# Patient Record
Sex: MALE | Race: WHITE | Employment: OTHER | URBAN - METROPOLITAN AREA
[De-identification: names, ages, dates, MRNs, and addresses within clinical notes are randomized per-mention and may not be internally consistent; named-entity substitution may affect disease eponyms.]

---

## 2017-01-03 ENCOUNTER — OFFICE VISIT (OUTPATIENT)
Dept: CARDIAC REHAB | Facility: CLINIC | Age: 82
End: 2017-01-03
Payer: COMMERCIAL

## 2017-01-03 DIAGNOSIS — Z95.2 S/P TAVR (TRANSCATHETER AORTIC VALVE REPLACEMENT): ICD-10-CM

## 2017-01-03 PROCEDURE — 93798 PHYS/QHP OP CAR RHAB W/ECG: CPT

## 2017-01-06 ENCOUNTER — HOSPITAL ENCOUNTER (INPATIENT)
Facility: HOSPITAL | Age: 82
LOS: 5 days | Discharge: HOME HEALTH | DRG: 291 | End: 2017-01-11
Attending: EMERGENCY MEDICINE | Admitting: INTERNAL MEDICINE
Payer: COMMERCIAL

## 2017-01-06 ENCOUNTER — APPOINTMENT (EMERGENCY)
Dept: RADIOLOGY | Facility: HOSPITAL | Age: 82
DRG: 291 | End: 2017-01-06
Payer: COMMERCIAL

## 2017-01-06 DIAGNOSIS — I50.22 CHRONIC SYSTOLIC HEART FAILURE (HCC): Chronic | ICD-10-CM

## 2017-01-06 DIAGNOSIS — J44.9 CHRONIC OBSTRUCTIVE PULMONARY DISEASE, UNSPECIFIED COPD TYPE (HCC): Chronic | ICD-10-CM

## 2017-01-06 DIAGNOSIS — J44.1 COPD EXACERBATION (HCC): ICD-10-CM

## 2017-01-06 DIAGNOSIS — I48.0 PAROXYSMAL ATRIAL FIBRILLATION (HCC): ICD-10-CM

## 2017-01-06 DIAGNOSIS — J44.1 COPD WITH EXACERBATION (HCC): Primary | ICD-10-CM

## 2017-01-06 DIAGNOSIS — I49.9 ARRHYTHMIA: ICD-10-CM

## 2017-01-06 DIAGNOSIS — J90 PLEURAL EFFUSION: ICD-10-CM

## 2017-01-06 PROBLEM — I47.2 NON-SUSTAINED VENTRICULAR TACHYCARDIA (HCC): Status: ACTIVE | Noted: 2017-01-06

## 2017-01-06 LAB
ALBUMIN SERPL BCP-MCNC: 2.7 G/DL (ref 3.5–5)
ALP SERPL-CCNC: 67 U/L (ref 46–116)
ALT SERPL W P-5'-P-CCNC: 14 U/L (ref 12–78)
ANION GAP SERPL CALCULATED.3IONS-SCNC: 13 MMOL/L (ref 4–13)
APTT PPP: 30 SECONDS (ref 24–36)
AST SERPL W P-5'-P-CCNC: 40 U/L (ref 5–45)
BACTERIA UR QL AUTO: NORMAL /HPF
BASOPHILS # BLD AUTO: 0 THOUSANDS/ΜL (ref 0–0.1)
BASOPHILS NFR BLD AUTO: 0 % (ref 0–1)
BILIRUB SERPL-MCNC: 0.6 MG/DL (ref 0.2–1)
BILIRUB UR QL STRIP: NEGATIVE
BUN SERPL-MCNC: 23 MG/DL (ref 5–25)
CALCIUM SERPL-MCNC: 8.7 MG/DL (ref 8.3–10.1)
CHLORIDE SERPL-SCNC: 103 MMOL/L (ref 100–108)
CK SERPL-CCNC: 111 U/L (ref 39–308)
CLARITY UR: CLEAR
CO2 SERPL-SCNC: 25 MMOL/L (ref 21–32)
COLOR UR: YELLOW
CREAT SERPL-MCNC: 1.8 MG/DL (ref 0.6–1.3)
EOSINOPHIL # BLD AUTO: 0 THOUSAND/ΜL (ref 0–0.61)
EOSINOPHIL NFR BLD AUTO: 1 % (ref 0–6)
ERYTHROCYTE [DISTWIDTH] IN BLOOD BY AUTOMATED COUNT: 15.8 % (ref 11.6–15.1)
GFR SERPL CREATININE-BSD FRML MDRD: 36 ML/MIN/1.73SQ M
GLUCOSE SERPL-MCNC: 98 MG/DL (ref 65–140)
GLUCOSE UR STRIP-MCNC: NEGATIVE MG/DL
HCT VFR BLD AUTO: 30.5 % (ref 42–52)
HGB BLD-MCNC: 9.5 G/DL (ref 14–18)
HGB UR QL STRIP.AUTO: NEGATIVE
INR PPP: 1.26 (ref 0.86–1.16)
KETONES UR STRIP-MCNC: NEGATIVE MG/DL
LACTATE SERPL-SCNC: 1.1 MMOL/L (ref 0.5–2)
LEUKOCYTE ESTERASE UR QL STRIP: NEGATIVE
LYMPHOCYTES # BLD AUTO: 0.6 THOUSANDS/ΜL (ref 0.6–4.47)
LYMPHOCYTES NFR BLD AUTO: 11 % (ref 14–44)
MAGNESIUM SERPL-MCNC: 2 MG/DL (ref 1.6–2.6)
MCH RBC QN AUTO: 25.1 PG (ref 27–31)
MCHC RBC AUTO-ENTMCNC: 31.1 G/DL (ref 31.4–37.4)
MCV RBC AUTO: 81 FL (ref 82–98)
MONOCYTES # BLD AUTO: 0.8 THOUSAND/ΜL (ref 0.17–1.22)
MONOCYTES NFR BLD AUTO: 13 % (ref 4–12)
NEUTROPHILS # BLD AUTO: 4.6 THOUSANDS/ΜL (ref 1.85–7.62)
NEUTS SEG NFR BLD AUTO: 76 % (ref 43–75)
NITRITE UR QL STRIP: NEGATIVE
NON-SQ EPI CELLS URNS QL MICRO: NORMAL /HPF
NRBC BLD AUTO-RTO: 0 /100 WBCS
NT-PROBNP SERPL-MCNC: 6070 PG/ML
PH UR STRIP.AUTO: 5.5 [PH] (ref 5–9)
PHOSPHATE SERPL-MCNC: 2.4 MG/DL (ref 2.3–4.1)
PLATELET # BLD AUTO: 271 THOUSANDS/UL (ref 130–400)
PMV BLD AUTO: 8.4 FL (ref 8.9–12.7)
POTASSIUM SERPL-SCNC: 3.9 MMOL/L (ref 3.5–5.3)
PROT SERPL-MCNC: 7.3 G/DL (ref 6.4–8.2)
PROT UR STRIP-MCNC: ABNORMAL MG/DL
PROTHROMBIN TIME: 13.3 SECONDS (ref 9.4–11.7)
RBC # BLD AUTO: 3.77 MILLION/UL (ref 4.7–6.1)
RBC #/AREA URNS AUTO: NORMAL /HPF
SODIUM SERPL-SCNC: 141 MMOL/L (ref 136–145)
SP GR UR STRIP.AUTO: 1.02 (ref 1–1.03)
TROPONIN I SERPL-MCNC: 0.07 NG/ML
UROBILINOGEN UR QL STRIP.AUTO: 0.2 E.U./DL
WBC # BLD AUTO: 6 THOUSAND/UL (ref 4.8–10.8)
WBC #/AREA URNS AUTO: NORMAL /HPF

## 2017-01-06 PROCEDURE — 36415 COLL VENOUS BLD VENIPUNCTURE: CPT | Performed by: EMERGENCY MEDICINE

## 2017-01-06 PROCEDURE — 83880 ASSAY OF NATRIURETIC PEPTIDE: CPT | Performed by: EMERGENCY MEDICINE

## 2017-01-06 PROCEDURE — 85610 PROTHROMBIN TIME: CPT | Performed by: EMERGENCY MEDICINE

## 2017-01-06 PROCEDURE — 85025 COMPLETE CBC W/AUTO DIFF WBC: CPT | Performed by: EMERGENCY MEDICINE

## 2017-01-06 PROCEDURE — 83735 ASSAY OF MAGNESIUM: CPT | Performed by: EMERGENCY MEDICINE

## 2017-01-06 PROCEDURE — 85730 THROMBOPLASTIN TIME PARTIAL: CPT | Performed by: EMERGENCY MEDICINE

## 2017-01-06 PROCEDURE — 80053 COMPREHEN METABOLIC PANEL: CPT | Performed by: EMERGENCY MEDICINE

## 2017-01-06 PROCEDURE — 82550 ASSAY OF CK (CPK): CPT | Performed by: EMERGENCY MEDICINE

## 2017-01-06 PROCEDURE — 71010 HB CHEST X-RAY 1 VIEW FRONTAL (PORTABLE): CPT

## 2017-01-06 PROCEDURE — 87081 CULTURE SCREEN ONLY: CPT | Performed by: ANESTHESIOLOGY

## 2017-01-06 PROCEDURE — 84484 ASSAY OF TROPONIN QUANT: CPT | Performed by: FAMILY MEDICINE

## 2017-01-06 PROCEDURE — 93005 ELECTROCARDIOGRAM TRACING: CPT | Performed by: EMERGENCY MEDICINE

## 2017-01-06 PROCEDURE — 96375 TX/PRO/DX INJ NEW DRUG ADDON: CPT

## 2017-01-06 PROCEDURE — 83605 ASSAY OF LACTIC ACID: CPT | Performed by: EMERGENCY MEDICINE

## 2017-01-06 PROCEDURE — 84100 ASSAY OF PHOSPHORUS: CPT | Performed by: FAMILY MEDICINE

## 2017-01-06 PROCEDURE — 87040 BLOOD CULTURE FOR BACTERIA: CPT | Performed by: EMERGENCY MEDICINE

## 2017-01-06 PROCEDURE — 99285 EMERGENCY DEPT VISIT HI MDM: CPT

## 2017-01-06 PROCEDURE — 94640 AIRWAY INHALATION TREATMENT: CPT

## 2017-01-06 PROCEDURE — 84484 ASSAY OF TROPONIN QUANT: CPT | Performed by: EMERGENCY MEDICINE

## 2017-01-06 PROCEDURE — 81001 URINALYSIS AUTO W/SCOPE: CPT | Performed by: EMERGENCY MEDICINE

## 2017-01-06 PROCEDURE — 96365 THER/PROPH/DIAG IV INF INIT: CPT

## 2017-01-06 PROCEDURE — 96367 TX/PROPH/DG ADDL SEQ IV INF: CPT

## 2017-01-06 RX ORDER — IPRATROPIUM BROMIDE AND ALBUTEROL SULFATE 2.5; .5 MG/3ML; MG/3ML
3 SOLUTION RESPIRATORY (INHALATION) ONCE
Status: COMPLETED | OUTPATIENT
Start: 2017-01-06 | End: 2017-01-06

## 2017-01-06 RX ORDER — ACETAMINOPHEN 325 MG/1
650 TABLET ORAL ONCE
Status: COMPLETED | OUTPATIENT
Start: 2017-01-06 | End: 2017-01-06

## 2017-01-06 RX ORDER — FUROSEMIDE 10 MG/ML
20 INJECTION INTRAMUSCULAR; INTRAVENOUS DAILY
Status: DISCONTINUED | OUTPATIENT
Start: 2017-01-06 | End: 2017-01-07

## 2017-01-06 RX ORDER — DOCUSATE SODIUM 100 MG/1
100 CAPSULE, LIQUID FILLED ORAL
Status: DISCONTINUED | OUTPATIENT
Start: 2017-01-06 | End: 2017-01-11 | Stop reason: HOSPADM

## 2017-01-06 RX ORDER — AMIODARONE HYDROCHLORIDE 200 MG/1
100 TABLET ORAL DAILY
Status: DISCONTINUED | OUTPATIENT
Start: 2017-01-07 | End: 2017-01-11 | Stop reason: HOSPADM

## 2017-01-06 RX ORDER — IPRATROPIUM BROMIDE AND ALBUTEROL SULFATE 2.5; .5 MG/3ML; MG/3ML
3 SOLUTION RESPIRATORY (INHALATION) EVERY 2 HOUR PRN
Status: DISCONTINUED | OUTPATIENT
Start: 2017-01-06 | End: 2017-01-11 | Stop reason: HOSPADM

## 2017-01-06 RX ORDER — TOLTERODINE 2 MG/1
4 CAPSULE, EXTENDED RELEASE ORAL DAILY
Status: DISCONTINUED | OUTPATIENT
Start: 2017-01-07 | End: 2017-01-11 | Stop reason: HOSPADM

## 2017-01-06 RX ORDER — SODIUM CHLORIDE 9 MG/ML
125 INJECTION, SOLUTION INTRAVENOUS CONTINUOUS
Status: DISCONTINUED | OUTPATIENT
Start: 2017-01-06 | End: 2017-01-06

## 2017-01-06 RX ORDER — POLYETHYLENE GLYCOL 3350 17 G/17G
17 POWDER, FOR SOLUTION ORAL DAILY
Status: DISCONTINUED | OUTPATIENT
Start: 2017-01-07 | End: 2017-01-11 | Stop reason: HOSPADM

## 2017-01-06 RX ORDER — METHYLPREDNISOLONE SODIUM SUCCINATE 125 MG/2ML
125 INJECTION, POWDER, LYOPHILIZED, FOR SOLUTION INTRAMUSCULAR; INTRAVENOUS ONCE
Status: COMPLETED | OUTPATIENT
Start: 2017-01-06 | End: 2017-01-06

## 2017-01-06 RX ORDER — LEVOTHYROXINE SODIUM 0.05 MG/1
50 TABLET ORAL
Status: DISCONTINUED | OUTPATIENT
Start: 2017-01-07 | End: 2017-01-11 | Stop reason: HOSPADM

## 2017-01-06 RX ORDER — 0.9 % SODIUM CHLORIDE 0.9 %
3 VIAL (ML) INJECTION AS NEEDED
Status: DISCONTINUED | OUTPATIENT
Start: 2017-01-06 | End: 2017-01-11 | Stop reason: HOSPADM

## 2017-01-06 RX ORDER — AMLODIPINE BESYLATE 10 MG/1
10 TABLET ORAL DAILY
Status: DISCONTINUED | OUTPATIENT
Start: 2017-01-07 | End: 2017-01-11 | Stop reason: HOSPADM

## 2017-01-06 RX ORDER — TADALAFIL 2.5 MG/1
2.5 TABLET ORAL DAILY PRN
Status: DISCONTINUED | OUTPATIENT
Start: 2017-01-06 | End: 2017-01-06

## 2017-01-06 RX ORDER — PRAVASTATIN SODIUM 20 MG
10 TABLET ORAL
Status: DISCONTINUED | OUTPATIENT
Start: 2017-01-07 | End: 2017-01-11 | Stop reason: HOSPADM

## 2017-01-06 RX ORDER — PANTOPRAZOLE SODIUM 40 MG/1
40 TABLET, DELAYED RELEASE ORAL
Status: DISCONTINUED | OUTPATIENT
Start: 2017-01-07 | End: 2017-01-11 | Stop reason: HOSPADM

## 2017-01-06 RX ORDER — MAGNESIUM SULFATE HEPTAHYDRATE 40 MG/ML
2 INJECTION, SOLUTION INTRAVENOUS ONCE
Status: COMPLETED | OUTPATIENT
Start: 2017-01-06 | End: 2017-01-07

## 2017-01-06 RX ORDER — AMIODARONE HYDROCHLORIDE 200 MG/1
100 TABLET ORAL ONCE
Status: DISCONTINUED | OUTPATIENT
Start: 2017-01-06 | End: 2017-01-06

## 2017-01-06 RX ORDER — ASPIRIN 81 MG/1
162 TABLET, CHEWABLE ORAL DAILY
Status: DISCONTINUED | OUTPATIENT
Start: 2017-01-07 | End: 2017-01-11 | Stop reason: HOSPADM

## 2017-01-06 RX ORDER — FENOFIBRATE 145 MG/1
145 TABLET, COATED ORAL DAILY
Status: DISCONTINUED | OUTPATIENT
Start: 2017-01-07 | End: 2017-01-11 | Stop reason: HOSPADM

## 2017-01-06 RX ORDER — IPRATROPIUM BROMIDE AND ALBUTEROL SULFATE 2.5; .5 MG/3ML; MG/3ML
3 SOLUTION RESPIRATORY (INHALATION)
Status: DISCONTINUED | OUTPATIENT
Start: 2017-01-07 | End: 2017-01-11 | Stop reason: HOSPADM

## 2017-01-06 RX ADMIN — DILTIAZEM HYDROCHLORIDE 2.5 MG/HR: 5 INJECTION INTRAVENOUS at 21:19

## 2017-01-06 RX ADMIN — MAGNESIUM SULFATE HEPTAHYDRATE 2 G: 40 INJECTION, SOLUTION INTRAVENOUS at 22:23

## 2017-01-06 RX ADMIN — AZITHROMYCIN 500 MG: 500 INJECTION, POWDER, LYOPHILIZED, FOR SOLUTION INTRAVENOUS at 19:13

## 2017-01-06 RX ADMIN — IPRATROPIUM BROMIDE AND ALBUTEROL SULFATE 3 ML: .5; 3 SOLUTION RESPIRATORY (INHALATION) at 18:14

## 2017-01-06 RX ADMIN — METHYLPREDNISOLONE SODIUM SUCCINATE 125 MG: 125 INJECTION, POWDER, FOR SOLUTION INTRAMUSCULAR; INTRAVENOUS at 18:14

## 2017-01-06 RX ADMIN — METOPROLOL TARTRATE 25 MG: 25 TABLET ORAL at 23:38

## 2017-01-06 RX ADMIN — CEFTRIAXONE 1000 MG: 1 INJECTION, SOLUTION INTRAVENOUS at 18:14

## 2017-01-06 RX ADMIN — DOCUSATE SODIUM 100 MG: 100 CAPSULE, LIQUID FILLED ORAL at 23:37

## 2017-01-06 RX ADMIN — FUROSEMIDE 20 MG: 10 INJECTION, SOLUTION INTRAMUSCULAR; INTRAVENOUS at 23:37

## 2017-01-06 RX ADMIN — ACETAMINOPHEN 650 MG: 325 TABLET, FILM COATED ORAL at 18:14

## 2017-01-06 RX ADMIN — AMIODARONE HYDROCHLORIDE 150 MG: 50 INJECTION, SOLUTION INTRAVENOUS at 20:30

## 2017-01-06 RX ADMIN — SODIUM CHLORIDE 125 ML/HR: 0.9 INJECTION, SOLUTION INTRAVENOUS at 18:17

## 2017-01-07 PROBLEM — M17.10 OSTEOARTHRITIS OF KNEE: Status: ACTIVE | Noted: 2017-01-07

## 2017-01-07 PROBLEM — I48.91 ATRIAL FIBRILLATION WITH RVR (HCC): Status: ACTIVE | Noted: 2017-01-07

## 2017-01-07 PROBLEM — I48.91 ATRIAL FIBRILLATION WITH RVR (HCC): Chronic | Status: ACTIVE | Noted: 2017-01-07

## 2017-01-07 PROBLEM — E66.01 MORBID OBESITY (HCC): Chronic | Status: ACTIVE | Noted: 2017-01-07

## 2017-01-07 PROBLEM — I10 BENIGN ESSENTIAL HYPERTENSION: Status: RESOLVED | Noted: 2017-01-07 | Resolved: 2017-01-07

## 2017-01-07 PROBLEM — I50.1 LEFT HEART FAILURE (HCC): Status: RESOLVED | Noted: 2017-01-07 | Resolved: 2017-01-07

## 2017-01-07 PROBLEM — R91.8 MULTIPLE PULMONARY NODULES: Status: ACTIVE | Noted: 2017-01-07

## 2017-01-07 PROBLEM — R91.8 MULTIPLE PULMONARY NODULES: Chronic | Status: ACTIVE | Noted: 2017-01-07

## 2017-01-07 PROBLEM — I10 BENIGN ESSENTIAL HYPERTENSION: Status: ACTIVE | Noted: 2017-01-07

## 2017-01-07 PROBLEM — E66.01 MORBID OBESITY (HCC): Status: ACTIVE | Noted: 2017-01-07

## 2017-01-07 PROBLEM — I73.9 PERIPHERAL VASCULAR DISEASE (HCC): Status: ACTIVE | Noted: 2017-01-07

## 2017-01-07 PROBLEM — I47.2 NON-SUSTAINED VENTRICULAR TACHYCARDIA (HCC): Status: RESOLVED | Noted: 2017-01-06 | Resolved: 2017-01-07

## 2017-01-07 PROBLEM — M17.10 OSTEOARTHRITIS OF KNEE: Chronic | Status: ACTIVE | Noted: 2017-01-07

## 2017-01-07 PROBLEM — I50.1 LEFT HEART FAILURE (HCC): Status: ACTIVE | Noted: 2017-01-07

## 2017-01-07 PROBLEM — I73.9 PERIPHERAL VASCULAR DISEASE (HCC): Chronic | Status: ACTIVE | Noted: 2017-01-07

## 2017-01-07 LAB
ANION GAP SERPL CALCULATED.3IONS-SCNC: 9 MMOL/L (ref 4–13)
BUN SERPL-MCNC: 27 MG/DL (ref 5–25)
CALCIUM SERPL-MCNC: 8.4 MG/DL (ref 8.3–10.1)
CHLORIDE SERPL-SCNC: 104 MMOL/L (ref 100–108)
CO2 SERPL-SCNC: 26 MMOL/L (ref 21–32)
CREAT SERPL-MCNC: 1.82 MG/DL (ref 0.6–1.3)
ERYTHROCYTE [DISTWIDTH] IN BLOOD BY AUTOMATED COUNT: 15.8 % (ref 11.6–15.1)
GFR SERPL CREATININE-BSD FRML MDRD: 35.5 ML/MIN/1.73SQ M
GLUCOSE SERPL-MCNC: 176 MG/DL (ref 65–140)
GLUCOSE SERPL-MCNC: 210 MG/DL (ref 65–140)
GLUCOSE SERPL-MCNC: 215 MG/DL (ref 65–140)
GLUCOSE SERPL-MCNC: 290 MG/DL (ref 65–140)
HCT VFR BLD AUTO: 32.3 % (ref 42–52)
HGB BLD-MCNC: 10.3 G/DL (ref 14–18)
MAGNESIUM SERPL-MCNC: 3.2 MG/DL (ref 1.6–2.6)
MCH RBC QN AUTO: 25.7 PG (ref 27–31)
MCHC RBC AUTO-ENTMCNC: 31.7 G/DL (ref 31.4–37.4)
MCV RBC AUTO: 81 FL (ref 82–98)
PHOSPHATE SERPL-MCNC: 4 MG/DL (ref 2.3–4.1)
PLATELET # BLD AUTO: 283 THOUSANDS/UL (ref 130–400)
PMV BLD AUTO: 8.5 FL (ref 8.9–12.7)
POTASSIUM SERPL-SCNC: 4.2 MMOL/L (ref 3.5–5.3)
RBC # BLD AUTO: 3.99 MILLION/UL (ref 4.7–6.1)
SODIUM SERPL-SCNC: 139 MMOL/L (ref 136–145)
TROPONIN I SERPL-MCNC: 0.05 NG/ML
TROPONIN I SERPL-MCNC: 0.07 NG/ML
WBC # BLD AUTO: 3.2 THOUSAND/UL (ref 4.8–10.8)

## 2017-01-07 PROCEDURE — 82948 REAGENT STRIP/BLOOD GLUCOSE: CPT

## 2017-01-07 PROCEDURE — 80048 BASIC METABOLIC PNL TOTAL CA: CPT | Performed by: FAMILY MEDICINE

## 2017-01-07 PROCEDURE — 84100 ASSAY OF PHOSPHORUS: CPT | Performed by: FAMILY MEDICINE

## 2017-01-07 PROCEDURE — 84484 ASSAY OF TROPONIN QUANT: CPT | Performed by: FAMILY MEDICINE

## 2017-01-07 PROCEDURE — 93005 ELECTROCARDIOGRAM TRACING: CPT | Performed by: FAMILY MEDICINE

## 2017-01-07 PROCEDURE — 85027 COMPLETE CBC AUTOMATED: CPT | Performed by: FAMILY MEDICINE

## 2017-01-07 PROCEDURE — 94640 AIRWAY INHALATION TREATMENT: CPT

## 2017-01-07 PROCEDURE — 94660 CPAP INITIATION&MGMT: CPT

## 2017-01-07 PROCEDURE — 94760 N-INVAS EAR/PLS OXIMETRY 1: CPT

## 2017-01-07 PROCEDURE — 83735 ASSAY OF MAGNESIUM: CPT | Performed by: FAMILY MEDICINE

## 2017-01-07 RX ORDER — TORSEMIDE 20 MG/1
20 TABLET ORAL
Status: DISCONTINUED | OUTPATIENT
Start: 2017-01-07 | End: 2017-01-11 | Stop reason: HOSPADM

## 2017-01-07 RX ORDER — TORSEMIDE 20 MG/1
40 TABLET ORAL
Status: DISCONTINUED | OUTPATIENT
Start: 2017-01-08 | End: 2017-01-11 | Stop reason: HOSPADM

## 2017-01-07 RX ORDER — MAGNESIUM SULFATE HEPTAHYDRATE 40 MG/ML
2 INJECTION, SOLUTION INTRAVENOUS ONCE
Status: COMPLETED | OUTPATIENT
Start: 2017-01-07 | End: 2017-01-07

## 2017-01-07 RX ORDER — METHYLPREDNISOLONE SODIUM SUCCINATE 40 MG/ML
40 INJECTION, POWDER, LYOPHILIZED, FOR SOLUTION INTRAMUSCULAR; INTRAVENOUS EVERY 8 HOURS SCHEDULED
Status: DISCONTINUED | OUTPATIENT
Start: 2017-01-07 | End: 2017-01-08

## 2017-01-07 RX ADMIN — INSULIN LISPRO 1 UNITS: 100 INJECTION, SOLUTION INTRAVENOUS; SUBCUTANEOUS at 13:15

## 2017-01-07 RX ADMIN — IPRATROPIUM BROMIDE AND ALBUTEROL SULFATE 3 ML: .5; 3 SOLUTION RESPIRATORY (INHALATION) at 11:52

## 2017-01-07 RX ADMIN — TOLTERODINE TARTRATE 4 MG: 2 CAPSULE, EXTENDED RELEASE ORAL at 09:46

## 2017-01-07 RX ADMIN — IPRATROPIUM BROMIDE AND ALBUTEROL SULFATE 3 ML: .5; 3 SOLUTION RESPIRATORY (INHALATION) at 23:25

## 2017-01-07 RX ADMIN — MAGNESIUM SULFATE HEPTAHYDRATE 2 G: 40 INJECTION, SOLUTION INTRAVENOUS at 01:14

## 2017-01-07 RX ADMIN — ENOXAPARIN SODIUM 30 MG: 30 INJECTION SUBCUTANEOUS at 09:49

## 2017-01-07 RX ADMIN — TORSEMIDE 20 MG: 20 TABLET ORAL at 14:51

## 2017-01-07 RX ADMIN — IPRATROPIUM BROMIDE AND ALBUTEROL SULFATE 3 ML: .5; 3 SOLUTION RESPIRATORY (INHALATION) at 04:13

## 2017-01-07 RX ADMIN — INSULIN LISPRO 1 UNITS: 100 INJECTION, SOLUTION INTRAVENOUS; SUBCUTANEOUS at 16:32

## 2017-01-07 RX ADMIN — IPRATROPIUM BROMIDE AND ALBUTEROL SULFATE 3 ML: .5; 3 SOLUTION RESPIRATORY (INHALATION) at 20:23

## 2017-01-07 RX ADMIN — LEVOTHYROXINE SODIUM 50 MCG: 50 TABLET ORAL at 06:04

## 2017-01-07 RX ADMIN — METHYLPREDNISOLONE SODIUM SUCCINATE 40 MG: 40 INJECTION, POWDER, FOR SOLUTION INTRAMUSCULAR; INTRAVENOUS at 14:50

## 2017-01-07 RX ADMIN — METHYLPREDNISOLONE SODIUM SUCCINATE 40 MG: 40 INJECTION, POWDER, FOR SOLUTION INTRAMUSCULAR; INTRAVENOUS at 21:12

## 2017-01-07 RX ADMIN — PANTOPRAZOLE SODIUM 40 MG: 40 TABLET, DELAYED RELEASE ORAL at 09:55

## 2017-01-07 RX ADMIN — PRAVASTATIN SODIUM 10 MG: 20 TABLET ORAL at 16:31

## 2017-01-07 RX ADMIN — PANTOPRAZOLE SODIUM 40 MG: 40 TABLET, DELAYED RELEASE ORAL at 15:48

## 2017-01-07 RX ADMIN — IPRATROPIUM BROMIDE AND ALBUTEROL SULFATE 3 ML: .5; 3 SOLUTION RESPIRATORY (INHALATION) at 15:48

## 2017-01-07 RX ADMIN — FUROSEMIDE 20 MG: 10 INJECTION, SOLUTION INTRAMUSCULAR; INTRAVENOUS at 09:51

## 2017-01-07 RX ADMIN — IPRATROPIUM BROMIDE AND ALBUTEROL SULFATE 3 ML: .5; 3 SOLUTION RESPIRATORY (INHALATION) at 00:09

## 2017-01-07 RX ADMIN — ASPIRIN 81 MG 162 MG: 81 TABLET ORAL at 09:48

## 2017-01-07 RX ADMIN — METOPROLOL TARTRATE 25 MG: 25 TABLET ORAL at 09:48

## 2017-01-07 RX ADMIN — DOCUSATE SODIUM 100 MG: 100 CAPSULE, LIQUID FILLED ORAL at 21:12

## 2017-01-07 RX ADMIN — AMIODARONE HYDROCHLORIDE 100 MG: 200 TABLET ORAL at 09:47

## 2017-01-07 RX ADMIN — IPRATROPIUM BROMIDE AND ALBUTEROL SULFATE 3 ML: .5; 3 SOLUTION RESPIRATORY (INHALATION) at 07:54

## 2017-01-07 RX ADMIN — FENOFIBRATE 145 MG: 145 TABLET, FILM COATED ORAL at 09:49

## 2017-01-07 RX ADMIN — METOPROLOL TARTRATE 25 MG: 25 TABLET ORAL at 21:11

## 2017-01-07 RX ADMIN — AZITHROMYCIN MONOHYDRATE 500 MG: 500 INJECTION, POWDER, LYOPHILIZED, FOR SOLUTION INTRAVENOUS at 19:50

## 2017-01-07 RX ADMIN — AMLODIPINE BESYLATE 10 MG: 10 TABLET ORAL at 09:47

## 2017-01-08 LAB
ANION GAP SERPL CALCULATED.3IONS-SCNC: 10 MMOL/L (ref 4–13)
BASOPHILS # BLD AUTO: 0 THOUSANDS/ΜL (ref 0–0.1)
BASOPHILS NFR BLD AUTO: 0 % (ref 0–1)
BUN SERPL-MCNC: 34 MG/DL (ref 5–25)
CALCIUM SERPL-MCNC: 8 MG/DL (ref 8.3–10.1)
CHLORIDE SERPL-SCNC: 102 MMOL/L (ref 100–108)
CO2 SERPL-SCNC: 26 MMOL/L (ref 21–32)
CREAT SERPL-MCNC: 1.84 MG/DL (ref 0.6–1.3)
EOSINOPHIL # BLD AUTO: 0 THOUSAND/ΜL (ref 0–0.61)
EOSINOPHIL NFR BLD AUTO: 0 % (ref 0–6)
ERYTHROCYTE [DISTWIDTH] IN BLOOD BY AUTOMATED COUNT: 15.7 % (ref 11.6–15.1)
EST. AVERAGE GLUCOSE BLD GHB EST-MCNC: 123 MG/DL
GFR SERPL CREATININE-BSD FRML MDRD: 35.1 ML/MIN/1.73SQ M
GLUCOSE SERPL-MCNC: 178 MG/DL (ref 65–140)
GLUCOSE SERPL-MCNC: 190 MG/DL (ref 65–140)
GLUCOSE SERPL-MCNC: 195 MG/DL (ref 65–140)
GLUCOSE SERPL-MCNC: 206 MG/DL (ref 65–140)
GLUCOSE SERPL-MCNC: 267 MG/DL (ref 65–140)
HBA1C MFR BLD: 5.9 % (ref 4.2–6.3)
HCT VFR BLD AUTO: 29.8 % (ref 42–52)
HGB BLD-MCNC: 9.6 G/DL (ref 14–18)
LYMPHOCYTES # BLD AUTO: 0.6 THOUSANDS/ΜL (ref 0.6–4.47)
LYMPHOCYTES NFR BLD AUTO: 6 % (ref 14–44)
MCH RBC QN AUTO: 25.7 PG (ref 27–31)
MCHC RBC AUTO-ENTMCNC: 32 G/DL (ref 31.4–37.4)
MCV RBC AUTO: 80 FL (ref 82–98)
MONOCYTES # BLD AUTO: 0.4 THOUSAND/ΜL (ref 0.17–1.22)
MONOCYTES NFR BLD AUTO: 4 % (ref 4–12)
MRSA NOSE QL CULT: ABNORMAL
NEUTROPHILS # BLD AUTO: 8.8 THOUSANDS/ΜL (ref 1.85–7.62)
NEUTS SEG NFR BLD AUTO: 90 % (ref 43–75)
NRBC BLD AUTO-RTO: 0 /100 WBCS
PLATELET # BLD AUTO: 256 THOUSANDS/UL (ref 130–400)
PMV BLD AUTO: 8.8 FL (ref 8.9–12.7)
POTASSIUM SERPL-SCNC: 4.1 MMOL/L (ref 3.5–5.3)
RBC # BLD AUTO: 3.72 MILLION/UL (ref 4.7–6.1)
SODIUM SERPL-SCNC: 138 MMOL/L (ref 136–145)
TSH SERPL DL<=0.05 MIU/L-ACNC: 4.61 UIU/ML (ref 0.36–3.74)
WBC # BLD AUTO: 9.7 THOUSAND/UL (ref 4.8–10.8)

## 2017-01-08 PROCEDURE — 85025 COMPLETE CBC W/AUTO DIFF WBC: CPT | Performed by: PHYSICIAN ASSISTANT

## 2017-01-08 PROCEDURE — 93005 ELECTROCARDIOGRAM TRACING: CPT | Performed by: PHYSICIAN ASSISTANT

## 2017-01-08 PROCEDURE — 84443 ASSAY THYROID STIM HORMONE: CPT | Performed by: INTERNAL MEDICINE

## 2017-01-08 PROCEDURE — 80048 BASIC METABOLIC PNL TOTAL CA: CPT | Performed by: PHYSICIAN ASSISTANT

## 2017-01-08 PROCEDURE — 94760 N-INVAS EAR/PLS OXIMETRY 1: CPT

## 2017-01-08 PROCEDURE — 94640 AIRWAY INHALATION TREATMENT: CPT

## 2017-01-08 PROCEDURE — 94660 CPAP INITIATION&MGMT: CPT

## 2017-01-08 PROCEDURE — 82948 REAGENT STRIP/BLOOD GLUCOSE: CPT

## 2017-01-08 PROCEDURE — 83036 HEMOGLOBIN GLYCOSYLATED A1C: CPT | Performed by: PHYSICIAN ASSISTANT

## 2017-01-08 RX ORDER — METHYLPREDNISOLONE SODIUM SUCCINATE 40 MG/ML
40 INJECTION, POWDER, LYOPHILIZED, FOR SOLUTION INTRAMUSCULAR; INTRAVENOUS EVERY 12 HOURS SCHEDULED
Status: DISCONTINUED | OUTPATIENT
Start: 2017-01-09 | End: 2017-01-10

## 2017-01-08 RX ADMIN — AMIODARONE HYDROCHLORIDE 100 MG: 200 TABLET ORAL at 08:25

## 2017-01-08 RX ADMIN — INSULIN LISPRO 1 UNITS: 100 INJECTION, SOLUTION INTRAVENOUS; SUBCUTANEOUS at 12:23

## 2017-01-08 RX ADMIN — PANTOPRAZOLE SODIUM 40 MG: 40 TABLET, DELAYED RELEASE ORAL at 06:33

## 2017-01-08 RX ADMIN — METOPROLOL TARTRATE 25 MG: 25 TABLET ORAL at 08:25

## 2017-01-08 RX ADMIN — ASPIRIN 81 MG 162 MG: 81 TABLET ORAL at 08:24

## 2017-01-08 RX ADMIN — PANTOPRAZOLE SODIUM 40 MG: 40 TABLET, DELAYED RELEASE ORAL at 15:58

## 2017-01-08 RX ADMIN — METOPROLOL TARTRATE 25 MG: 25 TABLET ORAL at 21:14

## 2017-01-08 RX ADMIN — IPRATROPIUM BROMIDE AND ALBUTEROL SULFATE 3 ML: .5; 3 SOLUTION RESPIRATORY (INHALATION) at 14:49

## 2017-01-08 RX ADMIN — LEVOTHYROXINE SODIUM 50 MCG: 50 TABLET ORAL at 06:33

## 2017-01-08 RX ADMIN — ENOXAPARIN SODIUM 30 MG: 30 INJECTION SUBCUTANEOUS at 08:25

## 2017-01-08 RX ADMIN — METHYLPREDNISOLONE SODIUM SUCCINATE 40 MG: 40 INJECTION, POWDER, FOR SOLUTION INTRAMUSCULAR; INTRAVENOUS at 14:20

## 2017-01-08 RX ADMIN — AMLODIPINE BESYLATE 10 MG: 10 TABLET ORAL at 08:25

## 2017-01-08 RX ADMIN — AZITHROMYCIN MONOHYDRATE 500 MG: 500 INJECTION, POWDER, LYOPHILIZED, FOR SOLUTION INTRAVENOUS at 18:14

## 2017-01-08 RX ADMIN — POLYETHYLENE GLYCOL 3350 17 G: 17 POWDER, FOR SOLUTION ORAL at 08:25

## 2017-01-08 RX ADMIN — DOCUSATE SODIUM 100 MG: 100 CAPSULE, LIQUID FILLED ORAL at 21:14

## 2017-01-08 RX ADMIN — IPRATROPIUM BROMIDE AND ALBUTEROL SULFATE 3 ML: .5; 3 SOLUTION RESPIRATORY (INHALATION) at 08:56

## 2017-01-08 RX ADMIN — METHYLPREDNISOLONE SODIUM SUCCINATE 40 MG: 40 INJECTION, POWDER, FOR SOLUTION INTRAMUSCULAR; INTRAVENOUS at 06:33

## 2017-01-08 RX ADMIN — TORSEMIDE 40 MG: 20 TABLET ORAL at 14:20

## 2017-01-08 RX ADMIN — IPRATROPIUM BROMIDE AND ALBUTEROL SULFATE 3 ML: .5; 3 SOLUTION RESPIRATORY (INHALATION) at 23:21

## 2017-01-08 RX ADMIN — PRAVASTATIN SODIUM 10 MG: 20 TABLET ORAL at 15:58

## 2017-01-08 RX ADMIN — INSULIN LISPRO 1 UNITS: 100 INJECTION, SOLUTION INTRAVENOUS; SUBCUTANEOUS at 09:07

## 2017-01-08 RX ADMIN — IPRATROPIUM BROMIDE AND ALBUTEROL SULFATE 3 ML: .5; 3 SOLUTION RESPIRATORY (INHALATION) at 04:05

## 2017-01-08 RX ADMIN — INSULIN LISPRO 1 UNITS: 100 INJECTION, SOLUTION INTRAVENOUS; SUBCUTANEOUS at 17:40

## 2017-01-08 RX ADMIN — FENOFIBRATE 145 MG: 145 TABLET, FILM COATED ORAL at 08:24

## 2017-01-09 ENCOUNTER — APPOINTMENT (INPATIENT)
Dept: RADIOLOGY | Facility: HOSPITAL | Age: 82
DRG: 291 | End: 2017-01-09
Payer: COMMERCIAL

## 2017-01-09 LAB
ANION GAP SERPL CALCULATED.3IONS-SCNC: 9 MMOL/L (ref 4–13)
ATRIAL RATE: 72 BPM
ATRIAL RATE: 84 BPM
BUN SERPL-MCNC: 40 MG/DL (ref 5–25)
CALCIUM SERPL-MCNC: 8.4 MG/DL (ref 8.3–10.1)
CHLORIDE SERPL-SCNC: 100 MMOL/L (ref 100–108)
CO2 SERPL-SCNC: 30 MMOL/L (ref 21–32)
CREAT SERPL-MCNC: 1.77 MG/DL (ref 0.6–1.3)
ERYTHROCYTE [DISTWIDTH] IN BLOOD BY AUTOMATED COUNT: 16.1 % (ref 11.6–15.1)
GFR SERPL CREATININE-BSD FRML MDRD: 36.7 ML/MIN/1.73SQ M
GLUCOSE SERPL-MCNC: 164 MG/DL (ref 65–140)
GLUCOSE SERPL-MCNC: 165 MG/DL (ref 65–140)
GLUCOSE SERPL-MCNC: 184 MG/DL (ref 65–140)
GLUCOSE SERPL-MCNC: 185 MG/DL (ref 65–140)
GLUCOSE SERPL-MCNC: 263 MG/DL (ref 65–140)
HCT VFR BLD AUTO: 32 % (ref 42–52)
HGB BLD-MCNC: 10.1 G/DL (ref 14–18)
LDH SERPL-CCNC: 282 U/L (ref 81–234)
MCH RBC QN AUTO: 25.6 PG (ref 27–31)
MCHC RBC AUTO-ENTMCNC: 31.7 G/DL (ref 31.4–37.4)
MCV RBC AUTO: 81 FL (ref 82–98)
P AXIS: 26 DEGREES
P AXIS: 51 DEGREES
PLATELET # BLD AUTO: 257 THOUSANDS/UL (ref 130–400)
PMV BLD AUTO: 8.8 FL (ref 8.9–12.7)
POTASSIUM SERPL-SCNC: 3.9 MMOL/L (ref 3.5–5.3)
PR INTERVAL: 200 MS
PR INTERVAL: 208 MS
PROT SERPL-MCNC: 7.6 G/DL (ref 6.4–8.2)
QRS AXIS: -49 DEGREES
QRS AXIS: -51 DEGREES
QRSD INTERVAL: 146 MS
QRSD INTERVAL: 146 MS
QT INTERVAL: 440 MS
QT INTERVAL: 464 MS
QTC INTERVAL: 508 MS
QTC INTERVAL: 519 MS
RBC # BLD AUTO: 3.97 MILLION/UL (ref 4.7–6.1)
SODIUM SERPL-SCNC: 139 MMOL/L (ref 136–145)
T WAVE AXIS: 79 DEGREES
T WAVE AXIS: 79 DEGREES
VENTRICULAR RATE: 72 BPM
VENTRICULAR RATE: 84 BPM
WBC # BLD AUTO: 11.6 THOUSAND/UL (ref 4.8–10.8)

## 2017-01-09 PROCEDURE — 97165 OT EVAL LOW COMPLEX 30 MIN: CPT

## 2017-01-09 PROCEDURE — 83615 LACTATE (LD) (LDH) ENZYME: CPT | Performed by: INTERNAL MEDICINE

## 2017-01-09 PROCEDURE — 82948 REAGENT STRIP/BLOOD GLUCOSE: CPT

## 2017-01-09 PROCEDURE — 94640 AIRWAY INHALATION TREATMENT: CPT

## 2017-01-09 PROCEDURE — G8978 MOBILITY CURRENT STATUS: HCPCS

## 2017-01-09 PROCEDURE — 76604 US EXAM CHEST: CPT

## 2017-01-09 PROCEDURE — 84155 ASSAY OF PROTEIN SERUM: CPT | Performed by: INTERNAL MEDICINE

## 2017-01-09 PROCEDURE — G8988 SELF CARE GOAL STATUS: HCPCS

## 2017-01-09 PROCEDURE — 85027 COMPLETE CBC AUTOMATED: CPT | Performed by: FAMILY MEDICINE

## 2017-01-09 PROCEDURE — 94760 N-INVAS EAR/PLS OXIMETRY 1: CPT

## 2017-01-09 PROCEDURE — G8979 MOBILITY GOAL STATUS: HCPCS

## 2017-01-09 PROCEDURE — G8987 SELF CARE CURRENT STATUS: HCPCS

## 2017-01-09 PROCEDURE — 94660 CPAP INITIATION&MGMT: CPT

## 2017-01-09 PROCEDURE — 80048 BASIC METABOLIC PNL TOTAL CA: CPT | Performed by: INTERNAL MEDICINE

## 2017-01-09 PROCEDURE — 97161 PT EVAL LOW COMPLEX 20 MIN: CPT

## 2017-01-09 RX ORDER — ACETAMINOPHEN 325 MG/1
650 TABLET ORAL EVERY 6 HOURS PRN
Status: DISCONTINUED | OUTPATIENT
Start: 2017-01-09 | End: 2017-01-11 | Stop reason: HOSPADM

## 2017-01-09 RX ADMIN — AZITHROMYCIN MONOHYDRATE 500 MG: 500 INJECTION, POWDER, LYOPHILIZED, FOR SOLUTION INTRAVENOUS at 18:10

## 2017-01-09 RX ADMIN — METHYLPREDNISOLONE SODIUM SUCCINATE 40 MG: 40 INJECTION, POWDER, FOR SOLUTION INTRAMUSCULAR; INTRAVENOUS at 13:10

## 2017-01-09 RX ADMIN — IPRATROPIUM BROMIDE AND ALBUTEROL SULFATE 3 ML: .5; 3 SOLUTION RESPIRATORY (INHALATION) at 20:21

## 2017-01-09 RX ADMIN — IPRATROPIUM BROMIDE AND ALBUTEROL SULFATE 3 ML: .5; 3 SOLUTION RESPIRATORY (INHALATION) at 23:06

## 2017-01-09 RX ADMIN — INSULIN LISPRO 1 UNITS: 100 INJECTION, SOLUTION INTRAVENOUS; SUBCUTANEOUS at 08:19

## 2017-01-09 RX ADMIN — IPRATROPIUM BROMIDE AND ALBUTEROL SULFATE 3 ML: .5; 3 SOLUTION RESPIRATORY (INHALATION) at 07:56

## 2017-01-09 RX ADMIN — TORSEMIDE 20 MG: 20 TABLET ORAL at 13:10

## 2017-01-09 RX ADMIN — IPRATROPIUM BROMIDE AND ALBUTEROL SULFATE 3 ML: .5; 3 SOLUTION RESPIRATORY (INHALATION) at 04:06

## 2017-01-09 RX ADMIN — INSULIN LISPRO 1 UNITS: 100 INJECTION, SOLUTION INTRAVENOUS; SUBCUTANEOUS at 12:24

## 2017-01-09 RX ADMIN — ACETAMINOPHEN 650 MG: 325 TABLET, FILM COATED ORAL at 22:40

## 2017-01-09 RX ADMIN — METOPROLOL TARTRATE 25 MG: 25 TABLET ORAL at 08:20

## 2017-01-09 RX ADMIN — INSULIN LISPRO 1 UNITS: 100 INJECTION, SOLUTION INTRAVENOUS; SUBCUTANEOUS at 16:52

## 2017-01-09 RX ADMIN — PRAVASTATIN SODIUM 10 MG: 20 TABLET ORAL at 16:52

## 2017-01-09 RX ADMIN — POLYETHYLENE GLYCOL 3350 17 G: 17 POWDER, FOR SOLUTION ORAL at 08:19

## 2017-01-09 RX ADMIN — METHYLPREDNISOLONE SODIUM SUCCINATE 40 MG: 40 INJECTION, POWDER, FOR SOLUTION INTRAMUSCULAR; INTRAVENOUS at 02:12

## 2017-01-09 RX ADMIN — DOCUSATE SODIUM 100 MG: 100 CAPSULE, LIQUID FILLED ORAL at 21:33

## 2017-01-09 RX ADMIN — ASPIRIN 81 MG 162 MG: 81 TABLET ORAL at 08:20

## 2017-01-09 RX ADMIN — METOPROLOL TARTRATE 25 MG: 25 TABLET ORAL at 21:33

## 2017-01-09 RX ADMIN — IPRATROPIUM BROMIDE AND ALBUTEROL SULFATE 3 ML: .5; 3 SOLUTION RESPIRATORY (INHALATION) at 11:43

## 2017-01-09 RX ADMIN — PANTOPRAZOLE SODIUM 40 MG: 40 TABLET, DELAYED RELEASE ORAL at 06:48

## 2017-01-09 RX ADMIN — FENOFIBRATE 145 MG: 145 TABLET, FILM COATED ORAL at 08:20

## 2017-01-09 RX ADMIN — PANTOPRAZOLE SODIUM 40 MG: 40 TABLET, DELAYED RELEASE ORAL at 16:52

## 2017-01-09 RX ADMIN — LEVOTHYROXINE SODIUM 50 MCG: 50 TABLET ORAL at 06:48

## 2017-01-09 RX ADMIN — AMIODARONE HYDROCHLORIDE 100 MG: 200 TABLET ORAL at 08:20

## 2017-01-09 RX ADMIN — TOLTERODINE TARTRATE 4 MG: 2 CAPSULE, EXTENDED RELEASE ORAL at 08:20

## 2017-01-09 RX ADMIN — AMLODIPINE BESYLATE 10 MG: 10 TABLET ORAL at 08:20

## 2017-01-10 LAB
ANION GAP SERPL CALCULATED.3IONS-SCNC: 7 MMOL/L (ref 4–13)
BUN SERPL-MCNC: 44 MG/DL (ref 5–25)
CALCIUM SERPL-MCNC: 8.5 MG/DL (ref 8.3–10.1)
CHLORIDE SERPL-SCNC: 100 MMOL/L (ref 100–108)
CO2 SERPL-SCNC: 34 MMOL/L (ref 21–32)
CREAT SERPL-MCNC: 1.73 MG/DL (ref 0.6–1.3)
ERYTHROCYTE [DISTWIDTH] IN BLOOD BY AUTOMATED COUNT: 16.3 % (ref 11.6–15.1)
GFR SERPL CREATININE-BSD FRML MDRD: 37.6 ML/MIN/1.73SQ M
GLUCOSE SERPL-MCNC: 106 MG/DL (ref 65–140)
GLUCOSE SERPL-MCNC: 146 MG/DL (ref 65–140)
GLUCOSE SERPL-MCNC: 191 MG/DL (ref 65–140)
GLUCOSE SERPL-MCNC: 268 MG/DL (ref 65–140)
GLUCOSE SERPL-MCNC: 87 MG/DL (ref 65–140)
HCT VFR BLD AUTO: 31.9 % (ref 42–52)
HGB BLD-MCNC: 10.1 G/DL (ref 14–18)
MCH RBC QN AUTO: 25.5 PG (ref 27–31)
MCHC RBC AUTO-ENTMCNC: 31.6 G/DL (ref 31.4–37.4)
MCV RBC AUTO: 81 FL (ref 82–98)
PLATELET # BLD AUTO: 249 THOUSANDS/UL (ref 130–400)
PMV BLD AUTO: 9.4 FL (ref 8.9–12.7)
POTASSIUM SERPL-SCNC: 3.5 MMOL/L (ref 3.5–5.3)
RBC # BLD AUTO: 3.95 MILLION/UL (ref 4.7–6.1)
SODIUM SERPL-SCNC: 141 MMOL/L (ref 136–145)
T4 FREE SERPL-MCNC: 1.12 NG/DL (ref 0.76–1.46)
WBC # BLD AUTO: 9.7 THOUSAND/UL (ref 4.8–10.8)

## 2017-01-10 PROCEDURE — 97110 THERAPEUTIC EXERCISES: CPT

## 2017-01-10 PROCEDURE — 84439 ASSAY OF FREE THYROXINE: CPT | Performed by: FAMILY MEDICINE

## 2017-01-10 PROCEDURE — 94760 N-INVAS EAR/PLS OXIMETRY 1: CPT

## 2017-01-10 PROCEDURE — 85027 COMPLETE CBC AUTOMATED: CPT | Performed by: FAMILY MEDICINE

## 2017-01-10 PROCEDURE — 82948 REAGENT STRIP/BLOOD GLUCOSE: CPT

## 2017-01-10 PROCEDURE — 97535 SELF CARE MNGMENT TRAINING: CPT

## 2017-01-10 PROCEDURE — 94640 AIRWAY INHALATION TREATMENT: CPT

## 2017-01-10 PROCEDURE — 80048 BASIC METABOLIC PNL TOTAL CA: CPT | Performed by: FAMILY MEDICINE

## 2017-01-10 RX ORDER — METHYLPREDNISOLONE SODIUM SUCCINATE 40 MG/ML
20 INJECTION, POWDER, LYOPHILIZED, FOR SOLUTION INTRAMUSCULAR; INTRAVENOUS EVERY 8 HOURS SCHEDULED
Status: DISCONTINUED | OUTPATIENT
Start: 2017-01-10 | End: 2017-01-11 | Stop reason: HOSPADM

## 2017-01-10 RX ORDER — METHYLPREDNISOLONE SODIUM SUCCINATE 40 MG/ML
INJECTION, POWDER, LYOPHILIZED, FOR SOLUTION INTRAMUSCULAR; INTRAVENOUS
Status: COMPLETED
Start: 2017-01-10 | End: 2017-01-10

## 2017-01-10 RX ORDER — HEPARIN SODIUM 5000 [USP'U]/ML
5000 INJECTION, SOLUTION INTRAVENOUS; SUBCUTANEOUS EVERY 8 HOURS SCHEDULED
Status: DISCONTINUED | OUTPATIENT
Start: 2017-01-10 | End: 2017-01-11 | Stop reason: HOSPADM

## 2017-01-10 RX ADMIN — INSULIN LISPRO 1 UNITS: 100 INJECTION, SOLUTION INTRAVENOUS; SUBCUTANEOUS at 17:15

## 2017-01-10 RX ADMIN — ASPIRIN 81 MG 162 MG: 81 TABLET ORAL at 08:25

## 2017-01-10 RX ADMIN — AZITHROMYCIN MONOHYDRATE 500 MG: 500 INJECTION, POWDER, LYOPHILIZED, FOR SOLUTION INTRAVENOUS at 18:04

## 2017-01-10 RX ADMIN — METHYLPREDNISOLONE SODIUM SUCCINATE 20 MG: 40 INJECTION, POWDER, FOR SOLUTION INTRAMUSCULAR; INTRAVENOUS at 21:07

## 2017-01-10 RX ADMIN — TOLTERODINE TARTRATE 4 MG: 2 CAPSULE, EXTENDED RELEASE ORAL at 08:28

## 2017-01-10 RX ADMIN — DOCUSATE SODIUM 100 MG: 100 CAPSULE, LIQUID FILLED ORAL at 21:03

## 2017-01-10 RX ADMIN — FENOFIBRATE 145 MG: 145 TABLET, FILM COATED ORAL at 08:25

## 2017-01-10 RX ADMIN — IPRATROPIUM BROMIDE AND ALBUTEROL SULFATE 3 ML: .5; 3 SOLUTION RESPIRATORY (INHALATION) at 04:18

## 2017-01-10 RX ADMIN — METHYLPREDNISOLONE SODIUM SUCCINATE 40 MG: 40 INJECTION, POWDER, FOR SOLUTION INTRAMUSCULAR; INTRAVENOUS at 03:28

## 2017-01-10 RX ADMIN — IPRATROPIUM BROMIDE AND ALBUTEROL SULFATE 3 ML: .5; 3 SOLUTION RESPIRATORY (INHALATION) at 15:05

## 2017-01-10 RX ADMIN — ACETAMINOPHEN 650 MG: 325 TABLET, FILM COATED ORAL at 13:47

## 2017-01-10 RX ADMIN — IPRATROPIUM BROMIDE AND ALBUTEROL SULFATE 3 ML: .5; 3 SOLUTION RESPIRATORY (INHALATION) at 19:57

## 2017-01-10 RX ADMIN — AMIODARONE HYDROCHLORIDE 100 MG: 200 TABLET ORAL at 08:27

## 2017-01-10 RX ADMIN — LEVOTHYROXINE SODIUM 50 MCG: 50 TABLET ORAL at 05:35

## 2017-01-10 RX ADMIN — PANTOPRAZOLE SODIUM 40 MG: 40 TABLET, DELAYED RELEASE ORAL at 08:28

## 2017-01-10 RX ADMIN — PANTOPRAZOLE SODIUM 40 MG: 40 TABLET, DELAYED RELEASE ORAL at 16:29

## 2017-01-10 RX ADMIN — HEPARIN SODIUM 5000 UNITS: 5000 INJECTION, SOLUTION INTRAVENOUS; SUBCUTANEOUS at 13:41

## 2017-01-10 RX ADMIN — IPRATROPIUM BROMIDE AND ALBUTEROL SULFATE 3 ML: .5; 3 SOLUTION RESPIRATORY (INHALATION) at 08:25

## 2017-01-10 RX ADMIN — METHYLPREDNISOLONE SODIUM SUCCINATE 40 MG: 40 INJECTION, POWDER, FOR SOLUTION INTRAMUSCULAR; INTRAVENOUS at 13:42

## 2017-01-10 RX ADMIN — PRAVASTATIN SODIUM 10 MG: 20 TABLET ORAL at 16:28

## 2017-01-10 RX ADMIN — AMLODIPINE BESYLATE 10 MG: 10 TABLET ORAL at 08:27

## 2017-01-10 RX ADMIN — METOPROLOL TARTRATE 25 MG: 25 TABLET ORAL at 08:28

## 2017-01-10 RX ADMIN — IPRATROPIUM BROMIDE AND ALBUTEROL SULFATE 3 ML: .5; 3 SOLUTION RESPIRATORY (INHALATION) at 23:30

## 2017-01-10 RX ADMIN — IPRATROPIUM BROMIDE AND ALBUTEROL SULFATE 3 ML: .5; 3 SOLUTION RESPIRATORY (INHALATION) at 11:34

## 2017-01-10 RX ADMIN — METOPROLOL TARTRATE 25 MG: 25 TABLET ORAL at 21:03

## 2017-01-10 RX ADMIN — HEPARIN SODIUM 5000 UNITS: 5000 INJECTION, SOLUTION INTRAVENOUS; SUBCUTANEOUS at 21:03

## 2017-01-10 RX ADMIN — TORSEMIDE 40 MG: 20 TABLET ORAL at 13:40

## 2017-01-11 VITALS
WEIGHT: 212.08 LBS | TEMPERATURE: 97.5 F | SYSTOLIC BLOOD PRESSURE: 150 MMHG | OXYGEN SATURATION: 94 % | RESPIRATION RATE: 18 BRPM | BODY MASS INDEX: 30.36 KG/M2 | HEIGHT: 70 IN | DIASTOLIC BLOOD PRESSURE: 70 MMHG | HEART RATE: 67 BPM

## 2017-01-11 PROBLEM — I50.33 ACUTE ON CHRONIC DIASTOLIC CONGESTIVE HEART FAILURE (HCC): Status: ACTIVE | Noted: 2017-01-11

## 2017-01-11 LAB
ATRIAL RATE: 73 BPM
BACTERIA BLD CULT: NORMAL
BACTERIA BLD CULT: NORMAL
GLUCOSE SERPL-MCNC: 128 MG/DL (ref 65–140)
GLUCOSE SERPL-MCNC: 156 MG/DL (ref 65–140)
P AXIS: 40 DEGREES
PR INTERVAL: 236 MS
QRS AXIS: -53 DEGREES
QRSD INTERVAL: 160 MS
QT INTERVAL: 472 MS
QTC INTERVAL: 519 MS
T WAVE AXIS: 84 DEGREES
VENTRICULAR RATE: 73 BPM

## 2017-01-11 PROCEDURE — 97110 THERAPEUTIC EXERCISES: CPT

## 2017-01-11 PROCEDURE — 94760 N-INVAS EAR/PLS OXIMETRY 1: CPT

## 2017-01-11 PROCEDURE — 94640 AIRWAY INHALATION TREATMENT: CPT

## 2017-01-11 PROCEDURE — 82948 REAGENT STRIP/BLOOD GLUCOSE: CPT

## 2017-01-11 RX ORDER — PREDNISONE 20 MG/1
20 TABLET ORAL DAILY
Qty: 13 TABLET | Refills: 0 | Status: SHIPPED | OUTPATIENT
Start: 2017-01-11 | End: 2017-01-19

## 2017-01-11 RX ADMIN — IPRATROPIUM BROMIDE AND ALBUTEROL SULFATE 3 ML: .5; 3 SOLUTION RESPIRATORY (INHALATION) at 03:30

## 2017-01-11 RX ADMIN — TOLTERODINE TARTRATE 4 MG: 2 CAPSULE, EXTENDED RELEASE ORAL at 08:05

## 2017-01-11 RX ADMIN — INSULIN LISPRO 1 UNITS: 100 INJECTION, SOLUTION INTRAVENOUS; SUBCUTANEOUS at 08:09

## 2017-01-11 RX ADMIN — TORSEMIDE 20 MG: 20 TABLET ORAL at 13:30

## 2017-01-11 RX ADMIN — HEPARIN SODIUM 5000 UNITS: 5000 INJECTION, SOLUTION INTRAVENOUS; SUBCUTANEOUS at 05:33

## 2017-01-11 RX ADMIN — AMLODIPINE BESYLATE 10 MG: 10 TABLET ORAL at 08:05

## 2017-01-11 RX ADMIN — METHYLPREDNISOLONE SODIUM SUCCINATE 20 MG: 40 INJECTION, POWDER, FOR SOLUTION INTRAMUSCULAR; INTRAVENOUS at 05:33

## 2017-01-11 RX ADMIN — ASPIRIN 81 MG 162 MG: 81 TABLET ORAL at 08:06

## 2017-01-11 RX ADMIN — PANTOPRAZOLE SODIUM 40 MG: 40 TABLET, DELAYED RELEASE ORAL at 05:35

## 2017-01-11 RX ADMIN — LEVOTHYROXINE SODIUM 50 MCG: 50 TABLET ORAL at 05:33

## 2017-01-11 RX ADMIN — IPRATROPIUM BROMIDE AND ALBUTEROL SULFATE 3 ML: .5; 3 SOLUTION RESPIRATORY (INHALATION) at 15:12

## 2017-01-11 RX ADMIN — AMIODARONE HYDROCHLORIDE 100 MG: 200 TABLET ORAL at 08:05

## 2017-01-11 RX ADMIN — IPRATROPIUM BROMIDE AND ALBUTEROL SULFATE 3 ML: .5; 3 SOLUTION RESPIRATORY (INHALATION) at 11:19

## 2017-01-11 RX ADMIN — IPRATROPIUM BROMIDE AND ALBUTEROL SULFATE 3 ML: .5; 3 SOLUTION RESPIRATORY (INHALATION) at 07:22

## 2017-01-11 RX ADMIN — METOPROLOL TARTRATE 25 MG: 25 TABLET ORAL at 08:05

## 2017-01-11 RX ADMIN — ACETAMINOPHEN 650 MG: 325 TABLET, FILM COATED ORAL at 13:27

## 2017-01-11 RX ADMIN — FENOFIBRATE 145 MG: 145 TABLET, FILM COATED ORAL at 08:05

## 2017-01-12 ENCOUNTER — GENERIC CONVERSION - ENCOUNTER (OUTPATIENT)
Dept: OTHER | Facility: OTHER | Age: 82
End: 2017-01-12

## 2017-01-27 ENCOUNTER — GENERIC CONVERSION - ENCOUNTER (OUTPATIENT)
Dept: OTHER | Facility: OTHER | Age: 82
End: 2017-01-27

## 2017-02-02 ENCOUNTER — GENERIC CONVERSION - ENCOUNTER (OUTPATIENT)
Dept: OTHER | Facility: OTHER | Age: 82
End: 2017-02-02

## 2017-02-06 ENCOUNTER — OFFICE VISIT (OUTPATIENT)
Dept: CARDIAC REHAB | Facility: CLINIC | Age: 82
End: 2017-02-06
Payer: COMMERCIAL

## 2017-02-06 DIAGNOSIS — Z95.2 S/P TAVR (TRANSCATHETER AORTIC VALVE REPLACEMENT): Chronic | ICD-10-CM

## 2017-02-06 PROCEDURE — 93798 PHYS/QHP OP CAR RHAB W/ECG: CPT

## 2017-02-08 ENCOUNTER — OFFICE VISIT (OUTPATIENT)
Dept: CARDIAC REHAB | Facility: CLINIC | Age: 82
End: 2017-02-08
Payer: COMMERCIAL

## 2017-02-08 DIAGNOSIS — Z95.2 S/P TAVR (TRANSCATHETER AORTIC VALVE REPLACEMENT): Chronic | ICD-10-CM

## 2017-02-08 PROCEDURE — 93798 PHYS/QHP OP CAR RHAB W/ECG: CPT

## 2017-02-10 ENCOUNTER — APPOINTMENT (OUTPATIENT)
Dept: CARDIAC REHAB | Facility: CLINIC | Age: 82
End: 2017-02-10
Payer: COMMERCIAL

## 2017-02-13 ENCOUNTER — APPOINTMENT (OUTPATIENT)
Dept: CARDIAC REHAB | Facility: CLINIC | Age: 82
End: 2017-02-13
Payer: COMMERCIAL

## 2017-02-15 ENCOUNTER — APPOINTMENT (OUTPATIENT)
Dept: CARDIAC REHAB | Facility: CLINIC | Age: 82
End: 2017-02-15
Payer: COMMERCIAL

## 2017-02-17 ENCOUNTER — APPOINTMENT (OUTPATIENT)
Dept: CARDIAC REHAB | Facility: CLINIC | Age: 82
End: 2017-02-17
Payer: COMMERCIAL

## 2017-02-20 ENCOUNTER — APPOINTMENT (OUTPATIENT)
Dept: CARDIAC REHAB | Facility: CLINIC | Age: 82
End: 2017-02-20
Payer: COMMERCIAL

## 2017-02-22 ENCOUNTER — APPOINTMENT (OUTPATIENT)
Dept: CARDIAC REHAB | Facility: CLINIC | Age: 82
End: 2017-02-22
Payer: COMMERCIAL

## 2017-03-02 ENCOUNTER — GENERIC CONVERSION - ENCOUNTER (OUTPATIENT)
Dept: OTHER | Facility: OTHER | Age: 82
End: 2017-03-02

## 2017-03-07 ENCOUNTER — GENERIC CONVERSION - ENCOUNTER (OUTPATIENT)
Dept: OTHER | Facility: OTHER | Age: 82
End: 2017-03-07

## 2017-04-26 ENCOUNTER — ALLSCRIPTS OFFICE VISIT (OUTPATIENT)
Dept: OTHER | Facility: OTHER | Age: 82
End: 2017-04-26

## 2017-04-26 DIAGNOSIS — I10 ESSENTIAL (PRIMARY) HYPERTENSION: ICD-10-CM

## 2017-04-26 DIAGNOSIS — I25.10 ATHEROSCLEROTIC HEART DISEASE OF NATIVE CORONARY ARTERY WITHOUT ANGINA PECTORIS: ICD-10-CM

## 2017-04-26 DIAGNOSIS — I48.91 ATRIAL FIBRILLATION (HCC): ICD-10-CM

## 2017-04-26 DIAGNOSIS — M19.012 PRIMARY OSTEOARTHRITIS OF LEFT SHOULDER: ICD-10-CM

## 2017-04-26 DIAGNOSIS — M25.512 PAIN IN LEFT SHOULDER: ICD-10-CM

## 2017-04-26 DIAGNOSIS — E03.9 HYPOTHYROIDISM: ICD-10-CM

## 2017-04-26 DIAGNOSIS — I50.9 HEART FAILURE (HCC): ICD-10-CM

## 2017-05-08 ENCOUNTER — HOSPITAL ENCOUNTER (OUTPATIENT)
Dept: RADIOLOGY | Facility: HOSPITAL | Age: 82
Discharge: HOME | End: 2017-05-08
Attending: FAMILY MEDICINE
Payer: COMMERCIAL

## 2017-05-08 ENCOUNTER — ALLSCRIPTS OFFICE VISIT (OUTPATIENT)
Dept: OTHER | Facility: OTHER | Age: 82
End: 2017-05-08

## 2017-05-08 ENCOUNTER — TRANSCRIBE ORDERS (OUTPATIENT)
Dept: ADMINISTRATIVE | Facility: HOSPITAL | Age: 82
End: 2017-05-08

## 2017-05-08 DIAGNOSIS — I25.119 ATHEROSCLEROSIS OF NATIVE CORONARY ARTERY WITH ANGINA PECTORIS, UNSPECIFIED WHETHER NATIVE OR TRANSPLANTED HEART (HCC): ICD-10-CM

## 2017-05-08 DIAGNOSIS — I48.91 ATRIAL FIBRILLATION, UNSPECIFIED TYPE (HCC): ICD-10-CM

## 2017-05-08 DIAGNOSIS — I48.91 ATRIAL FIBRILLATION, UNSPECIFIED TYPE (HCC): Primary | ICD-10-CM

## 2017-05-08 PROCEDURE — 71020 HB CHEST X-RAY 2VW FRONTAL&LATL: CPT

## 2017-05-09 ENCOUNTER — GENERIC CONVERSION - ENCOUNTER (OUTPATIENT)
Dept: OTHER | Facility: OTHER | Age: 82
End: 2017-05-09

## 2017-05-10 ENCOUNTER — HOSPITAL ENCOUNTER (OUTPATIENT)
Dept: RADIOLOGY | Facility: CLINIC | Age: 82
Discharge: HOME | End: 2017-05-10
Payer: COMMERCIAL

## 2017-05-10 ENCOUNTER — ALLSCRIPTS OFFICE VISIT (OUTPATIENT)
Dept: OTHER | Facility: OTHER | Age: 82
End: 2017-05-10

## 2017-05-10 DIAGNOSIS — M25.512 PAIN IN LEFT SHOULDER: ICD-10-CM

## 2017-05-10 PROCEDURE — 73030 X-RAY EXAM OF SHOULDER: CPT

## 2017-05-16 ENCOUNTER — APPOINTMENT (OUTPATIENT)
Dept: PHYSICAL THERAPY | Facility: CLINIC | Age: 82
End: 2017-05-16
Payer: COMMERCIAL

## 2017-05-16 DIAGNOSIS — M19.012 PRIMARY OSTEOARTHRITIS OF LEFT SHOULDER: ICD-10-CM

## 2017-05-16 PROCEDURE — G8984 CARRY CURRENT STATUS: HCPCS

## 2017-05-16 PROCEDURE — 97110 THERAPEUTIC EXERCISES: CPT

## 2017-05-16 PROCEDURE — G8985 CARRY GOAL STATUS: HCPCS

## 2017-05-16 PROCEDURE — 97161 PT EVAL LOW COMPLEX 20 MIN: CPT

## 2017-05-23 ENCOUNTER — GENERIC CONVERSION - ENCOUNTER (OUTPATIENT)
Dept: OTHER | Facility: OTHER | Age: 82
End: 2017-05-23

## 2017-05-23 ENCOUNTER — APPOINTMENT (OUTPATIENT)
Dept: PHYSICAL THERAPY | Facility: CLINIC | Age: 82
End: 2017-05-23
Payer: COMMERCIAL

## 2017-05-23 PROCEDURE — 97110 THERAPEUTIC EXERCISES: CPT

## 2017-05-30 ENCOUNTER — APPOINTMENT (OUTPATIENT)
Dept: PHYSICAL THERAPY | Facility: CLINIC | Age: 82
End: 2017-05-30
Payer: COMMERCIAL

## 2017-05-30 PROCEDURE — 97110 THERAPEUTIC EXERCISES: CPT

## 2017-05-31 ENCOUNTER — GENERIC CONVERSION - ENCOUNTER (OUTPATIENT)
Dept: OTHER | Facility: OTHER | Age: 82
End: 2017-05-31

## 2017-06-06 ENCOUNTER — APPOINTMENT (OUTPATIENT)
Dept: PHYSICAL THERAPY | Facility: CLINIC | Age: 82
End: 2017-06-06
Payer: COMMERCIAL

## 2017-06-08 ENCOUNTER — GENERIC CONVERSION - ENCOUNTER (OUTPATIENT)
Dept: OTHER | Facility: OTHER | Age: 82
End: 2017-06-08

## 2017-06-08 ENCOUNTER — HOSPITAL ENCOUNTER (OUTPATIENT)
Dept: RADIOLOGY | Facility: HOSPITAL | Age: 82
Discharge: HOME | End: 2017-06-08
Payer: COMMERCIAL

## 2017-06-08 DIAGNOSIS — R91.1 SOLITARY PULMONARY NODULE: ICD-10-CM

## 2017-06-08 LAB
A/G RATIO (HISTORICAL): 1.1 (ref 1.2–2.2)
ALBUMIN SERPL BCP-MCNC: 3.8 G/DL (ref 3.5–4.7)
ALP SERPL-CCNC: 69 IU/L (ref 39–117)
ALT SERPL W P-5'-P-CCNC: 12 IU/L (ref 0–44)
AMBIG ABBREV CMP14 DEFAULT (HISTORICAL): NORMAL
AST SERPL W P-5'-P-CCNC: 37 IU/L (ref 0–40)
BASOPHILS # BLD AUTO: 0 %
BASOPHILS # BLD AUTO: 0 X10E3/UL (ref 0–0.2)
BILIRUB SERPL-MCNC: 0.4 MG/DL (ref 0–1.2)
BUN SERPL-MCNC: 39 MG/DL (ref 8–27)
BUN/CREA RATIO (HISTORICAL): 22 (ref 10–24)
CALCIUM SERPL-MCNC: 9.6 MG/DL (ref 8.6–10.2)
CHLORIDE SERPL-SCNC: 96 MMOL/L (ref 96–106)
CHOLEST SERPL-MCNC: 110 MG/DL (ref 100–199)
CO2 SERPL-SCNC: 28 MMOL/L (ref 18–29)
CREAT SERPL-MCNC: 1.76 MG/DL (ref 0.76–1.27)
DEPRECATED RDW RBC AUTO: 17.1 % (ref 12.3–15.4)
EGFR AFRICAN AMERICAN (HISTORICAL): 40 ML/MIN/1.73
EGFR-AMERICAN CALC (HISTORICAL): 34 ML/MIN/1.73
EOSINOPHIL # BLD AUTO: 0.2 X10E3/UL (ref 0–0.4)
EOSINOPHIL # BLD AUTO: 3 %
GLUCOSE SERPL-MCNC: 96 MG/DL (ref 65–99)
HCT VFR BLD AUTO: 36.6 % (ref 37.5–51)
HDLC SERPL-MCNC: 59 MG/DL
HGB BLD-MCNC: 11.2 G/DL (ref 12.6–17.7)
IMM.GRANULOCYTES (CD4/8) (HISTORICAL): 0 %
IMM.GRANULOCYTES (CD4/8) (HISTORICAL): 0 X10E3/UL (ref 0–0.1)
LDLC SERPL CALC-MCNC: 43 MG/DL (ref 0–99)
LYMPHOCYTES # BLD AUTO: 1.5 X10E3/UL (ref 0.7–3.1)
LYMPHOCYTES # BLD AUTO: 19 %
MCH RBC QN AUTO: 24.9 PG (ref 26.6–33)
MCHC RBC AUTO-ENTMCNC: 30.6 G/DL (ref 31.5–35.7)
MCV RBC AUTO: 81 FL (ref 79–97)
MONOCYTES # BLD AUTO: 0.8 X10E3/UL (ref 0.1–0.9)
MONOCYTES (HISTORICAL): 10 %
NEUTROPHILS # BLD AUTO: 5.2 X10E3/UL (ref 1.4–7)
NEUTROPHILS # BLD AUTO: 68 %
PLATELET # BLD AUTO: 271 X10E3/UL (ref 150–379)
POTASSIUM SERPL-SCNC: 4.1 MMOL/L (ref 3.5–5.2)
RBC (HISTORICAL): 4.5 X10E6/UL (ref 4.14–5.8)
SODIUM SERPL-SCNC: 142 MMOL/L (ref 134–144)
TOT. GLOBULIN, SERUM (HISTORICAL): 3.5 G/DL (ref 1.5–4.5)
TOTAL PROTEIN (HISTORICAL): 7.3 G/DL (ref 6–8.5)
TRIGL SERPL-MCNC: 42 MG/DL (ref 0–149)
WBC # BLD AUTO: 7.7 X10E3/UL (ref 3.4–10.8)

## 2017-06-08 PROCEDURE — 71250 CT THORAX DX C-: CPT

## 2017-06-09 LAB
INTERPRETATION (HISTORICAL): NORMAL
INTERPRETATION (HISTORICAL): NORMAL
PDF IMAGE (HISTORICAL): NORMAL
T4 FREE SERPL-MCNC: 1.21 NG/DL (ref 0.82–1.77)
TSH SERPL DL<=0.05 MIU/L-ACNC: 5.73 UIU/ML (ref 0.45–4.5)

## 2017-06-11 ENCOUNTER — GENERIC CONVERSION - ENCOUNTER (OUTPATIENT)
Dept: OTHER | Facility: OTHER | Age: 82
End: 2017-06-11

## 2017-06-13 ENCOUNTER — APPOINTMENT (OUTPATIENT)
Dept: PHYSICAL THERAPY | Facility: CLINIC | Age: 82
End: 2017-06-13
Payer: COMMERCIAL

## 2017-06-13 PROCEDURE — 97110 THERAPEUTIC EXERCISES: CPT

## 2017-06-14 ENCOUNTER — ALLSCRIPTS OFFICE VISIT (OUTPATIENT)
Dept: OTHER | Facility: OTHER | Age: 82
End: 2017-06-14

## 2017-06-20 ENCOUNTER — APPOINTMENT (OUTPATIENT)
Dept: PHYSICAL THERAPY | Facility: CLINIC | Age: 82
End: 2017-06-20
Payer: COMMERCIAL

## 2017-06-23 ENCOUNTER — ALLSCRIPTS OFFICE VISIT (OUTPATIENT)
Dept: OTHER | Facility: OTHER | Age: 82
End: 2017-06-23

## 2017-06-27 ENCOUNTER — APPOINTMENT (OUTPATIENT)
Dept: PHYSICAL THERAPY | Facility: CLINIC | Age: 82
End: 2017-06-27
Payer: COMMERCIAL

## 2017-06-27 ENCOUNTER — ALLSCRIPTS OFFICE VISIT (OUTPATIENT)
Dept: OTHER | Facility: OTHER | Age: 82
End: 2017-06-27

## 2017-06-27 ENCOUNTER — TRANSCRIBE ORDERS (OUTPATIENT)
Dept: ADMINISTRATIVE | Facility: HOSPITAL | Age: 82
End: 2017-06-27

## 2017-06-27 DIAGNOSIS — Z95.2 HEART VALVE REPLACED: Primary | ICD-10-CM

## 2017-06-27 PROCEDURE — 97110 THERAPEUTIC EXERCISES: CPT

## 2017-06-27 PROCEDURE — G8985 CARRY GOAL STATUS: HCPCS

## 2017-06-27 PROCEDURE — G8986 CARRY D/C STATUS: HCPCS

## 2017-07-03 ENCOUNTER — GENERIC CONVERSION - ENCOUNTER (OUTPATIENT)
Dept: OTHER | Facility: OTHER | Age: 82
End: 2017-07-03

## 2017-08-25 ENCOUNTER — ALLSCRIPTS OFFICE VISIT (OUTPATIENT)
Dept: OTHER | Facility: OTHER | Age: 82
End: 2017-08-25

## 2017-09-18 ENCOUNTER — GENERIC CONVERSION - ENCOUNTER (OUTPATIENT)
Dept: OTHER | Facility: OTHER | Age: 82
End: 2017-09-18

## 2017-09-18 ENCOUNTER — TRANSCRIBE ORDERS (OUTPATIENT)
Dept: ADMINISTRATIVE | Facility: HOSPITAL | Age: 82
End: 2017-09-18

## 2017-09-18 ENCOUNTER — LAB (OUTPATIENT)
Dept: LAB | Facility: HOSPITAL | Age: 82
End: 2017-09-18
Payer: COMMERCIAL

## 2017-09-18 ENCOUNTER — HOSPITAL ENCOUNTER (OUTPATIENT)
Dept: NON INVASIVE DIAGNOSTICS | Facility: HOSPITAL | Age: 82
Discharge: HOME | End: 2017-09-18
Payer: COMMERCIAL

## 2017-09-18 DIAGNOSIS — I35.0 NODULAR CALCIFIC AORTIC VALVE STENOSIS: Primary | ICD-10-CM

## 2017-09-18 DIAGNOSIS — Z95.2 HEART VALVE REPLACED: ICD-10-CM

## 2017-09-18 DIAGNOSIS — N95.2 POSTMENOPAUSAL ATROPHIC VAGINITIS: ICD-10-CM

## 2017-09-18 DIAGNOSIS — Z95.2 PRESENCE OF PROSTHETIC HEART VALVE: ICD-10-CM

## 2017-09-18 DIAGNOSIS — I35.0 NONRHEUMATIC AORTIC VALVE STENOSIS: ICD-10-CM

## 2017-09-18 DIAGNOSIS — I35.0 NODULAR CALCIFIC AORTIC VALVE STENOSIS: ICD-10-CM

## 2017-09-18 LAB
BUN SERPL-MCNC: 44 MG/DL (ref 8–27)
BUN/CREA RATIO (HISTORICAL): 22 (ref 10–24)
CALCIUM SERPL-MCNC: 9.7 MG/DL (ref 8.6–10.2)
CHLORIDE SERPL-SCNC: 101 MMOL/L (ref 96–106)
CO2 SERPL-SCNC: 28 MMOL/L (ref 18–29)
CREAT SERPL-MCNC: 2.04 MG/DL (ref 0.76–1.27)
DEPRECATED RDW RBC AUTO: 15.9 % (ref 12.3–15.4)
EGFR AFRICAN AMERICAN (HISTORICAL): 33 ML/MIN/1.73
EGFR-AMERICAN CALC (HISTORICAL): 29 ML/MIN/1.73
GLUCOSE SERPL-MCNC: 81 MG/DL (ref 65–99)
HCT VFR BLD AUTO: 35.6 % (ref 37.5–51)
HGB BLD-MCNC: 11.5 G/DL (ref 12.6–17.7)
MCH RBC QN AUTO: 27.7 PG (ref 26.6–33)
MCHC RBC AUTO-ENTMCNC: 32.3 G/DL (ref 31.5–35.7)
MCV RBC AUTO: 86 FL (ref 79–97)
POTASSIUM SERPL-SCNC: 4.1 MMOL/L (ref 3.5–5.2)
RBC (HISTORICAL): 4.15 X10E6/UL (ref 4.14–5.8)
SODIUM SERPL-SCNC: 148 MMOL/L (ref 134–144)
WBC # BLD AUTO: 8.4 X10E3/UL (ref 3.4–10.8)

## 2017-09-18 PROCEDURE — 93306 TTE W/DOPPLER COMPLETE: CPT

## 2017-09-18 PROCEDURE — 93005 ELECTROCARDIOGRAM TRACING: CPT

## 2017-09-19 LAB
ATRIAL RATE: 74 BPM
P AXIS: 28 DEGREES
PR INTERVAL: 230 MS
QRS AXIS: -52 DEGREES
QRSD INTERVAL: 156 MS
QT INTERVAL: 422 MS
QTC INTERVAL: 468 MS
T WAVE AXIS: 77 DEGREES
VENTRICULAR RATE: 74 BPM

## 2017-10-02 ENCOUNTER — ALLSCRIPTS OFFICE VISIT (OUTPATIENT)
Dept: OTHER | Facility: OTHER | Age: 82
End: 2017-10-02

## 2017-10-05 ENCOUNTER — GENERIC CONVERSION - ENCOUNTER (OUTPATIENT)
Dept: OTHER | Facility: OTHER | Age: 82
End: 2017-10-05

## 2017-10-11 ENCOUNTER — ALLSCRIPTS OFFICE VISIT (OUTPATIENT)
Dept: OTHER | Facility: OTHER | Age: 82
End: 2017-10-11

## 2017-10-16 ENCOUNTER — GENERIC CONVERSION - ENCOUNTER (OUTPATIENT)
Dept: OTHER | Facility: OTHER | Age: 82
End: 2017-10-16

## 2017-10-16 ENCOUNTER — TRANSCRIBE ORDERS (OUTPATIENT)
Dept: ADMINISTRATIVE | Facility: HOSPITAL | Age: 82
End: 2017-10-16

## 2017-10-16 ENCOUNTER — HOSPITAL ENCOUNTER (OUTPATIENT)
Dept: RADIOLOGY | Facility: HOSPITAL | Age: 82
Discharge: HOME | End: 2017-10-16
Attending: FAMILY MEDICINE
Payer: COMMERCIAL

## 2017-10-16 DIAGNOSIS — I10 ESSENTIAL HYPERTENSION, MALIGNANT: ICD-10-CM

## 2017-10-16 DIAGNOSIS — I25.119 ATHEROSCLEROSIS OF NATIVE CORONARY ARTERY WITH ANGINA PECTORIS, UNSPECIFIED WHETHER NATIVE OR TRANSPLANTED HEART (HCC): ICD-10-CM

## 2017-10-16 DIAGNOSIS — I10 ESSENTIAL HYPERTENSION, MALIGNANT: Primary | ICD-10-CM

## 2017-10-16 DIAGNOSIS — J44.9 OBSTRUCTIVE CHRONIC BRONCHITIS WITHOUT EXACERBATION (HCC): ICD-10-CM

## 2017-10-16 DIAGNOSIS — I11.0 BENIGN HYPERTENSIVE HEART DISEASE WITH CONGESTIVE HEART FAILURE (HCC): ICD-10-CM

## 2017-10-16 DIAGNOSIS — N18.30 CHRONIC KIDNEY DISEASE, STAGE III (MODERATE) (HCC): ICD-10-CM

## 2017-10-16 LAB
A/G RATIO (HISTORICAL): 0.9 (ref 1.2–2.2)
ALBUMIN SERPL BCP-MCNC: 3.8 G/DL (ref 3.5–4.7)
ALP SERPL-CCNC: 61 IU/L (ref 39–117)
ALT SERPL W P-5'-P-CCNC: 15 IU/L (ref 0–44)
AST SERPL W P-5'-P-CCNC: 39 IU/L (ref 0–40)
BILIRUB SERPL-MCNC: 0.4 MG/DL (ref 0–1.2)
BUN SERPL-MCNC: 39 MG/DL (ref 8–27)
BUN/CREA RATIO (HISTORICAL): 19 (ref 10–24)
CALCIUM SERPL-MCNC: 9.7 MG/DL (ref 8.6–10.2)
CHLORIDE SERPL-SCNC: 95 MMOL/L (ref 96–106)
CHOLEST SERPL-MCNC: 97 MG/DL (ref 100–199)
CO2 SERPL-SCNC: 26 MMOL/L (ref 18–29)
CREAT SERPL-MCNC: 2.06 MG/DL (ref 0.76–1.27)
EGFR AFRICAN AMERICAN (HISTORICAL): 33 ML/MIN/1.73
EGFR-AMERICAN CALC (HISTORICAL): 28 ML/MIN/1.73
GLUCOSE SERPL-MCNC: 109 MG/DL (ref 65–99)
HDLC SERPL-MCNC: 37 MG/DL
LDLC SERPL CALC-MCNC: 45 MG/DL (ref 0–99)
POTASSIUM SERPL-SCNC: 4.6 MMOL/L (ref 3.5–5.2)
SODIUM SERPL-SCNC: 139 MMOL/L (ref 134–144)
TOT. GLOBULIN, SERUM (HISTORICAL): 4.1 G/DL (ref 1.5–4.5)
TOTAL PROTEIN (HISTORICAL): 7.9 G/DL (ref 6–8.5)
TRIGL SERPL-MCNC: 77 MG/DL (ref 0–149)

## 2017-10-16 PROCEDURE — 71020 HB CHEST X-RAY 2VW FRONTAL&LATL: CPT

## 2017-10-17 LAB
INTERPRETATION (HISTORICAL): NORMAL
INTERPRETATION (HISTORICAL): NORMAL
PDF IMAGE (HISTORICAL): NORMAL
TSH SERPL DL<=0.05 MIU/L-ACNC: 8.46 UIU/ML (ref 0.45–4.5)

## 2017-10-28 NOTE — PROGRESS NOTES
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)    Plan  Atrial fibrillation, CAD in native artery    · Metoprolol Tartrate 25 MG Oral Tablet; take 1 tablet by mouth twice a day  Benign essential HTN, CAD in native artery, Chronic kidney disease, stage 3, Chronicobstructive pulmonary disease, Congestive heart failure, Heart failure, left, with LVEF>40%    · (1) COMPREHENSIVE METABOLIC PANEL; Status:Active; Requested for:11Oct2017;    · (1) LIPID PANEL FASTING W DIRECT LDL REFLEX; Status:Active; Requestedfor:11Oct2017;    · (1) TSH; Status:Active; Requested for:11Oct2017;    · * XR CHEST PA & LATERAL; Status:Active; Requested for:11Oct2017;     Discussion/Summary    I suggest you take 2 pills a day of the torsemide 20mg until you reachieve a weight at home of about 210-211 or until monday when we should recheck your electrolytes to be sure it is safe on your kidneys and electrolytes  A chest xray can be done on Monday if you are not better or sooner if you are worse  contact your cardiologist for refils and monitoring of the medications for atrial fibrillation and CHF  The patient, patient's family was counseled regarding        Provider Comments  Provider Comments:   fluid overload w/o overt chfstablef/u for chf/cmp/afibstablestablemondayif no betterpending, unchanged hypothyroidism      Chief Complaint  Weight gain      History of Present Illness  HPI: weighs dailyfor past 3-4dwellabd size increaseall meds but missed torsemide past 4dsobPO 93-94 as usualcporthopnealeg edemasyncope but some dizzinesspalpitationsis Dr Isac Oliva next monthwith 2 pills torsemide today, typically alternates dosingstable      Review of Systems   Constitutional: no fever-- and-- no chills  Cardiovascular: no chest pain-- and-- no palpitations  Active Problems    1  Abnormal gait (781 2) (R26 9)   2  Acquired ankle/foot deformity (736 70) (M21 969)   3  Actinic keratosis (702 0) (L57 0)   4  Allergic rhinitis (477 9) (J30 9)   5  Arteriosclerosis of arteries of extremities (440 20) (I70 209)   6  Atrial fibrillation (427 31) (I48 91)   7  Benign essential HTN (401 1) (I10)   8  BPH (benign prostatic hyperplasia) (600 00) (N40 0)   9  Bursitis, subacromial (726 19) (M75 50)   10  CAD in native artery (414 01) (I25 10)   11  Callus (700) (L84)   12  Cervical radiculopathy (723 4) (M54 12)   13  Chronic kidney disease, stage 3 (585 3) (N18 3)   14  Chronic left shoulder pain (719 41,338 29) (M25 512,G89 29)   15  Chronic obstructive pulmonary disease (496) (J44 9)   16  Chronic systolic congestive heart failure (428 22,428 0) (I50 22)   17  Congestive heart failure (428 0) (I50 9)   18  COPD (chronic obstructive pulmonary disease) (496) (J44 9)   19  Depression screening (V79 0) (Z13 89)   20  Difficulty walking (719 7) (R26 2)   21  Dizziness (780 4) (R42)   22  Esophagitis determined by endoscopy (530 10) (K20 9)   23  Foot pain, bilateral (729 5) (M79 671,M79 672)   24  Heart failure, left, 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)   25  High triglycerides (272 1) (E78 1)   26  History of aortic valve stenosis (V12 59) (Z86 79)   27  Hyperlipidemia LDL goal <100 (272 4) (E78 5)   28  Hyperthyroidism (242 90) (E05 90)   29  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)   30  Hyponatremia (276 1) (E87 1)   31  Hypothyroidism (244 9) (E03 9)   32  Immunization due (V05 9) (Z23)   33  Obesity with alveolar hypoventilation, unspecified obesity severity (278 03) (E66 2)   34  Obstructive sleep apnea (327 23) (G47 33)   35  Onychomycosis (110 1) (B35 1)   36   Osteoarthritis of left shoulder (117 91) (M19 012)   37  Osteoarthritis, knee/lower leg (715 96) (M17 9)   38  Peripheral vascular disease (443 9) (I73 9)   39  Pleural Effusion   40  Prediabetes (790 29) (R73 09)   41  Primary osteoarthritis of left shoulder (715 11) (M19 012)   42  Pulmonary fibrosis (515) (J84 10)   43  Pulmonary nodule, left (793 11) (R91 1)   44  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)   45  Screening for cardiovascular, respiratory, and genitourinary diseases  (V81 2,V81 4,V81 6) (Z13 6,Z13 83,Z13 89)   46  Screening for neurological condition (V80 09) (Z13 89)   47  Sleep related hypoventilation in conditions classified elsewhere (327 26) (G47 36)    Past Medical History    1  Acute maxillary sinusitis (461 0) (J01 00)   2  History of Acute maxillary sinusitis, recurrence not specified (461 0) (J01 00)   3  Acute upper respiratory infection (465 9) (J06 9)   4  History of Acute upper respiratory infection (465 9) (J06 9)   5  History of Bladder neck obstruction (596 0) (N32 0)   6  History of Bronchitis, chronic obstructive, with exacerbation (491 21) (J44 1)   7  History of Cataract of both eyes (366 9) (H26 9)   8  History of Cellulitis (682 9) (L03 90)   9  History of Cellulitis (682 9) (L03 90)   10  History of Cellulitis of foot (682 7) (L03 119)   11  History of Cellulitis of neck (682 1) (L03 221)   12  History of Chronic allergic conjunctivitis (372 14) (H10 45)   13  History of 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)   14  History of Diabetes type 2, uncontrolled (250 02) (E11 65)   15  History of Dyspnea on exertion (786 09) (R06 09)   16  History of Exposure to scabies (V01 89) (Z20 89)   17  History of Foreign Body In The Right Auditory Canal (931)   18  History of acute bronchitis (V12 69) (Z87 09)   19  History of acute bronchitis (V12 69) (Z87 09)   20  History of acute bronchitis (V12 69) (Z87 09)   21  History of allergic rhinitis (V12 69) (Z87 09)   22   History of aortic valve replacement (V43 3) (Z95 2)   23  History of backache (V13 59) (Z87 39)   24  History of bacterial pneumonia (V12 61) (Z87 01)   25  History of bronchitis (V12 69) (Z87 09)   26  History of carcinoma (V10 90) (Z85 9)   27  History of cardiomegaly (V12 59) (Z86 79)   28  History of constipation (V12 79) (Z87 19)   29  History of decubitus ulcer (V13 3) (Z87 2)   30  History of dermatitis (V13 3) (Z87 2)   31  History of eczema (V13 3) (Z87 2)   32  History of edema (V13 89) (Z87 898)   33  History of epistaxis (V12 69) (Z87 898)   34  History of fever (V13 89) (Z87 898)   35  History of fracture of rib (V15 51) (Z87 81)   36  History of influenza (V12 09) (Z87 09)   37  History of osteoarthritis (V13 4) (Z87 39)   38  History of phimosis (V13 89) (Z87 438)   39  History of shortness of breath (V13 89) (Z87 898)   40  History of tinea corporis (V12 09) (Z86 19)   41  History of tinea corporis (V12 09) (Z86 19)   42  History of Internal Hemorrhoids (455 0)   43  History of Knee Sprain (844 9)   44  Late Effects Of Sprain Or Strain (905 7)   45  History of Noninfected skin tear of right lower extremity, initial encounter (891 0)  (S81 811A)   46  History of Non-ST elevation myocardial infarction (NSTEMI) of indeterminate age   52  History of Normal routine physical examination (V70 0) (Z00 00)   48  Otalgia, unspecified laterality (388 70) (H92 09)   49  History of Otalgia, unspecified laterality (388 70) (H92 09)   50  History of Pneumonia (V12 61)   51  History of Pneumonia (486) (J18 9)   52  History of Postop check (V67 00) (Z09)   53  History of Rotator cuff tendinitis (726 10) (M75 80)   54  Skin Abscess Of Hip (682 6)   55  History of Skin neoplasm (239 2) (D49 2)   56  History of Sprain and strain (848 9) (T14 8XXA)   57  History of Tachycardia (785 0) (R00 0)   58  History of Trigger finger (727 03) (M65 30)   59  History of Type 2 diabetes mellitus (250 00) (E11 9)   60   Venous thrombosis (453 9) (I82 90)    Family History  Mother    1  Family history of arthritis (V17 7) (Z82 61)   2  Family history of coronary artery disease (V17 3) (Z82 49)   3  Family history of hypertension (V17 49) (Z82 49)  Father    4  Family history of arthritis (V17 7) (Z82 61)   5  Family history of Prostate cancer  Family History    6  Denied: Family history of Pulmonary Disease  Family History Reviewed: The family history was reviewed and updated today  Social History   · Being A Social Drinker   · Denied: Drug use (305 90) (F19 90)   · Former smoker (V15 82) (J74 893)   · History of Former smoker (V1 82) (Z87 891)   · QUIT SMOKING 25-35 YEARS AGO AND HE SMOKED 1 PPD FOR 45YEARS  The social history was reviewed and updated today  Surgical History    1  History of Aortic Valve Replacement Transcatheter   2  History of Cataract Surgery   3  History of Hip Replacement   4  History of Knee Replacement   5  History of Surgery Penis Circumcision Except    10  History of Thoracentesis (Diagnostic)   7  History of Tonsillectomy    Current Meds   1  Amiodarone HCl - 100 MG Oral Tablet; TAKE 1 TABLET DAILY (CONTACT CARDIOLOGIST FOR ADDITIONAL REFILLS); Therapy: 13DOK3352 to (Last Rx:24Imd3424)  Requested for: 32Muc9248 Ordered   2  AmLODIPine Besylate 10 MG Oral Tablet; TAKE 1 TABLET DAILY; Therapy: 85HNO9920 to (Last Rx:41Lkx8345)  Requested for: 23Xjj5948 Ordered   3  Aspirin 81 MG Oral Tablet Chewable; 2 daily; Therapy: (Julia Gary) to Recorded   4  Breo Ellipta 100-25 MCG/INH Inhalation Aerosol Powder Breath Activated; INHALE 1 PUFFS Twice daily  Requested for: 17PMO5016; Last TC:13YZS4154 Ordered   5  Cialis 2 5 MG Oral Tablet; Take 1 tablet daily as directed; Therapy: 03KEO4769 to (Evaluate:2017)  Requested for: 44XDD9907; Last Rx:77Arj9890 Ordered   6  Colace 100 MG Oral Capsule; 1 Two Times A Day, As Needed; Therapy: 84REL5431 to  Requested for: 2017 Recorded   7   Fenofibrate 145 MG Oral Tablet; take 1 tablet by mouth once daily; Therapy: 78KRQ9897 to (Evaluate:37Kwo8138)  Requested for: 72Zbs5872; Last Rx:49Lxc6616 Ordered   8  Ipratropium-Albuterol 0 5-2 5 (3) MG/3ML Inhalation Solution; Use 1 vial in nebulizer 4 times daily; Therapy: 35Fzg1603 to (Evaluate:03Xzg4377)  Requested for: 12JSU8866; Last Rx:34Szj1385 Ordered   9  Levothyroxine Sodium 50 MCG Oral Tablet; Take 1 tablet daily, JESSICA; Therapy: 04LWN9600 to (Last Rx:05Jun2017)  Requested for: 82DDV3289 Ordered   10  Metoprolol Tartrate 25 MG Oral Tablet; take 1 tablet by mouth twice a day; Therapy: 79RMY7494 to (Evaluate:25Jan2018)  Requested for: 80QLW4873; Last  Rx:07Lze7375 Ordered   11  Simvastatin 20 MG Oral Tablet; Take 1 tablet daily; Therapy: 67XWX0263 to (Last Yessy Ghosh)  Requested for: 51BEF7798 Ordered   12  Tolterodine Tartrate 1 MG Oral Tablet; Therapy: (Leonila Nigh) to Recorded   13  Tolterodine Tartrate ER 4 MG Oral Capsule Extended Release 24 Hour; TAKE 1  CAPSULE ONCE DAILY; Therapy: 87VAE3386 to (Evaluate:25Mar2018)  Requested for: 10VRN7503; Last  Rx:46Uez1719 Ordered   14  Torsemide 20 MG Oral Tablet; TAKE 1 TABLET DAILY ALTERNATING WITH 2 TABLETS  (40 MG) DAILY; Therapy: 87LJS3425 to (Last Rx:09Oct2017)  Requested for: 37GTI4835 Ordered   15  Vitamin B Complex CAPS; Therapy: (Leonila Nigh) to Recorded   16  Vitamin B-12 TABS; Therapy: (Leonila Nigh) to Recorded   17  Vitamin C TABS; Therapy: (Leonila Nigh) to Recorded   18  Vitamin E 400 UNIT Oral Capsule; Therapy: (Leonila Nigh) to Recorded    Allergies  1  No Known Drug Allergies    Vitals   Recorded: 83VYA9779 02:34PM   Temperature 97 3 F   Heart Rate 88   Respiration 22   Systolic 543   Diastolic 64   Height 5 ft 8 in   Weight 218 lb    BMI Calculated 33 15   BSA Calculated 2 13       Physical Exam   Constitutional  General appearance: No acute distress, well appearing and well nourished     Eyes  Conjunctiva and lids: No swelling, erythema, or discharge  Pupils and irises: Equal, round and reactive to light  Ears, Nose, Mouth, and Throat  External inspection of ears and nose: Normal    Otoscopic examination: Tympanic membrance translucent with normal light reflex  Canals patent without erythema  Nasal mucosa, septum, and turbinates: Normal without edema or erythema  Oropharynx: Normal with no erythema, edema, exudate or lesions  Pulmonary  Respiratory effort: No increased work of breathing or signs of respiratory distress  Auscultation of lungs: Clear to auscultation, equal breath sounds bilaterally, no wheezes, no rales, no rhonci  Cardiovascular  Auscultation of heart: Normal rate and rhythm, normal S1 and S2, without murmurs  Examination of extremities for edema and/or varicosities: Normal    Abdomen  Abdomen: Abnormal   The abdomen was obese  Lymphatic  Palpation of lymph nodes in neck: No lymphadenopathy  Musculoskeletal  Gait and station: Normal    Digits and nails: Normal without clubbing or cyanosis  Inspection/palpation of joints, bones, and muscles: Normal    Skin  Skin and subcutaneous tissue: Normal without rashes or lesions  Psychiatric  Mood and affect: Normal          Future Appointments    Date/Time Provider Specialty Site   12/18/2017 03:15 PM Elizabeth Peace[address]   10/16/2017 03:45 PM Eva Montoya DPM Podiat[facility]   11/07/2017 02:00 PM ARLEEN Ang  Pulmonary Medicine [facility] [place]   11/03/2017 10:00 AM SAUL Elias   Cardiology [address]       Signatures   Electronically signed by : Da Romano DO; Oct 11 2017  3:28PM EST                       (Author)

## 2017-11-01 NOTE — PROGRESS NOTES
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)   2  History of aortic valve stenosis (V12 59) (Z86 79)    Discussion/Summary  Discussion Summary:   Coty Arthur returns to our office today for one year follow-up status post #E#29 mm TAVR bioprosthesis  He is stable from a cardiac standpoint  His cardiac symptoms are NYHA class I  His weight and vital signs are stable  CBC is stable and BMP demonstrates an elevation in creatinine reflective of known history of CKD stage III  Recent echocardiogram demonstrates aortic valve bioprosthesis well-seated with normal function  He does not need further follow-up in the CT surgery office however he should maintain routine follow-up with his cardiologist  We recommend yearly echocardiograms which can be obtained by his cardiologist    Counseling Documentation With Imm: The patient, patient's family was counseled regarding diagnostic results  Goals and Barriers: The patient has the current Goals: Maintain routine follow-up with cardiology  Patient's Capacity to Self-Care: Patient is able to Self-Care  Medication SE Review and Pt Understands Tx: Possible side effects of new medications were reviewed with the patient/guardian today  Self Referrals:   Self Referrals: No      Chief Complaint  Chief Complaint Free Text Note Form: One year follow-up status post TAVR      History of Present Illness  HPI: Coty Arthur is an 80-year-old gentleman with a history of symptomatic severe aortic stenosis who underwent TAVR #E#29 mm [place]real MAKAYLA 3 bioprosthesis on 8/23/16  He returns to our office today for routine 1 year follow-up  He reports doing well over the last year from a cardiac standpoint  He has had some hospital admissions related to exacerbation of COPD  He reports a stable weight without edema  He is experiencing his usual symptoms of shortness of breath related to his lung disease   He continues to use oxygen as he did prior to his procedure one year ago  He remains quite active and functional with his daily activities  He denies chest pain, fatigue, lightheadedness, dizziness, presyncope or syncope  Review of Systems  Complete-Male:  Constitutional: no fever,-- not feeling poorly,-- no recent weight gain,-- no chills-- and-- not feeling tired  ENT: no nosebleeds  Cardiovascular: no chest pain,-- no palpitations-- and-- no extremity edema  Respiratory: shortness of breath during exertion, but-- as noted in HPI,-- no orthopnea-- and-- no PND  Gastrointestinal: no abdominal pain  Musculoskeletal: arthralgias,-- joint stiffness-- and-- Multi-joint arthritis  Integumentary: no rashes  Neurological: no numbness,-- no tingling,-- no dizziness-- and-- no fainting  Psychiatric: Is not suicidal, no sleep disturbances, no anxiety or depression, no change in personality, no emotional problems  Endocrine: no muscle weakness  Hematologic/Lymphatic: a tendency for easy bruising, but-- no tendency for easy bleeding  Active Problems     1  Abnormal gait (781 2) (R26 9)   2  Actinic keratosis (702 0) (L57 0)   3  Allergic rhinitis (477 9) (J30 9)   4  BPH (benign prostatic hyperplasia) (600 00) (N40 0)   5  Bursitis, subacromial (726 19) (M75 50)   6  Callus (700) (L84)   7  Cervical radiculopathy (723 4) (M54 12)   8  Chronic left shoulder pain (719 41,338 29) (M25 512,G89 29)   9  Chronic systolic congestive heart failure (428 22,428 0) (I50 22)   10  COPD (chronic obstructive pulmonary disease) (496) (J44 9)   11  Depression screening (V79 0) (Z13 89)   12  Difficulty walking (719 7) (R26 2)   13  Dizziness (780 4) (R42)   14  Esophagitis determined by endoscopy (530 10) (K20 9)   15  High triglycerides (272 1) (E78 1)   16  Hyperlipidemia LDL goal <100 (272 4) (E78 5)   17  Hyperthyroidism (242 90) (E05 90)   18  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)   19  Hyponatremia (276 1) (E87 1)   20  Hypothyroidism (244 9) (E03 9)   21  Immunization due (V05 9) (Z23)   22  Obesity with alveolar hypoventilation, unspecified obesity severity (278 03) (E66 2)   23  Obstructive sleep apnea (327 23) (G47 33)   24  Onychomycosis (110 1) (B35 1)   25  Osteoarthritis of left shoulder (715 91) (M19 012)   26  Osteoarthritis, knee/lower leg (715 96) (M17 9)   27  Peripheral vascular disease (443 9) (I73 9)   28  Pleural Effusion   29  Prediabetes (790 29) (R73 09)   30  Primary osteoarthritis of left shoulder (715 11) (M19 012)   31  Pulmonary fibrosis (515) (J84 10)   32  Pulmonary nodule, left (793 11) (R91 1)   33  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)   34  Screening for cardiovascular, respiratory, and genitourinary diseases  (V81 2,V81 4,V81 6) (Z13 6,Z13 83,Z13 89)   35  Screening for neurological condition (V80 09) (Z13 89)   36  Sleep related hypoventilation in conditions classified elsewhere (327 26) (G47 36)  Atrial fibrillation (427 31) (I48 91)     Benign essential HTN (401 1) (I10)     Chronic obstructive pulmonary disease (496) (J44 9)     Chronic kidney disease, stage 3 (585 3) (N18 3)     Congestive heart failure (428 0) (I50 9)     CAD in native artery (414 01) (I25 10)     Heart failure, left, 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)     Acquired ankle/foot deformity (736 70) (M21 969)     Arteriosclerosis of arteries of extremities (440 20) (I70 209)     Foot pain, bilateral (729 5) (M79 671)       Past Medical History    1  Acute maxillary sinusitis (461 0) (J01 00)   2  History of Acute maxillary sinusitis, recurrence not specified (461 0) (J01 00)   3  Acute upper respiratory infection (465 9) (J06 9)   4  History of Acute upper respiratory infection (465 9) (J06 9)   5  History of Bladder neck obstruction (596 0) (N32 0)   6  History of Bronchitis, chronic obstructive, with exacerbation (491 21) (J44 1)   7  History of Cataract of both eyes (366 9) (H26 9)   8  History of Cellulitis (682 9) (L03 90)   9   History of Cellulitis (682 9) (L03 90)   10  History of Cellulitis of foot (682 7) (L03 119)   11  History of Cellulitis of neck (682 1) (L03 221)   12  History of Chronic allergic conjunctivitis (372 14) (H10 45)   13  History of 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)   14  History of Diabetes type 2, uncontrolled (250 02) (E11 65)   15  History of Dyspnea on exertion (786 09) (R06 09)   16  History of Exposure to scabies (V01 89) (Z20 89)   17  History of Foreign Body In The Right Auditory Canal (931)   18  History of acute bronchitis (V12 69) (Z87 09)   19  History of acute bronchitis (V12 69) (Z87 09)   20  History of acute bronchitis (V12 69) (Z87 09)   21  History of allergic rhinitis (V12 69) (Z87 09)   22  History of aortic valve replacement (V43 3) (Z95 2)   23  History of backache (V13 59) (Z87 39)   24  History of bacterial pneumonia (V12 61) (Z87 01)   25  History of bronchitis (V12 69) (Z87 09)   26  History of carcinoma (V10 90) (Z85 9)   27  History of cardiomegaly (V12 59) (Z86 79)   28  History of constipation (V12 79) (Z87 19)   29  History of decubitus ulcer (V13 3) (Z87 2)   30  History of dermatitis (V13 3) (Z87 2)   31  History of eczema (V13 3) (Z87 2)   32  History of edema (V13 89) (Z87 898)   33  History of epistaxis (V12 69) (Z87 898)   34  History of fever (V13 89) (Z87 898)   35  History of fracture of rib (V15 51) (Z87 81)   36  History of influenza (V12 09) (Z87 09)   37  History of osteoarthritis (V13 4) (Z87 39)   38  History of phimosis (V13 89) (Z87 438)   39  History of shortness of breath (V13 89) (Z87 898)   40  History of tinea corporis (V12 09) (Z86 19)   41  History of tinea corporis (V12 09) (Z86 19)   42  History of Internal Hemorrhoids (455 0)   43  History of Knee Sprain (844 9)   44  Late Effects Of Sprain Or Strain (905 7)   45  History of Noninfected skin tear of right lower extremity, initial encounter (891 0)  (S81 811A)   46   History of Non-ST elevation myocardial infarction (NSTEMI) of indeterminate age   52  History of Normal routine physical examination (V70 0) (Z00 00)   48  Otalgia, unspecified laterality (388 70) (H92 09)   49  History of Otalgia, unspecified laterality (388 70) (H92 09)   50  History of Pneumonia (V12 61)   51  History of Pneumonia (486) (J18 9)   52  History of Postop check (V67 00) (Z09)   53  History of Rotator cuff tendinitis (726 10) (M75 80)   54  Skin Abscess Of Hip (682 6)   55  History of Skin neoplasm (239 2) (D49 2)   56  History of Sprain and strain (848 9) (T14 8XXA)   57  History of Tachycardia (785 0) (R00 0)   58  History of Trigger finger (727 03) (M65 30)   59  History of Type 2 diabetes mellitus (250 00) (E11 9)   60  Venous thrombosis (453 9) (I82 90)    Surgical History  1  History of Aortic Valve Replacement Transcatheter   2  History of Cataract Surgery   3  History of Hip Replacement   4  History of Knee Replacement   5  History of Surgery Penis Circumcision Except    10  History of Thoracentesis (Diagnostic)   7  History of Tonsillectomy    Family History  Mother    1  Family history of arthritis (V17 7) (Z82 61)   2  Family history of coronary artery disease (V17 3) (Z82 49)   3  Family history of hypertension (V17 49) (Z82 49)  Father    4  Family history of arthritis (V17 7) (Z82 61)   5  Family history of Prostate cancer  Family History    6  Denied: Family history of Pulmonary Disease    Social History     · Being A Social Drinker   · Denied: Drug use (305 90) (F19 90)   · Former smoker (X04 05) (U39 575)   · History of Former smoker (V15 82) (Y10 543)  Social History Reviewed: The social history was reviewed and is unchanged  Current Meds   1  Amiodarone HCl - 100 MG Oral Tablet; TAKE 1 TABLET DAILY (CONTACT CARDIOLOGIST FOR ADDITIONAL REFILLS); Therapy: 21WZN4991 to (Last Rx:46Nip4575)  Requested for: 88Zxj3519 Ordered   2  AmLODIPine Besylate 10 MG Oral Tablet; TAKE 1 TABLET DAILY;  Therapy: 29QKS8407 to (Last Rx:38Ytr0997) Requested for: 54Yni9352 Ordered   3  Aspirin 81 MG Oral Tablet Chewable; 2 daily; Therapy: (Harry Sánchez) to Recorded   4  Breo Ellipta 100-25 MCG/INH Inhalation Aerosol Powder Breath Activated; INHALE 1 PUFFS Twice daily  Requested for: 30AME0408; Last GA:11HGE0879 Ordered   5  Cialis 2 5 MG Oral Tablet; Take 1 tablet daily as directed; Therapy: 51GUT1926 to (Evaluate:22Nov2017)  Requested for: 17CBJ9709; Last Rx:30Bsd4980 Ordered   6  Colace 100 MG Oral Capsule; 1 Two Times A Day, As Needed; Therapy: 02TOF5877 to  Requested for: 12Jan2017 Recorded   7  Fenofibrate 145 MG Oral Tablet; take 1 tablet by mouth once daily; Therapy: 50DDT4482 to (Evaluate:82Ohc2142)  Requested for: 26Apr2017; Last Rx:05Jum5984 Ordered   8  Ipratropium-Albuterol 0 5-2 5 (3) MG/3ML Inhalation Solution; Use 1 vial in nebulizer 4 times daily; Therapy: 60Lmj7228 to (Evaluate:92Pvw5877)  Requested for: 62VVY1726; Last Rx:53Gri4129 Ordered   9  Levothyroxine Sodium 50 MCG Oral Tablet; Take 1 tablet daily, JESSICA; Therapy: 29JMC7244 to (Last Rx:05Jun2017)  Requested for: 55YJH1730 Ordered   10  Metoprolol Tartrate 25 MG Oral Tablet; take 1 tablet by mouth twice a day; Therapy: 85CIP0603 to (Evaluate:25Jan2018)  Requested for: 34ISJ0794; Last  Rx:71Cyx3486 Ordered   11  Simvastatin 20 MG Oral Tablet; Take 1 tablet daily; Therapy: 08PAH9670 to (Last Elvia Marr)  Requested for: 30EWH6504 Ordered   12  Tolterodine Tartrate 1 MG Oral Tablet; Therapy: (Lizandroobe Chase) to Recorded   13  Tolterodine Tartrate ER 4 MG Oral Capsule Extended Release 24 Hour; TAKE 1  CAPSULE ONCE DAILY; Therapy: 42ATV0249 to (Evaluate:25Mar2018)  Requested for: 44FSR3547; Last  Rx:41Nai0636 Ordered   14  Torsemide 20 MG Oral Tablet; 20mg/d alternate with 40mg/d; Therapy: 07HJS9799 to (Evaluate:25Jun2017)  Requested for: 26Apr2017; Last  Rx:26Apr2017 Ordered   15  Vitamin B Complex CAPS; Therapy: (Pheobe Chase) to Recorded   16   Vitamin B-12 TABS; Therapy: (Evelin Dawson) to Recorded   17  Vitamin C TABS; Therapy: (Evelin Dawson) to Recorded   18  Vitamin E 400 UNIT Oral Capsule; Therapy: (Evelin Dawson) to Recorded  Medication List Reviewed: The medication list was reviewed and updated today  Allergies  1  No Known Drug Allergies   Dec 1 2004 12:00AM; Recorded By: ; 9/4/2012 12:16:59 AM    Vitals  Vital Signs    Recorded: 23FDU4044 10:15AM   Temperature 97 8 F, Oral    Heart Rate 52    Respiration 14    Systolic 805, , LUE   Diastolic 60, RUE 62, LUE   Height 5 ft 8 in    Weight 218 lb     BMI Calculated 33 15    BSA Calculated 2 12    O2 Saturation 96        Physical Exam   Constitutional  General appearance: No acute distress, well appearing and well nourished  Neck  Jugular veins: Normal    Pulmonary  Respiratory effort: No increased work of breathing or signs of respiratory distress  Auscultation of lungs: Clear to auscultation  Cardiovascular  Auscultation of heart: Normal rate and rhythm, normal S1 and S2, no murmurs  Measurement of blood pressure in two or more extremities: Within normal limits  Carotid pulses: Normal, 2+ bilaterally  Pedal pulses: Normal, 2+ bilaterally  Examination of extremities for edema and/or varicosities: Normal    Abdomen  Abdomen: Non-tender, no masses  Musculoskeletal  Inspection/palpation of digits and nails: Normal without clubbing or cyanosis  Muscle strength/tone: Normal  -- limited ROM TISH due to shoulder arthritis  Skin  Skin and subcutaneous tissue: Normal without rashes or lesions  Psychiatric  Orientation to person, place and time: Normal    Mood and affect: Normal        Future Appointments    Date/Time Provider Specialty Site   12/18/2017 03:15 PM Casi Lam[address]   12/18/2017 02:45 PM Zane Andrade DPM Podiatry [facility]   11/07/2017 02:00 PM ARLEEN Pettit   Pulmonary Medicine [facility]   11/03/2017 10:00 AM SAUL Bansal   Cardiology [address]       Signatures   Electronically signed by : ROC Pina; Oct  2 2017 11:26AM EST                       (Author)    Electronically signed by : Juliette Morton DO; Oct 17 2017  4:29PM EST                       (Author)

## 2017-11-03 ENCOUNTER — ALLSCRIPTS OFFICE VISIT (OUTPATIENT)
Dept: OTHER | Facility: OTHER | Age: 82
End: 2017-11-03

## 2017-11-04 NOTE — PROGRESS NOTES
Assessment  Assessed    1  Benign essential HTN (401 1) (I10)   2  CAD in native artery (414 01) (I25 10)   3  COPD (chronic obstructive pulmonary disease) (496) (J44 9)   4  Chronic systolic congestive heart failure (428 22,428 0) (I50 22)   5  Pulmonary fibrosis (515) (J84 10)   6  Hypothyroidism (244 9) (E03 9)   7  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)    Plan  Atrial fibrillation    · Amiodarone HCl - 100 MG Oral Tablet; TAKE 1 TABLET DAILY (CONTACT  CARDIOLOGIST FOR ADDITIONAL REFILLS)   Rx By: OhLife; Dispense: 0 Days ; #:90 Tablet; Refill: 1;For: Atrial fibrillation; JESSICA = N; Sent To: [facility]  Atrial fibrillation, CAD in native artery    · Metoprolol Tartrate 25 MG Oral Tablet; take 1 tablet by mouth twice a day   Rx By: OhLife; Dispense: 90 Days ; #:180 Tablet; Refill: 1;For: Atrial fibrillation, CAD in native artery; JESSICA = N; Sent To: [facility]   · Torsemide 20 MG Oral Tablet; TAKE 1 TABLET DAILY ALTERNATING WITH 2  TABLETS (40 MG) DAILY   Rx By: OhLife; Dispense: 90 Days ; #:180 X 90 Tablet Bottle; Refill: 1;For: Atrial fibrillation, CAD in native artery; JESSICA = N; Sent To: [facility]  Benign essential HTN    · AmLODIPine Besylate 10 MG Oral Tablet; TAKE 1 TABLET DAILY   Rx By: OhLife; Dispense: 0 Days ; #:90 Tablet; Refill: 1;For: Benign essential HTN; JESSICA = N; Sent To: [facility]  Benign essential HTN, CAD in native artery, Chronic systolic congestive heart failure, S/P  TAVR (transcatheter aortic valve replacement)    · Follow-up visit in 4 Months Evaluation and Treatment  Follow-up  Status: Hold For -  Scheduling  Requested for: 06UDV7900   Ordered; For: Benign essential HTN, CAD in native artery, Chronic systolic congestive heart failure, S/P TAVR (transcatheter aortic valve replacement);  Ordered By: Giselle Aguero Performed:  Due: 50PKL7203  High triglycerides    · From  Fenofibrate 145 MG Oral Tablet take 1 tablet by mouth once daily To  Fenofibrate 145 MG Oral Tablet ([facility]or) TAKE 1 TABLET BY MOUTH EVERY OTHER DAY   Rx By: Baldev Gomez; Dispense: 90 Days ; #:45 Tablet; Refill: 3;For: High triglycerides; JESSICA = N; Sent To: [facility]    Discussion/Summary  Cardiology Discussion Summary Free Text Note Form [facility]:   1  Chronic systolic and diastolic heart failure, [place] heart Association class 2/3 now compensated  Continue diuretics and other medication as before  Labs reviewed with the patientKnown moderate to severe COPD  Clinical exam shows to be under control continue nebulizers  Continue using home oxygen therapy  He isn't chest x-ray shows no infiltrate but pulmonary fibrosisHypertension seemed to well controlledParoxysmal atrial fibrillation Currently in sinus  Continue amiodarone  Patient not on antithrombotic therapy  His AFib episode only happens when patient respiratory status is compromised and he is not interested in antithrombotic therapy  Chronic kidney disease is stage III  Creatinine is stable around 2Dyslipidemia  Continue statins  Will decrease fenofibrate to every other day due to CRIHypothyroidism  TSH was high  He was not taking his levothyroxine  Now the dose is continued same as he is more compliantSevere aortic stenosis status post TAVR, will check echo Doppler for aortic valve area and paravalvular leakHistory of chronic COPD and pulmonary fibrosis  DJD with arthritis  Follow up with PMD         Goals and Barriers: The patient has the current Goals: Patient wanted to stay out of the hospital and [place]  The patent has the current Barriers: None  Patient's Capacity to Self-Care: Patient is able to Self-Care  Medication SE Review and Pt Understands Tx: Possible side effects of new medications were reviewed with the patient/guardian today     Counseling Documentation With [facility]: The patient, patient's family was counseled regarding instructions for management,-- risk factor reductions,-- prognosis,-- patient and family education,-- impressions,-- risks and benefits of treatment options,-- importance of compliance with treatment  Chief Complaint  Chief Complaint Free Text Note Form: Sebastián Mukherjee is here today as a followup  He denies any cardiac complaints in the office today  He is also resisting an EKG in office due to a recent one done in September  He would like you to control his cardiac medications  JW   Chief Complaint Chronic Condition [facility]: Patient is here today for follow up of chronic conditions described in HPI  History of Present Illness  Cardiology HPI Free Text Note Form [facility]: Mr Juan Carlos Bae is a 15-year-old male with history of coronary artery disease status post angioplasty of LAD with a residual 60% mid stenosis, severe aortic stenosis status post TAVR in [facility], known moderate to severe COPD, paroxysmal atrial fibrillation currently suppressed with amiodarone, on home oxygen therapy, chronic LBBB, CRI, hypertension, history of diastolic heart failure who was recently admitted to [facility] with a COPD exacerbation and Astelin heart failure was discharged home but was not taking his Demadex as prescribed  For couple days he did not take it at all and now he has started back  He was feeling short of breath particularly when he exerts  Since he started back on it he has been feeling a lot better  He still get hypoxic when he walks short distances  He denies any chest pain, any fever, any wheezing, any chills, any nausea or any increased leg swelling or weight gain  came with his significant other  He has not been admitted to hospital since January  Denies any chest pain or any shortness of breath  No fever no chills, still has chronic cough  Tolerating his medicines well  He had a history of TAVR   No PND, no other significant complaint he has is chronic usual shortness of breath and cough which is not changedreviewed  Patient came for follow-up  He is now using his oxygen all the time  Not admitted since January  Lost some weight  Recently chest x-ray shows presence of pulmonary fibrosis  Not on antithrombotic therapy but AFib is suppressed with low-dose amiodarone  No fever no chills no nausea no vomiting  No leg edema  Creatinine has been stable  Blood test and echo reviewed with the patient  CT surgery note noted his  Patient was not taking his thyroid medications and TSH was high  He is now from this to be compliant with medications  Came with his significant other  Review of Systems  Cardiology Male ROS:     Cardiac: rhythm problems-- and-- has heart murmur present, but-- as noted in HPI,-- no chest pain,-- no signs of swelling-- and-- no palpitations present  Skin: No complaints of nonhealing sores or skin rash  Genitourinary: frequent urination at night,-- kidney problems-- and-- prostate problems   Psychological: depression,-- anxiety-- and-- difficulty concentrating  General: appetite changes-- and-- night sweats, but-- no lack of energy/fatigue  Respiratory: shortness of breath-- and-- cough/sputum  HEENT: No complaints of serious problems, hearing problems, nose problems, throat problems, or snoring  Gastrointestinal: No complaints of liver problems, nausea, vomiting, heartburn, constipation, bloody stools, diarrhea, problems swallowing, adbominal pain, or rectal bleeding  Hematologic: No complaints of bleeding disorders, anemia, blood clots, or excessive brusing  Neurological: No complaints of numbness, tingling, dizziness, weakness, seizures, headaches, syncope or fainting, AM fatigue, daytime sleepiness, no witnessed apnea episodes  Musculoskeletal: arthritis-- and-- back pain, but-- no swelling/pain-- Patient has shoulder pain, bilateral hip arthritis and thinking of stem-cell transplant     ROS Reviewed:   ROS reviewed  Active Problems  Problems    1  Abnormal gait (781 2) (R26 9)   2  Acquired ankle/foot deformity (736 70) (M21 969)   3  Actinic keratosis (702 0) (L57 0)   4  Allergic rhinitis (477 9) (J30 9)   5  Arteriosclerosis of arteries of extremities (440 20) (I70 209)   6  Atrial fibrillation (427 31) (I48 91)   7  Benign essential HTN (401 1) (I10)   8  BPH (benign prostatic hyperplasia) (600 00) (N40 0)   9  Bursitis, subacromial (726 19) (M75 50)   10  CAD in native artery (414 01) (I25 10)   11  Callus (700) (L84)   12  Cervical radiculopathy (723 4) (M54 12)   13  Chronic kidney disease, stage 3 (585 3) (N18 3)   14  Chronic left shoulder pain (719 41,338 29) (M25 512,G89 29)   15  Chronic obstructive pulmonary disease (496) (J44 9)   16  Chronic systolic congestive heart failure (428 22,428 0) (I50 22)   17  Congestive heart failure (428 0) (I50 9)   18  COPD (chronic obstructive pulmonary disease) (496) (J44 9)   19  Depression screening (V79 0) (Z13 89)   20  Difficulty walking (719 7) (R26 2)   21  Dizziness (780 4) (R42)   22  Esophagitis determined by endoscopy (530 10) (K20 9)   23  Foot pain, bilateral (729 5) (M79 671,M79 672)   24  Heart failure, left, 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)   25  High triglycerides (272 1) (E78 1)   26  History of aortic valve stenosis (V12 59) (Z86 79)   27  Hyperlipidemia LDL goal <100 (272 4) (E78 5)   28  Hyperthyroidism (242 90) (E05 90)   29  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)   30  Hyponatremia (276 1) (E87 1)   31  Hypothyroidism (244 9) (E03 9)   32  Immunization due (V05 9) (Z23)   33  Obesity with alveolar hypoventilation, unspecified obesity severity (278 03) (E66 2)   34  Obstructive sleep apnea (327 23) (G47 33)   35  Onychomycosis (110 1) (B35 1)   36  Osteoarthritis of left shoulder (715 91) (M19 012)   37  Osteoarthritis, knee/lower leg (715 96) (M17 9)   38  Peripheral vascular disease (443 9) (I73 9)   39   Pleural Effusion 40  Prediabetes (790 29) (R73 09)   41  Primary osteoarthritis of left shoulder (715 11) (M19 012)   42  Pulmonary fibrosis (515) (J84 10)   43  Pulmonary nodule, left (793 11) (R91 1)   44  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)   45  Screening for cardiovascular, respiratory, and genitourinary diseases    (V81 2,V81 4,V81 6) (Z13 6,Z13 83,Z13 89)   46  Screening for neurological condition (V80 09) (Z13 89)   47  Sleep related hypoventilation in conditions classified elsewhere (327 26) (G47 36)    Past Medical History  Problems    1  Acute maxillary sinusitis (461 0) (J01 00)   2  History of Acute maxillary sinusitis, recurrence not specified (461 0) (J01 00)   3  Acute upper respiratory infection (465 9) (J06 9)   4  History of Acute upper respiratory infection (465 9) (J06 9)   5  History of Bladder neck obstruction (596 0) (N32 0)   6  History of Bronchitis, chronic obstructive, with exacerbation (491 21) (J44 1)   7  History of Cataract of both eyes (366 9) (H26 9)   8  History of Cellulitis (682 9) (L03 90)   9  History of Cellulitis (682 9) (L03 90)   10  History of Cellulitis of foot (682 7) (L03 119)   11  History of Cellulitis of neck (682 1) (L03 221)   12  History of Chronic allergic conjunctivitis (372 14) (H10 45)   13  History of 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)   14  History of Diabetes type 2, uncontrolled (250 02) (E11 65)   15  History of Dyspnea on exertion (786 09) (R06 09)   16  History of Exposure to scabies (V01 89) (Z20 89)   17  History of Foreign Body In The Right Auditory Canal (931)   18  History of acute bronchitis (V12 69) (Z87 09)   19  History of acute bronchitis (V12 69) (Z87 09)   20  History of acute bronchitis (V12 69) (Z87 09)   21  History of allergic rhinitis (V12 69) (Z87 09)   22  History of aortic valve replacement (V43 3) (Z95 2)   23  History of backache (V13 59) (Z87 39)   24  History of bacterial pneumonia (V12 61) (Z87 01)   25   History of bronchitis (V12 69) (Z87 09)   26  History of carcinoma (V10 90) (Z85 9)   27  History of cardiomegaly (V12 59) (Z86 79)   28  History of constipation (V12 79) (Z87 19)   29  History of decubitus ulcer (V13 3) (Z87 2)   30  History of dermatitis (V13 3) (Z87 2)   31  History of eczema (V13 3) (Z87 2)   32  History of edema (V13 89) (Z87 898)   33  History of epistaxis (V12 69) (Z87 898)   34  History of fever (V13 89) (Z87 898)   35  History of fracture of rib (V15 51) (Z87 81)   36  History of influenza (V12 09) (Z87 09)   37  History of osteoarthritis (V13 4) (Z87 39)   38  History of phimosis (V13 89) (Z87 438)   39  History of shortness of breath (V13 89) (Z87 898)   40  History of tinea corporis (V12 09) (Z86 19)   41  History of tinea corporis (V12 09) (Z86 19)   42  History of Internal Hemorrhoids (455 0)   43  History of Knee Sprain (844 9)   44  Late Effects Of Sprain Or Strain (905 7)   45  History of Noninfected skin tear of right lower extremity, initial encounter (891 0)    (S81 811A)   46  History of Non-ST elevation myocardial infarction (NSTEMI) of indeterminate age   52  History of Normal routine physical examination (V70 0) (Z00 00)   48  Otalgia, unspecified laterality (388 70) (H92 09)   49  History of Otalgia, unspecified laterality (388 70) (H92 09)   50  History of Pneumonia (V12 61)   51  History of Pneumonia (486) (J18 9)   52  History of Postop check (V67 00) (Z09)   53  History of Rotator cuff tendinitis (726 10) (M75 80)   54  Skin Abscess Of Hip (682 6)   55  History of Skin neoplasm (239 2) (D49 2)   56  History of Sprain and strain (848 9) (T14 8XXA)   57  History of Tachycardia (785 0) (R00 0)   58  History of Trigger finger (727 03) (M65 30)   59  History of Type 2 diabetes mellitus (250 00) (E11 9)   60  Venous thrombosis (453 9) (I82 90)  Active Problems And Past Medical History Reviewed: The active problems and past medical history were reviewed and updated today        Surgical History  Problems    1  History of Aortic Valve Replacement Transcatheter   2  History of Cataract Surgery   3  History of Hip Replacement   4  History of Knee Replacement   5  History of Surgery Penis Circumcision Except [place]   10  History of Thoracentesis (Diagnostic)   7  History of Tonsillectomy  Surgical History Reviewed: The surgical history was reviewed and updated today  Family History  Mother    1  Family history of arthritis (V17 7) (Z82 61)   2  Family history of coronary artery disease (V17 3) (Z82 49)   3  Family history of hypertension (V17 49) (Z82 49)  Father    4  Family history of arthritis (V17 7) (Z82 61)   5  Family history of Prostate cancer  Family History    6  Denied: Family history of Pulmonary Disease  Family History Reviewed: The family history was reviewed and updated today  Social History  Problems    · Being A Social Drinker   · Denied: Drug use (305 90) (F19 90)   · Former smoker (K27 72) (R86 122)   · History of Former smoker (N51 02) (A43 294)  Social History Reviewed: The social history was reviewed and is unchanged  Current Meds   1  Amiodarone HCl - 100 MG Oral Tablet; TAKE 1 TABLET DAILY (CONTACT   CARDIOLOGIST FOR ADDITIONAL REFILLS); Therapy: 77YHX1297 to (Last Rx:36Kxu6779)  Requested for: 89Gju5355 Ordered   2  AmLODIPine Besylate 10 MG Oral Tablet; TAKE 1 TABLET DAILY; Therapy: 70FVM5637 to (Last Rx:28Kxg7421)  Requested for: 39Vxt4263 Ordered   3  Aspirin 81 MG Oral Tablet Chewable; 2 daily; Therapy: (94 31 11) to Recorded   4  Breo Ellipta 100-25 MCG/INH Inhalation Aerosol Powder Breath Activated; INHALE 1   PUFFS Twice daily  Requested for: 68LDC9198; Last FI:24IUH1027 Ordered   5  Cialis 2 5 MG Oral Tablet; Take 1 tablet daily as directed; Therapy: 04JIH1492 to (Evaluate:43Vsn0448)  Requested for: 34DXS1883; Last   Rx:97Uzq8316 Ordered   6  Colace 100 MG Oral Capsule; 1 Two Times A Day, As Needed;    Therapy: 32HBZ7649 to Requested for: 73JJE9524 Recorded   7  Fenofibrate 145 MG Oral Tablet; take 1 tablet by mouth once daily; Therapy: 66ONA5026 to (Evaluate:29Qii7638)  Requested for: 53Uha2078; Last   Rx:76Epe8916 Ordered   8  Ipratropium-Albuterol 0 5-2 5 (3) MG/3ML Inhalation Solution; Use 1 vial in nebulizer 4   times daily; Therapy: 75Jhq0871 to (Evaluate:74Ukr8681)  Requested for: 69AEV2865; Last   Rx:32Vqa0602 Ordered   9  Levothyroxine Sodium 50 MCG Oral Tablet; Take 1 tablet daily, JESSICA;   Therapy: 13RII2024 to (Last Rx:05Jun2017)  Requested for: 60INI1114 Ordered   10  Metoprolol Tartrate 25 MG Oral Tablet; take 1 tablet by mouth twice a day; Therapy: 89XPE9912 to (486 8704)  Requested for: 97ULJ5947; Last    Rx:34Xaf6362 Ordered   11  Simvastatin 20 MG Oral Tablet; Take 1 tablet daily; Therapy: 09YWI2379 to (Last Maurita Feeling)  Requested for: 72LCK5836 Ordered   12  Tolterodine Tartrate 1 MG Oral Tablet; Therapy: (Almaz Zayra) to Recorded   13  Tolterodine Tartrate ER 4 MG Oral Capsule Extended Release 24 Hour; TAKE 1    CAPSULE ONCE DAILY; Therapy: 74RZR1311 to (Evaluate:25Mar2018)  Requested for: 91VZN9314; Last    Rx:03Mrp4182 Ordered   14  Torsemide 20 MG Oral Tablet; TAKE 1 TABLET DAILY ALTERNATING WITH 2 TABLETS    (40 MG) DAILY; Therapy: 91QSG6685 to (Last Rx:09Oct2017)  Requested for: 06HJK2397 Ordered   15  Vitamin B Complex CAPS; Therapy: (Almaz Zayra) to Recorded   16  Vitamin B-12 TABS; Therapy: (Almaz Zayra) to Recorded   17  Vitamin C TABS; Therapy: (Almaz Zayra) to Recorded   18  Vitamin E 400 UNIT Oral Capsule; Therapy: (Almaz Zayra) to Recorded  Medication List Reviewed: The medication list was reviewed and updated today  Allergies  Medication    1   No Known Drug Allergies    Vitals  Vital Signs    Recorded: 75EEQ6695 10:00AM   Heart Rate 42, Apical   Systolic 082, RUE, Sitting   Diastolic 70, RUE, Sitting   BP CUFF SIZE Large   Height 5 ft 8 in   Weight 218 lb 6 4 oz   BMI Calculated 33 21   BSA Calculated 2 12   O2 Saturation 78, RA     Physical Exam    Constitutional   General appearance: No acute distress, well appearing and well nourished  -- Alert awake oriented  Eyes   Conjunctiva and Sclera examination: Conjunctiva pink, sclera anicteric  Ears, Nose, Mouth, and Throat - Oropharynx: Clear, nares are clear, mucous membranes are moist    Neck   Neck and thyroid: Normal, supple, trachea midline, no thyromegaly  -- No JVP no carotid bruit  Pulmonary   Respiratory effort: No increased work of breathing or signs of respiratory distress  -- Normal effort  Auscultation of lungs: Clear to auscultation, no rales, no rhonchi, no wheezing, good air movement  -- Course breath sounds and with fine  Cardiovascular   Auscultation of heart: Normal rate and rhythm, normal S1 and S2, no murmurs  -- S1-S2 regular with 2/6 ejection murmur  Carotid pulses: Normal, 2+ bilaterally  Peripheral vascular exam: Abnormal     Pedal pulses: Abnormal  -- DP and PT are barely palpable  Examination of extremities for edema and/or varicosities: Normal     Abdomen   Abdomen: Non-tender and no distention  Liver and spleen: No hepatomegaly or splenomegaly  Musculoskeletal Gait and station: Normal gait  -- Digits and nails: Normal without clubbing or cyanosis  -- Inspection/palpation of joints, bones, and muscles: Normal, ROM normal     Skin - Skin and subcutaneous tissue: Normal without rashes or lesions  Skin is warm and well perfused, normal turgor  Neurologic - Cranial nerves: II - XII intact  -- Speech: Normal     Psychiatric - Orientation to person, place, and time: Normal -- Mood and affect: Normal    Additional Findings - On chronic oxygen therapy  Results/Data  Diagnostic Studies Reviewed Cardio: I personally reviewed the recording/images in the office today  My interpretation follows     Lab Review: October 16, 2017 shows BUN 39 creatinine 2 0, sodium 139 potassium 4 6, cholesterol 97 HDL 37 LDL 45  TSH was low 8 4   Echocardiogram/PINA: Echo Doppler done of in September of 2017 shows EF 40%, paradoxical septal motion, mildly dilated LV, the lateral right atrium and left atrium, mild-to-moderate TR with PA pressure 65 mm of mercury, bioprosthetic valve at aortic position which is Villarreal Esdras TAVR valve with normal function and mean gradient of 9 mm of mercury with no paravalvular leak  Mild-to-moderate MR noted  As compared to old study patient's EF is now around 40% and aortic stenosis has improved  Catheterization: Cardiac catheterization done shows he has 50-60% LAD stenosis  There is a patent stent seen in the mid and distal LAD  Nonobstructive disease seen in the right coronary artery and circumflex  This was done in March 2016  Other: Chest x-ray done few weeks ago shows evidence of interstitial fibrosis  No pneumonia no CHF  ECG Report: 6/27/2017 tablet EKG shows sinus rhythm with first-degree AV block, IVCD or incomplete LBBB  Not changed from old EKG  Patient had history of rate-related LBBB * XR CHEST PA & LATERAL 16Oct2017 08:55AM Bean Petty     Test Name Result Flag Reference   XR CHEST PA & LATERAL (Report)     CHEST      INDICATION: Shortness of breath  COMPARISON: 5/8/2017, CT 6/8/2017     VIEWS: Frontal and lateral projections     IMAGES: 2     FINDINGS:        Cardiomediastinal silhouette appears without change  Interstitial fibrosis noted predominantly at the lung bases as on the previous examination  Persistent small right effusion or pleural thickening is seen  No pneumothorax  No definite acute infiltrate  No pneumothorax     Visualized osseous structures appear within normal limits for the patient's age  IMPRESSION:     Interstitial fibrosis and chronic pleural changes on the right   No acute infiltrate is seen       Workstation performed: [id number]     Signed by:   Kee Moran MD 10/16/17     (1) COMPREHENSIVE METABOLIC PANEL 57FRW5811 25:27EA Kati Almonte     Test Name Result Flag Reference   Glucose, Serum 109 mg/dL H 65-99   BUN 39 mg/dL H 8-27   Creatinine, Serum 2 06 mg/dL H 0 76-1 27   BUN/Creatinine Ratio 19  10-24   Sodium, Serum 139 mmol/L  134-144   Potassium, Serum 4 6 mmol/L  3 5-5 2   Chloride, Serum 95 mmol/L L    Carbon Dioxide, Total 26 mmol/L  18-29   Calcium, Serum 9 7 mg/dL  8 6-10 2   Protein, Total, Serum 7 9 g/dL  6 0-8 5   Albumin, Serum 3 8 g/dL  3 5-4 7   Globulin, Total 4 1 g/dL  1 5-4 5   A/G Ratio 0 9 L 1 2-2 2   Bilirubin, Total 0 4 mg/dL  0 0-1 2   Alkaline Phosphatase, S 61 IU/L     AST (SGOT) 39 IU/L  0-40   ALT (SGPT) 15 IU/L  0-44   eGFR If NonAfricn Am 28 mL/min/1 73 L >59   eGFR If Africn Am 33 mL/min/1 73 L >59     (1) LIPID PANEL FASTING W DIRECT LDL REFLEX 16Oct2017 08:30AM Kati Almonte     Test Name Result Flag Reference   Cholesterol, Total 97 mg/dL L 100-199   Triglycerides 77 mg/dL  0-149   HDL Cholesterol 37 mg/dL L >39   LDL Cholesterol Calc 45 mg/dL  0-99     (1) TSH 16Oct2017 08:30AM Kati Almonte     Test Name Result Flag Reference   TSH 8 460 uIU/mL H 0 450-4 500     (LC) CBC, No Differential/Platelet 75HDX9706 77:90KH Gwendolyn Thomas     Test Name Result Flag Reference   WBC 8 4 x10E3/uL  3 4-10 8   RBC 4 15 x10E6/uL  4 14-5 80   Hemoglobin 11 5 g/dL L 12 6-17 7   Hematocrit 35 6 % L 37 5-51 0   MCV 86 fL  79-97   MCH 27 7 pg  26 6-33 0   MCHC 32 3 g/dL  31 5-35 7   RDW 15 9 % H 12 3-15 4     ECHO COMPLETE WITH CONTRAST IF INDICATED 18Sep2017 10:44AM Gwendolyn Thomas    Order Number: [id number]   Performing Comments: One year follow-up status post #26 mm MAKAYLA 3 TAVR; to be read by Dr Clara Mcconnell, Dr Ferny Jacobsen or Dr Makayla Dolan   - Patient Instructions: To schedule this appointment, please contact Central Scheduling at [phone]  For Luis Maier, please call [phone]       Test Name Result Flag Reference ECHO COMPLETE WITH CONTRAST IF INDICATED (Report)     THE [facility]   [address] [place] [address]   [phone]     Transthoracic Echocardiogram   2D, M-mode, Doppler, and Color Doppler     Study date: 18-Sep-2017     Patient: Chloe Najjar   MR number: [id number]   Account number: [de-identified]   : 24-Oct-1930   Age: 80 years   Gender: Male   Status: Routine   Location: Echo lab   Height: 70 in   Weight: 211 6 lb   BP: 152/ 71 mmHg     Indications: Follow up     Diagnoses: 424 1 - AORTIC VALVE DISORDER, V43 3 - HEART VALVE REPLAC NEC     Sonographer: CAMILLA Foley   Primary Physician: Kt Sequeira DO   Referring Physician: ROC Mendez   Group: [facility] Walden   Interpreting Physician: Renee Aden MD     SUMMARY     COMPARISONS:   Aortic stenosis has improved following the procedure  LEFT VENTRICLE:   The ventricle was mildly dilated  Systolic function was moderately reduced by visual assessment  Ejection fraction was estimated to be 40 %  There was moderate diffuse hypokinesis with paradoxical septal motion due to LBBB  Wall thickness was mildly increased  There was mild to moderate concentric hypertrophy  Doppler parameters were consistent with abnormal left ventricular relaxation (grade 1 diastolic dysfunction)  VENTRICULAR SEPTUM:   There was moderate paradoxical motion  These changes are consistent with LBBB  LEFT ATRIUM:   The atrium was moderately dilated  RIGHT ATRIUM:   The atrium was mildly dilated  MITRAL VALVE:   There was mild to moderate annular calcification  There was mild regurgitation  AORTIC VALVE:   Pt has TAVR, with peak velocity across the valve 2 26 m/sec, peak gradient of 20 mm of Hg and mean gradient of 9 mm of Hg and calculated FAMTATA 1 6 cm2  No paravalvular leak noted  TRICUSPID VALVE:   There was mild to moderate regurgitation  Estimated peak PA pressure was 65 mmHg       PULMONIC VALVE: There was mild regurgitation  COMPARISONS:   Aortic stenosis has improved following the procedure  HISTORY: PRIOR HISTORY: Aortic valve replace, HTN, CAD, COPD, CHF, A FIB      PROCEDURE: The procedure was performed in the echo lab  This was a routine study  The transthoracic approach was used  The study included complete 2D imaging, M-mode, complete spectral Doppler, and color Doppler  The heart rate was 78 bpm,   at the start of the study  Images were obtained from the parasternal, apical, subcostal, and suprasternal notch acoustic windows  Image quality was adequate  LEFT VENTRICLE: The ventricle was mildly dilated  Systolic function was moderately reduced by visual assessment  Ejection fraction was estimated to be 40 %  There was moderate diffuse hypokinesis with paradoxical septal motion due to LBBB  Wall thickness was mildly increased  There was mild to moderate concentric hypertrophy  DOPPLER: Doppler parameters were consistent with abnormal left ventricular relaxation (grade 1 diastolic dysfunction)  VENTRICULAR SEPTUM: Thickness was mildly increased  There was moderate paradoxical motion  These changes are consistent with LBBB  RIGHT VENTRICLE: The size was normal  Systolic function was normal      LEFT ATRIUM: The atrium was moderately dilated  RIGHT ATRIUM: The atrium was mildly dilated  MITRAL VALVE: There was mild to moderate annular calcification  There was mild thickening  DOPPLER: There was mild regurgitation  AORTIC VALVE: Pt has TAVR, with peak velocity across the valve 2 26 m/sec, peak gradient of 20 mm of Hg and mean gradient of 9 mm of Hg and calculated FATMATA 1 6 cm2  No paravalvular leak noted  DOPPLER: There was no evidence for stenosis  There was no significant regurgitation  TRICUSPID VALVE: DOPPLER: There was mild to moderate regurgitation  Estimated peak PA pressure was 65 mmHg  The findings suggest moderate to severe pulmonary hypertension  PULMONIC VALVE: DOPPLER: There was mild regurgitation  PERICARDIUM: There was no thickening or calcification  There was no pericardial effusion  AORTA: The root exhibited normal size  SYSTEMIC VEINS: IVC: The inferior vena cava was normal in size  Respirophasic changes were normal      SYSTEM MEASUREMENT TABLES     2D mode   AoR Diam 2D: 3 3 cm   LA Diam (2D): 4 2 cm   LA/Ao (2D): 1 27   FS (2D Teich): 11 1 %   IVSd (2D): 1 37 cm   LVDEV: 154 cmï¾³   LVEDV MOD BP: 196 cmï¾³   LVESV: 117 cmï¾³   LVIDd(2D): 5 6 cm   LVISd (2D): 4 98 cm   LVOT Area 2D: 3 14 cm squared   LVPWd (2D): 1 33 cm   SV (Teich): 37 cmï¾³     Apical four chamber   LVEF A4C: 28 %     Apical two chamber   LA Area: 28 1 cm squared   LA Volume: 106 cmï¾³   LVEF A2C: 34 %     Unspecified Scan Mode   FATMATA Cont Eq (Peak Pasquale): 0 82 cm squared   FATMATA Cont Eq (VTI): 1 52 cm squared   LVOT (VTI): 20 5 cm   LVOT Diam : 2 cm   LVOT Vmax: 1160 mm/s   LVOT Vmax; Mean: 595 mm/s   Peak Grad ; Mean: 1 mm[Hg]   SV (LVOT): 64 cmï¾³   VTI;Mean: 2 mm[Hg]   MV Peak A Pasquale: 1180 mm/s   MV Peak E Pasquale  Mean: 726 mm/s   MVA (PHT): 4 07 cm squared   PHT: 54 ms   Max P mm[Hg]   V Max: 3590 mm/s   Vmax: 3660 mm/s   RA Area: 23 6 cm squared   RA Volume: 75 5 cmï¾³   TAPSE: 2 5 cm     [employer] Accredited Echocardiography [facility]     Prepared and electronically signed by     Tereso Rubio MD   Signed 19-Sep-2017 08:17:58     Future Appointments    Date/Time Provider Specialty Site   2017 03:15 PM Kati Al[address]   2017 02:45 PM Brooklyn Kern DPM Podiatry [facility]ORES   2017 02:00 PM ARLEEN Lee   Pulmonary Medicine [place] [id number]     Signatures   Electronically signed by : SAUL Rojas ; Nov  3 2017 12:47PM EST                       (Author)

## 2017-11-07 ENCOUNTER — ALLSCRIPTS OFFICE VISIT (OUTPATIENT)
Dept: OTHER | Facility: OTHER | Age: 82
End: 2017-11-07

## 2017-11-08 NOTE — PROGRESS NOTES
Assessment  1  COPD (chronic obstructive pulmonary disease) (496) (J44 9)   2  Obstructive sleep apnea (327 23) (G47 33)   3  Chronic systolic congestive heart failure (428 22,428 0) (I50 22)   4  Chronic obstructive pulmonary disease (496) (J44 9)    Plan  Chronic obstructive pulmonary disease    · Modify Home Oxygen; Status:Complete;   Done: [id number]   Perform:[facility]; Order Comments:please provide optimizer and high flow tubing  Patient need 5-6L n/c wt ambulation; [id number]; Ordered; For:Chronic obstructive pulmonary disease; Ordered By:Kiera Waller;  Chronic obstructive pulmonary disease, Heart failure, left, with LVEF >40%    · Nocturnal Pulse Oximetry; Status:Active; Requested for:[id number];    [address]; Order Comments:please do nocturnal pulse oximetry on current CPAP and 5L supplemental oxyge; DHU:[id number]; Ordered; For:Chronic obstructive pulmonary disease, Heart failure, left, with LVEF >40%; Ordered By:Fausto Waller;  COPD (chronic obstructive pulmonary disease)    · Breo Ellipta 100-25 MCG/INH Inhalation Aerosol Powder Breath Activated;  INHALE 1 PUFFS Twice daily   Rx By: [employer]; Dispense: 90 Days ; #:3 Aerosol Powder Breath Activated; Refill: 3;For: COPD (chronic obstructive pulmonary disease); JESSICA = N; Verified Transmission to [facility]; Last Updated By: System, [employer]; 2017 10:09:26 AM  Pulmonary nodule, left    · * CT CHEST WO CONTRAST; Status:Need Information - Financial Authorization; Requested PARVEEN:[id number];    Perform:[facility]; HMX:[id number]; Ordered; For:Pulmonary nodule, left; Ordered By:Fausto Waller;    Discussion/Summary  Discussion Summary:   Twin has COPD and hypoxia  He is needing supplemental oxygen 4L at rest and 5-6L with ambulation  He is using Breo 100 mcg 1 puff daily and DuoNeb  I encouraged him to go use DuoNeb BID  He is compliant in the use of CPAP 10cm H2o pressure   He has been using 5L supplemental with CPAP  I will be ordering nocturnal pulse oximetry on L3 with CPAP had CT of chest 7/16 and new 9 7cm x 4 8mm OLYA nodule was seen  He has not had repeat CT and one is ordered for next month  had resting oximetry done on room air  With ambulation it was 82% and at rest 84%  Correction was made and instruction given  is having chest Xray today  Counseling Documentation With Imm: The patient, patient's family was counseled regarding  Goals and Barriers: The patient has the current Goals: Patient will use supplemental oxygen; high flow tubing and oxidizer is ordered  Patient's Capacity to Self-Care: Patient is able to Self-Care  Active Problems  1  Abnormal gait (781 2) (R26 9)   2  Actinic keratosis (702 0) (L57 0)   3  Allergic rhinitis (477 9) (J30 9)   4  Atrial fibrillation (427 31) (I48 91)   5  Benign essential HTN (401 1) (I10)   6  BPH (benign prostatic hypertrophy) (600 00) (N40 0)   7  CAD in native artery (414 01) (I25 10)   8  Cervical radiculopathy (723 4) (M54 12)   9  Chronic kidney disease, stage 3 (585 3) (N18 3)   10  Chronic left shoulder pain (719 41,338 29) (M25 512,G89 29)   11  Congestive heart failure (428 0) (I50 9)   12  COPD (chronic obstructive pulmonary disease) (496) (J44 9)   13  Depression screening (V79 0) (Z13 89)   14  Dizziness (780 4) (R42)   15  Esophagitis determined by endoscopy (530 10) (K20 9)   16  Heart failure, left, 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)   17  High triglycerides (272 1) (E78 1)   18  Hyperlipidemia LDL goal <100 (272 4) (E78 5)   19  Hyperthyroidism (242 90) (E05 90)   20  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)   21  Hyponatremia (276 1) (E87 1)   22  Hypothyroidism (244 9) (E03 9)   23  Immunization due (V05 9) (Z23)   24  Obesity with alveolar hypoventilation, unspecified obesity severity (278 03) (E66 2)   25  Obstructive sleep apnea (327 23) (G47 33)   26  Osteoarthritis, knee/lower leg (715 96) (M17 9)   27   Peripheral vascular disease (443  9) (I73 9)   28  Pleural Effusion   29  Prediabetes (790 29) (R73 09)   30  Pulmonary fibrosis (515) (J84 10)   31  Pulmonary nodule, left (793 11) (R91 1)   32  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)   33  Screening for cardiovascular, respiratory, and genitourinary diseases    (V81 2,V81 4,V81 6) (Z13 6,Z13 83,Z13 89)   34  Screening for neurological condition (V80 09) (Z13 89)   35  Sleep related hypoventilation in conditions classified elsewhere (327 26) (G47 36)    Chief Complaint  Chief Complaint Free Text Note Form: Patient is here for f/u COPD Cardiomegaly  CHF, PATITO, dyspnea  Hospital f/u      History of Present Illness  HPI: Berto Cortez is here today for follow up  He was hospitalized in 1/17 for atrial fibrillation and COPD exacerbation  He has chronic systolic heart failure  He has 2st degree AV block  He has stable loculated right pleural effusion and chronic hypoxic respiratory failure on home oxygen  He is using 5L Continous oxygen  He has PATITO and uses CPAP 10cm with 4 L supplemental oxygen  Berto Cortez has severe aortic stenosis s/p AVR  Maikol Albright saw Berto Cortez at the hospital during his visit  He was in ICU  Berto Cortez has moderate COPD and D#FEV is 1 87L or 74% of predicted  Berto Cortez is using Breo 100 mcg 1 puff daily and DuoNeb as needed  Berto Cortez has PND  He is having some productive cough but mostly clear to yellow      Review of Systems  Complete-Male Pulm:   Constitutional: No fever or chills, feels well, no tiredness, no recent weight gain or weight loss  Eyes: no complaints of vision problems  ENT: no rhinitis, no PND, no epistaxis  Cardiovascular: no palpitations, no chest pain  Respiratory: shortness of breath-- and-- shortness of breath during exertion  Gastrointestinal: no complaints of esophageal reflux, no abdominal pain  Genitourinary: no urinary retention  Musculoskeletal: no arthralgias, no joint swelling, no myalgias  Integumentary: no rash, no lesions  Neurological: no headache, no fainting, no weakness  Psychiatric: no anxiety, no depression  Endocrine: Negative  Past Medical History  1  Acute maxillary sinusitis (461 0) (J01 00)   2  History of Acute maxillary sinusitis, recurrence not specified (461 0) (J01 00)   3  Acute upper respiratory infection (465 9) (J06 9)   4  History of Acute upper respiratory infection (465 9) (J06 9)   5  History of Bladder neck obstruction (596 0) (N32 0)   6  History of Bronchitis, chronic obstructive, with exacerbation (491 21) (J44 1)   7  History of Cataract of both eyes (366 9) (H26 9)   8  History of Cellulitis (682 9) (L03 90)   9  History of Cellulitis (682 9) (L03 90)   10  History of Cellulitis of foot (682 7) (L03 119)   11  History of Cellulitis of neck (682 1) (L03 221)   12  History of Chronic allergic conjunctivitis (372 14) (H10 45)   13  History of 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)   14  History of Diabetes type 2, uncontrolled (250 02) (E11 65)   15  History of Dyspnea on exertion (786 09) (R06 09)   16  History of Exposure to scabies (V01 89) (Z20 89)   17  History of Foreign Body In The Right Auditory Canal (931)   18  History of acute bronchitis (V12 69) (Z87 09)   19  History of acute bronchitis (V12 69) (Z87 09)   20  History of acute bronchitis (V12 69) (Z87 09)   21  History of allergic rhinitis (V12 69) (Z87 09)   22  History of aortic valve replacement (V43 3) (Z95 2)   23  History of backache (V13 59) (Z87 39)   24  History of bacterial pneumonia (V12 61) (Z87 01)   25  History of bronchitis (V12 69) (Z87 09)   26  History of cardiomegaly (V12 59) (Z86 79)   27  History of constipation (V12 79) (Z87 19)   28  History of decubitus ulcer (V13 3) (Z87 2)   29  History of dermatitis (V13 3) (Z87 2)   30  History of eczema (V13 3) (Z87 2)   31  History of edema (V13 89) (Z87 898)   32  History of epistaxis (V12 69) (Z87 898)   33  History of fever (V13 89) (Z87 898)   34   History of fracture of rib (V15 51) (Z87 81)   35  History of influenza (V12 09) (Z87 09)   36  History of osteoarthritis (V13 4) (Z87 39)   37  History of phimosis (V13 89) (Z87 438)   38  History of shortness of breath (V13 89) (Z87 898)   39  History of tinea corporis (V12 09) (Z86 19)   40  History of tinea corporis (V12 09) (Z86 19)   41  History of Internal Hemorrhoids (455 0)   42  History of Knee Sprain (844 9)   43  Late Effects Of Sprain Or Strain (905 7)   44  History of Noninfected skin tear of right lower extremity, initial encounter (891 0)    (S81 811A)   45  History of Non-ST elevation myocardial infarction (NSTEMI) of indeterminate age   55  History of Normal routine physical examination (V70 0) (Z00 00)   47  Otalgia, unspecified laterality (388 70) (H92 09)   48  History of Otalgia, unspecified laterality (388 70) (H92 09)   49  History of Pneumonia (V12 61)   50  History of Pneumonia (486) (J18 9)   51  History of Postop check (V67 00) (Z09)   52  History of Rotator cuff tendinitis (726 10) (M75 80)   53  Skin Abscess Of Hip (682 6)   54  History of Skin neoplasm (239 2) (D49 2)   55  History of Sprain and strain (848 9) (T14 8)   56  History of Tachycardia (785 0) (R00 0)   57  History of Trigger finger (727 03) (M65 30)   58  History of Type 2 diabetes mellitus (250 00) (E11 9)   59  Venous thrombosis (453 9) (I82 90)    Surgical History  1  History of Aortic Valve Replacement Transcatheter   2  History of Knee Replacement   3  History of Surgery Penis Circumcision Except    4  History of Thoracentesis (Diagnostic)   5  History of Tonsillectomy  Surgical History Reviewed: The surgical history was reviewed and updated today  Family History  Mother    1  Family history of coronary artery disease (V17 3) (Z82 49)   2  Family history of hypertension (V17 49) (Z82 49)  Father    3  Family history of Prostate cancer  Family History    4  Denied: Family history of Pulmonary Disease  Family History Reviewed:    The family history was reviewed and updated today  Social History   · Being A Social Drinker   · Denied: Drug use (305 90) (F19 90)   · History of Former smoker (V15 82) (Z87 891)   · QUIT SMOKING 25-35 YEARS AGO AND HE SMOKED 1 PPD FOR 45YEARS  · Never a smoker  Social History Reviewed: The social history was reviewed and updated today  Current Meds   1  Amiodarone HCl - 100 MG Oral Tablet; TAKE 1 TABLET DAILY  Requested for:   22Nov2016; Last Rx:22Nov2016 Ordered   2  AmLODIPine Besylate 10 MG Oral Tablet; TAKE 1 TABLET DAILY; Therapy: 36CUB9761 to (Evaluate:96Mlk8208)  Requested for: 87QWY1152; Last   Rx:33Act5015 Ordered   3  AmLODIPine Besylate 2 5 MG Oral Tablet; take 1 tablet by mouth once daily as directed; Therapy: 47Vuy1888 to (03 17 74 30 53)  Requested for: 27Apr2017; Last   Rx:22Cpt3414 Ordered   4  Aspirin 81 MG Oral Tablet Chewable; 2 daily; Therapy: (Naima Stroud) to Recorded   5  Breo Ellipta 100-25 MCG/INH Inhalation Aerosol Powder Breath Activated; USE AS   DIRECTED ON PACKAGE; Therapy:  to Recorded   6  Cialis 2 5 MG Oral Tablet; Take 1 tablet daily as directed; Therapy: 33DRJ7033 to (Evaluate:16Vmv4904)  Requested for: 67QKP6727; Last   Rx:30Mar2017 Ordered   7  Colace 100 MG Oral Capsule; 1 Two Times A Day, As Needed; Therapy: 93WXB6265 to  Requested for: 12Jan2017 Recorded   8  Fenofibrate 145 MG Oral Tablet; take 1 tablet by mouth once daily; Therapy: 19PKX6944 to (Evaluate:78Jhl1060)  Requested for: 26Apr2017; Last   Rx:26Apr2017 Ordered   9  Ipratropium-Albuterol 0 5-2 5 (3) MG/3ML Inhalation Solution; Use 1 vial in nebulizer 4   times daily; Therapy: 82Cuo6693 to (Evaluate:48Ixm5651)  Requested for: 14UCV6897; Last   Rx:39Spu7879 Ordered   10  Levothyroxine Sodium 50 MCG Oral Tablet; Take 1 tablet daily, JESSICA;    Therapy: 30WRU4558 to (Last Rx:26Apr2017)  Requested for: 26Apr2017 Ordered   11   Metoprolol Tartrate 25 MG Oral Tablet; 1 two times a day; Therapy: 16RIT4237 to (Last Rx:16Oct2016)  Requested for: 72QPN9922 Ordered   12  Myrbetriq 25 MG Oral Tablet Extended Release 24 Hour; 1 DAILY; Therapy: 85EDC5359 to (Last Rx:14Oct2016) Ordered   13  Simvastatin 20 MG Oral Tablet; One tablet daily; Therapy: (Recorded:12Jan2017) to Recorded   14  Torsemide 20 MG Oral Tablet; 20mg/d alternate with 40mg/d; Therapy: 63UMT9954 to (Evaluate:25Jun2017)  Requested for: 26Apr2017; Last    Rx:26Apr2017 Ordered  Medication List Reviewed: The medication list was reviewed and updated today  Allergies  1  No Known Drug Allergies    Vitals  Vital Signs    Recorded: 92RXO8429 09:28AM   Temperature 98 F, Oral   Heart Rate 72   Pulse Quality Normal   Respiration Quality Normal   Respiration 24   Systolic 420, LUE, Sitting   Diastolic 60, LUE, Sitting   Height 5 ft 7 in   Weight 212 lb    BMI Calculated 33 2   BSA Calculated 2 07     Physical Exam    Constitutional   General appearance: No acute distress, well appearing and well nourished  Ears, Nose, Mouth, and Throat   Nasal mucosa, septum, and turbinates: Normal without edema or erythema  Lips, teeth, and gums: Normal, good dentition  Oropharynx: Normal with no erythema, edema, exudate or lesions  Neck   Neck: Supple, symmetric, trachea midline, no masses  Jugular veins: Normal     Pulmonary   Auscultation of lungs: Clear to auscultation, no rales, no crackles, no wheezing  Cardiovascular   Auscultation of heart: Normal rate and rhythm, normal S1 and S2, no murmurs  Examination of extremities for edema and/or varicosities: Normal     Abdomen   Abdomen: Soft, non-tender  Lymphatic   Palpation of lymph nodes in neck: No lymphadenopathy  Musculoskeletal   Gait and station: Normal     Digits and nails: Normal without clubbing or cyanosis  Neurologic   Mental Status: Normal  Not confused, no evidence of dementia, good comprehension, good concentration      Skin   Skin and subcutaneous tissue: Limited exam shows no rash  Psychiatric   Orientation to person, place and time: Normal     Mood and affect: Normal        Future Appointments    Date/Time Provider Specialty Site   05/10/2017 10:20 AM SAUL Loredo   Orthopedic [address] [place] [address]     Signatures   Electronically signed by : JACQUELYN Schwab; May  8 2017 10:21AM EST                       (Author)    Electronically signed by : ARLEEN Qiu ; Nov 7 2017  2:28PM EST                       (Author)

## 2017-11-10 ENCOUNTER — GENERIC CONVERSION - ENCOUNTER (OUTPATIENT)
Dept: OTHER | Facility: OTHER | Age: 82
End: 2017-11-10

## 2017-11-13 NOTE — PROGRESS NOTES
Assessment    1  Centrilobular emphysema (492 8) (J43 2)   2  Chronic hypoxemic respiratory failure (518 83,799 02) (J96 11)   3  Obstructive sleep apnea (327 23) (G47 33)    Plan   Chronic hypoxemic respiratory failure    · Follow-up visit in 6 months Evaluation and Treatment  Follow-up  Status: Hold For -Scheduling  Requested for: 17KGS3305   Ordered;Chronic hypoxemic respiratory failure; Ordered By: Shaq Aj Performed:  Due: 83EOB6626  Follow-up visit in 6 months Evaluation and Treatment  Follow-up  Status: Hold For - Scheduling  Requested for: 86ZMR2544 Ordered; For: Obstructive sleep apnea;  Ordered By: Shaq Aj  Performed:   Due: 71XDC6389   Results/Data  Naples Sleepiness Score 31SOG7937 02:41PM User, Ahs     Test Name Result Flag Reference   Naples Score 9 - Normal     Answer Summary: Q1-3, Q2-3, Q3-0, Q4-0, Q5-3, Q6-0, Q7-0, Q8-0     STOP BANG Questionnaire 70KWF2603 02:41PM User, [employer]     Test Name Result Flag Reference   STOP BANG Questionnaire Risk of PATITO Intermediate Risk       Snoring: No Tired: Yes Observed: No Blood Pressure: Yes BMI: No Age: Yes Neck Circumference: No Gender: Yes   STOP BANG Questionnaire PATITO Total Score 4       Snoring: No Tired: Yes Observed: No Blood Pressure: Yes BMI: No Age: Yes Neck Circumference: No Gender: Yes         Discussion/Summary  Discussion Summary:   COPD is stable  He will use Breo 100 mcg 1 puff once a day  He had mistakenly been taking 1 puff twice a day as the prescription that was ordered for a 90 day supply was written incorrectly  I did correct this and informed him that Chantel Kumar should only be 1 puff once a day  he does have moderate COPD with an FEV1 1 85 L or 76% of predicted  I will do spirometry next visit    Chronic hypoxemic respiratory failure  He will continue 4-5 L of oxygen with activity and 3 liters/minute at rest   Obstructive sleep apnea  Continue CPAP of 10 cm H2O with 3 liters of oxygen  with oxygen at bedtime    He does have a history of paroxysmal atrial fibrillation, hypertension and had trans catheter aortic valve replacement on August 23, 2016 for symptomatic severe aortic stenosis  He is on amiodarone 100 mg daily  Chest x-ray done October 16, 2017 was reviewed and shows some mild right lower lobe pulmonary fibrosis and some right pleural thickening  I did offer the influenza vaccine but he declined  He states last year and made him ill for several weeks  Follow-up in Spring  Goals and Barriers: The patient has the current Goals: Continue to use CPAP and oxygen as directed  Patient's Capacity to Self-Care: Patient is able to Self-Care  Medication SE Review and Pt Understands Tx: Possible side effects of new medications were reviewed with the patient/guardian today  The treatment plan was reviewed with the patient/guardian  The patient/guardian understands and agrees with the treatment plan      Active Problems    1  Abnormal gait (781 2) (R26 9)   2  Acquired ankle/foot deformity (736 70) (M21 969)   3  Actinic keratosis (702 0) (L57 0)   4  Allergic rhinitis (477 9) (J30 9)   5  Arteriosclerosis of arteries of extremities (440 20) (I70 209)   6  Atrial fibrillation (427 31) (I48 91)   7  Benign essential HTN (401 1) (I10)   8  BPH (benign prostatic hyperplasia) (600 00) (N40 0)   9  Bursitis, subacromial (726 19) (M75 50)   10  CAD in native artery (414 01) (I25 10)   11  Callus (700) (L84)   12  Cervical radiculopathy (723 4) (M54 12)   13  Chronic kidney disease, stage 3 (585 3) (N18 3)   14  Chronic left shoulder pain (719 41,338 29) (M25 512,G89 29)   15  Chronic systolic congestive heart failure (428 22,428 0) (I50 22)   16  Congestive heart failure (428 0) (I50 9)   17  COPD (chronic obstructive pulmonary disease) (496) (J44 9)   18  Depression screening (V79 0) (Z13 89)   19  Difficulty walking (719 7) (R26 2)   20  Dizziness (780 4) (R42)   21  Esophagitis determined by endoscopy (530 10) (K20 9)   22   Foot pain, bilateral (729 5) (M79 671,M79 672)   23  Heart failure, left, 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)   24  High triglycerides (272 1) (E78 1)   25  History of aortic valve stenosis (V12 59) (Z86 79)   26  Hyperlipidemia LDL goal <100 (272 4) (E78 5)   27  Hyperthyroidism (242 90) (E05 90)   28  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)   29  Hyponatremia (276 1) (E87 1)   30  Hypothyroidism (244 9) (E03 9)   31  Immunization due (V05 9) (Z23)   32  Obesity with alveolar hypoventilation, unspecified obesity severity (278 03) (E66 2)   33  Obstructive sleep apnea (327 23) (G47 33)   34  Onychomycosis (110 1) (B35 1)   35  Osteoarthritis of left shoulder (715 91) (M19 012)   36  Osteoarthritis, knee/lower leg (715 96) (M17 9)   37  Peripheral vascular disease (443 9) (I73 9)   38  Pleural Effusion   39  Prediabetes (790 29) (R73 09)   40  Primary osteoarthritis of left shoulder (715 11) (M19 012)   41  Pulmonary fibrosis (515) (J84 10)   42  Pulmonary nodule, left (793 11) (R91 1)   43  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)   44  Screening for cardiovascular, respiratory, and genitourinary diseases  (V81 2,V81 4,V81 6) (Z13 6,Z13 83,Z13 89)   45  Screening for neurological condition (V80 09) (Z13 89)   46  Sleep related hypoventilation in conditions classified elsewhere (327 26) (G47 36)    Chief Complaint  Chief Complaint Free Text Note Form: Pt presents today for 6mo checkup /sob/ct /pulse ox results  History of Present Illness  HPI: Luis De Los Santos presents for a follow-up visit  He has a history COPD hypoxemia  He also has underlying obstructive sleep apnea and uses CPAP at 10 cm H2O with 5 L of oxygen at bedtime  overall he is doing well  He does have some exertional dyspnea but this is stable  He is not having any chest pain, dizziness, or nocturnal dyspnea  He denies any chronic cough      He does have history of hx of symptomatic severe aortic stenosis and had transcatheter aortic valve replacement done 8/23/16  he also has paroxysmal atrial fibrillation, hypertension and chronic kidney disease  He did have a chest x-ray done October 16th which are reviewed and showed some small amount of right pleural thickening and mild fibrosis in the right lower lobe  Ying Foster stated he has been compliant with using his CPAP at night is not having any excessive daytime somnolence or morning headache  Review of Systems  Complete-Male Pulm:  Constitutional: No fever or chills, feels well, no tiredness, no recent weight gain or weight loss  Eyes: no complaints of vision problems  ENT: no rhinitis, no PND, no epistaxis  Cardiovascular: no palpitations, no chest pain  Respiratory: as noted in HPI  Gastrointestinal: no complaints of esophageal reflux, no abdominal pain  Genitourinary: no urinary retention  Musculoskeletal: no arthralgias, no joint swelling, no myalgias  Integumentary: no rash, no lesions  Neurological: no headache, no fainting, no weakness  Psychiatric: no anxiety, no depression  Hematologic/Lymphatic: no complaints of swollen glands  ROS Reviewed:   ROS reviewed  Past Medical History    1  Acute maxillary sinusitis (461 0) (J01 00)   2  History of Acute maxillary sinusitis, recurrence not specified (461 0) (J01 00)   3  Acute upper respiratory infection (465 9) (J06 9)   4  History of Acute upper respiratory infection (465 9) (J06 9)   5  History of Bladder neck obstruction (596 0) (N32 0)   6  History of Bronchitis, chronic obstructive, with exacerbation (491 21) (J44 1)   7  History of Cataract of both eyes (366 9) (H26 9)   8  History of Cellulitis (682 9) (L03 90)   9  History of Cellulitis (682 9) (L03 90)   10  History of Cellulitis of foot (682 7) (L03 119)   11  History of Cellulitis of neck (682 1) (L03 221)   12  History of Chronic allergic conjunctivitis (372 14) (H10 45)   13  History of 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)   14   History of Diabetes type 2, uncontrolled (250 02) (E11 65)   15  History of Dyspnea on exertion (786 09) (R06 09)   16  History of Exposure to scabies (V01 89) (Z20 89)   17  History of Foreign Body In The Right Auditory Canal (931)   18  History of acute bronchitis (V12 69) (Z87 09)   19  History of acute bronchitis (V12 69) (Z87 09)   20  History of acute bronchitis (V12 69) (Z87 09)   21  History of allergic rhinitis (V12 69) (Z87 09)   22  History of aortic valve replacement (V43 3) (Z95 2)   23  History of backache (V13 59) (Z87 39)   24  History of bacterial pneumonia (V12 61) (Z87 01)   25  History of bronchitis (V12 69) (Z87 09)   26  History of carcinoma (V10 90) (Z85 9)   27  History of cardiomegaly (V12 59) (Z86 79)   28  History of constipation (V12 79) (Z87 19)   29  History of decubitus ulcer (V13 3) (Z87 2)   30  History of dermatitis (V13 3) (Z87 2)   31  History of eczema (V13 3) (Z87 2)   32  History of edema (V13 89) (Z87 898)   33  History of epistaxis (V12 69) (Z87 898)   34  History of fever (V13 89) (Z87 898)   35  History of fracture of rib (V15 51) (Z87 81)   36  History of influenza (V12 09) (Z87 09)   37  History of phimosis (V13 89) (Z87 438)   38  History of shortness of breath (V13 89) (Z87 898)   39  History of tinea corporis (V12 09) (Z86 19)   40  History of tinea corporis (V12 09) (Z86 19)   41  History of Internal Hemorrhoids (455 0)   42  History of Knee Sprain (844 9)   43  Late Effects Of Sprain Or Strain (905 7)   44  History of Noninfected skin tear of right lower extremity, initial encounter (891 0)  (S81 811A)   45  History of Non-ST elevation myocardial infarction (NSTEMI) of indeterminate age   55  History of Normal routine physical examination (V70 0) (Z00 00)   47  Otalgia, unspecified laterality (388 70) (H92 09)   48  History of Otalgia, unspecified laterality (388 70) (H92 09)   49  History of Pneumonia (486) (J18 9)   50  History of Pneumonia (V12 61)   51   History of Postop check (V67 00) (Z09)   52  History of Rotator cuff tendinitis (726 10) (M75 80)   53  Skin Abscess Of Hip (682 6)   54  History of Skin neoplasm (239 2) (D49 2)   55  History of Sprain and strain (848 9) (T14 8XXA)   56  History of Tachycardia (785 0) (R00 0)   57  History of Trigger finger (727 03) (M65 30)   58  History of Type 2 diabetes mellitus (250 00) (E11 9)   59  Venous thrombosis (453 9) (I82 90)    Surgical History  1  History of Aortic Valve Replacement Transcatheter   2  History of Cataract Surgery   3  History of Hip Replacement   4  History of Knee Replacement   5  History of Surgery Penis Circumcision Except Vonore   10  History of Thoracentesis (Diagnostic)   7  History of Tonsillectomy  Surgical History Reviewed: The surgical history was reviewed and updated today  Family History  Mother    1  Family history of arthritis (V17 7) (Z82 61)   2  Family history of coronary artery disease (V17 3) (Z82 49)   3  Family history of hypertension (V17 49) (Z82 49)  Father    4  Family history of arthritis (V17 7) (Z82 61)   5  Family history of Prostate cancer  Family History    6  Denied: Family history of Pulmonary Disease  Family History Reviewed: The family history was reviewed and updated today  Social History     · Being A Social Drinker   · Denied: Drug use (305 90) (F19 90)   · History of Former smoker (V15 82) (R22 969)   · History of Former smoker (V15 82) (Z87 891)   · QUIT SMOKING 25-35 YEARS AGO AND HE SMOKED 1 PPD FOR 45YEARS  Social History Reviewed: The social history was reviewed and updated today  The social history was reviewed and is unchanged  Current Meds   1  Amiodarone HCl - 100 MG Oral Tablet; TAKE 1 TABLET DAILY (CONTACT CARDIOLOGIST FOR ADDITIONAL REFILLS); Therapy: 31XYJ0064 to (Last 21 )  Requested for: 33XIU5153 Ordered   2  AmLODIPine Besylate 10 MG Oral Tablet; TAKE 1 TABLET DAILY; Therapy: 39SSF5298 to (Last Rx:2017)  Requested for: 64OTH0739 Ordered   3  Aspirin 81 MG Oral Tablet Chewable; 2 daily; Therapy: (Tanner Marquez) to Recorded   4  Breo Ellipta 100-25 MCG/INH Inhalation Aerosol Powder Breath Activated; INHALE 1 PUFFS Daily; Therapy: 45IHD1242 to (Cheo Willo) Recorded   5  Cialis 2 5 MG Oral Tablet; Take 1 tablet daily as directed; Therapy: 01NKM8662 to (Evaluate:22Nov2017)  Requested for: 93BZY8213; Last Rx:92Miw6704 Ordered   6  Colace 100 MG Oral Capsule; 1 Two Times A Day, As Needed; Therapy: 30TWB5028 to  Requested for: 12Jan2017 Recorded   7  Fenofibrate 145 MG Oral Tablet; TAKE 1 TABLET BY MOUTH EVERY OTHER DAY; Therapy: 15NHT9210 to (95 [id number])  Requested for: 92NDJ5285; Last Rx:03Nov2017 Ordered   8  Ipratropium-Albuterol 0 5-2 5 (3) MG/3ML Inhalation Solution; Use 1 vial in nebulizer 4 times daily; Therapy: 15Vun5273 to (Evaluate:00Yko1075)  Requested for: 29GTP6638; Last Rx:88Azb5587 Ordered   9  Levothyroxine Sodium 50 MCG Oral Tablet; Take 1 tablet daily, JESSICA; Therapy: 46TKV7177 to (Last Rx:05Jun2017)  Requested for: 15QSC4505 Ordered   10  Metoprolol Tartrate 25 MG Oral Tablet; take 1 tablet by mouth twice a day; Therapy: 84XLU8815 to (Meagan Banks)  Requested for: 02QZT6253; Last  Rx:03Nov2017 Ordered   11  Simvastatin 40 MG Oral Tablet; take 1 tablet by mouth once daily; Therapy: (450 45 295) to Recorded   12  Tolterodine Tartrate ER 4 MG Oral Capsule Extended Release 24 Hour; TAKE 1  CAPSULE ONCE DAILY; Therapy: 09WSK7521 to (Evaluate:25Mar2018)  Requested for: 73EMD9157; Last  Rx:98Uxy4779 Ordered   13  Torsemide 20 MG Oral Tablet; TAKE 1 TABLET DAILY ALTERNATING WITH 2 TABLETS  (40 MG) DAILY; Therapy: 49AIR8010 to (Meagan Banks)  Requested for: 03XFH5587; Last  Rx:03Nov2017 Ordered   14  Vitamin B Complex CAPS; Therapy: (Irene Gabrielle) to Recorded   15  Vitamin B-12 TABS; Therapy: (Irene Gabrielle) to Recorded   16  Vitamin C TABS; Therapy: (Irene Gabrielle) to Recorded   17  Vitamin E 400 UNIT Oral Capsule; Therapy: (Leafy Torrance) to Recorded  Medication List Reviewed: The medication list was reviewed and updated today  Allergies  1  No Known Drug Allergies    Vitals  Vital Signs    Recorded: 28APF2039 02:06PM   Temperature 98 2 F, Oral   Heart Rate 76   Pulse Quality Normal   Respiration Quality Normal   Respiration 12   Systolic 995, LUE, Sitting   Diastolic 52, LUE, Sitting   Height 5 ft 9 in   Weight 218 lb    BMI Calculated 32 19   BSA Calculated 2 14   O2 Saturation 89, RA       Physical Exam   Constitutional  General appearance: No acute distress, well appearing and well nourished  Eyes  Examination of pupil and irises: Anicteric, pupils reactive  Ears, Nose, Mouth, and Throat  External inspection of ears and nose: Normal    Nasal mucosa, septum, and turbinates: Normal without edema or erythema  Lips, teeth, and gums: Normal, good dentition  Oropharynx: Normal with no erythema, edema, exudate or lesions  Neck  Neck: Supple, symmetric, trachea midline, no masses  Jugular veins: Normal    Pulmonary  Chest: Normal    Respiratory effort: No increased work of breathing or signs of respiratory distress  Auscultation of lungs: Abnormal   Auscultation of the lungs revealed decreased breath sounds diffusely  Cardiovascular  Auscultation of heart: Normal rate and rhythm, normal S1 and S2, no murmurs  -- Gr II DENIS  Examination of extremities for edema and/or varicosities: Abnormal   has trace lower tibial and pedal edema  Abdomen  Abdomen: Soft, non-tender  Lymphatic  Palpation of lymph nodes in neck: No lymphadenopathy  Musculoskeletal  Gait and station: Normal    Digits and nails: Normal without clubbing or cyanosis  Neurologic  Mental Status: Normal  Not confused, no evidence of dementia, good comprehension, good concentration  Skin  Skin and subcutaneous tissue: Limited exam shows no rash     Psychiatric  Orientation to person, place and time: Normal    Mood and affect: Normal        Future Appointments    Date/Time Provider Specialty Site   12/18/2017 03:15 PM Yana Jorgensen, [address] [place] [address]   12/18/2017 02:45 PM Nataliia Santiago DPM Podiat[facility]   05/14/2018 02:00 PM ARLEEN Zhang   Pulmonary Medicine [address] [facility]       Signatures   Electronically signed by : ARLEEN Delaney ; Nov 12 2017  7:35PM EST                       (Author)    Electronically signed by : ARLEEN Delaney ; Nov 12 2017  7:36PM EST                       (Author)

## 2017-11-27 ENCOUNTER — ALLSCRIPTS OFFICE VISIT (OUTPATIENT)
Dept: OTHER | Facility: OTHER | Age: 82
End: 2017-11-27

## 2017-11-28 NOTE — PROGRESS NOTES
Assessment    1  Atrial fibrillation (427 31) (I48 91)   2  Benign essential HTN (401 1) (I10)   3  CAD in native artery (414 01) (I25 10)   4  Congestive heart failure (428 0) (I50 9)   5  History of dizziness (V13 89) (Z87 898)   6  Dizziness (780 4) (R42)    Plan  Dizziness    · (1) COMPREHENSIVE METABOLIC PANEL; Status:Active; Requested for:27Nov2017;   Hypothyroidism    · Levothyroxine Sodium 50 MCG Oral Tablet; Take 1 tablet daily, JESSICA    Discussion/Summary    Please take Torsemide (diuretic) 20mg every day, not 1 pill/d alternating with 2 pills/d  See if this helps your symptoms of positional dizziness  We must be careful to control your blood pressure and not allow you to get fluid overloaded  Drink adequate water  Check your resting blood pressure and heart rate if possible over the next 3-4 days and let us know how you are  The patient, patient's caretaker was counseled regarding risk factor reductions,-- impressions,-- risks and benefits of treatment options  Possible side effects of new medications were reviewed with the patient/guardian today  The treatment plan was reviewed with the patient/guardian  The patient/guardian understands and agrees with the treatment plan      Chief Complaint  Discuss dizziness , review labs      History of Present Illness  dizzy in am, 5dwith position changesfew mintues then improvesspinninglightheadedheart racing or palpitations or cpbp check on ownconfusion or disorientation  drinking enough water admittedly  144/67885/60  been to cardiologistrecent changes in meds  stablef/ccompression stockings      Review of Systems   Cardiovascular: no chest pain-- and-- no extremity edema  Respiratory: no orthopnea  Active Problems    1  Abnormal gait (781 2) (R26 9)   2  Acquired ankle/foot deformity (736 70) (M21 969)   3  Actinic keratosis (702 0) (L57 0)   4  Allergic rhinitis (477 9) (J30 9)   5  Arteriosclerosis of arteries of extremities (440 20) (I70 209)   6  Atrial fibrillation (427 31) (I48 91)   7  Benign essential HTN (401 1) (I10)   8  BPH (benign prostatic hyperplasia) (600 00) (N40 0)   9  Bursitis, subacromial (726 19) (M75 50)   10  CAD in native artery (414 01) (I25 10)   11  Callus (700) (L84)   12  Centrilobular emphysema (492 8) (J43 2)   13  Cervical radiculopathy (723 4) (M54 12)   14  Denied: History of Chest tightness   15  Chronic hypoxemic respiratory failure (518 83,799 02) (J96 11)   16  Chronic kidney disease, stage 3 (585 3) (N18 3)   17  Chronic left shoulder pain (719 41,338 29) (M25 512,G89 29)   18  Chronic systolic congestive heart failure (428 22,428 0) (I50 22)   19  Congestive heart failure (428 0) (I50 9)   20  COPD (chronic obstructive pulmonary disease) (496) (J44 9)   21  Denied: History of Cough   22  Depression screening (V79 0) (Z13 89)   23  Difficulty walking (719 7) (R26 2)   24  Esophagitis determined by endoscopy (530 10) (K20 9)   25  Foot pain, bilateral (729 5) (M79 671,M79 672)   26  Heart failure, left, 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)   27  High triglycerides (272 1) (E78 1)   28  History of aortic valve stenosis (V12 59) (Z86 79)   29  Hyperlipidemia LDL goal <100 (272 4) (E78 5)   30  Hyperthyroidism (242 90) (E05 90)   31  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)   32  Hyponatremia (276 1) (E87 1)   33  Hypothyroidism (244 9) (E03 9)   34  Immunization due (V05 9) (Z23)   35  Obesity with alveolar hypoventilation, unspecified obesity severity (278 03) (E66 2)   36  Obstructive sleep apnea (327 23) (G47 33)   37  Onychomycosis (110 1) (B35 1)   38  Osteoarthritis of left shoulder (715 91) (M19 012)   39  Osteoarthritis, knee/lower leg (715 96) (M17 9)   40  Peripheral vascular disease (443 9) (I73 9)   41  Pleural Effusion   42  Prediabetes (790 29) (R73 09)   43  Primary osteoarthritis of left shoulder (715 11) (M19 012)   44  Pulmonary fibrosis (515) (J84 10)   45  Pulmonary nodule, left (793 11) (R91 1)   46   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)   47  Screening for cardiovascular, respiratory, and genitourinary diseases  (V81 2,V81 4,V81 6) (Z13 6,Z13 83,Z13 89)   48  Screening for neurological condition (V80 09) (Z13 89)   49  Sleep related hypoventilation in conditions classified elsewhere (327 26) (G47 36)    Past Medical History    1  Acute maxillary sinusitis (461 0) (J01 00)   2  History of Acute maxillary sinusitis, recurrence not specified (461 0) (J01 00)   3  Acute upper respiratory infection (465 9) (J06 9)   4  History of Acute upper respiratory infection (465 9) (J06 9)   5  History of Bladder neck obstruction (596 0) (N32 0)   6  History of Bronchitis, chronic obstructive, with exacerbation (491 21) (J44 1)   7  History of Cataract of both eyes (366 9) (H26 9)   8  History of Cellulitis (682 9) (L03 90)   9  History of Cellulitis (682 9) (L03 90)   10  History of Cellulitis of foot (682 7) (L03 119)   11  History of Cellulitis of neck (682 1) (L03 221)   12  Denied: History of Chest tightness   13  History of Chronic allergic conjunctivitis (372 14) (H10 45)   14  Denied: History of Cough   15  History of 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)   16  History of Diabetes type 2, uncontrolled (250 02) (E11 65)   17  History of Dyspnea on exertion (786 09) (R06 09)   18  History of Exposure to scabies (V01 89) (Z20 89)   19  History of Foreign Body In The Right Auditory Canal (931)   20  H/O blood clots (V12 51) (Z86 718)   21  History of acute bronchitis (V12 69) (Z87 09)   22  History of acute bronchitis (V12 69) (Z87 09)   23  History of acute bronchitis (V12 69) (Z87 09)   24  History of allergic rhinitis (V12 69) (Z87 09)   25  History of aortic valve replacement (V43 3) (Z95 2)   26  History of backache (V13 59) (Z87 39)   27  History of bacterial pneumonia (V12 61) (Z87 01)   28  History of bronchitis (V12 69) (Z87 09)   29  History of carcinoma (V10 90) (Z85 9)   30   History of cardiomegaly (V12 59) (Z86 79)   31  History of constipation (V12 79) (Z87 19)   32  History of decubitus ulcer (V13 3) (Z87 2)   33  History of dermatitis (V13 3) (Z87 2)   34  History of dizziness (V13 89) (Z87 898)   35  History of eczema (V13 3) (Z87 2)   36  History of edema (V13 89) (Z87 898)   37  History of epistaxis (V12 69) (Z87 898)   38  History of fever (V13 89) (Z87 898)   39  History of fracture of rib (V15 51) (Z87 81)   40  History of influenza (V12 09) (Z87 09)   41  History of phimosis (V13 89) (Z87 438)   42  History of shortness of breath (V13 89) (Z87 898)   43  History of tinea corporis (V12 09) (Z86 19)   44  History of tinea corporis (V12 09) (Z86 19)   45  History of Internal Hemorrhoids (455 0)   46  History of Knee Sprain (844 9)   47  Late Effects Of Sprain Or Strain (905 7)   48  History of Noninfected skin tear of right lower extremity, initial encounter (891 0)  (S81 811A)   49  History of Non-ST elevation myocardial infarction (NSTEMI) of indeterminate age   48  History of Normal routine physical examination (V70 0) (Z00 00)   51  Otalgia, unspecified laterality (388 70) (H92 09)   52  History of Otalgia, unspecified laterality (388 70) (H92 09)   53  History of Pneumonia (V12 61)   54  History of Pneumonia (486) (J18 9)   55  History of Postop check (V67 00) (Z09)   56  History of Rotator cuff tendinitis (726 10) (M75 80)   57  Skin Abscess Of Hip (682 6)   58  History of Skin neoplasm (239 2) (D49 2)   59  History of Sprain and strain (848 9) (T14 8XXA)   60  History of Tachycardia (785 0) (R00 0)   61  History of Trigger finger (727 03) (M65 30)   62  History of Type 2 diabetes mellitus (250 00) (E11 9)   63  Venous thrombosis (453 9) (I82 90)    Surgical History  1  History of Aortic Valve Replacement Transcatheter   2  History of Cataract Surgery   3  History of Hip Replacement   4  History of Knee Replacement   5  History of Surgery Penis Circumcision Except [place]   10   History of Thoracentesis (Diagnostic)   7  History of Tonsillectomy    Family History  Mother    1  Family history of arthritis (V17 7) (Z82 61)   2  Family history of coronary artery disease (V17 3) (Z82 49)   3  Family history of hypertension (V17 49) (Z82 49)  Father    4  Family history of arthritis (V17 7) (Z82 61)   5  Family history of Prostate cancer  Family History    6  Denied: Family history of Pulmonary Disease    The family history was reviewed and updated today  Social History     · Being A Social Drinker   · Denied: Drug use (305 90) (F19 90)   · History of Former smoker (V15 82) (I30 414)   · History of Former smoker (V15 82) (B26 079)   · Former smoker (V15 82) (V40 977)   · Denied: History of Pets / animals   · Denied: History of Recreational drug use   · Denied: History of Travel history  The social history was reviewed and updated today  Current Meds   1  Amiodarone HCl - 100 MG Oral Tablet; TAKE 1 TABLET DAILY (CONTACT CARDIOLOGIST FOR ADDITIONAL REFILLS); Therapy: 55MMV3737 to (Last 21 212 )  Requested for: 65ZJS6048 Ordered   2  AmLODIPine Besylate 10 MG Oral Tablet; TAKE 1 TABLET DAILY; Therapy: 97UAM2102 to (Last Rx:03Nov2017)  Requested for: 02GVH6637 Ordered   3  Aspirin 81 MG Oral Tablet Chewable; 2 daily; Therapy: (Azeb Wood) to Recorded   4  Breo Ellipta 100-25 MCG/INH Inhalation Aerosol Powder Breath Activated; INHALE 1 PUFFS Daily; Therapy: 91ILU2807 to (Talisha Dodd) Recorded   5  Cialis 2 5 MG Oral Tablet; Take 1 tablet daily as directed; Therapy: 17HQN5378 to (Evaluate:22Nov2017)  Requested for: 54HWO1924; Last Rx:12Eot9896 Ordered   6  Colace 100 MG Oral Capsule; 1 Two Times A Day, As Needed; Therapy: 46LXP3044 to  Requested for: 12Jan2017 Recorded   7  Fenofibrate 145 MG Oral Tablet; TAKE 1 TABLET BY MOUTH EVERY OTHER DAY; Therapy: 19MTO4128 to (21 )  Requested for: 90KUP8672; Last Rx:03Nov2017 Ordered   8   Ipratropium-Albuterol 0 5-2 5 (3) MG/3ML Inhalation Solution; Use 1 vial in nebulizer 4 times daily; Therapy: 15Wvx4232 to (Evaluate:73Vrb9387)  Requested for: 47LER1176; Last Rx:80Rtm0596 Ordered   9  Levothyroxine Sodium 50 MCG Oral Tablet; Take 1 tablet daily, JESSICA; Therapy: 58GAV9386 to (Last Rx:05Jun2017)  Requested for: 12KGV7150 Ordered   10  Metoprolol Tartrate 25 MG Oral Tablet; take 1 tablet by mouth twice a day; Therapy: 90KVJ1338 to (Andi Lacy)  Requested for: 31IVK7904; Last  Rx:03Nov2017 Ordered   11  Simvastatin 40 MG Oral Tablet; take 1 tablet by mouth once daily; Therapy: (0472 51 11 42) to Recorded   12  Tolterodine Tartrate ER 4 MG Oral Capsule Extended Release 24 Hour; TAKE 1  CAPSULE ONCE DAILY; Therapy: 45KAU7466 to (Evaluate:25Mar2018)  Requested for: 62FEF4241; Last  Rx:94Tlz1020 Ordered   13  Torsemide 20 MG Oral Tablet; TAKE 1 TABLET DAILY ALTERNATING WITH 2 TABLETS  (40 MG) DAILY; Therapy: 47AUJ5627 to (Andi Lacy)  Requested for: 95NBS2979; Last  Rx:03Nov2017 Ordered   14  Vitamin B Complex CAPS; Therapy: (Farris Bogus) to Recorded   15  Vitamin B-12 TABS; Therapy: (Farris Bogus) to Recorded   16  Vitamin C TABS; Therapy: (Farris Bogus) to Recorded   17  Vitamin E 400 UNIT Oral Capsule; Therapy: (Farris Bogus) to Recorded    Allergies  1  No Known Drug Allergies    Vitals  Vital Signs    Recorded: 75RWH8222 02:59PM   Temperature 97 5 F   Heart Rate 72   Respiration 18   Systolic 133   Diastolic 64   Height 5 ft 9 in   Weight 218 lb    BMI Calculated 32 19   BSA Calculated 2 14       Physical Exam   Constitutional  General appearance: No acute distress, well appearing and well nourished  Eyes  Conjunctiva and lids: No swelling, erythema, or discharge  Pupils and irises: Equal, round and reactive to light  Ears, Nose, Mouth, and Throat  External inspection of ears and nose: Normal    Otoscopic examination: Tympanic membrance translucent with normal light reflex  Canals patent without erythema  Nasal mucosa, septum, and turbinates: Normal without edema or erythema  Oropharynx: Normal with no erythema, edema, exudate or lesions  Pulmonary  Respiratory effort: No increased work of breathing or signs of respiratory distress  Auscultation of lungs: Clear to auscultation, equal breath sounds bilaterally, no wheezes, no rales, no rhonci  Cardiovascular  Auscultation of heart: Normal rate and rhythm, normal S1 and S2, without murmurs  Examination of extremities for edema and/or varicosities: Normal    Abdomen  Abdomen: Abnormal   The abdomen was obese  Lymphatic  Palpation of lymph nodes in neck: No lymphadenopathy  Musculoskeletal  Gait and station: Normal    Digits and nails: Normal without clubbing or cyanosis  Inspection/palpation of joints, bones, and muscles: Normal    Skin  Skin and subcutaneous tissue: Normal without rashes or lesions  Psychiatric  Mood and affect: Normal          Provider Comments    reduce torsemide in case of orthostasisif no betterstablemeds renewed, will need free [id number] better consider midodrine vs cardiologist eval      Future Appointments    Date/Time Provider Specialty Site   12/18/2017 03:15 PM [address]   12/18/2017 02:45 PM Odilon Dexter [facility]   05/14/2018 02:00 PM ARLEEN Low   Pulmonary Medicine [place] [address]       Signatures   Electronically signed by : Stephy Martin DO; Nov 27 2017 10:24PM EST                       (Author)

## 2017-12-18 ENCOUNTER — GENERIC CONVERSION - ENCOUNTER (OUTPATIENT)
Dept: OTHER | Facility: OTHER | Age: 82
End: 2017-12-18

## 2018-01-09 ENCOUNTER — GENERIC CONVERSION - ENCOUNTER (OUTPATIENT)
Dept: OTHER | Facility: OTHER | Age: 83
End: 2018-01-09

## 2018-01-09 NOTE — MISCELLANEOUS
Message   Recorded as Task   Date: 04/08/2016 11:34 AM, Created By: Learta Buerger   Task Name: Call Back   Assigned To: Joselo Brar   Regarding Patient: Tricia Holder, Status: Active   CommentMinerva Kwon - 08 Apr 2016 11:34 AM     TASK CREATED  pt was seen today by Dr LLOYD he forgot to give him a copy of his amlodipine 2 5 mg, he wanted this printed because he had issues with his pharmacy [facility]  I called this patient to see does he want me to call this in? Does he want us to mail this? should it go to mail order? Awaiting patient to call back  heena   VicenteDilia - 08 Apr 2016 1:26 PM     TASK EDITED  called pt  he asked Rx to be send to [facility] in [place]  sent per verbal order  Joselo Cuevas - 08 Apr 2016 6:33 PM     TASK EDITED  noted        Signatures   Electronically signed by : Magaly Oliva DO;  Apr 8 2016  6:34PM EST                       (Author)

## 2018-01-10 NOTE — MISCELLANEOUS
History of Present Illness  He has a low risk for readmission  He was discharged to home  Topics counseled included diet, need for daily weights, activities of daily living, importance of compliance with treatment and symptoms to report  HFCC Additional Notes:   Patient doing very well, maintaining self-management strategies and undergoing a work up prior to TAVR  Very pleasant with a positive attitude  [facility] following and has seen the OT  Current Meds   1  Amiodarone HCl - 100 MG Oral Tablet; TAKE 1 TABLET DAILY; Therapy: (Recorded:02Jun2016) to Recorded   2  AmLODIPine Besylate 2 5 MG Oral Tablet; Take 1 tablet daily as directed  Requested for:   08Apr2016; Last Rx:08Apr2016 Ordered   3  Aspirin 81 MG Oral Tablet Chewable; 2 daily; Therapy: (Recorded:07Apr2016) to Recorded   4  Benazepril HCl - 10 MG Oral Tablet; take one tablet by mouth every day; Therapy: 69OZY2729 to (Last Rx:03Jun2016)  Requested for: 03Jun2016 Ordered   5  Breo Ellipta 100-25 MCG/INH Inhalation Aerosol Powder Breath Activated; USE AS   DIRECTED ON PACKAGE; Therapy: (Recorded:02Jun2016) to Recorded   6  Cialis 2 5 MG Oral Tablet; 1 qd for BPH; Therapy: 23AFY6454 to (Last GS:30EZO9486)  Requested for: 03Jun2016 Ordered   7  CloNIDine HCl - 0 1 MG Oral Tablet; take 1 tablet by mouth twice a day; Therapy: 89AWC2419 to (Evaluate:08Wlq9360)  Requested for: 53LZX8406; Last   Rx:15Jan2016 Ordered   8  Colace 100 MG Oral Capsule; 1 DAILY; Therapy: (Recorded:02Jun2016) to Recorded   9  Fenofibrate 145 MG Oral Tablet (Tricor); take 1 tablet by mouth once daily; Therapy: 63DJE8373 to (Evaluate:83Umw3401)  Requested for: 07Apr2016; Last   Rx:96Ulh5177 Ordered   10  Ipratropium-Albuterol 0 5-2 5 (3) MG/3ML Inhalation Solution; Use 1 vial in nebulizer 4    times daily; Therapy: 28Apr2015 to (Evaluate:61Hzz7320)  Requested for: 12KMV4546; Last    Rx:77Com5750 Ordered   11   Metoprolol Tartrate 25 MG Oral Tablet; 1/2 tab tid; Therapy: 20WOW4624 to (Luis Purcell)  Requested for: 16ZJW4957; Last    Rx:29Mar2016 Ordered   12  Polyethylene Glycol 3350 Oral Packet (MiraLax); MIX 1 PACKET IN 8 OUNCES OF LIQUID    AND DRINK ONCE DAILY PRN; Therapy: 38GUT1422 to (Last Rx:29Mar2016) Ordered   13  ProAir  (90 Base) MCG/ACT Inhalation Aerosol Solution; 2 PUFFS EVERY 6    HOURS; Therapy: 13Bmx3110 to (Evaluate:11Oct2015)  Requested for: 19Vfi2158; Last    Rx:21Iot5974 Ordered   14  Ranitidine HCl - 150 MG Oral Tablet; TAKE 1 TABLET TWICE DAILY; Therapy: 73MTY8547 to (Evaluate:46Atm7945); Last Rx:29Mar2016 Ordered   15  Simvastatin 20 MG Oral Tablet; 1 every day; Therapy: 55XON5277 to (Last Rx:29Mar2016)  Requested for: 19Gku6368 Ordered   16  Tolterodine Tartrate ER 4 MG Oral Capsule Extended Release 24 Hour; TAKE 1    CAPSULE ONCE DAILY; Therapy: 33WAF4140 to (Evaluate:11Jun2016)  Requested for: 94DWT1839; Last    Rx:36Gnk5645 Ordered   17  Torsemide 20 MG Oral Tablet; 1 TABLET TWICE DAILY; Therapy: (Recorded:02Jun2016) to Recorded    Future Appointments    Date/Time Provider Specialty Site   07/08/2016 10:45 AM [place], [address]     Allergies    1   No Known Drug Allergies    Signatures   Electronically signed by : Lacy Cm, ; Peep 10 2016  4:16PM EST                       (Author)

## 2018-01-10 NOTE — MISCELLANEOUS
Chief Complaint  I spoke with Alyssia James on 1/12/17 after his hospital discharge to home on 1/11/17  He states that he is doing much better  He is afebrile and denies chest pain/dyspnea, He has his oxygen @ 5 L via N/C Continuously  I reviewed his medications with him and updated his chart  He is compliant with his Prednisone tapering instructions  He will call Dr Khoa Sosa office to make a follow up appt  He is aware to call our office at any time with any questions or concerns and he is also aware that if he has any dyspnea, weakness, dizziness, palpitations, et he needs to go to the ER Emilia Harp LPN   Chief Complaint Free Text Note Form: 2/1/17 HE CANCELLED HIS APPT SO HE WAS NOT SEEN FOR A TCM [address] [place] [address]      History of Present Illness  TCM Communication [facility]: The patient is being contacted for follow-up after hospitalization  Hospital records were reviewed  He was hospitalized at  The date of admission: 1/6/17, date of discharge: 1/11/17  Diagnosis: COPD Exac  He was discharged to home  Medications reviewed and updated today  He scheduled a follow up appointment  Follow-up appointments with other specialists: Dr Markel Rodgers 1/19/17  Counseling was provided to the patient  Topics counseled included instructions for management, risk factor reductions, patient and family education, activities of daily living and importance of compliance with treatment  Communication performed and completed by Emilia Harp LPN 3/84/70      Active Problems    1  Abnormal gait (781 2) (R26 9)  2  Actinic keratosis (702 0) (L57 0)  3  Allergic rhinitis (477 9) (J30 9)  4  Arthropathy of shoulder region (716 91) (M12 9)  5  Atrial fibrillation (427 31) (I48 91)  6  Benign essential HTN (401 1) (I10)  7  BPH (benign prostatic hypertrophy) (600 00) (N40 0)  8  CAD in native artery (414 01) (I25 10)  9  Cervical radiculopathy (723 4) (M54 12)  10  Chronic kidney disease, stage 3 (585 3) (N18 3)  11  Congestive heart failure (428 0) (I50 9)  12  COPD (chronic obstructive pulmonary disease) (496) (J44 9)  13  Depression screening (V79 0) (Z13 89)  14  Dizziness (780 4) (R42)  15  Esophagitis determined by endoscopy (530 10) (K20 9)  16  Heart failure, left, 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)  17  High triglycerides (272 1) (E78 1)  18  Hyperlipidemia LDL goal <100 (272 4) (E78 5)  19  Hyperthyroidism (242 90) (E05 90)  20  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)  21  Hyponatremia (276 1) (E87 1)  22  Immunization due (V05 9) (Z23)  23  Obesity with alveolar hypoventilation, unspecified obesity severity (278 03) (E66 2)  24  Obstructive sleep apnea (327 23) (G47 33)  25  Osteoarthritis, knee/lower leg (715 96) (M17 9)  26  Peripheral vascular disease (443 9) (I73 9)  27  Pleural Effusion  28  Prediabetes (790 29) (R73 09)  29  Pulmonary fibrosis (515) (J84 10)  30  Pulmonary nodule, left (793 11) (R91 1)  31  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)  32  Screening for neurological condition (V80 09) (Z13 89)  33  Sleep related hypoventilation in conditions classified elsewhere (327 26) (G47 36)    Screening for genitourinary condition (V81 6) (Z13 89)          Past Medical History   1  Acute maxillary sinusitis (461 0) (J01 00)  2  History of Acute maxillary sinusitis, recurrence not specified (461 0) (J01 00)  3  Acute upper respiratory infection (465 9) (J06 9)  4  History of Acute upper respiratory infection (465 9) (J06 9)  5  History of Bladder neck obstruction (596 0) (N32 0)  6  History of Bronchitis, chronic obstructive, with exacerbation (491 21) (J44 1)  7  History of Cataract of both eyes (366 9) (H26 9)  8  History of Cellulitis (682 9) (L03 90)  9  History of Cellulitis (682 9) (L03 90)  10  History of Cellulitis of foot (682 7) (L03 119)  11  History of Cellulitis of neck (682 1) (L03 221)  12  History of Chronic allergic conjunctivitis (372 14) (H10 45)  13  History of 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)  14   History of Diabetes type 2, uncontrolled (250 02) (E11 65)  15  History of Dyspnea on exertion (786 09) (R06 09)  16  History of Exposure to scabies (V01 89) (Z20 89)  17  History of Foreign Body In The Right Auditory Canal (931)  18  History of acute bronchitis (V12 69) (Z87 09)  19  History of acute bronchitis (V12 69) (Z87 09)  20  History of acute bronchitis (V12 69) (Z87 09)  21  History of allergic rhinitis (V12 69) (Z87 09)  22  History of aortic valve replacement (V43 3) (Z95 2)  23  History of backache (V13 59) (Z87 39)  24  History of bacterial pneumonia (V12 61) (Z87 01)  25  History of bronchitis (V12 69) (Z87 09)  26  History of cardiomegaly (V12 59) (Z86 79)  27  History of chronic obstructive lung disease (V12 69) (Z87 09)  28  History of constipation (V12 79) (Z87 19)  29  History of decubitus ulcer (V13 3) (Z87 2)  30  History of dermatitis (V13 3) (Z87 2)  31  History of eczema (V13 3) (Z87 2)  32  History of edema (V13 89) (Z87 898)  33  History of epistaxis (V12 69) (Z87 898)  34  History of fever (V13 89) (Z87 898)  35  History of fracture of rib (V15 51) (Z87 81)  36  History of influenza (V12 09) (Z87 09)  37  History of osteoarthritis (V13 4) (Z87 39)  38  History of phimosis (V13 89) (Z87 438)  39  History of shortness of breath (V13 89) (Z87 898)  40  History of tinea corporis (V12 09) (Z86 19)  41  History of tinea corporis (V12 09) (Z86 19)  42  History of Internal Hemorrhoids (455 0)  43  History of Knee Sprain (844 9)  44  Late Effects Of Sprain Or Strain (905 7)  45  History of Noninfected skin tear of right lower extremity, initial encounter (891 0)    (S81 801A)  46  History of Non-ST elevation myocardial infarction (NSTEMI) of indeterminate age  52  History of Normal routine physical examination (V70 0) (Z00 00)  48  Otalgia, unspecified laterality (388 70) (H92 09)  49  History of Otalgia, unspecified laterality (388 70) (H92 09)  50  History of Pneumonia (V12 61)  51   History of Pneumonia (486) (J18 9)  52  History of Postop check (V67 00) (Z09)  53  History of Rotator cuff tendinitis (726 10) (M75 80)  54  Skin Abscess Of Hip (682 6)  55  History of Skin neoplasm (239 2) (D49 2)  56  History of Sprain and strain (848 9) (T14 8)  57  History of Tachycardia (785 0) (R00 0)  58  History of Trigger finger (727 03) (M65 30)  59  History of Type 2 diabetes mellitus (250 00) (E11 9)  60  Venous thrombosis (453 9) (I82 90)    Surgical History   1  History of Aortic Valve Replacement Transcatheter  2  History of Knee Replacement  3  History of Surgery Penis Circumcision Except   4  History of Thoracentesis (Diagnostic)  5  History of Tonsillectomy    Family History  Mother   1  Family history of coronary artery disease (V17 3) (Z82 49)  2  Family history of hypertension (V17 49) (Z82 49)  Father   3  Family history of Prostate cancer  Family History   4  Denied: Family history of Pulmonary Disease    Social History    · Being A Social Drinker   · Denied: Drug use (305 90) (F19 90)   · History of Former smoker (V15 82) (O64 192)   · Never a smoker    Current Meds  1  Amiodarone HCl - 100 MG Oral Tablet; TAKE 1 TABLET DAILY  Requested for:   2016; Last Rx:2016 Ordered  2  AmLODIPine Besylate 10 MG Oral Tablet; TAKE 1 TABLET DAILY; Therapy: 43UUG7324 to (Evaluate:2017)  Requested for: 12NUP5517; Last   Rx:2016 Ordered  3  Aspirin 81 MG Oral Tablet Chewable; 2 daily; Therapy: (Chong Bathe) to Recorded  4  Breo Ellipta 100-25 MCG/INH Inhalation Aerosol Powder Breath Activated; USE AS   DIRECTED ON PACKAGE; Therapy: (Chong Bathe) to Recorded  5  Cialis 2 5 MG Oral Tablet; 1 qd for BPH; Therapy: 93YDH1272 to (Last DW:00OLR6350)  Requested for: 88TLN3288 Ordered  6  Colace 100 MG Oral Capsule; 1 Two Times A Day, As Needed; Therapy: 88MYS3455 to  Requested for: 2017 Recorded  7  Detrol LA 4 MG Oral Capsule Extended Release 24 Hour; 1 DAILY;    Therapy: (Recorded:12Jan2017) to Recorded  8  Fenofibrate 145 MG Oral Tablet; take 1 tablet by mouth once daily; Therapy: 67IHV9515 to (Evaluate:14Apr2017)  Requested for: 80NAW0634; Last   Rx:10Nmv0296 Ordered  9  Ipratropium-Albuterol 0 5-2 5 (3) MG/3ML Inhalation Solution; Use 1 vial in nebulizer 4   times daily; Therapy: 03Aww6156 to (Evaluate:78Lhr6476)  Requested for: 47YSM2497; Last   Rx:98Pxi7604 Ordered  10  Levothyroxine Sodium 50 MCG Oral Tablet; TAKE 1 TABLET DAILY; Therapy: 15HFZ9559 to Recorded  11  Metoprolol Tartrate 25 MG Oral Tablet; 1 two times a day; Therapy: 58YRD5454 to (Last Rx:16Oct2016)  Requested for: 36RIH5531 Ordered  12  Myrbetriq 25 MG Oral Tablet Extended Release 24 Hour; 1 DAILY; Therapy: 07IWR4041 to (Last Rx:14Oct2016) Ordered  13  Pantoprazole Sodium 40 MG Oral Tablet Delayed Release; TAKE 1 TABLET DAILY; Therapy: (Recorded:37Pqc4593) to Recorded  14  PredniSONE 20 MG Oral Tablet; use as dir as per hospital discharge instructions; Therapy: (Recorded:12Jan2017) to Recorded  15  Simvastatin 20 MG Oral Tablet; One tablet daily; Therapy: (Recorded:12Jan2017) to Recorded  16  Torsemide 20 MG Oral Tablet; 20mg/d alternate with 40mg/d; Therapy: 00MSF5195 to (Evaluate:03Jun2017)  Requested for: 04UZO6669; Last    Rx:52Plu6834 Ordered  Medication List Reviewed: The medication list was reviewed and updated today  Allergies   1  No Known Drug Allergies    Message   Recorded as Task   Date: 01/11/2017 05:01 PM, Created By: System   Task Name: [facility]   Assigned To:  Neftaly Sol   Regarding Patient: Nick Monday, Status: Active   Comment:    System - 11 Jan 2017 5:01 PM     Patient discharged from hospital   Patient Name: Tiffany Stokes  Patient YOB: 1930  Discharge Date: 1/11/2017  Facility: [facility] 11 Jan 2017 5:02 PM     TASK REASSIGNED: Previously Assigned To [facility]   Electronically signed by : Vladimir Ziegler ; Jan 12 2017 10:13AM EST                       (Co-author)    Electronically signed by : Teri Saul DO; Feb 2 2017 11:02PM EST                       (Author)    Electronically signed by : Teri Saul DO; Feb 2 2017 11:03PM EST                       (Author)

## 2018-01-11 NOTE — MISCELLANEOUS
Message   Recorded as Task   Date: 09/12/2016 03:34 PM, Created By: Triston Kelley   Task Name: Call Back   Assigned To: Joselo Brar   Regarding Patient: Sabina Soliman, Status: Active   CommentCaron Jasons - 12 Sep 2016 3:34 PM     TASK CREATED  I spoke with Hugh Trent today, 9/12/16  He was discharged from [facility] on 9/10/16 to his home  He is S/P TAVR  He states that he is doing fine and he denies Chest pain, fever, or dyspnea  He is using his Oxygen at home at 2 L via n/c  I attempted to review his medications with him and schedule his follow up appt with Dr Chantell Palencia but he wants to wait until Friday after he sees his cardiac surgeon Dr Angel Soto  He agreed to call me after that appt so we can review his meds and schedule an appt with Dr Chantell Palencia  He states that he wishes he was walking better than he is but he is managing to ambulate safely at home with either his cane or his walker  He is aware to call us back at any time with any questions or concerns  I will await a call back from him this Friday to set up his appt Danica Alt - 20 Sep 2016 3:38 PM     TASK EDITED                 I spoke with Hugh Trent again today  He is unable to come in sooner than 9/26/16 to make it a TCM visit  He states he is doing well and he offers no complaints   He said he will bring in his updated medication list from home to his appt and he is aware to call our office at any time with any questions or concerns Angelika Yoon - 24 Sep 2016 11:09 PM     TASK EDITED                 noted        Signatures   Electronically signed by : Sharifa Monson DO; Sep 24 2016 11:10PM EST                       (Author)

## 2018-01-11 NOTE — MISCELLANEOUS
Message  reviewed oximetry done on CPAP with 5 L supplemental oxygen  oxygen below 88% is 30 seconds  He is able to reduce to 4L supplemental oxygen        Signatures   Electronically signed by : JACQUELYN Rodriguez; May 31 2017  9:09AM EST                       (Author)

## 2018-01-11 NOTE — MISCELLANEOUS
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 (250 40,585 9)    (E11 22,N18 9)    Discussion/Summary  Discussion Summary:   No dysphagia off carafate  switch pantoprazole to ranitidine due to plavix  gi f/u pending  copd/htn/afib/natasha stable  Counseling Documentation With Imm: The patient, patient's family was counseled regarding risk factor reductions, impressions, risks and benefits of treatment options, importance of compliance with treatment  Medication SE Review and Pt Understands Tx: Possible side effects of new medications were reviewed with the patient/guardian today  The treatment plan was reviewed with the patient/guardian  The patient/guardian understands and agrees with the treatment plan      Chief Complaint   I spoke with both Tiara Gaming and his girlfriend on 3/29/16  He just came home from the [address] [place] [address] this afternoon  He is faxing me his medication list that he was provided with upon his discharge since there are some discrepancies from what we have here in his chart  He is aware that Dr Nesha Amos will review this and he will make the necessary changes and we will fax it back to him @ [phone] as per Tiara Gaming' request  He states that he is feeling well and much stronger  He is using oxygen at home @ 2 L when resting and 4 L when active  He denies chest pain or dyspnea, weakness   They are both aware to call our office at any time with any questions or concerns and they are also aware that if he develops any chest pain, dizziness, weakness, palpitations, dyspnea, etc he needs to go to the ER [facility]   Chief Complaint Free Text Note Form: Seen for a TCM  er/cma  History of Present Illness  TCM Communication [facility]: The patient is being contacted for follow-up after hospitalization and was in [facility], s/p pneumonia and copd exac with afib  Hospital records were reviewed  He was hospitalized [facility]  The date of discharge: 3/29/16  Diagnosis: COPD Exac, Esophagitis, A Fib  He was discharged to home  Medications reviewed and updated today  He scheduled a follow up appointment  Follow-up appointments with other specialists: Dr Luke Mcmahon 5/9/16  Counseling was provided to the patient  His girlfriend Alex Mcgill was also on the phone  Topics counseled included instructions for management, risk factor reductions, patient and family education, activities of daily living and importance of compliance with treatment  Communication performed and completed by 3/29/16 Neeru   HPI: d/c med list reviewed with pt  norvasc not been taken? [facility] med switched back from ditropan to detrol as before  diuretics alternating as before admission to hospital  zocor dose reduced for safety with amiodarone  on sotalol  metoprolol dosage was reduced  pt trying to be compliant with all meds, nebs per Dr Mary Causey  constipation stable      Review of Systems  Complete-Male:   Constitutional: no fever and no chills  ENT: no nosebleeds  Cardiovascular: no palpitations  Respiratory: no orthopnea and no PND  Gastrointestinal: constipation, but no abdominal pain and no blood in stools  Neurological: no dizziness and no fainting  Active Problems     1  Actinic keratosis (702 0) (L57 0)   2  Acute maxillary sinusitis, recurrence not specified (461 0) (J01 00)   3  Allergic rhinitis (477 9) (J30 9)   4  Atrial fibrillation (427 31) (I48 91)   5  Benign essential hypertension (401 1) (I10)   6   Benign prostatic hypertrophy without urinary obstruction (600 00) (N40 0)   7  Cardiomegaly (429 3) (I51 7)   8  Chronic kidney disease, stage 3 (585 3) (N18 3)   9  Chronic obstructive pulmonary disease (496) (J44 9)   10  Congestive heart failure (428 0) (I50 9)   11  Constipation (564 00) (K59 00)   12  Depression screening (V79 0) (Z13 89)   13  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)   14  Dyspnea on exertion (786 09) (R06 09)   15  Edema (782 3) (R60 9)   16  Esophagitis determined by endoscopy (530 10) (K20 9)   17  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)   18  Hyponatremia (276 1) (E87 1)   19  Lung nodules (793 19) (R91 8)   20  Need for pneumococcal vaccination (V03 82) (Z23)   21  Obesity, Class II, BMI 35-39 9, no comorbidity (278 01) (E66 01)   22  Obstructive sleep apnea (327 23) (G47 33)   23  Osteoarthritis, knee/lower leg (715 96) (M17 9)   24  Peripheral vascular disease (443 9) (I73 9)   25  Pleural Effusion (511 9)   26  Prediabetes (790 29) (R73 09)   27  Pulmonary fibrosis (515) (J84 10)   28  Screening for genitourinary condition (V81 6) (Z13 89)   29  Screening for neurological condition (V80 09) (Z13 89)   30  Sleep related hypoventilation in conditions classified elsewhere (327 26) (G47 36)    Coronary arteriosclerosis (414 00) (I25 10)       Tachycardia (785 0) (R00 0)       Hyperlipidemia (272 4) (E78 5)       Trigger finger (727 03) (M65 30)       Decubitus ulcer (707 00) (L89 90)       Rotator cuff tendinitis (726 10) (M75 80)       Cardiac left ventricular ejection fraction greater than or equal to 40 percent       Fever (780 60) (R50 9)       Bronchitis (490) (J40)       Non-ST elevation myocardial infarction (NSTEMI) of indeterminate age (36 65) (I24 2)          Past Medical History    1  Acute maxillary sinusitis (461 0) (J01 00)   2  Acute upper respiratory infection (465 9) (J06 9)   3  History of Acute upper respiratory infection (465 9) (J06 9)   4  History of Bladder neck obstruction (596 0) (N32 0)   5   History of Bronchitis, chronic obstructive, with exacerbation (491 21) (J44 1)   6  History of Cataract of both eyes (366 9) (H26 9)   7  History of Cellulitis (682 9) (L03 90)   8  History of Cellulitis of foot (682 7) (L03 119)   9  History of Cellulitis of neck (682 1) (L03 221)   10  History of Chronic allergic conjunctivitis (372 14) (H10 45)   11  History of Foreign Body In The Right Auditory Canal (931)   12  History of acute bronchitis (V12 69) (Z87 09)   13  History of acute bronchitis (V12 69) (Z87 09)   14  History of acute bronchitis (V12 69) (Z87 09)   15  History of allergic rhinitis (V12 69) (Z87 09)   16  History of backache (V13 59) (Z87 39)   17  History of bacterial pneumonia (V12 61) (Z87 01)   18  History of chronic obstructive lung disease (V12 69) (Z87 09)   19  History of dermatitis (V13 3) (Z87 2)   20  History of eczema (V13 3) (Z87 2)   21  History of epistaxis (V12 69) (Z87 898)   22  History of influenza (V12 09) (Z87 09)   23  History of osteoarthritis (V13 4) (Z87 39)   24  History of phimosis (V13 89) (Z87 438)   25  History of shortness of breath (V13 89) (Z87 898)   26  History of tinea corporis (V12 09) (Z86 19)   27  History of tinea corporis (V12 09) (Z86 19)   28  History of Internal Hemorrhoids (455 0)   29  History of Knee Sprain (844 9)   30  Late Effects Of Sprain Or Strain (905 7)   31  History of Normal routine physical examination (V70 0) (Z00 00)   32  Otalgia, unspecified laterality (388 70) (H92 09)   33  History of Otalgia, unspecified laterality (388 70) (H92 09)   34  History of Pneumonia (V12 61)   35  Skin Abscess Of Hip (682 6)   36  History of Type 2 diabetes mellitus (250 00) (E11 9)   37  Venous thrombosis (453 9) (I82 90)    Surgical History    1  History of Knee Replacement   2  History of Surgery Penis Circumcision Except    3  History of Thoracentesis (Diagnostic)   4  History of Tonsillectomy    Family History    1   Family history of coronary artery disease (V17 3) (Z82 49)   2  Family history of hypertension (V17 49) (Z82 49)    3  Family history of Prostate cancer    4  Denied: Family history of Pulmonary Disease  Family History Reviewed: The family history was reviewed and updated today  Social History    · Being A Social Drinker   · History of Former smoker (Y38 19) (L07 467)   · Never a smoker  Social History Reviewed: The social history was reviewed and is unchanged  Current Meds   1  Amiodarone HCl - 200 MG Oral Tablet; TAKE 1 TABLET DAILY  /cardiology; Therapy: 03BUU3071 to (Evaluate:89Qub6132); Last Rx:29Mar2016 Ordered   2  B-12 1000 MCG Oral Lozenge; one daily as directed; Last JE:69BIN2813 Ordered   3  Cialis 2 5 MG Oral Tablet; 1 qd for BPH; Therapy: 73XED4847 to (Last Rx:07Oct2015)  Requested for: 16VNH8530 Ordered   4  CloNIDine HCl - 0 1 MG Oral Tablet; take 1 tablet by mouth twice a day; Therapy: 15ZJQ8230 to (Evaluate:17Fag9018)  Requested for: 13BVI8228; Last   Rx:15Jan2016 Ordered   5  Clopidogrel Bisulfate 75 MG Oral Tablet; take 1 tablet by mouth once daily; Therapy: 26YWY4779 to (Evaluate:11Jun2016)  Requested for: 31UAP8826; Last   Rx:26Tag7097 Ordered   6  Clotrimazole-Betamethasone 1-0 05 % External Cream; APPLY SPARINGLY TO THE   AFFECTED AREA(S) TWICE DAILY  short term use; Therapy: 15CLC1353 to (Last ZA:23GDL9787)  Requested for: 78IZH0309 Ordered   7  Docusate Sodium 100 MG Oral Capsule; 2 Every Day; Therapy: 28YOP5717 to (Last Rx:29Mar2016)  Requested for: 51TQG8714 Ordered   8  Fenofibrate 145 MG Oral Tablet; take 1 tablet by mouth once daily; Therapy: 26JLS9481 to (Evaluate:22Qxr1342)  Requested for: 57Ssq9613; Last   Rx:94Yqz8723 Ordered   9  Ipratropium-Albuterol 0 5-2 5 (3) MG/3ML Inhalation Solution; Use 1 vial in nebulizer 4   times daily; Therapy: 38Udb7469 to (Evaluate:23Mar2016)  Requested for: 051-029-550; Last   Rx:28Apr2015 Ordered   10  Metoprolol Tartrate 25 MG Oral Tablet; 1/2 tab tid;     Therapy: 41QWJ2957 to (Troy Shikha)  Requested for: 76DIO0975; Last    Rx:29Mar2016 Ordered   11  Polyethylene Glycol 3350 Oral Packet; MIX 1 PACKET IN 8 OUNCES OF LIQUID AND    DRINK ONCE DAILY PRN; Therapy: 04GTW0202 to (Last Rx:29Mar2016) Ordered   12  ProAir  (90 Base) MCG/ACT Inhalation Aerosol Solution; 2 PUFFS EVERY 6    HOURS; Therapy: 91Noa3608 to (Evaluate:11Oct2015)  Requested for: 97Irr6834; Last    Rx:74Xir9942 Ordered   13  Ranitidine HCl - 150 MG Oral Tablet; TAKE 1 TABLET TWICE DAILY; Therapy: 92EKE9051 to (Evaluate:49Esm8833); Last Rx:29Mar2016 Ordered   14  Simvastatin 20 MG Oral Tablet; 1 every day; Therapy: 59QWA0163 to (Last Rx:29Mar2016)  Requested for: 29Mar2016 Ordered   15  Tolterodine Tartrate ER 4 MG Oral Capsule Extended Release 24 Hour; TAKE 1    CAPSULE ONCE DAILY; Therapy: 48SOP4689 to (Evaluate:11Jun2016)  Requested for: 68UQF2235; Last    Rx:18Xll0248 Ordered   16  Torsemide 20 MG Oral Tablet; 1 tab on mon/wed/fri, 2 tabs on other days; Therapy: 75HHC8239 to (Last HO:36IGY6628)  Requested for: 09VOW4237 Ordered   17  Vitamin C-Mariluz Hips 1000 MG Oral Tablet; Take 1 tablet daily; Last TP:58ASI9613    Ordered   18  Vitamin E 400 UNIT Oral Capsule; 1 every day; Therapy: 88Mdr0417 to (Last Rx:05Lqg9354) Ordered    Allergies    1  No Known Drug Allergies    Vitals  Signs [Data Includes: Current Encounter]   Recorded: 08Apr2016 10:30AM   Temperature: 97 7 F  Heart Rate: 88  Respiration: 24  Systolic: 659  Diastolic: 60  Height: 5 ft 8 5 in  Weight: 228 lb   BMI Calculated: 34 16  BSA Calculated: 2 17    Physical Exam    Constitutional   General appearance: No acute distress, well appearing and well nourished  Eyes   Conjunctiva and lids: No swelling, erythema, or discharge  Pupils and irises: Equal, round and reactive to light      Ears, Nose, Mouth, and Throat   External inspection of ears and nose: Normal     Otoscopic examination: Tympanic membrance translucent with normal light reflex  Canals patent without erythema  Nasal mucosa, septum, and turbinates: Normal without edema or erythema  Oropharynx: Normal with no erythema, edema, exudate or lesions  Pulmonary   Respiratory effort: No increased work of breathing or signs of respiratory distress  Auscultation of lungs: Clear to auscultation, equal breath sounds bilaterally, no wheezes, no rales, no rhonci  Cardiovascular   Auscultation of heart: Normal rate and rhythm, normal S1 and S2, without murmurs  Examination of extremities for edema and/or varicosities: Normal     Abdomen   Abdomen: Abnormal   The abdomen was obese  Lymphatic   Palpation of lymph nodes in neck: No lymphadenopathy  Musculoskeletal   Gait and station: Normal     Digits and nails: Normal without clubbing or cyanosis  Skin   Skin and subcutaneous tissue: Normal without rashes or lesions  Psychiatric   Mood and affect: Normal          Future Appointments    Date/Time Provider Specialty Site   05/09/2016 08:00 AM SAUL Henry  Gastroenterology Adult [facility]     Signatures   Electronically signed by : Karen Steve, ; Mar 29 2016  2:50PM EST                       (Co-author)    Electronically signed by : Romario Leon DO;  Apr 9 2016  3:58PM EST                       (Author)

## 2018-01-12 VITALS
HEART RATE: 42 BPM | WEIGHT: 218.4 LBS | BODY MASS INDEX: 33.1 KG/M2 | DIASTOLIC BLOOD PRESSURE: 70 MMHG | SYSTOLIC BLOOD PRESSURE: 145 MMHG | OXYGEN SATURATION: 78 % | HEIGHT: 68 IN

## 2018-01-12 VITALS
HEIGHT: 69 IN | RESPIRATION RATE: 18 BRPM | WEIGHT: 218 LBS | SYSTOLIC BLOOD PRESSURE: 150 MMHG | BODY MASS INDEX: 32.29 KG/M2 | DIASTOLIC BLOOD PRESSURE: 64 MMHG | HEART RATE: 72 BPM | TEMPERATURE: 97.5 F

## 2018-01-12 VITALS
DIASTOLIC BLOOD PRESSURE: 70 MMHG | SYSTOLIC BLOOD PRESSURE: 124 MMHG | BODY MASS INDEX: 31.55 KG/M2 | HEIGHT: 69 IN | WEIGHT: 213 LBS | HEART RATE: 69 BPM

## 2018-01-12 NOTE — PROCEDURES
Procedures by Good Espino PA-C at 8/24/2016   2:55 PM      Author:  Good Espino PA-C Service:  Interventional Radiology  Author Type:  Physician Assistant    Filed:  8/24/2016  3:00 PM Date of Service:  8/24/2016  2:55 PM Status:  Attested    :  Good Espino PA-C (Physician Assistant)  Cosigner:  Melisa Garcia MD at 8/26/2016  2:58 PM      Procedure Orders:       1  Thoracentesis [[id number]] ordered by Good Espino PA-C at 08/24/16 1455                 Post-procedure Diagnoses:       1  Pleural effusion, bilateral [J90]              Attestation signed by Melisa Garcia MD at 8/26/2016  2:58 PM           I was available to assist for the entirety of the procedure  I concur with my PA'S documentation  Thoracentesis  Date/Time: 8/24/2016 2:55 PM  Performed by: Mena Schwab by: Radha Lujan     Patient location: Interventional Radiology  Consent:     Consent obtained:  Written    Consent given by:  Patient    Risks discussed:  Bleeding, incomplete drainage, infection, pain and pneumothorax    Alternatives discussed:  No treatment and delayed treatment  Universal protocol:     Procedure explained and questions answered to patient or proxy's satisfaction: yes      Relevant documents present and verified: yes      Test results available and properly labeled: yes       Imaging studies available: yes      Required blood products, implants, devices and special equipment available: yes      Site/side marked: yes      Immediately prior to procedure a time out was called: yes      Patient identity confirmed:  Verbally with patient and arm band  Indications:     Procedure Purpose: therapeutic      Indications: pleural effusion      Indications comment:  Right  Sedation:     Sedation type: none  Anesthesia (see MAR for exact dosages):      Anesthesia method:  Local infiltration    Local anesthetic:  Lidocaine 1% w/o epi  Procedure details:     Preparation: Patient was prepped and draped in usual sterile fashion      Standard thoracentesis cath kit used: Yes (5 Fr x 7 cm Yueh)      Patient position:  Sitting    Laterality:  Unilateral and right    Location:  Posterior    Puncture method:  Over-the-needle catheter    Guidance: ultrasound      Reason for ultrasound: Identify fluid collection and guide cathetar placement  Number of attempts:  2 (repositioned puncture site to about 1 cm inferior than inital site due to patient movement)    Drainage characteristics:  Serosanguinous    Fluid removed amount:  525 mL  Post-procedure details:     Patient tolerance of procedure: Tolerated well, no immediate complications  Comments:      No labs on pleural fluid were ordered today  Both left and right pleural spaces were evaluated with ultrasound  A trace amount of fluid demonstrated in the left pleural space is not amenable to thoracentesis today  Thoracentesis performed on right                     Received for:Emi Lal [facility]  Aug 26 2016  2:59PM [facility] Standard Time

## 2018-01-12 NOTE — RESULT NOTES
Discussion/Summary   Chest xray did not show infection/pneumonia  There is still a pocket of fluid on the bottom right side but it appears smaller than before  Dr Elida Drummond        Verified Results  * XR CHEST PA & LATERAL [id number] 10:39AM Jailene Prim     Test Name Result Flag Reference   XR CHEST PA & LATERAL (Report)     CHEST      INDICATION: Pleural effusion     COMPARISON: 1/6/2017     VIEWS: Frontal and lateral projections     IMAGES: 2     FINDINGS:        Cardiomediastinal silhouette appears unremarkable  Prosthetic aortic valve  Loculated pleural effusion on the right appears smaller  Minimal bibasilar atelectasis  No evidence of heart failure  Visualized osseous structures appear within normal limits for the patient's age  IMPRESSION:     Loculated pleural effusion on the right appears smaller  Bibasilar atelectasis         Workstation performed: [id number]     Signed by:   Ibrahima Harris MD   5/9/17

## 2018-01-12 NOTE — RESULT NOTES
Verified Results  (1) BASIC METABOLIC PROFILE [id number] 06:30AM [facility] Kidney Disease Education Program recommendations are as follows:  GFR calculation is accurate only with a steady state creatinine  Chronic Kidney disease less than 60 ml/min/1 73 sq  meters  Kidney failure less than 15 ml/min/1 73 sq  meters  Test Name Result Flag Reference   GLUCOSE,RANDM 126 mg/dL     If the patient is fasting, the ADA then defines impaired fasting glucose as > 100 mg/dL and diabetes as > or equal to 123 mg/dL  SODIUM 140 mmol/L  136-145   POTASSIUM 3 3 mmol/L L 3 5-5 3   CHLORIDE 100 mmol/L  100-108   CARBON DIOXIDE 30 mmol/L  21-32   ANION GAP (CALC) 10 mmol/L  4-13   BLOOD UREA NITROGEN 30 mg/dL H 5-25   CREATININE 2 00 mg/dL H 0 60-1 30   Standardized to IDMS reference method   CALCIUM 8 6 mg/dL  8 3-10 1   eGFR Non-African American 31 9 ml/min/1 73sq m         Discussion/Summary   Potassium is slightly low  Kidneys are about the same  See you at your appointment    Dr Delmy Guerrero

## 2018-01-13 VITALS
BODY MASS INDEX: 32.28 KG/M2 | HEART RATE: 74 BPM | WEIGHT: 213 LBS | DIASTOLIC BLOOD PRESSURE: 63 MMHG | RESPIRATION RATE: 18 BRPM | SYSTOLIC BLOOD PRESSURE: 130 MMHG | HEIGHT: 68 IN

## 2018-01-13 VITALS
SYSTOLIC BLOOD PRESSURE: 130 MMHG | BODY MASS INDEX: 33.27 KG/M2 | HEART RATE: 72 BPM | HEIGHT: 67 IN | TEMPERATURE: 98 F | DIASTOLIC BLOOD PRESSURE: 60 MMHG | RESPIRATION RATE: 24 BRPM | WEIGHT: 212 LBS

## 2018-01-13 VITALS
HEART RATE: 68 BPM | SYSTOLIC BLOOD PRESSURE: 128 MMHG | BODY MASS INDEX: 31.7 KG/M2 | RESPIRATION RATE: 20 BRPM | WEIGHT: 214 LBS | TEMPERATURE: 96.8 F | DIASTOLIC BLOOD PRESSURE: 60 MMHG | HEIGHT: 69 IN

## 2018-01-13 NOTE — RESULT NOTES
Discussion/Summary   Your chest x-ray is stable  No sign of congestive heart failure  Dr Clau Comer        Verified Results  * XR CHEST PA & LATERAL [id number] 08:55AM Nader Button     Test Name Result Flag Reference   XR CHEST PA & LATERAL (Report)     CHEST      INDICATION: Shortness of breath  COMPARISON: 5/8/2017, CT 6/8/2017     VIEWS: Frontal and lateral projections     IMAGES: 2     FINDINGS:        Cardiomediastinal silhouette appears without change  Interstitial fibrosis noted predominantly at the lung bases as on the previous examination  Persistent small right effusion or pleural thickening is seen  No pneumothorax  No definite acute infiltrate  No pneumothorax     Visualized osseous structures appear within normal limits for the patient's age  IMPRESSION:     Interstitial fibrosis and chronic pleural changes on the right   No acute infiltrate is seen       Workstation performed: [id number]     Signed by:   Clemente Kauffman MD   10/16/17

## 2018-01-13 NOTE — RESULT NOTES
Verified Results  * CT HEAD WO CONTRAST 21Oct2016 09:53AM Nader Button     Test Name Result Flag Reference   CT HEAD WO CONTRAST (Report)     CT BRAIN - WITHOUT CONTRAST     INDICATION: Dizziness     COMPARISON: 5/16/2016     TECHNIQUE: CT examination of the brain was performed  In addition to axial images, coronal reformatted images were created and submitted for interpretation  This examination, like all CT scans performed in the [place], was    performed utilizing techniques to minimize radiation dose exposure, including the use of iterative reconstruction and automated exposure control  IMAGE QUALITY: Diagnostic  FINDINGS:      PARENCHYMA: No intracranial mass, mass effect or midline shift  No acute intracranial hemorrhage  No CT signs of acute infarction  VENTRICLES AND EXTRA-AXIAL SPACES: Prominent consistent with atrophy  There are periventricular ischemic changes present  VISUALIZED ORBITS AND PARANASAL SINUSES: Postoperative changes in the globes  CALVARIUM AND EXTRACRANIAL SOFT TISSUES:  Normal        IMPRESSION:     Atrophy and ischemia  No acute findings  Workstation performed: [id number]     Signed by:   Rohini Rodrigez MD   10/21/16       Discussion/Summary   CAT scan showed typical changes seen in someone with Genaro's age and conditions, but no new or old stroke or sign of brain injury    Dr Clau Comer

## 2018-01-13 NOTE — PROCEDURES
Procedures by Romario Palacios MD  at 3/10/2016  4:46 PM      Author:  Romario Palacios MD Service:  Cardiology Author Type:  Physician     Filed:  3/10/2016  4:47 PM Date of Service:  3/10/2016  4:46 PM Status:  Signed     :  Romario Palacios MD (Physician)         Procedures:       1  CARDIAC CATHETERIZATION [CATH01 (Custom)]                   Primary care doctor: Dr Kacey Venegas     Cardiologist is Dr Lorenzo Fitting    Date of procedure:  3/10/2016    Brief history: -25-year-old male with history of chronic renal insufficiency, CAD, status post angioplasty of LAD, known severe COPD who was admitted with hypoxic respiratory failure and has a troponin of 10  He has a known history of severe aortic  stenosis along with moderate pulmonary hypertension  Hence he was scheduled for complete heart cath    Procedure Details  The risks, benefits, complications, including risk of stroke, heart attack, bleeding and even death but not limited to at along with treatment options, and expected outcomes were discussed with the patient  The patient and/or family concurred with the  proposed plan, giving informed consent  Patient was brought to the cath lab after IV hydration was begun and oral premedication was given  He was further sedated with midazolam and fentanyl  He was prepped and draped in the usual manner  Using the modified  Seldinger access technique, a 6 Brune[place] sheath was placed in the right common femoral artery without any complications    A left heart catheterization was done  Angiograms were also done  End of the procedure patient was transferred to holding area without  any complications  He tolerated the procedure well  After the procedure was completed, sedation was stopped and the sheaths and catheters were all removed  Hemostasis was achieved with fem stop  Access was obtained in the right femoral artery as well as right femoral vein    Equipment used:   1  JR4 for right coronary angiography  2  JL 4 0 for left coronary angiography   3  LV gram with AL1    Findings:  1  Dominance: Right dominant coronary system    2  Left main:  Is a normal-size vessel it has mild calcification noted  It bifurcates into large LAD and a circumflex system    3  LAD: LAD is a large-size vessel and it has proximal calcification  Just after septal it has focal about 60-65% stenosis  This stent in the LAD is patent after the stenosis  No other focal stenosis seen  4  Circumflex is a nondominant medium size vessel  It has proximally 35-40% stenosis seen  5  RCA: RCA is large vessel and has mild luminal irregularities causing 35% stenosis proximally and mid area  RPL branch has around 35% stenosis in the mid area  5  LV gram: LV gram was not done as patient has renal insufficiency  However there was 25 mm gradient across aortic valve  Right heart catheter meters: Aortic pressure 128/74  LV pressure 152/10  Card to call output  About 5 L  PA pressure 55 mmHg  RV pressure 55/10  RA mean 8-10  Wedge pressure was not very accurate as patient has lots of respiratory motions  Itis to be around 16 minutes of mercury  Estimated Blood Loss: Minimal  Complications:  None; patient tolerated the procedure well  Recommendation: Continue medical therapy  Continue risk factor modification and patient will have a functional stress test as an outpatient to see if that LAD needs to be done  It seemed to be stable lesion and less likely to be the cause of patient's  non-ST elevation MI  We'll continue other Rx before  Check BMP and CBC  See orders     Disposition: Hemodynamically stable and PACU - hemodynamically stable  Condition: stable           Received for:Provider  EPIC   Mar 10 2016  4:48PM [facility] Standard Time

## 2018-01-14 VITALS
HEART RATE: 76 BPM | BODY MASS INDEX: 32.29 KG/M2 | WEIGHT: 218 LBS | RESPIRATION RATE: 12 BRPM | TEMPERATURE: 98.2 F | HEIGHT: 69 IN | OXYGEN SATURATION: 89 % | DIASTOLIC BLOOD PRESSURE: 52 MMHG | SYSTOLIC BLOOD PRESSURE: 124 MMHG

## 2018-01-14 VITALS
SYSTOLIC BLOOD PRESSURE: 154 MMHG | HEART RATE: 74 BPM | DIASTOLIC BLOOD PRESSURE: 66 MMHG | BODY MASS INDEX: 30.51 KG/M2 | HEIGHT: 69 IN | WEIGHT: 206 LBS

## 2018-01-14 NOTE — RESULT NOTES
Discussion/Summary   Sugar, kidneys, minerals and liver are normal/stable  Cholesterol profile is normal/good    Dr Philippe Barcenas        Verified Results  (1) COMPREHENSIVE METABOLIC PANEL [id number] 54:74HY Rey Navarro     Test Name Result Flag Reference   Glucose, Serum 109 mg/dL H 65-99   BUN 39 mg/dL H 8-27   Creatinine, Serum 2 06 mg/dL H 0 76-1 27   BUN/Creatinine Ratio 19  10-24   Sodium, Serum 139 mmol/L  134-144   Potassium, Serum 4 6 mmol/L  3 5-5 2   Chloride, Serum 95 mmol/L L    Carbon Dioxide, Total 26 mmol/L  18-29   Calcium, Serum 9 7 mg/dL  8 6-10 2   Protein, Total, Serum 7 9 g/dL  6 0-8 5   Albumin, Serum 3 8 g/dL  3 5-4 7   Globulin, Total 4 1 g/dL  1 5-4 5   A/G Ratio 0 9 L 1 2-2 2   Bilirubin, Total 0 4 mg/dL  0 0-1 2   Alkaline Phosphatase, S 61 IU/L     AST (SGOT) 39 IU/L  0-40   ALT (SGPT) 15 IU/L  0-44   eGFR If NonAfricn Am 28 mL/min/1 73 L >59   eGFR If Africn Am 33 mL/min/1 73 L >59     (1) LIPID PANEL FASTING W DIRECT LDL REFLEX 16Oct2017 08:30AM Rey Navarro     Test Name Result Flag Reference   Cholesterol, Total 97 mg/dL L 100-199   Triglycerides 77 mg/dL  0-149   HDL Cholesterol 37 mg/dL L >39   LDL Cholesterol Calc 45 mg/dL  0-99

## 2018-01-14 NOTE — MISCELLANEOUS
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)   7  BPH (benign prostatic hypertrophy) (600 00) (N40 0)    Plan  Atrial fibrillation, CAD in native artery    · Torsemide 20 MG Oral Tablet; 20mg/d alternate with 40mg/d   Rx By: Davion Pop; Dispense: 30 Days ; #:60 Tablet; Refill: 5; For: Atrial fibrillation, CAD in native artery; JESSICA = N; Verified Transmission to [facility] [place] [address] AILYN CARREON; Last Updated By: [employer]; 12/5/2016 10:49:56 AM  Chronic kidney disease, stage 3, Congestive heart failure    · Follow-Up Visit 10 - 14 Days Evaluation and Treatment  Follow-up  Status: Complete   Done: [id number]   Ordered Today; For: Chronic kidney disease, stage 3, Congestive heart failure; Ordered By: Davion Pop Performed:  Due: 02IZH2813    Discussion/Summary  Discussion Summary:   Daily wts suggested for CHF prediction  list of meds given for confirmation  cardiology f/u with Dr Kelsey Finney next week  labs in 1w after restart diuretics (he has not been taking the torsemide per pt)  f/u here in 2w from now  O2 sat went from 80 to 92-93% with rest in room  Chief Complaint  I spoke with Luigi Brantley on 11/28/16 after his hospital discharge to home this afternoon  He states that he is feeling better  He declined reviewing his medications with me and states that he is not able to do that at this time  He is using his O2 continuously at 4L  He will call Dr Kelsey Finney to schedule his hospital follow up appt and he knows to weigh himself daily at home  He knows to call Dr Kelsey Finney with any weight gain   He is aware to call our office at any time with any questions or concerns and he is also aware that if he has any chest pain, dyspnea, weakness, dizziness, palpitations, etc he should go to the ER [facility] LPN   Chief Complaint Free Text Note Form: pt present for a TCM  hg      History of Present Illness  TCM Communication [facility]: The patient is being contacted for follow-up after hospitalization and chf s/p diuresis, diastolic dysfunction, has not restarted any torsemide since ND  Hospital records were reviewed  He was hospitalized at [facility]  The date of admission: 11/24/16, date of discharge: 11/28/16  Diagnosis: CHF  He was discharged to home  Medications were not reviewed today  He scheduled a follow up appointment  Follow-up appointments with other specialists: He will call Dr Susan Orona to make his hospital folllow up appt  Counseling was provided to the patient  Topics counseled included instructions for management, risk factor reductions, patient and family education, activities of daily living and importance of compliance with treatment  Communication performed and completed by [facility] LPN 21/04/28      Review of Systems  Complete-Male:   Constitutional: no fever and no chills  Cardiovascular: no chest pain  Respiratory: shortness of breath and shortness of breath during exertion, but no cough  Neurological: no dizziness  Active Problems     1  Abnormal gait (781 2) (R26 9)   2  Actinic keratosis (702 0) (L57 0)   3  Allergic rhinitis (477 9) (J30 9)   4  Arthropathy of shoulder region (716 91) (M12 9)   5  Atrial fibrillation (427 31) (I48 91)   6  CAD in native artery (414 01) (I25 10)   7  Cervical radiculopathy (723 4) (M54 12)   8  Chronic kidney disease, stage 3 (585 3) (N18 3)   9  Congestive heart failure (428 0) (I50 9)   10  Depression screening (V79 0) (Z13 89)   11  Dizziness (780 4) (R42)   12  Esophagitis determined by endoscopy (530 10) (K20 9)   13  Heart failure, left, 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)   14  High triglycerides (272 1) (E78 1)   15  Hyperlipidemia LDL goal <100 (272 4) (E78 5)   16   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)   17  Hyponatremia (276 1) (E87 1)   18  Immunization due (V05 9) (Z23)   19  Obesity with alveolar hypoventilation, unspecified obesity severity (278 03) (E66 2)   20  Obstructive sleep apnea (327 23) (G47 33)   21  Osteoarthritis, knee/lower leg (715 96) (M17 9)   22  Peripheral vascular disease (443 9) (I73 9)   23  Prediabetes (790 29) (R73 09)   24  Pulmonary fibrosis (515) (J84 10)   25  Screening for genitourinary condition (V81 6) (Z13 89)   26  Screening for neurological condition (V80 09) (Z13 89)   27  Sleep related hypoventilation in conditions classified elsewhere (327 26) (G47 36)    Benign essential hypertension (401 1) (I10)       Benign prostatic hypertrophy without urinary obstruction (600 00) (N40 0)       Chronic obstructive pulmonary disease (496) (J44 9)       Constipation (564 00) (K59 00)       Dermatomycosis (110 9) (B36 9)       Lung nodules (793 19) (R91 8)       Fracture of rib (807 00) (S22 39XA)       Aortic stenosis, severe (424 1) (I35 0)       Noninfected skin tear of right lower extremity, initial encounter (891 0) (S81 801A)       Sprain and strain (848 9) (T14 8)       History of aortic valve replacement (V43 3) (Z95 2)       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)       Postop check (V67 00) (Z09)       Exposure to scabies (V01 89) (Z20 89)       Skin neoplasm (239 2) (D49 2)          Past Medical History    1  Acute maxillary sinusitis (461 0) (J01 00)   2  History of Acute maxillary sinusitis, recurrence not specified (461 0) (J01 00)   3  Acute upper respiratory infection (465 9) (J06 9)   4  History of Acute upper respiratory infection (465 9) (J06 9)   5  History of Bladder neck obstruction (596 0) (N32 0)   6  History of Bronchitis, chronic obstructive, with exacerbation (491 21) (J44 1)   7  History of Cataract of both eyes (366 9) (H26 9)   8  History of Cellulitis (682 9) (L03 90)   9   History of Cellulitis (682 9) (L03 90) 10  History of Cellulitis of foot (682 7) (L03 119)   11  History of Cellulitis of neck (682 1) (L03 221)   12  History of Chronic allergic conjunctivitis (372 14) (H10 45)   13  History of Diabetes type 2, uncontrolled (250 02) (E11 65)   14  History of Dyspnea on exertion (786 09) (R06 09)   15  History of Foreign Body In The Right Auditory Canal (931)   16  History of acute bronchitis (V12 69) (Z87 09)   17  History of acute bronchitis (V12 69) (Z87 09)   18  History of acute bronchitis (V12 69) (Z87 09)   19  History of allergic rhinitis (V12 69) (Z87 09)   20  History of backache (V13 59) (Z87 39)   21  History of bacterial pneumonia (V12 61) (Z87 01)   22  History of bronchitis (V12 69) (Z87 09)   23  History of cardiomegaly (V12 59) (Z86 79)   24  History of chronic obstructive lung disease (V12 69) (Z87 09)   25  History of decubitus ulcer (V13 3) (Z87 2)   26  History of dermatitis (V13 3) (Z87 2)   27  History of eczema (V13 3) (Z87 2)   28  History of edema (V13 89) (Z87 898)   29  History of epistaxis (V12 69) (Z87 898)   30  History of fever (V13 89) (Z87 898)   31  History of influenza (V12 09) (Z87 09)   32  History of osteoarthritis (V13 4) (Z87 39)   33  History of phimosis (V13 89) (Z87 438)   34  History of shortness of breath (V13 89) (Z87 898)   35  History of tinea corporis (V12 09) (Z86 19)   36  History of tinea corporis (V12 09) (Z86 19)   37  History of Internal Hemorrhoids (455 0)   38  History of Knee Sprain (844 9)   39  Late Effects Of Sprain Or Strain (905 7)   40  History of Non-ST elevation myocardial infarction (NSTEMI) of indeterminate age   39  History of Normal routine physical examination (V70 0) (Z00 00)   42  Otalgia, unspecified laterality (388 70) (H92 09)   43  History of Otalgia, unspecified laterality (388 70) (H92 09)   44  History of Pleural Effusion (511 9)   45  History of Pneumonia (V12 61)   46  History of Pneumonia (486) (J18 9)   47   History of Rotator cuff tendinitis (726 10) (M75 80)   48  Skin Abscess Of Hip (682 6)   49  History of Tachycardia (785 0) (R00 0)   50  History of Trigger finger (727 03) (M65 30)   51  History of Type 2 diabetes mellitus (250 00) (E11 9)   52  Venous thrombosis (453 9) (I82 90)    Surgical History    1  History of Aortic Valve Replacement Transcatheter   2  History of Knee Replacement   3  History of Surgery Penis Circumcision Except    4  History of Thoracentesis (Diagnostic)   5  History of Tonsillectomy    Family History  Mother    1  Family history of coronary artery disease (V17 3) (Z82 49)   2  Family history of hypertension (V17 49) (Z82 49)  Father    3  Family history of Prostate cancer  Family History    4  Denied: Family history of Pulmonary Disease  Family History Reviewed: The family history was reviewed and updated today  Social History    · Being A Social Drinker   · Denied: Drug use (305 90) (F19 90)   · History of Former smoker (V15 82) (L33 242)   · Never a smoker  Social History Reviewed: The social history was reviewed and is unchanged  Current Meds   1  Amiodarone HCl - 100 MG Oral Tablet; TAKE 1 TABLET DAILY  Requested for:   2016; Last Rx:2016 Ordered   2  AmLODIPine Besylate 10 MG Oral Tablet; TAKE 1 TABLET DAILY; Therapy: 32UPA9596 to (Evaluate:2017)  Requested for: 44OHC5186; Last   Rx:2016 Ordered   3  Aspirin 81 MG Oral Tablet Chewable; 2 daily; Therapy: (Mejias Serene) to Recorded   4  Breo Ellipta 100-25 MCG/INH Inhalation Aerosol Powder Breath Activated; USE AS   DIRECTED ON PACKAGE; Therapy: (Mejias Serene) to Recorded   5  Cialis 2 5 MG Oral Tablet; 1 qd for BPH; Therapy: 16MIK5066 to (Last AL:13OZA9981)  Requested for: 55LYH9915 Ordered   6  Fenofibrate 145 MG Oral Tablet; take 1 tablet by mouth once daily; Therapy: 57JFB6564 to (Evaluate:2017)  Requested for: 15IMH1148; Last   Rx:84Gxo1023 Ordered   7   Ipratropium-Albuterol 0 5-2 5 (3) MG/3ML Inhalation Solution; Use 1 vial in nebulizer 4   times daily; Therapy: 97Sya0393 to (Evaluate:81Ogt0223)  Requested for: 44FUL5046; Last   Rx:47Bze6517 Ordered   8  Metoprolol Tartrate 25 MG Oral Tablet; 1 two times a day; Therapy: 46BJR3463 to (Last Rx:16Oct2016)  Requested for: 49JIF3853 Ordered   9  Myrbetriq 25 MG Oral Tablet Extended Release 24 Hour; 1 DAILY; Therapy: 77CHI7936 to (Last Rx:14Oct2016) Ordered   10  Pacerone 100 MG Oral Tablet; take 1 tablet by mouth once daily; Therapy: 78ETH6671 to (Evaluate:05Qoy7360)  Requested for: 13HXL7159; Last    Rx:07Nov2016 Ordered   11  Pantoprazole Sodium 40 MG Oral Tablet Delayed Release; TAKE 1 TABLET DAILY; Therapy: (Recorded:14Oct2016) to Recorded   12  Simvastatin 40 MG Oral Tablet; Take 1 tablet daily; Therapy: 20PII0397 to (Evaluate:99Yut2504)  Requested for: 22Nov2016; Last    Rx:22Nov2016 Ordered   13  Torsemide 20 MG Oral Tablet; TAKE 1 TABLET DAILY; Therapy: 46QYQ3188 to (Evaluate:14Apr2017)  Requested for: 64ISJ4276; Last    Rx:16Oct2016 Ordered    Allergies    1  No Known Drug Allergies    Vitals  Signs   Recorded: 84QOX9583 10:13AM   Temperature: 98 2 F  Heart Rate: 90  Respiration: 26  Systolic: 890  Diastolic: 58  Height: 5 ft 10 in  Weight: 218 lb 4 oz  BMI Calculated: 31 32  BSA Calculated: 2 17    Physical Exam    Constitutional   General appearance: No acute distress, well appearing and well nourished  Eyes   Conjunctiva and lids: No swelling, erythema, or discharge  Pupils and irises: Equal, round and reactive to light  Ears, Nose, Mouth, and Throat   External inspection of ears and nose: Normal     Otoscopic examination: Tympanic membrance translucent with normal light reflex  Canals patent without erythema  Nasal mucosa, septum, and turbinates: Normal without edema or erythema  Oropharynx: Normal with no erythema, edema, exudate or lesions      Pulmonary   Respiratory effort: No increased work of breathing or signs of respiratory distress  Auscultation of lungs: Clear to auscultation, equal breath sounds bilaterally, no wheezes, no rales, no rhonci  Cardiovascular   Auscultation of heart: Normal rate and rhythm, normal S1 and S2, without murmurs  Examination of extremities for edema and/or varicosities: Normal     Carotid pulses: Normal     Abdomen   Abdomen: Abnormal   The abdomen was obese  Lymphatic   Palpation of lymph nodes in neck: No lymphadenopathy  Musculoskeletal   Gait and station: Normal     Digits and nails: Normal without clubbing or cyanosis  Skin   Skin and subcutaneous tissue: Normal without rashes or lesions  Psychiatric   Mood and affect: Normal          Provider Comments  Provider Comments:   meds reviewed  cont oxygen and pulm f/u  chf stable but needs wt monitoirng  cardio f/u appt pending  restart torsemide 20mg alt with 40mg, rx given, labs and f/u in 2w  bph stable  gerd stable  cad stable      Message   Recorded as Task   Date: 11/28/2016 03:13 PM, Created By: System   Task Name: [facility]   Assigned To:  Neftaly Sol   Regarding Patient: Cayla Westfall, Status: Active   Comment:    System - 28 Nov 2016 3:13 PM     Patient discharged from hospital   Patient Name: Renata Sanchez  Patient YOB: 1930  Discharge Date: 11/28/2016  Facility: [facility] 28 Nov 2016 3:14 PM     TASK REASSIGNED: Previously Assigned To [employer]   Electronically signed by : Ilir Lizama, ; Nov 28 2016  4:56PM EST                       (Co-author)    Electronically signed by : Ainsley Cowart DO; Dec  5 2016 10:50PM EST                       (Author)

## 2018-01-15 VITALS
TEMPERATURE: 97.3 F | RESPIRATION RATE: 22 BRPM | HEIGHT: 68 IN | DIASTOLIC BLOOD PRESSURE: 64 MMHG | SYSTOLIC BLOOD PRESSURE: 134 MMHG | BODY MASS INDEX: 33.04 KG/M2 | WEIGHT: 218 LBS | HEART RATE: 88 BPM

## 2018-01-15 VITALS
BODY MASS INDEX: 33.04 KG/M2 | RESPIRATION RATE: 14 BRPM | DIASTOLIC BLOOD PRESSURE: 60 MMHG | WEIGHT: 218 LBS | OXYGEN SATURATION: 96 % | HEIGHT: 68 IN | HEART RATE: 52 BPM | SYSTOLIC BLOOD PRESSURE: 138 MMHG | TEMPERATURE: 97.8 F

## 2018-01-15 NOTE — MISCELLANEOUS
Message  fax blood thinner clearance to Dr Cass Blount office  Fax number: [phone]      Active Problems    1  Actinic keratosis (702 0) (L57 0)   2  Allergic rhinitis (477 9) (J30 9)   3  Atrial fibrillation (427 31) (I48 91)   4  Benign essential hypertension (401 1) (I10)   5  Benign prostatic hypertrophy without urinary obstruction (600 00) (N40 0)   6  BMI 34 0-34 9,adult (V85 34) (Z68 34)   7  CAD in native artery (414 01) (I25 10)   8  Cardiomegaly (429 3) (I51 7)   9  Chronic kidney disease, stage 3 (585 3) (N18 3)   10  Chronic obstructive pulmonary disease (496) (J44 9)   11  Congestive heart failure (428 0) (I50 9)   12  Constipation (564 00) (K59 00)   13  Depression screening (V79 0) (Z13 89)   14  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)   15  Dyspnea on exertion (786 09) (R06 09)   16  Edema (782 3) (R60 9)   17  Esophagitis determined by endoscopy (530 10) (K20 9)   18  Heart failure, left, 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)   19  High triglycerides (272 1) (E78 1)   20  Hyperlipidemia LDL goal <100 (272 4) (E78 5)   21  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)   22  Hyponatremia (276 1) (E87 1)   23  Lung nodules (793 19) (R91 8)   24  Need for pneumococcal vaccination (V03 82) (Z23)   25  Obesity, Class II, BMI 35-39 9, no comorbidity (278 01) (E66 01)   26  Obstructive sleep apnea (327 23) (G47 33)   27  Osteoarthritis, knee/lower leg (715 96) (M17 9)   28  Peripheral vascular disease (443 9) (I73 9)   29  Pleural Effusion (511 9)   30  Prediabetes (790 29) (R73 09)   31  Pulmonary fibrosis (515) (J84 10)   32  Screening for genitourinary condition (V81 6) (Z13 89)   33  Screening for neurological condition (V80 09) (Z13 89)   34  Sleep related hypoventilation in conditions classified elsewhere (327 26) (G47 36)    Current Meds   1  Amiodarone HCl - 200 MG Oral Tablet; TAKE 1 TABLET DAILY  /cardiology; Therapy: 20OBE4539 to (Evaluate:13Iih7985); Last Rx:29Mar2016 Ordered   2   AmLODIPine Besylate 2 5 MG Oral Tablet; Take 1 tablet daily as directed  Requested for:   08Apr2016; Last Rx:08Apr2016 Ordered   3  Aspirin 81 MG Oral Tablet Chewable; 2 daily; Therapy: (Recorded:07Apr2016) to Recorded   4  Cialis 2 5 MG Oral Tablet; 1 qd for BPH; Therapy: 09DAQ0734 to (Last Rx:07Oct2015)  Requested for: 24SHX5153 Ordered   5  CloNIDine HCl - 0 1 MG Oral Tablet; take 1 tablet by mouth twice a day; Therapy: 69TSL9847 to (Evaluate:89Maa1871)  Requested for: 91DZE8701; Last   Rx:15Jan2016 Ordered   6  Clopidogrel Bisulfate 75 MG Oral Tablet; take 1 tablet by mouth once daily; Therapy: 71QQH1640 to (Evaluate:11Jun2016)  Requested for: 66ZGF7450; Last   Rx:04Euc6357 Ordered   7  Clotrimazole-Betamethasone 1-0 05 % External Cream; APPLY SPARINGLY TO THE   AFFECTED AREA(S) TWICE DAILY  short term use; Therapy: 09IPP9909 to (Last QC:44HYH8504)  Requested for: 88CYG5814 Ordered   8  Docusate Sodium 100 MG Oral Capsule; 2 Every Day; Therapy: 00CVA5006 to (Last Rx:29Mar2016)  Requested for: 07CXI2174 Ordered   9  Fenofibrate 145 MG Oral Tablet; take 1 tablet by mouth once daily; Therapy: 06DPD5442 to (Evaluate:22Azi1942)  Requested for: 07Apr2016; Last   Rx:99Qfa6217 Ordered   10  Ipratropium-Albuterol 0 5-2 5 (3) MG/3ML Inhalation Solution; Use 1 vial in nebulizer 4    times daily; Therapy: 28Apr2015 to (Evaluate:23Mar2016)  Requested for: [phone]; Last    Rx:28Apr2015 Ordered   11  Metoprolol Tartrate 25 MG Oral Tablet; 1/2 tab tid; Therapy: 36NUL7923 to (Concetta Sands)  Requested for: 90CXM7177; Last    Rx:29Mar2016 Ordered   12  Polyethylene Glycol 3350 Oral Packet; MIX 1 PACKET IN 8 OUNCES OF LIQUID AND    DRINK ONCE DAILY PRN; Therapy: 31WYH7545 to (Last Rx:29Mar2016) Ordered   13  ProAir  (90 Base) MCG/ACT Inhalation Aerosol Solution; 2 PUFFS EVERY 6    HOURS; Therapy: 12Aug2015 to (Evaluate:11Oct2015)  Requested for: 12Aug2015; Last    Rx:12Aug2015 Ordered   14   Ranitidine HCl - 150 MG Oral Tablet; TAKE 1 TABLET TWICE DAILY; Therapy: 00NLD7617 to (Evaluate:80Foe9718); Last Rx:29Mar2016 Ordered   15  Simvastatin 20 MG Oral Tablet; 1 every day; Therapy: 73HCH7564 to (Last Rx:29Mar2016)  Requested for: 43Idd1140 Ordered   16  Tolterodine Tartrate ER 4 MG Oral Capsule Extended Release 24 Hour; TAKE 1    CAPSULE ONCE DAILY; Therapy: 03LMF0229 to (Evaluate:11Jun2016)  Requested for: 34MSL9896; Last    Rx:47Gal8546 Ordered   17  Torsemide 20 MG Oral Tablet; 1 tab on mon/wed/fri, 2 tabs on other days; Therapy: 33NDP6810 to (Last DF:63HWS2001)  Requested for: 78BYX1256 Ordered    Allergies    1   No Known Drug Allergies    Signatures   Electronically signed by : Keara Sánchez, ; Apr 18 2016 11:50AM EST                       (Author)

## 2018-01-15 NOTE — MISCELLANEOUS
Assessment    1  Atrial fibrillation (427 31) (I48 91)   2  Benign essential hypertension (401 1) (I10)   3  Benign prostatic hypertrophy without urinary obstruction (600 00) (N40 0)   4  BMI 34 0-34 9,adult (V85 34) (Z68 34)   5  Congestive heart failure (428 0) (I50 9)   6  Aortic stenosis, severe (424 1) (I35 0)   7  Chronic obstructive pulmonary disease (496) (J44 9)   8  CAD in native artery (414 01) (I25 10)   9  Fracture of rib (807 00) (S22 39XA)   10  Chronic kidney disease, stage 3 (585 3) (N18 3)    Plan  Screening for genitourinary condition    · *VB-Urinary Incontinence Screen (Dx V81 6 Screen for UI); Status:Active; Requested  ZFD:45FNT8898;    Perform: In Office; SLADEZ:39MJN5297;ROMPXTV; For:Screening for genitourinary condition; Ordered By:Viki Brar Ahr;    Discussion/Summary  Discussion Summary:   Chf better  copd stable  tavr planned  oab stable  htn stable  Chief Complaint   I spoke with Jose R Collado on 6/2/16 after his hospital discharge to his home on 5/31/16  He states that he is doing fine and his breathing is "pretty good"  He is going to schedule a hospital follow up appt with Dr Corinne Contreras and he sees Dr Nain Marr next week  His oxygen is at 4L continuously at home with the concentrator  He does have some pain in his chest but states that this is from his rib injury s/p fall (previous admission) I reviewed his medications with him and updated his chart  He is aware to call our office at any time with any questions or concerns and he is also aware that if he has any dyspnea, chest pain which is different than his rib pain, weakness, dizziness, palpitations, etc he should go to the ER [facility]   Chief Complaint Free Text Note Form: Pt here for a TCM  sp/cma      History of Present Illness  TCM Communication [facility]: The patient is being contacted for follow-up after hospitalization  Hospital records were reviewed  He was hospitalized at and [address] [place] [address]  The date of discharge: 5/31/16  Diagnosis: CHF  He was discharged to home  Medications reviewed and updated today  He scheduled a follow up appointment  Follow-up appointments with other specialists: Dr Jg Hand next week he has an appt but unsure of the date  Counseling was provided to the patient  Topics counseled included instructions for management, risk factor reductions, patient and family education, activities of daily living and importance of compliance with treatment  Communication performed and completed by 6/2/16 Neeru   HPI: some meds changes  lost wt  benazepril added  diuretics reviewed  has significant AS, TAVR planned in about 5w  awaiting rib healing  using oxygen  copd precludes open heart valve replacement  resting pulse ox 95      Review of Systems  Complete-Male:   Constitutional: no fever and no chills  Cardiovascular: no chest pain  Neurological: no fainting  Preventive Quality 65 Older:   Preventive Quality 65 and Older: The patient is currently experiencing urinary symptoms  Urinary Incontinence Symptoms includes: urinary incontinence, urinary frequency and weak stream, but no dysuria, no nocturia and no straining       Active Problems     1  Actinic keratosis (702 0) (L57 0)   2  Allergic rhinitis (477 9) (J30 9)   3  Atrial fibrillation (427 31) (I48 91)   4  Benign essential hypertension (401 1) (I10)   5  Benign prostatic hypertrophy without urinary obstruction (600 00) (N40 0)   6  BMI 34 0-34 9,adult (V85 34) (Z68 34)   7  CAD in native artery (414 01) (I25 10)   8  Chronic kidney disease, stage 3 (585 3) (N18 3)   9  Chronic obstructive pulmonary disease (496) (J44 9)   10  Congestive heart failure (428 0) (I50 9)   11  Constipation (564 00) (K59 00)   12  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)   13  Esophagitis determined by endoscopy (530 10) (K20 9)   14  Fracture of rib (807 00) (S22 39XA)   15  Heart failure, left, 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)   16  High triglycerides (272 1) (E78 1)   17   Hyperlipidemia LDL goal <100 (272 4) (E78 5)   18  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)   19  Hyponatremia (276 1) (E87 1)   20  Lung nodules (793 19) (R91 8)   21  Obesity, Class II, BMI 35-39 9, no comorbidity (278 01) (E66 01)   22  Obstructive sleep apnea (327 23) (G47 33)   23  Osteoarthritis, knee/lower leg (715 96) (M17 9)   24  Peripheral vascular disease (443 9) (I73 9)   25  Prediabetes (790 29) (R73 09)   26  Pulmonary fibrosis (515) (J84 10)   27  Screening for genitourinary condition (V81 6) (Z13 89)   28  Screening for neurological condition (V80 09) (Z13 89)   29  Sleep related hypoventilation in conditions classified elsewhere (327 26) (G47 36)    Cardiomegaly (429 3) (I51 7)       Pleural Effusion (511 9)       Dyspnea on exertion (786 09) (R06 09)       Edema (782 3) (R60 9)       Depression screening (V79 0) (Z13 89)       Need for pneumococcal vaccination (V03 82) (Z23)          Past Medical History    1  Acute maxillary sinusitis (461 0) (J01 00)   2  History of Acute maxillary sinusitis, recurrence not specified (461 0) (J01 00)   3  Acute upper respiratory infection (465 9) (J06 9)   4  History of Acute upper respiratory infection (465 9) (J06 9)   5  History of Bladder neck obstruction (596 0) (N32 0)   6  History of Bronchitis, chronic obstructive, with exacerbation (491 21) (J44 1)   7  History of Cataract of both eyes (366 9) (H26 9)   8  History of Cellulitis (682 9) (L03 90)   9  History of Cellulitis of foot (682 7) (L03 119)   10  History of Cellulitis of neck (682 1) (L03 221)   11  History of Chronic allergic conjunctivitis (372 14) (H10 45)   12  History of Foreign Body In The Right Auditory Canal (931)   13  History of acute bronchitis (V12 69) (Z87 09)   14  History of acute bronchitis (V12 69) (Z87 09)   15  History of acute bronchitis (V12 69) (Z87 09)   16  History of allergic rhinitis (V12 69) (Z87 09)   17  History of backache (V13 59) (Z87 39)   18   History of bacterial pneumonia (V12 61) (Z87 01)   19  History of bronchitis (V12 69) (Z87 09)   20  History of chronic obstructive lung disease (V12 69) (Z87 09)   21  History of decubitus ulcer (V13 3) (Z87 2)   22  History of dermatitis (V13 3) (Z87 2)   23  History of eczema (V13 3) (Z87 2)   24  History of epistaxis (V12 69) (Z87 898)   25  History of fever (V13 89) (Z87 898)   26  History of influenza (V12 09) (Z87 09)   27  History of osteoarthritis (V13 4) (Z87 39)   28  History of phimosis (V13 89) (Z87 438)   29  History of shortness of breath (V13 89) (Z87 898)   30  History of tinea corporis (V12 09) (Z86 19)   31  History of tinea corporis (V12 09) (Z86 19)   32  History of Internal Hemorrhoids (455 0)   33  History of Knee Sprain (844 9)   34  Late Effects Of Sprain Or Strain (905 7)   35  History of Non-ST elevation myocardial infarction (NSTEMI) of indeterminate age    (36 65) (I21 4)   39  History of Normal routine physical examination (V70 0) (Z00 00)   37  Otalgia, unspecified laterality (388 70) (H92 09)   38  History of Otalgia, unspecified laterality (388 70) (H92 09)   39  History of Pneumonia (V12 61)   40  History of Rotator cuff tendinitis (726 10) (M75 80)   41  Skin Abscess Of Hip (682 6)   42  History of Tachycardia (785 0) (R00 0)   43  History of Trigger finger (727 03) (M65 30)   44  History of Type 2 diabetes mellitus (250 00) (E11 9)   45  Venous thrombosis (453 9) (I82 90)    Surgical History    1  History of Knee Replacement   2  History of Surgery Penis Circumcision Except Alpha   3  History of Thoracentesis (Diagnostic)   4  History of Tonsillectomy    Family History  Mother    1  Family history of coronary artery disease (V17 3) (Z82 49)   2  Family history of hypertension (V17 49) (Z82 49)  Father    3  Family history of Prostate cancer  Family History    4  Denied: Family history of Pulmonary Disease  Family History Reviewed: The family history was reviewed and updated today         Social History    · Being A Social Drinker   · History of Former smoker (M76 45) (M67 679)   · Never a smoker  Social History Reviewed: The social history was reviewed and is unchanged  Current Meds   1  Amiodarone HCl - 100 MG Oral Tablet; TAKE 1 TABLET DAILY; Therapy: (Recorded:02Jun2016) to Recorded   2  AmLODIPine Besylate 2 5 MG Oral Tablet; Take 1 tablet daily as directed  Requested for:   08Apr2016; Last Rx:08Apr2016 Ordered   3  Aspirin 81 MG Oral Tablet Chewable; 2 daily; Therapy: (Recorded:07Apr2016) to Recorded   4  Breo Ellipta 100-25 MCG/INH Inhalation Aerosol Powder Breath Activated; USE AS   DIRECTED ON PACKAGE; Therapy: (Recorded:02Jun2016) to Recorded   5  Cialis 2 5 MG Oral Tablet; 1 qd for BPH; Therapy: 01UMK0148 to (Last Rx:07Oct2015)  Requested for: 63IPW7469 Ordered   6  CloNIDine HCl - 0 1 MG Oral Tablet; take 1 tablet by mouth twice a day; Therapy: 08NGZ7703 to (Evaluate:79Yzk8921)  Requested for: 96MVM7558; Last   Rx:15Jan2016 Ordered   7  Colace 100 MG Oral Capsule; 1 DAILY; Therapy: (Recorded:02Jun2016) to Recorded   8  Fenofibrate 145 MG Oral Tablet; take 1 tablet by mouth once daily; Therapy: 83HLO3582 to (Evaluate:08Sbv5319)  Requested for: 07Apr2016; Last   Rx:01Vhe8925 Ordered   9  Ipratropium-Albuterol 0 5-2 5 (3) MG/3ML Inhalation Solution; Use 1 vial in nebulizer 4   times daily; Therapy: 28Apr2015 to (Evaluate:71Xbw2970)  Requested for: 28UJI2868; Last   Rx:05Uxh9698 Ordered   10  Metoprolol Tartrate 25 MG Oral Tablet; 1/2 tab tid; Therapy: 34LIJ0938 to (Jose Sharma)  Requested for: 76JZE9919; Last    Rx:29Mar2016 Ordered   11  Polyethylene Glycol 3350 Oral Packet; MIX 1 PACKET IN 8 OUNCES OF LIQUID AND    DRINK ONCE DAILY PRN; Therapy: 10HSX8421 to (Last Rx:29Mar2016) Ordered   12  ProAir  (90 Base) MCG/ACT Inhalation Aerosol Solution; 2 PUFFS EVERY 6    HOURS; Therapy: 23Osi2554 to (Evaluate:11Oct2015)  Requested for: 12Aug2015;  Last    Rx:12Aug2015 Ordered   13  Ranitidine HCl - 150 MG Oral Tablet; TAKE 1 TABLET TWICE DAILY; Therapy: 53XXV7043 to (Evaluate:90Jaw9037); Last Rx:29Mar2016 Ordered   14  Simvastatin 20 MG Oral Tablet; 1 every day; Therapy: 08BWV1888 to (Last Rx:29Mar2016)  Requested for: 07Apr2016 Ordered   15  Tolterodine Tartrate ER 4 MG Oral Capsule Extended Release 24 Hour; TAKE 1    CAPSULE ONCE DAILY; Therapy: 58HMY1351 to (Evaluate:11Jun2016)  Requested for: 23WOE3218; Last    Rx:27Pna8747 Ordered   16  Torsemide 20 MG Oral Tablet; 1 TABLET TWICE DAILY; Therapy: (Recorded:02Jun2016) to Recorded  Medication List Reviewed: The medication list was reviewed and updated today  Allergies    1  No Known Drug Allergies    Vitals  Signs [Data Includes: Current Encounter]   Recorded: 14ZYH1924 12:02PM   Temperature: 97 3 F  Heart Rate: 64  Respiration: 24  Systolic: 047  Diastolic: 50  Height: 5 ft 8 5 in  Weight: 230 lb   BMI Calculated: 34 46  BSA Calculated: 2 18  O2 Saturation: 77    Physical Exam    Constitutional   General appearance: No acute distress, well appearing and well nourished  Eyes   Conjunctiva and lids: No swelling, erythema, or discharge  Pupils and irises: Equal, round and reactive to light  Ears, Nose, Mouth, and Throat   External inspection of ears and nose: Normal     Otoscopic examination: Tympanic membrance translucent with normal light reflex  Canals patent without erythema  Nasal mucosa, septum, and turbinates: Normal without edema or erythema  Oropharynx: Normal with no erythema, edema, exudate or lesions  Pulmonary   Respiratory effort: No increased work of breathing or signs of respiratory distress  Auscultation of lungs: Clear to auscultation, equal breath sounds bilaterally, no wheezes, no rales, no rhonci  Cardiovascular   Auscultation of heart: Abnormal   The rhythm was regular  A grade 4 systolic murmur was heard at the RUSB     Examination of extremities for edema and/or varicosities: Normal     Carotid pulses: Normal     Abdomen   Abdomen: Abnormal   The abdomen was rounded and obese  Lymphatic   Palpation of lymph nodes in neck: No lymphadenopathy  Musculoskeletal   Gait and station: Abnormal   slow  Digits and nails: Normal without clubbing or cyanosis  Inspection/palpation of joints, bones, and muscles: Normal     Skin   Skin and subcutaneous tissue: Normal without rashes or lesions  Psychiatric   Mood and affect: Normal          Provider Comments  Provider Comments:   copd/cad/htn/chol stable  AS --plan surgery  CRF stable      Message   Recorded as Task   Date: 05/31/2016 04:06 PM, Created By: System   Task Name: Hospital DANIEL   Assigned To: Neftaly Sol   Regarding Patient: Sara Kang, Status: Active   Comment:    System - 31 May 2016 4:06 PM     Patient discharged from hospital   Patient Name: Sujit Salmeron  Patient YOB: 1930  Discharge Date: 5/31/2016  Facility[facility] 31 May 2016 4:12 PM     TASK REASSIGNED: Previously Assigned To Janae Ward 91 - 02 Jun 2016 10:34 AM     TASK EDITED  LM to CB on both his cell and house numbers   [facility]     Future Appointments    Date/Time Provider Specialty Site   07/08/2016 10:45 AM Eber Salazar, [address] [place] [address]     Signatures   Electronically signed by : Salbador Lobato, ; Jun 2 2016 12:36PM EST                       (Co-author)    Electronically signed by : Charly Leo DO; Pepe  3 2016 11:09PM EST                       (Author)

## 2018-01-15 NOTE — MISCELLANEOUS
Message  Return to work or school:        He may restart cardiac rehab on 2/6/17 as tolerated  His pulmonary specialist is Dr Maria Alejandra Clark if pulmonary issues arise  Marva Mejia DO        Signatures   Electronically signed by : Marva Mejia DO; Feb 2 2017 11:31PM EST                       (Author)

## 2018-01-15 NOTE — RESULT NOTES
Discussion/Summary   Blood count is normal/ok  Sugar, minerals and liver are normal   Kidneys are ok/stable         Cholesterol profile is normal       Thyroid levels are good currently  Please schedule an office appointment  Dr Ibrahima Max        Verified Results  [facility]) Cardiovascular Risk Assessment [id number] 08:54AM Clementina Boyd     Test Name Result Flag Reference   Interpretation NTAP     PDF Image Not applicable       [facility]) [address] [place] [address] Blvd CKD Program 47CPF0485 08:54AM Clementina Boyd     Test Name Result Flag Reference   Interpretation Note     -------------------------------  CHRONIC KIDNEY DISEASE:  EGFR, BLOOD PRESSURE, AND PROTEINURIA ASSESSMENT  We presume eGFR has been less than 60 mL/min/1 73mE2 on at  least two occasions spaced at least 3 months apart  Current  eGFR is 34 mL/min/1 73mE2 corresponding to CKD stage 3b  Multiply eGFR by 1 159 if patient is   Potassium is within goal, 4 1 mmol/L  EGFR, BLOOD PRESSURE, AND PROTEINURIA TREATMENT SUGGESTIONS  -  Given patient's advanced age, we are unable to provide  specific blood pressure treatment suggestions  Individualize  blood pressure goals based on the patient's comorbidities  and to avoid orthostatic hypotension and other medication  side effects  Assessment of albuminuria (urine  albumin:creatinine ratio or urine protein:creatinine ratio  preferred) is recommended at least annually in CKD patients  for staging and disease prognosis  EGFR, BLOOD PRESSURE, AND PROTEINURIA FOLLOW-UP  -  fasting Renal Panel within 6 months; Spot Urine Panel is  recommended by KDOQI guidelines, at least yearly;  -  BONE and MINERAL ASSESSMENT  Calcium is within goal, 9 6 mg/dL  Carbon Dioxide is within  goal, 28 mmol/L  KDOQI guidelines recommend the measurement  of 25-hydroxy vitamin D in patients with CKD  BONE and MINERAL TREATMENT SUGGESTIONS  -  Interpretations require simultaneous measurements of serum  calcium and phosphorus    BONE and MINERAL FOLLOW-UP  -  fasting PTH with Renal Panel and 25-Hydroxy Vitamin D are  recommended by KDOQI guidelines, at least yearly;  -  LIPIDS ASSESSMENT  LDL-C is optimal, 43 mg/dL  Triglyceride is normal, 42  mg/dL  Non-HDL Cholesterol is optimal, 51 mg/dL  HDL-C is  normal, 59 mg/dL  LIPIDS TREATMENT SUGGESTIONS  -  Therapeutic lifestyle changes are always valuable to  maintain optimal blood lipid status (diet, exercise, weight  management)  Continue statin if in use  Consider measurement  of LDL particle number or Apo B to adjudicate need for  further LDL lowering therapy  If statin cannot be tolerated  or increased, alternatives include use of an intestinal  agent (ezetimibe or bile acid sequestrant) or niacin  LIPIDS FOLLOW-UP  -  fasting Lipid Panel within 12 months;  -  ANEMIA ASSESSMENT  Hemoglobin is low, 11 2 g/dL  Hemoglobin target assumes NAJMA  is not in use  ANEMIA TREATMENT SUGGESTIONS  -  Iron deficiency is a common cause of anemia in CKD  Recommend measurement of Ferritin and TSAT  ANEMIA FOLLOW-UP  -  CBC within 3 months; Fe/TIBC (TSAT) and Ferritin with CBC is  due;  -------------------------------  DISCLAIMER  These assessments and treatment suggestions are provided as  a convenience in support of the physician-patient  relationship and are not intended to replace the physician's  clinical judgment  They are derived from the national  guidelines in addition to other evidence and expert opinion  The clinician should consider this information within the  context of clinical opinion and the individual patient  SEE GUIDANCE FOR CHRONIC KIDNEY DISEASE PROGRAM: National  Kidney Foundation Kidney Disease Outcomes Quality Initiative  (KDOQI (TM)), with its limitations and disclaimers, are at  www kidney  org/professionals/KDOQI  Kidney Disease Improving  Global Outcomes (KDIGO) clinical practice guidelines are at  http://kdigo  org/home/guidelines/   The members of  [employer] national advisory panel are listed at  www  [employer] com  This program is intended for patients who  have been diagnosed with stages 3, 4, or pre-dialysis 5 CKD  It is not intended for children, pregnant patients, or  transplant patients  PDF Image          (1) CBC/PLT/DIFF [id number] 08:54AM [facility]     Test Name Result Flag Reference   WBC 7 7 x10E3/uL  3 4-10 8   RBC 4 50 x10E6/uL  4 14-5 80   Hemoglobin 11 2 g/dL L 12 6-17 7   Hematocrit 36 6 % L 37 5-51 0   MCV 81 fL  79-97   MCH 24 9 pg L 26 6-33 0   MCHC 30 6 g/dL L 31 5-35 7   RDW 17 1 % H 12 3-15 4   Platelets 049 G09Z2/JR  150-379   Neutrophils 68 %     Lymphs 19 %     Monocytes 10 %     Eos 3 %     Basos 0 %     Neutrophils (Absolute) 5 2 x10E3/uL  1 4-7 0   Lymphs (Absolute) 1 5 x10E3/uL  0 7-3 1   Monocytes(Absolute) 0 8 x10E3/uL  0 1-0 9   Eos (Absolute) 0 2 x10E3/uL  0 0-0 4   Baso (Absolute) 0 0 x10E3/uL  0 0-0 2   Immature Granulocytes 0 %     Immature Grans (Abs) 0 0 x10E3/uL  0 0-0 1     (1) COMPREHENSIVE METABOLIC PANEL [id number] 99:17FK [facility]     Test Name Result Flag Reference   Glucose, Serum 96 mg/dL  65-99   BUN 39 mg/dL H 8-27   Creatinine, Serum 1 76 mg/dL H 0 76-1 27   BUN/Creatinine Ratio 22  10-24   Sodium, Serum 142 mmol/L  134-144   Potassium, Serum 4 1 mmol/L  3 5-5 2   Chloride, Serum 96 mmol/L     Carbon Dioxide, Total 28 mmol/L  18-29   Calcium, Serum 9 6 mg/dL  8 6-10 2   Protein, Total, Serum 7 3 g/dL  6 0-8 5   Albumin, Serum 3 8 g/dL  3 5-4 7   Globulin, Total 3 5 g/dL  1 5-4 5   A/G Ratio 1 1 L 1 2-2 2   Bilirubin, Total 0 4 mg/dL  0 0-1 2   Alkaline Phosphatase, S 69 IU/L     AST (SGOT) 37 IU/L  0-40   ALT (SGPT) 12 IU/L  0-44   eGFR If NonAfricn Am 34 mL/min/1 73 L >59   eGFR If Africn Am 40 mL/min/1 73 L >59     (1) LIPID PANEL FASTING W DIRECT LDL REFLEX [id number] 08:54AM [facility]     Test Name Result Flag Reference   Cholesterol, Total 110 mg/dL  100-199   Triglycerides 42 mg/dL  0-149   HDL Cholesterol 59 mg/dL  >39   LDL Cholesterol Calc 43 mg/dL  0-99     [facility]) Thyroxine (T4) Free, Direct, S [id number] 08:54AM [facility]     Test Name Result Flag Reference   T4,Free(Direct) 1 21 ng/dL  0 82-1 77     (1) TSH [id number] 08:54AM [facility]     Test Name Result Flag Reference   TSH 5 730 uIU/mL H 0 450-4 500

## 2018-01-16 NOTE — MISCELLANEOUS
Message   Recorded as Task   Date: 04/28/2016 09:38 AM, Created By: Kenia Hartman   Task Name: Follow Up   Assigned To: Joselo Brar   Regarding Patient: Dawayne Bamberger, Status: Active   Comment:    Kenia Hartman - 28 Apr 2016 9:38 AM     TASK CREATED  Caller: Self; Other; [phone] (Home)  CAN DR BRAR COME BY AND SIGN A CHART AT [facility] IT IS OUT OF COMPLIANCE  SHE KNOWS HE USUALLY COMES BY ON FRIDAYS SO SHE WILL LEAVE HIS CHART OUT FRONT ALONG WITH ANOTHER PT'S WHO JUST NEEDS SIGNING AS WELL  Robinson,Filomena - 28 Apr 2016 9:39 AM     TASK REASSIGNED: Previously Assigned To Janae Espinosa 42 - 28 Apr 2016 1:40 PM     TASK EDITED  noted        Signatures   Electronically signed by : Adina Stanley DO;  Apr 28 2016  1:40PM EST                       (Author)

## 2018-01-17 NOTE — MISCELLANEOUS
Message   Recorded as Task   Date: 03/17/2016 06:31 AM, Created By: System   Task Name: Hospital DANIEL   Assigned To:  Joselo Brar   Regarding Patient: Sravani Parish, Status: Active   Comment:    System - 17 Mar 2016 6:31 AM     Patient discharged from hospital   Patient Name: Sravani Parish  Patient YOB: 1930  Discharge Date: 03/16/2016  Facility: [facility] - 17 Mar 2016 8:42 AM     TASK REASSIGNED: Previously Assigned To Verito Caraballo - 17 Mar 2016 10:17 AM     TASK EDITED  He went to short term rehab [facility] Neftaly Jackson - 17 Mar 2016 10:17 AM     TASK REASSIGNED: Previously Assigned To Lalita Cooler - 17 Mar 2016 8:37 PM     TASK EDITED  noted        Signatures   Electronically signed by : Hammad Paz DO; Mar 17 2016  8:38PM EST                       (Author)

## 2018-01-17 NOTE — RESULT NOTES
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 or [facility]  They may not be cleared or approved by the [place]  Food and   Drug Administration  The FDA has determined that such clearance or   approval is not necessary  These tests are used for clinical purposes  They should not be regarded as investigational or for research  This   laboratory has been approved by [place] 88, designated as a high-complexity   laboratory and is qualified to perform these tests  LAB AP GROSS DESCRIPTION (Report)     A  The specimen is received in formalin, labeled with the patient's name   and hospital number, and is designated right cheek, is a shave biopsy of   skin measuring 0 8 x 0 5 x 0 1 cm  The epidermal surface is tan and   contains a 0 5 x 0 3 x 0 1 cm dark brown and ill-defined macule that comes   within 0 1 cm of the closest margin  The resection margin is inked blue   and the tips are inked red   Specimen is bisected revealing tan brown cut surfaces  Entirely submitted  One cassette  Note: The estimated total formalin fixation time based upon information   provided by the submitting clinician and the standard processing schedule   is over 72 hours  RLR   LAB AP CLINICAL INFORMATION      Shave biopsy from right cheek, r/o BCC

## 2018-01-18 NOTE — MISCELLANEOUS
Message   Recorded as Task   Date: 03/17/2016 08:03 AM, Created By: Alina Watts   Task Name: Miscellaneous   Assigned To: Joselo Brar   Regarding Patient: Tu Waters, Status: Active   Comment:    Alina Watts - 17 Mar 2016 8:03 AM     TASK CREATED  FYI  I admitted Mr Beti Villareal this morning to [facility] first floor    He came in last night after admission to [address] [place] [address] for COPD exacerbation, NSTEMI, Atrial fibrillation and esophagitis  Dr Ayesha Elizabeth   Electronically signed by : Nancy Matos DO; Mar 17 2016  6:05PM EST                       (Author)

## 2018-01-22 VITALS
HEART RATE: 78 BPM | HEIGHT: 68 IN | BODY MASS INDEX: 33.04 KG/M2 | RESPIRATION RATE: 22 BRPM | DIASTOLIC BLOOD PRESSURE: 67 MMHG | WEIGHT: 218 LBS | SYSTOLIC BLOOD PRESSURE: 129 MMHG

## 2018-01-23 NOTE — MISCELLANEOUS
Message   Recorded as Task   Date: 01/08/2018 01:11 AM, Created By: System   Task Name: Rx Renew Request   Assigned To: Joselo Brar   Regarding Patient: Alisia Zamora, Status: Active   Comment:    System - 08 Jan 2018 1:11 AM     PHARMACY: [facility]  PATIENT: Alisia Zamora  MEDICATION: METOPROLOL TARTRATE TABS 25MG   Joselo Brar - 08 Jan 2018 5:40 PM     TASK REASSIGNED: Previously Assigned To Joselo Brar  I think his cardiologist Dr Ry Diaz refills this (per med list) and he refilled it for 6 months in November 2017  Please confirm with patient     [address] - 09 Jan 2018 2:46 PM     TASK EDITED  I called the patient   [address] - 09 Jan 2018 2:47 PM     TASK EDITED  Please done this        Signatures   Electronically signed by : Julia Fischer DO; Jan 9 2018  6:08PM EST                       (Author)

## 2018-01-24 VITALS — HEIGHT: 69 IN | BODY MASS INDEX: 32.29 KG/M2 | WEIGHT: 218 LBS

## 2018-01-24 VITALS — HEIGHT: 69 IN | WEIGHT: 218 LBS | BODY MASS INDEX: 32.29 KG/M2

## 2018-02-23 ENCOUNTER — OFFICE VISIT (OUTPATIENT)
Dept: PODIATRY | Facility: CLINIC | Age: 83
End: 2018-02-23
Payer: COMMERCIAL

## 2018-02-23 VITALS — HEIGHT: 70 IN | BODY MASS INDEX: 30.35 KG/M2 | WEIGHT: 212 LBS

## 2018-02-23 DIAGNOSIS — L84 CORNS: Primary | ICD-10-CM

## 2018-02-23 DIAGNOSIS — I70.209 PERIPHERAL ARTERIOSCLEROSIS (HCC): ICD-10-CM

## 2018-02-23 DIAGNOSIS — E11.42 DIABETIC POLYNEUROPATHY ASSOCIATED WITH TYPE 2 DIABETES MELLITUS (HCC): ICD-10-CM

## 2018-02-23 DIAGNOSIS — M79.672 PAIN IN BOTH FEET: ICD-10-CM

## 2018-02-23 DIAGNOSIS — M79.671 PAIN IN BOTH FEET: ICD-10-CM

## 2018-02-23 PROCEDURE — 11056 PARNG/CUTG B9 HYPRKR LES 2-4: CPT | Performed by: PODIATRIST

## 2018-02-23 NOTE — PROGRESS NOTES
Assessment/Plan:    No problem-specific Assessment & Plan notes found for this encounter  Discussion/Summary  The patient was counseled regarding diagnostic results,-- instructions for management,-- prognosis,-- patient and family education,-- risks and benefits of treatment options,-- importance of compliance with treatment  Patient is able to Self-Care  Possible side effects of new medications were reviewed with the patient/guardian today  The treatment plan was reviewed with the patient/guardian  The patient/guardian understands and agrees with the treatment plan   Patient's shoes and socks removed  Right Foot/Ankle   Right Foot Inspection  Skin Exam: callus and callus                          Toe Exam: swelling  Sensory   Vibration: diminished  Proprioception: diminished   Monofilament testing: diminished  Vascular  Capillary refills: < 3 seconds  The right DP pulse is 1+  The right PT pulse is 1+  Left Foot/Ankle  Left Foot Inspection  Skin Exam: callus                         Toe Exam: swelling                   Sensory   Vibration: diminished  Proprioception: diminished  Monofilament: diminished  Vascular  Capillary refills: < 3 seconds  The left DP pulse is 1+  The left PT pulse is 1+  Assign Risk Category:  Deformity present; Loss of protective sensation; Weak pulses       Risk: 2     Chief Complaint  Routine nail care      History of Present Illness  HPI: Patient complains of pain in his feet with ambulation  Patient has no history of trauma  Patient has pain wearing shoes  Review of Systems   Constitutional: chills  Eyes: eyesight problems  ENT: hearing loss  Cardiovascular: No complaints of chest pain, no palpitations, no leg claudication or lower extremity edema  Respiratory: shortness of breath  Gastrointestinal: No complaints of abdominal pain, no constipation, no nausea or vomiting, no diarrhea or bloody stools    Genitourinary: No complaints of dysuria or incontinence, no hesitancy, no nocturia  Musculoskeletal: as noted in HPI  Integumentary: No complaints of skin rash or lesion, no itching or dry skin, no skin wounds  Neurological: No complaints of headache, no confusion, no numbness or tingling, no dizziness  Psychiatric: No suicidal thoughts, no anxiety, no depression  Endocrine: muscle weakness,-- frequent urination-- and-- excessive thirst       Active Problems  1  Abnormal gait (781 2) (R26 9)  2  Acquired ankle/foot deformity (736 70) (M21 969)  3  Actinic keratosis (702 0) (L57 0)  4  Allergic rhinitis (477 9) (J30 9)  5  Arteriosclerosis of arteries of extremities (440 20) (I70 209)  6  Atrial fibrillation (427 31) (I48 91)  7  Benign essential HTN (401 1) (I10)  8  BPH (benign prostatic hyperplasia) (600 00) (N40 0)  9  Bursitis, subacromial (726 19) (M75 50)  10  CAD in native artery (414 01) (I25 10)  11  Callus (700) (L84)  12  Centrilobular emphysema (492 8) (J43 2)  13  Cervical radiculopathy (723 4) (M54 12)  14  Denied: History of Chest tightness  15  Chronic hypoxemic respiratory failure (518 83,799 02) (J96 11)  16  Chronic kidney disease, stage 3 (585 3) (N18 3)  17  Chronic left shoulder pain (719 41,338 29) (M25 512,G89 29)  18  Chronic systolic congestive heart failure (428 22,428 0) (I50 22)  19  Congestive heart failure (428 0) (I50 9)  20  COPD (chronic obstructive pulmonary disease) (496) (J44 9)  21  Denied: History of Cough  22  Depression screening (V79 0) (Z13 89)  23  Difficulty walking (719 7) (R26 2)  24  Dizziness (780 4) (R42)  25  Esophagitis determined by endoscopy (530 10) (K20 9)  26  Foot pain, bilateral (729 5) (M79 671,M79 672)  27  Heart failure, left, 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)  28  High triglycerides (272 1) (E78 1)  29  History of aortic valve stenosis (V12 59) (Z86 79)  30  Hyperlipidemia LDL goal <100 (272 4) (E78 5)  31  Hyperthyroidism (242 90) (E05 90)  32  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)  33   Hyponatremia (276 1) (E87 1)  34  Hypothyroidism (244 9) (E03 9)  35  Immunization due (V05 9) (Z23)  36  Obesity with alveolar hypoventilation, unspecified obesity severity (278 03) (E66 2)  37  Obstructive sleep apnea (327 23) (G47 33)  38  Onychomycosis (110 1) (B35 1)  39  Osteoarthritis of left shoulder (715 91) (M19 012)  40  Osteoarthritis, knee/lower leg (715 96) (M17 9)  41  Peripheral vascular disease (443 9) (I73 9)  42  Pleural Effusion  43  Prediabetes (790 29) (R73 09)  44  Primary osteoarthritis of left shoulder (715 11) (M19 012)  45  Pulmonary fibrosis (515) (J84 10)  46  Pulmonary nodule, left (793 11) (R91 1)  47  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)  48  Screening for cardiovascular, respiratory, and genitourinary diseases  (V81 2,V81 4,V81 6) (Z13 6,Z13 83,Z13 89)  49  Screening for neurological condition (V80 09) (Z13 89)  50   Sleep related hypoventilation in conditions classified elsewhere (327 26) (G47 36)     Past Medical History   · Acute maxillary sinusitis (461 0) (J01 00)   · History of Acute maxillary sinusitis, recurrence not specified (461 0) (J01 00)   · Acute upper respiratory infection (465 9) (J06 9)   · History of Acute upper respiratory infection (465 9) (J06 9)   · History of Bladder neck obstruction (596 0) (N32 0)   · History of Bronchitis, chronic obstructive, with exacerbation (491 21) (J44 1)   · History of Cataract of both eyes (366 9) (H26 9)   · History of Cellulitis (682 9) (L03 90)   · History of Cellulitis (682 9) (L03 90)   · History of Cellulitis of foot (682 7) (L03 119)   · History of Cellulitis of neck (682 1) (O43 261)   · Denied: History of Chest tightness   · History of Chronic allergic conjunctivitis (372 14) (H10 45)   · Denied: History of Cough   · History of 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)   · History of Diabetes type 2, uncontrolled (250 02) (E11 65)   · History of Dyspnea on exertion (786 09) (R06 09)   · History of Exposure to scabies (V01 89) (Z20 89)   · History of Foreign Body In The Right Auditory Canal (931)   · H/O blood clots (V12 51) (Z86 718)   · History of acute bronchitis (V12 69) (Z87 09)   · History of acute bronchitis (V12 69) (Z87 09)   · History of acute bronchitis (V12 69) (Z87 09)   · History of allergic rhinitis (V12 69) (Z87 09)   · History of aortic valve replacement (V43 3) (Z95 2)   · History of backache (V13 59) (Z87 39)   · History of bacterial pneumonia (V12 61) (Z87 01)   · History of bronchitis (V12 69) (Z87 09)   · History of carcinoma (V10 90) (Z85 9)   · History of cardiomegaly (V12 59) (Z86 79)   · History of constipation (V12 79) (Z87 19)   · History of decubitus ulcer (V13 3) (Z87 2)   · History of dermatitis (V13 3) (Z87 2)   · History of dizziness (V13 89) (K19 603)   · History of eczema (V13 3) (Z87 2)   · History of edema (V13 89) (Q51 200)   · History of epistaxis (V12 69) (C17 113)   · History of fever (V13 89) (L39 074)   · History of fracture of rib (V15 51) (Z87 81)   · History of influenza (V12 09) (Z87 09)   · History of phimosis (V13 89) (A05 592)   · History of shortness of breath (V13 89) (G48 130)   · History of tinea corporis (V12 09) (Z86 19)   · History of tinea corporis (V12 09) (Z86 19)   · History of Internal Hemorrhoids (455 0)   · History of Knee Sprain (844 9)   · Late Effects Of Sprain Or Strain (905 7)   · History of Noninfected skin tear of right lower extremity, initial encounter (891 0)(S81 811A)   · History of Non-ST elevation myocardial infarction (NSTEMI) of indeterminate age   · History of Normal routine physical examination (V70 0) (Z00 00)   · Otalgia, unspecified laterality (388 70) (H92 09)   · History of Otalgia, unspecified laterality (388 70) (H92 09)   · History of Pneumonia (V12 61)   · History of Pneumonia (486) (J18 9)   · History of Postop check (V67 00) (Z09)   · History of Rotator cuff tendinitis (726 10) (M75 80)   · Skin Abscess Of Hip (682 6)   · History of Skin neoplasm (239 2) (D49 2)   · History of Sprain and strain (848 9) (T14 8XXA)   · History of Tachycardia (785 0) (R00 0)   · History of Trigger finger (727 03) (M65 30)   · History of Type 2 diabetes mellitus (250 00) (E11 9)   · Venous thrombosis (453 9) (I82 90)     The active problems and past medical history were reviewed and updated today  Surgical History   · History of Aortic Valve Replacement Transcatheter   · History of Cataract Surgery   · History of Hip Replacement   · History of Knee Replacement   · History of Surgery Penis Circumcision Except Rumely   · History of Thoracentesis (Diagnostic)   · History of Tonsillectomy     The surgical history was reviewed and updated today  Family History  Mother    · Family history of arthritis (V17 7) (Z82 61)   · Family history of coronary artery disease (V17 3) (Z82 49)   · Family history of hypertension (V17 49) (Z82 49)  Father    · Family history of arthritis (V17 7) (Z82 61)   · Family history of Prostate cancer  Family History    · Denied: Family history of Pulmonary Disease     The family history was reviewed and updated today  Social History   · Being A Social Drinker   · Denied: Drug use (305 90) (F19 90)   · Former smoker (V15 82) (H39 466)   · Pt quit smoking 50 yrs ago  · History of Former smoker (V15 97) (W46 342)   · History of Former smoker (V15 82) (Z87 891)   · QUIT SMOKING 25-35 YEARS AGO AND HE SMOKED 1 PPD FOR 45YEARS  · Denied: History of Pets / animals   · Denied: History of Recreational drug use   · Denied: History of Travel history  The social history was reviewed and updated today  Current Meds  1  Amiodarone HCl - 100 MG Oral Tablet; TAKE 1 TABLET DAILY (CONTACT CARDIOLOGIST FOR ADDITIONAL REFILLS); Therapy: 58TNC5262 to (Last 21 )  Requested for: 46WOO2830 Ordered  2  AmLODIPine Besylate 10 MG Oral Tablet; TAKE 1 TABLET DAILY; Therapy: 75VQW9132 to (Last Rx:2017)  Requested for: 31VPS7508 Ordered  3  Aspirin 81 MG Oral Tablet Chewable; 2 daily; Therapy: (Mary Browne) to Recorded  4  Breo Ellipta 100-25 MCG/INH Inhalation Aerosol Powder Breath Activated; INHALE 1 PUFFS Daily; Therapy: 61FWM7124 to (Mariia Junior) Recorded  5  Cialis 2 5 MG Oral Tablet; Take 1 tablet daily as directed; Therapy: 31ZRF3692 to (Evaluate:22Nov2017)  Requested for: 95QCQ4464; Last Rx:60Vnf8257 Ordered  6  Colace 100 MG Oral Capsule; 1 Two Times A Day, As Needed; Therapy: 54GYN6759 to  Requested for: 12Jan2017 Recorded  7  Fenofibrate 145 MG Oral Tablet; TAKE 1 TABLET BY MOUTH EVERY OTHER DAY; Therapy: 32ZJV0042 to ([id number])  Requested for: 50YAJ9434; Last Rx:03Nov2017 Ordered  8  Ipratropium-Albuterol 0 5-2 5 (3) MG/3ML Inhalation Solution; Use 1 vial in nebulizer 4 times daily; Therapy: 34Dzm7103 to (Evaluate:41Nmg0730)  Requested for: 24GPS7273; Last Rx:09May2016 Ordered  9  Levothyroxine Sodium 50 MCG Oral Tablet; Take 1 tablet daily, JESSICA; Therapy: 00FYM0366 to (Last Rx:27Nov2017)  Requested for: 27Nov2017 Ordered  10  Metoprolol Tartrate 25 MG Oral Tablet; take 1 tablet by mouth twice a day; Therapy: 34LKK6439 to (Hiwot Portillo)  Requested for: 24JGV0488; Last  Rx:03Nov2017 Ordered  11  Simvastatin 40 MG Oral Tablet; take 1 tablet by mouth once daily; Therapy: (0472 51 11 42) to Recorded  12  Tolterodine Tartrate ER 4 MG Oral Capsule Extended Release 24 Hour; TAKE 1  CAPSULE ONCE DAILY; Therapy: 04WBJ1329 to (Evaluate:25Mar2018)  Requested for: 28XOT8308; Last  Rx:12Ycx4606 Ordered  13  Torsemide 20 MG Oral Tablet; TAKE 1 TABLET DAILY ALTERNATING WITH 2 TABLETS  (40 MG) DAILY; Therapy: 69JNI8779 to (Hiwot Portillo)  Requested for: 25OYP8291; Last  Rx:03Nov2017 Ordered  14  Vitamin B Complex CAPS; Therapy: (Faizan Zapata) to Recorded  15  Vitamin B-12 TABS; Therapy: (Faizan Zapata) to Recorded  16  Vitamin C TABS; Therapy: (Faizan Zapata) to Recorded  17   Vitamin E 400 UNIT Oral Capsule; Therapy: (Alphguillermoa Monica) to Recorded     The medication list was reviewed and updated today  Allergies  1  No Known Drug Allergies     Vitals    Recorded: 76CQC2219 03:00PM Recorded: 92OBS7357 02:48PM   Height 5 ft 9 in 5 ft 9 in   Weight 218 lb  218 lb    BMI Calculated 32 19 32 19   BSA Calculated 2 14 2 14      Physical Exam  Left Foot: Appearance: Normal except as noted: excessive pronation-- and-- pes planus  Great toe deformities include a bunion  Tenderness: None except the distal first metatarsal-- and-- distal fifth metatarsal    Right Foot: Appearance: Normal except as noted: excessive pronation-- and-- pes planus  Great toe deformities include a bunion  Tenderness: None except the distal first metatarsal-- and-- distal fifth metatarsal    Left Ankle: ROM: limited ROM in all planes   Right Ankle: ROM: limited ROM in all planes   Neurological Exam: performed  Light touch was intact bilaterally  Vibratory sensation was intact bilaterally  Response to monofilament test was intact bilaterally  Deep tendon reflexes: patellar reflex present bilaterally-- and-- achilles reflex present bilaterally  Vascular Exam: performed Dorsalis pedis pulses were One/4 bilaterally  Posterior tibial pulses were One/4 bilaterally  Elevation Pallor: present bilaterally  Dependence rubor was present bilaterally  Capillary refill time was Q9 findings bilateral  Negative digital hair noted  Positive abnormal cooling bilateral, but-- between 1-3 seconds bilaterally  Toenails: All of the toenails were elongated,-- hypertrophied,-- discolored,-- tender-- and-- Mycotic  Hyperkeratosis: present on both first sub metatarsals-- and-- present on both fifth sub metatarsals  Shoe Gear Evaluation: performed ()  Recommendation(s): custom inlays  Procedure  Foot exam performed  All mycotic nails debrided  Plantar lesions debrided  Patient tolerated procedures well without pain or complication   Patient will moisturize after bathing  He will return for follow-up  There are no diagnoses linked to this encounter  Subjective:      Patient ID: Issa Duque is a 80 y o  male  HPI    The following portions of the patient's history were reviewed and updated as appropriate: allergies, current medications, past family history, past medical history, past social history, past surgical history and problem list     Review of Systems      Objective: Foot Exam    Right Foot/Ankle     Inspection and Palpation  Skin Exam: callus; Neurovascular  Dorsalis pedis: 1+  Posterior tibial: 1+      Left Foot/Ankle      Inspection and Palpation  Skin Exam: callus; Neurovascular  Dorsalis pedis: 1+  Posterior tibial: 1+        Physical Exam   Cardiovascular: Pulses are weak pulses  Pulses:       Dorsalis pedis pulses are 1+ on the right side, and 1+ on the left side  Posterior tibial pulses are 1+ on the right side, and 1+ on the left side  Feet:   Right Foot:   Skin Integrity: Positive for callus  Left Foot:   Skin Integrity: Positive for callus

## 2018-03-01 DIAGNOSIS — I48.0 PAF (PAROXYSMAL ATRIAL FIBRILLATION) (HCC): Primary | ICD-10-CM

## 2018-03-01 RX ORDER — AMIODARONE HYDROCHLORIDE 100 MG/1
100 TABLET ORAL DAILY
Qty: 90 TABLET | Refills: 3 | Status: SHIPPED | OUTPATIENT
Start: 2018-03-01 | End: 2018-03-06 | Stop reason: SDUPTHER

## 2018-03-06 DIAGNOSIS — I48.0 PAF (PAROXYSMAL ATRIAL FIBRILLATION) (HCC): ICD-10-CM

## 2018-03-06 RX ORDER — AMIODARONE HYDROCHLORIDE 100 MG/1
100 TABLET ORAL DAILY
Qty: 90 TABLET | Refills: 3 | Status: SHIPPED | OUTPATIENT
Start: 2018-03-06 | End: 2019-01-09 | Stop reason: SDUPTHER

## 2018-03-07 NOTE — PROGRESS NOTES
Dear Jeffrey Marie,  My name is Patti Matos and I am a Registered Nurse and Care Coordinator for [address]  We recently  spoke on the phone regarding your hospital stay and you opted to receive follow-up phone calls from me  Because of the reason that you were in the hospital, Medicare has placed you in a program called [address]  One of  the benefits of the program is that you can have a nurse call regularly to answer any questions or concerns you may have  My phone number is listed below in case you would need to contact me      Sincerely,   Patti Matos RN  [phone]        Electronically signed Juan Diego Millard RN  Nov 7 2016  3:21PM EST Author

## 2018-03-07 NOTE — PROGRESS NOTES
Dear Supriya Mcgarry,  My name is Bernabe Way and I am a Registered Nurse and Care Coordinator for [address]  We recently  spoke on the phone regarding your hospital stay and you opted to receive follow-up phone calls from me  Because of the reason that you were in the hospital, Medicare has placed you in a program called [address]  One of  the benefits of the program is that you can have a nurse call regularly to answer any questions or concerns you may have  My phone number is listed below in case you would need to contact me      Sincerely,   Bernabe Way RN  [phone]          Electronically signed Zainab Tellez RN  Nov 30 2016 11:48AM EST Author

## 2018-03-16 DIAGNOSIS — I25.10 CAD IN NATIVE ARTERY: Chronic | ICD-10-CM

## 2018-03-16 DIAGNOSIS — N31.8 HYPERTONICITY OF BLADDER: Primary | ICD-10-CM

## 2018-03-16 PROBLEM — G89.29 CHRONIC LEFT SHOULDER PAIN: Status: ACTIVE | Noted: 2017-04-26

## 2018-03-16 PROBLEM — J96.11 CHRONIC HYPOXEMIC RESPIRATORY FAILURE (HCC): Status: ACTIVE | Noted: 2017-11-12

## 2018-03-16 PROBLEM — M25.512 CHRONIC LEFT SHOULDER PAIN: Status: ACTIVE | Noted: 2017-04-26

## 2018-03-16 PROBLEM — J43.2 CENTRILOBULAR EMPHYSEMA (HCC): Status: ACTIVE | Noted: 2017-11-12

## 2018-03-16 PROBLEM — B35.1 ONYCHOMYCOSIS: Status: ACTIVE | Noted: 2017-08-25

## 2018-03-16 PROBLEM — I70.209 ARTERIOSCLEROSIS OF ARTERIES OF EXTREMITIES (HCC): Status: ACTIVE | Noted: 2017-08-25

## 2018-03-16 PROBLEM — E03.9 HYPOTHYROIDISM: Status: ACTIVE | Noted: 2017-04-26

## 2018-03-16 PROBLEM — M19.012 OSTEOARTHRITIS OF LEFT SHOULDER: Status: ACTIVE | Noted: 2017-05-10

## 2018-03-16 PROBLEM — I50.22 CHRONIC SYSTOLIC CONGESTIVE HEART FAILURE (HCC): Status: ACTIVE | Noted: 2017-05-08

## 2018-03-16 RX ORDER — LEVOTHYROXINE SODIUM 0.05 MG/1
TABLET ORAL
COMMUNITY
Start: 2016-12-09 | End: 2018-05-18 | Stop reason: SDUPTHER

## 2018-03-16 RX ORDER — SIMVASTATIN 40 MG
1 TABLET ORAL DAILY
Status: ON HOLD | COMMUNITY
End: 2018-03-31 | Stop reason: SDUPTHER

## 2018-03-16 RX ORDER — FLUTICASONE FUROATE AND VILANTEROL 100; 25 UG/1; UG/1
1 POWDER RESPIRATORY (INHALATION) DAILY
Status: ON HOLD | COMMUNITY
Start: 2017-11-07 | End: 2018-03-31 | Stop reason: SDUPTHER

## 2018-03-16 RX ORDER — AMLODIPINE BESYLATE 10 MG/1
1 TABLET ORAL DAILY
COMMUNITY
Start: 2016-09-09 | End: 2018-04-02 | Stop reason: HOSPADM

## 2018-03-16 RX ORDER — VITAMIN E 268 MG
CAPSULE ORAL
COMMUNITY
End: 2018-05-03 | Stop reason: ALTCHOICE

## 2018-03-16 RX ORDER — UBIDECARENONE 75 MG
CAPSULE ORAL DAILY
COMMUNITY

## 2018-03-16 RX ORDER — TORSEMIDE 20 MG/1
TABLET ORAL
COMMUNITY
Start: 2016-09-09 | End: 2018-08-03 | Stop reason: SDUPTHER

## 2018-03-16 RX ORDER — TOLTERODINE 4 MG/1
4 CAPSULE, EXTENDED RELEASE ORAL DAILY
Qty: 90 CAPSULE | Refills: 1 | Status: SHIPPED | OUTPATIENT
Start: 2018-03-16 | End: 2018-04-02 | Stop reason: HOSPADM

## 2018-03-16 RX ORDER — RIBOFLAVIN (VITAMIN B2) 100 MG
TABLET ORAL
COMMUNITY
End: 2018-05-03 | Stop reason: ALTCHOICE

## 2018-03-16 RX ORDER — ASPIRIN 81 MG/1
TABLET, CHEWABLE ORAL DAILY
COMMUNITY
End: 2018-12-09 | Stop reason: HOSPADM

## 2018-03-16 RX ORDER — IPRATROPIUM BROMIDE AND ALBUTEROL SULFATE 2.5; .5 MG/3ML; MG/3ML
1 SOLUTION RESPIRATORY (INHALATION) 4 TIMES DAILY
Status: ON HOLD | COMMUNITY
Start: 2015-04-28 | End: 2018-03-31

## 2018-03-16 RX ORDER — AMIODARONE HYDROCHLORIDE 100 MG/1
TABLET ORAL
COMMUNITY
Start: 2017-07-29 | End: 2018-04-02 | Stop reason: HOSPADM

## 2018-03-16 RX ORDER — SIMVASTATIN 40 MG
40 TABLET ORAL
Qty: 90 TABLET | Refills: 1 | Status: SHIPPED | OUTPATIENT
Start: 2018-03-16 | End: 2018-04-02 | Stop reason: HOSPADM

## 2018-03-16 RX ORDER — DOCUSATE SODIUM 100 MG/1
CAPSULE, LIQUID FILLED ORAL 2 TIMES DAILY PRN
COMMUNITY
Start: 2013-10-11 | End: 2018-04-02 | Stop reason: HOSPADM

## 2018-03-16 RX ORDER — TOLTERODINE 4 MG/1
1 CAPSULE, EXTENDED RELEASE ORAL DAILY
COMMUNITY
Start: 2014-05-30 | End: 2018-07-18

## 2018-03-16 RX ORDER — TADALAFIL 2.5 MG/1
1 TABLET ORAL DAILY
Status: ON HOLD | COMMUNITY
Start: 2015-10-07 | End: 2018-03-31 | Stop reason: SDUPTHER

## 2018-03-16 RX ORDER — FENOFIBRATE 145 MG/1
1 TABLET, COATED ORAL EVERY OTHER DAY
COMMUNITY
Start: 2012-07-06 | End: 2018-04-02 | Stop reason: HOSPADM

## 2018-03-20 ENCOUNTER — TELEPHONE (OUTPATIENT)
Dept: FAMILY MEDICINE CLINIC | Facility: CLINIC | Age: 83
End: 2018-03-20

## 2018-03-20 NOTE — TELEPHONE ENCOUNTER
Pt pharmacy called due to a possible drug interaction of symbastatin 40mg and amlodipine 10mg   Pls call and note order #[id number] at [phone]

## 2018-03-21 DIAGNOSIS — E78.5 HYPERLIPIDEMIA LDL GOAL <100: Primary | Chronic | ICD-10-CM

## 2018-03-21 RX ORDER — SIMVASTATIN 20 MG
TABLET ORAL
Qty: 90 TABLET | Refills: 1 | Status: SHIPPED | OUTPATIENT
Start: 2018-03-21 | End: 2018-07-18

## 2018-03-21 NOTE — TELEPHONE ENCOUNTER
I am ok with him taking both of these medications, amlodipine and simvastatin    I have discussed the warnings with him in the past

## 2018-03-28 ENCOUNTER — APPOINTMENT (EMERGENCY)
Dept: RADIOLOGY | Facility: HOSPITAL | Age: 83
DRG: 291 | End: 2018-03-28
Payer: COMMERCIAL

## 2018-03-28 ENCOUNTER — HOSPITAL ENCOUNTER (INPATIENT)
Facility: HOSPITAL | Age: 83
LOS: 5 days | Discharge: NURSING, REHAB OR LONG-TERM CARE | DRG: 291 | End: 2018-04-02
Attending: EMERGENCY MEDICINE | Admitting: ANESTHESIOLOGY
Payer: COMMERCIAL

## 2018-03-28 DIAGNOSIS — J96.91 RESPIRATORY FAILURE WITH HYPOXIA (HCC): ICD-10-CM

## 2018-03-28 DIAGNOSIS — J44.9 COPD, MODERATE (HCC): ICD-10-CM

## 2018-03-28 DIAGNOSIS — I50.9 CONGESTIVE HEART FAILURE (CHF) (HCC): Primary | ICD-10-CM

## 2018-03-28 DIAGNOSIS — H10.9 BACTERIAL CONJUNCTIVITIS OF RIGHT EYE: ICD-10-CM

## 2018-03-28 DIAGNOSIS — I10 HYPERTENSION: Chronic | ICD-10-CM

## 2018-03-28 LAB
ALBUMIN SERPL BCP-MCNC: 3 G/DL (ref 3.5–5)
ALP SERPL-CCNC: 57 U/L (ref 46–116)
ALT SERPL W P-5'-P-CCNC: 24 U/L (ref 12–78)
ANION GAP SERPL CALCULATED.3IONS-SCNC: 9 MMOL/L (ref 4–13)
APTT PPP: 27 SECONDS (ref 23–35)
AST SERPL W P-5'-P-CCNC: 39 U/L (ref 5–45)
BASOPHILS # BLD AUTO: 0 THOUSANDS/ΜL (ref 0–0.1)
BASOPHILS NFR BLD AUTO: 0 % (ref 0–1)
BILIRUB SERPL-MCNC: 0.3 MG/DL (ref 0.2–1)
BUN SERPL-MCNC: 24 MG/DL (ref 5–25)
CALCIUM SERPL-MCNC: 9.1 MG/DL (ref 8.3–10.1)
CHLORIDE SERPL-SCNC: 103 MMOL/L (ref 100–108)
CO2 SERPL-SCNC: 28 MMOL/L (ref 21–32)
CREAT SERPL-MCNC: 1.65 MG/DL (ref 0.6–1.3)
EOSINOPHIL # BLD AUTO: 0.2 THOUSAND/ΜL (ref 0–0.61)
EOSINOPHIL NFR BLD AUTO: 2 % (ref 0–6)
ERYTHROCYTE [DISTWIDTH] IN BLOOD BY AUTOMATED COUNT: 16.6 % (ref 11.6–15.1)
GFR SERPL CREATININE-BSD FRML MDRD: 37 ML/MIN/1.73SQ M
GLUCOSE SERPL-MCNC: 105 MG/DL (ref 65–140)
HCT VFR BLD AUTO: 36 % (ref 42–52)
HGB BLD-MCNC: 11.7 G/DL (ref 14–18)
INR PPP: 1.14 (ref 0.86–1.16)
LYMPHOCYTES # BLD AUTO: 1.8 THOUSANDS/ΜL (ref 0.6–4.47)
LYMPHOCYTES NFR BLD AUTO: 19 % (ref 14–44)
MCH RBC QN AUTO: 28.5 PG (ref 27–31)
MCHC RBC AUTO-ENTMCNC: 32.5 G/DL (ref 31.4–37.4)
MCV RBC AUTO: 87 FL (ref 82–98)
MONOCYTES # BLD AUTO: 0.6 THOUSAND/ΜL (ref 0.17–1.22)
MONOCYTES NFR BLD AUTO: 7 % (ref 4–12)
NEUTROPHILS # BLD AUTO: 6.6 THOUSANDS/ΜL (ref 1.85–7.62)
NEUTS SEG NFR BLD AUTO: 72 % (ref 43–75)
NRBC BLD AUTO-RTO: 0 /100 WBCS
NT-PROBNP SERPL-MCNC: 3795 PG/ML
PLATELET # BLD AUTO: 249 THOUSANDS/UL (ref 130–400)
PMV BLD AUTO: 8.6 FL (ref 8.9–12.7)
POTASSIUM SERPL-SCNC: 4.1 MMOL/L (ref 3.5–5.3)
PROT SERPL-MCNC: 7.4 G/DL (ref 6.4–8.2)
PROTHROMBIN TIME: 12 SECONDS (ref 9.4–11.7)
RBC # BLD AUTO: 4.12 MILLION/UL (ref 4.7–6.1)
SODIUM SERPL-SCNC: 140 MMOL/L (ref 136–145)
TROPONIN I SERPL-MCNC: <0.02 NG/ML
WBC # BLD AUTO: 9.3 THOUSAND/UL (ref 4.8–10.8)

## 2018-03-28 PROCEDURE — 84484 ASSAY OF TROPONIN QUANT: CPT | Performed by: EMERGENCY MEDICINE

## 2018-03-28 PROCEDURE — 85730 THROMBOPLASTIN TIME PARTIAL: CPT | Performed by: EMERGENCY MEDICINE

## 2018-03-28 PROCEDURE — 85610 PROTHROMBIN TIME: CPT | Performed by: EMERGENCY MEDICINE

## 2018-03-28 PROCEDURE — 96374 THER/PROPH/DIAG INJ IV PUSH: CPT

## 2018-03-28 PROCEDURE — 94660 CPAP INITIATION&MGMT: CPT

## 2018-03-28 PROCEDURE — 71045 X-RAY EXAM CHEST 1 VIEW: CPT

## 2018-03-28 PROCEDURE — 93005 ELECTROCARDIOGRAM TRACING: CPT

## 2018-03-28 PROCEDURE — 94760 N-INVAS EAR/PLS OXIMETRY 1: CPT

## 2018-03-28 PROCEDURE — 80053 COMPREHEN METABOLIC PANEL: CPT | Performed by: EMERGENCY MEDICINE

## 2018-03-28 PROCEDURE — 83880 ASSAY OF NATRIURETIC PEPTIDE: CPT | Performed by: EMERGENCY MEDICINE

## 2018-03-28 PROCEDURE — 36415 COLL VENOUS BLD VENIPUNCTURE: CPT | Performed by: EMERGENCY MEDICINE

## 2018-03-28 PROCEDURE — 85025 COMPLETE CBC W/AUTO DIFF WBC: CPT | Performed by: EMERGENCY MEDICINE

## 2018-03-28 RX ORDER — FUROSEMIDE 10 MG/ML
80 INJECTION INTRAMUSCULAR; INTRAVENOUS ONCE
Status: COMPLETED | OUTPATIENT
Start: 2018-03-28 | End: 2018-03-28

## 2018-03-28 RX ADMIN — FUROSEMIDE 80 MG: 10 INJECTION, SOLUTION INTRAMUSCULAR; INTRAVENOUS at 21:26

## 2018-03-29 ENCOUNTER — APPOINTMENT (INPATIENT)
Dept: RADIOLOGY | Facility: HOSPITAL | Age: 83
DRG: 291 | End: 2018-03-29
Payer: COMMERCIAL

## 2018-03-29 ENCOUNTER — APPOINTMENT (INPATIENT)
Dept: NON INVASIVE DIAGNOSTICS | Facility: HOSPITAL | Age: 83
DRG: 291 | End: 2018-03-29
Payer: COMMERCIAL

## 2018-03-29 PROBLEM — E03.9 HYPOTHYROIDISM: Chronic | Status: ACTIVE | Noted: 2017-04-26

## 2018-03-29 PROBLEM — I50.22 CHRONIC SYSTOLIC CONGESTIVE HEART FAILURE (HCC): Chronic | Status: ACTIVE | Noted: 2017-05-08

## 2018-03-29 PROBLEM — J96.21 ACUTE ON CHRONIC RESPIRATORY FAILURE WITH HYPOXIA (HCC): Status: ACTIVE | Noted: 2017-11-12

## 2018-03-29 PROBLEM — H10.9 BACTERIAL CONJUNCTIVITIS OF RIGHT EYE: Status: ACTIVE | Noted: 2018-03-29

## 2018-03-29 PROBLEM — J96.11 CHRONIC HYPOXEMIC RESPIRATORY FAILURE (HCC): Chronic | Status: ACTIVE | Noted: 2017-11-12

## 2018-03-29 PROBLEM — Z86.79 HISTORY OF ATRIAL FIBRILLATION: Chronic | Status: ACTIVE | Noted: 2017-01-07

## 2018-03-29 PROBLEM — D64.9 ANEMIA: Chronic | Status: ACTIVE | Noted: 2018-03-29

## 2018-03-29 PROBLEM — I50.42 CHRONIC COMBINED SYSTOLIC AND DIASTOLIC CHF (CONGESTIVE HEART FAILURE) (HCC): Status: ACTIVE | Noted: 2017-05-08

## 2018-03-29 PROBLEM — K21.9 GERD (GASTROESOPHAGEAL REFLUX DISEASE): Chronic | Status: ACTIVE | Noted: 2018-03-29

## 2018-03-29 LAB
ANION GAP SERPL CALCULATED.3IONS-SCNC: 9 MMOL/L (ref 4–13)
BASOPHILS # BLD AUTO: 0 THOUSANDS/ΜL (ref 0–0.1)
BASOPHILS NFR BLD AUTO: 0 % (ref 0–1)
BUN SERPL-MCNC: 21 MG/DL (ref 5–25)
CALCIUM SERPL-MCNC: 9.1 MG/DL (ref 8.3–10.1)
CHLORIDE SERPL-SCNC: 105 MMOL/L (ref 100–108)
CO2 SERPL-SCNC: 29 MMOL/L (ref 21–32)
CREAT SERPL-MCNC: 1.64 MG/DL (ref 0.6–1.3)
EOSINOPHIL # BLD AUTO: 0.2 THOUSAND/ΜL (ref 0–0.61)
EOSINOPHIL NFR BLD AUTO: 3 % (ref 0–6)
ERYTHROCYTE [DISTWIDTH] IN BLOOD BY AUTOMATED COUNT: 16.4 % (ref 11.6–15.1)
FLUAV AG SPEC QL: NORMAL
FLUBV AG SPEC QL: NORMAL
GFR SERPL CREATININE-BSD FRML MDRD: 37 ML/MIN/1.73SQ M
GLUCOSE SERPL-MCNC: 103 MG/DL (ref 65–140)
GLUCOSE SERPL-MCNC: 88 MG/DL (ref 65–140)
HCT VFR BLD AUTO: 31.9 % (ref 42–52)
HGB BLD-MCNC: 10.4 G/DL (ref 14–18)
LYMPHOCYTES # BLD AUTO: 1.6 THOUSANDS/ΜL (ref 0.6–4.47)
LYMPHOCYTES NFR BLD AUTO: 21 % (ref 14–44)
MAGNESIUM SERPL-MCNC: 1.9 MG/DL (ref 1.6–2.6)
MCH RBC QN AUTO: 28.3 PG (ref 27–31)
MCHC RBC AUTO-ENTMCNC: 32.5 G/DL (ref 31.4–37.4)
MCV RBC AUTO: 87 FL (ref 82–98)
MONOCYTES # BLD AUTO: 0.6 THOUSAND/ΜL (ref 0.17–1.22)
MONOCYTES NFR BLD AUTO: 8 % (ref 4–12)
NEUTROPHILS # BLD AUTO: 5.1 THOUSANDS/ΜL (ref 1.85–7.62)
NEUTS SEG NFR BLD AUTO: 68 % (ref 43–75)
NRBC BLD AUTO-RTO: 0 /100 WBCS
PHOSPHATE SERPL-MCNC: 3.6 MG/DL (ref 2.3–4.1)
PLATELET # BLD AUTO: 201 THOUSANDS/UL (ref 130–400)
PMV BLD AUTO: 8.1 FL (ref 8.9–12.7)
POTASSIUM SERPL-SCNC: 3.5 MMOL/L (ref 3.5–5.3)
PROCALCITONIN SERPL-MCNC: <0.05 NG/ML
RBC # BLD AUTO: 3.67 MILLION/UL (ref 4.7–6.1)
RSV B RNA SPEC QL NAA+PROBE: NORMAL
SODIUM SERPL-SCNC: 143 MMOL/L (ref 136–145)
TSH SERPL DL<=0.05 MIU/L-ACNC: 9.48 UIU/ML (ref 0.36–3.74)
WBC # BLD AUTO: 7.6 THOUSAND/UL (ref 4.8–10.8)

## 2018-03-29 PROCEDURE — 87205 SMEAR GRAM STAIN: CPT | Performed by: NURSE PRACTITIONER

## 2018-03-29 PROCEDURE — 99223 1ST HOSP IP/OBS HIGH 75: CPT | Performed by: ANESTHESIOLOGY

## 2018-03-29 PROCEDURE — 94660 CPAP INITIATION&MGMT: CPT

## 2018-03-29 PROCEDURE — 82948 REAGENT STRIP/BLOOD GLUCOSE: CPT

## 2018-03-29 PROCEDURE — 84145 PROCALCITONIN (PCT): CPT | Performed by: NURSE PRACTITIONER

## 2018-03-29 PROCEDURE — G8988 SELF CARE GOAL STATUS: HCPCS

## 2018-03-29 PROCEDURE — 87070 CULTURE OTHR SPECIMN AEROBIC: CPT | Performed by: NURSE PRACTITIONER

## 2018-03-29 PROCEDURE — 83735 ASSAY OF MAGNESIUM: CPT | Performed by: NURSE PRACTITIONER

## 2018-03-29 PROCEDURE — 84443 ASSAY THYROID STIM HORMONE: CPT | Performed by: NURSE PRACTITIONER

## 2018-03-29 PROCEDURE — 99285 EMERGENCY DEPT VISIT HI MDM: CPT

## 2018-03-29 PROCEDURE — 80048 BASIC METABOLIC PNL TOTAL CA: CPT | Performed by: NURSE PRACTITIONER

## 2018-03-29 PROCEDURE — 84100 ASSAY OF PHOSPHORUS: CPT | Performed by: NURSE PRACTITIONER

## 2018-03-29 PROCEDURE — 97163 PT EVAL HIGH COMPLEX 45 MIN: CPT

## 2018-03-29 PROCEDURE — 85025 COMPLETE CBC W/AUTO DIFF WBC: CPT | Performed by: NURSE PRACTITIONER

## 2018-03-29 PROCEDURE — 87798 DETECT AGENT NOS DNA AMP: CPT | Performed by: NURSE PRACTITIONER

## 2018-03-29 PROCEDURE — 71045 X-RAY EXAM CHEST 1 VIEW: CPT

## 2018-03-29 PROCEDURE — 94760 N-INVAS EAR/PLS OXIMETRY 1: CPT

## 2018-03-29 PROCEDURE — G8987 SELF CARE CURRENT STATUS: HCPCS

## 2018-03-29 PROCEDURE — G8979 MOBILITY GOAL STATUS: HCPCS

## 2018-03-29 PROCEDURE — 94640 AIRWAY INHALATION TREATMENT: CPT

## 2018-03-29 PROCEDURE — 94664 DEMO&/EVAL PT USE INHALER: CPT

## 2018-03-29 PROCEDURE — 87081 CULTURE SCREEN ONLY: CPT | Performed by: NURSE PRACTITIONER

## 2018-03-29 PROCEDURE — 87147 CULTURE TYPE IMMUNOLOGIC: CPT | Performed by: NURSE PRACTITIONER

## 2018-03-29 PROCEDURE — G8978 MOBILITY CURRENT STATUS: HCPCS

## 2018-03-29 PROCEDURE — 97167 OT EVAL HIGH COMPLEX 60 MIN: CPT

## 2018-03-29 RX ORDER — TOLTERODINE 2 MG/1
4 CAPSULE, EXTENDED RELEASE ORAL DAILY
Status: DISCONTINUED | OUTPATIENT
Start: 2018-03-29 | End: 2018-04-02 | Stop reason: HOSPADM

## 2018-03-29 RX ORDER — POTASSIUM CHLORIDE 20 MEQ/1
40 TABLET, EXTENDED RELEASE ORAL ONCE
Status: COMPLETED | OUTPATIENT
Start: 2018-03-29 | End: 2018-03-29

## 2018-03-29 RX ORDER — UBIDECARENONE 75 MG
100 CAPSULE ORAL DAILY
Status: DISCONTINUED | OUTPATIENT
Start: 2018-03-29 | End: 2018-04-02 | Stop reason: HOSPADM

## 2018-03-29 RX ORDER — POTASSIUM CHLORIDE 14.9 MG/ML
20 INJECTION INTRAVENOUS ONCE
Status: DISCONTINUED | OUTPATIENT
Start: 2018-03-29 | End: 2018-03-29

## 2018-03-29 RX ORDER — ASPIRIN 81 MG/1
81 TABLET, CHEWABLE ORAL DAILY
Status: DISCONTINUED | OUTPATIENT
Start: 2018-03-29 | End: 2018-04-02 | Stop reason: HOSPADM

## 2018-03-29 RX ORDER — AMLODIPINE BESYLATE 10 MG/1
10 TABLET ORAL DAILY
Status: DISCONTINUED | OUTPATIENT
Start: 2018-03-29 | End: 2018-03-30

## 2018-03-29 RX ORDER — ACETAMINOPHEN 325 MG/1
650 TABLET ORAL EVERY 6 HOURS PRN
Status: DISCONTINUED | OUTPATIENT
Start: 2018-03-29 | End: 2018-04-02 | Stop reason: HOSPADM

## 2018-03-29 RX ORDER — ONDANSETRON 2 MG/ML
4 INJECTION INTRAMUSCULAR; INTRAVENOUS EVERY 6 HOURS PRN
Status: DISCONTINUED | OUTPATIENT
Start: 2018-03-29 | End: 2018-04-02 | Stop reason: HOSPADM

## 2018-03-29 RX ORDER — MULTIVIT WITH MINERALS/LUTEIN
125 TABLET ORAL DAILY
Status: DISCONTINUED | OUTPATIENT
Start: 2018-03-29 | End: 2018-04-02 | Stop reason: HOSPADM

## 2018-03-29 RX ORDER — FUROSEMIDE 10 MG/ML
40 INJECTION INTRAMUSCULAR; INTRAVENOUS ONCE
Status: COMPLETED | OUTPATIENT
Start: 2018-03-29 | End: 2018-03-29

## 2018-03-29 RX ORDER — AMIODARONE HYDROCHLORIDE 200 MG/1
100 TABLET ORAL DAILY
Status: DISCONTINUED | OUTPATIENT
Start: 2018-03-29 | End: 2018-04-02 | Stop reason: HOSPADM

## 2018-03-29 RX ORDER — SODIUM CHLORIDE FOR INHALATION 0.9 %
3 VIAL, NEBULIZER (ML) INHALATION
Status: DISCONTINUED | OUTPATIENT
Start: 2018-03-29 | End: 2018-04-02 | Stop reason: HOSPADM

## 2018-03-29 RX ORDER — POLYETHYLENE GLYCOL 3350 17 G/17G
17 POWDER, FOR SOLUTION ORAL DAILY
Status: DISCONTINUED | OUTPATIENT
Start: 2018-03-29 | End: 2018-04-02 | Stop reason: HOSPADM

## 2018-03-29 RX ORDER — MAGNESIUM SULFATE HEPTAHYDRATE 40 MG/ML
2 INJECTION, SOLUTION INTRAVENOUS ONCE
Status: COMPLETED | OUTPATIENT
Start: 2018-03-29 | End: 2018-03-30

## 2018-03-29 RX ORDER — POTASSIUM CHLORIDE 20 MEQ/1
20 TABLET, EXTENDED RELEASE ORAL ONCE
Status: COMPLETED | OUTPATIENT
Start: 2018-03-29 | End: 2018-03-29

## 2018-03-29 RX ORDER — LEVALBUTEROL 1.25 MG/.5ML
1.25 SOLUTION, CONCENTRATE RESPIRATORY (INHALATION)
Status: DISCONTINUED | OUTPATIENT
Start: 2018-03-29 | End: 2018-04-02 | Stop reason: HOSPADM

## 2018-03-29 RX ORDER — PRAVASTATIN SODIUM 40 MG
40 TABLET ORAL
Status: DISCONTINUED | OUTPATIENT
Start: 2018-03-29 | End: 2018-04-02 | Stop reason: HOSPADM

## 2018-03-29 RX ORDER — RIBOFLAVIN (VITAMIN B2) 100 MG
100 TABLET ORAL DAILY
Status: DISCONTINUED | OUTPATIENT
Start: 2018-03-29 | End: 2018-03-29 | Stop reason: RX

## 2018-03-29 RX ORDER — LEVOTHYROXINE SODIUM 0.05 MG/1
50 TABLET ORAL
Status: DISCONTINUED | OUTPATIENT
Start: 2018-03-29 | End: 2018-04-02 | Stop reason: HOSPADM

## 2018-03-29 RX ORDER — PANTOPRAZOLE SODIUM 40 MG/1
40 TABLET, DELAYED RELEASE ORAL
Status: DISCONTINUED | OUTPATIENT
Start: 2018-03-29 | End: 2018-04-02 | Stop reason: HOSPADM

## 2018-03-29 RX ORDER — DOCUSATE SODIUM 100 MG/1
100 CAPSULE, LIQUID FILLED ORAL
Status: DISCONTINUED | OUTPATIENT
Start: 2018-03-29 | End: 2018-04-02 | Stop reason: HOSPADM

## 2018-03-29 RX ORDER — FENOFIBRATE 145 MG/1
145 TABLET, COATED ORAL DAILY
Status: DISCONTINUED | OUTPATIENT
Start: 2018-03-29 | End: 2018-04-02 | Stop reason: HOSPADM

## 2018-03-29 RX ORDER — HEPARIN SODIUM 5000 [USP'U]/ML
5000 INJECTION, SOLUTION INTRAVENOUS; SUBCUTANEOUS EVERY 8 HOURS SCHEDULED
Status: DISCONTINUED | OUTPATIENT
Start: 2018-03-29 | End: 2018-04-02 | Stop reason: HOSPADM

## 2018-03-29 RX ORDER — ERYTHROMYCIN 5 MG/G
0.5 OINTMENT OPHTHALMIC EVERY 6 HOURS SCHEDULED
Status: DISCONTINUED | OUTPATIENT
Start: 2018-03-29 | End: 2018-04-02 | Stop reason: HOSPADM

## 2018-03-29 RX ADMIN — ERYTHROMYCIN 0.5 INCH: 5 OINTMENT OPHTHALMIC at 18:34

## 2018-03-29 RX ADMIN — AMIODARONE HYDROCHLORIDE 100 MG: 200 TABLET ORAL at 09:04

## 2018-03-29 RX ADMIN — FUROSEMIDE 40 MG: 10 INJECTION, SOLUTION INTRAMUSCULAR; INTRAVENOUS at 13:17

## 2018-03-29 RX ADMIN — IPRATROPIUM BROMIDE 0.5 MG: 0.5 SOLUTION RESPIRATORY (INHALATION) at 14:19

## 2018-03-29 RX ADMIN — POLYETHYLENE GLYCOL 3350 17 G: 17 POWDER, FOR SOLUTION ORAL at 09:13

## 2018-03-29 RX ADMIN — ISODIUM CHLORIDE 3 ML: 0.03 SOLUTION RESPIRATORY (INHALATION) at 14:19

## 2018-03-29 RX ADMIN — ERYTHROMYCIN 0.5 INCH: 5 OINTMENT OPHTHALMIC at 13:21

## 2018-03-29 RX ADMIN — ACETAMINOPHEN 650 MG: 325 TABLET, FILM COATED ORAL at 01:32

## 2018-03-29 RX ADMIN — PRAVASTATIN SODIUM 40 MG: 40 TABLET ORAL at 16:32

## 2018-03-29 RX ADMIN — ACETAMINOPHEN 650 MG: 325 TABLET, FILM COATED ORAL at 21:10

## 2018-03-29 RX ADMIN — FENOFIBRATE 145 MG: 145 TABLET ORAL at 09:09

## 2018-03-29 RX ADMIN — HEPARIN SODIUM 5000 UNITS: 5000 INJECTION, SOLUTION INTRAVENOUS; SUBCUTANEOUS at 15:26

## 2018-03-29 RX ADMIN — ISODIUM CHLORIDE 3 ML: 0.03 SOLUTION RESPIRATORY (INHALATION) at 07:24

## 2018-03-29 RX ADMIN — ASCORBIC ACID TAB 250 MG 125 MG: 250 TAB at 09:04

## 2018-03-29 RX ADMIN — POTASSIUM CHLORIDE 20 MEQ: 1500 TABLET, EXTENDED RELEASE ORAL at 07:30

## 2018-03-29 RX ADMIN — DOCUSATE SODIUM 100 MG: 100 CAPSULE, LIQUID FILLED ORAL at 01:32

## 2018-03-29 RX ADMIN — DOCUSATE SODIUM 100 MG: 100 CAPSULE, LIQUID FILLED ORAL at 21:09

## 2018-03-29 RX ADMIN — HEPARIN SODIUM 5000 UNITS: 5000 INJECTION, SOLUTION INTRAVENOUS; SUBCUTANEOUS at 21:10

## 2018-03-29 RX ADMIN — ERYTHROMYCIN 0.5 INCH: 5 OINTMENT OPHTHALMIC at 09:11

## 2018-03-29 RX ADMIN — LEVOTHYROXINE SODIUM 50 MCG: 50 TABLET ORAL at 06:14

## 2018-03-29 RX ADMIN — ASPIRIN 81 MG 81 MG: 81 TABLET ORAL at 09:06

## 2018-03-29 RX ADMIN — IPRATROPIUM BROMIDE 0.5 MG: 0.5 SOLUTION RESPIRATORY (INHALATION) at 01:45

## 2018-03-29 RX ADMIN — MAGNESIUM SULFATE HEPTAHYDRATE 2 G: 40 INJECTION, SOLUTION INTRAVENOUS at 13:17

## 2018-03-29 RX ADMIN — POTASSIUM CHLORIDE 40 MEQ: 1500 TABLET, EXTENDED RELEASE ORAL at 09:03

## 2018-03-29 RX ADMIN — IPRATROPIUM BROMIDE 0.5 MG: 0.5 SOLUTION RESPIRATORY (INHALATION) at 07:24

## 2018-03-29 RX ADMIN — METOPROLOL TARTRATE 25 MG: 25 TABLET ORAL at 21:09

## 2018-03-29 RX ADMIN — ISODIUM CHLORIDE 3 ML: 0.03 SOLUTION RESPIRATORY (INHALATION) at 19:26

## 2018-03-29 RX ADMIN — LEVALBUTEROL 1.25 MG: 1.25 SOLUTION, CONCENTRATE RESPIRATORY (INHALATION) at 14:19

## 2018-03-29 RX ADMIN — VITAM B12 100 MCG: 100 TAB at 09:10

## 2018-03-29 RX ADMIN — LEVALBUTEROL 1.25 MG: 1.25 SOLUTION, CONCENTRATE RESPIRATORY (INHALATION) at 01:45

## 2018-03-29 RX ADMIN — PANTOPRAZOLE SODIUM 40 MG: 40 TABLET, DELAYED RELEASE ORAL at 06:14

## 2018-03-29 RX ADMIN — AMLODIPINE BESYLATE 10 MG: 10 TABLET ORAL at 09:06

## 2018-03-29 RX ADMIN — HEPARIN SODIUM 5000 UNITS: 5000 INJECTION, SOLUTION INTRAVENOUS; SUBCUTANEOUS at 06:14

## 2018-03-29 RX ADMIN — IPRATROPIUM BROMIDE 0.5 MG: 0.5 SOLUTION RESPIRATORY (INHALATION) at 19:26

## 2018-03-29 RX ADMIN — TOLTERODINE TARTRATE 4 MG: 2 CAPSULE, EXTENDED RELEASE ORAL at 09:05

## 2018-03-29 RX ADMIN — LEVALBUTEROL 1.25 MG: 1.25 SOLUTION, CONCENTRATE RESPIRATORY (INHALATION) at 19:26

## 2018-03-29 RX ADMIN — LEVALBUTEROL 1.25 MG: 1.25 SOLUTION, CONCENTRATE RESPIRATORY (INHALATION) at 07:24

## 2018-03-29 RX ADMIN — METOPROLOL TARTRATE 25 MG: 25 TABLET ORAL at 09:05

## 2018-03-29 RX ADMIN — PANTOPRAZOLE SODIUM 40 MG: 40 TABLET, DELAYED RELEASE ORAL at 16:32

## 2018-03-29 NOTE — PLAN OF CARE
DISCHARGE PLANNING     Discharge to home or other facility with appropriate resources Progressing        INFECTION - ADULT     Absence or prevention of progression during hospitalization Progressing     Absence of fever/infection during neutropenic period Progressing        Knowledge Deficit     Patient/family/caregiver demonstrates understanding of disease process, treatment plan, medications, and discharge instructions Progressing        PAIN - ADULT     Verbalizes/displays adequate comfort level or baseline comfort level Progressing        Potential for Falls     Patient will remain free of falls Progressing        Prexisting or High Potential for Compromised Skin Integrity     Skin integrity is maintained or improved Progressing        RESPIRATORY - ADULT     Achieves optimal ventilation and oxygenation Progressing

## 2018-03-29 NOTE — PROGRESS NOTES
Transfer Note - ICU/Stepdown Transfer to [facility]/MS tele   Doug Early 80 y o  male MRN: [id number]  Maria T 45   Unit/Bed#: ICU 04 Encounter: [id number]    Code Status: Level 2 - DNAR: but accepts endotracheal intubation    Reason for ICU/Stepdown admission: Acute CHF Exacerbation    Active problems: Principal Problem:    Acute exacerbation of CHF (congestive heart failure) ([place] [address] )  Active Problems:    Acute on chronic respiratory failure with hypoxia (HCC)    COPD, moderate (HCC)    Chronic combined systolic and diastolic CHF (congestive heart failure) ([facility])    Bacterial conjunctivitis of right eye    CAD in native artery    History of atrial fibrillation    PATITO (obstructive sleep apnea)    Diabetes mellitus type 2, noninsulin dependent (HCC)    Hypertension    CKD (chronic kidney disease), stage III    Hyperlipidemia    Anemia    GERD (gastroesophageal reflux disease)    Hypothyroidism    Peripheral vascular disease ([facility])    Multiple pulmonary nodules    S/P TAVR (transcatheter aortic valve replacement)    BPH (benign prostatic hyperplasia)  Resolved Problems:    * No resolved hospital problems   *      * Acute exacerbation of CHF (congestive heart failure) ([place] [address] )   Assessment & Plan    ED started on BiPAP 12/6 40% and Received 80 mg IV Lasix in the ED on admission with good diuresis  · Monitor urine output, strict I&O  · Daily weight  · On Home O2 of 6-7L NC round the clock  · Another dose of lasix 40 mg IV today  · Can restart home Demadex tomorrow        Acute on chronic respiratory failure with hypoxia (HCC)   Assessment & Plan    · Patient wears 7 L of O2 at home and CPAP at HS  · Continue CPAP at night, 6-7L NC during the day        COPD, moderate (HCC)   Assessment & Plan    FEV1 1 85 L or 76% of predicted as of Nov 2017, uses Breo Ellipta and Duoneb tx at home  · Continue Atrovent and Xopenex q 6 hours         Chronic combined systolic and diastolic CHF (congestive heart failure) ([facility] ) Assessment & Plan    · Last echo done November 2017: EF 40% G1DD  Mild MR  Mild to moderate TR  Estimated PA systolic pressure 65  FATMATA 1 6 cm2  No paravalvular leak noted  · Give another dose of Lasix 40mg IV x1 and can restart home Demadex tomorrow  · Continue metoprolol        Bacterial conjunctivitis of right eye   Assessment & Plan    · Erythromycin ointment q 6 hours        CAD in native artery   Assessment & Plan    · Drug eluting stent in 2014  · Continue aspirin and metoprolol        History of atrial fibrillation   Assessment & Plan    · Continue home dose amiodarone 100 mg p o  Daily  · Patient is currently in normal sinus rhythm  · Per Cardiology report patient refuses long-term anticoagulation        PATITO (obstructive sleep apnea)   Assessment & Plan    · BiPAP at HS        Hypertension   Assessment & Plan    · Continue Norvasc and metoprolol        Diabetes mellitus type 2, noninsulin dependent (HCC)   Assessment & Plan    · Hemoglobin A1c 5 9, has been off any regimen at home  · Blood glucose well controlled on BMP, will initiate point of care glucose testing  Will add SSI coverage if needed        Hyperlipidemia   Assessment & Plan    · Continue pravastatin  · Continue Tricor         CKD (chronic kidney disease), stage III   Assessment & Plan    · Baseline creatinine appears to be between 1 6 and 1 8  · Patient at baseline, continue to trend serum creatinine  · Avoid nephrotoxic agents        Anemia   Assessment & Plan    · Chronic anemia secondary to chronic kidney disease  · Trend hemoglobin daily        GERD (gastroesophageal reflux disease)   Assessment & Plan    · Continue home Protonix        Hypothyroidism   Assessment & Plan    · Continue Synthroid  · Check TSH in a m          S/P TAVR (transcatheter aortic valve replacement)   Assessment & Plan    · No perivalvular leak noted on last echo November 2017            Consultants:   · none    History of Present Illness/Summary of clinical course: "80 y o  male with pmh of moderate COPD, chronic hypoxic respiratory failure, systolic heart failure, DM2, Afib, BPH, s/p TAVR, CKD III, CAD who presents to the ED on the evening of 3/28 with c/o progressive SOB  Patient is O2 dependent at home and wears 7L  CXR on admission showed CHF/pulmonary vascular edema and small b/l pleural effusions  Patient was given 80mg IV Lasix and put on bipap 12/6 40% with improvement in symptoms "     Patient diuresed well with UOP 1 9 overnight  Did well on BiPAP and now off with saturation of mid 90s-100% on 6L this morning  Patient typically on 6L home O2 continuously at home  Will continue duiresis with 40 mg IV and should consider restarting home torsemide tomorrow  Patient to be placed back on CPAP qhs for PATITO  Please refer to today's progress note for further clinical details  Recent or scheduled procedures: none    Outstanding/pending diagnostics: none       Mobilization Plan: early OOB as tolerated    Nutrition Plan: Heart healthy diet with salt restriction     Discharge Plan: STR vs home with services pending progress     Specific Diagnosis Plan:  Heart Failure:  Cardiology not placed  Diuresis plan: lasix 40 mg this afternoon and consider restarting home Demedex 20 mg q daily    [ [facility] Family aware of transfer out of critical care: yes     Spoke with Dr Hari Rodriguez regarding transfer @ 5 PM 3/29/18  Patient accepted to their service      Evaristo Crane

## 2018-03-29 NOTE — ASSESSMENT & PLAN NOTE
· Baseline creatinine appears to be between 1 6 and 1 8  · Patient at baseline, continue to trend serum creatinine  · Avoid nephrotoxic agents

## 2018-03-29 NOTE — ASSESSMENT & PLAN NOTE
ED started on BiPAP 12/6 40% and Received 80 mg IV Lasix in the ED on admission with good diuresis  · Monitor urine output, strict I&O  · Daily weight  · On Home O2 of 6-7L NC round the clock  · Another dose of lasix 40 mg IV today  · Can restart home Demadex tomorrow

## 2018-03-29 NOTE — OCCUPATIONAL THERAPY NOTE
OT EVALUATION     03/29/18 0935   Restrictions/Precautions   Other Precautions Fall Risk; Chair Alarm; Bed Alarm;O2   Pain Assessment   Pain Assessment 0-10   Pain Score 6   Pain Location Hip   Pain Orientation Left   Home Living   Type of [address] One level;Elevator  (raised ranch with elevator)   Bathroom Shower/Tub Walk-in shower   Bathroom Toilet Raised   Bathroom Equipment Grab bars in shower; Shower chair; Toilet raiser   Home Equipment Walker;Cane  (uses cane at home, electric cart when at grocery store)   Additional Comments pt doesnt drive, hasnt been going to the store becuase he wasnt feeling well the past couple of weeks, Patient cooks, has a cleaning lady, is on 6L O2 at home   Prior Function   Level of [place] Independent with ADLs and functional mobility; Needs assistance with IADLs   Lives With Spouse   Receives Help From Family   ADL Assistance Independent   IADLs Needs assistance   ADL   Eating Assistance 5  Supervision/Setup   Grooming Assistance 5  Supervision/Setup   UB Bathing Assistance 4  Minimal Assistance   LB Bathing Assistance 3  Moderate Assistance   UB Dressing Assistance 4  Minimal Assistance   LB Dressing Assistance 3  Moderate Assistance   Toileting Assistance  3  Moderate Assistance   Additional Comments pt stood to urinate in urinal with min assist for balance    Bed Mobility   Supine to Sit 3  Moderate assistance   Transfers   Sit to Stand 4  Minimal assistance   Stand to Sit 4  Minimal assistance   Stand pivot 4  Minimal assistance   Functional Mobility   Functional Mobility 4  Minimal assistance   Additional Comments 5 feet bed to chair; spO2 dropped to upper 70s with transfer, SOB with activity    Additional items Rolling walker   Balance   Static Sitting Fair +   Dynamic Sitting Fair   Static Standing Fair -   Dynamic Standing Poor   Activity Tolerance   Activity Tolerance Patient limited by fatigue  (SOB)   RUE Assessment   RUE Assessment WFL  (3+/4-/5, shoulder flexion to 90)   LUE Overall AROM   L Shoulder Flexion 50 degrees, MMT 2+/5   L Elbow Flexion WFL, MMT 4-/5   L Mass Grasp WFL, MMT 4-/5   Cognition   Overall Cognitive Status WFL   Arousal/Participation Cooperative   Attention Within functional limits   Orientation Level Oriented X4   Following Commands Follows all commands and directions without difficulty   Assessment   Limitation Decreased ADL status; Decreased UE strength;Decreased Safe judgement during ADL;Decreased UE ROM; Decreased endurance;Decreased self-care trans;Decreased high-level ADLs  (decreased balance and mobility )   Prognosis Good   Assessment Patient evaluated by Occupational Therapy  Patient admitted with Acute exacerbation of CHF (congestive heart failure) ([facility] )  The patients occupational profile, medical and therapy history includes a extensive additional review of physical, cognitive, or psychosocial history related to current functional performance  Comorbidities affecting functional mobility and ADLS include:pulmonary fibrosis, afib, CAD, CHF, CKD, COPD, DVT, hypertension and PVD  Prior to admission, patient was independent with functional mobility with cane, independent with ADLS and requiring assist for IADLS  The evaluation identifies the following performance deficits: weakness, decreased ROM, impaired balance, decreased endurance, increased fall risk, new onset of impairment of functional mobility, decreased ADLS, decreased IADLS, pain, decreased activity tolerance, decreased safety awareness, impaired judgement, SOB upon exertion and decreased strength, that result in activity limitations and/or participation restrictions  This evaluation requires clinical decision making of high complexity, because the patient presents with comorbidites that affect occupational performance and required significant modification of tasks or assistance with consideration of multiple treatment options    The Barthel Index was used as a functional outcome tool presenting with a score of 55, indicating marked limitations of functional mobility and ADLS  Patient will benefit from skilled Occupational Therapy services to address above deficits and facilitate a safe return to prior level of function  Goals   Patient Goals go home    STG Time Frame (1-7 days)   Short Term Goal  Patient will increase standing tolerance to 3 minutes during functional activity; Patient will increase bed mobility to min assist; Patient will increase functional mobility to and from bathroom with rolling walker with supervision to increase performance with ADLS; Patient will tolerate 8 minutes of UE ROM/strengthening to increase general activity tolerance and performance in ADLS/IADLS; Patient will improve functional activity tolerance to 10 minutes of sustained functional tasks to increase participation in basic self-care and decrease assistance level  LTG Time Frame (8-14 days)   Long Term Goal Patient will increase standing tolerance to 6 minutes during functional activity; Patient will increase bed mobility to supervision; Patient will increase functional mobility to and from bathroom with rolling walker independently to increase performance with ADLS; Patient will tolerate 12 minutes of UE ROM/strengthening to increase general activity tolerance and performance in ADLS/IADLS; Patient will improve functional activity tolerance to 20 minutes of sustained functional tasks to increase participation in basic self-care and decrease assistance level     Functional Transfer Goals   Pt Will Perform All Functional Transfers (STG supervision LTG independent )   ADL Goals   Pt Will Perform Bathing (STG min assist LTG supervision )   Pt Will Perform UE Dressing (STG independent )   Pt Will Perform LE Dressing (STG min assist LTG supervision )   Pt Will Perform Toileting (STG supervision LTG independent )   Plan   Treatment Interventions ADL retraining;Functional transfer training;UE strengthening/ROM; Endurance training;Patient/family training;Equipment evaluation/education; Activityengagement; Energy conservation   OT Frequency 3-5x/wk   Recommendation   OT Discharge Recommendation (STR vs home with services pending progress)   Barthel Index   Feeding 10   Bathing 0   Grooming Score 5   Dressing Score 5   Bladder Score 10   Bowels Score 10   Toilet Use Score 5   Transfers (Bed/Chair) Score 10   Mobility (Level Surface) Score 0   Stairs Score 0   Barthel Index Score 55   Gibson Hernandez MS OTR/L [id number]

## 2018-03-29 NOTE — CASE MANAGEMENT
Initial Clinical Review    Admission: Date/Time/Statement: 3/28/18 @ 2138     Orders Placed This Encounter   Procedures    Inpatient Admission (expected length of stay for this patient is greater than two midnights)     Standing Status:   Standing     Number of Occurrences:   1     Order Specific Question:   Admitting Physician     Answer:   Chanell Angeles [[id number]]     Order Specific Question:   Level of Care     Answer:   Level 1 Stepdown [13]     Order Specific Question:   Estimated length of stay     Answer:   More than 2 Midnights     Order Specific Question:   Certification     Answer:   I certify that inpatient services are medically necessary for this patient for a duration of greater than two midnights  See H&P and MD Progress Notes for additional information about the patient's course of treatment  ED: Date/Time/Mode of Arrival:   ED Arrival Information     Expected Arrival Acuity Means of Arrival Escorted By Service Admission Type    - 3/28/2018 19:58 Emergent Walk-In Spouse Critical Care/ICU Emergency    Arrival Complaint    shortness of breath      Chief Complaint:   Chief Complaint   Patient presents with    Shortness of Breath     Patient's wife at bedside states that the patient is normally SOB but the past ten days have been worse  " The past ten days he has been getting worse and worse, and he is gaining weight "   History of Illness:   Patient presents for evaluation of shortness of breath worsening over the past 7-10 days with increasing weight gain   Denies chest pain  ED Vital Signs:   ED Triage Vitals [03/28/18 2003]   Temperature Pulse Respirations Blood Pressure SpO2   98 2 °F (36 8 °C) 98 (!) 26 160/90 94 %      Temp Source Heart Rate Source Patient Position - Orthostatic VS BP Location FiO2 (%)   Oral Monitor Lying Right arm --      Pain Score       No Pain        Wt Readings from Last 1 Encounters:   03/29/18 98 4 kg (216 lb 14 9 oz)   Vital Signs (abnormal):   RR 22, 25, 28, 27  Abnormal Labs/Diagnostic Test Results:   HGB 11 7 PT 12 CR 1 65 ALB 3 0 GFR 37 BNP 3795  CXR=No acute cardiopulmonary disease  Cardiomegaly and chronic changes  EKG=Interpretation: abnormal    Rate:     ECG rate:  94    ECG rate assessment: normal    Rhythm:     Rhythm: sinus rhythm    Ectopy:     Ectopy: PVCs    QRS:     QRS axis:  Left  Conduction:     Conduction: abnormal      Abnormal conduction: complete LBBB   ED Treatment:   Medication Administration from 03/28/2018 1958 to 03/29/2018 6865       Date/Time Order Dose Route Action Action by Comments     03/28/2018 2126 furosemide (LASIX) injection 80 mg 80 mg Intravenous Given Lara Blacmkon RN       Past Medical/Surgical History:    Active Ambulatory Problems     Diagnosis Date Noted    COPD, moderate ([facility] ) 03/15/2016    Acute exacerbation of CHF (congestive heart failure) ([facility] ) 05/26/2016    CAD in native artery     BPH (benign prostatic hyperplasia)     CKD (chronic kidney disease), stage III     Diabetes mellitus type 2, noninsulin dependent ([facility] )     Hyperlipidemia     Hypertension     S/P TAVR (transcatheter aortic valve replacement) 08/23/2016    Ambulatory dysfunction 08/23/2016    Abnormal gait 10/05/2016    Hearing difficulty of right ear 10/05/2016    PATITO (obstructive sleep apnea) 11/27/2016    Pleural effusion, right (chronic) 11/28/2016    Benign essential HTN 01/07/2017    Multiple pulmonary nodules 01/07/2017    Morbid obesity ([facility] ) 01/07/2017    Osteoarthritis of knee 01/07/2017    Peripheral vascular disease ([facility] ) 01/07/2017    History of atrial fibrillation 01/07/2017    Acute on chronic diastolic congestive heart failure ([facility] ) 01/11/2017    Corns 02/23/2018    Diabetic polyneuropathy associated with type 2 diabetes mellitus ([facility] ) 02/23/2018    Pain in both feet 02/23/2018    Peripheral [facility]iosclerosis [facility] 02/23/2018    Actinic keratosis 05/10/2013    Allergic rhinitis 05/30/2014    Arteriosclerosis of arteries of extremities ([facility] ) 08/25/2017    Centrilobular emphysema [facility] ) 11/12/2017    Cervical radiculopathy 09/26/2016    Acute on chronic respiratory failure with hypoxia (HCC) 11/12/2017    Chronic kidney disease, stage 3 08/18/2013    Chronic left shoulder pain 04/26/2017    Chronic combined systolic and diastolic CHF (congestive heart failure) ([facility] ) 05/08/2017    Esophagitis determined by endoscopy 03/17/2016    Heart failure, left, with LVEF >40% ([facility] ) 04/07/2016    Hypertonicity of bladder 02/21/2014    Hypothyroidism 04/26/2017    Obesity with alveolar hypoventilation, unspecified obesity severity ([facility] ) 07/07/2016    Onychomycosis 08/25/2017    Osteoarthritis of left shoulder 05/10/2017    Pulmonary fibrosis ([facility] ) 04/28/2014     Resolved Ambulatory Problems     Diagnosis Date Noted    COPD exacerbation ([facility] ) 03/04/2016    CHF exacerbation ([facility] ) 03/04/2016    Tachycardia 03/04/2016    Chronic systolic heart failure ([facility] ) 03/15/2016    Non-ST elevation MI (NSTEMI) ([facility] ) 03/15/2016    Rib fracture 05/16/2016    Fall 05/17/2016    Aortic stenosis, severe     Paroxysmal atrial fibrillation (HCC)     1st degree AV block 08/23/2016    Sinus bradycardia 08/23/2016    Hematuria 08/23/2016    Pleural effusion, right 08/23/2016    Multiple falls 08/23/2016    Physical deconditioning 08/23/2016    Leukocytosis 08/24/2016    Acute blood loss anemia 08/24/2016    Age-related cognitive decline 08/25/2016    Constipation 10/02/2016    Fecal impaction in rectum ([facility] ) 10/02/2016    Acute on chronic low back pain 10/02/2016    Elevated TSH 10/05/2016    Melena 10/05/2016    Dyspnea on exertion 11/24/2016    Chronic respiratory failure with hypoxia ([facility] ) 11/27/2016    Non-sustained ventricular tachycardia ([facility] ) 01/06/2017    Left heart failure ([facility] ) 01/07/2017    Hyperthyroidism 12/09/2016    Hyponatremia 03/19/2014    Obstructive sleep apnea 09/04/2012    Pleural effusion 04/28/2014    Prediabetes 10/07/2015    Pulmonary nodule, left 10/08/2015    Sleep related hypoventilation in conditions classified elsewhere 09/04/2012     Past Medical History:   Diagnosis Date    Aortic stenosis, severe     BPH (benign prostatic hyperplasia)     CAD (coronary artery disease)     [facility]congestive [facility]) [facility]     Chronic kidney disease (CKD)     CKD (chronic kidney disease), stage III     CKD (chronic kidney disease), stage III     COPD (chronic obstructive pulmonary disease) ([facility]     Diabetes mellitus type 2, noninsulin dependent ([facility])     Esophagitis, erosive     History of DVT (deep vein thrombosis)     History of non-ST elevation myocardial infarction (NSTEMI)     History of pneumonia     Hyperlipidemia     Hypertension     LBBB (left bundle branch block)     MRSA (methicillin resistant staph aureus) culture positive     Obstructive sleep apnea on CPAP     Paroxysmal atrial fibrillation ([facility]     Pulmonary fibrosis ([facility])     PVD (peripheral vascular disease) ([facility])    Admitting Diagnosis: Shortness of breath [R06 02]  Congestive heart failure (CHF) ([facility]) [I50 9]  Respiratory failure with hypoxia ([facility]) [J96 91]  Age/Sex: 80 y o  male  Assessment/Plan:   Acute exacerbation of CHF (congestive heart failure) ([facility])   Assessment & Plan     · Received 80 mg IV Lasix in the ED  · Monitor urine output, strict I&O  · Daily weight  · Continue BiPAP 12/6 40%       Acute on chronic respiratory failure with hypoxia ([facility])   Assessment & Plan     · Patient wear 7 L of O2 at home and CPAP at HS  · Continue BiPAP       COPD, moderate ([facility])   Assessment & Plan     · FEV1 1 85 L or 76% of predicted as of Nov 2017  · Atrovent and Xopenex q 6 hours      Chronic combined systolic and diastolic CHF (congestive heart failure) [facility])   Assessment & Plan     · Last echo done November 2017: EF 40% G1DD  Mild MR  Mild to moderate TR  Estimated PA systolic pressure 65  FATMATA 1 6 cm2   No paravalvular leak noted  · Will plan to diurese again tomorrow  · Continue metoprolol   Admission Orders:  ICU LEVEL 1 STEPDOWN  BIPAP  CHF EDUCATION  NEURO CHECKS Q4H  NPO  PT/OT EVAL & TX  CONTACT ISOLATION  ACCUCHECKS WITH COVERAGE SCALE  VENODYNES  INCENTIVE SPIROMETRY  Scheduled Meds:   Current Facility-Administered Medications:  acetaminophen 650 mg Oral Q6H PRN Alexa Spironello V, CRNP   amiodarone 100 mg Oral Daily Alexa Spironello V, CRNP   amLODIPine 10 mg Oral Daily Alexa Spironello V, CRNP   vitamin C 125 mg Oral Daily Alexa Spironello V, CRNP   aspirin 81 mg Oral Daily Alexa Spironello V, CRNP   cyanocobalamin 100 mcg Oral Daily Alexa Gerardo BILLIE, CRNP   docusate sodium 100 mg Oral HS Alexa Gerardo V, CRNP   erythromycin 0 5 inch Right Eye Q6H [facility] Alexa Spironello V, CRNP   fenofibrate 145 mg Oral Daily Alexa Gerardo BILLIE, CRNP   furosemide 40 mg Intravenous Once Cecillia Old, CRNP   heparin (porcine) 5,000 Units Subcutaneous Q8H [facility] Alexa Spironello BILLIE, CRNP   ipratropium 0 5 mg Nebulization Q6H Alexa Spironello BILLIE, CRNP   levalbuterol 1 25 mg Nebulization Q6H Alexa Joynello V, CRNP   And       sodium chloride 3 mL Nebulization Q6H Alexa Joynello V, CRNP   levothyroxine 50 mcg Oral Early Morning Alexa Gerardo BILLIE, CRNP   magnesium sulfate 2 g Intravenous Once Klarissa Sharma, CHICHINP   metoprolol tartrate 25 mg Oral Q12H [facility] Alexa Spironello V, CRNP   ondansetron 4 mg Intravenous Q6H PRN Alexa Spironello V, CRNP   pantoprazole 40 mg Oral BID AC Alexa Spironello V, CRNP   polyethylene glycol 17 g Oral Daily Alexa Spironello V, CRNP   pravastatin 40 mg Oral Daily With Xiam V, CRNP   tolterodine 4 mg Oral Daily Alexa Spironello V, CRNP     Continuous Infusions:    PRN Meds:   acetaminophen    ondansetron  Thank you,  [facility] in the [facility] by Markus Henderson for 2017  Network Utilization Review Department  Phone: [phone]; Fax [phone]  ATTENTION: The Network Utilization Review Department is now centralized for our 7 Facilities  Make a note that we have a new phone and fax numbers for our Department  Please call with any questions or concerns to [phone] and carefully follow the prompts so that you are directed to the right person  All voicemails are confidential  Fax any determinations, approvals, denials, and requests for initial or continue stay review clinical to [phone]  Due to HIGH CALL volume, it would be easier if you could please send faxed requests to expedite your requests and in part, help us provide discharge notifications faster

## 2018-03-29 NOTE — H&P
History and Physical - Critical Care/ Stepdown   Karolina Porter 80 y o  male MRN: [id number]  Unit/Bed#: ICU 04 Encounter: [id number]    Reason for Admission / Chief Complaint: SOB     History of Present Illness:  Karolina Porter is a 80 y o  male with pmh of moderate COPD, chronic hypoxic respiratory failure, chronic combined systolic and diastolic heart failure, DM2, Afib, BPH, s/p TAVR, CKD III, CAD who presents to the ED on the evening of 3/28 with c/o progressive SOB  Patient is O2 dependent at home and wears 7L  CXR on admission showed CHF/pulmonary vascular edema and small b/l pleural effusions  Patient was given 80mg IV Lasix and put on bipap 12/6 40% with improvement in symptoms  History obtained from the patient      Past Medical History:  Past Medical History:   Diagnosis Date    Aortic stenosis, severe     BPH (benign prostatic hyperplasia)     CAD (coronary artery disease)     stent 2014    CHF (congestive heart failure) (HCC)     Chronic kidney disease (CKD)     CKD (chronic kidney disease), stage III     CKD (chronic kidney disease), stage III     COPD (chronic obstructive pulmonary disease) (HCC)     Diabetes mellitus type 2, noninsulin dependent (HCC)     Esophagitis, erosive     History of DVT (deep vein thrombosis)     left posterior tibial/peroneal veins    History of non-ST elevation myocardial infarction (NSTEMI)     History of pneumonia     Hyperlipidemia     Hypertension     LBBB (left bundle branch block)     MRSA (methicillin resistant staph aureus) culture positive     Obstructive sleep apnea on CPAP     severe PATITO with AHI of 106 and uses CPAP at 10 cm H2O with 2 l/m oxygen    Paroxysmal atrial fibrillation (HCC)     Pulmonary fibrosis (HCC)     PVD (peripheral vascular disease) ([facility] [address] )        Past Surgical History:  Past Surgical History:   Procedure Laterality Date    CARDIAC CATHETERIZATION  03/10/2016    Showed 60-70% mid LAD lesion next to stent    ESOPHAGOGASTRODUODENOSCOPY N/A 3/14/2016    Procedure: ESOPHAGOGASTRODUODENOSCOPY (EGD); Surgeon: Felix Navarro MD;  Location: [facility]; Service:     EYE SURGERY      FRACTURE SURGERY      JOINT REPLACEMENT      both hips replaced    DE REPLACE AORTIC VALVE OPENFEMORAL ARTERY APPROACH N/A 8/23/2016    Procedure: TRANSFEMORAL TAVR WITH 26MM CALIX MAKAYLA S3 TISSUE VALVE; PINA ;  Surgeon: Edward Mcneal DO;  Location: [facility];  Service: Cardiac Surgery    TISSUE AORTIC VALVE REPLACEMENT      transfemoral, transcatheter    TONSILLECTOMY AND ADENOIDECTOMY      TOTAL HIP ARTHROPLASTY      Bilateral       Past Family History:  Family History   Problem Relation Age of Onset    Coronary artery disease Mother        Social History:  History   Smoking Status    Former Smoker    Packs/day: 1 00    Years: 35 00    Types: Cigarettes    Quit date: 1/1/1987   Smokeless Tobacco    Never Used      History   Alcohol Use    Yes     Comment: occasionally     History   Drug Use No     Marital Status:    Exercise History: ambulatory     Medications:  Current Facility-Administered Medications   Medication Dose Route Frequency    acetaminophen (TYLENOL) tablet 650 mg  650 mg Oral Q6H PRN    amiodarone tablet 100 mg  100 mg Oral Daily    amLODIPine (NORVASC) tablet 10 mg  10 mg Oral Daily    ascorbic acid (VITAMIN C) tablet 125 mg  125 mg Oral Daily    aspirin chewable tablet 81 mg  81 mg Oral Daily    cyanocobalamin (VITAMIN B-12) tablet 100 mcg  100 mcg Oral Daily    docusate sodium (COLACE) capsule 100 mg  100 mg Oral HS    erythromycin (ILOTYCIN) 0 5 % ophthalmic ointment 0 5 inch  0 5 inch Right Eye Q6H [address]    fenofibrate (TRICOR) tablet 145 mg  145 mg Oral Daily    heparin (porcine) subcutaneous injection 5,000 Units  5,000 Units Subcutaneous Q8H [address]    ipratropium (ATROVENT) 0 02 % inhalation solution 0 5 mg  0 5 mg Nebulization Q6H    levalbuterol (XOPENEX) inhalation solution 1 25 mg 1 25 mg Nebulization Q6H    And    sodium chloride 0 9 % inhalation solution 3 mL  3 mL Nebulization Q6H    levothyroxine tablet 50 mcg  50 mcg Oral Early Morning    metoprolol tartrate (LOPRESSOR) tablet 25 mg  25 mg Oral Q12H [facility]    ondansetron (ZOFRAN) injection 4 mg  4 mg Intravenous Q6H PRN    pantoprazole (PROTONIX) EC tablet 40 mg  40 mg Oral BID AC    polyethylene glycol (MIRALAX) packet 17 g  17 g Oral Daily    pravastatin (PRAVACHOL) tablet 40 mg  40 mg Oral Daily With Dinner    tolterodine (DETROL LA) 24 hr capsule 4 mg  4 mg Oral Daily     Home medications:  Prior to Admission medications    Medication Sig Start Date End Date Taking? Authorizing Provider   acetaminophen (TYLENOL) 325 mg tablet Take 2 tablets (650 mg total) by mouth every 6 (six) hours as needed for mild pain  8/27/16   Arabella Castillo PA-C   amiodarone 100 mg tablet Take 1 tablet (100 mg total) by mouth daily 3/6/18   Israel Betancourt MD   amiodarone 100 mg tablet Take by mouth 7/29/17   Historical Provider, MD   amLODIPine (NORVASC) 10 mg tablet Take 1 tablet (10 mg total) by mouth daily  8/27/16   Arablela Castillo PA-C   amLODIPine (NORVASC) 10 mg tablet Take 1 tablet by mouth daily 9/9/16   Historical Provider, MD   Ascorbic Acid (VITAMIN C) 100 MG tablet Take by mouth    Historical Provider, MD   aspirin 81 mg chewable tablet Chew daily    Historical Provider, MD   aspirin 81 MG tablet Take 162 mg by mouth daily  Historical Provider, MD   B Complex Vitamins (VITAMIN B COMPLEX PO) Take by mouth    Historical Provider, MD   cyanocobalamin (VITAMIN B-12) 100 mcg tablet Take by mouth    Historical Provider, MD   docusate sodium (COLACE) 100 mg capsule Take 100 mg by mouth daily at bedtime  Historical Provider, MD   docusate sodium (COLACE) 100 mg capsule Take by mouth 2 (two) times a day as needed 10/11/13   Historical Provider, MD   fenofibrate (TRICOR) 145 mg tablet Take 145 mg by mouth daily      Historical Provider, MD fenofibrate (TRICOR) 145 mg tablet Take 1 tablet by mouth every other day 7/6/12   Historical Provider, MD   Fluticasone Furoate-Vilanterol (BREO ELLIPTA) 100-25 MCG/INH AEPB Inhale  Historical Provider, MD   fluticasone furoate-vilanterol (BREO ELLIPTA) Inhale 1 puff daily 11/7/17   Historical Provider, MD   ipratropium-albuterol (DUO-NEB) 0 5-2 5 mg/3 mL Inhale 1 vial 4 (four) times a day 4/28/15   Historical Provider, MD   ipratropium-albuterol (DUO-NEB) 0 5-2 5 mg/mL Inhale 3 mL every 6 (six) hours  Historical Provider, MD   levothyroxine 50 mcg tablet Take by mouth 12/9/16   Historical Provider, MD   metoprolol tartrate (LOPRESSOR) 25 mg tablet Take 1 tablet (25 mg total) by mouth every 12 (twelve) hours  8/27/16   Tamiko Walton PA-C   metoprolol tartrate (LOPRESSOR) 25 mg tablet Take 1 tablet by mouth 2 (two) times a day 9/9/16   Historical Provider, MD   MIRABEGRON ER PO Take by mouth daily Patient not sure of the dose  Historical Provider, MD   oxyCODONE-acetaminophen (PERCOCET) 5-325 mg per tablet Take 1 tablet by mouth every 6 (six) hours as needed for moderate pain for up to 10 doses Max Daily Amount: 4 tablets 10/5/16   Aimee Grijalva DO   pantoprazole (PROTONIX) 40 mg tablet Take 1 tablet by mouth 2 (two) times a day before meals for 30 days 10/5/16 11/4/16  Aimee Grijalva DO   polyethylene glycol (MIRALAX) 17 g packet Take 17 g by mouth daily for 30 days 10/5/16 11/4/16  Aimee Grijalva DO   simvastatin (ZOCOR) 20 mg tablet TAKE 1 TABLET DAILY 3/21/18   Papito JorgeDO   simvastatin (ZOCOR) 40 mg tablet Take 1 tablet (40 mg total) by mouth daily at bedtime 3/16/18   Papito Jorge, DO   simvastatin (ZOCOR) 40 mg tablet Take 1 tablet by mouth daily    Historical Provider, MD   tadalafil (CIALIS) 2 5 MG tablet Take 2 5 mg by mouth daily as needed for erectile dysfunction      Historical Provider, MD   tadalafil (CIALIS) 2 5 MG tablet Take 1 tablet by mouth daily 10/7/15   Historical Provider, MD   tolterodine (DETROL LA) 4 mg 24 hr capsule Take 1 capsule (4 mg total) by mouth daily 3/16/18   Tere Villeda DO   tolterodine (DETROL LA) 4 mg 24 hr capsule Take 1 capsule by mouth daily 14   Historical Provider, MD   torsemide (DEMADEX) 20 mg tablet Take by mouth 16   Historical Provider, MD   vitamin E, tocopherol, 400 units capsule Take by mouth    Historical Provider, MD     Allergies:  No Known Allergies    ROS:   Review of Systems   Constitutional: Positive for activity change  HENT: Positive for congestion and sinus pressure  Eyes: Positive for discharge  Respiratory: Positive for cough and shortness of breath  Cardiovascular: Negative  Gastrointestinal: Negative  Endocrine: Negative  Genitourinary: Negative  Musculoskeletal: Negative  Allergic/Immunologic: Negative  Neurological: Negative  Hematological: Negative  Psychiatric/Behavioral: Negative  Vitals:  Vitals:    18 0045 18 0110 18 0146 18 0200   BP:  164/73  149/69   BP Location:  Right arm     Pulse:  67  64   Resp:  (!) 29  (!) 25   Temp:  98 2 °F (36 8 °C)     TempSrc:       SpO2:  99% 100% 99%   Weight: 98 8 kg (217 lb 13 oz)      Height:         Temperature:   Temp (24hrs), Av 2 °F (36 8 °C), Min:98 2 °F (36 8 °C), Max:98 2 °F (36 8 °C)    Current Temperature: 98 2 °F (36 8 °C)    Weights:   IBW: 73 kg  Body mass index is 31 25 kg/m²      Hemodynamic Monitoring:  N/A     Non-Invasive/Invasive Ventilation Settings:  Respiratory    Lab Data (Last 4 hours)    None         O2/Vent Data (Last 4 hours)       2300  0147        Non-Invasive Ventilation Mode BiPAP BiPAP                No results found for: PHART, ESH1UUG, PO2ART, MMA7FGP, H7PXPOEC, BEART, SOURCE  SpO2: SpO2: 99 %, SpO2 Activity: SpO2 Activity: At Rest, SpO2 Device: O2 Device: Other (comment) (Bipap)     Physical Exam:  Physical Exam   Constitutional: He is oriented to person, place, and time  He appears well-developed and well-nourished  HENT:   Head: Normocephalic and atraumatic  Eyes: EOM and lids are normal  Pupils are equal, round, and reactive to light  Right eye conjunctival purulent discharge    Neck: Trachea normal, normal range of motion and full passive range of motion without pain  Neck supple  Cardiovascular: Normal rate, regular rhythm, S1 normal, S2 normal, normal heart sounds, intact distal pulses and normal pulses  Pulses:       Radial pulses are 2+ on the right side, and 2+ on the left side  Dorsalis pedis pulses are 2+ on the right side, and 2+ on the left side  Occasional PVCs    Pulmonary/Chest: Effort normal  He has decreased breath sounds  He has rales  Abdominal: Soft  Normal appearance and bowel sounds are normal    Musculoskeletal: Normal range of motion  Neurological: He is alert and oriented to person, place, and time  He has normal strength  GCS eye subscore is 4  GCS verbal subscore is 5  GCS motor subscore is 6  Skin: Skin is warm, dry and intact  Capillary refill takes less than 2 seconds  Psychiatric: He has a normal mood and affect   His speech is normal and behavior is normal  Judgment and thought content normal  Cognition and memory are normal        Labs:    Results from last 7 days  Lab Units 03/28/18 2011   WBC Thousand/uL 9 30   HEMOGLOBIN g/dL 11 7*   HEMATOCRIT % 36 0*   PLATELETS Thousands/uL 249   NEUTROS PCT % 72   MONOS PCT % 7        Results from last 7 days  Lab Units 03/28/18 2035   SODIUM mmol/L 140   POTASSIUM mmol/L 4 1   CHLORIDE mmol/L 103   CO2 mmol/L 28   BUN mg/dL 24   CREATININE mg/dL 1 65*   CALCIUM mg/dL 9 1   TOTAL PROTEIN g/dL 7 4   BILIRUBIN TOTAL mg/dL 0 30   ALK PHOS U/L 57   ALT U/L 24   AST U/L 39   GLUCOSE RANDOM mg/dL 105                Results from last 7 days  Lab Units 03/28/18 2011   INR  1 14   PTT seconds 27           0  Lab Value Date/Time   TROPONINI <0 02 03/28/2018 2034   TROPONINI 0 05 (H) 01/07/2017 0531   TROPONINI 0 07 (H) 01/06/2017 2348   TROPONINI 0 07 (H) 01/06/2017 1800   TROPONINI 0 02 11/25/2016 0600   TROPONINI 0 02 11/24/2016 2329   TROPONINI <0 02 11/24/2016 1938   TROPONINI 0 04 (H) 05/26/2016 1703   TROPONINI 0 03 05/26/2016 1312   TROPONINI 0 02 05/26/2016 0950   TROPONINI <0 02 05/16/2016 1154   TROPONINI 8 73 (H) 03/05/2016 1247   TROPONINI 10 10 (H) 03/05/2016 0534   TROPONINI 6 59 (H) 03/04/2016 2341   TROPONINI 1 93 (H) 03/04/2016 1936   TROPONINI 0 66 (H) 03/04/2016 1335       Imaging: CXR 3/28- CHF/pulmonary vascular edema/sm b/l pleural effusions   EKG: 3/28-NSR with PVCs     Micro:  Blood Culture:   Lab Results   Component Value Date    BLOODCX No Growth After 5 Days  01/06/2017    BLOODCX No Growth After 5 Days  01/06/2017    BLOODCX No Growth After 5 Days  03/06/2016    BLOODCX No Growth After 5 Days  03/06/2016    BLOODCX No Growth After 5 Days  03/04/2016    BLOODCX No Growth After 5 Days   03/04/2016     Urine Culture:   Lab Results   Component Value Date    URINECX No Growth <1000 cfu/mL 03/04/2016     Sputum Culture: No components found for: SPUTUMCX  Wound Culure: No results found for: Richard Machado  ______________________________________________________________________    Assessment:   Principal Problem:    Acute exacerbation of CHF (congestive heart failure) ([facility])  Active Problems:    Acute on chronic respiratory failure with hypoxia ([facility])    COPD, moderate ([facility])    Chronic combined systolic and diastolic CHF (congestive heart failure) ([facility])    Bacterial conjunctivitis of right eye    CAD in native artery    History of atrial fibrillation    PATITO (obstructive sleep apnea)    Diabetes mellitus type 2, noninsulin dependent (HCC)    Hypertension    CKD (chronic kidney disease), stage III    Hyperlipidemia    Anemia    GERD (gastroesophageal reflux disease)    Hypothyroidism    Peripheral vascular disease (HCC)    Multiple pulmonary nodules    S/P TAVR (transcatheter aortic valve replacement)    BPH (benign prostatic hyperplasia)  Resolved Problems:    * No resolved hospital problems  *      * Acute exacerbation of CHF (congestive heart failure) ([facility])   Assessment & Plan    · Received 80 mg IV Lasix in the ED  · Monitor urine output, strict I&O  · Daily weight  · Continue BiPAP 12/6 40%        Acute on chronic respiratory failure with hypoxia ([facility])   Assessment & Plan    · Patient wear 7 L of O2 at home and CPAP at HS  · Continue BiPAP, will attempt to wean off BiPAP tomorrow morning        COPD, moderate ([facility])   Assessment & Plan    · FEV1 1 85 L or 76% of predicted as of Nov 2017  · Atrovent and Xopenex q 6 hours         Chronic combined systolic and diastolic CHF (congestive heart failure) [facility])   Assessment & Plan    · Last echo done November 2017: EF 40% G1DD  Mild MR  Mild to moderate TR  Estimated PA systolic pressure 65  FATMATA 1 6 cm2  No paravalvular leak noted  · Will plan to diurese again tomorrow  · Continue metoprolol        Bacterial conjunctivitis of right eye   Assessment & Plan    · Erythromycin ointment q 6 hours        CAD in native artery   Assessment & Plan    · Drug eluting stent in 2014  · Continue aspirin and metoprolol        History of atrial fibrillation   Assessment & Plan    · Continue home dose amiodarone 100 mg p o  Daily  · Patient is currently in normal sinus rhythm  · Per Cardiology report patient refuses long-term anticoagulation        PATITO (obstructive sleep apnea)   Assessment & Plan    · BiPAP at HS        Hypertension   Assessment & Plan    · Continue Norvasc and metoprolol        Diabetes mellitus type 2, noninsulin dependent (HCC)   Assessment & Plan    · Blood glucose well controlled on BMP, will initiate point of care glucose testing q 6 hours    Will add SSI coverage if needed        Hyperlipidemia   Assessment & Plan    · Continue pravastatin  · Continue Tricor         CKD (chronic kidney disease), stage III   Assessment & Plan    · Baseline creatinine appears to be between 1 6 and 1 8  · Patient at baseline, continue to trend serum creatinine  · Avoid nephrotoxic agents        Anemia   Assessment & Plan    · Chronic anemia secondary to chronic kidney disease  · Trend hemoglobin daily        GERD (gastroesophageal reflux disease)   Assessment & Plan    · Continue home Protonix        Hypothyroidism   Assessment & Plan    · Continue Synthroid  · Check TSH in a m  S/P TAVR (transcatheter aortic valve replacement)   Assessment & Plan    · No perivalvular leak noted on last echo November 2017            Plan:    Neuro:   · Routine neuro checks  · Daily CAM ICU  · Regulate sleep-wake cycle, environmental controls    CV:   · Hemodynamic monitoring    Pulm:   · Acute on chronic respiratory failure with hypoxia  · Continue BiPAP 12/6 40%  · Repeat CXR in a m     GI:   · Protonix for history of GERD  · Bowel regimen:  MiraLax and Colace  · Last bowel movement:  3/28 in a m  per patient report    :  · CKD III, patient at baseline creatinine, continue to trend  · Strict I& O and daily weight    FEN:   · Received 80 mg IV Lasix in ED, has diuresed 1100 mL  · Trend electrolytes on morning labs  · Keep NPO until off BiPAP    ID:   · Patient reports thick sputum production and congestion, however does not have any other clinical signs or symptoms of infection at this point  Will check flu/RSV PCR and sent sputum culture  · Check procalcitonin in a m  Heme:   · Chronic anemia secondary to chronic kidney disease, hemoglobin stable, continue to trend    Endo:   · History of non-insulin-dependent type 2 diabetes, glucose well controlled on BMP, will check blood glucose q 6 hours    MSK/Skin:   · Turn and reposition off-load pressure points  · PT/OT consulted    Family:  · Family updated: no     Disposition: Admit to Stepdown     Counseling / Coordination of Care  Total time spent today 50 minutes   Greater than 50% of total time was spent with the patient and / or family counseling and / or coordination of care  A description of the counseling / coordination of care: Assessing patient, reviewing labs, medications, orders, PMH, and hospital course    ______________________________________________________________________    VTE Pharmacologic Prophylaxis: Heparin  VTE Mechanical Prophylaxis: sequential compression device    Invasive lines and devices: Invasive Devices     Peripheral Intravenous Line            Peripheral IV 03/28/18 Left Antecubital less than 1 day                Code Status: Level 2 - DNAR: but accepts endotracheal intubation  POA:  Barby De Leon (son)   POLST:  none     Given critical illness, patient length of stay will require greater than two midnights      Signature: ROC Galdamez  Date: 3/29/2018

## 2018-03-29 NOTE — PLAN OF CARE
Problem: PAIN - ADULT  Goal: Verbalizes/displays adequate comfort level or baseline comfort level  Interventions:  - Encourage patient to monitor pain and request assistance  - Assess pain using appropriate pain scale  - Administer analgesics based on type and severity of pain and evaluate response  - Implement non-pharmacological measures as appropriate and evaluate response  - Consider cultural and social influences on pain and pain management  - Notify physician/advanced practitioner if interventions unsuccessful or patient reports new pain  Outcome: Progressing      Problem: INFECTION - ADULT  Goal: Absence or prevention of progression during hospitalization  INTERVENTIONS:  - Assess and monitor for signs and symptoms of infection  - Monitor lab/diagnostic results    - Administer medications as ordered  - Instruct and encourage patient and family to use good hand hygiene technique  - Identify and instruct in appropriate isolation precautions for identified infection/condition  Outcome: Progressing    Goal: Absence of fever/infection during neutropenic period  INTERVENTIONS:  - Monitor WBC    Outcome: Progressing      Problem: DISCHARGE PLANNING  Goal: Discharge to home or other facility with appropriate resources  INTERVENTIONS:  - Identify barriers to discharge w/patient and caregiver  - Arrange for needed discharge resources and transportation as appropriate  - Identify discharge learning needs (meds, wound care, etc )  - Arrange for interpretive services to assist at discharge as needed  - Refer to Case Management Department for coordinating discharge planning if the patient needs post-hospital services based on physician/advanced practitioner order or complex needs related to functional status, cognitive ability, or social support system  Outcome: Progressing      Problem: Knowledge Deficit  Goal: Patient/family/caregiver demonstrates understanding of disease process, treatment plan, medications, and discharge instructions  Complete learning assessment and assess knowledge base    Interventions:  - Provide teaching at level of understanding  - Provide teaching via preferred learning methods  Outcome: Progressing

## 2018-03-29 NOTE — PLAN OF CARE
Problem: Potential for Falls  Goal: Patient will remain free of falls  INTERVENTIONS:  - Assess patient frequently for physical needs  -  Identify cognitive and physical deficits and behaviors that affect risk of falls    -  [place] fall precautions as indicated by assessment   - Educate patient/family on patient safety including physical limitations  - Instruct patient to call for assistance with activity based on assessment  - Modify environment to reduce risk of injury  - Consider OT/PT consult to assist with strengthening/mobility   Outcome: Progressing      Problem: Prexisting or High Potential for Compromised Skin Integrity  Goal: Skin integrity is maintained or improved  INTERVENTIONS:  - Identify patients at risk for skin breakdown  - Assess and monitor skin integrity  - Assess and monitor nutrition and hydration status  - Monitor labs (i e  albumin)  - Assess for incontinence   - Turn and reposition patient  - Assist with mobility/ambulation  - Relieve pressure over bony prominences  - Avoid friction and shearing  - Provide appropriate hygiene as needed including keeping skin clean and dry  - Evaluate need for skin moisturizer/barrier cream  - Collaborate with interdisciplinary team (i e  Nutrition, Rehabilitation, etc )   - Patient/family teaching   Outcome: Progressing      Problem: RESPIRATORY - ADULT  Goal: Achieves optimal ventilation and oxygenation  INTERVENTIONS:  - Assess for changes in respiratory status  - Assess for changes in mentation and behavior  - Position to facilitate oxygenation and minimize respiratory effort  - Oxygen administration by appropriate delivery method based on oxygen saturation (per order) or ABGs  - Initiate smoking cessation education as indicated  - Encourage broncho-pulmonary hygiene including cough, deep breathe, Incentive Spirometry  - Assess the need for suctioning and aspirate as needed  - Assess and instruct to report SOB or any respiratory difficulty  - Respiratory Therapy support as indicated   Outcome: Progressing

## 2018-03-29 NOTE — ASSESSMENT & PLAN NOTE
FEV1 1 85 L or 76% of predicted as of Nov 2017, uses Luiza and Duoneb tx at home  · Continue Atrovent and Xopenex q 6 hours

## 2018-03-29 NOTE — ASSESSMENT & PLAN NOTE
· Last echo done November 2017: EF 40% G1DD  Mild MR  Mild to moderate TR  Estimated PA systolic pressure 65  FATMATA 1 6 cm2   No paravalvular leak noted  · Give another dose of Lasix 40mg IV x1 and can restart home Demadex tomorrow  · Continue metoprolol

## 2018-03-29 NOTE — ED PROCEDURE NOTE
PROCEDURE  ECG 12 Lead Documentation  Date/Time: 3/28/2018 9:10 PM  Performed by: Cori Files by: Debi Shelley     ECG reviewed by me, the ED Provider: yes    Patient location:  ED  Previous ECG:     Previous ECG:  Compared to current    Similarity:  No change  Interpretation:     Interpretation: abnormal    Rate:     ECG rate:  94    ECG rate assessment: normal    Rhythm:     Rhythm: sinus rhythm    Ectopy:     Ectopy: PVCs    QRS:     QRS axis:  Left  Conduction:     Conduction: abnormal      Abnormal conduction: complete LBBB           Jonathan Hicks DO  03/28/18 3168

## 2018-03-29 NOTE — ED PROVIDER NOTES
History  Chief Complaint   Patient presents with    Shortness of Breath     Patient's wife at bedside states that the patient is normally SOB but the past ten days have been worse  " The past ten days he has been getting worse and worse, and he is gaining weight "     Patient presents for evaluation of shortness of breath worsening over the past 7-10 days with increasing weight gain  Denies chest pain  History provided by:  Patient   used: No    Shortness of Breath   Associated symptoms: no chest pain        Prior to Admission Medications   Prescriptions Last Dose Informant Patient Reported? Taking? Ascorbic Acid (VITAMIN C) 100 MG tablet   Yes No   Sig: Take by mouth   B Complex Vitamins (VITAMIN B COMPLEX PO)   Yes No   Sig: Take by mouth   Fluticasone Furoate-Vilanterol (BREO ELLIPTA) 100-25 MCG/INH AEPB   Yes No   Sig: Inhale  MIRABEGRON ER PO   Yes No   Sig: Take by mouth daily Patient not sure of the dose  acetaminophen (TYLENOL) 325 mg tablet   No No   Sig: Take 2 tablets (650 mg total) by mouth every 6 (six) hours as needed for mild pain  amLODIPine (NORVASC) 10 mg tablet   No No   Sig: Take 1 tablet (10 mg total) by mouth daily  amLODIPine (NORVASC) 10 mg tablet   Yes No   Sig: Take 1 tablet by mouth daily   amiodarone 100 mg tablet   No No   Sig: Take 1 tablet (100 mg total) by mouth daily   amiodarone 100 mg tablet   Yes No   Sig: Take by mouth   aspirin 81 MG tablet   Yes No   Sig: Take 162 mg by mouth daily  aspirin 81 mg chewable tablet   Yes No   Sig: Chew daily   cyanocobalamin (VITAMIN B-12) 100 mcg tablet   Yes No   Sig: Take by mouth   docusate sodium (COLACE) 100 mg capsule   Yes No   Sig: Take 100 mg by mouth daily at bedtime  docusate sodium (COLACE) 100 mg capsule   Yes No   Sig: Take by mouth 2 (two) times a day as needed   fenofibrate (TRICOR) 145 mg tablet   Yes No   Sig: Take 145 mg by mouth daily     fenofibrate (TRICOR) 145 mg tablet   Yes No Sig: Take 1 tablet by mouth every other day   fluticasone furoate-vilanterol (BREO ELLIPTA)   Yes No   Sig: Inhale 1 puff daily   ipratropium-albuterol (DUO-NEB) 0 5-2 5 mg/3 mL   Yes No   Sig: Inhale 1 vial 4 (four) times a day   ipratropium-albuterol (DUO-NEB) 0 5-2 5 mg/mL   Yes No   Sig: Inhale 3 mL every 6 (six) hours  levothyroxine 50 mcg tablet   Yes No   Sig: Take by mouth   metoprolol tartrate (LOPRESSOR) 25 mg tablet   No No   Sig: Take 1 tablet (25 mg total) by mouth every 12 (twelve) hours  metoprolol tartrate (LOPRESSOR) 25 mg tablet   Yes No   Sig: Take 1 tablet by mouth 2 (two) times a day   oxyCODONE-acetaminophen (PERCOCET) 5-325 mg per tablet   No No   Sig: Take 1 tablet by mouth every 6 (six) hours as needed for moderate pain for up to 10 doses Max Daily Amount: 4 tablets   pantoprazole (PROTONIX) 40 mg tablet   No No   Sig: Take 1 tablet by mouth 2 (two) times a day before meals for 30 days   polyethylene glycol (MIRALAX) 17 g packet   No No   Sig: Take 17 g by mouth daily for 30 days   simvastatin (ZOCOR) 20 mg tablet   No No   Sig: TAKE 1 TABLET DAILY   simvastatin (ZOCOR) 40 mg tablet   No No   Sig: Take 1 tablet (40 mg total) by mouth daily at bedtime   simvastatin (ZOCOR) 40 mg tablet   Yes No   Sig: Take 1 tablet by mouth daily   tadalafil (CIALIS) 2 5 MG tablet   Yes No   Sig: Take 2 5 mg by mouth daily as needed for erectile dysfunction     tadalafil (CIALIS) 2 5 MG tablet   Yes No   Sig: Take 1 tablet by mouth daily   tolterodine (DETROL LA) 4 mg 24 hr capsule   No No   Sig: Take 1 capsule (4 mg total) by mouth daily   tolterodine (DETROL LA) 4 mg 24 hr capsule   Yes No   Sig: Take 1 capsule by mouth daily   torsemide (DEMADEX) 20 mg tablet   Yes No   Sig: Take by mouth   vitamin E, tocopherol, 400 units capsule   Yes No   Sig: Take by mouth      Facility-Administered Medications: None       Past Medical History:   Diagnosis Date    Aortic stenosis, severe     BPH (benign prostatic hyperplasia)     CAD (coronary artery disease)     stent 2014    CHF (congestive heart failure) (HCC)     Chronic kidney disease (CKD)     CKD (chronic kidney disease), stage III     CKD (chronic kidney disease), stage III     COPD (chronic obstructive pulmonary disease) (HCC)     Diabetes mellitus type 2, noninsulin dependent (HCC)     Esophagitis, erosive     History of DVT (deep vein thrombosis)     left posterior tibial/peroneal veins    History of non-ST elevation myocardial infarction (NSTEMI)     History of pneumonia     Hyperlipidemia     Hypertension     LBBB (left bundle branch block)     MRSA (methicillin resistant staph aureus) culture positive     Obstructive sleep apnea on CPAP     severe PATITO with AHI of 106 and uses CPAP at 10 cm H2O with 2 l/m oxygen    Paroxysmal atrial fibrillation (HCC)     Pulmonary fibrosis (HCC)     PVD (peripheral vascular disease) ([facility] [address] )        Past Surgical History:   Procedure Laterality Date    CARDIAC CATHETERIZATION  03/10/2016    Showed 60-70% mid LAD lesion next to stent    ESOPHAGOGASTRODUODENOSCOPY N/A 3/14/2016    Procedure: ESOPHAGOGASTRODUODENOSCOPY (EGD); Surgeon: Ghislaine Dinero MD;  Location: [facility] GI [facility]; Service:     EYE SURGERY      FRACTURE SURGERY      JOINT REPLACEMENT      both hips replaced    MO REPLACE AORTIC VALVE OPENFEMORAL ARTERY APPROACH N/A 8/23/2016    Procedure: TRANSFEMORAL TAVR WITH 26MM CALIX MAKAYLA S3 TISSUE VALVE; PINA ;  Surgeon: Kiran Rubalcava DO;  Location:  MAIN OR;  Service: Cardiac Surgery    TISSUE AORTIC VALVE REPLACEMENT      transfemoral, transcatheter    TONSILLECTOMY AND ADENOIDECTOMY      TOTAL HIP ARTHROPLASTY      Bilateral       Family History   Problem Relation Age of Onset    Coronary artery disease Mother      I have reviewed and agree with the history as documented      Social History   Substance Use Topics    Smoking status: Former Smoker     Packs/day: 1 00     Years: 35 00     Types: Cigarettes     Quit date: 1/1/1987    Smokeless tobacco: Never Used    Alcohol use Yes      Comment: occasionally        Review of Systems   Respiratory: Positive for shortness of breath  Cardiovascular: Negative for chest pain  All other systems reviewed and are negative  Physical Exam  ED Triage Vitals [03/28/18 2003]   Temperature Pulse Respirations Blood Pressure SpO2   98 2 °F (36 8 °C) 98 (!) 26 160/90 94 %      Temp Source Heart Rate Source Patient Position - Orthostatic VS BP Location FiO2 (%)   Oral Monitor Lying Right arm --      Pain Score       No Pain           Orthostatic Vital Signs  Vitals:    03/28/18 2003 03/28/18 2206 03/28/18 2315   BP: 160/90 169/87    Pulse: 98 87 78   Patient Position - Orthostatic VS: Lying Lying        Physical Exam   Constitutional: He is oriented to person, place, and time  No distress  HENT:   Mouth/Throat: Oropharynx is clear and moist    Eyes: Pupils are equal, round, and reactive to light  Neck: Normal range of motion  Cardiovascular: Normal rate, regular rhythm and intact distal pulses  Pulmonary/Chest: He is in respiratory distress  He has rales  Moderate respiratory distress   Abdominal: Soft  There is no tenderness  Musculoskeletal: Normal range of motion  He exhibits edema  Neurological: He is alert and oriented to person, place, and time  Skin: Capillary refill takes less than 2 seconds  He is not diaphoretic  Nursing note and vitals reviewed        ED Medications  Medications   furosemide (LASIX) injection 80 mg (80 mg Intravenous Given 3/28/18 2126)       Diagnostic Studies  Results Reviewed     Procedure Component Value Units Date/Time    B-type natriuretic peptide [[id number]]  (Abnormal) Collected:  03/28/18 2035    Lab Status:  Final result Specimen:  Blood from Arm, Left Updated:  03/28/18 2109     NT-proBNP 3,795 (H) pg/mL     Troponin I [[id number]]  (Normal) Collected:  03/28/18 2034    Lab Status:  Final result Specimen:  Blood from Arm, Left Updated:  03/28/18 2105     Troponin I <0 02 ng/mL     Narrative:         [employer] Chemistry analyzer 99% cutoff is > 0 04 ng/mL in network labs    o cTnI 99% cutoff is useful only when applied to patients in the clinical setting of myocardial ischemia  o cTnI 99% cutoff should be interpreted in the context of clinical history, ECG findings and possibly cardiac imaging to establish correct diagnosis  o cTnI 99% cutoff may be suggestive but clearly not indicative of a coronary event without the clinical setting of myocardial ischemia  Comprehensive metabolic panel [[id number]]  (Abnormal) Collected:  03/28/18 2035    Lab Status:  Final result Specimen:  Blood from Arm, Left Updated:  03/28/18 2102     Sodium 140 mmol/L      Potassium 4 1 mmol/L      Chloride 103 mmol/L      CO2 28 mmol/L      Anion Gap 9 mmol/L      BUN 24 mg/dL      Creatinine 1 65 (H) mg/dL      Glucose 105 mg/dL      Calcium 9 1 mg/dL      AST 39 U/L      ALT 24 U/L      Alkaline Phosphatase 57 U/L      Total Protein 7 4 g/dL      Albumin 3 0 (L) g/dL      Total Bilirubin 0 30 mg/dL      eGFR 37 ml/min/1 73sq m     Narrative:         National Kidney Disease Education Program recommendations are as follows:  GFR calculation is accurate only with a steady state creatinine  Chronic Kidney disease less than 60 ml/min/1 73 sq  meters  Kidney failure less than 15 ml/min/1 73 sq  meters  Protime-INR [[id number]]  (Abnormal) Collected:  03/28/18 2011    Lab Status:  Final result Specimen:  Blood from Arm, Left Updated:  03/28/18 2040     Protime 12 0 (H) seconds      INR 1 14    APTT [[id number]]  (Normal) Collected:  03/28/18 2011    Lab Status:  Final result Specimen:  Blood from Arm, Left Updated:  03/28/18 2040     PTT 27 seconds     Narrative:          Therapeutic Heparin Range = 60-90 seconds    CBC and differential [[id number]]  (Abnormal) Collected:  03/28/18 2011    Lab Status:  Final result Specimen:  Blood from Arm, Left Updated:  03/28/18 2019     WBC 9 30 Thousand/uL      RBC 4 12 (L) Million/uL      Hemoglobin 11 7 (L) g/dL      Hematocrit 36 0 (L) %      MCV 87 fL      MCH 28 5 pg      MCHC 32 5 g/dL      RDW 16 6 (H) %      MPV 8 6 (L) fL      Platelets 019 Thousands/uL      nRBC 0 /100 WBCs      Neutrophils Relative 72 %      Lymphocytes Relative 19 %      Monocytes Relative 7 %      Eosinophils Relative 2 %      Basophils Relative 0 %      Neutrophils Absolute 6 60 Thousands/µL      Lymphocytes Absolute 1 80 Thousands/µL      Monocytes Absolute 0 60 Thousand/µL      Eosinophils Absolute 0 20 Thousand/µL      Basophils Absolute 0 00 Thousands/µL                  XR chest 1 view portable    (Results Pending)              Procedures  Procedures       Phone Contacts  ED Phone Contact    ED Course  ED Course                                MDM  Number of Diagnoses or Management Options  Congestive heart failure (CHF) ([facility] [address] ):   Respiratory failure with hypoxia [facility]):   Diagnosis management comments: Pulse ox 86% on 6 L NC indicating poor oxygenation  Pulse ox 98% on BiPAP indicating adequate oxygenation  CXR: PVC as read by me    Patient started on BiPAP and treated with IV lasix with goo diuresis  Breathing improved on BiPAP  Given history and physical felt this was CHF rather than COPD          Amount and/or Complexity of Data Reviewed  Clinical lab tests: ordered and reviewed  Tests in the radiology section of CPT®: ordered and reviewed  Decide to obtain previous medical records or to obtain history from someone other than the patient: yes  Review and summarize past medical records: yes  Discuss the patient with other providers: yes  Independent visualization of images, tracings, or specimens: yes    Patient Progress  Patient progress: stable    The patient presented with a condition in which there was a high probability of imminent or life-threatening deterioration, and critical care services (excluding separately billable procedures) totalled 30-74 minutes  Disposition  Final diagnoses:   Congestive heart failure (CHF) ([facility] [address] )   Respiratory failure with hypoxia ([facility] [address] )     Time reflects when diagnosis was documented in both MDM as applicable and the Disposition within this note     Time User Action Codes Description Comment    3/28/2018  9:37 PM Sandra Tyler [I50 [id number]] Congestive heart failure (CHF) ([facility] [address] )     3/28/2018  9:37 PM Prem Tyler [[id number]] Respiratory failure with hypoxia [facility])       ED Disposition     ED Disposition Condition Comment    Admit  Case was discussed with Dr Teressa Bustamante and the patient's admission status was agreed to be admission to the service of Dr Teressa Bustamante  Follow-up Information    None       Patient's Medications   Discharge Prescriptions    No medications on file     No discharge procedures on file      ED Provider  Electronically Signed by           Branden Wood DO  03/28/18 6492

## 2018-03-29 NOTE — ASSESSMENT & PLAN NOTE
· Hemoglobin A1c 5 9, has been off any regimen at home  · Blood glucose well controlled on BMP, will initiate point of care glucose testing    Will add SSI coverage if needed

## 2018-03-29 NOTE — PHYSICAL THERAPY NOTE
PT EVALUATION     03/29/18 7744   Pain Assessment   Pain Assessment 0-10   Pain Score 6  (L hip area/chronic)   Home Living   Type of [address] One level;Elevator  (elevator to enter from garage)   [address] chair   Home Equipment Walker;Cane  (O2/6 liters at home)   Additional Comments patient reports using cane prior to admission but also has walker if needed   Prior Function   Level of [place] Independent with ADLs and functional mobility; Needs assistance with IADLs   Lives With Spouse   Receives Help From Family   ADL Assistance Independent   IADLs Needs assistance   Comments patient reports needing assist with transportation due to not driving but assisting in home for cooking and independent with ADLs   Restrictions/Precautions   Other Precautions Chair Alarm; Bed Alarm;O2;Fall Risk   General   Additional Pertinent History chart reviewed, patient admitted with SOB/COPD  Patient basically independent prior to admission and using cane   Now requiring assist of 1 with roller walker   Family/Caregiver Present No   Cognition   Overall Cognitive Status WFL   Arousal/Participation Cooperative   Orientation Level Oriented X4   Following Commands Follows all commands and directions without difficulty   RLE Assessment   RLE Assessment (ROM WFL, strength 3+/5)   LLE Assessment   LLE Assessment (ROM WFL, strength 3+/5)   Coordination   Movements are Fluid and Coordinated 0   Coordination and Movement Description decreased coordination BLEs with transfers and gait    Bed Mobility   Supine to Sit 3  Moderate assistance   Transfers   Sit to Stand 4  Minimal assistance   Additional items Assist x 1   Stand to Sit 4  Minimal assistance   Additional items Assist x 1   Ambulation/Elevation   Gait Assistance 4  Minimal assist   Additional items Assist x 1   Assistive Device Rolling walker   Distance 5 feet at bedside with change in direction, endurance limited by SpO2 in upper 70s with gait and transfers and recovered to upper 80s with rest on 6 liters   Balance   Static Sitting Fair +   Dynamic Sitting Fair   Static Standing Fair -   Dynamic Standing Poor   Ambulatory Poor   Activity Tolerance   Activity Tolerance Patient limited by fatigue;Treatment limited secondary to medical complications (Comment)  (limited by SOB)   Nurse Made Aware yes   Assessment   Prognosis Good   Problem List Decreased strength;Decreased range of motion;Decreased endurance; Impaired balance;Decreased mobility; Decreased coordination;Pain   Assessment Patient seen for Physical Therapy evaluation  Patient admitted with Acute exacerbation of CHF (congestive heart failure) ([facility] [address] )  Comorbidities affecting patient's physical performance include moderate COPD, chronic hypoxic respiratory failure, chronic combined systolic and diastolic heart failure, DM2, Afib, BPH, s/p TAVR, CKD III, CAD :    Personal factors affecting patient at time of initial evaluation include: ambulating with assistive device, inability to ambulate household distances, inability to navigate community distances, inability to navigate level surfaces without external assistance, inability to perform dynamic tasks in community, inability to perform physical activity, inability to perform ADLS and inability to perform IADLS   Prior to admission, patient was independent with functional mobility with cane, independent with ADLS, requiring assist for IADLS, ambulating household distance and home with family assist   Please find objective findings from Physical Therapy assessment regarding body systems outlined above with impairments and limitations including weakness, decreased ROM, impaired balance, decreased endurance, impaired coordination, gait deviations, pain, decreased activity tolerance, decreased functional mobility tolerance, fall risk and SOB upon exertion    The Barthel Index was used as a functional outcome tool presenting with a score of 50 today indicating marked limitations of functional mobility and ADLS  Patient's clinical presentation is currently unstable/unpredictable as seen in patient's presentation of vital sign response, changing level of pain, increased fall risk, new onset of impairment of functional mobility, decreased endurance and new onset of weakness  Pt would benefit from continued Physical Therapy treatment to address deficits as defined above and maximize level of functional mobility  As demonstrated by objective findings, the assigned level of complexity for this evaluation is high  Goals   Patient Goals breathe better , go home   STG Expiration Date (1 to 7 days)   Short Term Goal #1 transfers and gait with roller walker with supervision    Short Term Goal #2 gait endurance to 50 feet, strength BLEs 3+/4-   LTG Expiration Date (8 to 14 days)   Long Term Goal #1 transfers and gait with cane independently   Long Term Goal #2 gait endurance to unlimited functional household distances   Plan   Treatment/Interventions ADL retraining;Functional transfer training;LE strengthening/ROM; Therapeutic exercise; Endurance training;Patient/family training;Equipment eval/education; Bed mobility;Gait training   PT Frequency 5x/wk   Recommendation   Recommendation (home with services/PT)   Barthel Index   Feeding 10   Bathing 0   Grooming Score 0   Dressing Score 5   Bladder Score 10   Bowels Score 10   Toilet Use Score 5   Transfers (Bed/Chair) Score 10   Mobility (Level Surface) Score 0   Stairs Score 0   Barthel Index Score 50

## 2018-03-30 ENCOUNTER — APPOINTMENT (INPATIENT)
Dept: NON INVASIVE DIAGNOSTICS | Facility: HOSPITAL | Age: 83
DRG: 291 | End: 2018-03-30
Payer: COMMERCIAL

## 2018-03-30 LAB
ANION GAP SERPL CALCULATED.3IONS-SCNC: 8 MMOL/L (ref 4–13)
ATRIAL RATE: 94 BPM
BASOPHILS # BLD AUTO: 0 THOUSANDS/ΜL (ref 0–0.1)
BASOPHILS NFR BLD AUTO: 0 % (ref 0–1)
BUN SERPL-MCNC: 28 MG/DL (ref 5–25)
CALCIUM SERPL-MCNC: 9 MG/DL (ref 8.3–10.1)
CHLORIDE SERPL-SCNC: 104 MMOL/L (ref 100–108)
CO2 SERPL-SCNC: 28 MMOL/L (ref 21–32)
CREAT SERPL-MCNC: 1.94 MG/DL (ref 0.6–1.3)
EOSINOPHIL # BLD AUTO: 0.4 THOUSAND/ΜL (ref 0–0.61)
EOSINOPHIL NFR BLD AUTO: 5 % (ref 0–6)
ERYTHROCYTE [DISTWIDTH] IN BLOOD BY AUTOMATED COUNT: 16.4 % (ref 11.6–15.1)
GFR SERPL CREATININE-BSD FRML MDRD: 30 ML/MIN/1.73SQ M
GLUCOSE SERPL-MCNC: 95 MG/DL (ref 65–140)
HCT VFR BLD AUTO: 34 % (ref 42–52)
HGB BLD-MCNC: 10.9 G/DL (ref 14–18)
LYMPHOCYTES # BLD AUTO: 1.9 THOUSANDS/ΜL (ref 0.6–4.47)
LYMPHOCYTES NFR BLD AUTO: 25 % (ref 14–44)
MAGNESIUM SERPL-MCNC: 2.3 MG/DL (ref 1.6–2.6)
MCH RBC QN AUTO: 28 PG (ref 27–31)
MCHC RBC AUTO-ENTMCNC: 32 G/DL (ref 31.4–37.4)
MCV RBC AUTO: 87 FL (ref 82–98)
MONOCYTES # BLD AUTO: 0.6 THOUSAND/ΜL (ref 0.17–1.22)
MONOCYTES NFR BLD AUTO: 8 % (ref 4–12)
MRSA NOSE QL CULT: ABNORMAL
MRSA NOSE QL CULT: ABNORMAL
NEUTROPHILS # BLD AUTO: 4.6 THOUSANDS/ΜL (ref 1.85–7.62)
NEUTS SEG NFR BLD AUTO: 62 % (ref 43–75)
NRBC BLD AUTO-RTO: 0 /100 WBCS
P AXIS: 20 DEGREES
PHOSPHATE SERPL-MCNC: 4.1 MG/DL (ref 2.3–4.1)
PLATELET # BLD AUTO: 217 THOUSANDS/UL (ref 130–400)
PMV BLD AUTO: 8.3 FL (ref 8.9–12.7)
POTASSIUM SERPL-SCNC: 4.3 MMOL/L (ref 3.5–5.3)
PR INTERVAL: 206 MS
QRS AXIS: -59 DEGREES
QRSD INTERVAL: 150 MS
QT INTERVAL: 408 MS
QTC INTERVAL: 510 MS
RBC # BLD AUTO: 3.9 MILLION/UL (ref 4.7–6.1)
SODIUM SERPL-SCNC: 140 MMOL/L (ref 136–145)
T WAVE AXIS: 87 DEGREES
T4 FREE SERPL-MCNC: 1.19 NG/DL (ref 0.76–1.46)
VENTRICULAR RATE: 94 BPM
WBC # BLD AUTO: 7.4 THOUSAND/UL (ref 4.8–10.8)

## 2018-03-30 PROCEDURE — 93306 TTE W/DOPPLER COMPLETE: CPT | Performed by: INTERNAL MEDICINE

## 2018-03-30 PROCEDURE — 80048 BASIC METABOLIC PNL TOTAL CA: CPT | Performed by: PHYSICIAN ASSISTANT

## 2018-03-30 PROCEDURE — 93010 ELECTROCARDIOGRAM REPORT: CPT | Performed by: INTERNAL MEDICINE

## 2018-03-30 PROCEDURE — 97116 GAIT TRAINING THERAPY: CPT

## 2018-03-30 PROCEDURE — 94760 N-INVAS EAR/PLS OXIMETRY 1: CPT

## 2018-03-30 PROCEDURE — 85025 COMPLETE CBC W/AUTO DIFF WBC: CPT | Performed by: PHYSICIAN ASSISTANT

## 2018-03-30 PROCEDURE — 84100 ASSAY OF PHOSPHORUS: CPT | Performed by: PHYSICIAN ASSISTANT

## 2018-03-30 PROCEDURE — 99232 SBSQ HOSP IP/OBS MODERATE 35: CPT | Performed by: INTERNAL MEDICINE

## 2018-03-30 PROCEDURE — 94640 AIRWAY INHALATION TREATMENT: CPT

## 2018-03-30 PROCEDURE — 94660 CPAP INITIATION&MGMT: CPT

## 2018-03-30 PROCEDURE — 99223 1ST HOSP IP/OBS HIGH 75: CPT | Performed by: INTERNAL MEDICINE

## 2018-03-30 PROCEDURE — 93306 TTE W/DOPPLER COMPLETE: CPT

## 2018-03-30 PROCEDURE — 97110 THERAPEUTIC EXERCISES: CPT

## 2018-03-30 PROCEDURE — 84439 ASSAY OF FREE THYROXINE: CPT | Performed by: INTERNAL MEDICINE

## 2018-03-30 PROCEDURE — 83735 ASSAY OF MAGNESIUM: CPT | Performed by: PHYSICIAN ASSISTANT

## 2018-03-30 RX ORDER — HYDRALAZINE HYDROCHLORIDE 25 MG/1
25 TABLET, FILM COATED ORAL EVERY 8 HOURS SCHEDULED
Status: DISCONTINUED | OUTPATIENT
Start: 2018-03-30 | End: 2018-04-02 | Stop reason: HOSPADM

## 2018-03-30 RX ORDER — ISOSORBIDE MONONITRATE 30 MG/1
30 TABLET, EXTENDED RELEASE ORAL DAILY
Status: DISCONTINUED | OUTPATIENT
Start: 2018-03-30 | End: 2018-04-02 | Stop reason: HOSPADM

## 2018-03-30 RX ORDER — FUROSEMIDE 10 MG/ML
40 INJECTION INTRAMUSCULAR; INTRAVENOUS DAILY
Status: DISCONTINUED | OUTPATIENT
Start: 2018-03-30 | End: 2018-03-31

## 2018-03-30 RX ORDER — AMLODIPINE BESYLATE 5 MG/1
5 TABLET ORAL DAILY
Status: DISCONTINUED | OUTPATIENT
Start: 2018-03-31 | End: 2018-04-02 | Stop reason: HOSPADM

## 2018-03-30 RX ADMIN — ISODIUM CHLORIDE 3 ML: 0.03 SOLUTION RESPIRATORY (INHALATION) at 01:46

## 2018-03-30 RX ADMIN — ISOSORBIDE MONONITRATE 30 MG: 30 TABLET, EXTENDED RELEASE ORAL at 16:29

## 2018-03-30 RX ADMIN — ASPIRIN 81 MG 81 MG: 81 TABLET ORAL at 09:59

## 2018-03-30 RX ADMIN — FENOFIBRATE 145 MG: 145 TABLET ORAL at 09:59

## 2018-03-30 RX ADMIN — POLYETHYLENE GLYCOL 3350 17 G: 17 POWDER, FOR SOLUTION ORAL at 10:00

## 2018-03-30 RX ADMIN — DOCUSATE SODIUM 100 MG: 100 CAPSULE, LIQUID FILLED ORAL at 21:12

## 2018-03-30 RX ADMIN — ERYTHROMYCIN 0.5 INCH: 5 OINTMENT OPHTHALMIC at 00:19

## 2018-03-30 RX ADMIN — ASCORBIC ACID TAB 250 MG 125 MG: 250 TAB at 09:59

## 2018-03-30 RX ADMIN — LEVALBUTEROL 1.25 MG: 1.25 SOLUTION, CONCENTRATE RESPIRATORY (INHALATION) at 08:35

## 2018-03-30 RX ADMIN — ERYTHROMYCIN 0.5 INCH: 5 OINTMENT OPHTHALMIC at 17:13

## 2018-03-30 RX ADMIN — PRAVASTATIN SODIUM 40 MG: 40 TABLET ORAL at 17:13

## 2018-03-30 RX ADMIN — LEVOTHYROXINE SODIUM 50 MCG: 50 TABLET ORAL at 05:37

## 2018-03-30 RX ADMIN — IPRATROPIUM BROMIDE 0.5 MG: 0.5 SOLUTION RESPIRATORY (INHALATION) at 14:22

## 2018-03-30 RX ADMIN — PANTOPRAZOLE SODIUM 40 MG: 40 TABLET, DELAYED RELEASE ORAL at 17:13

## 2018-03-30 RX ADMIN — ERYTHROMYCIN 0.5 INCH: 5 OINTMENT OPHTHALMIC at 12:42

## 2018-03-30 RX ADMIN — AMIODARONE HYDROCHLORIDE 100 MG: 200 TABLET ORAL at 10:00

## 2018-03-30 RX ADMIN — HEPARIN SODIUM 5000 UNITS: 5000 INJECTION, SOLUTION INTRAVENOUS; SUBCUTANEOUS at 21:11

## 2018-03-30 RX ADMIN — LEVALBUTEROL 1.25 MG: 1.25 SOLUTION, CONCENTRATE RESPIRATORY (INHALATION) at 14:22

## 2018-03-30 RX ADMIN — PANTOPRAZOLE SODIUM 40 MG: 40 TABLET, DELAYED RELEASE ORAL at 05:37

## 2018-03-30 RX ADMIN — ISODIUM CHLORIDE 3 ML: 0.03 SOLUTION RESPIRATORY (INHALATION) at 08:35

## 2018-03-30 RX ADMIN — IPRATROPIUM BROMIDE 0.5 MG: 0.5 SOLUTION RESPIRATORY (INHALATION) at 08:35

## 2018-03-30 RX ADMIN — IPRATROPIUM BROMIDE 0.5 MG: 0.5 SOLUTION RESPIRATORY (INHALATION) at 19:47

## 2018-03-30 RX ADMIN — ISODIUM CHLORIDE 3 ML: 0.03 SOLUTION RESPIRATORY (INHALATION) at 14:22

## 2018-03-30 RX ADMIN — ERYTHROMYCIN 0.5 INCH: 5 OINTMENT OPHTHALMIC at 05:37

## 2018-03-30 RX ADMIN — FUROSEMIDE 40 MG: 10 INJECTION, SOLUTION INTRAMUSCULAR; INTRAVENOUS at 12:42

## 2018-03-30 RX ADMIN — METOPROLOL TARTRATE 25 MG: 25 TABLET ORAL at 21:11

## 2018-03-30 RX ADMIN — LEVALBUTEROL 1.25 MG: 1.25 SOLUTION, CONCENTRATE RESPIRATORY (INHALATION) at 01:46

## 2018-03-30 RX ADMIN — ACETAMINOPHEN 650 MG: 325 TABLET, FILM COATED ORAL at 21:19

## 2018-03-30 RX ADMIN — TOLTERODINE TARTRATE 4 MG: 2 CAPSULE, EXTENDED RELEASE ORAL at 09:59

## 2018-03-30 RX ADMIN — ISODIUM CHLORIDE 3 ML: 0.03 SOLUTION RESPIRATORY (INHALATION) at 19:47

## 2018-03-30 RX ADMIN — IPRATROPIUM BROMIDE 0.5 MG: 0.5 SOLUTION RESPIRATORY (INHALATION) at 01:46

## 2018-03-30 RX ADMIN — HYDRALAZINE HYDROCHLORIDE 25 MG: 25 TABLET, FILM COATED ORAL at 21:12

## 2018-03-30 RX ADMIN — AMLODIPINE BESYLATE 10 MG: 10 TABLET ORAL at 09:59

## 2018-03-30 RX ADMIN — HEPARIN SODIUM 5000 UNITS: 5000 INJECTION, SOLUTION INTRAVENOUS; SUBCUTANEOUS at 05:37

## 2018-03-30 RX ADMIN — METOPROLOL TARTRATE 25 MG: 25 TABLET ORAL at 10:00

## 2018-03-30 RX ADMIN — LEVALBUTEROL 1.25 MG: 1.25 SOLUTION, CONCENTRATE RESPIRATORY (INHALATION) at 19:47

## 2018-03-30 RX ADMIN — VITAM B12 100 MCG: 100 TAB at 09:59

## 2018-03-30 RX ADMIN — HYDRALAZINE HYDROCHLORIDE 25 MG: 25 TABLET, FILM COATED ORAL at 16:28

## 2018-03-30 RX ADMIN — HEPARIN SODIUM 5000 UNITS: 5000 INJECTION, SOLUTION INTRAVENOUS; SUBCUTANEOUS at 13:57

## 2018-03-30 NOTE — CONSULTS
Consultation - Cardiology   Josefina Alfaro 80 y o  male MRN: [id number]  : 10/24/1930  Unit/Bed#: [address] [place] [address] Encounter: [id number]      Assessment & Plan   1  Acute on chronic hypoxic respiratory failure most likely due to worsening of acute on chronic systolic and diastolic heart failure for not taking medicines regularly  2  Acute on chronic systolic and diastolic heart failure [place] heart Association class 2/3  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  3  Known moderate COPD with history of chronic hypoxia on oxygen therapy at home  4  History of severe aortic stenosis status post TAVR in 2017 will update an echo  Had mild paravalvular leak at that time  5  CAD status post angioplasty of LAD in   6  Dyslipidemia on statin  7  Paroxysmal atrial fibrillation currently in sinus suppressed with low-dose amiodarone  8  Hypothyroidism with high TSH  May need to increase the dose of levothyroxine  9  Dyslipidemia on statins  10  Moderate obesity with recent loss of weight  11   Chronic LBBB would benefit from biventricular pacemaker discussed with patient for now he is reluctant as this is 1st episode since his previous valve procedure would like to wait    Summary of Recommendations:        Continue monitoring  IV Lasix today and tomorrow  Will decrease the dose of Lasix to 20 mg  Will start patient on low-dose hydralazine and nitrates  Follow-up electrolytes  Monitor input output and daily weight  Consider increasing dose of levothyroxine if not done  Discussed with patient about biventricular pacemaker as he has LBBB and paradoxical septal motion, so far patient is reluctant would like to wait  With review echo when done  Concur with other Rx provided  Discussed with medical team     Physician Requesting Consult: Gladys Krishna MD    Reason for Consult / Principal Problem:  Shortness of breath    Consults    HPI: Josefina Alfaro is a 80y o  year old male who presents with shortness of breath  Patient has past medical history significant for moderate COPD, chronic combined systolic and diastolic heart failure, severe aortic stenosis status post TAVR in August of 2017, CAD status post angioplasty of LAD, chronic LBBB, CKD stage 3, type 2 diabetes mellitus, dyslipidemia, paroxysmal atrial fibrillation suppressed with amiodarone in sinus rhythm with no reoccurrence, CO2 dependent due to chronic hypoxia came to ED as he had a 10 lb weight gain  Patient says he was not taking all his medications as insurance has changed the supplier  He was managed in ICU on BiPAP and given IV Lasix and has been feeling lot better  Since last year after his procedure this is his 1st admission  He has no PND no orthopnea today  At home it was getting that he cannot lie flat to sleep  No other significant complaint today  Review of Systems   Constitutional: Positive for activity change and appetite change  Eyes: Positive for discharge  Right eyelid this swollen   Respiratory: Positive for cough, shortness of breath and wheezing  Cardiovascular: Positive for leg swelling  Musculoskeletal: Positive for back pain and gait problem  Neurological: Positive for weakness  Psychiatric/Behavioral: The patient is nervous/anxious  All other systems reviewed and are negative        Historical Information   Past Medical History:   Diagnosis Date    Aortic stenosis, severe     BPH (benign prostatic hyperplasia)     CAD (coronary artery disease)     stent 2014    CHF (congestive heart failure) ([facility])     Chronic kidney disease (CKD)     CKD (chronic kidney disease), stage III     CKD (chronic kidney disease), stage III     COPD (chronic obstructive pulmonary disease) ([facility])     Diabetes mellitus type 2, noninsulin dependent ([facility])     Esophagitis, erosive     History of DVT (deep vein thrombosis)     left posterior tibial/peroneal veins    History of non-ST elevation myocardial infarction (NSTEMI)     History of pneumonia     Hyperlipidemia     Hypertension     LBBB (left bundle branch block)     MRSA (methicillin resistant staph aureus) culture positive     Obstructive sleep apnea on CPAP     severe PATITO with AHI of 106 and uses CPAP at 10 cm H2O with 2 l/m oxygen    Paroxysmal atrial fibrillation (HCC)     Pulmonary fibrosis (HCC)     PVD (peripheral vascular disease) ([facility] [address] )      Past Surgical History:   Procedure Laterality Date    CARDIAC CATHETERIZATION  03/10/2016    Showed 60-70% mid LAD lesion next to stent    ESOPHAGOGASTRODUODENOSCOPY N/A 3/14/2016    Procedure: ESOPHAGOGASTRODUODENOSCOPY (EGD); Surgeon: Natalee Jaimes MD;  Location: [facility] LAB;   Service:     EYE SURGERY      FRACTURE SURGERY      JOINT REPLACEMENT      both hips replaced    OH REPLACE AORTIC VALVE OPENFEMORAL ARTERY APPROACH N/A 8/23/2016    Procedure: TRANSFEMORAL TAVR WITH 26MM CALIX MAKAYLA S3 TISSUE VALVE; PINA ;  Surgeon: Nellie Jensen DO;  Location: [facility];  Service: Cardiac Surgery    TISSUE AORTIC VALVE REPLACEMENT      transfemoral, transcatheter    TONSILLECTOMY AND ADENOIDECTOMY      TOTAL HIP ARTHROPLASTY      Bilateral     History   Alcohol Use    Yes     Comment: occasionally     History   Drug Use No     History   Smoking Status    Former Smoker    Packs/day: 1 00    Years: 35 00    Types: Cigarettes    Quit date: 1/1/1987   Smokeless Tobacco    Never Used     Family History:   Family History   Problem Relation Age of Onset    Coronary artery disease Mother        Meds/Allergies    PTA meds:    Prescriptions Prior to Admission   Medication    acetaminophen (TYLENOL) 325 mg tablet    amiodarone 100 mg tablet    amiodarone 100 mg tablet    amLODIPine (NORVASC) 10 mg tablet    amLODIPine (NORVASC) 10 mg tablet    Ascorbic Acid (VITAMIN C) 100 MG tablet    aspirin 81 mg chewable tablet    aspirin 81 MG tablet    B Complex Vitamins (VITAMIN B COMPLEX PO)    cyanocobalamin (VITAMIN B-12) 100 mcg tablet    docusate sodium (COLACE) 100 mg capsule    docusate sodium (COLACE) 100 mg capsule    fenofibrate (TRICOR) 145 mg tablet    fenofibrate (TRICOR) 145 mg tablet    Fluticasone Furoate-Vilanterol (BREO ELLIPTA) 100-25 MCG/INH AEPB    fluticasone furoate-vilanterol (BREO ELLIPTA)    ipratropium-albuterol (DUO-NEB) 0 5-2 5 mg/3 mL    ipratropium-albuterol (DUO-NEB) 0 5-2 5 mg/mL    levothyroxine 50 mcg tablet    metoprolol tartrate (LOPRESSOR) 25 mg tablet    metoprolol tartrate (LOPRESSOR) 25 mg tablet    MIRABEGRON ER PO    oxyCODONE-acetaminophen (PERCOCET) 5-325 mg per tablet    pantoprazole (PROTONIX) 40 mg tablet    polyethylene glycol (MIRALAX) 17 g packet    simvastatin (ZOCOR) 20 mg tablet    simvastatin (ZOCOR) 40 mg tablet    simvastatin (ZOCOR) 40 mg tablet    tadalafil (CIALIS) 2 5 MG tablet    tadalafil (CIALIS) 2 5 MG tablet    tolterodine (DETROL LA) 4 mg 24 hr capsule    tolterodine (DETROL LA) 4 mg 24 hr capsule    torsemide (DEMADEX) 20 mg tablet    vitamin E, tocopherol, 400 units capsule      No Known Allergies    Current Facility-Administered Medications:     acetaminophen (TYLENOL) tablet 650 mg, 650 mg, Oral, Q6H PRN, Alexa Spironello V, CRNP, 650 mg at 03/29/18 2110    amiodarone tablet 100 mg, 100 mg, Oral, Daily, Alexa Spironello V, CRNP, 100 mg at 03/30/18 1000    amLODIPine (NORVASC) tablet 10 mg, 10 mg, Oral, Daily, Alexa Spironello V, CRNP, 10 mg at 03/30/18 0959    ascorbic acid (VITAMIN C) tablet 125 mg, 125 mg, Oral, Daily, Alexa Spironello V, CRNP, 125 mg at 03/30/18 5381    aspirin chewable tablet 81 mg, 81 mg, Oral, Daily, Alexa Spironello V, CRNP, 81 mg at 03/30/18 0959    cyanocobalamin (VITAMIN B-12) tablet 100 mcg, 100 mcg, Oral, Daily, Alexa Gonzales V, CRNP, 100 mcg at 03/30/18 0942    docusate sodium (COLACE) capsule 100 mg, 100 mg, Oral, HS, Alexa Gonzales V, CRNP, 100 mg at 03/29/18 9651   erythromycin (ILOTYCIN) 0 5 % ophthalmic ointment 0 5 inch, 0 5 inch, Right Eye, Q6H CRISTOPHER, Alexa Spironello V, CRNP, 0 5 inch at 03/30/18 1242    fenofibrate (TRICOR) tablet 145 mg, 145 mg, Oral, Daily, Alexa Spironello V, CRNP, 145 mg at 03/30/18 0959    furosemide (LASIX) injection 40 mg, 40 mg, Intravenous, Daily, Valerie aBhena MD, 40 mg at 03/30/18 1242    heparin (porcine) subcutaneous injection 5,000 Units, 5,000 Units, Subcutaneous, Q8H [address], 5,000 Units at 03/30/18 1357 **AND** Platelet count, , , Once, Alexa Spironello V, CRNP    ipratropium (ATROVENT) 0 02 % inhalation solution 0 5 mg, 0 5 mg, Nebulization, Q6H, Alexa Joynello V, CRNP, 0 5 mg at 03/30/18 1422    levalbuterol (XOPENEX) inhalation solution 1 25 mg, 1 25 mg, Nebulization, Q6H, 1 25 mg at 03/30/18 1422 **AND** sodium chloride 0 9 % inhalation solution 3 mL, 3 mL, Nebulization, Q6H, Alexa Spironello V, CRNP, 3 mL at 03/30/18 1422    levothyroxine tablet 50 mcg, 50 mcg, Oral, Early Morning, Alexa Spironello V, CRNP, 50 mcg at 03/30/18 0537    metoprolol tartrate (LOPRESSOR) tablet 25 mg, 25 mg, Oral, Q12H CRISTOPHER, Alexa Spironello V, CRNP, 25 mg at 03/30/18 1000    ondansetron (ZOFRAN) injection 4 mg, 4 mg, Intravenous, Q6H PRN, Alexa Spironello V, CRNP    pantoprazole (PROTONIX) EC tablet 40 mg, 40 mg, Oral, BID AC, Alexa Spironello V, CRNP, 40 mg at 03/30/18 0537    polyethylene glycol (MIRALAX) packet 17 g, 17 g, Oral, Daily, Alexa Spironello V, CRNP, 17 g at 03/30/18 1000    pravastatin (PRAVACHOL) tablet 40 mg, 40 mg, Oral, Daily With Dinner, Glenis Ray Spironello V, CRNP, 40 mg at 03/29/18 1632    tolterodine (DETROL LA) 24 hr capsule 4 mg, 4 mg, Oral, Daily, Alexa Spironello V, CRNP, 4 mg at 03/30/18 0959    VTE Pharmacologic Prophylaxis:   Heparin    Objective:   Vitals: Blood pressure 146/65, pulse 74, temperature 98 4 °F (36 9 °C), temperature source Oral, resp   rate 18, height 5' 10" (1 778 m), weight 96 8 kg (213 lb 6 5 oz), SpO2 93 %  Body mass index is 30 62 kg/m²  BP Readings from Last 3 Encounters:   03/30/18 146/65   11/27/17 150/64   11/07/17 124/52     Orthostatic Blood Pressures    Flowsheet Row Most Recent Value   Blood Pressure  146/65 filed at 03/30/2018 1243   Patient Position - Orthostatic VS  Lying filed at 03/30/2018 1243          Intake/Output Summary (Last 24 hours) at 03/30/18 1448  Last data filed at 03/30/18 0548   Gross per 24 hour   Intake              150 ml   Output             1225 ml   Net            -1075 ml       Invasive Devices     Peripheral Intravenous Line            Peripheral IV 03/28/18 Left Antecubital 1 day                  Physical Exam:   Physical Exam    Neurologic:  Alert & oriented x 3, no new focal deficits, Not in any acute distress,  Constitutional:  Well developed, well nourished, non-toxic appearance   Eyes:  Pupil equal and reacting to light, conjunctiva normal   HENT:  Atraumatic, oropharynx moist, Neck- normal range of motion, no tenderness, supple   Respiratory:  Bilateral air entry, with bilateral rare rhonchi and decreased at the bases positive chronic rales  Cardiovascular: S1-S2 regular with a 3/6 ejection systolic murmur paradoxical S2 split  GI:  Soft, nondistended, normal bowel sounds, nontender, no hepatosplenomegaly appreciated  Musculoskeletal:  No edema, no tenderness, no deformities     Skin:  Well hydrated, no rash   Lymphatic:  No lymphadenopathy noted   Extremities:  No edema and distal pulses are present    Labs:   Troponins:   Results from last 7 days  Lab Units 03/28/18 2034   TROPONIN I ng/mL <0 02       CBC with diff:   Results from last 7 days  Lab Units 03/30/18  0548 03/29/18  0529 03/28/18 2011   WBC Thousand/uL 7 40 7 60 9 30   HEMOGLOBIN g/dL 10 9* 10 4* 11 7*   HEMATOCRIT % 34 0* 31 9* 36 0*   MCV fL 87 87 87   PLATELETS Thousands/uL 217 201 249   MCH pg 28 0 28 3 28 5   MCHC g/dL 32 0 32 5 32 5   RDW % 16 4* 16 4* 16 6* MPV fL 8 3* 8 1* 8 6*   NRBC AUTO /100 WBCs 0 0 0       CMP:   Results from last 7 days  Lab Units 18  0548 18  0529 18   SODIUM mmol/L 140 143 140   POTASSIUM mmol/L 4 3 3 5 4 1   CHLORIDE mmol/L 104 105 103   CO2 mmol/L 28 29 28   ANION GAP mmol/L 8 9 9   BUN mg/dL 28* 21 24   CREATININE mg/dL 1 94* 1 64* 1 65*   GLUCOSE RANDOM mg/dL 95 88 105   CALCIUM mg/dL 9 0 9 1 9 1   AST U/L  --   --  39   ALT U/L  --   --  24   ALK PHOS U/L  --   --  57   TOTAL PROTEIN g/dL  --   --  7 4   BILIRUBIN TOTAL mg/dL  --   --  0 30   EGFR ml/min/1 73sq m 30 37 37       Magnesium:   Results from last 7 days  Lab Units 18  0548 18  0529   MAGNESIUM mg/dL 2 3 1 9     Coags:   Results from last 7 days  Lab Units 18   PTT seconds 27   INR  1 14     TSH:    Results from last 7 days  Lab Units 18  0529   TSH 3RD GENERATON uIU/mL 9 479*          NT-proBNP:   Recent Labs      18   NTBNP  3,795*        Imaging & Testing   Cardiac testing:   Results for orders placed during the hospital encounter of 17   Echo complete with contrast if indicated    Narrative [place] [id number]  [phone]    Transthoracic Echocardiogram  2D, M-mode, Doppler, and Color Doppler    Study date:  18-Sep-2017    Patient: Mikey Prado  MR number: [id number]  Account number: [de-identified]  : 24-Oct-1930  Age: 80 years  Gender: Male  Status: Routine  Location: Echo lab  Height: 70 in  Weight: 211 6 lb  BP: 152/ 71 mmHg    Indications: Follow up    Diagnoses: 424 1 - AORTIC VALVE DISORDER, V43 3 - HEART VALVE REPLAC NEC    Sonographer:  CAMILLA Alcantar  Primary Physician:  Yana Jorgensen DO  Referring Physician:  ROC España  Group:  Jeanette Cardoso  Interpreting Physician:  Liz Hwang MD    SUMMARY    COMPARISONS:  Aortic stenosis has improved following the procedure      LEFT VENTRICLE:  The ventricle was mildly dilated  Systolic function was moderately reduced by visual assessment  Ejection fraction was estimated to be 40 %  There was moderate diffuse hypokinesis with paradoxical septal motion due to LBBB  Wall thickness was mildly increased  There was mild to moderate concentric hypertrophy  Doppler parameters were consistent with abnormal left ventricular relaxation (grade 1 diastolic dysfunction)  VENTRICULAR SEPTUM:  There was moderate paradoxical motion  These changes are consistent with LBBB  LEFT ATRIUM:  The atrium was moderately dilated  RIGHT ATRIUM:  The atrium was mildly dilated  MITRAL VALVE:  There was mild to moderate annular calcification  There was mild regurgitation  AORTIC VALVE:  Pt has TAVR, with peak velocity across the valve 2 26 m/sec, peak gradient of 20 mm of Hg and mean gradient of 9 mm of Hg and calculated FATMATA 1 6 cm2  No paravalvular leak noted  TRICUSPID VALVE:  There was mild to moderate regurgitation  Estimated peak PA pressure was 65 mmHg  PULMONIC VALVE:  There was mild regurgitation  COMPARISONS:  Aortic stenosis has improved following the procedure  HISTORY: PRIOR HISTORY: Aortic valve replace, HTN, CAD, COPD, CHF, A FIB     PROCEDURE: The procedure was performed in the echo lab  This was a routine study  The transthoracic approach was used  The study included complete 2D imaging, M-mode, complete spectral Doppler, and color Doppler  The heart rate was 78 bpm,  at the start of the study  Images were obtained from the parasternal, apical, subcostal, and suprasternal notch acoustic windows  Image quality was adequate  LEFT VENTRICLE: The ventricle was mildly dilated  Systolic function was moderately reduced by visual assessment  Ejection fraction was estimated to be 40 %  There was moderate diffuse hypokinesis with paradoxical septal motion due to LBBB  Wall thickness was mildly increased  There was mild to moderate concentric hypertrophy   DOPPLER: Doppler parameters were consistent with abnormal left ventricular relaxation (grade 1 diastolic dysfunction)  VENTRICULAR SEPTUM: Thickness was mildly increased  There was moderate paradoxical motion  These changes are consistent with LBBB  RIGHT VENTRICLE: The size was normal  Systolic function was normal     LEFT ATRIUM: The atrium was moderately dilated  RIGHT ATRIUM: The atrium was mildly dilated  MITRAL VALVE: There was mild to moderate annular calcification  There was mild thickening  DOPPLER: There was mild regurgitation  AORTIC VALVE: Pt has TAVR, with peak velocity across the valve 2 26 m/sec, peak gradient of 20 mm of Hg and mean gradient of 9 mm of Hg and calculated FATMATA 1 6 cm2  No paravalvular leak noted  DOPPLER: There was no evidence for stenosis  There was no significant regurgitation  TRICUSPID VALVE: DOPPLER: There was mild to moderate regurgitation  Estimated peak PA pressure was 65 mmHg  The findings suggest moderate to severe pulmonary hypertension  PULMONIC VALVE: DOPPLER: There was mild regurgitation  PERICARDIUM: There was no thickening or calcification  There was no pericardial effusion  AORTA: The root exhibited normal size  SYSTEMIC VEINS: IVC: The inferior vena cava was normal in size   Respirophasic changes were normal     SYSTEM MEASUREMENT TABLES    2D mode  AoR Diam 2D: 3 3 cm  LA Diam (2D): 4 2 cm  LA/Ao (2D): 1 27  FS (2D Teich): 11 1 %  IVSd (2D): 1 37 cm  LVDEV: 154 cm³  LVEDV MOD BP: 196 cm³  LVESV: 117 cm³  LVIDd(2D): 5 6 cm  LVISd (2D): 4 98 cm  LVOT Area 2D: 3 14 cm squared  LVPWd (2D): 1 33 cm  SV (Teich): 37 cm³    Apical four chamber  LVEF A4C: 28 %    Apical two chamber  LA Area: 28 1 cm squared  LA Volume: 106 cm³  LVEF A2C: 34 %    Unspecified Scan Mode  FATMATA Cont Eq (Peak Psaquale): 0 82 cm squared  FATMATA Cont Eq (VTI): 1 52 cm squared  LVOT (VTI): 20 5 cm  LVOT Diam : 2 cm  LVOT Vmax: 1160 mm/s  LVOT Vmax; Mean: 595 mm/s  Peak Grad ; Mean: 1 mm[Hg]  SV (LVOT): 64 cm³  VTI;Mean: 2 mm[Hg]  MV Peak A Pasquale: 1180 mm/s  MV Peak E Pasquale  Mean: 726 mm/s  MVA (PHT): 4 07 cm squared  PHT: 54 ms  Max P mm[Hg]  V Max: 3590 mm/s  Vmax: 3660 mm/s  RA Area: 23 6 cm squared  RA Volume: 75 5 cm³  TAPSE: 2 5 cm    [facility]    Prepared and electronically signed by    Magui Contreras MD  Signed 19-Sep-2017 08:17:58         Imaging: I have personally reviewed pertinent reports  Xr Chest 1 View Portable    Result Date: 3/29/2018  Narrative: CHEST INDICATION:   SOB  COMPARISON:  10/16/2017 EXAM PERFORMED/VIEWS:  XR CHEST PORTABLE FINDINGS: Cardiomediastinal silhouette appears enlarged prosthetic aortic valve  Chronic changes  Chronic right pleural thickening  No infiltrates  Osseous structures appear within normal limits for patient age  Impression: No acute cardiopulmonary disease  Cardiomegaly and chronic changes  Workstation performed: [id number]     Xr Chest Portable Icu    Result Date: 3/29/2018  Narrative: CHEST INDICATION:   follow up diuresis  Shortness of breath  COMPARISON:  Chest radiographs 2018 EXAM PERFORMED/VIEWS:  XR CHEST PORTABLE ICU  AP portable semierect view in the conventional and line placement techniques  Images: 2 FINDINGS: The heart is enlarged  Atherosclerotic changes in the aorta  Lung volumes diminished  Stable small bilateral pleural effusions  Bisi and pulmonary vessels diffusely enlarged  Diffuse groundglass infiltrates throughout the lungs bilaterally  These have progressed  Osseous structures appear within normal limits for patient age  Impression: Progressive congestive heart failure  Workstation performed: [id number]     EKG/ Monitor: Personally reviewed     LBBB which is chronic     Code Status: Level 2 - DNAR: but accepts endotracheal intubation  Advance Directive and Living Will: Yes    POLST:       Dr Magui Contreras MD [facility]      "This note has been constructed using a voice recognition system  Therefore there may be syntax, spelling, and/or grammatical errors   Please call if you have any questions  "

## 2018-03-30 NOTE — PROGRESS NOTES
Progress Note - Brady Buck 10/24/1930, 80 y o  male MRN: [id number]    Unit/Bed#: [address] [place] [address] Encounter: [id number]    Primary Care Provider: Dorothy Gipson DO   Date and time admitted to hospital: 3/28/2018  8:01 PM        Chronic combined systolic and diastolic CHF (congestive heart failure) [facility])   Assessment & Plan    · Last echo done November 2017: EF 40% G1DD  Mild MR  Mild to moderate TR  Estimated PA systolic pressure 65  FATMATA 1 6 cm2  No paravalvular leak noted, repeat echo follow up with Cardiology  · Give another dose of Lasix 40mg IV x1 and can restart home Demadex tomorrow  · Continue metoprolol        * Acute exacerbation of CHF (congestive heart failure) ([facility] [address] )   Assessment & Plan    Was on BiPAP 12/6 40% in the ed and Received 80 mg IV Lasix in the ED on admission with good diuresis  · Monitor urine output, strict I&O  · Daily weight  · On Home O2 of 6-7L NC round the clock, currently  2-3 L of oxygen  · Another dose of lasix 40 mg IV today, creatinine has been between 1 6-2 monitor creatinine closely  · Will give 1 more dose of IV Lasix and start Demadex tomorrow  If will also consult Cardiology for further recommendation  Will repeat the echo  · Chest x-ray done yesterday showed worsening of the CHF we will give another dose of IV Lasix today and try to change to [facility] tomorrow and repeat chest x-ray  GERD (gastroesophageal reflux disease)   Assessment & Plan    Continue proton        Anemia   Assessment & Plan    · Chronic anemia secondary to chronic kidney disease  · Trend hemoglobin daily           Bacterial conjunctivitis of right eye   Assessment & Plan    · Erythromycin ointment q 6 hours        Hypothyroidism   Assessment & Plan    TSH is elevated check free T4 continue Synthroid may have to increase the dose of Synthroid          Acute on chronic respiratory failure with hypoxia [facility])   Assessment & Plan    Improving continued currently he is on 2-3 L of nasal cannula at home he uses about 6-7 L of nasal cannula during the day continue CPAP at night        History of atrial fibrillation   Assessment & Plan    Continue amiodarone, patient normal sinus rhythm patient has been refusing long-term anticoagulation follow up with Cardiology  Peripheral vascular disease [facility])   Assessment & Plan    Continue medical management        Multiple pulmonary nodules   Assessment & Plan    Outpatient follow-up        PATITO (obstructive sleep apnea)   Assessment & Plan    On CPAP at night        S/P TAVR (transcatheter aortic valve replacement)   Assessment & Plan    · No perivalvular leak noted on last echo November 2017, follow up on the echo and cardiology        Hypertension   Assessment & Plan    Continue Norvasc 2  Hyperlipidemia   Assessment & Plan    On pravastatin and Tricor        Diabetes mellitus type 2, noninsulin dependent (HCC)   Assessment & Plan    · Hemoglobin A1c 5 9, has been off any regimen at home  · Blood glucose well controlled on BMP, will initiate point of care glucose testing  Will add SSI coverage if needed        CKD (chronic kidney disease), stage III   Assessment & Plan    History of chronically disease the baseline creatinine is between 2-1 6    Monitor creatinine closely with the IV Lasix         BPH (benign prostatic hyperplasia)   Assessment & Plan    Continue to monitor urine output no signs of retention        CAD in native artery   Assessment & Plan    · Drug eluting stent in 2014  · Continue aspirin and metoprolol, continue medical management with  beta blocker and statin        COPD, moderate ([address] )   Assessment & Plan    FEV1 1 85 L or 76% of predicted as of Nov 2017, uses Breo Ellipta and Duoneb tx at home  · Continue Atrovent and Xopenex q 6 hours                VTE Pharmacologic Prophylaxis:   Pharmacologic: Heparin  Mechanical VTE Prophylaxis in Place: Yes    Patient Centered Rounds: I have performed bedside rounds with nursing staff today     Discussions with Specialists or Other Care Team Provider:  Will consult Cardiology    Education and Discussions with Family / Patient:  Patient  Time Spent for Care: 20 minutes  More than 50% of total time spent on counseling and coordination of care as described above  Current Length of Stay: 2 day(s)    Current Patient Status: Inpatient   Certification Statement: The patient will continue to require additional inpatient hospital stay due to CHF exacerbation    Discharge Plan:  Pending physical therapy    Code Status: Level 2 - DNAR: but accepts endotracheal intubation      Subjective:   Feels better shortness of breath has improved no chest pain    Objective:     Vitals:   Temp (24hrs), Av 9 °F (36 6 °C), Min:97 6 °F (36 4 °C), Max:98 4 °F (36 9 °C)    HR:  [58-74] 74  Resp:  [14-27] 18  BP: (137-161)/(64-72) 146/65  SpO2:  [93 %-98 %] 93 %  Body mass index is 30 62 kg/m²  Input and Output Summary (last 24 hours): Intake/Output Summary (Last 24 hours) at 18 1248  Last data filed at 18 0548   Gross per 24 hour   Intake              275 ml   Output             1225 ml   Net             -950 ml       Physical Exam:     Physical Exam   Constitutional: He is oriented to person, place, and time  He appears well-developed and well-nourished  No distress  HENT:   Head: Normocephalic and atraumatic  Right Ear: External ear normal    Left Ear: External ear normal    Eyes: Conjunctivae and EOM are normal  Pupils are equal, round, and reactive to light  No scleral icterus  Neck: Normal range of motion  Neck supple  No JVD present  No tracheal deviation present  No thyromegaly present  Cardiovascular: Normal rate and regular rhythm  No murmur heard  Pulmonary/Chest: Effort normal  No stridor  He has rales  Decreased breath sounds at bases   Abdominal: Soft  Bowel sounds are normal  He exhibits no distension  There is no tenderness  There is no rebound     Musculoskeletal: Trace edema   Neurological: He is alert and oriented to person, place, and time  No cranial nerve deficit  Coordination normal    Skin: Skin is warm and dry  No rash noted  No erythema  Psychiatric: His behavior is normal        Additional Data:     Labs:      Results from last 7 days  Lab Units 03/30/18  0548   WBC Thousand/uL 7 40   HEMOGLOBIN g/dL 10 9*   HEMATOCRIT % 34 0*   PLATELETS Thousands/uL 217   NEUTROS PCT % 62   LYMPHS PCT % 25   MONOS PCT % 8   EOS PCT % 5       Results from last 7 days  Lab Units 03/30/18  0548  03/28/18  2035   SODIUM mmol/L 140  < > 140   POTASSIUM mmol/L 4 3  < > 4 1   CHLORIDE mmol/L 104  < > 103   CO2 mmol/L 28  < > 28   BUN mg/dL 28*  < > 24   CREATININE mg/dL 1 94*  < > 1 65*   CALCIUM mg/dL 9 0  < > 9 1   TOTAL PROTEIN g/dL  --   --  7 4   BILIRUBIN TOTAL mg/dL  --   --  0 30   ALK PHOS U/L  --   --  57   ALT U/L  --   --  24   AST U/L  --   --  39   GLUCOSE RANDOM mg/dL 95  < > 105   < > = values in this interval not displayed  Results from last 7 days  Lab Units 03/28/18  2011   INR  1 14       * I Have Reviewed All Lab Data Listed Above  * Additional Pertinent Lab Tests Reviewed:  [address] Admission Reviewed    Imaging:    Imaging Reports Reviewed Today Include:  None today  Imaging Personally Reviewed by Myself Includes:  None today  Recent Cultures (last 7 days):       Results from last 7 days  Lab Units 03/29/18  0850 03/29/18  0049   SPUTUM CULTURE  Culture too young- will reincubate  --    GRAM STAIN RESULT  Rare Polys  Rare Epithelial Cells  2+ Gram positive cocci in pairs, chains and clusters  2+ Gram negative diplococci  1+ Gram negative rods  1+ Gram positive rods  --    INFLUENZA A PCR   --  None Detected   INFLUENZA B PCR   --  None Detected   RSV PCR   --  None Detected       Last 24 Hours Medication List:     Current Facility-Administered Medications:  acetaminophen 650 mg Oral Q6H PRN ROC Ewing   amiodarone 100 mg Oral Daily Alexa Spironello V, CRNP   amLODIPine 10 mg Oral Daily Alexa Spironello V, CRNP   vitamin C 125 mg Oral Daily Alexa Spironello V, CRNP   aspirin 81 mg Oral Daily Alexa Spironello V, CRNP   cyanocobalamin 100 mcg Oral Daily Alexa Spironello V, CRNP   docusate sodium 100 mg Oral HS Alexa Spironello V, CRNP   erythromycin 0 5 inch Right Eye Q6H [address] Alexa Spironello V, CRNP   fenofibrate 145 mg Oral Daily Alexa Spironello V, CRNP   furosemide 40 mg Intravenous Daily Valerie Bahena MD   heparin (porcine) 5,000 Units Subcutaneous Q8H [address] Alexa Spironello V, CRNP   ipratropium 0 5 mg Nebulization Q6H Alexa Spironello V, CRNP   levalbuterol 1 25 mg Nebulization Q6H Alexa Spironello V, CRNP   And       sodium chloride 3 mL Nebulization Q6H Alexa Spironello V, CRNP   levothyroxine 50 mcg Oral Early Morning Alexa Spironello V, CRNP   metoprolol tartrate 25 mg Oral Q12H [address] Alexa Spironello V, CRNP   ondansetron 4 mg Intravenous Q6H PRN Alexa Spironello V, CRNP   pantoprazole 40 mg Oral BID AC Alexa Spironello V, CRNP   polyethylene glycol 17 g Oral Daily Alexa Spironello V, CRNP   pravastatin 40 mg Oral Daily With Prizeo V, CRNP   tolterodine 4 mg Oral Daily Alexa Spironello V, CRNP        Today, Patient Was Seen By: Emiliano Liu MD    ** Please Note: Dictation voice to text software may have been used in the creation of this document   **

## 2018-03-30 NOTE — CASE MANAGEMENT
Continued Stay Review    Date: 3/30/18    Vital Signs: /65 (BP Location: Right arm)   Pulse 74   Temp 98 4 °F (36 9 °C) (Oral)   Resp 18   Ht 5' 10" (1 778 m)   Wt 96 8 kg (213 lb 6 5 oz)   SpO2 93%   BMI 30 62 kg/m²       TELE MON  OXYGEN   RESPIRATORY PROTOCOL  CPAP  CONSULT CARDIOLOGY  PTOT  CMP BNP  IS  Medications:   Scheduled Meds:   Current Facility-Administered Medications:  acetaminophen 650 mg Oral Q6H PRN Alexa Spironello V, CRNP   amiodarone 100 mg Oral Daily Alexa Spironello V, CRNP   amLODIPine 10 mg Oral Daily Alexa Spironello V, CRNP   vitamin C 125 mg Oral Daily Alexa Spironello V, CRNP   aspirin 81 mg Oral Daily Alexa Spironello V, CRNP   cyanocobalamin 100 mcg Oral Daily Alexa Spironello V, CRNP   docusate sodium 100 mg Oral HS Alexa Gerardo V, CRNP   erythromycin 0 5 inch Right Eye Q6H [facility] Alexa Joynello V, CRNP   fenofibrate 145 mg Oral Daily Alexa Spironello V, CRNP   furosemide 40 mg Intravenous Daily Valerie Bahena MD   heparin (porcine) 5,000 Units Subcutaneous Q8H [facility] Alexa Joynello V, CRNP   ipratropium 0 5 mg Nebulization Q6H Alexa Spironello V, CRNP   levalbuterol 1 25 mg Nebulization Q6H Alexa Gerardo V, CRNP   And       sodium chloride 3 mL Nebulization Q6H Alexa Gerardo V, CRNP   levothyroxine 50 mcg Oral Early Morning Alexa Spironello V, CRNP   metoprolol tartrate 25 mg Oral Q12H [facility] Alexa Spironello V, CRNP   ondansetron 4 mg Intravenous Q6H PRN Alexa Spironello V, CRNP   pantoprazole 40 mg Oral BID AC Alexa Spironello V, CRNP   polyethylene glycol 17 g Oral Daily Alexa Spironello V, CRNP   pravastatin 40 mg Oral Daily With [employer] V, CRNP   tolterodine 4 mg Oral Daily Alexa Spironello V, CRNP     Continuous Infusions:    PRN Meds:   acetaminophen    ondansetron    Abnormal Labs/Diagnostic Results: BUN/CR 28/1 94   HEMOGLOBIN g/dL 10 9*   HEMATOCRIT % 34 0*   CXR  3/29/18 Progressive congestive heart failure  Age/Sex: 80 y o  male     ATTENDING  TRANSFERRED FROM ICU TODAY  Certification Statement: The patient will continue to require additional inpatient hospital stay due to CHF exacerbation       Chronic combined systolic and diastolic CHF (congestive heart failure) [facility])   Assessment & Plan     · Last echo done November 2017: EF 40% G1DD  Mild MR  Mild to moderate TR  Estimated PA systolic pressure 65  FATMATA 1 6 cm2  No paravalvular leak noted, repeat echo follow up with Cardiology  · Give another dose of Lasix 40mg IV x1 and can restart home Demadex tomorrow  · Continue metoprolol          * Acute exacerbation of CHF (congestive heart failure) ([facility] [address] )   Assessment & Plan     Was on BiPAP 12/6 40% in the ed and Received 80 mg IV Lasix in the ED on admission with good diuresis  · Monitor urine output, strict I&O  · Daily weight  · On Home O2 of 6-7L NC round the clock, currently  2-3 L of oxygen  · Another dose of lasix 40 mg IV today, creatinine has been between 1 6-2 monitor creatinine closely  · Will give 1 more dose of IV Lasix and start Demadex tomorrow  If will also consult Cardiology for further recommendation  Will repeat the echo  · Chest x-ray done yesterday showed worsening of the CHF we will give another dose of IV Lasix today and try to change to [facility] tomorrow and repeat chest x-ray         GERD (gastroesophageal reflux disease)   Assessment & Plan     Continue proton      Anemia   Assessment & Plan     · Chronic anemia secondary to chronic kidney disease  · Trend hemoglobin daily   Bacterial conjunctivitis of right eye   Assessment & Plan     · Erythromycin ointment q 6 hours   Hypothyroidism   Assessment & Plan     TSH is elevated check free T4 continue Synthroid may have to increase the dose of Synthroid     Acute on chronic respiratory failure with hypoxia [facility])   Assessment & Plan     Improving continued currently he is on 2-3 L of nasal cannula at home he uses about 6-7 L of nasal cannula during the day continue CPAP at night      History of atrial fibrillation   Assessment & Plan     Continue amiodarone, patient normal sinus rhythm patient has been refusing long-term anticoagulation follow up with Cardiology       Peripheral vascular disease ([facility] [address] )   Assessment & Plan     Continue medical management      Multiple pulmonary nodules   Assessment & Plan     Outpatient follow-up      PATITO (obstructive sleep apnea)   Assessment & Plan     On CPAP at night      S/P TAVR (transcatheter aortic valve replacement)   Assessment & Plan     · No perivalvular leak noted on last echo November 2017, follow up on the echo and cardiology      Hypertension   Assessment & Plan     Continue Norvasc 2       Hyperlipidemia   Assessment & Plan     On pravastatin and Tricor      Diabetes mellitus type 2, noninsulin dependent (HCC)   Assessment & Plan     · Hemoglobin A1c 5 9, has been off any regimen at home  · Blood glucose well controlled on BMP, will initiate point of care glucose testing   Will add SSI coverage if needed      CKD (chronic kidney disease), stage III   Assessment & Plan     History of chronically disease the baseline creatinine is between 2-1 6    Monitor creatinine closely with the IV Lasix       BPH (benign prostatic hyperplasia)   Assessment & Plan     Continue to monitor urine output no signs of retention      CAD in native artery   Assessment & Plan     · Drug eluting stent in 2014  · Continue aspirin and metoprolol, continue medical management with  beta blocker and statin      COPD, moderate (HCC)   Assessment & Plan     FEV1 1 85 L or 76% of predicted as of Nov 2017, uses Breo Ellipta and Duoneb tx at home  · Continue Atrovent and Xopenex q 6 hours

## 2018-03-30 NOTE — PLAN OF CARE
Problem: DISCHARGE PLANNING - CARE MANAGEMENT  Goal: Discharge to post-acute care or home with appropriate resources  INTERVENTIONS:  - Conduct assessment to determine patient/family and health care team treatment goals, and need for post-acute services based on payer coverage, community resources, and patient preferences, and barriers to discharge  - Address psychosocial, clinical, and financial barriers to discharge as identified in assessment in conjunction with the patient/family and health care team  - Arrange appropriate level of post-acute services according to patient's   needs and preference and payer coverage in collaboration with the physician and health care team  - Communicate with and update the patient/family, physician, and health care team regarding progress on the discharge plan  - Arrange appropriate transportation to post-acute venues  [address]     Outcome: Progressing

## 2018-03-30 NOTE — ASSESSMENT & PLAN NOTE
· Drug eluting stent in 2014  · Continue aspirin and metoprolol, continue medical management with  beta blocker and statin

## 2018-03-30 NOTE — CASE MANAGEMENT
Continued Stay Review     Date: 3/29/18     Vital Signs: /67   Pulse 96   Temp 97 9 °F (36 6 °C) (Temporal)   Resp 14   Ht 5' 10" (1 778 m)   Wt 98 4 kg (216 lb 14 9 oz)   SpO2 96%   BMI 31 13 kg/m²      TRANSFER TO [facility] LATER TODAY  CBC DIFF BMP MG PHOS   BIPAP PRN  CPAP HS  OXYGEN  RESPIRATORY PROTOCOL  Medications:   Scheduled Meds:   Current Facility-Administered Medications:  acetaminophen 650 mg Oral Q6H PRN Alexa Spironello V, CHICHINP   amiodarone 100 mg Oral Daily Alexa Spironello V, CRNP   amLODIPine 10 mg Oral Daily Alexa Spironello BILLIE, CRNP   vitamin C 125 mg Oral Daily Alexa Spironello V, CHICHINP   aspirin 81 mg Oral Daily Alexa Spironello V, CRNP   cyanocobalamin 100 mcg Oral Daily Alexa Spironello V, CHICHINP   docusate sodium 100 mg Oral HS Alexa Joynello V, CHICHINP   erythromycin 0 5 inch Right Eye Q6H [address] Alexa Spironello BILLIE, CHICHINP   fenofibrate 145 mg Oral Daily Alexa Spironello BILLIE, CHICHINP   furosemide 40 mg Intravenous Once ROC Ramsey   heparin (porcine) 5,000 Units Subcutaneous Q8H [address] Alexa Spironello BILLIE, CHICHINP   ipratropium 0 5 mg Nebulization Q6H Alexa Spironello BILLIE, CHICHINP   levalbuterol 1 25 mg Nebulization Q6H Alexa Gerardo BILLIE, ROC   And           sodium chloride 3 mL Nebulization Q6H Alexa Joynello BILLIE, CHICHINP   levothyroxine 50 mcg Oral Early Morning Alexa Spironello V, CRNP   magnesium sulfate 2 g Intravenous Once Klarissa Sharma, ROC   metoprolol tartrate 25 mg Oral Q12H [address] Alexa Spironello BILLIE, ROC   ondansetron 4 mg Intravenous Q6H PRN Alexa Spironello V, CRNP   pantoprazole 40 mg Oral BID AC Alexa Spironello V, ROC   polyethylene glycol 17 g Oral Daily Alexa Spironello V, CHICHINP   pravastatin 40 mg Oral Daily With [employer]mit[employer] V, ROC   tolterodine 4 mg Oral Daily ROC Ewing      Continuous Infusions:    PRN Meds:   acetaminophen    ondansetron     Abnormal Labs/Diagnostic Results: HGB 10 4 CR 1 64 GFR 37 TSH 9 479  CXR=Progressive congestive heart failure  Age/Sex: 80 y o  male      ICU  Acute exacerbation of CHF (congestive heart failure) ([facility] [address] )   Assessment & Plan     ED started on BiPAP 12/6 40% and Received 80 mg IV Lasix in the ED on admission with good diuresis  · Monitor urine output, strict I&O  · Daily weight  · On Home O2 of 6-7L NC round the clock  · Another dose of lasix 40 mg IV today  · Can restart home Demadex tomorrow      Acute on chronic respiratory failure with hypoxia (HCC)   Assessment & Plan     · Patient wears 7 L of O2 at home and CPAP at HS  · Continue CPAP at night, 6-7L NC during the day      COPD, moderate (HCC)   Assessment & Plan     FEV1 1 85 L or 76% of predicted as of Nov 2017, uses Luiza and Duoneb tx at home  · Continue Atrovent and Xopenex q 6 hours       Chronic combined systolic and diastolic CHF (congestive heart failure) [facility])   Assessment & Plan     · Last echo done November 2017: EF 40% G1DD  Mild MR  Mild to moderate TR  Estimated PA systolic pressure 65  FATMATA 1 6 cm2  No paravalvular leak noted  · Give another dose of Lasix 40mg IV x1 and can restart home Demadex tomorrow  · Continue metoprolol      Bacterial conjunctivitis of right eye   Assessment & Plan     · Erythromycin ointment q 6 hours      CAD in native artery   Assessment & Plan     · Drug eluting stent in 2014  · Continue aspirin and metoprolol      History of atrial fibrillation   Assessment & Plan     · Continue home dose amiodarone 100 mg p o   Daily  · Patient is currently in normal sinus rhythm  · Per Cardiology report patient refuses long-term anticoagulation      PATITO (obstructive sleep apnea)   Assessment & Plan     · BiPAP at HS      Hypertension   Assessment & Plan     · Continue Norvasc and metoprolol      Diabetes mellitus type 2, noninsulin dependent (HCC)   Assessment & Plan     · Hemoglobin A1c 5 9, has been off any regimen at home  · Blood glucose well controlled on BMP, will initiate point of care glucose testing    Will add SSI coverage if needed      Hyperlipidemia   Assessment & Plan     · Continue pravastatin  · Continue Tricor       CKD (chronic kidney disease), stage III   Assessment & Plan     · Baseline creatinine appears to be between 1 6 and 1 8  · Patient at baseline, continue to trend serum creatinine  · Avoid nephrotoxic agents      Anemia   Assessment & Plan     · Chronic anemia secondary to chronic kidney disease  · Trend hemoglobin daily      GERD (gastroesophageal reflux disease)   Assessment & Plan     · Continue home Protonix      Hypothyroidism   Assessment & Plan     · Continue Synthroid  · Check TSH in a m       S/P TAVR (transcatheter aortic valve replacement)   Assessment & Plan     · No perivalvular leak noted on last echo November 2017

## 2018-03-30 NOTE — PLAN OF CARE
Problem: PHYSICAL THERAPY ADULT  Goal: Performs mobility at highest level of function for planned discharge setting  See evaluation for individualized goals  Outcome: Progressing  Prognosis: Good  Problem List: Decreased strength, Decreased range of motion, Decreased endurance, Impaired balance, Decreased mobility, Decreased coordination, Pain  Assessment: Patient seen for Physical Therapy evaluation  Patient admitted with Acute exacerbation of CHF (congestive heart failure) ([facility] [address] )  Comorbidities affecting patient's physical performance include moderate COPD, chronic hypoxic respiratory failure, chronic combined systolic and diastolic heart failure, DM2, Afib, BPH, s/p TAVR, CKD III, CAD :    Personal factors affecting patient at time of initial evaluation include: ambulating with assistive device, inability to ambulate household distances, inability to navigate community distances, inability to navigate level surfaces without external assistance, inability to perform dynamic tasks in community, inability to perform physical activity, inability to perform ADLS and inability to perform IADLS   Prior to admission, patient was independent with functional mobility with cane, independent with ADLS, requiring assist for IADLS, ambulating household distance and home with family assist   Please find objective findings from Physical Therapy assessment regarding body systems outlined above with impairments and limitations including weakness, decreased ROM, impaired balance, decreased endurance, impaired coordination, gait deviations, pain, decreased activity tolerance, decreased functional mobility tolerance, fall risk and SOB upon exertion  The Barthel Index was used as a functional outcome tool presenting with a score of 50 today indicating marked limitations of functional mobility and ADLS    Patient's clinical presentation is currently unstable/unpredictable as seen in patient's presentation of vital sign response, changing level of pain, increased fall risk, new onset of impairment of functional mobility, decreased endurance and new onset of weakness  Pt would benefit from continued Physical Therapy treatment to address deficits as defined above and maximize level of functional mobility  As demonstrated by objective findings, the assigned level of complexity for this evaluation is high  Recommendation: Home with family support, Home PT          See flowsheet documentation for full assessment

## 2018-03-30 NOTE — PROGRESS NOTES
Pt was transfer to  329 via wheelchair with O2 @ 5l/min  Full weight bearing, able to ambulate with slow steady gait with contact guard assist  No c/o SOB  Tolerated it well  Pt  will notify family regarding transfer

## 2018-03-30 NOTE — ASSESSMENT & PLAN NOTE
· Last echo done November 2017: EF 40% G1DD  Mild MR  Mild to moderate TR  Estimated PA systolic pressure 65  FATMATA 1 6 cm2   No paravalvular leak noted, repeat echo follow up with Cardiology  · Give another dose of Lasix 40mg IV x1 and can restart home Demadex tomorrow  · Continue metoprolol

## 2018-03-30 NOTE — PHYSICAL THERAPY NOTE
PT TREATMENT     03/30/18 1443   Pain Assessment   Pain Assessment No/denies pain   Restrictions/Precautions   Other Precautions O2;Fall Risk   General   Chart Reviewed Yes   Family/Caregiver Present No   Cognition   Overall Cognitive Status WFL   Arousal/Participation Cooperative   Orientation Level Oriented X4   Following Commands Follows all commands and directions without difficulty   Subjective   Subjective "I have had my left hip since 1991"   Bed Mobility   Supine to Sit 4  Minimal assistance   Transfers   Sit to Stand 4  Minimal assistance   Stand to Sit 4  Minimal assistance   Ambulation/Elevation   Gait pattern Forward Flexion   Gait Assistance 4  Minimal assist   Assistive Device Rolling walker   Distance 30 feet around room with O2 tubing ; 4 L   Activity Tolerance   Activity Tolerance Patient limited by fatigue   Exercises   Hip Flexion Sitting;15 reps;Bilateral   Knee AROM Long Arc Quad Sitting;15 reps;Bilateral   Ankle Pumps Sitting;15 reps;Bilateral  (heel toe raises)   Balance training  Standing with Supervision for use of urinal prior to walking   Goals   Patient Goals to go home   Plan   Treatment/Interventions Functional transfer training; Therapeutic exercise; Endurance training;Patient/family training;Equipment eval/education; Bed mobility;Gait training   Progress Progressing toward goals   Recommendation   Recommendation Home with family support;Home PT   RN acquired long O2 tubing and attachments and PT cut and fit tubing and connected for use walking within room  In chair with all needs in reach and chair alarm at end of session  Hua Artis PT  [id number]

## 2018-03-30 NOTE — ASSESSMENT & PLAN NOTE
Improving continued currently he is on 2-3 L of nasal cannula at home he uses about 6-7 L of nasal cannula during the day continue CPAP at night

## 2018-03-30 NOTE — ASSESSMENT & PLAN NOTE
Continue amiodarone, patient normal sinus rhythm patient has been refusing long-term anticoagulation follow up with Cardiology

## 2018-03-30 NOTE — ASSESSMENT & PLAN NOTE
Was on BiPAP 12/6 40% in the ed and Received 80 mg IV Lasix in the ED on admission with good diuresis  · Monitor urine output, strict I&O  · Daily weight  · On Home O2 of 6-7L NC round the clock, currently  2-3 L of oxygen  · Another dose of lasix 40 mg IV today, creatinine has been between 1 6-2 monitor creatinine closely  · Will give 1 more dose of IV Lasix and start Demadex tomorrow  If will also consult Cardiology for further recommendation  Will repeat the echo  · Chest x-ray done yesterday showed worsening of the CHF we will give another dose of IV Lasix today and try to change to [facility] tomorrow and repeat chest x-ray

## 2018-03-30 NOTE — SOCIAL WORK
DASH discussion completed  Discussed goals of making sure pt's needs are met upon discharge, pt's preferences are taken into account, pt understands her health condition, medications and symptoms to watch for after returning home and pt is aware of any follow up appointments recommended by hospital physician  SPOKE WITH THE PT AT THE BEDSIDE  PT NOTED THAT HE LIVES WITH HIS WIFE AND NO LONGER HAS HIS HHA OR ANY HHC AT THIS TIME  PT HAS A CARE, WALKER AND ROLATOR  HE HAS O2, NEB MACHINE AND SLEEP MACHINE  [facility]  PT DOES NOT DRIVE HIS FAMILY DOES     [facility] IN [place], [place]

## 2018-03-30 NOTE — ASSESSMENT & PLAN NOTE
History of chronically disease the baseline creatinine is between 2-1 6    Monitor creatinine closely with the IV Lasix

## 2018-03-30 NOTE — ASSESSMENT & PLAN NOTE
FEV1 1 85 L or 76% of predicted as of Nov 2017, uses Breo Ellipta and Duoneb tx at home  · Continue Atrovent and Xopenex q 6 hours

## 2018-03-31 PROBLEM — I50.43 ACUTE ON CHRONIC COMBINED SYSTOLIC AND DIASTOLIC CHF (CONGESTIVE HEART FAILURE) (HCC): Status: ACTIVE | Noted: 2017-05-08

## 2018-03-31 PROBLEM — R53.1 GENERALIZED WEAKNESS: Status: ACTIVE | Noted: 2018-03-31

## 2018-03-31 LAB
ALBUMIN SERPL BCP-MCNC: 2.6 G/DL (ref 3.5–5)
ALP SERPL-CCNC: 41 U/L (ref 46–116)
ALT SERPL W P-5'-P-CCNC: 17 U/L (ref 12–78)
ANION GAP SERPL CALCULATED.3IONS-SCNC: 8 MMOL/L (ref 4–13)
AST SERPL W P-5'-P-CCNC: 33 U/L (ref 5–45)
BILIRUB SERPL-MCNC: 0.3 MG/DL (ref 0.2–1)
BUN SERPL-MCNC: 33 MG/DL (ref 5–25)
CALCIUM SERPL-MCNC: 8.6 MG/DL (ref 8.3–10.1)
CHLORIDE SERPL-SCNC: 101 MMOL/L (ref 100–108)
CO2 SERPL-SCNC: 29 MMOL/L (ref 21–32)
CREAT SERPL-MCNC: 1.94 MG/DL (ref 0.6–1.3)
GFR SERPL CREATININE-BSD FRML MDRD: 30 ML/MIN/1.73SQ M
GLUCOSE SERPL-MCNC: 95 MG/DL (ref 65–140)
NT-PROBNP SERPL-MCNC: 2849 PG/ML
POTASSIUM SERPL-SCNC: 4.1 MMOL/L (ref 3.5–5.3)
PROT SERPL-MCNC: 6.6 G/DL (ref 6.4–8.2)
SODIUM SERPL-SCNC: 138 MMOL/L (ref 136–145)

## 2018-03-31 PROCEDURE — 99233 SBSQ HOSP IP/OBS HIGH 50: CPT | Performed by: INTERNAL MEDICINE

## 2018-03-31 PROCEDURE — 94760 N-INVAS EAR/PLS OXIMETRY 1: CPT

## 2018-03-31 PROCEDURE — 97110 THERAPEUTIC EXERCISES: CPT | Performed by: PHYSICAL THERAPIST

## 2018-03-31 PROCEDURE — 80053 COMPREHEN METABOLIC PANEL: CPT | Performed by: INTERNAL MEDICINE

## 2018-03-31 PROCEDURE — 83880 ASSAY OF NATRIURETIC PEPTIDE: CPT | Performed by: INTERNAL MEDICINE

## 2018-03-31 PROCEDURE — 94640 AIRWAY INHALATION TREATMENT: CPT

## 2018-03-31 PROCEDURE — 94660 CPAP INITIATION&MGMT: CPT

## 2018-03-31 PROCEDURE — 99232 SBSQ HOSP IP/OBS MODERATE 35: CPT | Performed by: NURSE PRACTITIONER

## 2018-03-31 RX ORDER — IPRATROPIUM BROMIDE AND ALBUTEROL SULFATE 2.5; .5 MG/3ML; MG/3ML
3 SOLUTION RESPIRATORY (INHALATION) 4 TIMES DAILY PRN
Refills: 0
Start: 2018-03-31 | End: 2018-06-05 | Stop reason: SDUPTHER

## 2018-03-31 RX ORDER — FUROSEMIDE 10 MG/ML
20 INJECTION INTRAMUSCULAR; INTRAVENOUS DAILY
Status: DISCONTINUED | OUTPATIENT
Start: 2018-03-31 | End: 2018-03-31

## 2018-03-31 RX ORDER — FUROSEMIDE 10 MG/ML
20 INJECTION INTRAMUSCULAR; INTRAVENOUS
Status: DISCONTINUED | OUTPATIENT
Start: 2018-03-31 | End: 2018-03-31

## 2018-03-31 RX ORDER — ISOSORBIDE MONONITRATE 30 MG/1
30 TABLET, EXTENDED RELEASE ORAL DAILY
Qty: 30 TABLET | Refills: 0 | Status: SHIPPED | OUTPATIENT
Start: 2018-04-01 | End: 2018-04-02

## 2018-03-31 RX ORDER — FUROSEMIDE 10 MG/ML
20 INJECTION INTRAMUSCULAR; INTRAVENOUS DAILY
Status: DISCONTINUED | OUTPATIENT
Start: 2018-04-01 | End: 2018-04-02 | Stop reason: HOSPADM

## 2018-03-31 RX ORDER — ERYTHROMYCIN 5 MG/G
0.5 OINTMENT OPHTHALMIC EVERY 6 HOURS SCHEDULED
Qty: 3.5 G | Refills: 0 | Status: SHIPPED | OUTPATIENT
Start: 2018-03-31 | End: 2018-04-02

## 2018-03-31 RX ORDER — HYDRALAZINE HYDROCHLORIDE 25 MG/1
25 TABLET, FILM COATED ORAL EVERY 8 HOURS SCHEDULED
Qty: 90 TABLET | Refills: 0 | Status: SHIPPED | OUTPATIENT
Start: 2018-03-31 | End: 2018-04-02

## 2018-03-31 RX ADMIN — ACETAMINOPHEN 650 MG: 325 TABLET, FILM COATED ORAL at 19:42

## 2018-03-31 RX ADMIN — FUROSEMIDE 20 MG: 10 INJECTION, SOLUTION INTRAMUSCULAR; INTRAVENOUS at 08:54

## 2018-03-31 RX ADMIN — ISODIUM CHLORIDE 3 ML: 0.03 SOLUTION RESPIRATORY (INHALATION) at 15:21

## 2018-03-31 RX ADMIN — HEPARIN SODIUM 5000 UNITS: 5000 INJECTION, SOLUTION INTRAVENOUS; SUBCUTANEOUS at 16:43

## 2018-03-31 RX ADMIN — ASPIRIN 81 MG 81 MG: 81 TABLET ORAL at 08:57

## 2018-03-31 RX ADMIN — LEVALBUTEROL 1.25 MG: 1.25 SOLUTION, CONCENTRATE RESPIRATORY (INHALATION) at 01:52

## 2018-03-31 RX ADMIN — IPRATROPIUM BROMIDE 0.5 MG: 0.5 SOLUTION RESPIRATORY (INHALATION) at 20:59

## 2018-03-31 RX ADMIN — LEVALBUTEROL 1.25 MG: 1.25 SOLUTION, CONCENTRATE RESPIRATORY (INHALATION) at 15:21

## 2018-03-31 RX ADMIN — LEVALBUTEROL 1.25 MG: 1.25 SOLUTION, CONCENTRATE RESPIRATORY (INHALATION) at 08:27

## 2018-03-31 RX ADMIN — IPRATROPIUM BROMIDE 0.5 MG: 0.5 SOLUTION RESPIRATORY (INHALATION) at 08:27

## 2018-03-31 RX ADMIN — IPRATROPIUM BROMIDE 0.5 MG: 0.5 SOLUTION RESPIRATORY (INHALATION) at 01:53

## 2018-03-31 RX ADMIN — DOCUSATE SODIUM 100 MG: 100 CAPSULE, LIQUID FILLED ORAL at 21:18

## 2018-03-31 RX ADMIN — ISODIUM CHLORIDE 3 ML: 0.03 SOLUTION RESPIRATORY (INHALATION) at 20:59

## 2018-03-31 RX ADMIN — METOPROLOL TARTRATE 25 MG: 25 TABLET ORAL at 08:54

## 2018-03-31 RX ADMIN — METOPROLOL TARTRATE 25 MG: 25 TABLET ORAL at 21:18

## 2018-03-31 RX ADMIN — TOLTERODINE TARTRATE 4 MG: 2 CAPSULE, EXTENDED RELEASE ORAL at 08:53

## 2018-03-31 RX ADMIN — HYDRALAZINE HYDROCHLORIDE 25 MG: 25 TABLET, FILM COATED ORAL at 21:18

## 2018-03-31 RX ADMIN — AMIODARONE HYDROCHLORIDE 100 MG: 200 TABLET ORAL at 08:54

## 2018-03-31 RX ADMIN — MUPIROCIN 1 APPLICATION: 20 OINTMENT TOPICAL at 10:00

## 2018-03-31 RX ADMIN — PANTOPRAZOLE SODIUM 40 MG: 40 TABLET, DELAYED RELEASE ORAL at 06:35

## 2018-03-31 RX ADMIN — PRAVASTATIN SODIUM 40 MG: 40 TABLET ORAL at 16:44

## 2018-03-31 RX ADMIN — PANTOPRAZOLE SODIUM 40 MG: 40 TABLET, DELAYED RELEASE ORAL at 16:44

## 2018-03-31 RX ADMIN — ERYTHROMYCIN 0.5 INCH: 5 OINTMENT OPHTHALMIC at 06:39

## 2018-03-31 RX ADMIN — IPRATROPIUM BROMIDE 0.5 MG: 0.5 SOLUTION RESPIRATORY (INHALATION) at 15:20

## 2018-03-31 RX ADMIN — HEPARIN SODIUM 5000 UNITS: 5000 INJECTION, SOLUTION INTRAVENOUS; SUBCUTANEOUS at 21:18

## 2018-03-31 RX ADMIN — FENOFIBRATE 145 MG: 145 TABLET ORAL at 08:55

## 2018-03-31 RX ADMIN — ACETAMINOPHEN 650 MG: 325 TABLET, FILM COATED ORAL at 09:11

## 2018-03-31 RX ADMIN — VITAM B12 100 MCG: 100 TAB at 08:53

## 2018-03-31 RX ADMIN — ERYTHROMYCIN 0.5 INCH: 5 OINTMENT OPHTHALMIC at 12:23

## 2018-03-31 RX ADMIN — ISOSORBIDE MONONITRATE 30 MG: 30 TABLET, EXTENDED RELEASE ORAL at 08:53

## 2018-03-31 RX ADMIN — HYDRALAZINE HYDROCHLORIDE 25 MG: 25 TABLET, FILM COATED ORAL at 06:35

## 2018-03-31 RX ADMIN — HYDRALAZINE HYDROCHLORIDE 25 MG: 25 TABLET, FILM COATED ORAL at 15:40

## 2018-03-31 RX ADMIN — ASCORBIC ACID TAB 250 MG 125 MG: 250 TAB at 08:54

## 2018-03-31 RX ADMIN — HEPARIN SODIUM 5000 UNITS: 5000 INJECTION, SOLUTION INTRAVENOUS; SUBCUTANEOUS at 06:35

## 2018-03-31 RX ADMIN — MUPIROCIN 1 APPLICATION: 20 OINTMENT TOPICAL at 21:18

## 2018-03-31 RX ADMIN — AMLODIPINE BESYLATE 5 MG: 5 TABLET ORAL at 08:55

## 2018-03-31 RX ADMIN — ERYTHROMYCIN 0.5 INCH: 5 OINTMENT OPHTHALMIC at 17:17

## 2018-03-31 RX ADMIN — ISODIUM CHLORIDE 3 ML: 0.03 SOLUTION RESPIRATORY (INHALATION) at 08:27

## 2018-03-31 RX ADMIN — LEVALBUTEROL 1.25 MG: 1.25 SOLUTION, CONCENTRATE RESPIRATORY (INHALATION) at 20:59

## 2018-03-31 RX ADMIN — LEVOTHYROXINE SODIUM 50 MCG: 50 TABLET ORAL at 06:35

## 2018-03-31 RX ADMIN — ISODIUM CHLORIDE 3 ML: 0.03 SOLUTION RESPIRATORY (INHALATION) at 01:53

## 2018-03-31 NOTE — ASSESSMENT & PLAN NOTE
FEV1 1 85 L or 76% of predicted as of Nov 2017, uses Luiza and Duoneb tx at home  · Continue Atrovent and Xopenex q6h

## 2018-03-31 NOTE — PHYSICAL THERAPY NOTE
PT TREATMENT     03/31/18 1231   Pain Assessment   Pain Assessment 0-10   Pain Score 5   Pain Type Chronic pain   Pain Location Shoulder   Pain Orientation Left   Restrictions/Precautions   Other Precautions Contact/isolation; Chair Alarm; Bed Alarm;O2;Fall Risk   General   Chart Reviewed Yes  (Asked by Pauline Clark NP to see pt for Tx today)   Cognition   Overall Cognitive Status WFL   Subjective   Subjective "I cant get out of this chair and I am suppossed to go home"   Transfers   Sit to Stand 2  Maximal assistance   Stand to Sit 2  Maximal assistance   Toilet transfer 3  Moderate assistance   Additional items (Assist for set up for hygeine)   Additional Comments (large change in ability to transfer due to mm weakness)   Ambulation/Elevation   Gait pattern Foward flexed   Gait Assistance 4  Minimal assist   Additional items Assist x 1   Assistive Device Rolling walker   Distance 30'x6   Additional items (4L O2 NC)   Balance   Static Sitting Fair +   Dynamic Sitting Fair +   Static Standing Fair -   Dynamic Standing Fair -   Ambulatory Fair -   Endurance Deficit   Endurance Deficit Yes   Endurance Deficit Description decreased endurance   Activity Tolerance   Activity Tolerance Patient limited by fatigue;Patient limited by pain   Nurse Made Aware (yes)   Exercises   Knee AROM Sitting;10 reps   Ankle Pumps Sitting;10 reps   Squat Sitting;10 reps   Assessment   Prognosis Good   Problem List Decreased strength;Decreased range of motion;Decreased endurance; Impaired balance;Decreased mobility;Pain   Assessment Pt having difficulty with transfers today and fearful about going home due to inabilty to transfer today  Pt states that he has some days where he has more difficulty than others getting up  Spoke to pt about rehab center vs home due to requiring max A for transfers today   Aslo spoke to pt about a lift chair to assist with transfers   Goals   Patient Goals "I want to get stronger"   Plan Treatment/Interventions Functional transfer training;LE strengthening/ROM; Therapeutic exercise; Endurance training;Patient/family training;Gait training;Bed mobility;Spoke to nursing;Spoke to case management   Progress Slow progress, decreased activity tolerance   PT Frequency 5x/wk   Recommendation   Recommendation (STR vs home with home Pt pending progress )   Additional Comments pt needs to be able to transfer indepednently for safe transfers at home     Licensure   [place] License Number  [place], [place] [id number]

## 2018-03-31 NOTE — PROGRESS NOTES
Progress Note - Cardiology   Virginia Conley 80 y o  male MRN: [id number]  : 10/24/1930  Unit/Bed#: [address] [place] [address] Encounter: [id number]    Assessment and Plan:   1  Acute on chronic hypoxic respiratory failure most likely due to worsening of acute on chronic systolic and diastolic heart failure for not taking medicines regularly  He seems to be responding well to IV Lasix  2   Acute on chronic systolic and diastolic heart failure [place] heart Association class 2/3  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  Added hydralazine and nitrates  3  Known moderate COPD with history of chronic hypoxia on oxygen therapy at home  4  History of severe aortic stenosis status post TAVR in 2017 will update an echo  Repeat echo shows valve is functioning adequately  EF around 35%  5   CAD status post angioplasty of LAD in , with no symptoms of angina  6  Dyslipidemia on statin  7  Paroxysmal atrial fibrillation currently in sinus suppressed with low-dose amiodarone  8  Hypothyroidism with high TSH  May need to increase the dose of levothyroxine  9  Dyslipidemia on statins  10  Moderate obesity with recent loss of weight  11   Chronic LBBB would benefit from biventricular pacemaker discussed with patient for now he is reluctant as this is 1st episode since his previous valve procedure would like to wait  12  Moderate to severe pulmonary hypertension secondary to severe COPD, obesity, cardiac issues  Plan  Decrease Lasix to once a day  Continue hydralazine and nitrates  Will slowly titrate up hydralazine  Rehab as patient's seems to be have significant deconditioning   Monitor input output  Follow-up electrolytes  Patient want to go to rehabilitation place  Continue other Rx as before  Reluctant for biventricular pacemaker at this time  Subjective / Objective:   Patient still feeling weak and tired  Get short of breath when he was out of bed to chair  Breathing has improved otherwise    Echo shows EF around 35% and aortic valve is functioning adequately  No other significant complaint  Has severe pulmonary hypertension on echo  Vitals: Blood pressure 136/82, pulse 65, temperature 98 3 °F (36 8 °C), temperature source Tympanic, resp  rate 18, height 5' 10" (1 778 m), weight 96 8 kg (213 lb 6 5 oz), SpO2 98 %  Vitals:    03/29/18 0626 03/30/18 0600   Weight: 98 4 kg (216 lb 14 9 oz) 96 8 kg (213 lb 6 5 oz)     Body mass index is 30 62 kg/m²  BP Readings from Last 3 Encounters:   03/31/18 136/82   11/27/17 150/64   11/07/17 124/52     Orthostatic Blood Pressures    Flowsheet Row Most Recent Value   Blood Pressure  136/82 filed at 03/31/2018 0845   Patient Position - Orthostatic VS  Lying filed at 03/31/2018 0845        I/O       03/29 0701 - 03/30 0700 03/30 0701 - 03/31 0700 03/31 0701 - 04/01 0700    P  O  355 480     Total Intake(mL/kg) 355 (3 7) 480 (5)     Urine (mL/kg/hr) 1525 (0 7) 1300 (0 6) 300 (0 5)    Total Output 1525 1300 300    Net -1170 -820 -300               Invasive Devices     Peripheral Intravenous Line            Peripheral IV 03/28/18 Left Antecubital 2 days                  Intake/Output Summary (Last 24 hours) at 03/31/18 1243  Last data filed at 03/31/18 1001   Gross per 24 hour   Intake              480 ml   Output             1600 ml   Net            -1120 ml         Physical Exam:   Physical Exam    Neurologic:  Alert & oriented x 3,  no focal deficits noted   Constitutional:  Well developed, well nourished,  With no acute distress  Eyes:  PERRL, conjunctiva normal   HENT:  Atraumatic, external ears normal, nose normal,   NECK: Normal range of motion, no tenderness, neck is supple , No JVP  Respiratory:  Bilateral air entry mostly clear to auscultation with rare rhonchi  Cardiovascular: S1-S2 regular with a 3/6 ejection systolic murmur   S4 is heard  GI:  Soft, nondistended, normal bowel sounds, nontender, no hepatosplenomegaly appreciated  Musculoskeletal:  No tenderness, no deformities      Extremities:  Mild edema and distal pulses are present  Psychiatric:  Speech and behavior appropriate             Medications/ Allergies:       Current Facility-Administered Medications:  acetaminophen 650 mg Oral Q6H PRN Alexa Spironello V, CHICHINP   amiodarone 100 mg Oral Daily Alexa Spironello V, CRNP   amLODIPine 5 mg Oral Daily Jazlyn Garcia MD   vitamin C 125 mg Oral Daily Alexa Spironello V, CHICHINP   aspirin 81 mg Oral Daily Alexa Spironello V, CRNP   cyanocobalamin 100 mcg Oral Daily Alexa Spironello V, CRNP   docusate sodium 100 mg Oral HS Alexa Spironello V, CRNP   erythromycin 0 5 inch Right Eye Q6H [facility] Alexa Gerardo V, ROC   fenofibrate 145 mg Oral Daily Aleax Spironello V, CRNP   furosemide 20 mg Intravenous BID (diuretic) ROC Arana   heparin (porcine) 5,000 Units Subcutaneous Q8H [facility] Alexa Gerardo BILLIE, CHICHINP   hydrALAZINE 25 mg Oral Q8H [facility] Jazlyn Garcia MD   ipratropium 0 5 mg Nebulization Q6H Alexa Spironello V, ROC   isosorbide mononitrate 30 mg Oral Daily Jazlyn Garcia MD   levalbuterol 1 25 mg Nebulization Q6H Alexa Spironello V, ROC   And       sodium chloride 3 mL Nebulization Q6H Alexa Spironello V, CRNP   levothyroxine 50 mcg Oral Early Morning Alexa Gerardo V, CHICHINP   metoprolol tartrate 25 mg Oral Q12H [facility] Alexa Spironello V, CRNP   mupirocin  Nasal Q12H [facility] ROC Arana   ondansetron 4 mg Intravenous Q6H PRN Alexa Spironello V, ROC   pantoprazole 40 mg Oral BID AC Alexa Spironello V, ROC   polyethylene glycol 17 g Oral Daily Alexa Spironello V, CRNP   pravastatin 40 mg Oral Daily With Extra Life V, ROC   tolterodine 4 mg Oral Daily Alexa Spironello V, CRNP       acetaminophen 650 mg Q6H PRN   ondansetron 4 mg Q6H PRN     No Known Allergies    VTE Pharmacologic Prophylaxis:   Heparin    Labs:   Troponins:    Results from last 7 days  Lab Units 03/28/18 2035 TROPONIN I ng/mL <0 02       CBC with diff:    Results from last 7 days  Lab Units 03/30/18  0548 03/29/18  0529 03/28/18 2011   WBC Thousand/uL 7 40 7 60 9 30   HEMOGLOBIN g/dL 10 9* 10 4* 11 7*   HEMATOCRIT % 34 0* 31 9* 36 0*   MCV fL 87 87 87   PLATELETS Thousands/uL 217 201 249   MCH pg 28 0 28 3 28 5   MCHC g/dL 32 0 32 5 32 5   RDW % 16 4* 16 4* 16 6*   MPV fL 8 3* 8 1* 8 6*   NRBC AUTO /100 WBCs 0 0 0       CMP:    Results from last 7 days  Lab Units 03/31/18 0642 03/30/18  0548 03/29/18 0529 03/28/18 2035   SODIUM mmol/L 138 140 143 140   POTASSIUM mmol/L 4 1 4 3 3 5 4 1   CHLORIDE mmol/L 101 104 105 103   CO2 mmol/L 29 28 29 28   ANION GAP mmol/L 8 8 9 9   BUN mg/dL 33* 28* 21 24   CREATININE mg/dL 1 94* 1 94* 1 64* 1 65*   GLUCOSE RANDOM mg/dL 95 95 88 105   CALCIUM mg/dL 8 6 9 0 9 1 9 1   AST U/L 33  --   --  39   ALT U/L 17  --   --  24   ALK PHOS U/L 41*  --   --  57   TOTAL PROTEIN g/dL 6 6  --   --  7 4   BILIRUBIN TOTAL mg/dL 0 30  --   --  0 30   EGFR ml/min/1 73sq m 30 30 37 37       Magnesium:    Results from last 7 days  Lab Units 03/30/18 0548 03/29/18  0529   MAGNESIUM mg/dL 2 3 1 9     Coags:    Results from last 7 days  Lab Units 03/28/18 2011   PTT seconds 27   INR  1 14     TSH:    Results from last 7 days  Lab Units 03/29/18  0529   TSH 3RD GENERATON uIU/mL 9 479*     Lipid Profile:    Hgb A1c:    NT-proBNP:   Recent Labs      03/28/18 2035 03/31/18 0642   NTBNP  3,795*  2,849*        Imaging & Testing   I have personally reviewed pertinent reports  Xr Chest 1 View Portable    Result Date: 3/29/2018  Narrative: CHEST INDICATION:   SOB  COMPARISON:  10/16/2017 EXAM PERFORMED/VIEWS:  XR CHEST PORTABLE FINDINGS: Cardiomediastinal silhouette appears enlarged prosthetic aortic valve  Chronic changes  Chronic right pleural thickening  No infiltrates  Osseous structures appear within normal limits for patient age  Impression: No acute cardiopulmonary disease   Cardiomegaly and chronic changes  Workstation performed: [id number]     Xr Chest Portable Icu    Result Date: 3/29/2018  Narrative: CHEST INDICATION:   follow up diuresis  Shortness of breath  COMPARISON:  Chest radiographs 2018 EXAM PERFORMED/VIEWS:  XR CHEST PORTABLE ICU  AP portable semierect view in the conventional and line placement techniques  Images: 2 FINDINGS: The heart is enlarged  Atherosclerotic changes in the aorta  Lung volumes diminished  Stable small bilateral pleural effusions  Bisi and pulmonary vessels diffusely enlarged  Diffuse groundglass infiltrates throughout the lungs bilaterally  These have progressed  Osseous structures appear within normal limits for patient age  Impression: Progressive congestive heart failure  Workstation performed: [id number]        EKG / Monitor: Personally reviewed  LBBB  Cardiac testing:   Results for orders placed during the hospital encounter of 18   Echo complete with contrast if indicated    Narrative [address]  [address] [facility]  [address] [facility], [facility] [address]  [phone]    Transthoracic Echocardiogram  2D, M-mode, Doppler, and Color Doppler    Study date:  30-Mar-2018    Patient: Tonja Shah  MR number: [id number]  Account number: [de-identified]  : 24-Oct-1930  Age: 80 years  Gender: Male  Status: Routine  Location: Bedside  Height: 70 in  Weight: 213 lb  BP: 146/ 65 mmHg    Indications: Coronary Artery Disease    Diagnoses: I50 9 - Heart failure, unspecified    Sonographer:  CAMILLA Washington, RVS  Primary Physician:  Clide Denver, DO  Referring Physician:  Miguelina Barrios MD  Group:  [facility]  Interpreting Physician:  Romario Palacios MD    SUMMARY    LEFT VENTRICLE:  Size was at the upper limits of normal   Systolic function was moderately reduced  Ejection fraction was estimated in the range of 35 % to 40 % to be 35 %  There was moderate diffuse hypokinesis    Wall thickness was mildly increased  There was mild concentric hypertrophy  Doppler parameters were consistent with abnormal left ventricular relaxation (grade 1 diastolic dysfunction)  VENTRICULAR SEPTUM:  There was moderate paradoxical motion  These changes are consistent with LBBB  RIGHT VENTRICLE:  The size was at the upper limits of normal     LEFT ATRIUM:  The atrium was moderately dilated  RIGHT ATRIUM:  The atrium was mildly dilated  MITRAL VALVE:  There was mild to moderate annular calcification  There was mild regurgitation  AORTIC VALVE:  There was trace regurgitation  TRICUSPID VALVE:  There was moderate regurgitation  Estimated peak PA pressure was 70 mmHg  The findings suggest severe pulmonary hypertension  PULMONIC VALVE:  There was mild regurgitation  HISTORY: PRIOR HISTORY: HTN, PVD, Atrial Fibrillation, LBBB, CHF, CAD, COPD, TAVR    PROCEDURE: The procedure was performed at the bedside  This was a routine study  The transthoracic approach was used  The study included complete 2D imaging, M-mode, complete spectral Doppler, and color Doppler  The heart rate was 75 bpm,  at the start of the study  Images were obtained from the parasternal, apical, subcostal, and suprasternal notch acoustic windows  Echocardiographic views were limited due to poor acoustic window availability  This was a technically  difficult study  LEFT VENTRICLE: Size was at the upper limits of normal  Systolic function was moderately reduced  Ejection fraction was estimated in the range of 35 % to 40 % to be 35 %  There was moderate diffuse hypokinesis  Wall thickness was mildly  increased  There was mild concentric hypertrophy  DOPPLER: Doppler parameters were consistent with abnormal left ventricular relaxation (grade 1 diastolic dysfunction)  VENTRICULAR SEPTUM: Thickness was mildly increased  There was moderate paradoxical motion  These changes are consistent with LBBB      RIGHT VENTRICLE: The size was at the upper limits of normal  Systolic function was low normal     LEFT ATRIUM: The atrium was moderately dilated  RIGHT ATRIUM: The atrium was mildly dilated  MITRAL VALVE: There was mild to moderate annular calcification  There was lower normal leaflet separation  DOPPLER: There was no evidence for stenosis  There was mild regurgitation  AORTIC VALVE: TAVR had been performed previously  Peak velocity 2 6 to 2 7 m/sec  Mean gradient 16-17 mm and peak gradient of 28-30 mm of hg and FATMATA 1 30 to 1 35 cm2 DOPPLER: There was trace regurgitation  TRICUSPID VALVE: DOPPLER: There was moderate regurgitation  Estimated peak PA pressure was 70 mmHg  The findings suggest severe pulmonary hypertension  PULMONIC VALVE: DOPPLER: There was mild regurgitation  PERICARDIUM: There was no thickening or calcification  There was no pericardial effusion  AORTA: The root exhibited normal size  SYSTEMIC VEINS: IVC: The inferior vena cava was upper normal in size  Respirophasic changes were normal     SYSTEM MEASUREMENT TABLES    2D mode  AoR Diam 2D: 2 6 cm  LA Diam (2D): 5 3 cm  LA/Ao (2D): 2 04  FS (2D Teich): 6 82 %  IVSd (2D): 1 58 cm  LVDEV: 152 cm³  LVESV: 129 cm³  LVIDd(2D): 5 57 cm  LVISd (2D): 5 19 cm  LVOT Area 2D: 3 8 cm squared  LVPWd (2D): 1 39 cm  SV (Teich): 23 cm³    Apical four chamber  LVEF A4C: 25 %    Unspecified Scan Mode  FATMATA Cont Eq (Peak Pasquale): 1 56 cm squared  FATMATA Cont Eq (VTI): 1 61 cm squared  LVOT (VTI): 22 1 cm  LVOT Diam : 2 2 cm  LVOT Vmax: 1070 mm/s  LVOT Vmax; Mean: 1040 mm/s  Peak Grad ; Mean: 4 mm[Hg]  SV (LVOT): 82 cm³  VTI;Mean: 3 mm[Hg]  MV Peak A Pasquale: 1150 mm/s  MV Peak E Pasquale   Mean: 718 mm/s  MVA (PHT): 1 95 cm squared  PHT: 110 ms  Max P mm[Hg]  V Max: 3760 mm/s  Vmax: 3550 mm/s  RA Area: 22 5 cm squared  RA Volume: 66 8 cm³    [facility] Accredited Echocardiography Laboratory    Prepared and electronically signed by    Familia Wright MD  Signed 30-Mar-2018 16:35:19 Dr Liya Heller MD [facility]      "This note has been constructed using a voice recognition system  Therefore there may be syntax, spelling, and/or grammatical errors   Please call if you have any questions  "

## 2018-03-31 NOTE — PLAN OF CARE
DISCHARGE PLANNING     Discharge to home or other facility with appropriate resources Progressing        DISCHARGE PLANNING - CARE MANAGEMENT     Discharge to post-acute care or home with appropriate resources Progressing        INFECTION - ADULT     Absence or prevention of progression during hospitalization Progressing     Absence of fever/infection during neutropenic period Progressing        Knowledge Deficit     Patient/family/caregiver demonstrates understanding of disease process, treatment plan, medications, and discharge instructions Progressing        PAIN - ADULT     Verbalizes/displays adequate comfort level or baseline comfort level Progressing        Potential for Falls     Patient will remain free of falls Progressing        Prexisting or High Potential for Compromised Skin Integrity     Skin integrity is maintained or improved Progressing        RESPIRATORY - ADULT     Achieves optimal ventilation and oxygenation Progressing

## 2018-03-31 NOTE — ASSESSMENT & PLAN NOTE
On Home O2 of 6-7 LNC continuously, currently 4 LNC day, likely improving with IV diuresis  · Continue CPAP at night

## 2018-03-31 NOTE — ASSESSMENT & PLAN NOTE
Pt significantly weaker, does not feel comfortable going home  · PT/OT eval recommending STR and a lift chair once home as pt a max assist from sit to stand  · CM aware and working on STR placement, pt is reluctant to go to STR at this time but will think it over

## 2018-03-31 NOTE — ASSESSMENT & PLAN NOTE
Echo November 2017: EF 40% G1DD  Mild MR  Mild to moderate TR  Estimated PA systolic pressure 65  FATMATA 1 6 cm2  No paravalvular leak noted  Was on BiPAP 12/6 40% in the ED  Received 80 mg IV Lasix with good diuresis   On Home O2 of 6-7 LNC continuously, currently 4 LNC  · Cardiology following, appreciate input  · Continue Lasix 20 mg IV daily, continue Metoprolol  · Monitor urine output, strict I&O, daily weight

## 2018-03-31 NOTE — SOCIAL WORK
PCP ASKED CM TO TOUCH BASE WITH PT AGAIN RE: DCP  PT NOW THINKS HE IS TOO WEAK TO RETURN TO HOME, WOULD LIKE TO PURSUE STR  D/W PT AT THE BEDSIDE  PT PROVIDED CM WITH CHOICES OF [facility], [facility] AND [facility]    REFERRALS PLACED AND AWAITING RESPONSE

## 2018-03-31 NOTE — ASSESSMENT & PLAN NOTE
Was on BiPAP 12/6 40% in the ED  Received 80 mg IV Lasix with good diuresis   On Home O2 of 6-7 LNC continuously, currently 4 LNC  · Cardiology following, appreciate input  · Continue Lasix 20 mg IV daily   · Monitor urine output, strict I&O, daily weight

## 2018-03-31 NOTE — ASSESSMENT & PLAN NOTE
Drug eluting stent in 2014  · Continue aspirin and metoprolol, continue medical management with beta blocker and statin

## 2018-03-31 NOTE — PROGRESS NOTES
Progress Note - Mahogany Rdz 10/24/1930, 80 y o  male MRN: [id number]    Unit/Bed#: [address] [place] [address] Encounter: [id number]    Primary Care Provider: Rocío Solitario DO   Date and time admitted to hospital: 3/28/2018  8:01 PM        Acute on chronic combined systolic and diastolic CHF (congestive heart failure) [facility])   Assessment & Plan    Echo November 2017: EF 40% G1DD  Mild MR  Mild to moderate TR  Estimated PA systolic pressure 65  FATMATA 1 6 cm2  No paravalvular leak noted  Was on BiPAP 12/6 40% in the ED  Received 80 mg IV Lasix with good diuresis   On Home O2 of 6-7 LNC continuously, currently 4 LNC  · Cardiology following, appreciate input  · Continue Lasix 20 mg IV daily, continue Metoprolol  · Monitor urine output, strict I&O, daily weight        Generalized weakness   Assessment & Plan    Pt significantly weaker, does not feel comfortable going home  · PT/OT eval recommending STR and a lift chair once home as pt a max assist from sit to stand  · CM aware and working on STR placement, pt is reluctant to go to STR at this time but will think it over         Acute on chronic respiratory failure with hypoxia ([address] )   Assessment & Plan    On Home O2 of 6-7 LNC continuously, currently 4 LNC day, likely improving with IV diuresis  · Continue CPAP at night        Bacterial conjunctivitis of right eye   Assessment & Plan    Continue Erythromycin ointment q6h        COPD, moderate (HCC)   Assessment & Plan    FEV1 1 85 L or 76% of predicted as of Nov 2017, uses [employer] and [employer] tx at home  · Continue Atrovent and Xopenex q6h            GERD (gastroesophageal reflux disease)   Assessment & Plan    Continue PPI BID        Anemia   Assessment & Plan    Chronic anemia secondary to chronic kidney disease  · Trend hemoglobin daily           Hypothyroidism   Assessment & Plan    TSH is elevated, free T4 wnl  · Continue Synthroid at current dosage        History of atrial fibrillation   Assessment & Plan    Pt in NSR  · Continue amiodarone        Peripheral vascular disease (HCC)   Assessment & Plan    Continue ASA and statin         Multiple pulmonary nodules   Assessment & Plan    Outpatient follow-up        PATITO (obstructive sleep apnea)   Assessment & Plan    On CPAP at night        S/P TAVR (transcatheter aortic valve replacement)   Assessment & Plan    No perivalvular leak noted on last echo November 2017  · Cardiology following        Hypertension   Assessment & Plan    Continue Norvasc, Hydralazine, and Metoprolol         Hyperlipidemia   Assessment & Plan    On Pravastatin and Tricor        Diabetes mellitus type 2, noninsulin dependent (HCC)   Assessment & Plan    Hemoglobin A1c 5 9, has been off any regimen at home  · Consider starting SSI coverage if needed        CKD (chronic kidney disease), stage III   Assessment & Plan    Cr 1 94 with a baseline creatinine between 1 6-2 0  · Monitor Cr closely with Lasix         BPH (benign prostatic hyperplasia)   Assessment & Plan    · Continue to monitor urine output         CAD in native artery   Assessment & Plan    Drug eluting stent in 2014  · Continue aspirin and metoprolol, continue medical management with beta blocker and statin            VTE Pharmacologic Prophylaxis:   Pharmacologic: Heparin  Mechanical VTE Prophylaxis in Place: Yes    Patient Centered Rounds: I have performed bedside rounds with nursing staff today  Discussions with Specialists or Other Care Team Provider: Nursing, CM, cardiology     Education and Discussions with Family / Patient: I have answered all questions to the best of my ability  Friend updated  Time Spent for Care: 20 minutes  More than 50% of total time spent on counseling and coordination of care as described above      Current Length of Stay: 3 day(s)    Current Patient Status: Inpatient   Certification Statement: The patient will continue to require additional inpatient hospital stay due to generalized weakness, needing STR    Discharge Plan: Patient is not medically stable for discharge home due to weakness  Code Status: Level 2 - DNAR: but accepts endotracheal intubation      Subjective:   Patient would like to go home today but he does not feel strong enough to go home safely  He states he has difficultly standing and is concerned he will not be able to care for himself at home  He is requesting another PT treatment to evaluate his strength  He will consider STR  Continues with shortness of breath on exertion  Denies HA, CP, abd pain  Objective:     Vitals:   Temp (24hrs), Av 5 °F (36 9 °C), Min:98 2 °F (36 8 °C), Max:99 °F (37 2 °C)    HR:  [64-82] 64  Resp:  [18-26] 18  BP: (130-163)/(58-82) 137/63  SpO2:  [90 %-98 %] 92 %  Body mass index is 30 62 kg/m²  Input and Output Summary (last 24 hours): Intake/Output Summary (Last 24 hours) at 18 1655  Last data filed at 18 1401   Gross per 24 hour   Intake              480 ml   Output             1950 ml   Net            -1470 ml       Physical Exam:     Physical Exam   Constitutional: He is oriented to person, place, and time  He appears well-developed  No distress  Resting comfortably on 4 LNC O2   HENT:   Head: Normocephalic  Neck: Normal range of motion  Cardiovascular: Normal rate and regular rhythm  Murmur heard  Pulmonary/Chest: Effort normal  No respiratory distress  He has decreased breath sounds  He has no wheezes  He has no rhonchi  He has no rales  Bilateral lower lobe course breath sounds with exertional dyspnea      Abdominal: Soft  Bowel sounds are normal  He exhibits no distension  There is no tenderness  Musculoskeletal: He exhibits no edema or tenderness  Max assist of 2 from sit to stand    Neurological: He is alert and oriented to person, place, and time  Skin: Skin is warm and dry  No rash noted  He is not diaphoretic  Psychiatric: He has a normal mood and affect   Judgment normal    Nursing note and vitals reviewed  Additional Data:     Labs:      Results from last 7 days  Lab Units 03/30/18  0548   WBC Thousand/uL 7 40   HEMOGLOBIN g/dL 10 9*   HEMATOCRIT % 34 0*   PLATELETS Thousands/uL 217   NEUTROS PCT % 62   LYMPHS PCT % 25   MONOS PCT % 8   EOS PCT % 5       Results from last 7 days  Lab Units 03/31/18  0642   SODIUM mmol/L 138   POTASSIUM mmol/L 4 1   CHLORIDE mmol/L 101   CO2 mmol/L 29   BUN mg/dL 33*   CREATININE mg/dL 1 94*   CALCIUM mg/dL 8 6   TOTAL PROTEIN g/dL 6 6   BILIRUBIN TOTAL mg/dL 0 30   ALK PHOS U/L 41*   ALT U/L 17   AST U/L 33   GLUCOSE RANDOM mg/dL 95       Results from last 7 days  Lab Units 03/28/18  2011   INR  1 14       * I Have Reviewed All Lab Data Listed Above  * Additional Pertinent Lab Tests Reviewed: All Labs Within Last 24 Hours Reviewed    Imaging:    Imaging Reports Reviewed Today Include: CXR   Imaging Personally Reviewed by Myself Includes:  None    Recent Cultures (last 7 days):       Results from last 7 days  Lab Units 03/29/18  0850 03/29/18  0049   SPUTUM CULTURE  Culture results to follow    --    GRAM STAIN RESULT  Rare Polys  Rare Epithelial Cells  2+ Gram positive cocci in pairs, chains and clusters  2+ Gram negative diplococci  1+ Gram negative rods  1+ Gram positive rods  --    INFLUENZA A PCR   --  None Detected   INFLUENZA B PCR   --  None Detected   RSV PCR   --  None Detected       Last 24 Hours Medication List:     Current Facility-Administered Medications:  acetaminophen 650 mg Oral Q6H PRN ROC Ewing   amiodarone 100 mg Oral Daily Alexa Joynello ROC WISE   amLODIPine 5 mg Oral Daily Anderson Siddiqui MD   vitamin C 125 mg Oral Daily Alexa Spironello VROC   aspirin 81 mg Oral Daily Alexa Spironello V, CRDAYLIN   cyanocobalamin 100 mcg Oral Daily Alexa Joynello ROC WISE   docusate sodium 100 mg Oral HS ROC Ewing   erythromycin 0 5 inch Right Eye Q6H [address] Alexa Spirojamaro VROC   fenofibrate 145 mg Oral Daily ROC Ewing   [START ON 4/1/2018] furosemide 20 mg Intravenous Daily Aakash Garcia MD   heparin (porcine) 5,000 Units Subcutaneous Q8H [facility] ROC Ewing   hydrALAZINE 25 mg Oral Q8H [facility] Aakash Garcia MD   ipratropium 0 5 mg Nebulization Q6H ROC Ewing   isosorbide mononitrate 30 mg Oral Daily Aakash Garcia MD   levalbuterol 1 25 mg Nebulization Q6H ROC Ewing   And       sodium chloride 3 mL Nebulization Q6H ROC Ewing   levothyroxine 50 mcg Oral Early Morning ROC Ewing   metoprolol tartrate 25 mg Oral Q12H [facility] Alexa Gonzales V, ROC   mupirocin  Nasal Q12H [facility] ROC Arango   ondansetron 4 mg Intravenous Q6H PRN ROC Ewing   pantoprazole 40 mg Oral BID AC ROC Ewing   polyethylene glycol 17 g Oral Daily ROC Ewing   pravastatin 40 mg Oral Daily With [place]OL[place] J&B ROC WISE   tolterodine 4 mg Oral Daily ROC Ewing        Today, Patient Was Seen By: ROC Arango    ** Please Note: Dictation voice to text software may have been used in the creation of this document   **

## 2018-04-01 LAB
ANION GAP SERPL CALCULATED.3IONS-SCNC: 8 MMOL/L (ref 4–13)
BACTERIA SPT RESP CULT: NORMAL
BUN SERPL-MCNC: 32 MG/DL (ref 5–25)
CALCIUM SERPL-MCNC: 8.7 MG/DL (ref 8.3–10.1)
CHLORIDE SERPL-SCNC: 102 MMOL/L (ref 100–108)
CO2 SERPL-SCNC: 27 MMOL/L (ref 21–32)
CREAT SERPL-MCNC: 1.86 MG/DL (ref 0.6–1.3)
ERYTHROCYTE [DISTWIDTH] IN BLOOD BY AUTOMATED COUNT: 16.4 % (ref 11.6–15.1)
GFR SERPL CREATININE-BSD FRML MDRD: 32 ML/MIN/1.73SQ M
GLUCOSE SERPL-MCNC: 94 MG/DL (ref 65–140)
GRAM STN SPEC: NORMAL
HCT VFR BLD AUTO: 32.8 % (ref 42–52)
HGB BLD-MCNC: 10.7 G/DL (ref 14–18)
MCH RBC QN AUTO: 28.5 PG (ref 27–31)
MCHC RBC AUTO-ENTMCNC: 32.5 G/DL (ref 31.4–37.4)
MCV RBC AUTO: 88 FL (ref 82–98)
PLATELET # BLD AUTO: 207 THOUSANDS/UL (ref 130–400)
PMV BLD AUTO: 8.7 FL (ref 8.9–12.7)
POTASSIUM SERPL-SCNC: 4.2 MMOL/L (ref 3.5–5.3)
RBC # BLD AUTO: 3.75 MILLION/UL (ref 4.7–6.1)
SODIUM SERPL-SCNC: 137 MMOL/L (ref 136–145)
WBC # BLD AUTO: 7.8 THOUSAND/UL (ref 4.8–10.8)

## 2018-04-01 PROCEDURE — 85027 COMPLETE CBC AUTOMATED: CPT | Performed by: NURSE PRACTITIONER

## 2018-04-01 PROCEDURE — 99232 SBSQ HOSP IP/OBS MODERATE 35: CPT | Performed by: NURSE PRACTITIONER

## 2018-04-01 PROCEDURE — 80048 BASIC METABOLIC PNL TOTAL CA: CPT | Performed by: NURSE PRACTITIONER

## 2018-04-01 PROCEDURE — 94660 CPAP INITIATION&MGMT: CPT

## 2018-04-01 PROCEDURE — 94760 N-INVAS EAR/PLS OXIMETRY 1: CPT

## 2018-04-01 PROCEDURE — 99232 SBSQ HOSP IP/OBS MODERATE 35: CPT | Performed by: INTERNAL MEDICINE

## 2018-04-01 PROCEDURE — 94640 AIRWAY INHALATION TREATMENT: CPT

## 2018-04-01 PROCEDURE — 99239 HOSP IP/OBS DSCHRG MGMT >30: CPT | Performed by: NURSE PRACTITIONER

## 2018-04-01 RX ADMIN — PRAVASTATIN SODIUM 40 MG: 40 TABLET ORAL at 17:31

## 2018-04-01 RX ADMIN — HEPARIN SODIUM 5000 UNITS: 5000 INJECTION, SOLUTION INTRAVENOUS; SUBCUTANEOUS at 21:22

## 2018-04-01 RX ADMIN — VITAM B12 100 MCG: 100 TAB at 09:15

## 2018-04-01 RX ADMIN — ISODIUM CHLORIDE 3 ML: 0.03 SOLUTION RESPIRATORY (INHALATION) at 14:32

## 2018-04-01 RX ADMIN — PANTOPRAZOLE SODIUM 40 MG: 40 TABLET, DELAYED RELEASE ORAL at 06:49

## 2018-04-01 RX ADMIN — LEVALBUTEROL 1.25 MG: 1.25 SOLUTION, CONCENTRATE RESPIRATORY (INHALATION) at 14:32

## 2018-04-01 RX ADMIN — ACETAMINOPHEN 650 MG: 325 TABLET, FILM COATED ORAL at 12:59

## 2018-04-01 RX ADMIN — IPRATROPIUM BROMIDE 0.5 MG: 0.5 SOLUTION RESPIRATORY (INHALATION) at 21:22

## 2018-04-01 RX ADMIN — POLYETHYLENE GLYCOL 3350 17 G: 17 POWDER, FOR SOLUTION ORAL at 09:12

## 2018-04-01 RX ADMIN — ERYTHROMYCIN 0.5 INCH: 5 OINTMENT OPHTHALMIC at 12:59

## 2018-04-01 RX ADMIN — HYDRALAZINE HYDROCHLORIDE 25 MG: 25 TABLET, FILM COATED ORAL at 06:49

## 2018-04-01 RX ADMIN — ERYTHROMYCIN 0.5 INCH: 5 OINTMENT OPHTHALMIC at 17:32

## 2018-04-01 RX ADMIN — IPRATROPIUM BROMIDE 0.5 MG: 0.5 SOLUTION RESPIRATORY (INHALATION) at 14:32

## 2018-04-01 RX ADMIN — PANTOPRAZOLE SODIUM 40 MG: 40 TABLET, DELAYED RELEASE ORAL at 17:32

## 2018-04-01 RX ADMIN — ASCORBIC ACID TAB 250 MG 125 MG: 250 TAB at 09:13

## 2018-04-01 RX ADMIN — METOPROLOL TARTRATE 25 MG: 25 TABLET ORAL at 21:21

## 2018-04-01 RX ADMIN — TOLTERODINE TARTRATE 4 MG: 2 CAPSULE, EXTENDED RELEASE ORAL at 09:12

## 2018-04-01 RX ADMIN — HYDRALAZINE HYDROCHLORIDE 25 MG: 25 TABLET, FILM COATED ORAL at 13:00

## 2018-04-01 RX ADMIN — METOPROLOL TARTRATE 25 MG: 25 TABLET ORAL at 09:14

## 2018-04-01 RX ADMIN — MUPIROCIN 1 APPLICATION: 20 OINTMENT TOPICAL at 09:12

## 2018-04-01 RX ADMIN — FENOFIBRATE 145 MG: 145 TABLET ORAL at 09:12

## 2018-04-01 RX ADMIN — IPRATROPIUM BROMIDE 0.5 MG: 0.5 SOLUTION RESPIRATORY (INHALATION) at 02:16

## 2018-04-01 RX ADMIN — IPRATROPIUM BROMIDE 0.5 MG: 0.5 SOLUTION RESPIRATORY (INHALATION) at 08:07

## 2018-04-01 RX ADMIN — ISODIUM CHLORIDE 3 ML: 0.03 SOLUTION RESPIRATORY (INHALATION) at 02:16

## 2018-04-01 RX ADMIN — ISOSORBIDE MONONITRATE 30 MG: 30 TABLET, EXTENDED RELEASE ORAL at 09:13

## 2018-04-01 RX ADMIN — ERYTHROMYCIN 0.5 INCH: 5 OINTMENT OPHTHALMIC at 06:49

## 2018-04-01 RX ADMIN — FUROSEMIDE 20 MG: 10 INJECTION, SOLUTION INTRAMUSCULAR; INTRAVENOUS at 09:13

## 2018-04-01 RX ADMIN — ASPIRIN 81 MG 81 MG: 81 TABLET ORAL at 09:14

## 2018-04-01 RX ADMIN — LEVALBUTEROL 1.25 MG: 1.25 SOLUTION, CONCENTRATE RESPIRATORY (INHALATION) at 08:07

## 2018-04-01 RX ADMIN — LEVALBUTEROL 1.25 MG: 1.25 SOLUTION, CONCENTRATE RESPIRATORY (INHALATION) at 21:23

## 2018-04-01 RX ADMIN — LEVALBUTEROL 1.25 MG: 1.25 SOLUTION, CONCENTRATE RESPIRATORY (INHALATION) at 02:16

## 2018-04-01 RX ADMIN — AMIODARONE HYDROCHLORIDE 100 MG: 200 TABLET ORAL at 09:14

## 2018-04-01 RX ADMIN — LEVOTHYROXINE SODIUM 50 MCG: 50 TABLET ORAL at 06:49

## 2018-04-01 RX ADMIN — HEPARIN SODIUM 5000 UNITS: 5000 INJECTION, SOLUTION INTRAVENOUS; SUBCUTANEOUS at 06:50

## 2018-04-01 RX ADMIN — DOCUSATE SODIUM 100 MG: 100 CAPSULE, LIQUID FILLED ORAL at 21:22

## 2018-04-01 RX ADMIN — HEPARIN SODIUM 5000 UNITS: 5000 INJECTION, SOLUTION INTRAVENOUS; SUBCUTANEOUS at 13:00

## 2018-04-01 RX ADMIN — HYDRALAZINE HYDROCHLORIDE 25 MG: 25 TABLET, FILM COATED ORAL at 21:22

## 2018-04-01 RX ADMIN — AMLODIPINE BESYLATE 5 MG: 5 TABLET ORAL at 09:14

## 2018-04-01 RX ADMIN — MUPIROCIN 1 APPLICATION: 20 OINTMENT TOPICAL at 21:21

## 2018-04-01 RX ADMIN — ISODIUM CHLORIDE 3 ML: 0.03 SOLUTION RESPIRATORY (INHALATION) at 08:07

## 2018-04-01 NOTE — PROGRESS NOTES
Progress Note - Cardiology   Tor Mention 80 y o  male MRN: [id number]  : 10/24/1930  Unit/Bed#: [address] [place] [address] Encounter: [id number]    Assessment and Plan:   1  Acute on chronic hypoxic respiratory failure most likely due to worsening of acute on chronic systolic and diastolic heart failure for not taking medicines regularly  He seems to be responding well to IV Lasix  Will switch to p  O  maintenance diuretics from tomorrow  2  Acute on chronic systolic and diastolic heart failure [place] heart Association class 2/3  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  Added hydralazine and nitrates  Slowly titrate up the dose  3  Known moderate COPD with history of chronic hypoxia on oxygen therapy at home  4  History of severe aortic stenosis status post TAVR in 2017 will update an echo  Repeat echo shows valve is functioning adequately  EF around 35%  5   CAD status post angioplasty of LAD in , with no symptoms of angina  6  Dyslipidemia on statin  7  Paroxysmal atrial fibrillation currently in sinus suppressed with low-dose amiodarone  8  Hypothyroidism with high TSH  May need to increase the dose of levothyroxine  9  Dyslipidemia on statins  10  Moderate obesity with recent loss of weight  11   Chronic LBBB would benefit from biventricular pacemaker discussed with patient for now he is reluctant as this is 1st episode since his previous valve procedure would like to wait  12  Moderate to severe pulmonary hypertension secondary to severe COPD, obesity, cardiac issues  Plan  Will switch to p o  maintenance dose from tomorrow  Continue hydralazine and nitrates  Will slowly titrate up hydralazine  Rehab as patient's seems to be have significant deconditioning   Monitor input output  Follow-up electrolytes  Continue other Rx as before  Reluctant for biventricular pacemaker at this time  Subjective / Objective:   Patient feeling much better     His most important claims today is left hip pain   He was sitting in the chair for about 4 hours  Breathing is much better  Denies any chest pain or any other significant complaint  Serum creatinine is improving  Blood pressure is better    Vitals: Blood pressure 142/78, pulse 69, temperature 97 8 °F (36 6 °C), temperature source Oral, resp  rate 18, height 5' 10" (1 778 m), weight 96 8 kg (213 lb 6 5 oz), SpO2 95 %  Vitals:    03/29/18 0626 03/30/18 0600   Weight: 98 4 kg (216 lb 14 9 oz) 96 8 kg (213 lb 6 5 oz)     Body mass index is 30 62 kg/m²  BP Readings from Last 3 Encounters:   04/01/18 142/78   11/27/17 150/64   11/07/17 124/52     Orthostatic Blood Pressures    Flowsheet Row Most Recent Value   Blood Pressure  142/78 filed at 04/01/2018 1300   Patient Position - Orthostatic VS  Sitting filed at 04/01/2018 0913          Invasive Devices     Peripheral Intravenous Line            Peripheral IV 03/28/18 Left Antecubital 3 days                  Intake/Output Summary (Last 24 hours) at 04/01/18 1400  Last data filed at 04/01/18 1301   Gross per 24 hour   Intake                0 ml   Output             1650 ml   Net            -1650 ml         Physical Exam:   Physical Exam    Neurologic:  Alert & oriented x 3,  no focal deficits noted   Constitutional:  Well developed, well nourished,  With no acute distress  Eyes:  PERRL, conjunctiva normal   HENT:  Atraumatic, external ears normal, nose normal,   NECK: Normal range of motion, no tenderness, neck is supple , No JVP  Respiratory:  Bilateral air entry mostly clear to auscultation with rare rhonchi  Cardiovascular: S1-S2 regular with a 3/6 ejection systolic murmur  S4 is heard  GI:  Soft, nondistended, normal bowel sounds, nontender, no hepatosplenomegaly appreciated  Musculoskeletal:  No tenderness, no deformities      Extremities:  Mild edema and distal pulses are present  Psychiatric:  Speech and behavior appropriate             Medications/ Allergies:       Current Facility-Administered Medications:  acetaminophen 650 mg Oral Q6H PRN Alexa Spironello V, CRNP   amiodarone 100 mg Oral Daily Alexa Gerardo BILLIE, ROC   amLODIPine 5 mg Oral Daily Antonio Howell MD   vitamin C 125 mg Oral Daily Alexa Spironello V, CRNP   aspirin 81 mg Oral Daily Alexa Spironello V, CRNP   cyanocobalamin 100 mcg Oral Daily Alexa Gerardo BILLIE, CHICHINP   docusate sodium 100 mg Oral HS Alexa Gerardo V, CRNP   erythromycin 0 5 inch Right Eye Q6H [facility] Alexa Gerardo BILLIE, CRNP   fenofibrate 145 mg Oral Daily Alexa Gonzaels V, ROC   furosemide 20 mg Intravenous Daily Antonio Howell MD   heparin (porcine) 5,000 Units Subcutaneous Q8H [facility] Alexa Gerardo BILLIE, ROC   hydrALAZINE 25 mg Oral Q8H [facility] Antonio Howell MD   ipratropium 0 5 mg Nebulization Q6H Alexa Gerardo BILLIE, ROC   isosorbide mononitrate 30 mg Oral Daily Antonio Howell MD   levalbuterol 1 25 mg Nebulization Q6H ROC Ewing   And       sodium chloride 3 mL Nebulization Q6H Alexa Gerardo BILLIE, ROC   levothyroxine 50 mcg Oral Early Morning Alexa Gerardo BILLIE, CHICHINP   metoprolol tartrate 25 mg Oral Q12H [facility] Alexa Gonzales V, ROC   mupirocin  Nasal Q12H [facility] Kylie Purpura, CRNP   ondansetron 4 mg Intravenous Q6H PRN Alexa Spironello V, CRNP   pantoprazole 40 mg Oral BID AC Alexa Spironello V, CRNP   polyethylene glycol 17 g Oral Daily Alexa Spironello V, CRNP   pravastatin 40 mg Oral Daily With Fanear V, CRNP   tolterodine 4 mg Oral Daily Alexa Spironello V, CRNP       acetaminophen 650 mg Q6H PRN   ondansetron 4 mg Q6H PRN     No Known Allergies    VTE Pharmacologic Prophylaxis:   Heparin    Labs:   Troponins:    Results from last 7 days  Lab Units 03/28/18 2034   TROPONIN I ng/mL <0 02       CBC with diff:    Results from last 7 days  Lab Units 04/01/18  0657 03/30/18  0548 03/29/18  0529 03/28/18 2011   WBC Thousand/uL 7 80 7 40 7 60 9 30   HEMOGLOBIN g/dL 10 7* 10 9* 10 4* 11 7*   HEMATOCRIT % 32 8* 34 0* 31 9* 36 0*   MCV fL 88 87 87 87   PLATELETS Thousands/uL 207 217 201 249   MCH pg 28 5 28 0 28 3 28 5   MCHC g/dL 32 5 32 0 32 5 32 5   RDW % 16 4* 16 4* 16 4* 16 6*   MPV fL 8 7* 8 3* 8 1* 8 6*   NRBC AUTO /100 WBCs  --  0 0 0       CMP:    Results from last 7 days  Lab Units 04/01/18  0657 03/31/18  0642 03/30/18  0548 03/29/18  0529 03/28/18  2035   SODIUM mmol/L 137 138 140 143 140   POTASSIUM mmol/L 4 2 4 1 4 3 3 5 4 1   CHLORIDE mmol/L 102 101 104 105 103   CO2 mmol/L 27 29 28 29 28   ANION GAP mmol/L 8 8 8 9 9   BUN mg/dL 32* 33* 28* 21 24   CREATININE mg/dL 1 86* 1 94* 1 94* 1 64* 1 65*   GLUCOSE RANDOM mg/dL 94 95 95 88 105   CALCIUM mg/dL 8 7 8 6 9 0 9 1 9 1   AST U/L  --  33  --   --  39   ALT U/L  --  17  --   --  24   ALK PHOS U/L  --  41*  --   --  57   TOTAL PROTEIN g/dL  --  6 6  --   --  7 4   BILIRUBIN TOTAL mg/dL  --  0 30  --   --  0 30   EGFR ml/min/1 73sq m 32 30 30 37 37       Magnesium:    Results from last 7 days  Lab Units 03/30/18  0548 03/29/18  0529   MAGNESIUM mg/dL 2 3 1 9     Coags:    Results from last 7 days  Lab Units 03/28/18 2011   PTT seconds 27   INR  1 14     TSH:    Results from last 7 days  Lab Units 03/29/18  0529   TSH 3RD GENERATON uIU/mL 9 479*     NT-proBNP:   Recent Labs      03/31/18   0642   NTBNP  2,849*        Imaging & Testing   I have personally reviewed pertinent reports  Xr Chest 1 View Portable    Result Date: 3/29/2018  Narrative: CHEST INDICATION:   SOB  COMPARISON:  10/16/2017 EXAM PERFORMED/VIEWS:  XR CHEST PORTABLE FINDINGS: Cardiomediastinal silhouette appears enlarged prosthetic aortic valve  Chronic changes  Chronic right pleural thickening  No infiltrates  Osseous structures appear within normal limits for patient age  Impression: No acute cardiopulmonary disease  Cardiomegaly and chronic changes   Workstation performed: [id number]     Xr Chest Portable Icu    Result Date: 3/29/2018  Narrative: CHEST INDICATION:   follow up diuresis  Shortness of breath  COMPARISON:  Chest radiographs 2018 EXAM PERFORMED/VIEWS:  XR CHEST PORTABLE ICU  AP portable semierect view in the conventional and line placement techniques  Images: 2 FINDINGS: The heart is enlarged  Atherosclerotic changes in the aorta  Lung volumes diminished  Stable small bilateral pleural effusions  Bisi and pulmonary vessels diffusely enlarged  Diffuse groundglass infiltrates throughout the lungs bilaterally  These have progressed  Osseous structures appear within normal limits for patient age  Impression: Progressive congestive heart failure  Workstation performed: [id number]        EKG / Monitor: Personally reviewed  LBBB  Cardiac testing:   Results for orders placed during the hospital encounter of 18   Echo complete with contrast if indicated    Narrative [address] [place] [address]  [phone]    Transthoracic Echocardiogram  2D, M-mode, Doppler, and Color Doppler    Study date:  30-Mar-2018    Patient: Marica Kawasaki  MR number: [id number]  Account number: [de-identified]  : 24-Oct-1930  Age: 80 years  Gender: Male  Status: Routine  Location: Bedside  Height: 70 in  Weight: 213 lb  BP: 146/ 65 mmHg    Indications: Coronary Artery Disease    Diagnoses: I50 9 - Heart failure, unspecified    Sonographer:  CAMILLA Singer, RVS  Primary Physician:  Layton Tellez DO  Referring Physician:  Ninoska Alonso MD  Group:  [id number]  Interpreting Physician:  Estrada Baron MD    SUMMARY    LEFT VENTRICLE:  Size was at the upper limits of normal   Systolic function was moderately reduced  Ejection fraction was estimated in the range of 35 % to 40 % to be 35 %  There was moderate diffuse hypokinesis  Wall thickness was mildly increased  There was mild concentric hypertrophy    Doppler parameters were consistent with abnormal left ventricular relaxation (grade 1 diastolic dysfunction)  VENTRICULAR SEPTUM:  There was moderate paradoxical motion  These changes are consistent with LBBB  RIGHT VENTRICLE:  The size was at the upper limits of normal     LEFT ATRIUM:  The atrium was moderately dilated  RIGHT ATRIUM:  The atrium was mildly dilated  MITRAL VALVE:  There was mild to moderate annular calcification  There was mild regurgitation  AORTIC VALVE:  There was trace regurgitation  TRICUSPID VALVE:  There was moderate regurgitation  Estimated peak PA pressure was 70 mmHg  The findings suggest severe pulmonary hypertension  PULMONIC VALVE:  There was mild regurgitation  HISTORY: PRIOR HISTORY: HTN, PVD, Atrial Fibrillation, LBBB, CHF, CAD, COPD, TAVR    PROCEDURE: The procedure was performed at the bedside  This was a routine study  The transthoracic approach was used  The study included complete 2D imaging, M-mode, complete spectral Doppler, and color Doppler  The heart rate was 75 bpm,  at the start of the study  Images were obtained from the parasternal, apical, subcostal, and suprasternal notch acoustic windows  Echocardiographic views were limited due to poor acoustic window availability  This was a technically  difficult study  LEFT VENTRICLE: Size was at the upper limits of normal  Systolic function was moderately reduced  Ejection fraction was estimated in the range of 35 % to 40 % to be 35 %  There was moderate diffuse hypokinesis  Wall thickness was mildly  increased  There was mild concentric hypertrophy  DOPPLER: Doppler parameters were consistent with abnormal left ventricular relaxation (grade 1 diastolic dysfunction)  VENTRICULAR SEPTUM: Thickness was mildly increased  There was moderate paradoxical motion  These changes are consistent with LBBB  RIGHT VENTRICLE: The size was at the upper limits of normal  Systolic function was low normal     LEFT ATRIUM: The atrium was moderately dilated      RIGHT ATRIUM: The atrium was mildly dilated  MITRAL VALVE: There was mild to moderate annular calcification  There was lower normal leaflet separation  DOPPLER: There was no evidence for stenosis  There was mild regurgitation  AORTIC VALVE: TAVR had been performed previously  Peak velocity 2 6 to 2 7 m/sec  Mean gradient 16-17 mm and peak gradient of 28-30 mm of hg and FATMATA 1 30 to 1 35 cm2 DOPPLER: There was trace regurgitation  TRICUSPID VALVE: DOPPLER: There was moderate regurgitation  Estimated peak PA pressure was 70 mmHg  The findings suggest severe pulmonary hypertension  PULMONIC VALVE: DOPPLER: There was mild regurgitation  PERICARDIUM: There was no thickening or calcification  There was no pericardial effusion  AORTA: The root exhibited normal size  SYSTEMIC VEINS: IVC: The inferior vena cava was upper normal in size  Respirophasic changes were normal     SYSTEM MEASUREMENT TABLES    2D mode  AoR Diam 2D: 2 6 cm  LA Diam (2D): 5 3 cm  LA/Ao (2D): 2 04  FS (2D Teich): 6 82 %  IVSd (2D): 1 58 cm  LVDEV: 152 cm³  LVESV: 129 cm³  LVIDd(2D): 5 57 cm  LVISd (2D): 5 19 cm  LVOT Area 2D: 3 8 cm squared  LVPWd (2D): 1 39 cm  SV (Teich): 23 cm³    Apical four chamber  LVEF A4C: 25 %    Unspecified Scan Mode  FATMATA Cont Eq (Peak Pasquale): 1 56 cm squared  FATMATA Cont Eq (VTI): 1 61 cm squared  LVOT (VTI): 22 1 cm  LVOT Diam : 2 2 cm  LVOT Vmax: 1070 mm/s  LVOT Vmax; Mean: 1040 mm/s  Peak Grad ; Mean: 4 mm[Hg]  SV (LVOT): 82 cm³  VTI;Mean: 3 mm[Hg]  MV Peak A Pasquale: 1150 mm/s  MV Peak E Pasquale  Mean: 718 mm/s  MVA (PHT): 1 95 cm squared  PHT: 110 ms  Max P mm[Hg]  V Max: 3760 mm/s  Vmax: 3550 mm/s  RA Area: 22 5 cm squared  RA Volume: 66 8 cm³    [employer] Accredited Echocardiography Laboratory    Prepared and electronically signed by    Shagufta Theodore MD  Signed 30-Mar-2018 16:35:19         Dr Shagufta Theodore MD [facility] [place]      "This note has been constructed using a voice recognition system  Therefore there may be syntax, spelling, and/or grammatical errors   Please call if you have any questions  "

## 2018-04-01 NOTE — DISCHARGE SUMMARY
Discharge- Teresa De Luna 10/24/1930, 80 y o  male MRN: [id number]    Unit/Bed#: [address] [place] [address] Encounter: [id number]    Primary Care Provider: Gail Sorto DO   Date and time admitted to hospital: 3/28/2018  8:01 PM        * Acute on chronic combined systolic and diastolic CHF (congestive heart failure) [facility])   Assessment & Plan    Echo November 2017: EF 40% G1DD  Mild MR  Mild to moderate TR  Estimated PA systolic pressure 65  FATMATA 1 6 cm2  No paravalvular leak noted  Was on BiPAP 12/6 40% in the ED  Received 80 mg IV Lasix with good diuresis   On Home O2 of 6-7 LNC continuously, currently 4 LNC  · Cardiology following, appreciate input  · Change Lasix 20 mg IV daily to home medication Demadex 20 mg daily, continue Metoprolol  · Monitor urine output, strict I&O, daily weight  · Check BMP in 1 week        Generalized weakness   Assessment & Plan    Pt significantly weaker, does not feel comfortable going home  · PT/OT eval recommending STR and a lift chair once home as pt a max assist from sit to stand  · Pt willing to go to STR for further strengthening         Acute on chronic respiratory failure with hypoxia ([facility] [address] )   Assessment & Plan    On Home O2 of 6-7 LNC continuously, currently 4 LNC day, likely improving with IV diuresis  · Continue CPAP at night        Bacterial conjunctivitis of right eye   Assessment & Plan    Continue Erythromycin ointment q6h        COPD, moderate (HCC)   Assessment & Plan    FEV1 1 85 L or 76% of predicted as of Nov 2017, uses Breo Ellipta and [employer] tx at home  · Continue Atrovent and Xopenex q6h            GERD (gastroesophageal reflux disease)   Assessment & Plan    Continue PPI BID        Anemia   Assessment & Plan    Chronic anemia secondary to chronic kidney disease  · Trend hemoglobin daily           Hypothyroidism   Assessment & Plan    TSH is elevated, free T4 wnl  · Continue Synthroid at current dosage        History of atrial fibrillation   Assessment & Plan    Pt in NSR  · Continue amiodarone        Peripheral vascular disease (HCC)   Assessment & Plan    Continue ASA and statin         Multiple pulmonary nodules   Assessment & Plan    Outpatient follow-up        PATITO (obstructive sleep apnea)   Assessment & Plan    On CPAP at night        S/P TAVR (transcatheter aortic valve replacement)   Assessment & Plan    No perivalvular leak noted on last echo November 2017  · Cardiology following        Hypertension   Assessment & Plan    Continue Norvasc, Hydralazine, and Metoprolol         Hyperlipidemia   Assessment & Plan    On Pravastatin and Tricor        Diabetes mellitus type 2, noninsulin dependent (HCC)   Assessment & Plan    Hemoglobin A1c 5 9, has been off any regimen at home  · Consider starting SSI coverage if needed        CKD (chronic kidney disease), stage III   Assessment & Plan    Cr 1 94 -> 1 86 with a baseline creatinine between 1 6-2 0  · Monitor Cr closely with Lasix         BPH (benign prostatic hyperplasia)   Assessment & Plan    · Continue to monitor urine output         CAD in native artery   Assessment & Plan    Drug eluting stent in 2014  · Continue aspirin, metoprolol, statin                 Resolved Problems  Date Reviewed: 4/1/2018    None          Consultations During Hospital Stay:  · Cardiology - Dr Avi Jones     Procedures Performed:     · CXR 3/28: No acute cardiopulmonary disease  Cardiomegaly and chronic changes  · CXR: 3/29: Progressive congestive heart failure  · Echo: LVEF 35%, G1DD, moderate diffuse hypokinesis, moderate paradoxical motion consistent with LBBB, PA pressure 70 mmHg  Significant Findings / Test Results:     Echo: LVEF 35%, G1DD, moderate diffuse hypokinesis, moderate paradoxical motion consistent with LBBB, PA pressure 70 mmHg  Incidental Findings:   · None    Test Results Pending at Discharge (will require follow up):    · None     Outpatient Tests Requested:  · BMP in 3 days     Complications:  None    Reason for Admission: Acute on chronic respiratory failure with hypoxia likely due to acute on chronic combined heart failure     Hospital Course:     Brady Buck is a 80 y o  male patient with a PMH including chronic combined heart failure, chronic hypoxic respiratory failure on 6 LNC O2 at home, CKD, and PATITO on CPAP who originally presented to the hospital on 3/28/2018 due to progressive dyspnea with normal activity  He reported he was noncompliant with medications and missed a few doses  Workup in the ED included a CXR revealing increased vascular congestion and pleural effusions  Patient was treated with BIPAP, IV Lasix, and admitted for close monitoring in ICU  He was weaned off of BiPAP and currently on 4 LNC O2  He continues with dyapnea on exertion but better at rest  He diuresed well  Per cardiology, he can transition to his home regimen, Demadex 20 mg daily  He is to be discharge to STR for reconditioning and strengthening  He is to resume his home medications with improved compliance  He will have a BMP in 1 week  He will follow-up with Dr Juli Michaud as an outpatient  Please see above list of diagnoses and related plan for additional information  Condition at Discharge: stable     Discharge Day Visit / Exam:     Subjective:  Observed resting oob in chair, appears comfortable  Continue on 4 LNC  Continues with increased dyspnea with exertion  Overall, feeling improved and ready for STR  Declines any headache, CP, dizziness, or lightheadedness  Vitals: Blood Pressure: 151/67 (04/02/18 1413)  Pulse: 65 (04/02/18 1413)  Temperature: 98 6 °F (37 °C) (04/02/18 1413)  Temp Source: Oral (04/02/18 1413)  Respirations: 16 (04/02/18 1413)  Height: 5' 10" (177 8 cm) (03/28/18 2003)  Weight - Scale: 96 3 kg (212 lb 4 9 oz) (04/02/18 1042)  SpO2: 98 % (04/02/18 1413)  Exam:   Physical Exam   Constitutional: He is oriented to person, place, and time  He appears well-developed  No distress  HENT:   Head: Normocephalic  Neck: Normal range of motion  Cardiovascular: Normal rate and regular rhythm  Murmur heard  Pulmonary/Chest: Effort normal  No respiratory distress  He has decreased breath sounds in the right lower field and the left lower field  He has no wheezes  He has no rhonchi  He has no rales  Abdominal: Soft  Bowel sounds are normal  He exhibits no distension  Musculoskeletal: He exhibits no edema or tenderness  Neurological: He is alert and oriented to person, place, and time  Skin: Skin is warm and dry  No rash noted  He is not diaphoretic  Psychiatric: He has a normal mood and affect  Judgment normal    Nursing note and vitals reviewed  Discussion with Family: Significant other    Discharge instructions/Information to patient and family:   See after visit summary for information provided to patient and family  Provisions for Follow-Up Care:  See after visit summary for information related to follow-up care and any pertinent home health orders  Disposition:     Other [facility] Dalila Jeff    For Discharges to [facility] SNF:   · Not Applicable to this Patient - Not Applicable to this Patient    Planned Readmission: None     Discharge Statement:  I spent > 30 minutes discharging the patient  This time was spent on the day of discharge  I had direct contact with the patient on the day of discharge  Greater than 50% of the total time was spent examining patient, answering all patient questions, arranging and discussing plan of care with patient as well as directly providing post-discharge instructions  Additional time then spent on discharge activities  Discharge Medications:  See after visit summary for reconciled discharge medications provided to patient and family        ** Please Note: This note has been constructed using a voice recognition system **

## 2018-04-01 NOTE — ASSESSMENT & PLAN NOTE
Echo November 2017: EF 40% G1DD  Mild MR  Mild to moderate TR  Estimated PA systolic pressure 65  FATMATA 1 6 cm2  No paravalvular leak noted  Was on BiPAP 12/6 40% in the ED  Received 80 mg IV Lasix with good diuresis   On Home O2 of 6-7 LNC continuously, currently 4 LNC  · Cardiology following, appreciate input  · Change Lasix 20 mg IV daily to home medication Demadex 20 mg daily, continue Metoprolol  · Monitor urine output, strict I&O, daily weight  · Check BMP in 1 week

## 2018-04-01 NOTE — ASSESSMENT & PLAN NOTE
Pt significantly weaker, does not feel comfortable going home  · PT/OT eval recommending STR and a lift chair once home as pt a max assist from sit to stand  · Pt willing to go to STR for further strengthening

## 2018-04-01 NOTE — PROGRESS NOTES
Progress Note - Joylene Day 10/24/1930, 80 y o  male MRN: [id number]    Unit/Bed#: [address] [place] [address] Encounter: [id number]    Primary Care Provider: Tariq Tinoco DO   Date and time admitted to hospital: 3/28/2018  8:01 PM        * Acute on chronic combined systolic and diastolic CHF (congestive heart failure) [facility])   Assessment & Plan    Echo November 2017: EF 40% G1DD  Mild MR  Mild to moderate TR  Estimated PA systolic pressure 65  FATMATA 1 6 cm2  No paravalvular leak noted  Was on BiPAP 12/6 40% in the ED  Received 80 mg IV Lasix with good diuresis   On Home O2 of 6-7 LNC continuously, currently 4 LNC  · Cardiology following, appreciate input  · Continue Lasix 20 mg IV daily, continue Metoprolol  · Monitor urine output, strict I&O, daily weight        Generalized weakness   Assessment & Plan    Pt significantly weaker, does not feel comfortable going home  · PT/OT eval recommending STR and a lift chair once home as pt a max assist from sit to stand  · CM aware and working on STR placement, pt is reluctant to go to STR at this time but will think it over         Acute on chronic respiratory failure with hypoxia ([facility] [address] )   Assessment & Plan    On Home O2 of 6-7 LNC continuously, currently 4 LNC day, likely improving with IV diuresis  · Continue CPAP at night        Bacterial conjunctivitis of right eye   Assessment & Plan    Continue Erythromycin ointment q6h        COPD, moderate (HCC)   Assessment & Plan    FEV1 1 85 L or 76% of predicted as of Nov 2017, uses Breo Ellipta and [employer] tx at home  · Continue Atrovent and Xopenex q6h            GERD (gastroesophageal reflux disease)   Assessment & Plan    Continue PPI BID        Anemia   Assessment & Plan    Chronic anemia secondary to chronic kidney disease  · Trend hemoglobin daily           Hypothyroidism   Assessment & Plan    TSH is elevated, free T4 wnl  · Continue Synthroid at current dosage        History of atrial fibrillation   Assessment & Plan    Pt in NSR  · Continue amiodarone        Peripheral vascular disease (HCC)   Assessment & Plan    Continue ASA and statin         Multiple pulmonary nodules   Assessment & Plan    Outpatient follow-up        PATITO (obstructive sleep apnea)   Assessment & Plan    On CPAP at night        S/P TAVR (transcatheter aortic valve replacement)   Assessment & Plan    No perivalvular leak noted on last echo November 2017  · Cardiology following        Hypertension   Assessment & Plan    Continue Norvasc, Hydralazine, and Metoprolol         Hyperlipidemia   Assessment & Plan    On Pravastatin and Tricor        Diabetes mellitus type 2, noninsulin dependent (HCC)   Assessment & Plan    Hemoglobin A1c 5 9, has been off any regimen at home  · Consider starting SSI coverage if needed        CKD (chronic kidney disease), stage III   Assessment & Plan    Cr 1 94 -> 1 86 with a baseline creatinine between 1 6-2 0  · Monitor Cr closely with Lasix         BPH (benign prostatic hyperplasia)   Assessment & Plan    · Continue to monitor urine output         CAD in native artery   Assessment & Plan    Drug eluting stent in 2014  · Continue aspirin, metoprolol, statin             VTE Pharmacologic Prophylaxis:   Pharmacologic: Heparin  Mechanical VTE Prophylaxis in Place: Yes    Patient Centered Rounds: I have performed bedside rounds with nursing staff today  Discussions with Specialists or Other Care Team Provider: Nursing, CM, cardiology     Education and Discussions with Family / Patient: I have answered all questions to the best of my ability  Friend updated  Time Spent for Care: 20 minutes  More than 50% of total time spent on counseling and coordination of care as described above      Current Length of Stay: 4 day(s)    Current Patient Status: Inpatient   Certification Statement: The patient will continue to require additional inpatient hospital stay due to generalized weakness, needing STR    Discharge Plan: Patient is not medically stable for discharge home due to weakness  Code Status: Level 2 - DNAR: but accepts endotracheal intubation      Subjective:   Overall, patient feels his breathing has improved, but he continues with generalized weakness  Leg swelling persistent but improved  He is oob in the chair but required max 2 assistants to transfer  He is fearful to go home and is considering STR  Objective:     Vitals:   Temp (24hrs), Av 5 °F (36 9 °C), Min:97 8 °F (36 6 °C), Max:99 4 °F (37 4 °C)    HR:  [64-74] 69  Resp:  [16-26] 18  BP: (130-153)/(63-92) 130/64  SpO2:  [92 %-100 %] 95 %  Body mass index is 30 62 kg/m²  Input and Output Summary (last 24 hours): Intake/Output Summary (Last 24 hours) at 18 1204  Last data filed at 18 0901   Gross per 24 hour   Intake                0 ml   Output             1200 ml   Net            -1200 ml       Physical Exam:     Physical Exam   Constitutional: He is oriented to person, place, and time  He appears well-developed  No distress  Resting oob in chair on 3 LNC, feet elevated, appears comfortable   HENT:   Head: Normocephalic  Neck: Normal range of motion  Cardiovascular: Normal rate and regular rhythm  Murmur heard  Pulmonary/Chest: Effort normal and breath sounds normal  No respiratory distress  He has no wheezes  He has no rhonchi  He has no rales  Course breath sounds at lower lobes     Abdominal: Soft  Bowel sounds are normal  He exhibits no distension  There is no tenderness  Musculoskeletal: Normal range of motion  He exhibits edema (trace BLE)  He exhibits no tenderness  Generalized weakness, requires max 2 assist to change positions    Neurological: He is alert and oriented to person, place, and time  forgetful   Skin: Skin is warm and dry  No rash noted  He is not diaphoretic  There is pallor  Psychiatric: He has a normal mood and affect  Judgment normal    Nursing note and vitals reviewed        Additional Data: Labs:      Results from last 7 days  Lab Units 04/01/18  0657 03/30/18  0548   WBC Thousand/uL 7 80 7 40   HEMOGLOBIN g/dL 10 7* 10 9*   HEMATOCRIT % 32 8* 34 0*   PLATELETS Thousands/uL 207 217   NEUTROS PCT %  --  62   LYMPHS PCT %  --  25   MONOS PCT %  --  8   EOS PCT %  --  5       Results from last 7 days  Lab Units 04/01/18  0657 03/31/18  0642   SODIUM mmol/L 137 138   POTASSIUM mmol/L 4 2 4 1   CHLORIDE mmol/L 102 101   CO2 mmol/L 27 29   BUN mg/dL 32* 33*   CREATININE mg/dL 1 86* 1 94*   CALCIUM mg/dL 8 7 8 6   TOTAL PROTEIN g/dL  --  6 6   BILIRUBIN TOTAL mg/dL  --  0 30   ALK PHOS U/L  --  41*   ALT U/L  --  17   AST U/L  --  33   GLUCOSE RANDOM mg/dL 94 95       Results from last 7 days  Lab Units 03/28/18  2011   INR  1 14       * I Have Reviewed All Lab Data Listed Above  * Additional Pertinent Lab Tests Reviewed:  All Labs Within Last 24 Hours Reviewed    Imaging:    Imaging Reports Reviewed Today Include: CXR   Imaging Personally Reviewed by Myself Includes:  None    Recent Cultures (last 7 days):       Results from last 7 days  Lab Units 03/29/18  0850 03/29/18  0049   SPUTUM CULTURE  4+ Growth of   --    GRAM STAIN RESULT  Rare Polys  Rare Epithelial Cells  2+ Gram positive cocci in pairs, chains and clusters  2+ Gram negative diplococci  1+ Gram negative rods  1+ Gram positive rods  --    INFLUENZA A PCR   --  None Detected   INFLUENZA B PCR   --  None Detected   RSV PCR   --  None Detected       Last 24 Hours Medication List:     Current Facility-Administered Medications:  acetaminophen 650 mg Oral Q6H PRN Alexa Spironello V, CRNP   amiodarone 100 mg Oral Daily Alexa Spironello V, CRDAYLIN   amLODIPine 5 mg Oral Daily Aakash Garcia MD   vitamin C 125 mg Oral Daily Alexa Spironello VROC   aspirin 81 mg Oral Daily Alexa Spironello V, CRNP   cyanocobalamin 100 mcg Oral Daily Alexa Spironello V, CRNP   docusate sodium 100 mg Oral HS Alexa Gerardo ROC WISE erythromycin 0 5 inch Right Eye Q6H [address] Alexa Spironello V, CRNP   fenofibrate 145 mg Oral Daily Alexa Spironello V, CRNP   furosemide 20 mg Intravenous Daily Familia Wright MD   heparin (porcine) 5,000 Units Subcutaneous Q8H [address] Alexa Spironello V, CRNP   hydrALAZINE 25 mg Oral Q8H [address] Familia Wright MD   ipratropium 0 5 mg Nebulization Q6H Alexa Spironello V, CHICHINP   isosorbide mononitrate 30 mg Oral Daily Familia Wright MD   levalbuterol 1 25 mg Nebulization Q6H Alexa Spirojamaro V, ROC   And       sodium chloride 3 mL Nebulization Q6H Alexa Spironello V, CRNP   levothyroxine 50 mcg Oral Early Morning Alexa Spironello V, ROC   metoprolol tartrate 25 mg Oral Q12H [address] Alexa Gerardo V, CRDAYLIN   mupirocin  Nasal Q12H [address] ROC Astudillo   ondansetron 4 mg Intravenous Q6H PRN Alexa Spironello V, CRDAYLIN   pantoprazole 40 mg Oral BID AC Alexa Spironello V, ROC   polyethylene glycol 17 g Oral Daily Alexa Spironello V, CRDAYLIN   pravastatin 40 mg Oral Daily With [employer] V, ROC   tolterodine 4 mg Oral Daily Alexa Spiromayra WISE, ROC        Today, Patient Was Seen By: ROC Astudillo    ** Please Note: Dictation voice to text software may have been used in the creation of this document   **

## 2018-04-02 VITALS
DIASTOLIC BLOOD PRESSURE: 67 MMHG | OXYGEN SATURATION: 98 % | WEIGHT: 212.3 LBS | SYSTOLIC BLOOD PRESSURE: 151 MMHG | HEIGHT: 70 IN | RESPIRATION RATE: 16 BRPM | HEART RATE: 65 BPM | BODY MASS INDEX: 30.39 KG/M2 | TEMPERATURE: 98.6 F

## 2018-04-02 LAB
ANION GAP SERPL CALCULATED.3IONS-SCNC: 9 MMOL/L (ref 4–13)
BUN SERPL-MCNC: 34 MG/DL (ref 5–25)
CALCIUM SERPL-MCNC: 8.9 MG/DL (ref 8.3–10.1)
CHLORIDE SERPL-SCNC: 101 MMOL/L (ref 100–108)
CO2 SERPL-SCNC: 28 MMOL/L (ref 21–32)
CREAT SERPL-MCNC: 1.87 MG/DL (ref 0.6–1.3)
GFR SERPL CREATININE-BSD FRML MDRD: 32 ML/MIN/1.73SQ M
GLUCOSE SERPL-MCNC: 100 MG/DL (ref 65–140)
POTASSIUM SERPL-SCNC: 4 MMOL/L (ref 3.5–5.3)
SODIUM SERPL-SCNC: 138 MMOL/L (ref 136–145)

## 2018-04-02 PROCEDURE — 97110 THERAPEUTIC EXERCISES: CPT

## 2018-04-02 PROCEDURE — 94760 N-INVAS EAR/PLS OXIMETRY 1: CPT

## 2018-04-02 PROCEDURE — 99232 SBSQ HOSP IP/OBS MODERATE 35: CPT | Performed by: INTERNAL MEDICINE

## 2018-04-02 PROCEDURE — 80048 BASIC METABOLIC PNL TOTAL CA: CPT | Performed by: NURSE PRACTITIONER

## 2018-04-02 PROCEDURE — 94640 AIRWAY INHALATION TREATMENT: CPT

## 2018-04-02 PROCEDURE — 97535 SELF CARE MNGMENT TRAINING: CPT

## 2018-04-02 RX ORDER — HYDRALAZINE HYDROCHLORIDE 25 MG/1
25 TABLET, FILM COATED ORAL EVERY 8 HOURS SCHEDULED
Qty: 90 TABLET | Refills: 0
Start: 2018-04-02 | End: 2018-06-25 | Stop reason: SDUPTHER

## 2018-04-02 RX ORDER — ISOSORBIDE MONONITRATE 30 MG/1
30 TABLET, EXTENDED RELEASE ORAL DAILY
Qty: 30 TABLET | Refills: 0
Start: 2018-04-02 | End: 2018-08-31

## 2018-04-02 RX ORDER — ERYTHROMYCIN 5 MG/G
0.5 OINTMENT OPHTHALMIC EVERY 6 HOURS SCHEDULED
Qty: 3.5 G | Refills: 0
Start: 2018-04-02 | End: 2018-04-09

## 2018-04-02 RX ADMIN — IPRATROPIUM BROMIDE 0.5 MG: 0.5 SOLUTION RESPIRATORY (INHALATION) at 02:20

## 2018-04-02 RX ADMIN — FENOFIBRATE 145 MG: 145 TABLET ORAL at 09:06

## 2018-04-02 RX ADMIN — IPRATROPIUM BROMIDE 0.5 MG: 0.5 SOLUTION RESPIRATORY (INHALATION) at 13:30

## 2018-04-02 RX ADMIN — VITAM B12 100 MCG: 100 TAB at 09:05

## 2018-04-02 RX ADMIN — ISODIUM CHLORIDE 3 ML: 0.03 SOLUTION RESPIRATORY (INHALATION) at 07:46

## 2018-04-02 RX ADMIN — LEVOTHYROXINE SODIUM 50 MCG: 50 TABLET ORAL at 06:15

## 2018-04-02 RX ADMIN — ACETAMINOPHEN 650 MG: 325 TABLET, FILM COATED ORAL at 16:42

## 2018-04-02 RX ADMIN — MUPIROCIN 1 APPLICATION: 20 OINTMENT TOPICAL at 09:31

## 2018-04-02 RX ADMIN — PANTOPRAZOLE SODIUM 40 MG: 40 TABLET, DELAYED RELEASE ORAL at 06:15

## 2018-04-02 RX ADMIN — FUROSEMIDE 20 MG: 10 INJECTION, SOLUTION INTRAMUSCULAR; INTRAVENOUS at 09:07

## 2018-04-02 RX ADMIN — LEVALBUTEROL 1.25 MG: 1.25 SOLUTION, CONCENTRATE RESPIRATORY (INHALATION) at 02:20

## 2018-04-02 RX ADMIN — ASCORBIC ACID TAB 250 MG 125 MG: 250 TAB at 08:59

## 2018-04-02 RX ADMIN — AMIODARONE HYDROCHLORIDE 100 MG: 200 TABLET ORAL at 09:03

## 2018-04-02 RX ADMIN — ISOSORBIDE MONONITRATE 30 MG: 30 TABLET, EXTENDED RELEASE ORAL at 08:59

## 2018-04-02 RX ADMIN — AMLODIPINE BESYLATE 5 MG: 5 TABLET ORAL at 09:04

## 2018-04-02 RX ADMIN — PANTOPRAZOLE SODIUM 40 MG: 40 TABLET, DELAYED RELEASE ORAL at 16:40

## 2018-04-02 RX ADMIN — LEVALBUTEROL 1.25 MG: 1.25 SOLUTION, CONCENTRATE RESPIRATORY (INHALATION) at 07:46

## 2018-04-02 RX ADMIN — HEPARIN SODIUM 5000 UNITS: 5000 INJECTION, SOLUTION INTRAVENOUS; SUBCUTANEOUS at 13:58

## 2018-04-02 RX ADMIN — HYDRALAZINE HYDROCHLORIDE 25 MG: 25 TABLET, FILM COATED ORAL at 06:15

## 2018-04-02 RX ADMIN — ISODIUM CHLORIDE 3 ML: 0.03 SOLUTION RESPIRATORY (INHALATION) at 13:30

## 2018-04-02 RX ADMIN — ERYTHROMYCIN 0.5 INCH: 5 OINTMENT OPHTHALMIC at 06:19

## 2018-04-02 RX ADMIN — HYDRALAZINE HYDROCHLORIDE 25 MG: 25 TABLET, FILM COATED ORAL at 13:57

## 2018-04-02 RX ADMIN — LEVALBUTEROL 1.25 MG: 1.25 SOLUTION, CONCENTRATE RESPIRATORY (INHALATION) at 13:30

## 2018-04-02 RX ADMIN — IPRATROPIUM BROMIDE 0.5 MG: 0.5 SOLUTION RESPIRATORY (INHALATION) at 07:46

## 2018-04-02 RX ADMIN — TOLTERODINE TARTRATE 4 MG: 2 CAPSULE, EXTENDED RELEASE ORAL at 08:59

## 2018-04-02 RX ADMIN — ERYTHROMYCIN 0.5 INCH: 5 OINTMENT OPHTHALMIC at 12:23

## 2018-04-02 RX ADMIN — ASPIRIN 81 MG 81 MG: 81 TABLET ORAL at 09:04

## 2018-04-02 RX ADMIN — ACETAMINOPHEN 650 MG: 325 TABLET, FILM COATED ORAL at 06:34

## 2018-04-02 RX ADMIN — PRAVASTATIN SODIUM 40 MG: 40 TABLET ORAL at 16:40

## 2018-04-02 RX ADMIN — HEPARIN SODIUM 5000 UNITS: 5000 INJECTION, SOLUTION INTRAVENOUS; SUBCUTANEOUS at 06:15

## 2018-04-02 RX ADMIN — METOPROLOL TARTRATE 25 MG: 25 TABLET ORAL at 09:02

## 2018-04-02 NOTE — PHYSICAL THERAPY NOTE
PT TREATMENT     04/02/18 1310   Pain Assessment   Pain Assessment 0-10   Pain Score 5  (L hip and B shoulder/chronic pains)   Restrictions/Precautions   Other Precautions Chair Alarm; Bed Alarm; Fall Risk;O2;Pain   General   Chart Reviewed Yes   Family/Caregiver Present No   Cognition   Arousal/Participation Cooperative   Subjective   Subjective patient cooperative and anxious to get to rehab to get stronger and go home   Transfers   Sit to Stand 3  Moderate assistance   Additional items Assist x 1  (from low bedside chair and toilet/low)   Stand to Sit 4  Minimal assistance   Additional items Assist x 1   Ambulation/Elevation   Gait Assistance 4  Minimal assist   Additional items Assist x 1   Assistive Device Rolling walker   Distance 50 feet x 1, 30 feet x 1, all with several changes in direction  Patient ambulated to and from bathroom with independent hygiene on the toilet for BM but assist required to stand up from toilet  Balance   Static Standing Fair   Dynamic Standing Fair -   Ambulatory Fair -   Exercises   Hip Flexion Sitting;15 reps;Bilateral   Knee AROM Long Arc Quad Sitting;15 reps;Bilateral   Ankle Pumps Sitting;15 reps;Bilateral  (heel and toe raises)   Marching Standing;10 reps;Bilateral   Balance training  sidestepping and backward walking completed with roller walker   Assessment   Assessment patient demonstrating improving gait balance and endurance and will benefit from continued PT with progression as tolerated   Plan   Treatment/Interventions ADL retraining;Functional transfer training;LE strengthening/ROM; Therapeutic exercise; Endurance training;Patient/family training;Equipment eval/education; Bed mobility;Gait training   PT Frequency 5x/wk   Recommendation   Recommendation (STR)     Chinyere Cardenas [id number]

## 2018-04-02 NOTE — PLAN OF CARE
Problem: RESPIRATORY - ADULT  Goal: Achieves optimal ventilation and oxygenation  INTERVENTIONS:  - Assess for changes in respiratory status  - Assess for changes in mentation and behavior  - Position to facilitate oxygenation and minimize respiratory effort  - Oxygen administration by appropriate delivery method based on oxygen saturation (per order) or ABGs  - Initiate smoking cessation education as indicated  - Encourage broncho-pulmonary hygiene including cough, deep breathe, Incentive Spirometry  - Respiratory Therapy support as indicated  -Bipap hs  Outcome: Progressing

## 2018-04-02 NOTE — NURSING NOTE
Discharged to [facility] with suitcase personal belomgings as listed   Report called to receiving facility,

## 2018-04-02 NOTE — CASE MANAGEMENT
Continued Stay Review    Date: 18  Vitals:   Temp (24hrs), Av 5 °F (36 9 °C), Min:97 8 °F (36 6 °C), Max:99 4 °F (37 4 °C)  HR:  [64-74] 69  Resp:  [16-26] 18  BP: (130-153)/(63-92) 130/64  SpO2:  [92 %-100 %] 95 %  Body mass index is 30 62 kg/m²     Medications:   Scheduled Meds:   Current Facility-Administered Medications:  acetaminophen 650 mg Oral Q6H PRN Alexa Spironello V, CRNP   amiodarone 100 mg Oral Daily Alexa Spironello V, CRNP   amLODIPine 5 mg Oral Daily Magui Contreras MD   vitamin C 125 mg Oral Daily Alexa Spironello V, CRNP   aspirin 81 mg Oral Daily Alexa Spironello V, CRNP   cyanocobalamin 100 mcg Oral Daily Alexa Spironello V, CRNP   docusate sodium 100 mg Oral HS Alexa Spironello V, CRNP   erythromycin 0 5 inch Right Eye Q6H [facility] Alexa Spironello V, CRNP   fenofibrate 145 mg Oral Daily Alexa Spironello V, CRNP   furosemide 20 mg Intravenous Daily Magui Contreras MD   heparin (porcine) 5,000 Units Subcutaneous Q8H [facility] Alexa Spironello V, CRNP   hydrALAZINE 25 mg Oral Q8H [facility] Magui Contreras MD   ipratropium 0 5 mg Nebulization Q6H Alexa Spironello V, CRNP   isosorbide mononitrate 30 mg Oral Daily Magui Contreras MD   levalbuterol 1 25 mg Nebulization Q6H Alexa Spironello V, CRNP   And       sodium chloride 3 mL Nebulization Q6H Alexa Spironello V, CRNP   levothyroxine 50 mcg Oral Early Morning Alexa Spironello V, CRNP   metoprolol tartrate 25 mg Oral Q12H [facility] Alexa Spironello V, CRNP   mupirocin  Nasal Q12H [facility] ROC Galeana   ondansetron 4 mg Intravenous Q6H PRN Alexa SpiroROC Russo   pantoprazole 40 mg Oral BID AC Alexa SpiroROC Russo   polyethylene glycol 17 g Oral Daily ROC Ewing   pravastatin 40 mg Oral Daily With [employer] ROC WISE   tolterodine 4 mg Oral Daily ROC Ewing   Continuous Infusions:    PRN Meds:   acetaminophen    ondansetron  Abnormal Labs/Diagnostic Results:   BUN 32 CR 1 86 GFR  32 HGB 10 7   Age/Sex: 80 y o  male   Assessment/Plan:   Acute on chronic combined systolic and diastolic CHF (congestive heart failure) ([facility])   Assessment & Plan     Received 80 mg IV Lasix with good diuresis  On Home O2 of 6-7 LNC continuously, currently 4 LNC  · Continue Lasix 20 mg IV daily, continue Metoprolol  · Monitor urine output, strict I&O, daily weight     Acute on chronic respiratory failure with hypoxia (HCC)   Assessment & Plan     On Home O2 of 6-7 LNC continuously, currently 4 LNC day, continue IV diuresis  · Continue CPAP at night     COPD, moderate ([facility])   Assessment & Plan     FEV1 1 85 L or 76% of predicted, uses Breo Ellipta and Duoneb tx at home  · Continue Atrovent and Xopenex q6h   Discharge Plan: TBD  Thank you,  [address] [place] [address] in the [facility] by Markus Henderson for 2017  Network Utilization Review Department  Phone: [phone]; Fax [phone]  ATTENTION: The Network Utilization Review Department is now centralized for our 7 Facilities  Make a note that we have a new phone and fax numbers for our Department  Please call with any questions or concerns to [phone] and carefully follow the prompts so that you are directed to the right person  All voicemails are confidential  Fax any determinations, approvals, denials, and requests for initial or continue stay review clinical to [phone]  Due to HIGH CALL volume, it would be easier if you could please send faxed requests to expedite your requests and in part, help us provide discharge notifications faster

## 2018-04-02 NOTE — SOCIAL WORK
SPOKE WITH PT AT THE BEDSIDE THIS AM RE: STR CHOICES  PT HAS DECIDED HE DID NOT WANT TO GO TO [facility] OR [facility] HOME  DECIDED HE WOULD PREFER [facility] ONLY  PT HAS BEEN ACCEPTED TO [facility] AND AUTH OBTAINED PER [facility] NOTE  PT GOING FOR STR, IS ABLE TO DC TODAY  CONFIRMED WITH [facility] THAT CPAP HAS BEEN ORDERED  NOTED THAT HE CAN COME TONIGHT AFTER 5P  CALL MADE TO PAT AND  SCHEDULED FOR APPROX  [id number]  PT AWARE AND WANTED TO CALL HIS WIFE TO LET HER KNOW

## 2018-04-02 NOTE — OCCUPATIONAL THERAPY NOTE
OT TREATMENT     04/02/18 1324   Restrictions/Precautions   Other Precautions Chair Alarm; Bed Alarm;O2;Fall Risk   ADL   LB Dressing Assistance 2  Maximal Assistance   LB Dressing Deficit Don/doff R sock; Don/doff L sock   Bed Mobility   Supine to Sit 5  Supervision   Transfers   Sit to Stand 5  Supervision   Additional items Assist x 1   Stand to Sit 5  Supervision   Additional items Assist x 1   Stand pivot 5  Supervision   Additional items Assist x 1   Cognition   Overall Cognitive Status [facility]   Arousal/Participation Alert; Responsive; Cooperative   Attention Within functional limits   Orientation Level Oriented X4   Memory Within functional limits   Following Commands Follows all commands and directions without difficulty   Activity Tolerance   Activity Tolerance Patient limited by fatigue   Assessment   Assessment Mr Saucedo Bowels participated in self care treatments of LB dressing, needs max A  SPV for transfer back to bed and to lay down into bed  Pt anticipated to be at a SPV level for UB care and min A for toileting  Plan   Treatment Interventions ADL retraining;Functional transfer training;UE strengthening/ROM; Endurance training;Cognitive reorientation;Equipment evaluation/education; Fine motor coordination activities; Compensatory technique education   OT Frequency 3-5x/wk   Recommendation   OT Discharge Recommendation Short Term Rehab

## 2018-04-02 NOTE — CASE MANAGEMENT
Continued Stay Review  Date: 3/31/18  Vitals: Blood pressure 136/82, pulse 65, temperature 98 3 °F (36 8 °C), temperature source Tympanic, resp  rate 18, height 5' 10" (1 778 m), weight 96 8 kg (213 lb 6 5 oz), SpO2 98 %    Medications:   Scheduled Meds:   Current Facility-Administered Medications:  acetaminophen 650 mg Oral Q6H PRN Alexa Spironello V, CHICHINP   amiodarone 100 mg Oral Daily Alexa Spironello V, CHICHINP   amLODIPine 5 mg Oral Daily Suleman Tripp MD   vitamin C 125 mg Oral Daily Alexa Spironello V, CHICHINP   aspirin 81 mg Oral Daily Alexa Spironello V, CRNP   cyanocobalamin 100 mcg Oral Daily Alexa Spironello V, ROC   docusate sodium 100 mg Oral HS Alexa Joynello V, ROC   erythromycin 0 5 inch Right Eye Q6H [address] Alexa Gerardo ROC WISE   fenofibrate 145 mg Oral Daily Alexa Joynello V, CHICHINP   furosemide 20 mg Intravenous Daily Suleman Tripp MD   heparin (porcine) 5,000 Units Subcutaneous Q8H [address] Alexa Gerardo VROC   hydrALAZINE 25 mg Oral Q8H [address] Suleman Tripp MD   ipratropium 0 5 mg Nebulization Q6H Alexa Gerardo V, ROC   isosorbide mononitrate 30 mg Oral Daily Suleman Tripp MD   levalbuterol 1 25 mg Nebulization Q6H Alexa Gerardo VROC   And       sodium chloride 3 mL Nebulization Q6H Alexa Joynello V, ROC   levothyroxine 50 mcg Oral Early Morning Alexa Joynello V, ROC   metoprolol tartrate 25 mg Oral Q12H [address] Alexa Gerardo V, ROC   mupirocin  Nasal Q12H [address] ROC Leon   ondansetron 4 mg Intravenous Q6H PRN Alexa Spironello V, ROC   pantoprazole 40 mg Oral BID AC Alexa Spironello V, ROC   polyethylene glycol 17 g Oral Daily ROC Ewing   pravastatin 40 mg Oral Daily With [facility] [employer] ROC WISE   tolterodine 4 mg Oral Daily ROC Ewing   Continuous Infusions:    PRN Meds:   acetaminophen    ondansetron  Abnormal Labs/Diagnostic Results:   BNP 2849 BUN 33 CR 1  94 ALK PHOS 41 GFR 30   Age/Sex: 80 y o  male   Assessment/Plan:   Continues with shortness of breath on exertion  Pulmonary/Chest: Effort normal  No respiratory distress  He has decreased breath sounds  He has no wheezes  He has no rhonchi  He has no rales  Bilateral lower lobe course breath sounds with exertional dyspnea   Acute on chronic combined systolic and diastolic CHF (congestive heart failure) [facility])   Assessment & Plan     Echo November 2017: EF 40% G1DD  Mild MR  Mild to moderate TR  Estimated PA systolic pressure 65  FATMATA 1 6 cm2  No paravalvular leak noted  Received 80 mg IV Lasix with good diuresis  On Home O2 of 6-7 LNC continuously, currently 4 LNC  · Continue Lasix 20 mg IV daily, continue Metoprolol  · Monitor urine output, strict I&O, daily weight     Acute on chronic respiratory failure with hypoxia (HCC)   Assessment & Plan     On Home O2 of 6-7 LNC continuously, currently 4 LNC day, likely improving with IV diuresis  · Continue CPAP at night     COPD, moderate (HCC)   Assessment & Plan     FEV1 1 85 L or 76% of predicted, uses Breo Ellipta and Duoneb tx at home  · Continue Atrovent and Xopenex q6h      PER CARDIO  Subjective / Objective:   Patient still feeling weak and tired  Get short of breath when he was out of bed to chair  Breathing has improved otherwise  Echo shows EF around 35% and aortic valve is functioning adequately  No other significant complaint  Has severe pulmonary hypertension on echo  Assessment and Plan:   1   Acute on chronic hypoxic respiratory failure most likely due to worsening of acute on chronic systolic and diastolic heart failure for not taking medicines regularly  He seems to be responding well to IV Lasix  2   Acute on chronic systolic and diastolic heart failure [place] heart Association class 2/3   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  Added hydralazine and nitrates  3   Known moderate COPD with history of chronic hypoxia on oxygen therapy at home  4   History of severe aortic stenosis status post TAVR in August 2017 will update an echo  Repeat echo shows valve is functioning adequately  EF around 35%  5   CAD status post angioplasty of LAD in 2014, with no symptoms of angina  6   Dyslipidemia on statin  7   Paroxysmal atrial fibrillation currently in sinus suppressed with low-dose amiodarone  8   Hypothyroidism with high TSH   May need to increase the dose of levothyroxine  9   Dyslipidemia on statins  10   Moderate obesity with recent loss of weight  11   Chronic LBBB would benefit from biventricular pacemaker discussed with patient for now he is reluctant as this is 1st episode since his previous valve procedure would like to wait  12  Moderate to severe pulmonary hypertension secondary to severe COPD, obesity, cardiac issues  Plan  Decrease Lasix to once a day  Continue hydralazine and nitrates  Will slowly titrate up hydralazine  Monitor input output  Follow-up electrolytes  Reluctant for biventricular pacemaker at this time    Discharge Plan: DONNA

## 2018-04-02 NOTE — [place] UNIVERSAL TRANSFER FORM
[place] UNIVERSAL TRANSFER FORM  (ALL ITEMS MUST BE COMPLETED)    1  TRANSFER FROM: [facility] TO:[facility]    2  DATE OF TRANSFER: 4/2/2018                        TIME OF TRANSFER: 1700    3  PATIENT NAME: Karen Vila,        YOB: 1930                             GENDER: male    4  LANGUAGE:   English    5  PHYSICIAN NAME:  Merline Sans, MD                   PHONE: [phone]    6  CODE STATUS: Level 2 - DNAR: but accepts endotracheal intubation        Out of Hospital DNR Attached: No    7  :                                      :  Extended Emergency Contact Information  Primary Emergency Contact: [address]  Phone: [phone]  Relation: Other  Secondary Emergency Contact: Adebayo Murray, [address] [place] [address] Phone: [phone]  Relation: [address] Representative/Proxy:  No           Legal Guardian:  No             NAME OF:           HEALTH CARE REPRESENTATIVE/PROXY:                                         OR           LEGAL GUARDIAN, IF NOT :                                               PHONE:  (Day)           (Night)                        (Cell)    8  REASON FOR TRANSFER: (Must include brief medical history and recent changes in physical function or cognition ) short term rehab            V/S: /67 (BP Location: Left arm)   Pulse 65   Temp 98 6 °F (37 °C) (Oral)   Resp 16   Ht 5' 10" (1 778 m)   Wt 96 3 kg (212 lb 4 9 oz)   SpO2 98%   BMI 30 46 kg/m²           PAIN: Yes, Site hip and shoulder    9  PRIMARY DIAGNOSIS: Acute on chronic combined systolic and diastolic CHF (congestive heart failure) ([facility] [address] )      Secondary Diagnosis:         Pacemaker: No      Internal Defib: No          Mental Health Diagnosis (if Applicable):    10  RESTRAINTS: No     11  RESPIRATORY NEEDS: Oxygen Device 4 lpm,    12  ISOLATION/PRECAUTION: MRSA    15  ALLERGY: Patient has no known allergies  14  SENSORY:       Vision Good, Hearing Good  and Speech Clear    15  SKIN CONDITION: No Wounds    16  DIET: Regular    17  IV ACCESS: None    18  PERSONAL ITEMS SENT WITH PATIENT: Glasses, Dentures Upper Full and Lower Full and Othersuit case with clothing    19  ATTACHED DOCUMENTS: MUST ATTACH CURRENT MEDICATION INFORMATION Face Sheet, Medication Reconciliation, Diagnostic Studies, Labs, Respiratory Care, Advanced Directive, Code Status, Discharge Summary, PT Note and OT Note    20  AT RISK ALERTS:Falls        HARM TO: N/A    21  WEIGHT BEARING STATUS:         Left Leg: Full        Right Leg: Full    22  MENTAL STATUS:Alert    23  FUNCTION:        Walk: With Help        Transfer: With Help        Toilet: With Help        Feed: Self    24  IMMUNIZATIONS/SCREENING:     Immunization History   Administered Date(s) Administered    Pneumococcal Conjugate 13-Valent 06/12/2015       25  BOWEL: Continent    26  BLADDER: Continent    27   SENDING FACILITY CONTACT: [facility]                  Title: RN        Unit: [address]        Phone: [phone] (if known):        Title:        Unit:         Phone:         FORM PREFILLED BY (if applicable)       Title:RN        Unit: [address] [place]       Phone:         FORM COMPLETED BY Jean-Claude Wu RN      Title: RN      Phone: [phone]

## 2018-04-02 NOTE — SOCIAL WORK
SW referral received to obtain authorization  SW sent pt's clinical information to from [employer]  Pt was approved for 3 days of level 1 therapy, under auth # [id number]  Next review is with Emely Quijano  Her phone number is [phone] and fax is [phone]

## 2018-04-02 NOTE — PROGRESS NOTES
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  Added hydralazine and nitrates  Continue same hydralazine and nitrate does  He blood pressure becomes low amlodipine can be stopped  3  Known moderate COPD with history of chronic hypoxia on oxygen therapy at home  4  History of severe aortic stenosis status post TAVR in 2017 will update an echo  Repeat echo shows valve is functioning adequately  EF around 35%  5   CAD status post angioplasty of LAD in , with no symptoms of angina  6  Dyslipidemia on statin  7  Paroxysmal atrial fibrillation currently in sinus suppressed with low-dose amiodarone  8  Hypothyroidism with high TSH  May need to increase the dose of levothyroxine  9  Dyslipidemia on statins  10  CKD stage 3 with creatinine close to baseline  11   Chronic LBBB would benefit from biventricular pacemaker discussed with patient for now he is reluctant as this is 1st episode since his previous valve procedure would like to wait  12  Moderate to severe pulmonary hypertension secondary to severe COPD, obesity, cardiac issues  Plan  Continue p o  maintenance dose of diuretics  Torsemide 20 mg daily  Continue hydralazine and nitrates  Will slowly titrate up hydralazine  Rehab as patient's seems to be have significant deconditioning   Monitor input output  Continue other Rx as before  Reluctant for biventricular pacemaker at this time  Subjective / Objective:   Patient feeling much better  Patient is being transferred today to rehab facility  Bleeding much better  No other significant cardiovascular complaint      Vitals: Blood pressure 151/67, pulse 65, temperature 98 6 °F (37 °C), temperature source Oral, resp  rate 16, height 5' 10" (1 778 m), weight 96 3 kg (212 lb 4 9 oz), SpO2 98 %  Vitals:    03/30/18 0600 04/02/18 1042   Weight: 96 8 kg (213 lb 6 5 oz) 96 3 kg (212 lb 4 9 oz)     Body mass index is 30 46 kg/m²  BP Readings from Last 3 Encounters:   04/02/18 151/67   11/27/17 150/64   11/07/17 124/52     Orthostatic Blood Pressures    Flowsheet Row Most Recent Value   Blood Pressure  151/67 filed at 04/02/2018 1413   Patient Position - Orthostatic VS  Lying filed at 04/02/2018 1413          Invasive Devices          No matching active lines, drains, or airways            Intake/Output Summary (Last 24 hours) at 04/02/18 1809  Last data filed at 04/02/18 0901   Gross per 24 hour   Intake                0 ml   Output              875 ml   Net             -875 ml         Physical Exam:   Physical Exam   Constitutional: He is oriented to person, place, and time  He appears well-developed and well-nourished  No distress  HENT:   Head: Normocephalic and atraumatic  Eyes: Pupils are equal, round, and reactive to light  Neck: Neck supple  No JVD present  No tracheal deviation present  No thyromegaly present  Cardiovascular: Normal rate, regular rhythm, S1 normal, S2 normal and intact distal pulses  Exam reveals no gallop, no S3, no S4, no distant heart sounds and no friction rub  Murmur heard  Systolic (ejection) murmur is present with a grade of 2/6   Pulmonary/Chest: Effort normal  No respiratory distress  He has no wheezes  He has no rales  He exhibits no tenderness  Chronic bilateral rhonchi not changed   Abdominal: Soft  Bowel sounds are normal  He exhibits no distension  There is no tenderness  Musculoskeletal: He exhibits no edema or deformity  Neurological: He is alert and oriented to person, place, and time  Skin: Skin is warm and dry  No rash noted  He is not diaphoretic  No pallor     Psychiatric: He has a normal mood and affect   His behavior is normal  Judgment normal                  Medications/ Allergies:       Current Facility-Administered Medications:  acetaminophen 650 mg Oral Q6H PRN Alexa Spironello V, CRNP   amiodarone 100 mg Oral Daily Alexa Spironello V, CRNP   amLODIPine 5 mg Oral Daily Israel Betancourt MD   vitamin C 125 mg Oral Daily Alexa Spironello V, CRNP   aspirin 81 mg Oral Daily Alexa Spironello V, CRNP   cyanocobalamin 100 mcg Oral Daily Alexa Spironello V, CRNP   docusate sodium 100 mg Oral HS Alexa Spironello V, CRNP   erythromycin 0 5 inch Right Eye Q6H [address] Alexa Spironello V, CRNP   fenofibrate 145 mg Oral Daily Alexa Spirojamaro V, CRNP   furosemide 20 mg Intravenous Daily Israel Betancourt MD   heparin (porcine) 5,000 Units Subcutaneous Q8H [address] Alexa Spironello V, CRNP   hydrALAZINE 25 mg Oral Q8H [address] Israel Betancourt MD   ipratropium 0 5 mg Nebulization Q6H Alexa Spironello V, CRNP   isosorbide mononitrate 30 mg Oral Daily Israel Betancourt MD   levalbuterol 1 25 mg Nebulization Q6H Alexa Spironello V, CHICHINP   And       sodium chloride 3 mL Nebulization Q6H Alexa Spironello V, CRNP   levothyroxine 50 mcg Oral Early Morning Alexa Spironello V, CRNP   metoprolol tartrate 25 mg Oral Q12H [address] Alexa Spironello V, CRNP   mupirocin  Nasal Q12H [address] Felizardo Christian, CRNP   ondansetron 4 mg Intravenous Q6H PRN Alexa Spironello V, CRNP   pantoprazole 40 mg Oral BID AC Alexa Spironello V, CRNP   polyethylene glycol 17 g Oral Daily Alexa Spironello V, CRNP   pravastatin 40 mg Oral Daily With Bancha V, CRNP   tolterodine 4 mg Oral Daily Alexa Spironello V, CRNP       acetaminophen 650 mg Q6H PRN   ondansetron 4 mg Q6H PRN     No Known Allergies    VTE Pharmacologic Prophylaxis:   Heparin    Labs:   Troponins:    Results from last 7 days  Lab Units 03/28/18  2034   TROPONIN I ng/mL <0 02       CBC with diff:    Results from last 7 days  Lab Units 04/01/18  0657 03/30/18  0548 03/29/18  0529 03/28/18 2011   WBC Thousand/uL 7 80 7 40 7 60 9 30   HEMOGLOBIN g/dL 10 7* 10 9* 10 4* 11 7*   HEMATOCRIT % 32 8* 34 0* 31 9* 36 0*   MCV fL 88 87 87 87   PLATELETS Thousands/uL 207 217 201 249   MCH pg 28 5 28 0 28 3 28 5   MCHC g/dL 32 5 32 0 32 5 32 5   RDW % 16 4* 16 4* 16 4* 16 6*   MPV fL 8 7* 8 3* 8 1* 8 6*   NRBC AUTO /100 WBCs  --  0 0 0       CMP:    Results from last 7 days  Lab Units 04/02/18  0606 04/01/18  0657 03/31/18  2748 03/30/18  0548 03/29/18  0529 03/28/18  2035   SODIUM mmol/L 138 137 138 140 143 140   POTASSIUM mmol/L 4 0 4 2 4 1 4 3 3 5 4 1   CHLORIDE mmol/L 101 102 101 104 105 103   CO2 mmol/L 28 27 29 28 29 28   ANION GAP mmol/L 9 8 8 8 9 9   BUN mg/dL 34* 32* 33* 28* 21 24   CREATININE mg/dL 1 87* 1 86* 1 94* 1 94* 1 64* 1 65*   GLUCOSE RANDOM mg/dL 100 94 95 95 88 105   CALCIUM mg/dL 8 9 8 7 8 6 9 0 9 1 9 1   AST U/L  --   --  33  --   --  39   ALT U/L  --   --  17  --   --  24   ALK PHOS U/L  --   --  41*  --   --  57   TOTAL PROTEIN g/dL  --   --  6 6  --   --  7 4   BILIRUBIN TOTAL mg/dL  --   --  0 30  --   --  0 30   EGFR ml/min/1 73sq m 32 32 30 30 37 37       Magnesium:    Results from last 7 days  Lab Units 03/30/18  0548 03/29/18  0529   MAGNESIUM mg/dL 2 3 1 9     Coags:    Results from last 7 days  Lab Units 03/28/18 2011   PTT seconds 27   INR  1 14     TSH:    Results from last 7 days  Lab Units 03/29/18  0529   TSH 3RD GENERATON uIU/mL 9 479*     NT-proBNP:   Recent Labs      03/31/18   0642   NTBNP  2,849*        Imaging & Testing   I have personally reviewed pertinent reports  Xr Chest 1 View Portable    Result Date: 3/29/2018  Narrative: CHEST INDICATION:   SOB  COMPARISON:  10/16/2017 EXAM PERFORMED/VIEWS:  XR CHEST PORTABLE FINDINGS: Cardiomediastinal silhouette appears enlarged prosthetic aortic valve  Chronic changes  Chronic right pleural thickening  No infiltrates   Osseous structures appear within normal limits for patient age  Impression: No acute cardiopulmonary disease  Cardiomegaly and chronic changes  Workstation performed: [id number]     Xr Chest Portable Icu    Result Date: 3/29/2018  Narrative: CHEST INDICATION:   follow up diuresis  Shortness of breath  COMPARISON:  Chest radiographs 2018 EXAM PERFORMED/VIEWS:  XR CHEST PORTABLE ICU  AP portable semierect view in the conventional and line placement techniques  Images: 2 FINDINGS: The heart is enlarged  Atherosclerotic changes in the aorta  Lung volumes diminished  Stable small bilateral pleural effusions  Bisi and pulmonary vessels diffusely enlarged  Diffuse groundglass infiltrates throughout the lungs bilaterally  These have progressed  Osseous structures appear within normal limits for patient age  Impression: Progressive congestive heart failure  Workstation performed: [id number]        EKG / Monitor: Personally reviewed  LBBB  Cardiac testing:   Results for orders placed during the hospital encounter of 18   Echo complete with contrast if indicated    Narrative [place] [address]  [phone]    Transthoracic Echocardiogram  2D, M-mode, Doppler, and Color Doppler    Study date:  30-Mar-2018    Patient: Tonja Shah  MR number: [id number]  Account number: [de-identified]  : 24-Oct-1930  Age: 80 years  Gender: Male  Status: Routine  Location: Bedside  Height: 70 in  Weight: 213 lb  BP: 146/ 65 mmHg    Indications: Coronary Artery Disease    Diagnoses: I50 9 - Heart failure, unspecified    Sonographer:  CAMILLA Washington, RVS  Primary Physician:  Clide Denver, DO  Referring Physician:  Miguelina Barrios MD  Group:  [facility]  Interpreting Physician:  Romario Palacios MD    SUMMARY    LEFT VENTRICLE:  Size was at the upper limits of normal   Systolic function was moderately reduced   Ejection fraction was estimated in the range of 35 % to 40 % to be 35 %  There was moderate diffuse hypokinesis  Wall thickness was mildly increased  There was mild concentric hypertrophy  Doppler parameters were consistent with abnormal left ventricular relaxation (grade 1 diastolic dysfunction)  VENTRICULAR SEPTUM:  There was moderate paradoxical motion  These changes are consistent with LBBB  RIGHT VENTRICLE:  The size was at the upper limits of normal     LEFT ATRIUM:  The atrium was moderately dilated  RIGHT ATRIUM:  The atrium was mildly dilated  MITRAL VALVE:  There was mild to moderate annular calcification  There was mild regurgitation  AORTIC VALVE:  There was trace regurgitation  TRICUSPID VALVE:  There was moderate regurgitation  Estimated peak PA pressure was 70 mmHg  The findings suggest severe pulmonary hypertension  PULMONIC VALVE:  There was mild regurgitation  HISTORY: PRIOR HISTORY: HTN, PVD, Atrial Fibrillation, LBBB, CHF, CAD, COPD, TAVR    PROCEDURE: The procedure was performed at the bedside  This was a routine study  The transthoracic approach was used  The study included complete 2D imaging, M-mode, complete spectral Doppler, and color Doppler  The heart rate was 75 bpm,  at the start of the study  Images were obtained from the parasternal, apical, subcostal, and suprasternal notch acoustic windows  Echocardiographic views were limited due to poor acoustic window availability  This was a technically  difficult study  LEFT VENTRICLE: Size was at the upper limits of normal  Systolic function was moderately reduced  Ejection fraction was estimated in the range of 35 % to 40 % to be 35 %  There was moderate diffuse hypokinesis  Wall thickness was mildly  increased  There was mild concentric hypertrophy  DOPPLER: Doppler parameters were consistent with abnormal left ventricular relaxation (grade 1 diastolic dysfunction)  VENTRICULAR SEPTUM: Thickness was mildly increased  There was moderate paradoxical motion  These changes are consistent with LBBB  RIGHT VENTRICLE: The size was at the upper limits of normal  Systolic function was low normal     LEFT ATRIUM: The atrium was moderately dilated  RIGHT ATRIUM: The atrium was mildly dilated  MITRAL VALVE: There was mild to moderate annular calcification  There was lower normal leaflet separation  DOPPLER: There was no evidence for stenosis  There was mild regurgitation  AORTIC VALVE: TAVR had been performed previously  Peak velocity 2 6 to 2 7 m/sec  Mean gradient 16-17 mm and peak gradient of 28-30 mm of hg and FATMATA 1 30 to 1 35 cm2 DOPPLER: There was trace regurgitation  TRICUSPID VALVE: DOPPLER: There was moderate regurgitation  Estimated peak PA pressure was 70 mmHg  The findings suggest severe pulmonary hypertension  PULMONIC VALVE: DOPPLER: There was mild regurgitation  PERICARDIUM: There was no thickening or calcification  There was no pericardial effusion  AORTA: The root exhibited normal size  SYSTEMIC VEINS: IVC: The inferior vena cava was upper normal in size  Respirophasic changes were normal     SYSTEM MEASUREMENT TABLES    2D mode  AoR Diam 2D: 2 6 cm  LA Diam (2D): 5 3 cm  LA/Ao (2D): 2 04  FS (2D Teich): 6 82 %  IVSd (2D): 1 58 cm  LVDEV: 152 cm³  LVESV: 129 cm³  LVIDd(2D): 5 57 cm  LVISd (2D): 5 19 cm  LVOT Area 2D: 3 8 cm squared  LVPWd (2D): 1 39 cm  SV (Teich): 23 cm³    Apical four chamber  LVEF A4C: 25 %    Unspecified Scan Mode  FATMATA Cont Eq (Peak Pasquale): 1 56 cm squared  FATMATA Cont Eq (VTI): 1 61 cm squared  LVOT (VTI): 22 1 cm  LVOT Diam : 2 2 cm  LVOT Vmax: 1070 mm/s  LVOT Vmax; Mean: 1040 mm/s  Peak Grad ; Mean: 4 mm[Hg]  SV (LVOT): 82 cm³  VTI;Mean: 3 mm[Hg]  MV Peak A Pasquale: 1150 mm/s  MV Peak E Pasquale   Mean: 718 mm/s  MVA (PHT): 1 95 cm squared  PHT: 110 ms  Max P mm[Hg]  V Max: 3760 mm/s  Vmax: 3550 mm/s  RA Area: 22 5 cm squared  RA Volume: 66 8 cm³    [facility]    Prepared and electronically signed by    Antonio Howell MD  Signed 30-Mar-2018 16:35:19         Dr Antonio Howell MD [facility]      "This note has been constructed using a voice recognition system  Therefore there may be syntax, spelling, and/or grammatical errors   Please call if you have any questions  "

## 2018-04-03 ENCOUNTER — DOCUMENTATION (OUTPATIENT)
Dept: FAMILY MEDICINE CLINIC | Facility: CLINIC | Age: 83
End: 2018-04-03

## 2018-04-03 NOTE — CASE MANAGEMENT
Notification of Discharge  This is a Notification of Discharge from our facility [address]  Please be advised that this patient has been discharge from our facility  Below you will find the admission and discharge date and time including the patients disposition  PRESENTATION DATE: 3/28/2018  8:01 PM  IP ADMISSION DATE: 3/28/18 2138  DISCHARGE DATE: 4/2/2018  6:32 PM  DISPOSITION: Released to SNF/TCU/SNU [address] [place] [address] in the [facility] by Markus Henderson for 2017  Network Utilization Review Department  Phone: [phone]; Fax [phone]  ATTENTION: The Network Utilization Review Department is now centralized for our 7 Facilities  Make a note that we have a new phone and fax numbers for our Department  Please call with any questions or concerns to [phone] and carefully follow the prompts so that you are directed to the right person  All voicemails are confidential  Fax any determinations, approvals, denials, and requests for initial or continue stay review clinical to [phone]  Due to HIGH CALL volume, it would be easier if you could please send faxed requests to expedite your requests and in part, help us provide discharge notifications faster

## 2018-04-09 DIAGNOSIS — I10 ESSENTIAL HYPERTENSION: Primary | Chronic | ICD-10-CM

## 2018-04-10 RX ORDER — AMLODIPINE BESYLATE 10 MG/1
TABLET ORAL
Qty: 90 TABLET | Refills: 3 | Status: SHIPPED | OUTPATIENT
Start: 2018-04-10 | End: 2018-09-06 | Stop reason: SDUPTHER

## 2018-04-11 DIAGNOSIS — J44.9 COPD (CHRONIC OBSTRUCTIVE PULMONARY DISEASE) WITH CHRONIC BRONCHITIS (HCC): Primary | ICD-10-CM

## 2018-04-16 RX ORDER — FLUTICASONE FUROATE AND VILANTEROL TRIFENATATE 100; 25 UG/1; UG/1
POWDER RESPIRATORY (INHALATION)
Qty: 1 EACH | Refills: 5 | Status: SHIPPED | OUTPATIENT
Start: 2018-04-16

## 2018-04-23 ENCOUNTER — OFFICE VISIT (OUTPATIENT)
Dept: PULMONOLOGY | Facility: MEDICAL CENTER | Age: 83
End: 2018-04-23
Payer: COMMERCIAL

## 2018-04-23 VITALS
DIASTOLIC BLOOD PRESSURE: 50 MMHG | SYSTOLIC BLOOD PRESSURE: 100 MMHG | OXYGEN SATURATION: 96 % | HEIGHT: 70 IN | RESPIRATION RATE: 18 BRPM | WEIGHT: 203 LBS | HEART RATE: 60 BPM | TEMPERATURE: 98.4 F | BODY MASS INDEX: 29.06 KG/M2

## 2018-04-23 DIAGNOSIS — G47.33 OBSTRUCTIVE SLEEP APNEA: ICD-10-CM

## 2018-04-23 DIAGNOSIS — J96.11 CHRONIC HYPOXEMIC RESPIRATORY FAILURE (HCC): Primary | ICD-10-CM

## 2018-04-23 DIAGNOSIS — J43.2 CENTRILOBULAR EMPHYSEMA (HCC): ICD-10-CM

## 2018-04-23 DIAGNOSIS — G47.33 OSA (OBSTRUCTIVE SLEEP APNEA): Chronic | ICD-10-CM

## 2018-04-23 PROCEDURE — 99213 OFFICE O/P EST LOW 20 MIN: CPT | Performed by: INTERNAL MEDICINE

## 2018-04-23 NOTE — PROGRESS NOTES
Assessment/Plan:     Problem List Items Addressed This Visit        Respiratory    PATITO (obstructive sleep apnea) (Chronic)     Moderate obstructive sleep apnea  Continue CPAP at 10 cm water 5 L of oxygen at bedtime         Chronic hypoxemic respiratory failure ([facility] [address] ) - Primary     He presently is using 5 L of oxygen continuous and could actually pie be on 3 L at rest   When patient is discharged home he will monitor his O2 saturations and can titrate his oxygen to keep saturation greater 90% during the day         Centrilobular emphysema (HCC)     Moderate COPD with FEV1 of 1 85 L  He will continue on Breo 100 mcg 1 puff daily and uses nebulizer with DuoNeb 4 times a day as needed  His COPD appears to be stable         RESOLVED: Obstructive sleep apnea            Return in about 6 months (around 10/23/2018)  All questions are answered to the patient's satisfaction and understanding  He verbalizes understanding  He is encouraged to call with any further questions or concerns  Portions of the record may have been created with voice recognition software  Occasional wrong word or "sound a like" substitutions may have occurred due to the inherent limitations of voice recognition software  Read the chart carefully and recognize, using context, where substitutions have occurred  ______________________________________________________________________    Chief Complaint:   Chief Complaint   Patient presents with    Follow-up    COPD    Cough    Sleep Apnea       Patient ID: Manny Gee is a 80 y o  y o  male has a past medical history of Aortic stenosis, severe; BPH (benign prostatic hyperplasia); CAD (coronary artery disease); CHF (congestive heart failure) ([address] ); Chronic kidney disease (CKD); CKD (chronic kidney disease), stage III; CKD (chronic kidney disease), stage III; COPD (chronic obstructive pulmonary disease) ([address] ); Diabetes mellitus type 2, noninsulin dependent ([address] ); Esophagitis, erosive;  History of DVT (deep vein thrombosis); History of non-ST elevation myocardial infarction (NSTEMI); History of pneumonia; Hyperlipidemia; Hypertension; LBBB (left bundle branch block); MRSA (methicillin resistant staph aureus) culture positive; Obstructive sleep apnea on CPAP; Paroxysmal atrial fibrillation ([facility] [address] ); Pulmonary fibrosis ([facility] [address] ); and PVD (peripheral vascular disease) ([facility] )  4/23/2018  LEANDRO Cardona had recent admission to [facility] from March 28 to April 2, 2018 for acute on chronic combined systolic and diastolic congestive heart failure  He is presently and short-term rehab at [facility] and will be discharged from there soon to go home  Overall he is doing well  He is not having any shortness of breath at rest or nighttime  Does have some mild exertional dyspnea but this is stable  He keeps his oxygen on 5 liters/minute on a continuous basis and uses Breo 100 mcg 1 puff daily for COPD as well as nebulizer with DuoNeb 4 times a day  He has a history of moderate COPD with an FEV1 of 1 85 L or 76% predicted  He does have obstructive sleep apnea and uses CPAP at 10 cm water with 5 L of oxygen at bedtime  He does have a history of previous severe aortic stenosis and had a trans catheter aortic valve replacement done August 23, 2016  He also has a history of hypertension, chronic kidney disease, and paroxysmal atrial fibrillation  Also has some mild right pleural thickening and mild fibrosis of the right lower lobe  Review of Systems   Constitutional: Negative for diaphoresis, fatigue and fever  Patient is doing well now  Not having any shortness of breath with his usual activity  HENT: Negative for congestion and sinus pressure  Eyes: Negative for redness  Respiratory: Positive for shortness of breath  Negative for wheezing  Has chronic exertional dyspnea   Gastrointestinal: Negative for abdominal pain     Endocrine: Negative for polydipsia and polyphagia  Genitourinary: Negative for dysuria  Musculoskeletal: Negative for joint swelling  Neurological: Negative for dizziness  Psychiatric/Behavioral: Negative for confusion  Smoking history: He reports that he quit smoking about 31 years ago  His smoking use included Cigarettes  He has a 35 00 pack-year smoking history  He has never used smokeless tobacco     The following portions of the patient's history were reviewed and updated as appropriate: allergies, current medications, past family history, past medical history, past social history, past surgical history and problem list     Immunization History   Administered Date(s) Administered    Pneumococcal Conjugate 13-Valent 06/12/2015     Current Outpatient Prescriptions   Medication Sig Dispense Refill    acetaminophen (TYLENOL) 325 mg tablet Take 2 tablets (650 mg total) by mouth every 6 (six) hours as needed for mild pain  30 tablet 0    amiodarone 100 mg tablet Take 1 tablet (100 mg total) by mouth daily 90 tablet 3    amLODIPine (NORVASC) 10 mg tablet TAKE 1 TABLET DAILY 90 tablet 3    Ascorbic Acid (VITAMIN C) 100 MG tablet Take by mouth      aspirin 81 mg chewable tablet Chew daily      B Complex Vitamins (VITAMIN B COMPLEX PO) Take by mouth      BREO ELLIPTA USE 1 INHALATION DAILY 1 each 5    cyanocobalamin (VITAMIN B-12) 100 mcg tablet Take by mouth      docusate sodium (COLACE) 100 mg capsule Take 100 mg by mouth daily at bedtime   fenofibrate (TRICOR) 145 mg tablet Take 145 mg by mouth daily        hydrALAZINE (APRESOLINE) 25 mg tablet Take 1 tablet (25 mg total) by mouth every 8 (eight) hours 90 tablet 0    ipratropium-albuterol (DUO-NEB) 0 5-2 5 mg/3 mL Take 3 mL by nebulization 4 (four) times a day as needed for wheezing or shortness of breath  0    isosorbide mononitrate (IMDUR) 30 mg 24 hr tablet Take 1 tablet (30 mg total) by mouth daily 30 tablet 0    levothyroxine 50 mcg tablet Take by mouth  metoprolol tartrate (LOPRESSOR) 25 mg tablet Take 1 tablet (25 mg total) by mouth every 12 (twelve) hours  60 tablet 2    MIRABEGRON ER PO Take by mouth daily Patient not sure of the dose   oxyCODONE-acetaminophen (PERCOCET) 5-325 mg per tablet Take 1 tablet by mouth every 6 (six) hours as needed for moderate pain for up to 10 doses Max Daily Amount: 4 tablets 10 tablet 0    simvastatin (ZOCOR) 20 mg tablet TAKE 1 TABLET DAILY 90 tablet 1    torsemide (DEMADEX) 20 mg tablet Take by mouth      vitamin E, tocopherol, 400 units capsule Take by mouth      ipratropium-albuterol (DUO-NEB) 0 5-2 5 mg/mL Inhale 3 mL every 6 (six) hours   pantoprazole (PROTONIX) 40 mg tablet Take 1 tablet by mouth 2 (two) times a day before meals for 30 days 60 tablet 0    polyethylene glycol (MIRALAX) 17 g packet Take 17 g by mouth daily for 30 days 510 g 0    tolterodine (DETROL LA) 4 mg 24 hr capsule Take 1 capsule by mouth daily       No current facility-administered medications for this visit  Allergies: Patient has no known allergies  Objective:  Vitals:    04/23/18 1415   BP: 100/50   BP Location: Right arm   Patient Position: Sitting   Cuff Size: Standard   Pulse: 60   Resp: 18   Temp: 98 4 °F (36 9 °C)   TempSrc: Oral   SpO2: 96%   Weight: 92 1 kg (203 lb)   Height: 5' 10" (1 778 m)   Oxygen Therapy  SpO2: 96 % (4  l/m continous)    Wt Readings from Last 3 Encounters:   04/23/18 92 1 kg (203 lb)   04/02/18 96 3 kg (212 lb 4 9 oz)   02/23/18 96 2 kg (212 lb)     Body mass index is 29 13 kg/m²  Physical Exam   Constitutional:   Patient is awake alert, no conversational dyspnea  On 5 L O2 saturation is 97 and 98% and on 4 L is 96%  HENT:   Head: Normocephalic  Nose: Nose normal    Mouth/Throat: Oropharynx is clear and moist  No oropharyngeal exudate  Eyes: Conjunctivae are normal  Pupils are equal, round, and reactive to light  No scleral icterus  Neck: Neck supple  No JVD present   No tracheal deviation present  Cardiovascular: Normal rate, regular rhythm and normal heart sounds  Pulmonary/Chest: Effort normal  No respiratory distress  He has no wheezes  Lung sounds are clear to auscultation  Abdominal: Soft  He exhibits no distension  There is no tenderness  Musculoskeletal:   Minimal edema lower tibial surfaces  No cyanosis or clubbing   Lymphadenopathy:     He has no cervical adenopathy  Neurological: He is alert  Skin: Skin is warm and dry  Psychiatric: He has a normal mood and affect  His behavior is normal  Thought content normal        Lab Review:     Xr Chest 1 View Portable    Result Date: 3/29/2018  Narrative: CHEST INDICATION:   SOB  COMPARISON:  10/16/2017 EXAM PERFORMED/VIEWS:  XR CHEST PORTABLE FINDINGS: Cardiomediastinal silhouette appears enlarged prosthetic aortic valve  Chronic changes  Chronic right pleural thickening  No infiltrates  Osseous structures appear within normal limits for patient age  Impression: No acute cardiopulmonary disease  Cardiomegaly and chronic changes  Workstation performed: [id number]     Xr Chest Portable Icu    Result Date: 3/29/2018  Narrative: CHEST INDICATION:   follow up diuresis  Shortness of breath  COMPARISON:  Chest radiographs March 28, 2018 EXAM PERFORMED/VIEWS:  XR CHEST PORTABLE ICU  AP portable semierect view in the conventional and line placement techniques  Images: 2 FINDINGS: The heart is enlarged  Atherosclerotic changes in the aorta  Lung volumes diminished  Stable small bilateral pleural effusions  Bisi and pulmonary vessels diffusely enlarged  Diffuse groundglass infiltrates throughout the lungs bilaterally  These have progressed  Osseous structures appear within normal limits for patient age  Impression: Progressive congestive heart failure   Workstation performed: [id number]

## 2018-04-24 NOTE — ASSESSMENT & PLAN NOTE
He presently is using 5 L of oxygen continuous and could actually pie be on 3 L at rest   When patient is discharged home he will monitor his O2 saturations and can titrate his oxygen to keep saturation greater 90% during the day

## 2018-04-24 NOTE — ASSESSMENT & PLAN NOTE
Moderate COPD with FEV1 of 1 85 L or 76% of predicted    He will continue on Breo 100 mcg 1 puff daily and uses nebulizer with DuoNeb 4 times a day as needed    His COPD appears to be stable

## 2018-04-25 ENCOUNTER — TELEPHONE (OUTPATIENT)
Dept: CARDIOLOGY CLINIC | Facility: CLINIC | Age: 83
End: 2018-04-25

## 2018-04-29 NOTE — PROGRESS NOTES
Progress Note - Cardiology Office  Musa Carrasco 80 y o  male MRN: [id number]  : 10/24/1930  Encounter: [id number]      Assessment:     1  Acute on chronic combined systolic and diastolic CHF (congestive heart failure) ([facility] )    2  CAD in native artery    3  Benign essential HTN    4  CKD (chronic kidney disease), stage III    5  S/P TAVR (transcatheter aortic valve replacement)    6  Pulmonary fibrosis ([facility] )    7  PATITO (obstructive sleep apnea)    8  COPD, moderate ([facility] )    9  Chronic hypoxemic respiratory failure (HCC)    10  Other specified hypothyroidism    11  Diabetic polyneuropathy associated with type 2 diabetes mellitus ([facility] )    12  Paroxysmal atrial fibrillation ([facility])        Discussion Summary and Plan:  1  Chronic systolic and diastolic heart failure [place] heart Association class 3  Patient advised to be compliant with diuretics  Currently he is in compensated state  2   Known moderate COPD with history of chronic hypoxia on oxygen therapy at home  3  History of severe aortic stenosis status post TAVR in 2017  Repeat echo shows valve is functioning adequately  Ejection fraction around 35%  Could benefit from biventricular pacemaker  At this time patient wants to wait  4   CAD status post angioplasty of LAD in   Has residual 50-60% lad stenosis on medical Rx   5  Dyslipidemia on statin  Tolerating well  6  Paroxysmal atrial fibrillation currently in sinus suppressed with low-dose amiodarone  7  Hypothyroidism  On levothyroxine management as per PMD  8  Moderate obesity with recent loss of weight  9  Chronic LBBB would benefit from biventricular pacemaker discussed with patient for now he is reluctant as this is 1st episode since his previous valve procedure would like to wait  10  Hypertension seemed to well controlled  11  Chronic kidney disease is stage III  Creatinine is stable around 2  Counseling :  A description of the counseling   above reviewed in detail  Patient's ability to self care: Yes  Medication side effect reviewed with patient in detail and all their questions answered to their satisfaction  HPI :     Sathish Frances is a 80y o  year old male who came for follow up  Patient has past medical history significant for moderate COPD, chronic combined systolic and diastolic heart failure, severe aortic stenosis status post TAVR in August of 2017, CAD status post angioplasty of LAD, chronic LBBB, CKD stage 3, type 2 diabetes mellitus, dyslipidemia, paroxysmal atrial fibrillation suppressed with amiodarone in sinus rhythm with no reoccurrence, CO2 dependent due to chronic hypoxia  with history of coronary artery disease status post angioplasty of LAD with a residual 60% mid stenosis, severe aortic stenosis status post TAVR in [facility], known moderate to severe COPD, paroxysmal atrial fibrillation currently suppressed with amiodarone, on home oxygen therapy, chronic LBBB, CRI, hypertension, history of diastolic heart failure who was recently admitted to [facility] with a COPD exacerbation and Astelin heart failure was discharged home but was not taking his Demadex as prescribed  For couple days he did not take it at all and now he has started back  He was feeling short of breath particularly when he exerts  Since he started back on it he has been feeling a lot better  He still get hypoxic when he walks short distances  He denies any chest pain, any fever, any wheezing, any chills, any nausea or any increased leg swelling or weight gain  04/30/2018  Patient was recently admitted to [facility] with acute tracheobronchitis and COPD along with some diastolic heart failure  He recovered in nursing home  He still dropped his oxygen when he walks but as per him he quickly goes back to 94-95  Denies any leg swelling  No chest pain  Has chronic shortness of breath  No fever no chills no nausea no vomiting    No cough no other significant complaint  Review of Systems   Constitutional: Positive for activity change and fatigue  Respiratory: Positive for shortness of breath and wheezing  Musculoskeletal: Positive for arthralgias and gait problem  Psychiatric/Behavioral: The patient is nervous/anxious  All other systems reviewed and are negative        Historical Information   Past Medical History:   Diagnosis Date    Allergic rhinitis 06/11/2010    Aortic stenosis, severe     Bacterial pneumonia 06/08/2007    Bladder neck obstruction 12/23/2010    BPH (benign prostatic hyperplasia)     Bronchitis, chronic obstructive, with exacerbation ([facility] ) 01/30/2012    CAD (coronary artery disease)     stent 2014    Carcinoma ([place] [address] )     resolved [id number]    Cardiomegaly 02/13/2007    Cataract of both eyes     CHF (congestive heart failure) ([facility]     Chronic allergic conjunctivitis     last assessed [id number]    Chronic kidney disease (CKD)     CKD (chronic kidney disease), stage III     CKD (chronic kidney disease), stage III     COPD (chronic obstructive pulmonary disease) ([facility] )     Decubitus ulcer     resolved [id number]    Dermatomycosis     last assessed [id number]    Diabetes mellitus type 2, noninsulin dependent ([place] [address] )     Dyspnea on exertion     last assessed [id number]    Eczema     last assessed [id number]    Edema     last assessed [id number]    Epistaxis 12/01/2004    Esophagitis, erosive     Exposure to scabies     resolved [id number]    Fracture of rib     last assessed [id number]    H/O aortic valve replacement     resolved [id number]    H/O blood clots     History of DVT (deep vein thrombosis)     left posterior tibial/peroneal veins    History of non-ST elevation myocardial infarction (NSTEMI)     History of pneumonia     Hyperlipidemia     Hypertension     Internal hemorrhoids 09/13/2006    LBBB (left bundle branch block)     MRSA (methicillin resistant staph aureus) culture positive     NSTEMI (non-ST elevated myocardial infarction) ([facility] )     resolved [id number]    Obstructive sleep apnea on CPAP     severe PATITO with AHI of 106 and uses CPAP at 10 cm H2O with 2 l/m oxygen    Paroxysmal atrial fibrillation ([facility] )     Phimosis 2010    severe pt had circumcision 2010    Pulmonary fibrosis ([facility] )     PVD (peripheral vascular disease) [facility]     Rotator cuff tendonitis     lsat assessed [id number]    Skin abscess 2004    Hip    Skin neoplasm     last assessed [id number]    Tachycardia 2004    Trigger finger     last assessed [id number]    Type 2 diabetes mellitus ([facility] ) 2004    Venous thrombosis 2007    Venous thrombosis 2007     Past Surgical History:   Procedure Laterality Date    CARDIAC CATHETERIZATION  03/10/2016    Showed 60-70% mid LAD lesion next to stent    CATARACT EXTRACTION      CIRCUMCISION, NON-      ESOPHAGOGASTRODUODENOSCOPY N/A 3/14/2016    Procedure: ESOPHAGOGASTRODUODENOSCOPY (EGD); Surgeon: Ny Alvarenga MD;  Location: [facility];   Service:     EYE SURGERY      FRACTURE SURGERY      JOINT REPLACEMENT      both hips replaced    OTHER SURGICAL HISTORY      H/O thoracentesis (diagnostic)    IA REPLACE AORTIC VALVE OPENFEMORAL ARTERY APPROACH N/A 2016    Procedure: TRANSFEMORAL TAVR WITH 26MM CALIX MAKAYLA S3 TISSUE VALVE; PINA ;  Surgeon: Tre Padron DO;  Location: [facility];  Service: Cardiac Surgery    REPLACEMENT TOTAL KNEE Bilateral 1991 and     TISSUE AORTIC VALVE REPLACEMENT      transfemoral, transcatheter    TONSILLECTOMY      TONSILLECTOMY AND ADENOIDECTOMY      TOTAL HIP ARTHROPLASTY      Bilateral     History   Alcohol Use    Yes     Comment: occasionally     History   Drug Use No     History   Smoking Status    Former Smoker    Packs/day: 1 00    Years: 35 00    Types: Cigarettes    Quit date: 1987   Smokeless Tobacco    Never Used     Comment: quit 50 years ago, per [employer]     Family History:   Family History   Problem Relation Age of Onset    Coronary artery disease Mother     Arthritis Mother     Hypertension Mother    [facility] Rheum arthritis Father     Prostate cancer Father        Meds/Allergies     No Known Allergies    Current Outpatient Prescriptions:     amiodarone 100 mg tablet, Take 1 tablet (100 mg total) by mouth daily, Disp: 90 tablet, Rfl: 3    amLODIPine (NORVASC) 10 mg tablet, TAKE 1 TABLET DAILY, Disp: 90 tablet, Rfl: 3    aspirin 81 mg chewable tablet, Chew daily, Disp: , Rfl:     B Complex Vitamins (VITAMIN B COMPLEX PO), Take by mouth, Disp: , Rfl:     BREO ELLIPTA, USE 1 INHALATION DAILY, Disp: 1 each, Rfl: 5    cyanocobalamin (VITAMIN B-12) 100 mcg tablet, Take by mouth, Disp: , Rfl:     fenofibrate (TRICOR) 145 mg tablet, Take 145 mg by mouth daily  , Disp: , Rfl:     hydrALAZINE (APRESOLINE) 25 mg tablet, Take 1 tablet (25 mg total) by mouth every 8 (eight) hours, Disp: 90 tablet, Rfl: 0    ipratropium-albuterol (DUO-NEB) 0 5-2 5 mg/3 mL, Take 3 mL by nebulization 4 (four) times a day as needed for wheezing or shortness of breath, Disp: , Rfl: 0    ipratropium-albuterol (DUO-NEB) 0 5-2 5 mg/mL, Inhale 3 mL every 6 (six) hours  , Disp: , Rfl:     isosorbide mononitrate (IMDUR) 30 mg 24 hr tablet, Take 1 tablet (30 mg total) by mouth daily, Disp: 30 tablet, Rfl: 0    levothyroxine 50 mcg tablet, Take by mouth, Disp: , Rfl:     metoprolol tartrate (LOPRESSOR) 25 mg tablet, Take 1 tablet (25 mg total) by mouth every 12 (twelve) hours  , Disp: 60 tablet, Rfl: 2    oxyCODONE-acetaminophen (PERCOCET) 5-325 mg per tablet, Take 1 tablet by mouth every 6 (six) hours as needed for moderate pain for up to 10 doses Max Daily Amount: 4 tablets, Disp: 10 tablet, Rfl: 0    simvastatin (ZOCOR) 20 mg tablet, TAKE 1 TABLET DAILY, Disp: 90 tablet, Rfl: 1    tolterodine (DETROL LA) 4 mg 24 hr capsule, Take 1 capsule by mouth daily, Disp: , Rfl:     torsemide (DEMADEX) 20 mg tablet, Take by mouth, Disp: , Rfl:     acetaminophen (TYLENOL) 325 mg tablet, Take 2 tablets (650 mg total) by mouth every 6 (six) hours as needed for mild pain , Disp: 30 tablet, Rfl: 0    Ascorbic Acid (VITAMIN C) 100 MG tablet, Take by mouth, Disp: , Rfl:     docusate sodium (COLACE) 100 mg capsule, Take 100 mg by mouth daily at bedtime  , Disp: , Rfl:     MIRABEGRON ER PO, Take by mouth daily Patient not sure of the dose , Disp: , Rfl:     pantoprazole (PROTONIX) 40 mg tablet, Take 1 tablet by mouth 2 (two) times a day before meals for 30 days, Disp: 60 tablet, Rfl: 0    polyethylene glycol (MIRALAX) 17 g packet, Take 17 g by mouth daily for 30 days, Disp: 510 g, Rfl: 0    vitamin E, tocopherol, 400 units capsule, Take by mouth, Disp: , Rfl:     Vitals: Blood pressure 132/78, pulse 66, height 5' 10" (1 778 m), weight 98 2 kg (216 lb 9 6 oz), SpO2 96 %  Body mass index is 31 08 kg/m²  Vitals:    04/30/18 1337   Weight: 98 2 kg (216 lb 9 6 oz)     BP Readings from Last 3 Encounters:   04/30/18 132/78   04/23/18 100/50   04/02/18 151/67         Physical Exam   Constitutional: He is oriented to person, place, and time  He appears well-developed and well-nourished  No distress  HENT:   Head: Normocephalic and atraumatic  Eyes: Pupils are equal, round, and reactive to light  Neck: Neck supple  No JVD present  No tracheal deviation present  No thyromegaly present  Cardiovascular: Normal rate, regular rhythm, S1 normal, S2 normal and intact distal pulses  Exam reveals no gallop, no S3, no S4, no distant heart sounds and no friction rub  Murmur heard  Systolic (ejection) murmur is present with a grade of 3/6   3/6 ejection systolic murmur  Pulmonary/Chest: Effort normal  No respiratory distress  He has no rales  He exhibits no tenderness  Bilateral air entry with chronic rhonchi and coarse breath sounds   Abdominal: Soft  Bowel sounds are normal  He exhibits no distension  There is no tenderness  Musculoskeletal: He exhibits no edema or deformity  Neurological: He is alert and oriented to person, place, and time  Skin: Skin is warm and dry  No rash noted  He is not diaphoretic  No pallor  Psychiatric: He has a normal mood and affect  His behavior is normal  Judgment normal          Diagnostic Studies Review Cardio:    Echocardiogram/PINA: Echo Doppler done of in 2017 shows EF 40%, paradoxical septal motion, mildly dilated LV, the lateral right atrium and left atrium, mild-to-moderate TR with PA pressure 65 mm of mercury, bioprosthetic valve at aortic position which is Villarreal Esdras TAVR valve with normal function and mean gradient of 9 mm of mercury with no paravalvular leak  Mild-to-moderate MR noted  As compared to old study patient's EF is now around 40% and aortic stenosis has improved  Catheterization: Cardiac catheterization done shows he has 50-60% LAD stenosis  There is a patent stent seen in the mid and distal LAD  Nonobstructive disease seen in the right coronary artery and circumflex  This was done in 2016  Other: Chest x-ray done few weeks ago shows evidence of interstitial fibrosis  No pneumonia no CHF  ECG Report: 2017 tablet EKG shows sinus rhythm with first-degree AV block, IVCD or incomplete LBBB  Not changed from old EKG  Patient had history of rate-related LBBB     Twelve lead EKG 2018 shows normal sinus rhythm heart rate 65 beats per minute  IVCD    No change from old EKG    Cardiac testing:   Results for orders placed during the hospital encounter of 18   Echo complete with contrast if indicated    Narrative [address]  [address] [place] [address]  [phone]    Transthoracic Echocardiogram  2D, M-mode, Doppler, and Color Doppler    Study date:  30-Mar-2018    Patient: Zina Keenan  MR number: [id number]  Account number: [de-identified]  : 24-Oct-1930  Age: 80 years  Gender: Male  Status: Routine  Location: Bedside  Height: 70 in  Weight: 213 lb  BP: 146/ 65 mmHg    Indications: Coronary Artery Disease    Diagnoses: I50 9 - Heart failure, unspecified    Sonographer:  CAMILLA Bartlett, RVS  Primary Physician:  Casi Lam DO  Referring Physician:  Angelina Mendez MD  Group:  [facility]  Interpreting Physician:  Krissy Mitchell MD    SUMMARY    LEFT VENTRICLE:  Size was at the upper limits of normal   Systolic function was moderately reduced  Ejection fraction was estimated in the range of 35 % to 40 % to be 35 %  There was moderate diffuse hypokinesis  Wall thickness was mildly increased  There was mild concentric hypertrophy  Doppler parameters were consistent with abnormal left ventricular relaxation (grade 1 diastolic dysfunction)  VENTRICULAR SEPTUM:  There was moderate paradoxical motion  These changes are consistent with LBBB  RIGHT VENTRICLE:  The size was at the upper limits of normal     LEFT ATRIUM:  The atrium was moderately dilated  RIGHT ATRIUM:  The atrium was mildly dilated  MITRAL VALVE:  There was mild to moderate annular calcification  There was mild regurgitation  AORTIC VALVE:  There was trace regurgitation  TRICUSPID VALVE:  There was moderate regurgitation  Estimated peak PA pressure was 70 mmHg  The findings suggest severe pulmonary hypertension  PULMONIC VALVE:  There was mild regurgitation  HISTORY: PRIOR HISTORY: HTN, PVD, Atrial Fibrillation, LBBB, CHF, CAD, COPD, TAVR    PROCEDURE: The procedure was performed at the bedside  This was a routine study  The transthoracic approach was used  The study included complete 2D imaging, M-mode, complete spectral Doppler, and color Doppler  The heart rate was 75 bpm,  at the start of the study  Images were obtained from the parasternal, apical, subcostal, and suprasternal notch acoustic windows   Echocardiographic views were limited due to poor acoustic window availability  This was a technically  difficult study  LEFT VENTRICLE: Size was at the upper limits of normal  Systolic function was moderately reduced  Ejection fraction was estimated in the range of 35 % to 40 % to be 35 %  There was moderate diffuse hypokinesis  Wall thickness was mildly  increased  There was mild concentric hypertrophy  DOPPLER: Doppler parameters were consistent with abnormal left ventricular relaxation (grade 1 diastolic dysfunction)  VENTRICULAR SEPTUM: Thickness was mildly increased  There was moderate paradoxical motion  These changes are consistent with LBBB  RIGHT VENTRICLE: The size was at the upper limits of normal  Systolic function was low normal     LEFT ATRIUM: The atrium was moderately dilated  RIGHT ATRIUM: The atrium was mildly dilated  MITRAL VALVE: There was mild to moderate annular calcification  There was lower normal leaflet separation  DOPPLER: There was no evidence for stenosis  There was mild regurgitation  AORTIC VALVE: TAVR had been performed previously  Peak velocity 2 6 to 2 7 m/sec  Mean gradient 16-17 mm and peak gradient of 28-30 mm of hg and FATMATA 1 30 to 1 35 cm2 DOPPLER: There was trace regurgitation  TRICUSPID VALVE: DOPPLER: There was moderate regurgitation  Estimated peak PA pressure was 70 mmHg  The findings suggest severe pulmonary hypertension  PULMONIC VALVE: DOPPLER: There was mild regurgitation  PERICARDIUM: There was no thickening or calcification  There was no pericardial effusion  AORTA: The root exhibited normal size  SYSTEMIC VEINS: IVC: The inferior vena cava was upper normal in size   Respirophasic changes were normal     SYSTEM MEASUREMENT TABLES    2D mode  AoR Diam 2D: 2 6 cm  LA Diam (2D): 5 3 cm  LA/Ao (2D): 2 04  FS (2D Teich): 6 82 %  IVSd (2D): 1 58 cm  LVDEV: 152 cm³  LVESV: 129 cm³  LVIDd(2D): 5 57 cm  LVISd (2D): 5 19 cm  LVOT Area 2D: 3 8 cm squared  LVPWd (2D): 1 39 cm  SV (Teich): 23 cm³    Apical four chamber  LVEF A4C: 25 %    Unspecified Scan Mode  FATMATA Cont Eq (Peak Pasquale): 1 56 cm squared  FATMATA Cont Eq (VTI): 1 61 cm squared  LVOT (VTI): 22 1 cm  LVOT Diam : 2 2 cm  LVOT Vmax: 1070 mm/s  LVOT Vmax; Mean: 1040 mm/s  Peak Grad ; Mean: 4 mm[Hg]  SV (LVOT): 82 cm³  VTI;Mean: 3 mm[Hg]  MV Peak A Pasquale: 1150 mm/s  MV Peak E Pasquale  Mean: 718 mm/s  MVA (PHT): 1 95 cm squared  PHT: 110 ms  Max P mm[Hg]  V Max: 3760 mm/s  Vmax: 3550 mm/s  RA Area: 22 5 cm squared  RA Volume: 66 8 cm³    [employer] Accredited Echocardiography Laboratory    Prepared and electronically signed by    Hua Gaspar MD  Signed 30-Mar-2018 16:35:19         Results for orders placed during the hospital encounter of 16   Cardiac catheterization    Narrative Cardiac Catheterization Operative Report    Primary care doctor: Dr Jaleesa Castorena     Cardiologist is Dr Miky Land    Date of procedure:  3/10/2016    Brief history: -41-year-old male with history of chronic renal   insufficiency, CAD, status post angioplasty of LAD, known severe COPD who   was admitted with hypoxic respiratory failure and has a troponin of 10  He   has a known history of severe aortic stenosis along with moderate   pulmonary hypertension  Hence he was scheduled for complete heart cath    Procedure Details  The risks, benefits, complications, including risk of stroke, heart   attack, bleeding and even death but not limited to at along with treatment   options, and expected outcomes were discussed with the patient  The   patient and/or family concurred with the proposed plan, giving informed   consent  Patient was brought to the cath lab after IV hydration was begun   and oral premedication was given  He was further sedated with midazolam   and fentanyl  He was prepped and draped in the usual manner  Using the   modified Seldinger access technique, a 6 Anguillan sheath was placed in the   right common femoral artery without any complications     A left heart catheterization was done  Angiograms were also done  End of the procedure   patient was transferred to holding area without any complications  He   tolerated the procedure well  After the procedure was completed, sedation was stopped and the sheaths   and catheters were all removed  Hemostasis was achieved with fem stop  Access was obtained in the right femoral artery as well as right femoral   vein    Equipment used:   1  JR4 for right coronary angiography  2  JL 4 0 for left coronary angiography   3  LV gram with AL1    Findings:  1  Dominance: Right dominant coronary system    2  Left main:  Is a normal-size vessel it has mild calcification noted  It   bifurcates into large LAD and a circumflex system    3  LAD: LAD is a large-size vessel and it has proximal calcification  Just   after septal it has focal about 60-65% stenosis  This stent in the LAD is   patent after the stenosis  No other focal stenosis seen  4  Circumflex is a nondominant medium size vessel  It has proximally   35-40% stenosis seen  5  RCA: RCA is large vessel and has mild luminal irregularities causing   35% stenosis proximally and mid area  RPL branch has around 35% stenosis   in the mid area  5  LV gram: LV gram was not done as patient has renal insufficiency  However there was 25 mm gradient across aortic valve  Right heart catheter meters: Aortic pressure 128/74  LV pressure 152/10  Card to call output  About 5 L  PA pressure 55 mmHg  RV pressure 55/10  RA mean 8-10  Wedge pressure was not very accurate as patient has lots of respiratory   motions  Itis to be around 16 minutes of mercury  Estimated Blood Loss: Minimal  Complications:  None; patient tolerated the procedure well  Recommendation: Continue medical therapy  Continue risk factor   modification and patient will have a functional stress test as an   outpatient to see if that LAD needs to be done   It seemed to be stable   lesion and less likely to be the cause of patient's non-ST elevation MI  We'll continue other Rx before  Check BMP and CBC  See orders     Disposition: Hemodynamically stable and PACU - hemodynamically stable  Condition: stable       Imaging:  Chest X-Ray:   Xr Chest Pa & Lateral    Result Date: 10/16/2017  Impression Interstitial fibrosis and chronic pleural changes on the right  No acute infiltrate is seen Workstation performed: [id number]     Xr Chest Pa & Lateral    Result Date: 5/9/2017  Impression Loculated pleural effusion on the right appears smaller  Bibasilar atelectasis  Workstation performed: [id number]     CT-scan of the chest:     No CTA results available for this patient  Lab Review   Lab Results   Component Value Date    WBC 7 80 04/01/2018    HGB 10 7 (L) 04/01/2018    HCT 32 8 (L) 04/01/2018    MCV 88 04/01/2018    RDW 16 4 (H) 04/01/2018     04/01/2018     BMP:  Lab Results   Component Value Date     04/02/2018    K 4 0 04/02/2018     04/02/2018    CO2 28 04/02/2018    ANIONGAP 9 04/02/2018    BUN 34 (H) 04/02/2018    CREATININE 1 87 (H) 04/02/2018    GLUCOSE 100 04/02/2018    CALCIUM 8 9 04/02/2018    EGFR 32 04/02/2018    MG 2 3 03/30/2018     LFT:  Lab Results   Component Value Date    AST 33 03/31/2018    ALT 17 03/31/2018    ALKPHOS 41 (L) 03/31/2018    PROT 6 6 03/31/2018    BILITOT 0 30 03/31/2018       Lab Results   Component Value Date    HGBA1C 5 9 01/08/2017     Lipid Profile:   Lab Results   Component Value Date    CHOL 97 (L) 10/16/2017    HDL 37 (L) 10/16/2017    LDLCALC 45 10/16/2017    TRIG 77 10/16/2017     Lab Results   Component Value Date    CHOL 97 (L) 10/16/2017    CHOL 110 06/08/2017     Lab Results   Component Value Date    CKTOTAL 111 01/06/2017    TROPONINI <0 02 03/28/2018     Lab Results   Component Value Date    NTBNP 2,849 (H) 03/31/2018        Dr Anderson Siddiqui MD [facility]      "This note has been constructed using a voice recognition system  Therefore there may be syntax, spelling, and/or grammatical errors   Please call if you have any questions  "

## 2018-04-30 ENCOUNTER — OFFICE VISIT (OUTPATIENT)
Dept: CARDIOLOGY CLINIC | Facility: CLINIC | Age: 83
End: 2018-04-30
Payer: COMMERCIAL

## 2018-04-30 VITALS
HEART RATE: 66 BPM | DIASTOLIC BLOOD PRESSURE: 78 MMHG | BODY MASS INDEX: 31.01 KG/M2 | HEIGHT: 70 IN | SYSTOLIC BLOOD PRESSURE: 132 MMHG | OXYGEN SATURATION: 96 % | WEIGHT: 216.6 LBS

## 2018-04-30 DIAGNOSIS — I48.0 PAROXYSMAL ATRIAL FIBRILLATION (HCC): ICD-10-CM

## 2018-04-30 DIAGNOSIS — N18.30 CKD (CHRONIC KIDNEY DISEASE), STAGE III (HCC): Chronic | ICD-10-CM

## 2018-04-30 DIAGNOSIS — J96.11 CHRONIC HYPOXEMIC RESPIRATORY FAILURE (HCC): ICD-10-CM

## 2018-04-30 DIAGNOSIS — I50.43 ACUTE ON CHRONIC COMBINED SYSTOLIC AND DIASTOLIC CHF (CONGESTIVE HEART FAILURE) (HCC): ICD-10-CM

## 2018-04-30 DIAGNOSIS — J84.10 PULMONARY FIBROSIS (HCC): ICD-10-CM

## 2018-04-30 DIAGNOSIS — E11.42 DIABETIC POLYNEUROPATHY ASSOCIATED WITH TYPE 2 DIABETES MELLITUS (HCC): ICD-10-CM

## 2018-04-30 DIAGNOSIS — E03.8 OTHER SPECIFIED HYPOTHYROIDISM: Chronic | ICD-10-CM

## 2018-04-30 DIAGNOSIS — I10 BENIGN ESSENTIAL HTN: ICD-10-CM

## 2018-04-30 DIAGNOSIS — G47.33 OSA (OBSTRUCTIVE SLEEP APNEA): Chronic | ICD-10-CM

## 2018-04-30 DIAGNOSIS — I25.10 CAD IN NATIVE ARTERY: Chronic | ICD-10-CM

## 2018-04-30 DIAGNOSIS — Z95.2 S/P TAVR (TRANSCATHETER AORTIC VALVE REPLACEMENT): Chronic | ICD-10-CM

## 2018-04-30 DIAGNOSIS — J44.9 COPD, MODERATE (HCC): ICD-10-CM

## 2018-04-30 PROCEDURE — 99214 OFFICE O/P EST MOD 30 MIN: CPT | Performed by: INTERNAL MEDICINE

## 2018-04-30 PROCEDURE — 93000 ELECTROCARDIOGRAM COMPLETE: CPT | Performed by: INTERNAL MEDICINE

## 2018-05-03 ENCOUNTER — OFFICE VISIT (OUTPATIENT)
Dept: FAMILY MEDICINE CLINIC | Facility: CLINIC | Age: 83
End: 2018-05-03
Payer: COMMERCIAL

## 2018-05-03 VITALS
WEIGHT: 218 LBS | BODY MASS INDEX: 31.21 KG/M2 | HEART RATE: 72 BPM | DIASTOLIC BLOOD PRESSURE: 56 MMHG | RESPIRATION RATE: 20 BRPM | SYSTOLIC BLOOD PRESSURE: 112 MMHG | HEIGHT: 70 IN

## 2018-05-03 DIAGNOSIS — J96.11 CHRONIC HYPOXEMIC RESPIRATORY FAILURE (HCC): ICD-10-CM

## 2018-05-03 DIAGNOSIS — I25.10 CAD IN NATIVE ARTERY: Chronic | ICD-10-CM

## 2018-05-03 DIAGNOSIS — J44.9 COPD, MODERATE (HCC): ICD-10-CM

## 2018-05-03 DIAGNOSIS — G47.33 OSA (OBSTRUCTIVE SLEEP APNEA): Chronic | ICD-10-CM

## 2018-05-03 DIAGNOSIS — Z00.00 MEDICARE ANNUAL WELLNESS VISIT, SUBSEQUENT: Primary | ICD-10-CM

## 2018-05-03 DIAGNOSIS — N18.30 CKD (CHRONIC KIDNEY DISEASE) STAGE 3, GFR 30-59 ML/MIN (HCC): ICD-10-CM

## 2018-05-03 PROCEDURE — 3725F SCREEN DEPRESSION PERFORMED: CPT | Performed by: FAMILY MEDICINE

## 2018-05-03 PROCEDURE — 99214 OFFICE O/P EST MOD 30 MIN: CPT | Performed by: FAMILY MEDICINE

## 2018-05-03 PROCEDURE — G0439 PPPS, SUBSEQ VISIT: HCPCS | Performed by: FAMILY MEDICINE

## 2018-05-03 NOTE — PROGRESS NOTES
Assessment/Plan:    No problem-specific Assessment & Plan notes found for this encounter  CAD stable for now  Needs to be compliant with meds, diet and daily wts  Repeat bmp and cbc as scheduled  CKD stable  COPD on oxygen, f/u with pulmonary  Is homebound         Diagnoses and all orders for this visit:    CAD in native artery    APTITO (obstructive sleep apnea)    COPD, moderate ([facility] )    Chronic hypoxemic respiratory failure ([facility] )              Return in about 6 months (around 11/3/2018) for Recheck  Subjective:      Patient ID: Tuyet Chun is a 80 y o  male  Chief Complaint   Patient presents with    Follow-up     stay at [facility]  for rehab--lj       HPI  s/p STR  Left 3d ago  Homebound  Does not drive  Leaves for necessities along  Home rehab in place  Doing HEP  Hydralazine added  Shoulder issues, seeing ortho  Dark urine lately  Eat/drink ok  Weighs daily      The following portions of the patient's history were reviewed and updated as appropriate: allergies, current medications, past family history, past medical history, past social history, past surgical history and problem list     Review of Systems      Current Outpatient Prescriptions   Medication Sig Dispense Refill    acetaminophen (TYLENOL) 325 mg tablet Take 2 tablets (650 mg total) by mouth every 6 (six) hours as needed for mild pain  30 tablet 0    amiodarone 100 mg tablet Take 1 tablet (100 mg total) by mouth daily 90 tablet 3    amLODIPine (NORVASC) 10 mg tablet TAKE 1 TABLET DAILY 90 tablet 3    aspirin 81 mg chewable tablet Chew daily      B Complex Vitamins (VITAMIN B COMPLEX PO) Take by mouth      BREO ELLIPTA USE 1 INHALATION DAILY 1 each 5    cyanocobalamin (VITAMIN B-12) 100 mcg tablet Take by mouth      fenofibrate (TRICOR) 145 mg tablet Take 145 mg by mouth daily        hydrALAZINE (APRESOLINE) 25 mg tablet Take 1 tablet (25 mg total) by mouth every 8 (eight) hours 90 tablet 0    ipratropium-albuterol (DUO-NEB) 0 5-2 5 mg/3 mL Take 3 mL by nebulization 4 (four) times a day as needed for wheezing or shortness of breath  0    isosorbide mononitrate (IMDUR) 30 mg 24 hr tablet Take 1 tablet (30 mg total) by mouth daily 30 tablet 0    levothyroxine 50 mcg tablet Take by mouth      metoprolol tartrate (LOPRESSOR) 25 mg tablet Take 1 tablet (25 mg total) by mouth every 12 (twelve) hours  60 tablet 2    oxyCODONE-acetaminophen (PERCOCET) 5-325 mg per tablet Take 1 tablet by mouth every 6 (six) hours as needed for moderate pain for up to 10 doses Max Daily Amount: 4 tablets 10 tablet 0    simvastatin (ZOCOR) 20 mg tablet TAKE 1 TABLET DAILY 90 tablet 1    tolterodine (DETROL LA) 4 mg 24 hr capsule Take 1 capsule by mouth daily      torsemide (DEMADEX) 20 mg tablet Take by mouth      docusate sodium (COLACE) 100 mg capsule Take 100 mg by mouth daily at bedtime   pantoprazole (PROTONIX) 40 mg tablet Take 1 tablet by mouth 2 (two) times a day before meals for 30 days 60 tablet 0    polyethylene glycol (MIRALAX) 17 g packet Take 17 g by mouth daily for 30 days 510 g 0     No current facility-administered medications for this visit  Objective:    /56   Pulse 72   Resp 20   Ht 5' 10" (1 778 m)   Wt 98 9 kg (218 lb)   BMI 31 28 kg/m²        Physical Exam   Constitutional: He appears well-developed  HENT:   Head: Normocephalic  Eyes: Conjunctivae are normal    Neck: Neck supple  Cardiovascular: Normal rate and intact distal pulses  Pulmonary/Chest: Effort normal  No respiratory distress  Abdominal: Soft  Musculoskeletal: He exhibits no edema or deformity  Neurological: He is alert  Skin: Skin is warm and dry  Psychiatric: His behavior is normal  Thought content normal    Nursing note and vitals reviewed  f2f done    HOMEBOUND QUALIFICATIONS REVIEW:  >>Cannot leave home without considerable taxing effort   Y  >>Requires the aid of supportive devices(wheelchair or walker)   Y  >>Requires the use of special transportation   N  >>Needs the assistance of another person   Y  >>Has a condition that leaving the home is medically contraindicated  Marisela Hanson, gets very sob with short distances and worse in heat  >>Symptoms of the disease process worsen when leaving the home  Marisela Hanson, richter and sob  >>Has a skilled need requiring a medical professional  Barbie Muniz PT          Binu Cure, DO    HPI:  Sara Sanz is a 80 y o  male here for his Subsequent Wellness Visit      Patient Active Problem List   Diagnosis    COPD, moderate (HCC)    Paroxysmal atrial fibrillation ([address] )    CAD in native artery    BPH (benign prostatic hyperplasia)    CKD (chronic kidney disease), stage III    Hyperlipidemia    Hypertension    S/P TAVR (transcatheter aortic valve replacement)    Ambulatory dysfunction    Abnormal gait    Hearing difficulty of right ear    Chronic hypoxemic respiratory failure (HCC)    PATITO (obstructive sleep apnea)    Pleural effusion, right (chronic)    Benign essential HTN    Multiple pulmonary nodules    Morbid obesity (HCC)    Osteoarthritis of knee    Peripheral vascular disease ([facility] )    History of atrial fibrillation    Acute on chronic diastolic congestive heart failure ([facility] [address] )    Corns    Diabetic polyneuropathy associated with type 2 diabetes mellitus (HCC)    Pain in both feet    Peripheral arteriosclerosis (HCC)    Actinic keratosis    Allergic rhinitis    Arteriosclerosis of arteries of extremities (HCC)    Centrilobular emphysema (HCC)    Cervical radiculopathy    Acute on chronic respiratory failure with hypoxia (HCC)    Chronic left shoulder pain    Acute on chronic combined systolic and diastolic CHF (congestive heart failure) (HCC)    Esophagitis determined by endoscopy    Heart failure, left, 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)    Hypertonicity of bladder    Hypothyroidism    Obesity with alveolar hypoventilation, unspecified obesity severity (HCC)    Onychomycosis    Osteoarthritis of left shoulder    Pulmonary fibrosis (HCC)    Bacterial conjunctivitis of right eye    Anemia    GERD (gastroesophageal reflux disease)    Generalized weakness    CKD (chronic kidney disease) stage 3, GFR 30-59 ml/min     Past Medical History:   Diagnosis Date    Allergic rhinitis 06/11/2010    Aortic stenosis, severe     Bacterial pneumonia 06/08/2007    Bladder neck obstruction 12/23/2010    BPH (benign prostatic hyperplasia)     Bronchitis, chronic obstructive, with exacerbation ([facility] [address] ) 01/30/2012    CAD (coronary artery disease)     stent 2014    Carcinoma ([facility] )     resolved [id number]    Cardiomegaly 02/13/2007    Cataract of both eyes     [facility]     Chronic allergic conjunctivitis     last assessed [id number]    Chronic kidney disease (CKD)     CKD (chronic kidney disease), stage III     CKD (chronic kidney disease), stage III     COPD (chronic obstructive pulmonary disease) ([facility] [address] )     Decubitus ulcer     resolved [id number]    Dermatomycosis     last assessed [id number]    Diabetes mellitus type 2, noninsulin dependent ([facility] [address] )     Dyspnea on exertion     last assessed [id number]    Eczema     last assessed [id number]    Edema     last assessed [id number]    Epistaxis 12/01/2004    Esophagitis, erosive     Exposure to scabies     resolved [id number]    Fracture of rib     last assessed [id number]    H/O aortic valve replacement     resolved [id number]    H/O blood clots     History of DVT (deep vein thrombosis)     left posterior tibial/peroneal veins    History of non-ST elevation myocardial infarction (NSTEMI)     History of pneumonia     Hyperlipidemia     Hypertension     Internal hemorrhoids 09/13/2006    LBBB (left bundle branch block)     MRSA (methicillin resistant staph aureus) culture positive     NSTEMI (non-ST elevated myocardial infarction) ([facility] )     resolved [id number]    Obstructive sleep apnea on CPAP     severe PATITO with AHI of 106 and uses CPAP at 10 cm H2O with 2 l/m oxygen    Paroxysmal atrial fibrillation ([facility] )     Phimosis 2010    severe pt had circumcision 2010    Pulmonary fibrosis ([facility] [address] )     PVD (peripheral vascular disease) ([facility] )     Rotator cuff tendonitis     lsat assessed [id number]    Skin abscess 2004    Hip    Skin neoplasm     last assessed [id number]    Tachycardia 2004    Trigger finger     last assessed [id number]    Type 2 diabetes mellitus ([facility] [address] ) 2004    Venous thrombosis 2007    Venous thrombosis 2007     Past Surgical History:   Procedure Laterality Date    CARDIAC CATHETERIZATION  03/10/2016    Showed 60-70% mid LAD lesion next to stent    CATARACT EXTRACTION      CIRCUMCISION, NON-      ESOPHAGOGASTRODUODENOSCOPY N/A 3/14/2016    Procedure: ESOPHAGOGASTRODUODENOSCOPY (EGD); Surgeon: Elin Lu MD;  Location: [facility] GI LAB;   Service:     EYE SURGERY      FRACTURE SURGERY      JOINT REPLACEMENT      both hips replaced    OTHER SURGICAL HISTORY      H/O thoracentesis (diagnostic)    MS REPLACE AORTIC VALVE OPENFEMORAL ARTERY APPROACH N/A 2016    Procedure: TRANSFEMORAL TAVR WITH 26MM CALIX MAKAYLA S3 TISSUE VALVE; PINA ;  Surgeon: Alyssa Boyer DO;  Location:  MAIN OR;  Service: Cardiac Surgery    REPLACEMENT TOTAL KNEE Bilateral 1991 and     TISSUE AORTIC VALVE REPLACEMENT      transfemoral, transcatheter    TONSILLECTOMY      TONSILLECTOMY AND ADENOIDECTOMY      TOTAL HIP ARTHROPLASTY      Bilateral     Family History   Problem Relation Age of Onset    Coronary artery disease Mother     Arthritis Mother     Hypertension Mother    Macel Genaro Rheum arthritis Father     Prostate cancer Father      History   Smoking Status    Former Smoker    Packs/day: 1 00    Years: 35 00    Types: Cigarettes    Quit date: 1/1/1987   Smokeless Tobacco    Never Used     Comment: quit 50 years ago, per [employer]     History   Alcohol Use    Yes     Comment: occasionally      History   Drug Use No     /56   Pulse 72   Resp 20   Ht 5' 10" (1 778 m)   Wt 98 9 kg (218 lb)   BMI 31 28 kg/m²       Current Outpatient Prescriptions   Medication Sig Dispense Refill    acetaminophen (TYLENOL) 325 mg tablet Take 2 tablets (650 mg total) by mouth every 6 (six) hours as needed for mild pain  30 tablet 0    amiodarone 100 mg tablet Take 1 tablet (100 mg total) by mouth daily 90 tablet 3    amLODIPine (NORVASC) 10 mg tablet TAKE 1 TABLET DAILY 90 tablet 3    aspirin 81 mg chewable tablet Chew daily      B Complex Vitamins (VITAMIN B COMPLEX PO) Take by mouth      BREO ELLIPTA USE 1 INHALATION DAILY 1 each 5    cyanocobalamin (VITAMIN B-12) 100 mcg tablet Take by mouth      fenofibrate (TRICOR) 145 mg tablet Take 145 mg by mouth daily   hydrALAZINE (APRESOLINE) 25 mg tablet Take 1 tablet (25 mg total) by mouth every 8 (eight) hours 90 tablet 0    ipratropium-albuterol (DUO-NEB) 0 5-2 5 mg/3 mL Take 3 mL by nebulization 4 (four) times a day as needed for wheezing or shortness of breath  0    isosorbide mononitrate (IMDUR) 30 mg 24 hr tablet Take 1 tablet (30 mg total) by mouth daily 30 tablet 0    levothyroxine 50 mcg tablet Take by mouth      metoprolol tartrate (LOPRESSOR) 25 mg tablet Take 1 tablet (25 mg total) by mouth every 12 (twelve) hours   60 tablet 2    oxyCODONE-acetaminophen (PERCOCET) 5-325 mg per tablet Take 1 tablet by mouth every 6 (six) hours as needed for moderate pain for up to 10 doses Max Daily Amount: 4 tablets 10 tablet 0    simvastatin (ZOCOR) 20 mg tablet TAKE 1 TABLET DAILY 90 tablet 1    tolterodine (DETROL LA) 4 mg 24 hr capsule Take 1 capsule by mouth daily      torsemide (DEMADEX) 20 mg tablet Take by mouth      docusate sodium (COLACE) 100 mg capsule Take 100 mg by mouth daily at bedtime   pantoprazole (PROTONIX) 40 mg tablet Take 1 tablet by mouth 2 (two) times a day before meals for 30 days 60 tablet 0    polyethylene glycol (MIRALAX) 17 g packet Take 17 g by mouth daily for 30 days 510 g 0     No current facility-administered medications for this visit        No Known Allergies  Immunization History   Administered Date(s) Administered    Pneumococcal Conjugate 13-Valent 06/12/2015       Patient Care Team:  Binu Rucker DO as PCP - General  DO MO Lozano MD Garnette Comes, MD Norwood Bowie, DO Amannda Richline, DPM Ardelle Miss, CRNP      Medicare Screening Tests and Risk Assessments:  AWV Clinical     ISAR:   Previous hospitalizations?:  Yes   How many hospitalizations have you had in the last year?:  1-2   Additional Comments:  chf, copd       Once in a Lifetime Medicare Screening:   EKG performed?:  No    AAA screening performed? (if performed, please add date to Health Maintenance):  No       Medicare Screening Tests and Risk Assessment:   AAA Risk Assessment    None Indicated:  Yes    Osteoporosis Risk Assessment    None indicated:  Yes    HIV Risk Assessment    None indicated:  Yes        Drug and Alcohol Use:   Tobacco use    Cigarettes:  former smoker    Quit date:  1/1/1990   Tobacco use duration    Tobacco Cessation Readiness    Alcohol use    Alcohol use:  occasional use    Alcohol Treatment Readiness   Illicit Drug Use    Drug use:  never        Diet & Exercise:   Diet   What is your diet?:  No Added Salt, Low Carb   How many servings a day of the following:   Exercise    Do you currently exercise?:  yes    Frequency:  daily    Type of exercise:  walking       Cognitive Impairment Screening:   Depression screening preformed:  Yes Depression screen score:  0   Cognitive Impairment Screening    Do you have difficulty learning or retaining new information?:  No        Functional Ability/Level of Safety:   Hearing    Hearing difficulties:  Yes    Hearing aid:  Yes    Hearing Impairment Assessment    Current Activities    Help needed with the folllowing:     Transportation:  Yes   Managing Medications:  No Managing Money:  No   ADL    Fall Risk   Have you fallen in the last 12 months?:  No    Injury History       Home Safety:   Are there hazards in your environment?:  No   Home Safety Risk Factors   Unfamilar with surroundings:  No        Advanced Directives:   Advanced Directives    Living Will:  Yes Durable POA for healthcare:  Yes   Patient's End of Life Decisions    Reviewed with patient:  No    End of life decisions comments:  [place]       Urinary Incontinence:   Do you have urinary incontinence?:  No        Glaucoma:            Provider Screening     Preventative Screening/Counseling:   Cardiovascular Screening/Counseling:   (Labs Q5 years, EKG optional one-time)   General:  Risks and Benefits Discussed, Screening Current           Diabetes Screening/Counseling:   (2 tests/year if Pre-Diabetes or 1 test/year if no Diabetes)   General:  Risks and Benefits Discussed, Screening Current           Colorectal Cancer Screening/Counseling:   (FOBT Q1 yr; Flex Sig Q4 yrs or Q10 yrs after Screening Colonoscopy; Screening Colonoscpy Q2 yrs High Risk or Q10 yrs Low Risk; Barium Enema Q2 yrs High Risk or Q4 yrs Low Risk)   General:  Risks and Benefits Discussed           Prostate Cancer Screening/Counseling:   (Annual)    General:  Risks and Benefits Discussed          Breast Cancer Screening/Counseling:   (Baseline Age 28 - 43; Annual Age 36+)         Cervical Cancer Screening/Counseling:   (Annual for High Risk or Childbearing Age with Abnormal Pap in Last 3 yrs;  Every 2 all others)         Osteoporosis Screening/Counseling:   (Every 2 Yrs if at risk or more if medically necessary)   General:  Screening Not Indicated           AAA Screening/Counseling:   (Once per Lifetime with risk factors)          Glaucoma Screening/Counseling: (Annual)         HIV Screening/Counseling:   (Voluntary; Once annually for high risk OR 3 times for Pregnancy at diagnosis of IUP; 3rd trimester; and at Labor   General:  Screening Not Indicated           Hepatitis C Screening:             Immunizations:   Pneumococcal (Once in a Lifetime):  Lifetime Vaccine Completed       Other Preventative Couseling (Non-Medicare Wellness Visit Required):       Referrals (Non-Medicare Wellness Visit Required):       Medical Equipment/Suppliers:           No exam data present  Reviewed Updated [facility] Prior Wellness Visits:   Last Medicare wellness visit information was reviewed, patient interviewed , no change since last AWVyes  Last Medicare wellness visit information was reviewed, patient interviewed and updates made to the record today yes    Assessment and Plan:  1  Medicare annual wellness visit, subsequent     2  CAD in native artery     3  PATITO (obstructive sleep apnea)     4  COPD, moderate ([address] )     5  Chronic hypoxemic respiratory failure (HCC)     6   CKD (chronic kidney disease) stage 3, GFR 30-59 ml/min         Health Maintenance Due   Topic Date Due    OPHTHALMOLOGY EXAM  10/24/1940    URINE MICROALBUMIN  10/24/1940    Depression Screening PHQ-9  10/24/1942    DTaP,Tdap,and Td Vaccines (1 - Tdap) 10/24/1951    Fall Risk  10/24/1995    PNEUMOCOCCAL POLYSACCHARIDE VACCINE AGE 65 AND OVER  10/24/1995    GLAUCOMA SCREENING 67+ YR  10/24/1997    HEMOGLOBIN A1C  07/08/2017

## 2018-05-14 ENCOUNTER — OFFICE VISIT (OUTPATIENT)
Dept: PODIATRY | Facility: CLINIC | Age: 83
End: 2018-05-14
Payer: COMMERCIAL

## 2018-05-14 VITALS
DIASTOLIC BLOOD PRESSURE: 65 MMHG | BODY MASS INDEX: 31.21 KG/M2 | RESPIRATION RATE: 17 BRPM | HEIGHT: 70 IN | WEIGHT: 218 LBS | SYSTOLIC BLOOD PRESSURE: 112 MMHG | HEART RATE: 72 BPM

## 2018-05-14 DIAGNOSIS — R26.9 ABNORMAL GAIT: Primary | ICD-10-CM

## 2018-05-14 DIAGNOSIS — M79.672 PAIN IN BOTH FEET: ICD-10-CM

## 2018-05-14 DIAGNOSIS — M79.671 PAIN IN BOTH FEET: ICD-10-CM

## 2018-05-14 DIAGNOSIS — I70.209 ARTERIOSCLEROSIS OF ARTERIES OF EXTREMITIES (HCC): ICD-10-CM

## 2018-05-14 DIAGNOSIS — E11.42 DIABETIC POLYNEUROPATHY ASSOCIATED WITH TYPE 2 DIABETES MELLITUS (HCC): ICD-10-CM

## 2018-05-14 PROCEDURE — 99212 OFFICE O/P EST SF 10 MIN: CPT | Performed by: PODIATRIST

## 2018-05-14 NOTE — PROGRESS NOTES
Procedures   Foot Exam     No problem-specific Assessment & Plan notes found for this encounter  Discussion/Summary  The patient was counseled regarding diagnostic results,-- instructions for management,-- prognosis,-- patient and family education,-- risks and benefits of treatment options,-- importance of compliance with treatment  Patient is able to Self-Care  Possible side effects of new medications were reviewed with the patient/guardian today  The treatment plan was reviewed with the patient/guardian  The patient/guardian understands and agrees with the treatment plan   Patient's shoes and socks removed  Right Foot/Ankle   Right Foot Inspection  Skin Exam: callus and callus                           Toe Exam: swelling  Sensory   Vibration: diminished  Proprioception: diminished   Monofilament testing: diminished  Vascular  Capillary refills: < 3 seconds  The right DP pulse is 1+  The right PT pulse is 1+       Left Foot/Ankle  Left Foot Inspection  Skin Exam: callus                          Toe Exam: swelling                   Sensory   Vibration: diminished  Proprioception: diminished  Monofilament: diminished  Vascular  Capillary refills: < 3 seconds  The left DP pulse is 1+  The left PT pulse is 1+  Assign Risk Category:  Deformity present; Loss of protective sensation; Weak pulses       Risk: 2     Chief Complaint  Routine nail care      History of Present Illness  HPI: Patient complains of pain in his feet with ambulation  Patient has no history of trauma  Patient has pain wearing shoes       Review of Systems   Constitutional: chills   Eyes: eyesight problems    ENT: hearing loss   Cardiovascular: No complaints of chest pain, no palpitations, no leg claudication or lower extremity edema   Respiratory: shortness of breath   Gastrointestinal: No complaints of abdominal pain, no constipation, no nausea or vomiting, no diarrhea or bloody stools   Genitourinary: No complaints of dysuria or incontinence, no hesitancy, no nocturia   Musculoskeletal: as noted in HPI   Integumentary: No complaints of skin rash or lesion, no itching or dry skin, no skin wounds   Neurological: No complaints of headache, no confusion, no numbness or tingling, no dizziness   Psychiatric: No suicidal thoughts, no anxiety, no depression   Endocrine: muscle weakness,-- frequent urination-- and-- excessive thirst       Active Problems  1  Abnormal gait (781 2) (R26 9)  2  Acquired ankle/foot deformity (736 70) (M21 969)  3  Actinic keratosis (702 0) (L57 0)  4  Allergic rhinitis (477 9) (J30 9)  5  Arteriosclerosis of arteries of extremities (440 20) (I70 209)  6  Atrial fibrillation (427 31) (I48 91)  7  Benign essential HTN (401 1) (I10)  8  BPH (benign prostatic hyperplasia) (600 00) (N40 0)  9  Bursitis, subacromial (726 19) (M75 50)  10  CAD in native artery (414 01) (I25 10)  11  Callus (700) (L84)  12  Centrilobular emphysema (492 8) (J43 2)  13  Cervical radiculopathy (723 4) (M54 12)  14  Denied: History of Chest tightness  15  Chronic hypoxemic respiratory failure (518 83,799 02) (J96 11)  16  Chronic kidney disease, stage 3 (585 3) (N18 3)  17  Chronic left shoulder pain (719 41,338 29) (M25 512,G89 29)  18  Chronic systolic congestive heart failure (428 22,428 0) (I50 22)  19  Congestive heart failure (428 0) (I50 9)  20  COPD (chronic obstructive pulmonary disease) (496) (J44 9)  21  Denied: History of Cough  22  Depression screening (V79 0) (Z13 89)  23  Difficulty walking (719 7) (R26 2)  24  Dizziness (780 4) (R42)  25  Esophagitis determined by endoscopy (530 10) (K20 9)  26  Foot pain, bilateral (729 5) (M79 671,M79 672)  27  Heart failure, left, 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)  28  High triglycerides (272 1) (E78 1)  29  History of aortic valve stenosis (V12 59) (Z86 79)  30  Hyperlipidemia LDL goal <100 (272 4) (E78 5)  31  Hyperthyroidism (242 90) (E05 90)  32  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)  33   Hyponatremia (276 1) (E87 1)  34  Hypothyroidism (244 9) (E03 9)  35  Immunization due (V05 9) (Z23)  36  Obesity with alveolar hypoventilation, unspecified obesity severity (278 03) (E66 2)  37  Obstructive sleep apnea (327 23) (G47 33)  38  Onychomycosis (110 1) (B35 1)  39  Osteoarthritis of left shoulder (715 91) (M19 012)  40  Osteoarthritis, knee/lower leg (715 96) (M17 9)  41  Peripheral vascular disease (443 9) (I73 9)  42  Pleural Effusion  43  Prediabetes (790 29) (R73 09)  44  Primary osteoarthritis of left shoulder (715 11) (M19 012)  45  Pulmonary fibrosis (515) (J84 10)  46  Pulmonary nodule, left (793 11) (R91 1)  47  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)  48  Screening for cardiovascular, respiratory, and genitourinary diseases  (V81 2,V81 4,V81 6) (Z13 6,Z13 83,Z13 89)  49  Screening for neurological condition (V80 09) (Z13 89)  50   Sleep related hypoventilation in conditions classified elsewhere (327 26) (G47 36)     Past Medical History   · Acute maxillary sinusitis (461 0) (J01 00)   · History of Acute maxillary sinusitis, recurrence not specified (461 0) (J01 00)   · Acute upper respiratory infection (465 9) (J06 9)   · History of Acute upper respiratory infection (465 9) (J06 9)   · History of Bladder neck obstruction (596 0) (N32 0)   · History of Bronchitis, chronic obstructive, with exacerbation (491 21) (J44 1)   · History of Cataract of both eyes (366 9) (H26 9)   · History of Cellulitis (682 9) (L03 90)   · History of Cellulitis (682 9) (L03 90)   · History of Cellulitis of foot (682 7) (L03 119)   · History of Cellulitis of neck (682 1) (F32 299)   · Denied: History of Chest tightness   · History of Chronic allergic conjunctivitis (372 14) (H10 45)   · Denied: History of Cough   · History of 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)   · History of Diabetes type 2, uncontrolled (250 02) (E11 65)   · History of Dyspnea on exertion (786 09) (R06 09)   · History of Exposure to scabies (V01 89) (Z20 89)   · History of Foreign Body In The Right Auditory Canal (931)   · H/O blood clots (V12 51) (Z86 718)   · History of acute bronchitis (V12 69) (Z87 09)   · History of acute bronchitis (V12 69) (Z87 09)   · History of acute bronchitis (V12 69) (Z87 09)   · History of allergic rhinitis (V12 69) (Z87 09)   · History of aortic valve replacement (V43 3) (Z95 2)   · History of backache (V13 59) (Z87 39)   · History of bacterial pneumonia (V12 61) (Z87 01)   · History of bronchitis (V12 69) (Z87 09)   · History of carcinoma (V10 90) (Z85 9)   · History of cardiomegaly (V12 59) (Z86 79)   · History of constipation (V12 79) (Z87 19)   · History of decubitus ulcer (V13 3) (Z87 2)   · History of dermatitis (V13 3) (Z87 2)   · History of dizziness (V13 89) (Y29 522)   · History of eczema (V13 3) (Z87 2)   · History of edema (V13 89) (P84 067)   · History of epistaxis (V12 69) (Q90 600)   · History of fever (V13 89) (W29 777)   · History of fracture of rib (V15 51) (Z87 81)   · History of influenza (V12 09) (Z87 09)   · History of phimosis (V13 89) (V79 599)   · History of shortness of breath (V13 89) (O58 586)   · History of tinea corporis (V12 09) (Z86 19)   · History of tinea corporis (V12 09) (Z86 19)   · History of Internal Hemorrhoids (455 0)   · History of Knee Sprain (844 9)   · Late Effects Of Sprain Or Strain (905 7)   · History of Noninfected skin tear of right lower extremity, initial encounter (891 0)(S81 811A)   · History of Non-ST elevation myocardial infarction (NSTEMI) of indeterminate age   · History of Normal routine physical examination (V70 0) (Z00 00)   · Otalgia, unspecified laterality (388 70) (H92 09)   · History of Otalgia, unspecified laterality (388 70) (H92 09)   · History of Pneumonia (V12 61)   · History of Pneumonia (486) (J18 9)   · History of Postop check (V67 00) (Z09)   · History of Rotator cuff tendinitis (726 10) (M75 80)   · Skin Abscess Of Hip (682 6)   · History of Skin neoplasm (239 2) (D49 2)   · History of Sprain and strain (848 9) (T14 8XXA)   · History of Tachycardia (785 0) (R00 0)   · History of Trigger finger (727 03) (M65 30)   · History of Type 2 diabetes mellitus (250 00) (E11 9)   · Venous thrombosis (453 9) (I82 90)     The active problems and past medical history were reviewed and updated today       Surgical History   · History of Aortic Valve Replacement Transcatheter   · History of Cataract Surgery   · History of Hip Replacement   · History of Knee Replacement   · History of Surgery Penis Circumcision Except    · History of Thoracentesis (Diagnostic)   · History of Tonsillectomy     The surgical history was reviewed and updated today        Family History  Mother    · Family history of arthritis (V17 7) (Z82 61)   · Family history of coronary artery disease (V17 3) (Z82 49)   · Family history of hypertension (V17 49) (Z82 49)  Father    · Family history of arthritis (V17 7) (Z82 61)   · Family history of Prostate cancer  Family History    · Denied: Family history of Pulmonary Disease     The family history was reviewed and updated today        Social History   · Being A Social Drinker   · Denied: Drug use (305 90) (F19 90)   · Former smoker (V15 82) (Z87 891)   · Pt quit smoking 50 yrs ago    · History of Former smoker (V15 82) (Z87 891)   · History of Former smoker (V15 82) (Z87 891)   · QUIT SMOKING 25-35 YEARS AGO AND HE SMOKED 1 PPD FOR 45YEARS    · Denied: History of Pets / animals   · Denied: History of Recreational drug use   · Denied: History of Travel history  The social history was reviewed and updated today       Current Meds  1  Amiodarone HCl - 100 MG Oral Tablet; TAKE 1 TABLET DAILY (CONTACT CARDIOLOGIST FOR ADDITIONAL REFILLS); Therapy: 97QWA8316 to (Last Rx:2017)  Requested for: 17LML8348 Ordered  2  AmLODIPine Besylate 10 MG Oral Tablet; TAKE 1 TABLET DAILY; Therapy: 70ABG4325 to (Last Rx:2017)  Requested for: 08PZP7079 Ordered  3  Aspirin 81 MG Oral Tablet Chewable; 2 daily; Therapy: (Risco Shirts) to Recorded  4  Breo Ellipta 100-25 MCG/INH Inhalation Aerosol Powder Breath Activated; INHALE 1 PUFFS Daily; Therapy: 33JVQ8930 to (Kami Snider) Recorded  5  Cialis 2 5 MG Oral Tablet; Take 1 tablet daily as directed; Therapy: 62PUP3810 to (Evaluate:22Nov2017)  Requested for: 93TBB8917; Last Rx:50Pts3033 Ordered  6  Colace 100 MG Oral Capsule; 1 Two Times A Day, As Needed; Therapy: 75LNJ6810 to [facility] for: 17KIJ3782 Recorded  7  Fenofibrate 145 MG Oral Tablet; TAKE 1 TABLET BY MOUTH EVERY OTHER DAY; Therapy: 32UQL9245 to (74 831 591)  Requested for: 47VPT6369; Last Rx:03Nov2017 Ordered  8  Ipratropium-Albuterol 0 5-2 5 (3) MG/3ML Inhalation Solution; Use 1 vial in nebulizer 4 times daily; Therapy: 98Pex5971 to (Evaluate:71Swf3746)  Requested for: 12EYI0457; Last Rx:09May2016 Ordered  9  Levothyroxine Sodium 50 MCG Oral Tablet; Take 1 tablet daily, JESSICA; Therapy: 85AQY3584 to (Last Rx:27Nov2017)  Requested for: 24UEM5128 Ordered  10  Metoprolol Tartrate 25 MG Oral Tablet; take 1 tablet by mouth twice a day; [facility]: 26HVB9493 to (Kashif Guerrero)  Requested for: 64UUA8710; Last  Rx:03Nov2017 Ordered  11  Simvastatin 40 MG Oral Tablet; take 1 tablet by mouth once daily;  Therapy: (ZDGXSTXN:77PVP2218) to Recorded  12  Tolterodine Tartrate ER 4 MG Oral Capsule Extended Release 24 Hour; TAKE 1  CAPSULE ONCE DAILY;  Therapy: 31UFK5002 to (YCULZPGW:24NWA4424)  Requested for: 54LHT9404; Last  Rx:12Bmb9549 Ordered  13  Torsemide 20 MG Oral Tablet; TAKE 1 TABLET DAILY ALTERNATING WITH 2 TABLETS  (40 MG) DAILY;  Therapy: 74KPK6400 to (Kashif Guerrero)  Requested for: 60IJK9570; Last  Rx:03Nov2017 Ordered  14  Vitamin B Complex CAPS;  Therapy: (Roxana Burgess) to Recorded  15  Vitamin B-12 TABS;  Therapy: (Roxana Burgess) to Recorded  16  Vitamin C TABS;  Therapy: (Roxana Burgess) to Recorded  17   Vitamin E 400 UNIT Oral Capsule;  Therapy: (Essie Medin) to Recorded     The medication list was reviewed and updated today       Allergies  1  No Known Drug Allergies     Vitals        Height 5 ft 9 in 5 ft 9 in   Weight 218 lb  218 lb    BMI Calculated 32 19 32 19   BSA Calculated 2 14 2 14      Physical Exam  Left Foot: Appearance: Normal except as noted: excessive pronation-- and-- pes planus  Great toe deformities include a bunion  Tenderness: None except the distal first metatarsal-- and-- distal fifth metatarsal    Right Foot: Appearance: Normal except as noted: excessive pronation-- and-- pes planus  Great toe deformities include a bunion  Tenderness: None except the distal first metatarsal-- and-- distal fifth metatarsal    Left Ankle: ROM: limited ROM in all planes   Right Ankle: ROM: limited ROM in all planes   Neurological Exam: performed  Light touch was intact bilaterally  Vibratory sensation was intact bilaterally  Response to monofilament test was intact bilaterally  Deep tendon reflexes: patellar reflex present bilaterally-- and-- achilles reflex present bilaterally  Vascular Exam: performed Dorsalis pedis pulses were One/4 bilaterally  Posterior tibial pulses were One/4 bilaterally  Elevation Pallor: present bilaterally  Dependence rubor was present bilaterally  Capillary refill time was Q9 findings bilateral  Negative digital hair noted  Positive abnormal cooling bilateral, but-- between 1-3 seconds bilaterally  Toenails: All of the toenails were elongated,-- hypertrophied,-- discolored,-- tender-- and-- Mycotic  Hyperkeratosis: present on both first sub metatarsals-- and-- present on both fifth sub metatarsals  Shoe Gear Evaluation: performed ()  Recommendation(s): custom inlays       Procedure  Foot exam performed  All mycotic nails debrided  Plantar lesions debrided  Patient tolerated procedures well without pain or complication  Patient will moisturize after bathing   He will return for follow-up           There are no diagnoses linked to this encounter        Subjective:       Patient ID: Stephanie Powers is a 80 y o  male      HPI     The following portions of the patient's history were reviewed and updated as appropriate: allergies, current medications, past family history, past medical history, past social history, past surgical history and problem list      Review of Systems       Objective:      Foot Exam     Right Foot/Ankle      Inspection and Palpation  Skin Exam: callus;      Neurovascular  Dorsalis pedis: 1+  Posterior tibial: 1+        Left Foot/Ankle       Inspection and Palpation  Skin Exam: callus;      Neurovascular  Dorsalis pedis: 1+  Posterior tibial: 1+        Physical Exam   Cardiovascular: Pulses are weak pulses  Pulses:       Dorsalis pedis pulses are 1+ on the right side, and 1+ on the left side  Posterior tibial pulses are 1+ on the right side, and 1+ on the left side  Feet:   Right Foot:   Skin Integrity: Positive for callus  Left Foot:   Skin Integrity: Positive for callus

## 2018-05-18 DIAGNOSIS — E03.8 OTHER SPECIFIED HYPOTHYROIDISM: Primary | Chronic | ICD-10-CM

## 2018-05-18 RX ORDER — LEVOTHYROXINE SODIUM 0.05 MG/1
TABLET ORAL
Qty: 90 TABLET | Refills: 1 | Status: SHIPPED | OUTPATIENT
Start: 2018-05-18 | End: 2018-06-22 | Stop reason: SDUPTHER

## 2018-05-24 DIAGNOSIS — K21.00 GASTROESOPHAGEAL REFLUX DISEASE WITH ESOPHAGITIS: Primary | Chronic | ICD-10-CM

## 2018-05-24 RX ORDER — PANTOPRAZOLE SODIUM 40 MG/1
TABLET, DELAYED RELEASE ORAL
Qty: 90 TABLET | Refills: 3 | Status: SHIPPED | OUTPATIENT
Start: 2018-05-24

## 2018-06-05 DIAGNOSIS — J44.9 COPD, MODERATE (HCC): ICD-10-CM

## 2018-06-05 DIAGNOSIS — J43.2 CENTRILOBULAR EMPHYSEMA (HCC): Primary | ICD-10-CM

## 2018-06-05 RX ORDER — IPRATROPIUM BROMIDE AND ALBUTEROL SULFATE 2.5; .5 MG/3ML; MG/3ML
3 SOLUTION RESPIRATORY (INHALATION) 4 TIMES DAILY PRN
Refills: 0 | Status: CANCELLED
Start: 2018-06-05

## 2018-06-05 RX ORDER — IPRATROPIUM BROMIDE AND ALBUTEROL SULFATE 2.5; .5 MG/3ML; MG/3ML
3 SOLUTION RESPIRATORY (INHALATION) 4 TIMES DAILY
Qty: 1080 ML | Refills: 3 | Status: SHIPPED | OUTPATIENT
Start: 2018-06-05 | End: 2018-09-03

## 2018-06-05 RX ORDER — ALBUTEROL SULFATE 90 UG/1
2 AEROSOL, METERED RESPIRATORY (INHALATION) EVERY 4 HOURS PRN
Qty: 18 G | Refills: 5 | Status: SHIPPED | OUTPATIENT
Start: 2018-06-05

## 2018-06-05 NOTE — PROGRESS NOTES
Requested his nebulizer solution DuoNeb be sent to [facility] for 1 month supply then 3 month supply from [facility] and wants a quick acting inhaler such as albuterol    I called patient and will prescribe Proair rescue inhaler and try 3 month supply of Duoneb from [facility]

## 2018-06-07 DIAGNOSIS — J44.1 COPD EXACERBATION (HCC): Primary | ICD-10-CM

## 2018-06-07 RX ORDER — PREDNISONE 20 MG/1
TABLET ORAL
Qty: 15 TABLET | Refills: 0 | Status: ON HOLD | OUTPATIENT
Start: 2018-06-07 | End: 2018-06-16

## 2018-06-07 NOTE — PROGRESS NOTES
Hu Hutchinson was seen by his visiting nurse today and does have some increased shortness of breath over the last day or so  He also states that his oxygen saturation has been dropping with activity  On 4 liters held go down into the 60 to 70% range when walking  When he rest and on 6 liters he will be 90-94%  Does have a chronic cough that is not different than before  No fever  He is not have any leg edema  I did discussed option of going to the emergency room for evaluation but he wanted to try some type of treatment at home  I will prescribe prednisone 40 milligram for 5 days followed by 20 milligram for 5 days  He can keep his oxygen on 6 liters/minute uses nebulizer with DuoNeb 4 times a day    I instructed him and his significant other Danika Alston to call our office around 11 o'clock let us know if he is doing any better    Also I told patient and a loner if there is any worsening of his shortness of breath or he could not maintain his O2 saturations 90% with 6 liters he should go to the emergency room for evaluation

## 2018-06-11 ENCOUNTER — APPOINTMENT (INPATIENT)
Dept: RADIOLOGY | Facility: HOSPITAL | Age: 83
DRG: 291 | End: 2018-06-11
Payer: COMMERCIAL

## 2018-06-11 ENCOUNTER — APPOINTMENT (EMERGENCY)
Dept: RADIOLOGY | Facility: HOSPITAL | Age: 83
DRG: 291 | End: 2018-06-11
Payer: COMMERCIAL

## 2018-06-11 ENCOUNTER — HOSPITAL ENCOUNTER (INPATIENT)
Facility: HOSPITAL | Age: 83
LOS: 5 days | Discharge: HOME HEALTH | DRG: 291 | End: 2018-06-16
Attending: EMERGENCY MEDICINE | Admitting: INTERNAL MEDICINE
Payer: COMMERCIAL

## 2018-06-11 DIAGNOSIS — D50.9 IRON DEFICIENCY ANEMIA, UNSPECIFIED IRON DEFICIENCY ANEMIA TYPE: Chronic | ICD-10-CM

## 2018-06-11 DIAGNOSIS — J44.1 COPD EXACERBATION (HCC): ICD-10-CM

## 2018-06-11 DIAGNOSIS — I50.9 CHF (CONGESTIVE HEART FAILURE) (HCC): Primary | ICD-10-CM

## 2018-06-11 DIAGNOSIS — I50.9 OTHER CONGESTIVE HEART FAILURE (HCC): ICD-10-CM

## 2018-06-11 PROBLEM — E66.9 CLASS 1 OBESITY IN ADULT: Status: ACTIVE | Noted: 2017-01-07

## 2018-06-11 PROBLEM — N17.9 AKI (ACUTE KIDNEY INJURY) (HCC): Status: ACTIVE | Noted: 2018-06-11

## 2018-06-11 PROBLEM — R07.89 ATYPICAL CHEST PAIN: Status: ACTIVE | Noted: 2018-06-11

## 2018-06-11 PROBLEM — D64.9 CHRONIC ANEMIA: Status: ACTIVE | Noted: 2018-03-29

## 2018-06-11 PROBLEM — R79.89 ELEVATED D-DIMER: Status: ACTIVE | Noted: 2018-06-11

## 2018-06-11 PROBLEM — H10.9 BACTERIAL CONJUNCTIVITIS OF RIGHT EYE: Status: RESOLVED | Noted: 2018-03-29 | Resolved: 2018-06-11

## 2018-06-11 LAB
ALBUMIN SERPL BCP-MCNC: 3.3 G/DL (ref 3.5–5)
ALP SERPL-CCNC: 59 U/L (ref 46–116)
ALT SERPL W P-5'-P-CCNC: 33 U/L (ref 12–78)
ANION GAP SERPL CALCULATED.3IONS-SCNC: 11 MMOL/L (ref 4–13)
APTT PPP: 25 SECONDS (ref 24–33)
AST SERPL W P-5'-P-CCNC: 47 U/L (ref 5–45)
BASOPHILS # BLD AUTO: 0.01 THOUSANDS/ΜL (ref 0–0.1)
BASOPHILS NFR BLD AUTO: 0 % (ref 0–1)
BILIRUB SERPL-MCNC: 0.3 MG/DL (ref 0.2–1)
BUN SERPL-MCNC: 51 MG/DL (ref 5–25)
CALCIUM SERPL-MCNC: 8.6 MG/DL (ref 8.3–10.1)
CHLORIDE SERPL-SCNC: 100 MMOL/L (ref 100–108)
CO2 SERPL-SCNC: 28 MMOL/L (ref 21–32)
CREAT SERPL-MCNC: 2.42 MG/DL (ref 0.6–1.3)
DEPRECATED D DIMER PPP: 1250 NG/ML (FEU) (ref 190–520)
EOSINOPHIL # BLD AUTO: 0 THOUSAND/ΜL (ref 0–0.61)
EOSINOPHIL NFR BLD AUTO: 0 % (ref 0–6)
ERYTHROCYTE [DISTWIDTH] IN BLOOD BY AUTOMATED COUNT: 15.8 % (ref 11.6–15.1)
GFR SERPL CREATININE-BSD FRML MDRD: 23 ML/MIN/1.73SQ M
GLUCOSE SERPL-MCNC: 159 MG/DL (ref 65–140)
HCT VFR BLD AUTO: 30.9 % (ref 36.5–49.3)
HGB BLD-MCNC: 9.6 G/DL (ref 12–17)
IMM GRANULOCYTES # BLD AUTO: 0.12 THOUSAND/UL (ref 0–0.2)
IMM GRANULOCYTES NFR BLD AUTO: 1 % (ref 0–2)
INR PPP: 1.16 (ref 0.86–1.16)
LYMPHOCYTES # BLD AUTO: 0.75 THOUSANDS/ΜL (ref 0.6–4.47)
LYMPHOCYTES NFR BLD AUTO: 7 % (ref 14–44)
MCH RBC QN AUTO: 27.7 PG (ref 26.8–34.3)
MCHC RBC AUTO-ENTMCNC: 31.1 G/DL (ref 31.4–37.4)
MCV RBC AUTO: 89 FL (ref 82–98)
MONOCYTES # BLD AUTO: 0.25 THOUSAND/ΜL (ref 0.17–1.22)
MONOCYTES NFR BLD AUTO: 2 % (ref 4–12)
NEUTROPHILS # BLD AUTO: 9.22 THOUSANDS/ΜL (ref 1.85–7.62)
NEUTS SEG NFR BLD AUTO: 90 % (ref 43–75)
NRBC BLD AUTO-RTO: 0 /100 WBCS
NT-PROBNP SERPL-MCNC: 7696 PG/ML
PLATELET # BLD AUTO: 304 THOUSANDS/UL (ref 149–390)
PMV BLD AUTO: 10.5 FL (ref 8.9–12.7)
POTASSIUM SERPL-SCNC: 4.8 MMOL/L (ref 3.5–5.3)
PROCALCITONIN SERPL-MCNC: 0.05 NG/ML
PROT SERPL-MCNC: 7.9 G/DL (ref 6.4–8.2)
PROTHROMBIN TIME: 12.2 SECONDS (ref 9.4–11.7)
RBC # BLD AUTO: 3.47 MILLION/UL (ref 3.88–5.62)
SODIUM SERPL-SCNC: 139 MMOL/L (ref 136–145)
T3 SERPL-MCNC: 0.5 NG/ML (ref 0.6–1.8)
T4 FREE SERPL-MCNC: 1.15 NG/DL (ref 0.76–1.46)
TROPONIN I SERPL-MCNC: 0.02 NG/ML
TROPONIN I SERPL-MCNC: <0.02 NG/ML
TSH SERPL DL<=0.05 MIU/L-ACNC: 7.97 UIU/ML (ref 0.36–3.74)
WBC # BLD AUTO: 10.35 THOUSAND/UL (ref 4.31–10.16)

## 2018-06-11 PROCEDURE — 93005 ELECTROCARDIOGRAM TRACING: CPT

## 2018-06-11 PROCEDURE — 99221 1ST HOSP IP/OBS SF/LOW 40: CPT | Performed by: INTERNAL MEDICINE

## 2018-06-11 PROCEDURE — 84484 ASSAY OF TROPONIN QUANT: CPT | Performed by: FAMILY MEDICINE

## 2018-06-11 PROCEDURE — 85025 COMPLETE CBC W/AUTO DIFF WBC: CPT | Performed by: EMERGENCY MEDICINE

## 2018-06-11 PROCEDURE — 85610 PROTHROMBIN TIME: CPT | Performed by: EMERGENCY MEDICINE

## 2018-06-11 PROCEDURE — 84439 ASSAY OF FREE THYROXINE: CPT | Performed by: EMERGENCY MEDICINE

## 2018-06-11 PROCEDURE — 36415 COLL VENOUS BLD VENIPUNCTURE: CPT | Performed by: EMERGENCY MEDICINE

## 2018-06-11 PROCEDURE — 84480 ASSAY TRIIODOTHYRONINE (T3): CPT | Performed by: EMERGENCY MEDICINE

## 2018-06-11 PROCEDURE — 94760 N-INVAS EAR/PLS OXIMETRY 1: CPT

## 2018-06-11 PROCEDURE — 71045 X-RAY EXAM CHEST 1 VIEW: CPT

## 2018-06-11 PROCEDURE — 84484 ASSAY OF TROPONIN QUANT: CPT | Performed by: EMERGENCY MEDICINE

## 2018-06-11 PROCEDURE — 87081 CULTURE SCREEN ONLY: CPT | Performed by: INTERNAL MEDICINE

## 2018-06-11 PROCEDURE — 84145 PROCALCITONIN (PCT): CPT | Performed by: INTERNAL MEDICINE

## 2018-06-11 PROCEDURE — 94660 CPAP INITIATION&MGMT: CPT

## 2018-06-11 PROCEDURE — 83880 ASSAY OF NATRIURETIC PEPTIDE: CPT | Performed by: EMERGENCY MEDICINE

## 2018-06-11 PROCEDURE — 71250 CT THORAX DX C-: CPT

## 2018-06-11 PROCEDURE — 85730 THROMBOPLASTIN TIME PARTIAL: CPT | Performed by: INTERNAL MEDICINE

## 2018-06-11 PROCEDURE — 96374 THER/PROPH/DIAG INJ IV PUSH: CPT

## 2018-06-11 PROCEDURE — 94640 AIRWAY INHALATION TREATMENT: CPT

## 2018-06-11 PROCEDURE — 85730 THROMBOPLASTIN TIME PARTIAL: CPT | Performed by: EMERGENCY MEDICINE

## 2018-06-11 PROCEDURE — 87147 CULTURE TYPE IMMUNOLOGIC: CPT | Performed by: INTERNAL MEDICINE

## 2018-06-11 PROCEDURE — 99285 EMERGENCY DEPT VISIT HI MDM: CPT

## 2018-06-11 PROCEDURE — 84443 ASSAY THYROID STIM HORMONE: CPT | Performed by: EMERGENCY MEDICINE

## 2018-06-11 PROCEDURE — 80053 COMPREHEN METABOLIC PANEL: CPT | Performed by: EMERGENCY MEDICINE

## 2018-06-11 PROCEDURE — 85379 FIBRIN DEGRADATION QUANT: CPT | Performed by: INTERNAL MEDICINE

## 2018-06-11 RX ORDER — ACETAMINOPHEN 325 MG/1
650 TABLET ORAL EVERY 6 HOURS PRN
Status: DISCONTINUED | OUTPATIENT
Start: 2018-06-11 | End: 2018-06-16 | Stop reason: HOSPADM

## 2018-06-11 RX ORDER — HYDRALAZINE HYDROCHLORIDE 25 MG/1
25 TABLET, FILM COATED ORAL EVERY 8 HOURS SCHEDULED
Status: DISCONTINUED | OUTPATIENT
Start: 2018-06-11 | End: 2018-06-16 | Stop reason: HOSPADM

## 2018-06-11 RX ORDER — ASPIRIN 81 MG/1
81 TABLET, CHEWABLE ORAL DAILY
Status: DISCONTINUED | OUTPATIENT
Start: 2018-06-12 | End: 2018-06-16 | Stop reason: HOSPADM

## 2018-06-11 RX ORDER — POLYETHYLENE GLYCOL 3350 17 G/17G
17 POWDER, FOR SOLUTION ORAL DAILY
Status: DISCONTINUED | OUTPATIENT
Start: 2018-06-12 | End: 2018-06-16 | Stop reason: HOSPADM

## 2018-06-11 RX ORDER — CHLORHEXIDINE GLUCONATE 0.12 MG/ML
15 RINSE ORAL EVERY 12 HOURS SCHEDULED
Status: DISCONTINUED | OUTPATIENT
Start: 2018-06-11 | End: 2018-06-12

## 2018-06-11 RX ORDER — HEPARIN SODIUM 1000 [USP'U]/ML
4000 INJECTION, SOLUTION INTRAVENOUS; SUBCUTANEOUS AS NEEDED
Status: DISCONTINUED | OUTPATIENT
Start: 2018-06-11 | End: 2018-06-12

## 2018-06-11 RX ORDER — PANTOPRAZOLE SODIUM 40 MG/1
40 TABLET, DELAYED RELEASE ORAL DAILY
Status: DISCONTINUED | OUTPATIENT
Start: 2018-06-12 | End: 2018-06-16 | Stop reason: HOSPADM

## 2018-06-11 RX ORDER — METHYLPREDNISOLONE SODIUM SUCCINATE 40 MG/ML
20 INJECTION, POWDER, LYOPHILIZED, FOR SOLUTION INTRAMUSCULAR; INTRAVENOUS EVERY 8 HOURS SCHEDULED
Status: DISCONTINUED | OUTPATIENT
Start: 2018-06-11 | End: 2018-06-13

## 2018-06-11 RX ORDER — HEPARIN SODIUM 5000 [USP'U]/ML
5000 INJECTION, SOLUTION INTRAVENOUS; SUBCUTANEOUS EVERY 8 HOURS SCHEDULED
Status: DISCONTINUED | OUTPATIENT
Start: 2018-06-11 | End: 2018-06-11

## 2018-06-11 RX ORDER — HEPARIN SODIUM 1000 [USP'U]/ML
8000 INJECTION, SOLUTION INTRAVENOUS; SUBCUTANEOUS AS NEEDED
Status: DISCONTINUED | OUTPATIENT
Start: 2018-06-11 | End: 2018-06-12

## 2018-06-11 RX ORDER — LEVOTHYROXINE SODIUM 0.05 MG/1
50 TABLET ORAL
Status: DISCONTINUED | OUTPATIENT
Start: 2018-06-12 | End: 2018-06-16 | Stop reason: HOSPADM

## 2018-06-11 RX ORDER — ISOSORBIDE MONONITRATE 30 MG/1
30 TABLET, EXTENDED RELEASE ORAL DAILY
Status: DISCONTINUED | OUTPATIENT
Start: 2018-06-12 | End: 2018-06-16 | Stop reason: HOSPADM

## 2018-06-11 RX ORDER — HEPARIN SODIUM 1000 [USP'U]/ML
8000 INJECTION, SOLUTION INTRAVENOUS; SUBCUTANEOUS ONCE
Status: COMPLETED | OUTPATIENT
Start: 2018-06-11 | End: 2018-06-11

## 2018-06-11 RX ORDER — DOCUSATE SODIUM 100 MG/1
100 CAPSULE, LIQUID FILLED ORAL
Status: DISCONTINUED | OUTPATIENT
Start: 2018-06-11 | End: 2018-06-16 | Stop reason: HOSPADM

## 2018-06-11 RX ORDER — HEPARIN SODIUM 10000 [USP'U]/100ML
3-30 INJECTION, SOLUTION INTRAVENOUS
Status: DISCONTINUED | OUTPATIENT
Start: 2018-06-11 | End: 2018-06-12

## 2018-06-11 RX ORDER — NITROGLYCERIN 0.4 MG/1
0.4 TABLET SUBLINGUAL ONCE
Status: COMPLETED | OUTPATIENT
Start: 2018-06-11 | End: 2018-06-11

## 2018-06-11 RX ORDER — AMLODIPINE BESYLATE 10 MG/1
10 TABLET ORAL DAILY
Status: DISCONTINUED | OUTPATIENT
Start: 2018-06-12 | End: 2018-06-16 | Stop reason: HOSPADM

## 2018-06-11 RX ORDER — FUROSEMIDE 10 MG/ML
20 INJECTION INTRAMUSCULAR; INTRAVENOUS ONCE
Status: COMPLETED | OUTPATIENT
Start: 2018-06-11 | End: 2018-06-11

## 2018-06-11 RX ORDER — AMIODARONE HYDROCHLORIDE 200 MG/1
100 TABLET ORAL DAILY
Status: DISCONTINUED | OUTPATIENT
Start: 2018-06-12 | End: 2018-06-16 | Stop reason: HOSPADM

## 2018-06-11 RX ORDER — IPRATROPIUM BROMIDE AND ALBUTEROL SULFATE 2.5; .5 MG/3ML; MG/3ML
3 SOLUTION RESPIRATORY (INHALATION)
Status: DISCONTINUED | OUTPATIENT
Start: 2018-06-11 | End: 2018-06-16 | Stop reason: HOSPADM

## 2018-06-11 RX ADMIN — FUROSEMIDE 20 MG: 10 INJECTION, SOLUTION INTRAVENOUS at 15:50

## 2018-06-11 RX ADMIN — HEPARIN SODIUM AND DEXTROSE 18 UNITS/KG/HR: 10000; 5 INJECTION INTRAVENOUS at 18:05

## 2018-06-11 RX ADMIN — IPRATROPIUM BROMIDE AND ALBUTEROL SULFATE 3 ML: .5; 3 SOLUTION RESPIRATORY (INHALATION) at 19:31

## 2018-06-11 RX ADMIN — NITROGLYCERIN 0.4 MG: 0.4 TABLET SUBLINGUAL at 14:40

## 2018-06-11 RX ADMIN — HEPARIN SODIUM 8000 UNITS: 1000 INJECTION, SOLUTION INTRAVENOUS; SUBCUTANEOUS at 18:07

## 2018-06-11 RX ADMIN — HYDRALAZINE HYDROCHLORIDE 25 MG: 25 TABLET, FILM COATED ORAL at 21:58

## 2018-06-11 RX ADMIN — HYDRALAZINE HYDROCHLORIDE 25 MG: 25 TABLET, FILM COATED ORAL at 18:07

## 2018-06-11 RX ADMIN — CHLORHEXIDINE GLUCONATE 15 ML: 1.2 RINSE ORAL at 21:56

## 2018-06-11 RX ADMIN — IPRATROPIUM BROMIDE AND ALBUTEROL SULFATE 3 ML: .5; 3 SOLUTION RESPIRATORY (INHALATION) at 16:41

## 2018-06-11 RX ADMIN — METHYLPREDNISOLONE SODIUM SUCCINATE 20 MG: 40 INJECTION, POWDER, FOR SOLUTION INTRAMUSCULAR; INTRAVENOUS at 21:56

## 2018-06-11 RX ADMIN — DOCUSATE SODIUM 100 MG: 100 CAPSULE, LIQUID FILLED ORAL at 21:57

## 2018-06-11 RX ADMIN — METOPROLOL TARTRATE 25 MG: 25 TABLET, FILM COATED ORAL at 21:57

## 2018-06-11 NOTE — PLAN OF CARE
Problem: RESPIRATORY - ADULT  Goal: Achieves optimal ventilation and oxygenation  INTERVENTIONS:  - Assess for changes in respiratory status  - Assess for changes in mentation and behavior  - Position to facilitate oxygenation and minimize respiratory effort  - Oxygen administration by appropriate delivery method based on oxygen saturation (per order) or ABGs  - Encourage broncho-pulmonary hygiene including cough, deep breathe, Incentive Spirometry  - Assess and instruct to report SOB or any respiratory difficulty  - Respiratory Therapy support as indicated  - Continue with Home 02 at 6-8LPM   - Continue with Cpap +10 cmH20  - Bipap on standby at bedside    - Decrease work of breathing  - Continue with nebulizer treatments   Outcome: Progressing      Comments: Patient admitted to ICU  Bipap on standby at bedside  Cpap for hours of sleep  Home O2 6-8LPM  Continue with nebulizer treatments  Will update POC x 3 days 6/14/18  Jael Disla RRT

## 2018-06-11 NOTE — ASSESSMENT & PLAN NOTE
- decrease solu-medrol to 20mg BID  - patient has history of moderate COPD on 6 L of home oxygen  - continue with duo nebs, and supplemental oxygen as needed, goal SpO2 88-93%  - unchanged diffuse emphysematous/fibrotic changes in CT imaging of chest

## 2018-06-11 NOTE — ASSESSMENT & PLAN NOTE
- currently in normal sinus rhythm, patient on amiodarone and Lopressor - will continue with that  - monitor on tele

## 2018-06-11 NOTE — ASSESSMENT & PLAN NOTE
- patient has history of diabetes in the past, most recent A1c although was 5 7 last year, glucose on admission was mildly elevated, will order a repeat A1c given his risk factor of obesity, and treat based on that

## 2018-06-11 NOTE — ASSESSMENT & PLAN NOTE
- V/Q scan showed low probability of PE, PE r/o, no signs for DVT, likely was acute reactant to ongoing respiratory condition

## 2018-06-11 NOTE — ASSESSMENT & PLAN NOTE
- patient has history of chronic small pulmonary nodule, 4 x 8 mm as seen on last CT scan earlier this month with no changes seen since last nodule in 2013, stable, continue to monitor per guidelines

## 2018-06-11 NOTE — ED NOTES
Afshan Davenport called lab to add on ICU orders  Only able to add D dimer        Vince Loyola, MEGHA  06/11/18 2282

## 2018-06-11 NOTE — ASSESSMENT & PLAN NOTE
- pt has hx of CAD s/p TAVR  - s/p 1 dose of nitrostat in ED since which pain resolved  - trop neg x1, willt rend trop for total 3 set  - monitor on tele; nitro prn, morphine prn; repeat EKG in AM  - c/w ASA, lopressore, imdur  - complete LBBB on EKG admission which is chronic for pt on prior EKG as well

## 2018-06-11 NOTE — ASSESSMENT & PLAN NOTE
- s/p lasix 20 mg IV x1 in the ER for probable acute component of chronic heart failure, no signs of fluid overload at this time  - monitor output and input, monitor for fluid retention, daily weights  - close to -4 L net I/O the since admission  - resume home torsemide today  - last echo done 3 months ago showed ejection fraction of 19-99%, grade 1 diastolic dysfunction, severe pulmonary hypertension with peak pulmonary pressure of 70mmHg --- use caution with fluids, repeat echo pending today

## 2018-06-11 NOTE — H&P
H&P Note - Critical Care/ Stepdown   Chico Boehringer 80 y o  male MRN: [id number]  Unit/Bed#: ICU 03 Encounter: [id number]    ______________________________________________________________________  Assessment:   Active Problems:    Acute on chronic respiratory failure with hypoxia (HCC)    Acute on Chronic Kidney Injury    Atypical chest pain    COPD, moderate (HCC)    Paroxysmal atrial fibrillation (HCC)    CAD in native artery    Hypertension    S/P TAVR (transcatheter aortic valve replacement)    PATITO (obstructive sleep apnea)    Pleural effusion, right (chronic)    Multiple pulmonary nodules    Class 1 obesity in adult    Peripheral vascular disease (HCC)    Diabetic polyneuropathy associated with type 2 diabetes mellitus (HCC)    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   COPD exacerbation versus worsening severe pulmonary hypertension, vs ACS  - trop neg x1, will trend troponins; s/p Nitrostat in the ER, patient reports upper abdominal/lower chest wall tightness/discomfort that has since resolved and was associated with dyspnea; will rule out ACS by trending troponins, and monitoring with tele, Nitrostat p r n , on Imdur  - CXR and CT chest look unremarkable for any acute abnormality on wet read  - D-dimer elevated at 1250 in setting of hx of paroxysmal Afib, patient has acute on chronic kidney injury, due to which CTA cannot be done at this time, will order for V/Q scan in the morning, and start him on therapeutic heparin at this time until PE is ruled out  - also start him on Solu-Medrol 20 mg every 8 hours for probable COPD exacerbation, patient status post outpatient prednisone taper recently  - continue with duo nebs every 6 hours and PRN  - procalcitonin ordered and pending to rule out any infectious sources, although patient afebrile, no leukocytosis, no concerning signs of infection at this time  - BNP elevated, however no significant fluid noticed on chest x-ray; patient does have chronic mixed systolic and diastolic heart failure, for which he is on torsemide, he did get Lasix 20 mg IV in the ER, will monitor his intake and output, unlikely CHF exacerbation at this time, however will see how he does with the other treatment listed above  - patient uses 6 L of oxygen at home for COPD        Acute on Chronic Kidney Injury   Assessment & Plan    - patient has CKD stage III, baseline creatinine in the past couple months has been 1 6-1 9, creatinine 2 4 to this admission, acute component over the chronic kidney disease;   - ?   Etiology, uncertain if it is prerenal versus intrarenal out this time, will monitor with creatinine, if worsens, will consider IV fluids for hydration, at this time need to use caution with fluids given his history of systolic and diastolic heart failure        Atypical chest pain   Assessment & Plan    - pt has hx of CAD s/p TAVR  - s/p 1 dose of nitrostat in ED since which pain resolved  - trop neg x1, willt rend trop for total 3 set  - monitor on tele; nitro prn, morphine prn; repeat EKG in AM  - c/w ASA, lopressore, imdur  - complete LBBB on EKG admission which is chronic for pt on prior EKG as well        Elevated d-dimer   Assessment & Plan    - V/Q scan in the morning and therapeutic heparin infusion as mentioned above        GERD (gastroesophageal reflux disease)   Assessment & Plan    c/w protonix        Chronic anemia   Assessment & Plan    - patient has history of chronic microcytic anemia with baseline hemoglobin in 10-11 range, will monitor with daily CBC, no concerning sign for acute blood loss at this time  - transfuse if hemoglobin less than 7  - will order iron panel given the microcytic nature of the anemia        Hypothyroidism   Assessment & Plan    - TSH elevated on admission, 7 968, will order free T3 and T4 to further assist the status of the thyroid function, patient currently on levothyroxine, will continue home dose of 50 mcg daily, will adjust the dose based on the thyroid results in the morning        Acute on chronic combined systolic and diastolic CHF (congestive heart failure) ([facility])   Assessment & Plan    - s/p lasix 20 mg IV x1 in the ER, monitor output and input, monitor for fluid retention  - continue with home torsemide  - most recent echo done 3 months ago showed ejection fraction of 17-56%, grade 1 diastolic dysfunction, severe pulmonary hypertension with peak pulmonary pressure of 70mmHg --- use caution with fluids        CKD (chronic kidney disease), stage III   Assessment & Plan    - address acute on chronic component of CKD as mentioned above  - avoid nephrotoxic agents        Diabetic polyneuropathy associated with type 2 diabetes mellitus ([facility] [address] )   Assessment & Plan    - patient has history of diabetes in the past, most recent A1c although was 5 7 last year, glucose on admission was mildly elevated, will order a repeat A1c given his risk factor of obesity, and treat based on that        Peripheral vascular disease ([facility] [address] )   Assessment & Plan    c/w aspirin, statin        Class 1 obesity in adult   Assessment & Plan     on lifestyle modification for weight loss, goal BMI less than 30        Multiple pulmonary nodules   Assessment & Plan    - patient has history of chronic small pulmonary nodule, 4 x 8 mm as seen on last CT scan earlier this month with no changes seen since last nodule in 2013, stable, continue to monitor per guidelines        Pleural effusion, right (chronic)   Assessment & Plan    - chronic right-sided pleural effusion, small, continue to monitor        PATITO (obstructive sleep apnea) Assessment & Plan    Use BiPAP at bedtime given his acute on chronic respiratory failure        S/P TAVR (transcatheter aortic valve replacement)   Assessment & Plan    -r/o ACS as above  - c/w nitro PRN, imdur, ASA, lopressor          Hypertension   Assessment & Plan    - continue with home amlodipine, Lopressor, hydralazine, Imdur  - blood pressure currently elevated in 846Q to 386 systolic - possibly 2/2 acute on chronic respiratory failure, will treat with p r n  antihypertensive if Severe elevation blood pressure        CAD in native artery   Assessment & Plan    - continue with aspirin, Lopressor, Imdur, statin  - r/o ACS as above  - nitrostat PRN        Paroxysmal atrial fibrillation (HCC)   Assessment & Plan    - patient currently in normal sinus rhythm, patient on amiodarone and Lopressor - will continue with that  - monitor on tele          COPD, moderate (HCC)   Assessment & Plan    - continue with Solu-Medrol for probable COPD exacerbation as mentioned above  - patient has history of moderate COPD on 6 L of home oxygen  - continue with duo nebs, and supplemental oxygen as needed, goal SpO2 88-93%  - unchanged diffuse emphysematous/fibrotic changes in CT imaging of chest             Plan:    Neuro:   · No sedation needed at this time  · Delirium: CAM ICU positive no   · Regulate sleep/wake cycle    CV:   · History of combined systolic and diastolic dysfunction  · History of paroxysmal AFib on amiodarone and Lopressor  · Rhythm: NSR  · Follow rhythm on telemetry    Pulm:   · Acute on chronic hypoxic respiratory failure:   BiPAP p r n , rest plan as above    GI:   · Nutrition/diet plan:  Cardiac diet, with fluid and sodium restriction given history of heart failure  · Stress ulcer prophylaxis: Protonix PO  · Bowel regimen: Colace    FEN:     · Fluid/Diuretic plan: No intervention  · Electrolytes repleted: no  · Goal: K >4 0, Mag >2 0, and Phos >3 0    :   · Indwelling Tong present: no     ID:   · No signs concerning for active infection at this time  · Trend temps and WBC count    Heme:   · Chronic anemia  · Trend hgb and plts  · Transfuse as needed for goal hgb >7    Endo:   · A1c pending for hyperglycemia on admission    Msk/Skin:  · Mobility goal:  Encourage early mobility  · PT consult: yes  · OT consult: yes  · Frequent turning and off-loading    Family:  · Family updated within 24 hours: yes     Lines:  PIV    VTE Prophylaxis:  · Pharmacologic Prophylaxis: Heparin  · Mechanical Prophylaxis: sequential compression device    Disposition: Admit to stepdown    Code Status: Level 1 - Full Code    Counseling / Coordination of Care  Total Critical Care time spent 40 minutes excluding procedures, teaching and family updates  ______________________________________________________________________    HPI:    This is a 77-year-old male with a past medical history of moderate COPD on home oxygen 6 L, paroxysmal AFib, combined systolic and diastolic dysfunction, who presents today for worsening shortness of breath, as well as dyspnea exertion, patient reports that since the last 2 weeks he has not been feeling that well, however since 1 day he has been feeling very short of breath  Patient also reports that earlier this morning he had lower chest wall/upper abdominal discomfort/tightening, that did resolve in the ER  Patient reports that his oxygen saturation have been dropping with activity at home  Patient was that he does have a chronic cough that is not different than before, no fever, no leg pain  No chest pain at this time  In ER, patient was noted to be hypoxic on nasal cannula, and had to be placed on BiPAP    Patient also got 1 dose of 20 mg IV Lasix and 1 dose of 0 4 mg Nitrostat sublingual in the ER, as well as a DuoNeb breathing treatment   ______________________________________________________________________    ROS:  Review of Systems - History obtained from the patient  Respiratory ROS: chronic cough, worsening SOB, BARCENAS, wheezing  Cardiovascular ROS: chest pain/discomfort as above; peripheral edema - mild  Gastrointestinal ROS: no abdominal pain, no N/V; has chronic intermittent constipation  Genito-Urinary ROS: neg  Neurological ROS: neg        Physical Exam:   Physical Exam   Constitutional: No distress  HENT:   Head: Normocephalic and atraumatic  Right Ear: External ear normal    Left Ear: External ear normal    Cardiovascular: Normal rate, regular rhythm and intact distal pulses  Murmur heard  Pulmonary/Chest: Effort normal  No respiratory distress  He has no wheezes  He has no rales  Breath sounds diminished bilaterally  Maintaining oxygen saturation anywhere from high 80s to low 90s on 6 L nasal cannula at this time  Patient has mild conversational dyspnea  Becomes hypoxic in mid 80s with talking   Abdominal: Soft  Bowel sounds are normal  He exhibits no distension  There is no tenderness  There is no guarding  Musculoskeletal: He exhibits no tenderness or deformity  1+ pitting edema bilateral lower extremities  Mild Chronic venous stasis dermatitis   Neurological: He is alert  Skin: He is not diaphoretic        ______________________________________________________________________  Vitals:    18 1641 18 1713 18 1745 18 1800   BP:  147/68 167/77 143/65   BP Location:  Right arm Right arm Right arm   Pulse: 74 76 83 75   Resp: (!) 24 (!) 24 (!) 30 20   Temp:  98 2 °F (36 8 °C) 98 6 °F (37 °C)    TempSrc:  Oral Temporal    SpO2: 93% 96% 95% 93%   Weight:   101 kg (222 lb 0 1 oz)    Height:   5' 10" (1 778 m)               Temperature:   Temp (24hrs), Av 8 °F (37 1 °C), Min:98 2 °F (36 8 °C), Max:99 5 °F (37 5 °C)    Current Temperature: 98 6 °F (37 °C)    Weights:   IBW: 73 kg    Body mass index is 31 85 kg/m²    Weight (last 2 days)     Date/Time   Weight    18 1745  101 (222)              Hemodynamic Monitoring:  N/A       Non-Invasive/Invasive Ventilation Settings:  Respiratory    Lab Data (Last 4 hours)    None         O2/Vent Data (Last 4 hours)      06/11 1641          Non-Invasive Ventilation Mode BiPAP  Comment: SB at bedside                 No results found for: PHART, LPE8EBD, PO2ART, TZL1BZB, Y8NKKEWA, BEART, SOURCE  SpO2: SpO2: 93 %    Intake and Outputs:  I/O       06/09 0701 - 06/10 0700 06/10 0701 - 06/11 0700 06/11 0701 - 06/12 0700    Urine (mL/kg/hr)   1100    Total Output     1100    Net     -1100                   Nutrition:        Diet Orders            Start     Ordered    06/11/18 1836  Room Service  Once     Question:  Type of Service  Answer:  Room Service - Appropriate with Assistance    06/11/18 1835    06/11/18 1737  Diet Juan David/CHO Controlled; Consistent Carbohydrate Diet Level 2 (5 carb servings/75 grams CHO/meal); Cardiac Step 1  Diet effective now     Question Answer Comment   Diet Type Juan David/CHO Controlled    Juan David/CHO Controlled Consistent Carbohydrate Diet Level 2 (5 carb servings/75 grams CHO/meal)    Other Restriction(s): Cardiac Step 1    RD to adjust diet per protocol?  No        06/11/18 1736            Labs:     Results from last 7 days  Lab Units 06/11/18  1415   WBC Thousand/uL 10 35*   HEMOGLOBIN g/dL 9 6*   HEMATOCRIT % 30 9*   PLATELETS Thousands/uL 304   NEUTROS PCT % 90*   MONOS PCT % 2*       Results from last 7 days  Lab Units 06/11/18  1415   SODIUM mmol/L 139   POTASSIUM mmol/L 4 8   CHLORIDE mmol/L 100   CO2 mmol/L 28   BUN mg/dL 51*   CREATININE mg/dL 2 42*   CALCIUM mg/dL 8 6   TOTAL PROTEIN g/dL 7 9   BILIRUBIN TOTAL mg/dL 0 30   ALK PHOS U/L 59   ALT U/L 33   AST U/L 47*   GLUCOSE RANDOM mg/dL 159*         Lab Results   Component Value Date    PHOS 4 1 03/30/2018    PHOS 3 6 03/29/2018    PHOS 4 0 01/07/2017        Results from last 7 days  Lab Units 06/11/18  1415   INR  1 16   PTT seconds 25       0  Lab Value Date/Time   TROPONINI 0 02 06/11/2018 1415   TROPONINI <0 02 03/28/2018 2034   TROPONINI 0 05 (H) 01/07/2017 0531   TROPONINI 0 07 (H) 01/06/2017 2348   TROPONINI 0 07 (H) 01/06/2017 1800   TROPONINI 0 02 11/25/2016 0600   TROPONINI 0 02 11/24/2016 2329   TROPONINI <0 02 11/24/2016 1938   TROPONINI 0 04 (H) 05/26/2016 1703   TROPONINI 0 03 05/26/2016 1312   TROPONINI 0 02 05/26/2016 0950   TROPONINI <0 02 05/16/2016 1154   TROPONINI 8 73 (H) 03/05/2016 1247   TROPONINI 10 10 (H) 03/05/2016 0534   TROPONINI 6 59 (H) 03/04/2016 2341   TROPONINI 1 93 (H) 03/04/2016 1936   TROPONINI 0 66 (H) 03/04/2016 1335         ABG:No results found for: PHART, CCD3SJQ, PO2ART, TSG9AAV, A2YYRRPX, BEART, SOURCE    Imaging: Xr Chest Portable    Result Date: 6/11/2018  Impression: Enlarged cardiac silhouette with pulmonary vascular prominence, suspected small right pleural effusion, and bilateral airspace opacities  Consider a component of fluid overload/CHF  Superimposed infection could be considered in the appropriate clinical  setting  Workstation performed: [id number]     Ct Chest Wo Contrast    Result Date: 6/11/2018  Impression: 1  Unchanged small right pleural effusion which could be loculated although this is difficult to evaluate without contrast  2   Unchanged appearance of diffuse lung emphysematous/interstitial/fibrotic changes  3   Lingular groundglass regions could represent atelectasis  rather than pneumonia, correlate with lab values and symptomatology  Workstation performed: [id number]       Micro:  Lab Results   Component Value Date    BLOODCX No Growth After 5 Days  01/06/2017    BLOODCX No Growth After 5 Days  01/06/2017    BLOODCX No Growth After 5 Days   03/06/2016    URINECX No Growth <1000 cfu/mL 03/04/2016    SPUTUMCULTUR 4+ Growth of  03/29/2018    SPUTUMCULTUR 2+ Growth of Candida sp  presumptively albicans 03/09/2016    SPUTUMCULTUR 2+ Growth of Candida sp  presumptively glabrata 03/09/2016    SPUTUMCULTUR 2+ Growth of Mixed Respiratory [place] 03/09/2016       Allergies: No Known Allergies  Medications: Scheduled Meds:  Current Facility-Administered Medications:  acetaminophen 650 mg Oral Q6H PRN [facility]    [START ON 6/12/2018] amiodarone 100 mg Oral Daily [facility]    [START ON 6/12/2018] amLODIPine 10 mg Oral Daily [facility]    [START ON 6/12/2018] aspirin 81 mg Oral Daily [facility]    chlorhexidine 15 mL Swish & Spit Q12H [facility]    docusate sodium 100 mg Oral HS [facility]    heparin (porcine) 3-30 Units/kg/hr (Order-Specific) Intravenous Titrated Linda Lola, DO Last Rate: 18 Units/kg/hr (06/11/18 1805)   heparin (porcine) 4,000 Units Intravenous PRN Linda Lola, DO    heparin (porcine) 8,000 Units Intravenous PRN Linda Lola, DO    hydrALAZINE 25 mg Oral Q8H [facility]    ipratropium-albuterol 3 mL Nebulization Q6H [facility]    [START ON 6/12/2018] isosorbide mononitrate 30 mg Oral Daily [facility]    [START ON 6/12/2018] levothyroxine 50 mcg Oral Early Morning [facility]    methylPREDNISolone sodium succinate 20 mg Intravenous [facility] Linda Lola, DO    metoprolol tartrate 25 mg Oral Q12H [facility]    [START ON 6/12/2018] pantoprazole 40 mg Oral Daily [facility]    [START ON 6/12/2018] polyethylene glycol 17 g Oral Daily [facility]      Continuous Infusions:  heparin (porcine) 3-30 Units/kg/hr (Order-Specific) Last Rate: 18 Units/kg/hr (06/11/18 1805)     PRN Meds:    acetaminophen 650 mg Q6H PRN   heparin (porcine) 4,000 Units PRN   heparin (porcine) 8,000 Units PRN       Invasive lines and devices:   Invasive Devices     Peripheral Intravenous Line            Peripheral IV 06/11/18 Left Antecubital less than 1 day                   SIGNATURE: Linda Davila DO  DATE: June 11, 2018

## 2018-06-11 NOTE — ED PROVIDER NOTES
History  No chief complaint on file  87M, Hx CAD/STENT, VALVE REPLACEMENT, CKD, CHF, COPD, HYPOTHYROID  C/O SOB AND WORSENING BARCENAS X 1 DAY  162/82, O2 SAT 99% ON FACE MASK, B/L EDEMA          History provided by:  Patient and EMS personnel  Shortness of Breath   Severity:  Moderate  Onset quality:  Unable to specify  Duration:  1 day  Timing:  Constant  Progression:  Worsening  Chronicity:  Recurrent  Context: activity    Relieved by:  Rest and sitting up  Worsened by: Activity      Prior to Admission Medications   Prescriptions Last Dose Informant Patient Reported? Taking? B Complex Vitamins (VITAMIN B COMPLEX PO)  Self Yes No   Sig: Take by mouth   BREO ELLIPTA  Self No No   Sig: USE 1 INHALATION DAILY   acetaminophen (TYLENOL) 325 mg tablet  Self No No   Sig: Take 2 tablets (650 mg total) by mouth every 6 (six) hours as needed for mild pain  albuterol (PROAIR HFA) 90 mcg/act inhaler   No No   Sig: Inhale 2 puffs every 4 (four) hours as needed for wheezing or shortness of breath   amLODIPine (NORVASC) 10 mg tablet  Self No No   Sig: TAKE 1 TABLET DAILY   amiodarone 100 mg tablet  Self No No   Sig: Take 1 tablet (100 mg total) by mouth daily   aspirin 81 mg chewable tablet  Self Yes No   Sig: Chew daily   cyanocobalamin (VITAMIN B-12) 100 mcg tablet  Self Yes No   Sig: Take by mouth   docusate sodium (COLACE) 100 mg capsule  Self Yes No   Sig: Take 100 mg by mouth daily at bedtime  fenofibrate (TRICOR) 145 mg tablet  Self Yes No   Sig: Take 145 mg by mouth daily     hydrALAZINE (APRESOLINE) 25 mg tablet  Self No No   Sig: Take 1 tablet (25 mg total) by mouth every 8 (eight) hours   ipratropium-albuterol (DUO-NEB) 0 5-2 5 mg/3 mL nebulizer solution   No No   Sig: Take 1 vial (3 mL total) by nebulization 4 (four) times a day for 90 days   isosorbide mononitrate (IMDUR) 30 mg 24 hr tablet  Self No No   Sig: Take 1 tablet (30 mg total) by mouth daily   levothyroxine 50 mcg tablet   No No   Sig: TAKE 1 TABLET BY MOUTH  DAILY   metoprolol tartrate (LOPRESSOR) 25 mg tablet  Self No No   Sig: Take 1 tablet (25 mg total) by mouth every 12 (twelve) hours     oxyCODONE-acetaminophen (PERCOCET) 5-325 mg per tablet  Self No No   Sig: Take 1 tablet by mouth every 6 (six) hours as needed for moderate pain for up to 10 doses Max Daily Amount: 4 tablets   pantoprazole (PROTONIX) 40 mg tablet   No No   Sig: take 1 tablet by mouth once daily   polyethylene glycol (MIRALAX) 17 g packet   No No   Sig: Take 17 g by mouth daily for 30 days   predniSONE 20 mg tablet   No No   Sig: Take 2 tablets x 5 days then 1 tab x 5  days   simvastatin (ZOCOR) 20 mg tablet  Self No No   Sig: TAKE 1 TABLET DAILY   tolterodine (DETROL LA) 4 mg 24 hr capsule  Self Yes No   Sig: Take 1 capsule by mouth daily   torsemide (DEMADEX) 20 mg tablet  Self Yes No   Sig: Take by mouth      Facility-Administered Medications: None       Past Medical History:   Diagnosis Date    Allergic rhinitis 06/11/2010    Aortic stenosis, severe     Bacterial pneumonia 06/08/2007    Bladder neck obstruction 12/23/2010    BPH (benign prostatic hyperplasia)     Bronchitis, chronic obstructive, with exacerbation ([facility] [address] ) 01/30/2012    CAD (coronary artery disease)     stent 2014    Carcinoma ([facility] [address] )     resolved [id number]    Cardiomegaly 02/13/2007    Cataract of both eyes     CHF (congestive heart failure) (HCC)     Chronic allergic conjunctivitis     last assessed [id number]    Chronic kidney disease (CKD)     CKD (chronic kidney disease), stage III     CKD (chronic kidney disease), stage III     COPD (chronic obstructive pulmonary disease) ([facility] [address] )     Decubitus ulcer     resolved [id number]    Dermatomycosis     last assessed [id number]    Diabetes mellitus type 2, noninsulin dependent ([facility] [address] )     Dyspnea on exertion     last assessed [id number]    Eczema     last assessed [id number]    Edema     last assessed [id number]    Epistaxis 12/01/2004    Esophagitis, erosive     Exposure to scabies     resolved [id number]    Fracture of rib     last assessed [id number]    H/O aortic valve replacement     resolved [id number]    H/O blood clots     History of DVT (deep vein thrombosis)     left posterior tibial/peroneal veins    History of non-ST elevation myocardial infarction (NSTEMI)     History of pneumonia     Hyperlipidemia     Hypertension     Internal hemorrhoids 2006    LBBB (left bundle branch block)     MRSA (methicillin resistant staph aureus) culture positive     NSTEMI (non-ST elevated myocardial infarction) ([facility] [address] )     resolved [id number]    Obstructive sleep apnea on CPAP     severe PATITO with AHI of 106 and uses CPAP at 10 cm H2O with 2 l/m oxygen    Paroxysmal atrial fibrillation ([facility] [address] )     Phimosis 2010    severe pt had circumcision 2010    Pulmonary fibrosis ([facility] [address] )     [facility]     Rotator cuff tendonitis     lsat assessed [id number]    Skin abscess 2004    Hip    Skin neoplasm     last assessed [id number]    Tachycardia 2004    Trigger finger     last assessed [id number]    Type 2 diabetes mellitus ([facility] [address] ) 2004    Venous thrombosis 2007    Venous thrombosis 2007       Past Surgical History:   Procedure Laterality Date    CARDIAC CATHETERIZATION  03/10/2016    Showed 60-70% mid LAD lesion next to stent    CATARACT EXTRACTION      CIRCUMCISION, NON-      ESOPHAGOGASTRODUODENOSCOPY N/A 3/14/2016    Procedure: ESOPHAGOGASTRODUODENOSCOPY (EGD); Surgeon: Marek Bush MD;  Location: [facility];   Service:     EYE SURGERY      FRACTURE SURGERY      JOINT REPLACEMENT      both hips replaced    OTHER SURGICAL HISTORY      H/O thoracentesis (diagnostic)    NM REPLACE AORTIC VALVE OPENFEMORAL ARTERY APPROACH N/A 2016    Procedure: TRANSFEMORAL TAVR WITH 26MM CALIX MAKAYLA S3 TISSUE VALVE; PINA ;  Surgeon: Ata Schmitz DO;  Location:  MAIN OR;  Service: Cardiac Surgery  REPLACEMENT TOTAL KNEE Bilateral 01/01/1991 1991 and 2001    TISSUE AORTIC VALVE REPLACEMENT      transfemoral, transcatheter    TONSILLECTOMY      TONSILLECTOMY AND ADENOIDECTOMY      TOTAL HIP ARTHROPLASTY      Bilateral       Family History   Problem Relation Age of Onset    Coronary artery disease Mother     Arthritis Mother     Hypertension Mother     Rheum arthritis Father     Prostate cancer Father      I have reviewed and agree with the history as documented  Social History   Substance Use Topics    Smoking status: Former Smoker     Packs/day: 1 00     Years: 35 00     Types: Cigarettes     Quit date: 1/1/1987    Smokeless tobacco: Never Used      Comment: quit 50 years ago, per [facility]    Alcohol use Yes      Comment: occasionally        Review of Systems   Respiratory: Positive for shortness of breath  All other systems reviewed and are negative  Physical Exam  Physical Exam   Constitutional: He appears well-developed and well-nourished  No distress  HENT:   Head: Normocephalic and atraumatic  Eyes: Conjunctivae and EOM are normal  Pupils are equal, round, and reactive to light  Neck: Normal range of motion  JVD present  Cardiovascular: Normal rate and regular rhythm  Pulmonary/Chest: He is in respiratory distress  Abdominal: Soft  There is no tenderness  Musculoskeletal: Normal range of motion  He exhibits edema  Skin: Skin is warm and dry  He is not diaphoretic  Nursing note and vitals reviewed  Vital Signs  ED Triage Vitals   Temp Pulse Resp BP SpO2   -- -- -- -- --      Temp src Heart Rate Source Patient Position - Orthostatic VS BP Location FiO2 (%)   -- -- -- -- --      Pain Score       --           There were no vitals filed for this visit      Visual Acuity      ED Medications  Medications - No data to display    Diagnostic Studies  Results Reviewed     None                 No orders to display              Procedures  ECG 12 Lead Documentation  Date/Time: 6/11/2018 2:31 PM  Performed by: Triny Oconnell by: Milla Zacarias     ECG reviewed by me, the ED Provider: yes    Patient location:  ED  Interpretation:     Interpretation: abnormal    Rate:     ECG rate:  71    ECG rate assessment: normal    Rhythm:     Rhythm: sinus rhythm    Ectopy:     Ectopy: PVCs      PVCs:  Frequent and multifocal  QRS:     QRS intervals: Wide  Conduction:     Conduction: abnormal      Abnormal conduction: complete LBBB    ST segments:     ST segments:  Normal  T waves:     T waves: normal             Phone Contacts  ED Phone Contact    ED Course                               MDM  Number of Diagnoses or Management Options  Diagnosis management comments: REMAINED ON BIPAP,  ICU EVAL PT IN THE ER    CritCare Time    Disposition  Final diagnoses:   None     ED Disposition     None      Follow-up Information    None         Patient's Medications   Discharge Prescriptions    No medications on file     No discharge procedures on file      ED Provider  Electronically Signed by           Gene Varma MD  06/11/18 [id number]

## 2018-06-11 NOTE — ASSESSMENT & PLAN NOTE
- continue with aspirin, Lopressor, Imdur, statin  - no chest pain at this time, troponins trended negative upon admission x3  - nitrostat PRN

## 2018-06-11 NOTE — ASSESSMENT & PLAN NOTE
- currently on 6 L NC O2, O2 sats mid 00B  - etiology uncertain at this time, worsening severe pulmonary hypertension, vs decreased cardiac output from cardiomyopathy vs mild component of COPD exacerbation  - repeat echo pending this morning to assess pulmonary pressure and LV function  - trop neg x3, ACS ruled out  - CT chest unremarkable for any acute new changes, probable lingular atelectasis  - V/Q scan shows low probability of PE, PE ruled out, pt on prophylactic heparin for DVT prophylaxis  - reduce Solu-Medrol to 20 mg every 12 hours for any element of probable COPD exacerbation  - continue with duo nebs every 6 hours and PRN  - procalcitonin neg, afebrile- no concerning signs of infection at this time  - leukocytosis this morning noted, likely 2/2 steroids  - patient uses 6 L of oxygen at home for COPD

## 2018-06-11 NOTE — ASSESSMENT & PLAN NOTE
- TSH elevated 5 876 with normal T4, patient currently on levothyroxine, will continue home dose of 50 mcg daily, repeat TSH in 6-8 weeks as dilation TSH may be affected by her acute respiratory condition at this time

## 2018-06-11 NOTE — ASSESSMENT & PLAN NOTE
- patient has CKD stage III, baseline creatinine 1 6-1 9, creatinine 2 4 this admission, now decreased to 1 83 -- acute component resolved  - ?   Etiology of the acute component of the CKD, however this is improving now, need to use caution with fluids given his history of systolic and diastolic heart failure, will continue to monitor with daily creatinine   - avoid nephrotoxic agents

## 2018-06-11 NOTE — ASSESSMENT & PLAN NOTE
- hx of chronic microcytic anemia with baseline hemoglobin in 10-11 range, will monitor with daily CBC, no concerning sign for acute blood loss at this time, hemoglobin 8 3 this morning -- iron panel showing low serum iron -- FOBT pending, c/w ferrous sulfate   - transfuse if hemoglobin less than 7

## 2018-06-12 ENCOUNTER — APPOINTMENT (INPATIENT)
Dept: RADIOLOGY | Facility: HOSPITAL | Age: 83
DRG: 291 | End: 2018-06-12
Payer: COMMERCIAL

## 2018-06-12 PROBLEM — R53.1 GENERALIZED WEAKNESS: Status: RESOLVED | Noted: 2018-03-31 | Resolved: 2018-06-12

## 2018-06-12 PROBLEM — J43.2 CENTRILOBULAR EMPHYSEMA (HCC): Status: RESOLVED | Noted: 2017-11-12 | Resolved: 2018-06-12

## 2018-06-12 PROBLEM — I51.89 COMBINED SYSTOLIC AND DIASTOLIC CARDIAC DYSFUNCTION: Chronic | Status: ACTIVE | Noted: 2017-05-08

## 2018-06-12 PROBLEM — E66.9 CLASS 1 OBESITY IN ADULT: Chronic | Status: ACTIVE | Noted: 2017-01-07

## 2018-06-12 PROBLEM — M79.671 PAIN IN BOTH FEET: Status: RESOLVED | Noted: 2018-02-23 | Resolved: 2018-06-12

## 2018-06-12 PROBLEM — R07.89 ATYPICAL CHEST PAIN: Status: RESOLVED | Noted: 2018-06-11 | Resolved: 2018-06-12

## 2018-06-12 PROBLEM — E11.42 DIABETIC POLYNEUROPATHY ASSOCIATED WITH TYPE 2 DIABETES MELLITUS (HCC): Chronic | Status: ACTIVE | Noted: 2018-02-23

## 2018-06-12 PROBLEM — M79.672 PAIN IN BOTH FEET: Status: RESOLVED | Noted: 2018-02-23 | Resolved: 2018-06-12

## 2018-06-12 PROBLEM — I70.209 ARTERIOSCLEROSIS OF ARTERIES OF EXTREMITIES (HCC): Status: RESOLVED | Noted: 2017-08-25 | Resolved: 2018-06-12

## 2018-06-12 PROBLEM — L84 CORNS: Status: RESOLVED | Noted: 2018-02-23 | Resolved: 2018-06-12

## 2018-06-12 PROBLEM — I70.209 PERIPHERAL ARTERIOSCLEROSIS (HCC): Chronic | Status: ACTIVE | Noted: 2018-02-23

## 2018-06-12 PROBLEM — G89.29 CHRONIC LEFT SHOULDER PAIN: Chronic | Status: ACTIVE | Noted: 2017-04-26

## 2018-06-12 PROBLEM — M25.512 CHRONIC LEFT SHOULDER PAIN: Chronic | Status: ACTIVE | Noted: 2017-04-26

## 2018-06-12 PROBLEM — B35.1 ONYCHOMYCOSIS: Status: RESOLVED | Noted: 2017-08-25 | Resolved: 2018-06-12

## 2018-06-12 PROBLEM — M19.012 OSTEOARTHRITIS OF LEFT SHOULDER: Status: RESOLVED | Noted: 2017-05-10 | Resolved: 2018-06-12

## 2018-06-12 LAB
ANION GAP SERPL CALCULATED.3IONS-SCNC: 9 MMOL/L (ref 4–13)
APTT PPP: 105 SECONDS (ref 24–36)
APTT PPP: 58 SECONDS (ref 24–33)
APTT PPP: >153 SECONDS (ref 24–36)
BASOPHILS # BLD AUTO: 0.01 THOUSANDS/ΜL (ref 0–0.1)
BASOPHILS NFR BLD AUTO: 0 % (ref 0–1)
BUN SERPL-MCNC: 46 MG/DL (ref 5–25)
CALCIUM SERPL-MCNC: 8.7 MG/DL (ref 8.3–10.1)
CHLORIDE SERPL-SCNC: 98 MMOL/L (ref 100–108)
CO2 SERPL-SCNC: 30 MMOL/L (ref 21–32)
CREAT SERPL-MCNC: 2.13 MG/DL (ref 0.6–1.3)
EOSINOPHIL # BLD AUTO: 0 THOUSAND/ΜL (ref 0–0.61)
EOSINOPHIL NFR BLD AUTO: 0 % (ref 0–6)
ERYTHROCYTE [DISTWIDTH] IN BLOOD BY AUTOMATED COUNT: 15.4 % (ref 11.6–15.1)
EST. AVERAGE GLUCOSE BLD GHB EST-MCNC: 105 MG/DL
FERRITIN SERPL-MCNC: 33 NG/ML (ref 8–388)
GFR SERPL CREATININE-BSD FRML MDRD: 27 ML/MIN/1.73SQ M
GLUCOSE SERPL-MCNC: 160 MG/DL (ref 65–140)
HBA1C MFR BLD: 5.3 % (ref 4.2–6.3)
HCT VFR BLD AUTO: 27.7 % (ref 36.5–49.3)
HGB BLD-MCNC: 8.8 G/DL (ref 12–17)
IMM GRANULOCYTES # BLD AUTO: 0.09 THOUSAND/UL (ref 0–0.2)
IMM GRANULOCYTES NFR BLD AUTO: 1 % (ref 0–2)
IRON SATN MFR SERPL: 6 %
IRON SERPL-MCNC: 23 UG/DL (ref 65–175)
LYMPHOCYTES # BLD AUTO: 1.16 THOUSANDS/ΜL (ref 0.6–4.47)
LYMPHOCYTES NFR BLD AUTO: 13 % (ref 14–44)
MAGNESIUM SERPL-MCNC: 2.2 MG/DL (ref 1.6–2.6)
MCH RBC QN AUTO: 27.8 PG (ref 26.8–34.3)
MCHC RBC AUTO-ENTMCNC: 31.8 G/DL (ref 31.4–37.4)
MCV RBC AUTO: 87 FL (ref 82–98)
MONOCYTES # BLD AUTO: 0.5 THOUSAND/ΜL (ref 0.17–1.22)
MONOCYTES NFR BLD AUTO: 6 % (ref 4–12)
NEUTROPHILS # BLD AUTO: 7.41 THOUSANDS/ΜL (ref 1.85–7.62)
NEUTS SEG NFR BLD AUTO: 80 % (ref 43–75)
NRBC BLD AUTO-RTO: 0 /100 WBCS
PHOSPHATE SERPL-MCNC: 3.7 MG/DL (ref 2.3–4.1)
PLATELET # BLD AUTO: 276 THOUSANDS/UL (ref 149–390)
PMV BLD AUTO: 11.3 FL (ref 8.9–12.7)
POTASSIUM SERPL-SCNC: 4 MMOL/L (ref 3.5–5.3)
RBC # BLD AUTO: 3.17 MILLION/UL (ref 3.88–5.62)
SODIUM SERPL-SCNC: 137 MMOL/L (ref 136–145)
T3FREE SERPL-MCNC: 1.27 PG/ML (ref 2.3–4.2)
T4 FREE SERPL-MCNC: 1.1 NG/DL (ref 0.76–1.46)
TIBC SERPL-MCNC: 378 UG/DL (ref 250–450)
TROPONIN I SERPL-MCNC: <0.02 NG/ML
WBC # BLD AUTO: 9.17 THOUSAND/UL (ref 4.31–10.16)

## 2018-06-12 PROCEDURE — 84484 ASSAY OF TROPONIN QUANT: CPT | Performed by: FAMILY MEDICINE

## 2018-06-12 PROCEDURE — 85730 THROMBOPLASTIN TIME PARTIAL: CPT | Performed by: INTERNAL MEDICINE

## 2018-06-12 PROCEDURE — 93005 ELECTROCARDIOGRAM TRACING: CPT

## 2018-06-12 PROCEDURE — 85025 COMPLETE CBC W/AUTO DIFF WBC: CPT | Performed by: PHYSICIAN ASSISTANT

## 2018-06-12 PROCEDURE — 78582 LUNG VENTILAT&PERFUS IMAGING: CPT

## 2018-06-12 PROCEDURE — 94760 N-INVAS EAR/PLS OXIMETRY 1: CPT

## 2018-06-12 PROCEDURE — 94640 AIRWAY INHALATION TREATMENT: CPT

## 2018-06-12 PROCEDURE — 84481 FREE ASSAY (FT-3): CPT | Performed by: FAMILY MEDICINE

## 2018-06-12 PROCEDURE — 94660 CPAP INITIATION&MGMT: CPT

## 2018-06-12 PROCEDURE — 83735 ASSAY OF MAGNESIUM: CPT | Performed by: PHYSICIAN ASSISTANT

## 2018-06-12 PROCEDURE — 80048 BASIC METABOLIC PNL TOTAL CA: CPT | Performed by: PHYSICIAN ASSISTANT

## 2018-06-12 PROCEDURE — 94664 DEMO&/EVAL PT USE INHALER: CPT

## 2018-06-12 PROCEDURE — 84100 ASSAY OF PHOSPHORUS: CPT | Performed by: PHYSICIAN ASSISTANT

## 2018-06-12 PROCEDURE — 82728 ASSAY OF FERRITIN: CPT | Performed by: FAMILY MEDICINE

## 2018-06-12 PROCEDURE — A9540 TC99M MAA: HCPCS

## 2018-06-12 PROCEDURE — A9539 TC99M PENTETATE: HCPCS

## 2018-06-12 PROCEDURE — 84439 ASSAY OF FREE THYROXINE: CPT | Performed by: FAMILY MEDICINE

## 2018-06-12 PROCEDURE — 83036 HEMOGLOBIN GLYCOSYLATED A1C: CPT | Performed by: FAMILY MEDICINE

## 2018-06-12 PROCEDURE — 99232 SBSQ HOSP IP/OBS MODERATE 35: CPT | Performed by: INTERNAL MEDICINE

## 2018-06-12 PROCEDURE — 83550 IRON BINDING TEST: CPT | Performed by: FAMILY MEDICINE

## 2018-06-12 PROCEDURE — 83540 ASSAY OF IRON: CPT | Performed by: FAMILY MEDICINE

## 2018-06-12 RX ORDER — TORSEMIDE 20 MG/1
20 TABLET ORAL DAILY
Status: DISCONTINUED | OUTPATIENT
Start: 2018-06-13 | End: 2018-06-16 | Stop reason: HOSPADM

## 2018-06-12 RX ORDER — HEPARIN SODIUM 5000 [USP'U]/ML
5000 INJECTION, SOLUTION INTRAVENOUS; SUBCUTANEOUS EVERY 8 HOURS SCHEDULED
Status: DISCONTINUED | OUTPATIENT
Start: 2018-06-12 | End: 2018-06-16 | Stop reason: HOSPADM

## 2018-06-12 RX ORDER — FERROUS SULFATE 325(65) MG
325 TABLET ORAL
Status: DISCONTINUED | OUTPATIENT
Start: 2018-06-13 | End: 2018-06-16 | Stop reason: HOSPADM

## 2018-06-12 RX ADMIN — HYDRALAZINE HYDROCHLORIDE 25 MG: 25 TABLET, FILM COATED ORAL at 06:39

## 2018-06-12 RX ADMIN — ISOSORBIDE MONONITRATE 30 MG: 30 TABLET, EXTENDED RELEASE ORAL at 09:19

## 2018-06-12 RX ADMIN — HYDRALAZINE HYDROCHLORIDE 25 MG: 25 TABLET, FILM COATED ORAL at 22:04

## 2018-06-12 RX ADMIN — METHYLPREDNISOLONE SODIUM SUCCINATE 20 MG: 40 INJECTION, POWDER, FOR SOLUTION INTRAMUSCULAR; INTRAVENOUS at 22:04

## 2018-06-12 RX ADMIN — IPRATROPIUM BROMIDE AND ALBUTEROL SULFATE 3 ML: .5; 3 SOLUTION RESPIRATORY (INHALATION) at 14:54

## 2018-06-12 RX ADMIN — HEPARIN SODIUM AND DEXTROSE 20 UNITS/KG/HR: 10000; 5 INJECTION INTRAVENOUS at 06:39

## 2018-06-12 RX ADMIN — IPRATROPIUM BROMIDE AND ALBUTEROL SULFATE 3 ML: .5; 3 SOLUTION RESPIRATORY (INHALATION) at 01:24

## 2018-06-12 RX ADMIN — LEVOTHYROXINE SODIUM 50 MCG: 50 TABLET ORAL at 06:40

## 2018-06-12 RX ADMIN — HYDRALAZINE HYDROCHLORIDE 25 MG: 25 TABLET, FILM COATED ORAL at 14:39

## 2018-06-12 RX ADMIN — METOPROLOL TARTRATE 25 MG: 25 TABLET, FILM COATED ORAL at 09:19

## 2018-06-12 RX ADMIN — ACETAMINOPHEN 650 MG: 325 TABLET ORAL at 20:32

## 2018-06-12 RX ADMIN — METOPROLOL TARTRATE 25 MG: 25 TABLET, FILM COATED ORAL at 20:33

## 2018-06-12 RX ADMIN — HEPARIN SODIUM 4000 UNITS: 1000 INJECTION, SOLUTION INTRAVENOUS; SUBCUTANEOUS at 02:04

## 2018-06-12 RX ADMIN — IPRATROPIUM BROMIDE AND ALBUTEROL SULFATE 3 ML: .5; 3 SOLUTION RESPIRATORY (INHALATION) at 19:48

## 2018-06-12 RX ADMIN — ASPIRIN 81 MG 81 MG: 81 TABLET ORAL at 09:18

## 2018-06-12 RX ADMIN — METHYLPREDNISOLONE SODIUM SUCCINATE 20 MG: 40 INJECTION, POWDER, FOR SOLUTION INTRAMUSCULAR; INTRAVENOUS at 14:41

## 2018-06-12 RX ADMIN — AMIODARONE HYDROCHLORIDE 100 MG: 200 TABLET ORAL at 09:19

## 2018-06-12 RX ADMIN — DOCUSATE SODIUM 100 MG: 100 CAPSULE, LIQUID FILLED ORAL at 22:04

## 2018-06-12 RX ADMIN — AMLODIPINE BESYLATE 10 MG: 10 TABLET ORAL at 09:20

## 2018-06-12 RX ADMIN — IPRATROPIUM BROMIDE AND ALBUTEROL SULFATE 3 ML: .5; 3 SOLUTION RESPIRATORY (INHALATION) at 07:20

## 2018-06-12 RX ADMIN — METHYLPREDNISOLONE SODIUM SUCCINATE 20 MG: 40 INJECTION, POWDER, FOR SOLUTION INTRAMUSCULAR; INTRAVENOUS at 06:40

## 2018-06-12 RX ADMIN — PANTOPRAZOLE SODIUM 40 MG: 40 TABLET, DELAYED RELEASE ORAL at 09:18

## 2018-06-12 NOTE — PLAN OF CARE
Problem: DISCHARGE PLANNING - CARE MANAGEMENT  Goal: Discharge to post-acute care or home with appropriate resources  INTERVENTIONS:  - Conduct assessment to determine patient/family and health care team treatment goals, and need for post-acute services based on payer coverage, community resources, and patient preferences, and barriers to discharge  - Address psychosocial, clinical, and financial barriers to discharge as identified in assessment in conjunction with the patient/family and health care team  - Arrange appropriate level of post-acute services according to patient's   needs and preference and payer coverage in collaboration with the physician and health care team  - Communicate with and update the patient/family, physician, and health care team regarding progress on the discharge plan  - Arrange appropriate transportation to post-acute venues  Outcome: Adequate for Discharge  Plan:  Discharge to home with home health care vs STR

## 2018-06-12 NOTE — CASE MANAGEMENT
Initial Clinical Review  Admission: Date/Time/Statement: 6/11/18 @ 1628   Orders Placed This Encounter   Procedures    Inpatient Admission (expected length of stay for this patient is greater than two midnights)     Standing Status:   Standing     Number of Occurrences:   1     Order Specific Question:   Admitting Physician     Answer:   Rama Lamb     Order Specific Question:   Level of Care     Answer:   Level 1 Stepdown [13]     Order Specific Question:   Estimated length of stay     Answer:   Not Applicable   ED: Date/Time/Mode of Arrival:   ED Arrival Information     Expected Arrival Acuity Means of Arrival Escorted By Service Admission Type    - 6/11/2018 13:54 Emergent [facility] [place] [facility]    Arrival Complaint    diff breathing      Chief Complaint:   Chief Complaint   Patient presents with    Shortness of Breath     Patient states that he started with SOB with ambulation " a few hours ago"   Hx COPD, CHF     History of Illness:   87M, Hx CAD/STENT, VALVE REPLACEMENT, CKD, CHF, COPD, HYPOTHYROID  C/O SOB AND WORSENING BARCENAS X 1 DAY  162/82, O2 SAT 99% ON FACE MASK, B/L EDEMA  Neck: JVD present  Pulmonary/Chest: He is in respiratory distress  Musculoskeletal: He exhibits edema  ED Vital Signs:   ED Triage Vitals   Temperature Pulse Respirations Blood Pressure SpO2   06/11/18 1401 06/11/18 1401 06/11/18 1401 06/11/18 1401 06/11/18 1401   99 5 °F (37 5 °C) 74 (!) 24 162/82 99 %      Temp Source Heart Rate Source Patient Position - Orthostatic VS BP Location FiO2 (%)   06/11/18 1401 06/11/18 1713 06/11/18 1713 06/11/18 1713 06/11/18 2205   Tympanic Monitor Sitting Right arm 40      Pain Score       06/11/18 1800       No Pain        Wt Readings from Last 1 Encounters:   06/12/18 100 kg (220 lb 7 4 oz)   Vital Signs (abnormal):    Abnormal Labs/Diagnostic Test Results:   WBC 10 35 HGB 9 6 BUN 51 CR 2 42 GLUC 159 AST 47 GFR 23 TSH 7 968 T3 0 50 BNP 7696 D-DIMER 1250  CXR=Enlarged cardiac silhouette with pulmonary vascular prominence, suspected small right pleural effusion, and bilateral airspace opacities  Consider a component of fluid overload/CHF  Superimposed infection could be considered in the appropriate clinical setting  CT CHEST=1  Unchanged small right pleural effusion which could be loculated although this is difficult to evaluate without contrast   2   Unchanged appearance of diffuse lung emphysematous/interstitial/fibrotic changes  3   Lingular groundglass regions could represent atelectasis  rather than pneumonia, correlate with lab values and symptomatology  EKG=Interpretation: abnormal    Rate:     ECG rate:  71    ECG rate assessment: normal    Rhythm:     Rhythm: sinus rhythm    Ectopy:     Ectopy: PVCs      PVCs:  Frequent and multifocal  QRS:     QRS intervals: Wide  Conduction:     Conduction: abnormal      Abnormal conduction: complete LBBB    ST segments:     ST segments:  Normal  T waves:   ED Treatment:   Medication Administration from 06/11/2018 1354 to 06/11/2018 1735       Date/Time Order Dose Route Action Action by Comments     06/11/2018 1440 nitroglycerin (NITROSTAT) SL tablet 0 4 mg 0 4 mg Sublingual Given Robb Ferguson /72, 75; pain 7/10     06/11/2018 1550 furosemide (LASIX) injection 20 mg 20 mg Intravenous Given Robb Ferguson RN      06/11/2018 1641 ipratropium-albuterol (DUO-NEB) 0 5-2 5 mg/3 mL inhalation solution 3 mL 3 mL Nebulization Given Joss Campos, RT       Past Medical/Surgical History:    Active Ambulatory Problems     Diagnosis Date Noted    COPD [facility] acute exacerbation [facility] ) 03/15/2016    Paroxysmal atrial fibrillation ([facility] )     CAD in native artery     BPH (benign prostatic hyperplasia)     Hyperlipidemia     Hypertension     S/P TAVR (transcatheter aortic valve replacement) 08/23/2016    Ambulatory dysfunction 08/23/2016    Hearing difficulty of right ear 10/05/2016    PATITO (obstructive sleep apnea) 11/27/2016    Pleural effusion, right (chronic) 11/28/2016    Multiple pulmonary nodules 01/07/2017    Class 1 obesity in adult 01/07/2017    Osteoarthritis of knee 01/07/2017    Peripheral vascular disease ([facility] ) 01/07/2017    Diabetic polyneuropathy associated with type 2 diabetes mellitus [facility] 02/23/2018    Peripheral arteriosclerosis ([facility] ) 02/23/2018    Actinic keratosis 05/10/2013    Allergic rhinitis 05/30/2014    Cervical radiculopathy 09/26/2016    Acute on chronic respiratory failure with hypoxia (HCC) 11/12/2017    Chronic left shoulder pain 04/26/2017    Combined systolic and diastolic cardiac dysfunction 05/08/2017    Hypothyroidism 04/26/2017    Chronic anemia 03/29/2018    GERD (gastroesophageal reflux disease) 03/29/2018     Resolved Ambulatory Problems     Diagnosis Date Noted    CHF exacerbation ([facility] ) 03/04/2016    Tachycardia 03/04/2016    Chronic systolic heart failure ([facility] ) 03/15/2016    Non-ST elevation MI (NSTEMI) ([facility] ) 03/15/2016    Rib fracture 05/16/2016    Fall 05/17/2016    Acute exacerbation of CHF (congestive heart failure) ([facility] ) 05/26/2016    Aortic stenosis, severe     Diabetes mellitus type 2, noninsulin dependent ([facility] )     1st degree AV block 08/23/2016    Sinus bradycardia 08/23/2016    Hematuria 08/23/2016    Pleural effusion, right 08/23/2016    Multiple falls 08/23/2016    Physical deconditioning 08/23/2016    Leukocytosis 08/24/2016    Acute blood loss anemia 08/24/2016    Age-related cognitive decline 08/25/2016    Constipation 10/02/2016    Fecal impaction in rectum ([facility] ) 10/02/2016    Acute on chronic low back pain 10/02/2016    Elevated TSH 10/05/2016    Abnormal gait 10/05/2016    Melena 10/05/2016    Dyspnea on exertion 11/24/2016    Chronic hypoxemic respiratory failure ([facility] ) 11/27/2016    Non-sustained ventricular tachycardia ([facility] ) 01/06/2017    Left heart failure ([facility] ) 01/07/2017    Corns 02/23/2018    Pain in both feet 02/23/2018    Arteriosclerosis of arteries of extremities ([facility] ) 08/25/2017    Centrilobular emphysema ([facility] ) 11/12/2017    CKD (chronic kidney disease), stage III 08/18/2013    Esophagitis determined by endoscopy 03/17/2016    Heart failure, left, with LVEF >40% ([facility] ) 04/07/2016    Hyperthyroidism 12/09/2016    Hypertonicity of bladder 02/21/2014    Hyponatremia 03/19/2014    Obesity with alveolar hypoventilation, unspecified obesity severity ([facility] ) 07/07/2016    Obstructive sleep apnea 09/04/2012    Onychomycosis 08/25/2017    Osteoarthritis of left shoulder 05/10/2017    Pleural effusion 04/28/2014    Prediabetes 10/07/2015    Pulmonary fibrosis ([facility] ) 04/28/2014    Pulmonary nodule, left 10/08/2015    Sleep related hypoventilation in conditions classified elsewhere 09/04/2012    Bacterial conjunctivitis of right eye 03/29/2018    Generalized weakness 03/31/2018     Past Medical History:   Diagnosis Date    Allergic rhinitis 06/11/2010    Aortic stenosis, severe     Bacterial pneumonia 06/08/2007    Bladder neck obstruction 12/23/2010    BPH (benign prostatic hyperplasia)     Bronchitis, chronic obstructive, with exacerbation ([facility] ) 01/30/2012    CAD (coronary artery disease)     Carcinoma ([facility] )     Cardiomegaly 02/13/2007    Cataract of both eyes     [facility]     Chronic allergic conjunctivitis     Chronic kidney disease (CKD)     CKD (chronic kidney disease), stage III     CKD (chronic kidney disease), stage III     COPD (chronic obstructive pulmonary disease) (HCC)     Decubitus ulcer     Dermatomycosis     Diabetes mellitus type 2, noninsulin dependent (HCC)     Dyspnea on exertion     Eczema     Edema     Epistaxis 12/01/2004    Esophagitis, erosive     Exposure to scabies     Fracture of rib     H/O aortic valve replacement     H/O blood clots     History of DVT (deep vein thrombosis)     History of non-ST elevation myocardial infarction (NSTEMI)     History of pneumonia     Hyperlipidemia     Hypertension     Internal hemorrhoids 09/13/2006    LBBB (left bundle branch block)     MRSA (methicillin resistant staph aureus) culture positive     [facility]STEM[facility]non-ST elevated [facility] infarction) [facility]     Obstructive sleep apnea on CPAP     Paroxysmal atrial fibrillation (HCC)     Phimosis 09/01/2010    Pulmonary fibrosis (HCC)     PVD (peripheral vascular disease) ([facility])     Rotator cuff tendonitis     Skin abscess 12/21/2004    Skin neoplasm     Tachycardia 12/01/2004    Trigger finger     Type 2 diabetes mellitus ([facility] ) 12/01/2004    Venous thrombosis 05/16/2007    Venous thrombosis 05/16/2007   Admitting Diagnosis: CHF (congestive heart failure) ([facility] ) [I50 9]  Difficulty breathing [R06 89]  COPD exacerbation ([facility] [J44 1]  Age/Sex: 80 y o  male  Assessment/Plan:   ADMITTED TO ICU ON BIPAP FOR ACUTE ON CHRONIC RESP FAILURE WITH HYPOXIA  PATIENT WITH CONVERSATIONAL DYSPNEA WITH INCREASING HYPOXEMIA WITH TALKING INTO THE 80"S  IV HEPARIN GTT FOR POSSIBLE PE AS CAUSE OF HYPOXIA, VQ SCAN ORDERED     Admission Orders:  ICU LEVEL 1 STEPDOWN  O2 TO KEEP SATS>90%; BIPAP  NEURO CHECKS Q4H  LABS PER IV HEPARIN GTT PROTOCOL  PT/OT EVAL & TX  VENODYNES  Scheduled Meds:   Current Facility-Administered Medications:  acetaminophen 650 mg Oral Q6H PRN ROC Ocampo    amiodarone 100 mg Oral Daily ROC Hamilton    amLODIPine 10 mg Oral Daily ROC Ocampo    aspirin 81 mg Oral Daily ROC Hamilton    docusate sodium 100 mg Oral HS ROC Hamilton    heparin (porcine) 3-30 Units/kg/hr (Order-Specific) Intravenous Titrated Linda Lola, DO Last Rate: 17 Units/kg/hr (06/12/18 0945)   heparin (porcine) 4,000 Units Intravenous PRN Linda Lola, DO    heparin (porcine) 8,000 Units Intravenous PRN Linda Lola, DO    hydrALAZINE 25 mg Oral Q8H [facility] ROC Ocampo ipratropium-albuterol 3 mL Nebulization Q6H Klarissa ROC Nunez    isosorbide mononitrate 30 mg Oral Daily Klarissa ROC Nunez    levothyroxine 50 mcg Oral Early Morning Klarissa ROC Nunez    methylPREDNISolone sodium succinate 20 mg Intravenous [facility] Linda Davila DO    metoprolol tartrate 25 mg Oral Q12H [facility] Klarissa CHICHI NunezNP    pantoprazole 40 mg Oral Daily Klarissa JOSEPH Sharma, CRNP    polyethylene glycol 17 g Oral Daily Klarissa JOSEPH Sharma, CHICHINP    Continuous Infusions:   heparin (porcine) 3-30 Units/kg/hr (Order-Specific) Last Rate: 17 Units/kg/hr (06/12/18 0945)   PRN Meds:   acetaminophen    heparin (porcine)    heparin (porcine)  Thank you,  [facility] in the [facility] by Markus Henderson for 2017  Network Utilization Review Department  Phone: [phone]; Fax [phone]  ATTENTION: The Network Utilization Review Department is now centralized for our 7 Facilities  Make a note that we have a new phone and fax numbers for our Department  Please call with any questions or concerns to [phone] and carefully follow the prompts so that you are directed to the right person  All voicemails are confidential  Fax any determinations, approvals, denials, and requests for initial or continue stay review clinical to [phone]  Due to HIGH CALL volume, it would be easier if you could please send faxed requests to expedite your requests and in part, help us provide discharge notifications faster

## 2018-06-12 NOTE — SOCIAL WORK
Met with pt  Resides with his lady friend who is supportive  Occasionally uses RW or cane for ambulation  Has w/c that he does not use  [place]  Has home oxygen and nebulizer provided by [employer]  Has portable devices  [facility], will need to follow  Main contact is lady friend Rufino Snyder  Has been to [facility] and [facility] in the past, but does not want to have inpatient rehab again  Explained role of cm, will follow  CM reviewed d/c planning process including the following: identifying help at home, patient preference for d/c planning needs, and availability of the treatment team to discuss questions or concerns patient and/or family may have regarding understanding of medications and recognizing signs and symptoms once discharged  CM also encouraged patient to follow up with all recommended appointments after discharge  Patient advised of importance for patient and family to participate in managing patient's medical well being

## 2018-06-12 NOTE — PHYSICAL THERAPY NOTE
PT/OT evaluations held today per department policy as pt awaiting V/Q scan to r/o PE  Will follow    David Shabazz PT  [id number]

## 2018-06-12 NOTE — CASE MANAGEMENT
Continued Stay Review  Date: 6/12/18  Vital Signs: /66   Pulse 65   Temp 97 6 °F (36 4 °C) (Temporal)   Resp (!) 26   Ht 5' 10" (1 778 m)   Wt 100 kg (220 lb 7 4 oz)   SpO2 98%   BMI 31 63 kg/m²   Medications:   Scheduled Meds:   Current Facility-Administered Medications:  acetaminophen 650 mg Oral Q6H PRN Eudelia Nichols, CRNP    amiodarone 100 mg Oral Daily Klarissa R Isaak-Dyana, CRNP    amLODIPine 10 mg Oral Daily Klarissa R Isaak-Dyana, CRNP    aspirin 81 mg Oral Daily Klarissa R Isaak-Dyana, CRNP    docusate sodium 100 mg Oral HS Klarissa R Isaak-Dyana, CRNP    heparin (porcine) 3-30 Units/kg/hr (Order-Specific) Intravenous Titrated Linda Lola, DO Last Rate: 17 Units/kg/hr (06/12/18 0945)   heparin (porcine) 4,000 Units Intravenous PRN Linda Lola, DO    heparin (porcine) 8,000 Units Intravenous PRN Linda Lola, DO    hydrALAZINE 25 mg Oral Q8H [facility] Klarissa R Zachary, CRNP    ipratropium-albuterol 3 mL Nebulization Q6H Klarissa JOSEPH Sharma, CRNP    isosorbide mononitrate 30 mg Oral Daily Klarissa R Isaak-Dyana, CRNP    levothyroxine 50 mcg Oral Early Morning Klarissa R Zachary, CRNP    methylPREDNISolone sodium succinate 20 mg Intravenous Q8H [facility] Linda Lola, DO    metoprolol tartrate 25 mg Oral Q12H [facility] Klarissa R Zachary, CRNP    pantoprazole 40 mg Oral Daily Klarissa R Zachary, CRNP    polyethylene glycol 17 g Oral Daily Klarissa R Zachary, CRNP    Continuous Infusions:   heparin (porcine) 3-30 Units/kg/hr (Order-Specific) Last Rate: 17 Units/kg/hr (06/12/18 0945)   PRN Meds:   acetaminophen    heparin (porcine)    heparin (porcine)  Abnormal Labs/Diagnostic Results:   TROP NEG  IRON 23 HGB 8 8 CL 98 BUN 46 CR 2 13 GLUC 160 GFR 27 T3 1 27 PTT >153  VQ SCAN=Ventilation imaging demonstrates heterogeneous distribution of radiotracer with some central deposition which could be seen with COPD and/or reactive airway disease   The probability for pulmonary embolus is low  Age/Sex: 80 y o  male   Assessment/Plan:   PERSISTENT SOB WITH DROP IN O2 SAT WITH MINIMAL ACTIVITY  SAT DROP TO 79% ON 6LTR WITH ADJUSTING HIMSELF IN BED  IV STEROIDS CONTINUED FOR COPD, LUNGS WITH  DECREASED BREATH SOUNDS NOTED     Discharge Plan: TBD

## 2018-06-12 NOTE — PLAN OF CARE
Problem: Prexisting or High Potential for Compromised Skin Integrity  Goal: Skin integrity is maintained or improved  INTERVENTIONS:  - Identify patients at risk for skin breakdown  - Assess and monitor skin integrity  - Assess and monitor nutrition and hydration status  - Monitor labs (i e  albumin)  - Assess for incontinence   - Turn and reposition patient  - Assist with mobility/ambulation  - Relieve pressure over bony prominences  - Avoid friction and shearing  - Provide appropriate hygiene as needed including keeping skin clean and dry  - Evaluate need for skin moisturizer/barrier cream  - Collaborate with interdisciplinary team (i e  Nutrition, Rehabilitation, etc )   - Patient/family teaching   Outcome: Progressing      Problem: METABOLIC, FLUID AND ELECTROLYTES - ADULT  Goal: Fluid balance maintained  INTERVENTIONS:  - Monitor labs and assess for signs and symptoms of volume excess or deficit  - Monitor I/O and WT  - Instruct patient on fluid and nutrition as appropriate   Outcome: Progressing

## 2018-06-12 NOTE — PROGRESS NOTES
Daily Progress Note - Critical Care/ Stepdown   Adelaide Perez 80 y o  male MRN: [id number]  Unit/Bed#: ICU 03 Encounter: [id number]    ______________________________________________________________________  Assessment:   Principal Problem:    Acute on chronic respiratory failure with hypoxia (HCC)  Active Problems:    Acute on Chronic Kidney Injury    COPD with acute exacerbation (HCC)    Elevated d-dimer    Paroxysmal atrial fibrillation (HCC)    CAD in native artery    Hypertension    PATITO (obstructive sleep apnea)    Class 1 obesity in adult    Combined systolic and diastolic cardiac dysfunction    Hypothyroidism    Chronic anemia  Resolved Problems:    Atypical chest pain    CKD (chronic kidney disease), stage III      * Acute on chronic respiratory failure with hypoxia (HCC)   Assessment & Plan    - currently on 6 L NC O2, O2 sats high 90s to 908%  - etiology uncertain at this time, PE versus worsening severe pulmonary hypertension, vs decreased cardiac output from cardiomyopathy  - trop neg x3  - CT chest yesterday unremarkable for any acute new changes, probable lingular atelectasis  - D-dimer elevated at 1250 in setting of hx of paroxysmal Afib, patient has acute on chronic kidney injury, due to which CTA cannot be done at this time,  V/Q scheduled for today, c/w therapeutic heparin at this time until PE is ruled out  - c/w Solu-Medrol 20 mg every 8 hours for any element of probable COPD exacerbation  - continue with duo nebs every 6 hours and PRN  - procalcitonin neg, afebrile, no leukocytosis - no concerning signs of infection at this time  - BNP elevated, however no significant fluid noticed on chest x-ray/CT; hx of chronic combined systolic and diastolic heart failure,s/p Lasix 20 mg IV in the ER x1, will monitor his intake and output, unlikely CHF exacerbation at this time, however will see how he does with the other treatment listed above  - patient uses 6 L of oxygen at home for COPD        Acute on Chronic Kidney Injury   Assessment & Plan    - patient has CKD stage III, baseline creatinine 1 6-1 9, creatinine 2 4 this admission, now decreased to 2 13 -- improving  - ?   Etiology of the acute component of the CKD, however this is improving now, need to use caution with fluids given his history of systolic and diastolic heart failure, will continue to monitor with daily creatinine   - avoid nephrotoxic agents        COPD with acute exacerbation (HCC)   Assessment & Plan    - continue with Solu-Medrol for probable COPD exacerbation as mentioned above  - patient has history of moderate COPD on 6 L of home oxygen  - continue with duo nebs, and supplemental oxygen as needed, goal SpO2 88-93%  - unchanged diffuse emphysematous/fibrotic changes in CT imaging of chest         Atypical chest painresolved as of 6/12/2018   Assessment & Plan    - pt has hx of CAD s/p TAVR  - s/p 1 dose of nitrostat in ED since which pain resolved  - trop neg x1, willt rend trop for total 3 set  - monitor on tele; nitro prn, morphine prn; repeat EKG in AM  - c/w ASA, lopressore, imdur  - complete LBBB on EKG admission which is chronic for pt on prior EKG as well        Elevated d-dimer   Assessment & Plan    - V/Q scan in the morning and therapeutic heparin infusion as mentioned above        GERD (gastroesophageal reflux disease)   Assessment & Plan    c/w protonix        Chronic anemia   Assessment & Plan    - hx of chronic microcytic anemia with baseline hemoglobin in 10-11 range, will monitor with daily CBC, no concerning sign for acute blood loss at this time, hemoglobin 8 8 this morning -- iron panel showing low serum iron -- FOBT ordered, and ferrous sulfate started  - transfuse if hemoglobin less than 7          Hypothyroidism   Assessment & Plan    - TSH elevated on admission, 7 968, with normal T4 , patient currently on levothyroxine, will continue home dose of 50 mcg daily, repeat TSH in AM        Combined systolic and diastolic cardiac dysfunction   Assessment & Plan    - s/p lasix 20 mg IV x1 in the ER for probable acute component of chronic heart failure  - monitor output and input, monitor for fluid retention  - close to -3 L net I/O the since admission  - c/w home torsemide  - most recent echo done 3 months ago showed ejection fraction of 15-31%, grade 1 diastolic dysfunction, severe pulmonary hypertension with peak pulmonary pressure of 70mmHg --- use caution with fluids  - no signs of fluid overload at this time        Diabetic polyneuropathy associated with type 2 diabetes mellitus ([address] )   Assessment & Plan    - patient has history of diabetes in the past, most recent A1c although was 5 7 last year, glucose on admission was mildly elevated, will order a repeat A1c given his risk factor of obesity, and treat based on that        Peripheral vascular disease ([facility] )   Assessment & Plan    c/w aspirin, statin        Class 1 obesity in adult   Assessment & Plan     on lifestyle modification for weight loss, goal BMI less than 30        Multiple pulmonary nodules   Assessment & Plan    - patient has history of chronic small pulmonary nodule, 4 x 8 mm as seen on last CT scan earlier this month with no changes seen since last nodule in 2013, stable, continue to monitor per guidelines        Pleural effusion, right (chronic)   Assessment & Plan    - chronic right-sided pleural effusion, small, continue to monitor        PATITO (obstructive sleep apnea)   Assessment & Plan    Use BiPAP at bedtime given his acute on chronic respiratory failure        S/P TAVR (transcatheter aortic valve replacement)   Assessment & Plan    -r/o ACS as above  - c/w nitro PRN, imdur, ASA, lopressor          Hypertension   Assessment & Plan    - c/w home amlodipine, Lopressor, hydralazine, Imdur  - blood pressure currently 262M-362Z systolic        CAD in native artery   Assessment & Plan    - continue with aspirin, Lopressor, Imdur, statin  - no chest pain at this time, troponins trended negative upon admission x3  - nitrostat PRN        Paroxysmal atrial fibrillation (HCC)   Assessment & Plan    - currently in normal sinus rhythm, patient on amiodarone and Lopressor - will continue with that  - monitor on tele          CKD (chronic kidney disease), stage IIIresolved as of 6/12/2018   Assessment & Plan    - address acute on chronic component of CKD as mentioned above  - avoid nephrotoxic agents            Plan:    Neuro:   · No pain at this time  · No sedation needed  · No signs concerning for delirium  · Continue with regular CAM ICU assessments  · Regulate sleep/wake cycle  · Hx of nervous tic induced intermittent twitching of R  Eye, chronic POA    CV:     · Rhythm: NSR  · Follow rhythm on telemetry    Pulm:   · No acute pulmonary issues    GI:   · Nutrition/diet plan: cardiac diet with fluid and salt restriction  · Stress ulcer prophylaxis: Protonix PO  · Bowel regimen: Colace    FEN:     · Fluid/Diuretic plan: No intervention  · Electrolytes repleted:  Replete electrolytes as needed  · Goal: K >4 0, Mag >2 0, and Phos >3 0    :   · UOP last 12 hours: 2200cc  · Indwelling Tong present: no     ID:   · Trend temps and WBC count    Heme:   · Trend hgb and plts  · Transfuse as needed for goal hgb >7    Endo:   · Glycemic control plan: Blood glucose controlled on current regimen    Msk/Skin:    · Mobility goal:  Encourage early mobility  · PT consult: yes  · OT consult: yes  · Frequent turning and off-loading    Family:  · Family updated within 24 hours: no     Lines:  · Peripheral IV line: 2    VTE Prophylaxis:  · Pharmacologic Prophylaxis: Heparin  · Mechanical Prophylaxis: sequential compression device    Disposition: c/w SD      Code Status: Level 1 - Full Code    Counseling / Coordination of Care  Total Critical Care time spent 40 minutes excluding procedures, teaching and family updates  ______________________________________________________________________    HPI/24hr events:  Patient seen and examined bedside, no acute events overnight, currently on 6 L of oxygen, he does have history of moderate COPD and is on 6 L of oxygen at home  Reports that his shortness of breath, that was present on admission, is not significantly improve, and is 50% closer to his baseline  No recurrence of the chest pain/discomfort that he had on admission  No nausea or vomiting  Tolerating p o  diet  No leg pain     ______________________________________________________________________    Physical Exam:   Physical Exam   Constitutional: No distress  HENT:   Head: Normocephalic and atraumatic  Right Ear: External ear normal    Left Ear: External ear normal    Cardiovascular: Normal rate and regular rhythm  Murmur heard  Systolic murmur   Pulmonary/Chest: Effort normal and breath sounds normal  No respiratory distress  He has no wheezes  He has no rales  6L -- high 90s to 100% O2 sats   Abdominal: Soft  Bowel sounds are normal  He exhibits no distension  There is no tenderness  There is no guarding  Musculoskeletal: He exhibits no edema or tenderness  Tic of R  Eye, chronic   Neurological: He is alert  Skin: He is not diaphoretic  Psychiatric: He has a normal mood and affect  Nursing note and vitals reviewed  ______________________________________________________________________  Vitals:    18 0800 18 0900 18 0919 18 1000   BP: 140/60 144/66 144/65 157/67   BP Location: Right arm      Pulse: 64 69 76 63   Resp: (!) 24 (!) 25  (!) 23   Temp:       TempSrc:       SpO2: 92% 97%  99%   Weight:       Height:                  Temperature:   Temp (24hrs), Av 3 °F (36 8 °C), Min:97 4 °F (36 3 °C), Max:99 5 °F (37 5 °C)    Current Temperature: (!) 97 4 °F (36 3 °C)    Weights:   IBW: 73 kg    Body mass index is 31 63 kg/m²    Weight (last 2 days)     Date/Time   Weight 06/12/18 0600  100 (220 46)    06/11/18 1745  101 (222)              Hemodynamic Monitoring:  N/A       Non-Invasive/Invasive Ventilation Settings:  Respiratory    Lab Data (Last 4 hours)    None         O2/Vent Data (Last 4 hours)    None              No results found for: PHART, URS8CFR, PO2ART, LPQ4LXT, D7VTPRMI, BEART, SOURCE  SpO2: SpO2: 99 %    Intake and Outputs:  I/O       06/10 0701 - 06/11 0700 06/11 0701 - 06/12 0700 06/12 0701 - 06/13 0700    P  O   420 240    I V  (mL/kg)  223 (2 2)     Total Intake(mL/kg)  643 (6 4) 240 (2 4)    Urine (mL/kg/hr)  3500 300 (0 8)    Total Output   3500 300    Net   -2857 -60                   Nutrition:        Diet Orders            Start     Ordered    06/11/18 1841  Diet Juan David/CHO Controlled; Consistent Carbohydrate Diet Level 2 (5 carb servings/75 grams CHO/meal); Cardiac Step 1, Sodium 4 GM (STEFF), Sodium 2 Gm, Fluid Restriction 1200 ML  Diet effective now     Question Answer Comment   Diet Type Juan David/CHO Controlled    Juan David/CHO Controlled Consistent Carbohydrate Diet Level 2 (5 carb servings/75 grams CHO/meal)    Other Restriction(s): Cardiac Step 1    Other Restriction(s): Sodium 4 GM (STEFF)    Other Restriction(s): Sodium 2 Gm    Other Restriction(s): Fluid Restriction 1200 ML    RD to adjust diet per protocol?  No        06/11/18 1840    06/11/18 1836  Room Service  Once     Question:  Type of Service  Answer:  Room Service - Appropriate with Assistance    06/11/18 1835            Labs:     Results from last 7 days  Lab Units 06/12/18  0614 06/11/18  1415   WBC Thousand/uL 9 17 10 35*   HEMOGLOBIN g/dL 8 8* 9 6*   HEMATOCRIT % 27 7* 30 9*   PLATELETS Thousands/uL 276 304   NEUTROS PCT % 80* 90*   MONOS PCT % 6 2*       Results from last 7 days  Lab Units 06/12/18  0614 06/11/18  1415   SODIUM mmol/L 137 139   POTASSIUM mmol/L 4 0 4 8   CHLORIDE mmol/L 98* 100   CO2 mmol/L 30 28   BUN mg/dL 46* 51*   CREATININE mg/dL 2 13* 2 42*   CALCIUM mg/dL 8 7 8 6   TOTAL PROTEIN g/dL --  7 9   BILIRUBIN TOTAL mg/dL  --  0 30   ALK PHOS U/L  --  59   ALT U/L  --  33   AST U/L  --  47*   GLUCOSE RANDOM mg/dL 160* 159*       Results from last 7 days  Lab Units 06/12/18  0614   MAGNESIUM mg/dL 2 2     Lab Results   Component Value Date    PHOS 3 7 06/12/2018    PHOS 4 1 03/30/2018    PHOS 3 6 03/29/2018        Results from last 7 days  Lab Units 06/12/18  0739 06/11/18  2343 06/11/18  1415   INR   --   --  1 16   PTT seconds >153* 58* 25       0  Lab Value Date/Time   TROPONINI <0 02 06/12/2018 0206   TROPONINI <0 02 06/11/2018 2020   TROPONINI 0 02 06/11/2018 1415   TROPONINI <0 02 03/28/2018 2034   TROPONINI 0 05 (H) 01/07/2017 0531   TROPONINI 0 07 (H) 01/06/2017 2348   TROPONINI 0 07 (H) 01/06/2017 1800   TROPONINI 0 02 11/25/2016 0600   TROPONINI 0 02 11/24/2016 2329   TROPONINI <0 02 11/24/2016 1938   TROPONINI 0 04 (H) 05/26/2016 1703   TROPONINI 0 03 05/26/2016 1312   TROPONINI 0 02 05/26/2016 0950   TROPONINI <0 02 05/16/2016 1154   TROPONINI 8 73 (H) 03/05/2016 1247   TROPONINI 10 10 (H) 03/05/2016 0534   TROPONINI 6 59 (H) 03/04/2016 2341   TROPONINI 1 93 (H) 03/04/2016 1936   TROPONINI 0 66 (H) 03/04/2016 1335         ABG:No results found for: PHART, QRS3SJJ, PO2ART, GKA3EOG, Z9HPXNQU, BEART, SOURCE    Imaging:     Xr Chest Portable    Result Date: 6/11/2018  Impression: Enlarged cardiac silhouette with pulmonary vascular prominence, suspected small right pleural effusion, and bilateral airspace opacities  Consider a component of fluid overload/CHF  Superimposed infection could be considered in the appropriate clinical  setting  Workstation performed: [id number]     Ct Chest Wo Contrast    Result Date: 6/11/2018  Impression: 1  Unchanged small right pleural effusion which could be loculated although this is difficult to evaluate without contrast  2   Unchanged appearance of diffuse lung emphysematous/interstitial/fibrotic changes   3   Lingular groundglass regions could represent atelectasis  rather than pneumonia, correlate with lab values and symptomatology  Workstation performed: [id number]     Micro:  Lab Results   Component Value Date    BLOODCX No Growth After 5 Days  01/06/2017    BLOODCX No Growth After 5 Days  01/06/2017    BLOODCX No Growth After 5 Days   03/06/2016    URINECX No Growth <1000 cfu/mL 03/04/2016    SPUTUMCULTUR 4+ Growth of  03/29/2018    SPUTUMCULTUR 2+ Growth of Candida sp  presumptively albicans 03/09/2016    SPUTUMCULTUR 2+ Growth of Candida sp  presumptively glabrata 03/09/2016    SPUTUMCULTUR 2+ Growth of Mixed Respiratory [place] 03/09/2016       Allergies: No Known Allergies  Medications:   Scheduled Meds:  Current Facility-Administered Medications:  acetaminophen 650 mg Oral Q6H PRN Real Fanti ROC Sharma    amiodarone 100 mg Oral Daily Klarissa R ROC Sharma    amLODIPine 10 mg Oral Daily Klarissa R ROC Sharma    aspirin 81 mg Oral Daily Klarissa R ROC Sharma    docusate sodium 100 mg Oral HS Klarissa R CHICHI SharmaNP    heparin (porcine) 3-30 Units/kg/hr (Order-Specific) Intravenous Titrated Linda Lola, DO Last Rate: 17 Units/kg/hr (06/12/18 0945)   heparin (porcine) 4,000 Units Intravenous PRN Linda Lola, DO    heparin (porcine) 8,000 Units Intravenous PRN Linda Lola, DO    hydrALAZINE 25 mg Oral Q8H [address] Klarissa R ROC Sharma    ipratropium-albuterol 3 mL Nebulization Q6H Klarissa R CHICHI SharmaNP    isosorbide mononitrate 30 mg Oral Daily Klarissa R ROC Sharma    levothyroxine 50 mcg Oral Early Morning Klarissa ROC Nunez    methylPREDNISolone sodium succinate 20 mg Intravenous [facility] Linda Lola, DO    metoprolol tartrate 25 mg Oral Q12H [address] Klarissa R Zachary, CHICHINP    pantoprazole 40 mg Oral Daily Klarissa R ROC Sharma    polyethylene glycol 17 g Oral Daily Klarissa R Isaak-Dyana, CRNP      Continuous Infusions:  heparin (porcine) 3-30 Units/kg/hr (Order-Specific) Last Rate: 17 Units/kg/hr (06/12/18 0945)     PRN Meds:    acetaminophen 650 mg Q6H PRN   heparin (porcine) 4,000 Units PRN   heparin (porcine) 8,000 Units PRN       Invasive lines and devices:   Invasive Devices     Peripheral Intravenous Line            Peripheral IV 06/11/18 Left Antecubital less than 1 day    Peripheral IV 06/11/18 Left Hand less than 1 day                   SIGNATURE: Linda Davila DO  DATE: June 12, 2018

## 2018-06-13 ENCOUNTER — APPOINTMENT (INPATIENT)
Dept: NON INVASIVE DIAGNOSTICS | Facility: HOSPITAL | Age: 83
DRG: 291 | End: 2018-06-13
Payer: COMMERCIAL

## 2018-06-13 PROBLEM — D50.9 IRON DEFICIENCY ANEMIA: Chronic | Status: ACTIVE | Noted: 2018-03-29

## 2018-06-13 PROBLEM — N17.9 AKI (ACUTE KIDNEY INJURY) (HCC): Status: RESOLVED | Noted: 2018-06-11 | Resolved: 2018-06-13

## 2018-06-13 LAB
ANION GAP SERPL CALCULATED.3IONS-SCNC: 8 MMOL/L (ref 4–13)
ATRIAL RATE: 66 BPM
ATRIAL RATE: 71 BPM
BASOPHILS # BLD AUTO: 0.01 THOUSANDS/ΜL (ref 0–0.1)
BASOPHILS NFR BLD AUTO: 0 % (ref 0–1)
BUN SERPL-MCNC: 47 MG/DL (ref 5–25)
CALCIUM SERPL-MCNC: 8.7 MG/DL (ref 8.3–10.1)
CHLORIDE SERPL-SCNC: 98 MMOL/L (ref 100–108)
CO2 SERPL-SCNC: 28 MMOL/L (ref 21–32)
CREAT SERPL-MCNC: 1.83 MG/DL (ref 0.6–1.3)
EOSINOPHIL # BLD AUTO: 0 THOUSAND/ΜL (ref 0–0.61)
EOSINOPHIL NFR BLD AUTO: 0 % (ref 0–6)
ERYTHROCYTE [DISTWIDTH] IN BLOOD BY AUTOMATED COUNT: 15.3 % (ref 11.6–15.1)
GFR SERPL CREATININE-BSD FRML MDRD: 32 ML/MIN/1.73SQ M
GLUCOSE SERPL-MCNC: 152 MG/DL (ref 65–140)
HCT VFR BLD AUTO: 26.3 % (ref 36.5–49.3)
HGB BLD-MCNC: 8.3 G/DL (ref 12–17)
IMM GRANULOCYTES # BLD AUTO: 0.08 THOUSAND/UL (ref 0–0.2)
IMM GRANULOCYTES NFR BLD AUTO: 1 % (ref 0–2)
LYMPHOCYTES # BLD AUTO: 1.12 THOUSANDS/ΜL (ref 0.6–4.47)
LYMPHOCYTES NFR BLD AUTO: 9 % (ref 14–44)
MAGNESIUM SERPL-MCNC: 2.1 MG/DL (ref 1.6–2.6)
MCH RBC QN AUTO: 27.5 PG (ref 26.8–34.3)
MCHC RBC AUTO-ENTMCNC: 31.6 G/DL (ref 31.4–37.4)
MCV RBC AUTO: 87 FL (ref 82–98)
MONOCYTES # BLD AUTO: 0.97 THOUSAND/ΜL (ref 0.17–1.22)
MONOCYTES NFR BLD AUTO: 8 % (ref 4–12)
MRSA NOSE QL CULT: ABNORMAL
NEUTROPHILS # BLD AUTO: 10.83 THOUSANDS/ΜL (ref 1.85–7.62)
NEUTS SEG NFR BLD AUTO: 82 % (ref 43–75)
NRBC BLD AUTO-RTO: 0 /100 WBCS
P AXIS: -1 DEGREES
P AXIS: 61 DEGREES
PHOSPHATE SERPL-MCNC: 3.8 MG/DL (ref 2.3–4.1)
PLATELET # BLD AUTO: 279 THOUSANDS/UL (ref 149–390)
PMV BLD AUTO: 11.1 FL (ref 8.9–12.7)
POTASSIUM SERPL-SCNC: 4.4 MMOL/L (ref 3.5–5.3)
PR INTERVAL: 188 MS
PR INTERVAL: 206 MS
QRS AXIS: -44 DEGREES
QRS AXIS: -54 DEGREES
QRSD INTERVAL: 144 MS
QRSD INTERVAL: 152 MS
QT INTERVAL: 440 MS
QT INTERVAL: 454 MS
QTC INTERVAL: 475 MS
QTC INTERVAL: 478 MS
RBC # BLD AUTO: 3.02 MILLION/UL (ref 3.88–5.62)
SODIUM SERPL-SCNC: 134 MMOL/L (ref 136–145)
T WAVE AXIS: 71 DEGREES
T WAVE AXIS: 89 DEGREES
TSH SERPL DL<=0.05 MIU/L-ACNC: 5.88 UIU/ML (ref 0.36–3.74)
VENTRICULAR RATE: 66 BPM
VENTRICULAR RATE: 71 BPM
WBC # BLD AUTO: 13.01 THOUSAND/UL (ref 4.31–10.16)

## 2018-06-13 PROCEDURE — 84443 ASSAY THYROID STIM HORMONE: CPT | Performed by: FAMILY MEDICINE

## 2018-06-13 PROCEDURE — 97535 SELF CARE MNGMENT TRAINING: CPT

## 2018-06-13 PROCEDURE — 97163 PT EVAL HIGH COMPLEX 45 MIN: CPT

## 2018-06-13 PROCEDURE — 93010 ELECTROCARDIOGRAM REPORT: CPT | Performed by: INTERNAL MEDICINE

## 2018-06-13 PROCEDURE — 94640 AIRWAY INHALATION TREATMENT: CPT

## 2018-06-13 PROCEDURE — 99232 SBSQ HOSP IP/OBS MODERATE 35: CPT | Performed by: INTERNAL MEDICINE

## 2018-06-13 PROCEDURE — G8979 MOBILITY GOAL STATUS: HCPCS

## 2018-06-13 PROCEDURE — 97530 THERAPEUTIC ACTIVITIES: CPT

## 2018-06-13 PROCEDURE — 93306 TTE W/DOPPLER COMPLETE: CPT

## 2018-06-13 PROCEDURE — 80048 BASIC METABOLIC PNL TOTAL CA: CPT | Performed by: NURSE PRACTITIONER

## 2018-06-13 PROCEDURE — G8978 MOBILITY CURRENT STATUS: HCPCS

## 2018-06-13 PROCEDURE — 84100 ASSAY OF PHOSPHORUS: CPT | Performed by: NURSE PRACTITIONER

## 2018-06-13 PROCEDURE — 94760 N-INVAS EAR/PLS OXIMETRY 1: CPT

## 2018-06-13 PROCEDURE — 97167 OT EVAL HIGH COMPLEX 60 MIN: CPT

## 2018-06-13 PROCEDURE — 94660 CPAP INITIATION&MGMT: CPT

## 2018-06-13 PROCEDURE — 94761 N-INVAS EAR/PLS OXIMETRY MLT: CPT

## 2018-06-13 PROCEDURE — 82272 OCCULT BLD FECES 1-3 TESTS: CPT | Performed by: FAMILY MEDICINE

## 2018-06-13 PROCEDURE — 85025 COMPLETE CBC W/AUTO DIFF WBC: CPT | Performed by: NURSE PRACTITIONER

## 2018-06-13 PROCEDURE — 83735 ASSAY OF MAGNESIUM: CPT | Performed by: NURSE PRACTITIONER

## 2018-06-13 PROCEDURE — G8987 SELF CARE CURRENT STATUS: HCPCS

## 2018-06-13 PROCEDURE — G8988 SELF CARE GOAL STATUS: HCPCS

## 2018-06-13 RX ORDER — METHYLPREDNISOLONE SODIUM SUCCINATE 40 MG/ML
20 INJECTION, POWDER, LYOPHILIZED, FOR SOLUTION INTRAMUSCULAR; INTRAVENOUS EVERY 12 HOURS SCHEDULED
Status: DISCONTINUED | OUTPATIENT
Start: 2018-06-13 | End: 2018-06-16 | Stop reason: HOSPADM

## 2018-06-13 RX ADMIN — AMIODARONE HYDROCHLORIDE 100 MG: 200 TABLET ORAL at 09:05

## 2018-06-13 RX ADMIN — ASPIRIN 81 MG 81 MG: 81 TABLET ORAL at 09:05

## 2018-06-13 RX ADMIN — HEPARIN SODIUM 5000 UNITS: 5000 INJECTION, SOLUTION INTRAVENOUS; SUBCUTANEOUS at 14:35

## 2018-06-13 RX ADMIN — HEPARIN SODIUM 5000 UNITS: 5000 INJECTION, SOLUTION INTRAVENOUS; SUBCUTANEOUS at 21:19

## 2018-06-13 RX ADMIN — FERROUS SULFATE TAB 325 MG (65 MG ELEMENTAL FE) 325 MG: 325 (65 FE) TAB at 08:30

## 2018-06-13 RX ADMIN — HYDRALAZINE HYDROCHLORIDE 25 MG: 25 TABLET, FILM COATED ORAL at 14:00

## 2018-06-13 RX ADMIN — POLYETHYLENE GLYCOL 3350 17 G: 17 POWDER, FOR SOLUTION ORAL at 09:05

## 2018-06-13 RX ADMIN — DOCUSATE SODIUM 100 MG: 100 CAPSULE, LIQUID FILLED ORAL at 21:19

## 2018-06-13 RX ADMIN — METOPROLOL TARTRATE 25 MG: 25 TABLET, FILM COATED ORAL at 09:07

## 2018-06-13 RX ADMIN — METOPROLOL TARTRATE 25 MG: 25 TABLET, FILM COATED ORAL at 21:20

## 2018-06-13 RX ADMIN — LEVOTHYROXINE SODIUM 50 MCG: 50 TABLET ORAL at 05:37

## 2018-06-13 RX ADMIN — HYDRALAZINE HYDROCHLORIDE 25 MG: 25 TABLET, FILM COATED ORAL at 05:38

## 2018-06-13 RX ADMIN — IPRATROPIUM BROMIDE AND ALBUTEROL SULFATE 3 ML: .5; 3 SOLUTION RESPIRATORY (INHALATION) at 02:44

## 2018-06-13 RX ADMIN — METHYLPREDNISOLONE SODIUM SUCCINATE 20 MG: 40 INJECTION, POWDER, FOR SOLUTION INTRAMUSCULAR; INTRAVENOUS at 05:37

## 2018-06-13 RX ADMIN — AMLODIPINE BESYLATE 10 MG: 10 TABLET ORAL at 09:07

## 2018-06-13 RX ADMIN — HEPARIN SODIUM 5000 UNITS: 5000 INJECTION, SOLUTION INTRAVENOUS; SUBCUTANEOUS at 05:39

## 2018-06-13 RX ADMIN — PANTOPRAZOLE SODIUM 40 MG: 40 TABLET, DELAYED RELEASE ORAL at 09:05

## 2018-06-13 RX ADMIN — METHYLPREDNISOLONE SODIUM SUCCINATE 20 MG: 40 INJECTION, POWDER, FOR SOLUTION INTRAMUSCULAR; INTRAVENOUS at 21:18

## 2018-06-13 RX ADMIN — TORSEMIDE 20 MG: 20 TABLET ORAL at 09:07

## 2018-06-13 RX ADMIN — IPRATROPIUM BROMIDE AND ALBUTEROL SULFATE 3 ML: .5; 3 SOLUTION RESPIRATORY (INHALATION) at 19:34

## 2018-06-13 RX ADMIN — ISOSORBIDE MONONITRATE 30 MG: 30 TABLET, EXTENDED RELEASE ORAL at 09:05

## 2018-06-13 RX ADMIN — IPRATROPIUM BROMIDE AND ALBUTEROL SULFATE 3 ML: .5; 3 SOLUTION RESPIRATORY (INHALATION) at 14:17

## 2018-06-13 RX ADMIN — IPRATROPIUM BROMIDE AND ALBUTEROL SULFATE 3 ML: .5; 3 SOLUTION RESPIRATORY (INHALATION) at 07:26

## 2018-06-13 RX ADMIN — HYDRALAZINE HYDROCHLORIDE 25 MG: 25 TABLET, FILM COATED ORAL at 21:21

## 2018-06-13 NOTE — CASE MANAGEMENT
Continued Stay Review    Date: 6/13/18    Vital Signs: /68   Pulse 79   Temp (!) 97 1 °F (36 2 °C) (Temporal)   Resp (!) 39   Ht 5' 10" (1 778 m)   Wt 99 3 kg (218 lb 14 7 oz)   SpO2 90%   BMI 31 41 kg/m²     Medications:   Scheduled Meds:   Current Facility-Administered Medications:  acetaminophen 650 mg Oral Q6H PRN Arletta Jeffy, CRNP   amiodarone 100 mg Oral Daily Klarissa CHICHI NunezNP   amLODIPine 10 mg Oral Daily Klarissa CHICHI NunezNP   aspirin 81 mg Oral Daily Klarissa ROC Nunez   docusate sodium 100 mg Oral HS Klarissa ROC Nunez   ferrous sulfate 325 mg Oral Daily With Breakfast Linda Davila DO   heparin (porcine) 5,000 Units Subcutaneous Q8H [address] Linda Davila DO   hydrALAZINE 25 mg Oral Q8H [address] ROC [place]   ipratropium-albuterol 3 mL Nebulization Q6H KlarissaROC Topete   isosorbide mononitrate 30 mg Oral Daily KlarissaROC Topete   levothyroxine 50 mcg Oral Early Morning KlarissaROC Topete   methylPREDNISolone sodium succinate 20 mg Intravenous Q12H [address] Linda Davila, DO   metoprolol tartrate 25 mg Oral Q12H [address] ROC [place]   pantoprazole 40 mg Oral Daily KlarissaROC Topete   polyethylene glycol 17 g Oral Daily Klarissa ROC Nunez   torsemide 20 mg Oral Daily Linda Davila DO     Continuous Infusions:    PRN Meds:   acetaminophen  Abnormal Labs/Diagnostic Results:   TSH 5 876 WBC 13 01 HGB 8 3  CL 98 BUN 47 CR 1 83 GLUC 152 GFR 32   Age/Sex: 80 y o  male   Assessment/Plan:   ACUTE ON CHRONIC RESIRATORY FAILURE WITH HYPOXIA  O2 MAINTAINED AT 6LTR AT REST, SATS DROP TO 85% THIS AM WITH TRANSFER TO CHAIR ONLY  LUNGS WITH DECREASED BREATH SOUNDS  ECHO REPEAT THIS AM TO ASSESS PULMONARY PRESSURE & LV FUNCTION  HEPARIN GTT D/C'D DUE TO NEG PE ON VQ SCAN    Discharge Plan: TBD  Thank you,  [facility] in the [facility] by [employer] for 2017  Network Utilization Review Department  Phone: [phone]; Fax [phone]  ATTENTION: The Network Utilization Review Department is now centralized for our 7 Facilities  Make a note that we have a new phone and fax numbers for our Department  Please call with any questions or concerns to [phone] and carefully follow the prompts so that you are directed to the right person  All voicemails are confidential  Fax any determinations, approvals, denials, and requests for initial or continue stay review clinical to [phone]  Due to HIGH CALL volume, it would be easier if you could please send faxed requests to expedite your requests and in part, help us provide discharge notifications faster

## 2018-06-13 NOTE — PHYSICAL THERAPY NOTE
PT EVALUATION     06/13/18 1055   Note Type   Note type Eval/Treat   Pain Assessment   Pain Assessment 0-10   Pain Score Worst Possible Pain   Pain Type Chronic pain   Pain Location Hip   Pain Orientation Left  (bilat shld 6/10)   Home Living   Type of [address] [facility] One level;Elevator   [address] [facility]; Wheelchair-manual  (RW;home O2 6L)   Additional Comments (Pt has a HHA to assist with dressing)   Prior Function   Level of [place] Needs assistance with ADLs and functional mobility; Needs assistance with IADLs  (amb with/without SPC PTA;occasional use of RW)   Lives With Significant other   Receives Help From Family;Home health   ADL Assistance Needs assistance   IADLs Needs assistance   Restrictions/Precautions   Other Precautions Contact/isolation; Fall Risk;O2;Bed Alarm; Chair Alarm;Hard of hearing  (decreasing O2 sats)   General   Additional Pertinent History Pt adm with worsening SOB and BARCENAS  Family/Caregiver Present No   Cognition   Overall Cognitive Status WFL   Arousal/Participation Cooperative   Attention Within functional limits   Orientation Level Oriented X4   Following Commands Follows all commands and directions without difficulty   RLE Assessment   RLE Assessment WFL  (3+ to 4-/5)   LLE Assessment   LLE Assessment WFL  (3 to 3+/5)   Light Touch   RLE Light Touch Grossly intact   LLE Light Touch Grossly intact   Transfers   Sit to Stand 4  Minimal assistance   Additional items Assist x 1;Verbal cues   Stand to Sit 4  Minimal assistance   Additional items Assist x 1;Verbal cues   Ambulation/Elevation   Gait pattern (mild to mod unsteadiness with SPC)   Gait Assistance 4  Minimal assist   Additional items Assist x 1;Verbal cues; Tactile cues   Assistive Device [facility]   Distance 20 feet in room;RT present assessing SAO2;pt does drop into the low 80s on 10L O2  Pt is unsteady with cane and will benefit from cont amb with RW     Balance   Static Sitting Good   Static Standing Fair - Dynamic Standing Poor +   Ambulatory Poor +   Activity Tolerance   Activity Tolerance Patient limited by fatigue  (SOB and SAO2 dropping into low 80s)   Assessment   Prognosis Good   Problem List Decreased strength;Decreased range of motion;Decreased endurance; Impaired balance;Decreased mobility; Decreased coordination;Decreased safety awareness;Pain; Impaired hearing   Assessment Patient seen for Physical Therapy evaluation  Patient admitted with Acute on chronic respiratory failure with hypoxia ([facility] [address] )  Comorbidities affecting patient's physical performance include: COPD, A-Fib, CAD, BPH, HLD, HTN, amb dysfxn, [place], obesity, PVD, CHF  Personal factors affecting patient at time of initial evaluation include: lives in one story house, ambulating with assistive device, inability to ambulate household distances, inability to navigate community distances, inability to navigate level surfaces without external assistance, inability to perform dynamic tasks in community and limited home support  Prior to admission, patient was independent with functional mobility with cane, independent with functional mobility without assistive device, requiring assist for ADLS, requiring assist for IADLS, living with significant other in a one level home with elevator to enter and ambulating household distance  Please find objective findings from Physical Therapy assessment regarding body systems outlined above with impairments and limitations including weakness, decreased ROM, impaired balance, decreased endurance, impaired coordination, gait deviations, pain, decreased activity tolerance, decreased functional mobility tolerance, decreased safety awareness, fall risk and SOB upon exertion  The Barthel Index was used as a functional outcome tool presenting with a score of 40 today indicating marked limitations of functional mobility and ADLS    Patient's clinical presentation is currently unstable/unpredictable as seen in patient's presentation of vital sign response, changing level of pain, increased fall risk, new onset of impairment of functional mobility, decreased endurance and new onset of weakness  Pt would benefit from continued Physical Therapy treatment to address deficits as defined above and maximize level of functional mobility  As demonstrated by objective findings, the assigned level of complexity for this evaluation is high  Goals   Patient Goals "to go home"   STG Expiration Date 06/20/18   Short Term Goal #1 bed mob - min A; trans - stand by assist   Short Term Goal #2 amb with RW household distances with stand by assist; increase balance to F/F+ with RW so pt can safely perform functional task in his home   LTG Expiration Date 06/27/18   Long Term Goal #1 bed mob - I; trans - I   Long Term Goal #2 amb with RW or SPC x 100 feet - I as previous in pt's home; balance with AD - F+/G so pt can safely ambulate at home and perform safe functional mobility   Treatment Day 1   Plan   Treatment/Interventions ADL retraining;Functional transfer training;LE strengthening/ROM; Therapeutic exercise; Endurance training;Patient/family training;Bed mobility;Gait training   PT Frequency 5x/wk   Recommendation   Recommendation (STR vs home with services as previous/PT;pending progress)   Equipment Recommended (Pt has all DME needs)   Barthel Index   Feeding 5   Bathing 0   Grooming Score 0   Dressing Score 5   Bladder Score 10   Bowels Score 10   Toilet Use Score 5   Transfers (Bed/Chair) Score 5   Mobility (Level Surface) Score 0   Stairs Score 0   Barthel Index Score 36   Licensure   [place] License Number  Tacos Reed [id number]     Time In:1055  Time Out:1110  Total Time: 15 mins      S:  "I think I have to move my bowels"  O:  SPT chair to commode with min A  Pt able to move bowels and urinated as well  However, pt urinated on floor while seated on commode  Pt needs assist for hygiene after bowel movement   Pt and floor cleaned of urine, pt's slippers changed  Pt SPT commode to chair with min A   A:  Pt will benefit from cont skilled PT services to increase his strength and endurance and to rtn pt to prior functional level  P:  Per PT POC  Cont amb with RW  DCP - STR vs home with services as previous, home PT pending progress      Darby Sturgis, [place] [id number]

## 2018-06-13 NOTE — OCCUPATIONAL THERAPY NOTE
Occupational Therapy Evaluation/Treatment     06/13/18 2343   Note Type   Note type Eval/Treat   Restrictions/Precautions   Other Precautions Bed Alarm; Chair Alarm; Fall Risk;Pain;Multiple lines;O2   Pain Assessment   Pain Assessment 0-10   Pain Score Worst Possible Pain   Pain Type Chronic pain   Pain Location Hip   Pain Orientation Left  (bilateral shoulders 6/10)   Home Living   Type of Home House   Home Layout Elevator   Bathroom Shower/Tub Walk-in shower   Bathroom Equipment Grab bars in shower  (pt stands to shower with grab bars )   Home Equipment Walker;Cane;Wheelchair-manual   Additional Comments pt has a HHA for dressing   Prior Function   Level of [place] Needs assistance with IADLs; Needs assistance with ADLs and functional mobility   Lives With Significant other   Receives Help From Family;Home health   ADL Assistance Needs assistance   IADLs Needs assistance   Comments pt reports cooking, family grocery shops    Subjective   Subjective "I want to go home, not rehab"   ADL   Eating Assistance 5  Supervision/Setup   Grooming Assistance 5  Supervision/Setup   UB Bathing Assistance 4  Minimal Assistance   LB Bathing Assistance 3  Moderate Assistance   UB Dressing Assistance 4  Minimal Assistance   LB Dressing Assistance 3  Moderate Assistance   [address]  3  Moderate Assistance   Bed Mobility   Supine to Sit 3  Moderate assistance   Additional items Assist x 1   Transfers   Sit to Stand (min/mod assist of 2)   Stand to Sit (min/mod assist of 2)   Stand pivot (min/mod assist of 2)   Functional Mobility   Functional Mobility (min/mod assist of 2)   Additional Comments few steps bed to chair    Additional items Hand hold assistance   Balance   Static Sitting Fair   Dynamic Sitting Fair -   Static Standing Poor   Dynamic Standing Poor   Activity Tolerance   Activity Tolerance Patient limited by fatigue;Patient limited by pain  (SOB spO2 dropped to 81%, pulmonologist aware)   Medical Staff Made Aware Dr Samra Maria  (90 deg shoulder flexion 3+/5, elbow/ 4/5)   LUE Assessment   LUE Assessment (elbow and  4-/5)   LUE Overall AROM   L Shoulder Flexion 50 degrees shoulder flexion, MMT 2+/5   Hand Function   Gross Motor Coordination Functional   Fine Motor Coordination Functional   Cognition   Overall Cognitive Status WFL   Arousal/Participation Cooperative   Attention Within functional limits   Orientation Level Oriented X4   Following Commands Follows all commands and directions without difficulty   Comments Augustine wears hearing aides battery changed during session    Assessment   Limitation Decreased ADL status; Decreased UE strength;Decreased Safe judgement during ADL;Decreased endurance;Decreased self-care trans;Decreased high-level ADLs; Decreased UE ROM  (decreased balance and mobility )   Prognosis Good   Assessment Patient evaluated by Occupational Therapy  Patient admitted with Acute on chronic respiratory failure with hypoxia ([facility] [address] )  The patients occupational profile, medical and therapy history includes a extensive additional review of physical, cognitive, or psychosocial history related to current functional performance  Comorbidities affecting functional mobility and ADLS include: afib, CAD, CHF, CKD, COPD, diabetes, DVT, hypertension and MI  Prior to admission, patient was independent with functional mobility with cane, lives in a home with and elevator on 1 level, requiring assist for ADLS and requiring assist for IADLS  The evaluation identifies the following performance deficits: weakness, impaired balance, decreased endurance, increased fall risk, new onset of impairment of functional mobility, decreased ADLS, decreased IADLS, decreased activity tolerance, decreased safety awareness, impaired judgement, SOB upon exertion and decreased strength and pain, that result in activity limitations and/or participation restrictions   This evaluation requires clinical decision making of high complexity, because the patient presents with comorbidites that affect occupational performance and required significant modification of tasks or assistance with consideration of multiple treatment options  The Barthel Index was used as a functional outcome tool presenting with a score of 40, indicating marked limitations of functional mobility and ADLS  Patient will benefit from skilled Occupational Therapy services to address above deficits and facilitate a safe return to prior level of function  Goals   Patient Goals "get back to normal, go home"   STG Time Frame (1-7 days)   Short Term Goal  Patient will increase standing tolerance to 3 minutes during ADL task to decrease assistance level and decrease fall risk; Patient will increase bed mobility to min assist in preparation for ADLS and transfers for a safe discharge plan home to prior level of function; Patient will increase functional mobility to and from bathroom with rolling walker with min assist to increase performance with ADLS and to use a toilet per pt goal to return to "normal"; Patient will tolerate 10 minutes of UE ROM/strengthening to increase general activity tolerance and performance in ADLS/IADLS; Patient will improve functional activity tolerance to 10 minutes of sustained functional tasks to increase participation in basic self-care and decrease assistance level to increase safety for home independent;  Patient will increase dynamic standing balance to fair to improve postural stability and decrease fall risk during standing ADLS and transfers       LTG Time Frame (8-14 days)   Long Term Goal Patient will increase standing tolerance to 6 minutes during ADL task to decrease assistance level and decrease fall risk; Patient will increase bed mobility to supervision in preparation for ADLS and transfers for a safe discharge plan home to prior level of function; Patient will increase functional mobility to and from bathroom with rolling walker with supervision to increase performance with ADLS and to use a toilet per pt goal to return to "normal"; Patient will tolerate 20 minutes of UE ROM/strengthening to increase general activity tolerance and performance in ADLS/IADLS; Patient will improve functional activity tolerance to 20 minutes of sustained functional tasks to increase participation in basic self-care and decrease assistance level to increase safety for home independent;  Patient will increase dynamic standing balance to fair+ to improve postural stability and decrease fall risk during standing ADLS and transfers  Functional Transfer Goals   Pt Will Perform All Functional Transfers (STG min assist LTG supervision )   ADL Goals   Pt Will Perform Eating (STG independent )   Pt Will Perform Grooming (STG supervision LTG independent )   Pt Will Perform Bathing (STG min assist LTG supervision )   Pt Will Perform UE Dressing (STG supervision LTG independent )   Pt Will Perform LE Dressing (STG min assist LTG supervision )   Pt Will Perform Toileting (STG min assist LTG supervision )   Plan   Treatment Interventions ADL retraining;Functional transfer training;UE strengthening/ROM; Endurance training;Patient/family training;Equipment evaluation/education; Activityengagement; Energy conservation   Goal Expiration Date 06/27/18   Treatment Day 1   OT Frequency 3-5x/wk   Additional Treatment Session   Start Time 0920   End Time 0930   Treatment Assessment Patient completed bathing ADLS  Patient able to bathe perineal, abdomen, chest, arms and face with setup seated in bed with head raised  Max assist for LE bathing and back  Patient fatigued with activity spO2 dropped to 81% on O2  After session Dr Aditya Bethea stated its ok to turn pt to 10L for activity  Patient completed pursed lip breathing and returned to 90%       Recommendation   OT Discharge Recommendation 24 hour supervision/assist  (STR vs 24 hour assist/services (pt adamant about going home))   Barthel Index   Feeding 5   Bathing 0   Grooming Score 0   Dressing Score 5   Bladder Score 10   Bowels Score 10   Toilet Use Score 5   Transfers (Bed/Chair) Score 5   Mobility (Level Surface) Score 0   Stairs Score 0   Barthel Index Score 40   Licensure   [place] License Number  [place] [address] OTR/L [id number]

## 2018-06-13 NOTE — PROGRESS NOTES
Transfer Note - ICU/Stepdown Transfer to [facility]/MS tele   Adelaide Perez 80 y o  male MRN: [id number]  Monty [address]   Unit/Bed#: ICU 03 Encounter: [id number]    Code Status: Level 1 - Full Code    Reason for ICU/Stepdown admission: hypoxia    Active problems: Principal Problem:    Acute on chronic respiratory failure with hypoxia ([address] )  Active Problems:    Combined systolic and diastolic cardiac dysfunction    Elevated d-dimer    PATITO (obstructive sleep apnea)    CAD in native artery    Paroxysmal atrial fibrillation (HCC)    Hypertension    COPD with acute exacerbation (HCC)    Class 1 obesity in adult    Hypothyroidism    Iron deficiency anemia  Resolved Problems:    Acute on Chronic Kidney Injury    CKD (chronic kidney disease), stage III    Atypical chest pain      * Acute on chronic respiratory failure with hypoxia (HCC)   Assessment & Plan    - currently on 6 L NC O2, O2 sats mid 94M  - etiology uncertain at this time, worsening severe pulmonary hypertension, vs decreased cardiac output from cardiomyopathy vs mild component of COPD exacerbation  - repeat echo pending this morning to assess pulmonary pressure and LV function  - trop neg x3, ACS ruled out  - CT chest unremarkable for any acute new changes, probable lingular atelectasis  - V/Q scan shows low probability of PE, PE ruled out, pt on prophylactic heparin for DVT prophylaxis  - reduce Solu-Medrol to 20 mg every 12 hours for any element of probable COPD exacerbation  - continue with duo nebs every 6 hours and PRN  - procalcitonin neg, afebrile- no concerning signs of infection at this time  - leukocytosis this morning noted, likely 2/2 steroids  - patient uses 6 L of oxygen at home for COPD        Combined systolic and diastolic cardiac dysfunction   Assessment & Plan    - s/p lasix 20 mg IV x1 in the ER for probable acute component of chronic heart failure, no signs of fluid overload at this time  - monitor output and input, monitor for fluid retention, daily weights  - close to -4 L net I/O the since admission  - resume home torsemide today  - last echo done 3 months ago showed ejection fraction of 18-66%, grade 1 diastolic dysfunction, severe pulmonary hypertension with peak pulmonary pressure of 70mmHg --- use caution with fluids, repeat echo pending today          Elevated d-dimer   Assessment & Plan    - V/Q scan showed low probability of PE, PE r/o, no signs for DVT, likely was acute reactant to ongoing respiratory condition        PATITO (obstructive sleep apnea)   Assessment & Plan    Use BiPAP at bedtime given his acute on chronic respiratory failure        Acute on Chronic Kidney Injuryresolved as of 6/13/2018   Assessment & Plan    - patient has CKD stage III, baseline creatinine 1 6-1 9, creatinine 2 4 this admission, now decreased to 1 83 -- acute component resolved  - ?   Etiology of the acute component of the CKD, however this is improving now, need to use caution with fluids given his history of systolic and diastolic heart failure, will continue to monitor with daily creatinine   - avoid nephrotoxic agents        CAD in native artery   Assessment & Plan    - continue with aspirin, Lopressor, Imdur, statin  - no chest pain at this time, troponins trended negative upon admission x3  - nitrostat PRN        Paroxysmal atrial fibrillation (HCC)   Assessment & Plan    - currently in normal sinus rhythm, patient on amiodarone and Lopressor - will continue with that  - monitor on tele          Hypertension   Assessment & Plan    - c/w home amlodipine, Lopressor, hydralazine, Imdur  - blood pressure currently [id number] systolic        GERD (gastroesophageal reflux disease)   Assessment & Plan    c/w protonix        Iron deficiency anemia   Assessment & Plan    - hx of chronic microcytic anemia with baseline hemoglobin in 10-11 range, will monitor with daily CBC, no concerning sign for acute blood loss at this time, hemoglobin 8 3 this morning -- iron panel showing low serum iron -- FOBT pending, c/w ferrous sulfate   - transfuse if hemoglobin less than 7          Hypothyroidism   Assessment & Plan    - TSH elevated 5 876 with normal T4, patient currently on levothyroxine, will continue home dose of 50 mcg daily, repeat TSH in 6-8 weeks as dilation TSH may be affected by her acute respiratory condition at this time        Diabetic polyneuropathy associated with type 2 diabetes mellitus ([facility] [address] )   Assessment & Plan    - patient has history of diabetes in the past, most recent A1c although was 5 7 last year, glucose on admission was mildly elevated, will order a repeat A1c given his risk factor of obesity, and treat based on that        Peripheral vascular disease ([facility] [address] )   Assessment & Plan    c/w aspirin, statin        Class 1 obesity in adult   Assessment & Plan     on lifestyle modification for weight loss, goal BMI less than 30        Multiple pulmonary nodules   Assessment & Plan    - patient has history of chronic small pulmonary nodule, 4 x 8 mm as seen on last CT scan earlier this month with no changes seen since last nodule in 2013, stable, continue to monitor per guidelines        Pleural effusion, right (chronic)   Assessment & Plan    - chronic right-sided pleural effusion, small, continue to monitor        S/P TAVR (transcatheter aortic valve replacement)   Assessment & Plan    -r/o ACS as above  - c/w nitro PRN, imdur, ASA, lopressor          COPD with acute exacerbation (HCC)   Assessment & Plan    - decrease solu-medrol to 20mg BID  - patient has history of moderate COPD on 6 L of home oxygen  - continue with duo nebs, and supplemental oxygen as needed, goal SpO2 88-93%  - unchanged diffuse emphysematous/fibrotic changes in CT imaging of chest         Atypical chest painresolved as of 6/12/2018   Assessment & Plan    - pt has hx of CAD s/p TAVR  - s/p 1 dose of nitrostat in ED since which pain resolved  - trop neg x1, willt huma trop for total 3 set  - monitor on tele; nitro prn, morphine prn; repeat EKG in AM  - c/w ASA, lopressore, imdur  - complete LBBB on EKG admission which is chronic for pt on prior EKG as well        CKD (chronic kidney disease), stage IIIresolved as of 6/12/2018   Assessment & Plan    - address acute on chronic component of CKD as mentioned above  - avoid nephrotoxic agents            Consultants:   · none    History of Present Illness/Summary of clinical course: "This is a 75-year-old male with a past medical history of moderate COPD on home oxygen 6 L, paroxysmal AFib, combined systolic and diastolic dysfunction, who presents today for worsening shortness of breath, as well as dyspnea exertion, patient reports that since the last 2 weeks he has not been feeling that well, however since 1 day he has been feeling very short of breath  Patient also reports that earlier this morning he had lower chest wall/upper abdominal discomfort/tightening, that did resolve in the ER  Patient reports that his oxygen saturation have been dropping with activity at home  Patient was that he does have a chronic cough that is not different than before, no fever, no leg pain  No chest pain at this time      In ER, patient was noted to be hypoxic on nasal cannula, and had to be placed on BiPAP  Patient also got 1 dose of 20 mg IV Lasix and 1 dose of 0 4 mg Nitrostat sublingual in the ER, as well as a DuoNeb breathing treatment "    Admitted to step-down unit for further monitoring  A CT scan was done which did not show evidence of pneumonia  Troponins were trended which were not consistent with acute coronary syndrome  A V/Q scan was done which showed low probability of a pulmonary embolus  Repeat echocardiogram was done to assess pulmonary artery pressures as he does have a history of pulmonary hypertension, this has yet to be officially read    At this point he is back to his 6 L nasal cannula baseline, however with exertion he does easily desaturate  It is unclear if this is just worsening of his underlying COPD in combination with his pulmonary arterial hypertension  Awaiting formal read of his echocardiogram        Please refer to today's progress note for further clinical details  Recent or scheduled procedures: none    Outstanding/pending diagnostics: Echocardiogram       Mobilization Plan: OOB with PT/OT    Nutrition Plan: CCB2 diet    Discharge Plan: Home vs STR    Spoke with Dr Juan Christie regarding transfer @ 1600  Patient accepted to their service      Veronica Miller PA-C

## 2018-06-13 NOTE — PLAN OF CARE
Problem: RESPIRATORY - ADULT  Goal: Achieves optimal ventilation and oxygenation  INTERVENTIONS:  - Assess for changes in respiratory status  - Respiratory Therapy support as indicated  - Continue with Home 02 at 6-8LPM   - Continue with Cpap +10 cmH20  - Bipap on standby at bedside   - Continue with nebulizer treatments    Outcome: Adequate for Discharge  POC Due 6/16/18 2534 [address] Sol [address] RRT

## 2018-06-13 NOTE — RESPIRATORY THERAPY NOTE
Pt  Ambulated in room with PT on 10L NC, SpO2 decreased to 81%, it took several minutes but it did increase to 92%   Dr Kyle Roy aware

## 2018-06-13 NOTE — PROGRESS NOTES
Daily Progress Note - Critical Care/ Stepdown   Floria Siemens 80 y o  male MRN: [id number]  Unit/Bed#: ICU 03 Encounter: [id number]    ______________________________________________________________________  Assessment:   Principal Problem:    Acute on chronic respiratory failure with hypoxia (HCC)  Active Problems:    Acute on Chronic Kidney Injury    COPD with acute exacerbation (HCC)    Elevated d-dimer    Paroxysmal atrial fibrillation (HCC)    CAD in native artery    Hypertension    PATITO (obstructive sleep apnea)    Class 1 obesity in adult    Combined systolic and diastolic cardiac dysfunction    Hypothyroidism    Iron deficiency anemia  Resolved Problems:    Atypical chest pain    CKD (chronic kidney disease), stage III      * Acute on chronic respiratory failure with hypoxia (HCC)   Assessment & Plan    - currently on 6 L NC O2, O2 sats mid 15W  - etiology uncertain at this time, worsening severe pulmonary hypertension, vs decreased cardiac output from cardiomyopathy vs mild component of COPD exacerbation  - repeat echo pending this morning to assess pulmonary pressure and LV function  - trop neg x3, ACS ruled out  - CT chest unremarkable for any acute new changes, probable lingular atelectasis  - V/Q scan shows low probability of PE, PE ruled out, pt on prophylactic heparin for DVT prophylaxis  - reduce Solu-Medrol to 20 mg every 12 hours for any element of probable COPD exacerbation  - continue with duo nebs every 6 hours and PRN  - procalcitonin neg, afebrile- no concerning signs of infection at this time  - leukocytosis this morning noted, likely 2/2 steroids  - patient uses 6 L of oxygen at home for COPD        Acute on Chronic Kidney Injuryresolved as of 6/13/2018   Assessment & Plan    - patient has CKD stage III, baseline creatinine 1 6-1 9, creatinine 2 4 this admission, now decreased to 1 83 -- acute component resolved  - ?   Etiology of the acute component of the CKD, however this is improving now, need to use caution with fluids given his history of systolic and diastolic heart failure, will continue to monitor with daily creatinine   - avoid nephrotoxic agents        COPD with acute exacerbation (HCC)   Assessment & Plan    - decrease solu-medrol to 20mg BID  - patient has history of moderate COPD on 6 L of home oxygen  - continue with duo nebs, and supplemental oxygen as needed, goal SpO2 88-93%  - unchanged diffuse emphysematous/fibrotic changes in CT imaging of chest         Atypical chest painresolved as of 6/12/2018   Assessment & Plan    - pt has hx of CAD s/p TAVR  - s/p 1 dose of nitrostat in ED since which pain resolved  - trop neg x1, willt rend trop for total 3 set  - monitor on tele; nitro prn, morphine prn; repeat EKG in AM  - c/w ASA, lopressore, imdur  - complete LBBB on EKG admission which is chronic for pt on prior EKG as well        Elevated d-dimer   Assessment & Plan    - V/Q scan showed low probability of PE, PE r/o, no signs for DVT, likely was acute reactant to ongoing respiratory condition        GERD (gastroesophageal reflux disease)   Assessment & Plan    c/w protonix        Iron deficiency anemia   Assessment & Plan    - hx of chronic microcytic anemia with baseline hemoglobin in 10-11 range, will monitor with daily CBC, no concerning sign for acute blood loss at this time, hemoglobin 8 3 this morning -- iron panel showing low serum iron -- FOBT pending, c/w ferrous sulfate   - transfuse if hemoglobin less than 7          Hypothyroidism   Assessment & Plan    - TSH elevated 5 876 with normal T4, patient currently on levothyroxine, will continue home dose of 50 mcg daily, repeat TSH in 6-8 weeks as dilation TSH may be affected by her acute respiratory condition at this time        Combined systolic and diastolic cardiac dysfunction   Assessment & Plan    - s/p lasix 20 mg IV x1 in the ER for probable acute component of chronic heart failure, no signs of fluid overload at this time  - monitor output and input, monitor for fluid retention, daily weights  - close to -4 L net I/O the since admission  - resume home torsemide today  - last echo done 3 months ago showed ejection fraction of 65-79%, grade 1 diastolic dysfunction, severe pulmonary hypertension with peak pulmonary pressure of 70mmHg --- use caution with fluids, repeat echo pending today          Diabetic polyneuropathy associated with type 2 diabetes mellitus ([facility] )   Assessment & Plan    - patient has history of diabetes in the past, most recent A1c although was 5 7 last year, glucose on admission was mildly elevated, will order a repeat A1c given his risk factor of obesity, and treat based on that        Peripheral vascular disease ([facility] )   Assessment & Plan    c/w aspirin, statin        Class 1 obesity in adult   Assessment & Plan     on lifestyle modification for weight loss, goal BMI less than 30        Multiple pulmonary nodules   Assessment & Plan    - patient has history of chronic small pulmonary nodule, 4 x 8 mm as seen on last CT scan earlier this month with no changes seen since last nodule in 2013, stable, continue to monitor per guidelines        Pleural effusion, right (chronic)   Assessment & Plan    - chronic right-sided pleural effusion, small, continue to monitor        PATITO (obstructive sleep apnea)   Assessment & Plan    Use BiPAP at bedtime given his acute on chronic respiratory failure        S/P TAVR (transcatheter aortic valve replacement)   Assessment & Plan    -r/o ACS as above  - c/w nitro PRN, imdur, ASA, lopressor          Hypertension   Assessment & Plan    - c/w home amlodipine, Lopressor, hydralazine, Imdur  - blood pressure currently 730D-959V systolic        CAD in native artery   Assessment & Plan    - continue with aspirin, Lopressor, Imdur, statin  - no chest pain at this time, troponins trended negative upon admission x3  - nitrostat PRN        Paroxysmal atrial fibrillation ([facility] ) Assessment & Plan    - currently in normal sinus rhythm, patient on amiodarone and Lopressor - will continue with that  - monitor on tele          CKD (chronic kidney disease), stage IIIresolved as of 6/12/2018   Assessment & Plan    - address acute on chronic component of CKD as mentioned above  - avoid nephrotoxic agents            Plan:     Neuro:   · No pain at this time  · No sedation needed  · No signs concerning for delirium  · Continue with regular CAM ICU assessments  · Regulate sleep/wake cycle    CV:     · Rhythm: NSR  · Follow rhythm on telemetry    Pulm:   · No acute pulmonary issues    GI:   · Nutrition/diet plan: cardiac diet/carb controlled  · Stress ulcer prophylaxis: Protonix PO  · Bowel regimen: Colace    FEN:     · Fluid/Diuretic plan: No intervention  · Electrolytes repleted:  Replete electrolytes as needed  · Goal: K >4 0, Mag >2 0, and Phos >3 0    :   · UOP last 12 hours: 900cc  · Indwelling Tong present: no     ID:   · Trend temps and WBC count    Heme:     · Trend hgb and plts  · Transfuse as needed for goal hgb >7    Endo:   · Glycemic control plan: Blood glucose controlled on current regimen    Msk/Skin:    · Mobility goal:  Encourage early mobility  · PT consult: yes  · OT consult: yes  · Frequent turning and off-loading      Lines:    · Peripheral IV line x2    VTE Prophylaxis:  · Pharmacologic Prophylaxis: Heparin  · Mechanical Prophylaxis: sequential compression device    Disposition: Continue Stepdown    Code Status: Level 1 - Full Code    Counseling / Coordination of Care  Total Critical Care time spent 35 minutes excluding procedures, teaching and family updates  ______________________________________________________________________    HPI/24hr events:  Patient seen and examined at bedside, no acute events overnight, the patient denies shortness of breath at this time is resting, no chest pain, no leg pain, no pain anywhere else    Yesterday patient did get hypoxic and desaturated to 80s upon exertion, this morning maintaining saturation on 6 L of oxygen  ______________________________________________________________________    Physical Exam:   Physical Exam   Constitutional: He is oriented to person, place, and time  No distress  HENT:   Head: Normocephalic and atraumatic  Right Ear: External ear normal    Left Ear: External ear normal    Cardiovascular: Normal rate and regular rhythm  Murmur heard  Systolic murmur   Pulmonary/Chest: Effort normal and breath sounds normal  No respiratory distress  He has no wheezes  He has no rales  6L O2 with SpO2 94% at rest   Abdominal: Soft  Bowel sounds are normal  He exhibits no distension  There is no tenderness  There is no guarding  Musculoskeletal: He exhibits no edema or tenderness  Neurological: He is alert and oriented to person, place, and time  Skin: He is not diaphoretic  Psychiatric: He has a normal mood and affect  Nursing note and vitals reviewed  ______________________________________________________________________  Vitals:    18 0600 18 0652 18 0700 18 0729   BP: 161/71  148/70    BP Location:   Right arm    Pulse: 64  71    Resp: (!) 25  22    Temp:  (!) 97 1 °F (36 2 °C)     TempSrc:  Temporal     SpO2: 99%  96% 98%   Weight:       Height:                  Temperature:   Temp (24hrs), Av 4 °F (36 3 °C), Min:97 1 °F (36 2 °C), Max:97 8 °F (36 6 °C)    Current Temperature: (!) 97 1 °F (36 2 °C)    Weights:   IBW: 73 kg    Body mass index is 31 41 kg/m²    Weight (last 2 days)     Date/Time   Weight    18 0550  99 3 (218 92)    18 0600  100 (220 46)    18 1745  101 (222)              Hemodynamic Monitoring:  N/A       Non-Invasive/Invasive Ventilation Settings:  Respiratory    Lab Data (Last 4 hours)    None         O2/Vent Data (Last 4 hours)    None              No results found for: PHART, ESR8QEX, PO2ART, ZWA1OAV, J5PSOGPI, BEART, SOURCE  SpO2: SpO2: 98 %    Intake and Outputs:  I/O       06/11 0701 - 06/12 0700 06/12 0701 - 06/13 0700 06/13 0701 - 06/14 0700    P  O  420 600     I V  (mL/kg) 223 (2 2) 267 5 (2 7)     Total Intake(mL/kg) 643 (6 4) 867 5 (8 7)     Urine (mL/kg/hr) 3500 2050 (0 9)     Total Output 3500 2050      Net -3483 -5567 5                     Nutrition:        Diet Orders            Start     Ordered    06/11/18 1841  Diet Juan David/CHO Controlled; Consistent Carbohydrate Diet Level 2 (5 carb servings/75 grams CHO/meal); Cardiac Step 1, Sodium 4 GM (STEFF), Sodium 2 Gm, Fluid Restriction 1200 ML  Diet effective now     Question Answer Comment   Diet Type Juan David/CHO Controlled    Juan David/CHO Controlled Consistent Carbohydrate Diet Level 2 (5 carb servings/75 grams CHO/meal)    Other Restriction(s): Cardiac Step 1    Other Restriction(s): Sodium 4 GM (STEFF)    Other Restriction(s): Sodium 2 Gm    Other Restriction(s): Fluid Restriction 1200 ML    RD to adjust diet per protocol?  No        06/11/18 1840    06/11/18 1836  Room Service  Once     Question:  Type of Service  Answer:  Room Service - Appropriate with Assistance    06/11/18 1835            Labs:     Results from last 7 days  Lab Units 06/13/18  0559 06/12/18  0614 06/11/18  1415   WBC Thousand/uL 13 01* 9 17 10 35*   HEMOGLOBIN g/dL 8 3* 8 8* 9 6*   HEMATOCRIT % 26 3* 27 7* 30 9*   PLATELETS Thousands/uL 279 276 304   NEUTROS PCT % 82* 80* 90*   MONOS PCT % 8 6 2*       Results from last 7 days  Lab Units 06/13/18  0559 06/12/18  0614 06/11/18  1415   SODIUM mmol/L 134* 137 139   POTASSIUM mmol/L 4 4 4 0 4 8   CHLORIDE mmol/L 98* 98* 100   CO2 mmol/L 28 30 28   BUN mg/dL 47* 46* 51*   CREATININE mg/dL 1 83* 2 13* 2 42*   CALCIUM mg/dL 8 7 8 7 8 6   TOTAL PROTEIN g/dL  --   --  7 9   BILIRUBIN TOTAL mg/dL  --   --  0 30   ALK PHOS U/L  --   --  59   ALT U/L  --   --  33   AST U/L  --   --  47*   GLUCOSE RANDOM mg/dL 152* 160* 159*       Results from last 7 days  Lab Units 06/13/18  0559 06/12/18  2047 MAGNESIUM mg/dL 2 1 2 2     Lab Results   Component Value Date    PHOS 3 8 06/13/2018    PHOS 3 7 06/12/2018    PHOS 4 1 03/30/2018        Results from last 7 days  Lab Units 06/12/18  1538 06/12/18  0739 06/11/18  2343 06/11/18  1415   INR   --   --   --  1 16   PTT seconds 105* >153* 58* 25       0  Lab Value Date/Time   TROPONINI <0 02 06/12/2018 0206   TROPONINI <0 02 06/11/2018 2020   TROPONINI 0 02 06/11/2018 1415   TROPONINI <0 02 03/28/2018 2034   TROPONINI 0 05 (H) 01/07/2017 0531   TROPONINI 0 07 (H) 01/06/2017 2348   TROPONINI 0 07 (H) 01/06/2017 1800   TROPONINI 0 02 11/25/2016 0600   TROPONINI 0 02 11/24/2016 2329   TROPONINI <0 02 11/24/2016 1938   TROPONINI 0 04 (H) 05/26/2016 1703   TROPONINI 0 03 05/26/2016 1312   TROPONINI 0 02 05/26/2016 0950   TROPONINI <0 02 05/16/2016 1154   TROPONINI 8 73 (H) 03/05/2016 1247   TROPONINI 10 10 (H) 03/05/2016 0534   TROPONINI 6 59 (H) 03/04/2016 2341   TROPONINI 1 93 (H) 03/04/2016 1936   TROPONINI 0 66 (H) 03/04/2016 1335         ABG:No results found for: PHART, EZN2AKE, PO2ART, LZZ9USN, G5IYDYVW, BEART, SOURCE    Imaging: Xr Chest Portable    Result Date: 6/11/2018  Impression: Enlarged cardiac silhouette with pulmonary vascular prominence, suspected small right pleural effusion, and bilateral airspace opacities  Consider a component of fluid overload/CHF  Superimposed infection could be considered in the appropriate clinical  setting  Workstation performed: [id number]     Ct Chest Wo Contrast    Result Date: 6/11/2018  Impression: 1  Unchanged small right pleural effusion which could be loculated although this is difficult to evaluate without contrast  2   Unchanged appearance of diffuse lung emphysematous/interstitial/fibrotic changes  3   Lingular groundglass regions could represent atelectasis  rather than pneumonia, correlate with lab values and symptomatology   Workstation performed: [id number]     Nm Lung Ventilation / Perfusion    Result Date: 6/12/2018  Impression: Ventilation imaging demonstrates heterogeneous distribution of radiotracer with some central deposition which could be seen with COPD and/or reactive airway disease  The probability for pulmonary embolus is low  Workstation performed: [id number]       Micro:  Lab Results   Component Value Date    BLOODCX No Growth After 5 Days  01/06/2017    BLOODCX No Growth After 5 Days  01/06/2017    BLOODCX No Growth After 5 Days   03/06/2016    URINECX No Growth <1000 cfu/mL 03/04/2016    SPUTUMCULTUR 4+ Growth of  03/29/2018    SPUTUMCULTUR 2+ Growth of Candida sp  presumptively albicans 03/09/2016    SPUTUMCULTUR 2+ Growth of Candida sp  presumptively glabrata 03/09/2016    SPUTUMCULTUR 2+ Growth of Mixed Respiratory [place] 03/09/2016       Allergies: No Known Allergies  Medications:   Scheduled Meds:  Current Facility-Administered Medications:  acetaminophen 650 mg Oral Q6H PRN Palma Stephanier ROC Sharma   amiodarone 100 mg Oral Daily Klarissa R ROC Sharma   amLODIPine 10 mg Oral Daily Klarissa R CHICHI SharmaNP   aspirin 81 mg Oral Daily Klarissa R ROC Sharma   docusate sodium 100 mg Oral HS Klarissa R ROC Sharma   ferrous sulfate 325 mg Oral Daily With Breakfast Linda Lola, DO   heparin (porcine) 5,000 Units Subcutaneous Q8H [address] [facility], [facility]   hydrALAZINE 25 mg Oral Q8H [address] Klarissa R ROC Sharma   ipratropium-albuterol 3 mL Nebulization Q6H Klarissa ROC Nunez   isosorbide mononitrate 30 mg Oral Daily Klarissa R ROC Sharma   levothyroxine 50 mcg Oral Early Morning Klarissa R ROC Sharma   methylPREDNISolone sodium succinate 20 mg Intravenous [facility], DO   metoprolol tartrate 25 mg Oral Q12H [address] Klarissa R ROC Sharma   pantoprazole 40 mg Oral Daily Klarissa R ROC Sharma   polyethylene glycol 17 g Oral Daily Klarissa ROC Nunez   torsemide 20 mg Oral Daily Linda Lola, DO     Continuous Infusions:   PRN Meds:    acetaminophen 650 mg Q6H PRN       Invasive lines and devices:   Invasive Devices     Peripheral Intravenous Line            Peripheral IV 06/11/18 Left Antecubital 1 day    Peripheral IV 06/11/18 Left Hand 1 day                   SIGNATURE: Valery Cody DO  DATE: June 13, 2018

## 2018-06-14 LAB
ANION GAP SERPL CALCULATED.3IONS-SCNC: 5 MMOL/L (ref 4–13)
BASOPHILS # BLD AUTO: 0.01 THOUSANDS/ΜL (ref 0–0.1)
BASOPHILS NFR BLD AUTO: 0 % (ref 0–1)
BUN SERPL-MCNC: 55 MG/DL (ref 5–25)
CALCIUM SERPL-MCNC: 8.9 MG/DL (ref 8.3–10.1)
CHLORIDE SERPL-SCNC: 98 MMOL/L (ref 100–108)
CO2 SERPL-SCNC: 31 MMOL/L (ref 21–32)
CREAT SERPL-MCNC: 2.02 MG/DL (ref 0.6–1.3)
EOSINOPHIL # BLD AUTO: 0 THOUSAND/ΜL (ref 0–0.61)
EOSINOPHIL NFR BLD AUTO: 0 % (ref 0–6)
ERYTHROCYTE [DISTWIDTH] IN BLOOD BY AUTOMATED COUNT: 15.3 % (ref 11.6–15.1)
GFR SERPL CREATININE-BSD FRML MDRD: 29 ML/MIN/1.73SQ M
GLUCOSE SERPL-MCNC: 181 MG/DL (ref 65–140)
HCT VFR BLD AUTO: 24.5 % (ref 36.5–49.3)
HGB BLD-MCNC: 7.6 G/DL (ref 12–17)
IMM GRANULOCYTES # BLD AUTO: 0.09 THOUSAND/UL (ref 0–0.2)
IMM GRANULOCYTES NFR BLD AUTO: 1 % (ref 0–2)
LYMPHOCYTES # BLD AUTO: 0.72 THOUSANDS/ΜL (ref 0.6–4.47)
LYMPHOCYTES NFR BLD AUTO: 8 % (ref 14–44)
MAGNESIUM SERPL-MCNC: 2.1 MG/DL (ref 1.6–2.6)
MCH RBC QN AUTO: 27.1 PG (ref 26.8–34.3)
MCHC RBC AUTO-ENTMCNC: 31 G/DL (ref 31.4–37.4)
MCV RBC AUTO: 88 FL (ref 82–98)
MONOCYTES # BLD AUTO: 0.49 THOUSAND/ΜL (ref 0.17–1.22)
MONOCYTES NFR BLD AUTO: 6 % (ref 4–12)
NEUTROPHILS # BLD AUTO: 7.59 THOUSANDS/ΜL (ref 1.85–7.62)
NEUTS SEG NFR BLD AUTO: 85 % (ref 43–75)
NRBC BLD AUTO-RTO: 0 /100 WBCS
PHOSPHATE SERPL-MCNC: 3.6 MG/DL (ref 2.3–4.1)
PLATELET # BLD AUTO: 240 THOUSANDS/UL (ref 149–390)
PMV BLD AUTO: 10.7 FL (ref 8.9–12.7)
POTASSIUM SERPL-SCNC: 4.7 MMOL/L (ref 3.5–5.3)
RBC # BLD AUTO: 2.8 MILLION/UL (ref 3.88–5.62)
SODIUM SERPL-SCNC: 134 MMOL/L (ref 136–145)
WBC # BLD AUTO: 8.9 THOUSAND/UL (ref 4.31–10.16)

## 2018-06-14 PROCEDURE — 97110 THERAPEUTIC EXERCISES: CPT

## 2018-06-14 PROCEDURE — 94760 N-INVAS EAR/PLS OXIMETRY 1: CPT

## 2018-06-14 PROCEDURE — 94660 CPAP INITIATION&MGMT: CPT

## 2018-06-14 PROCEDURE — 85025 COMPLETE CBC W/AUTO DIFF WBC: CPT | Performed by: PHYSICIAN ASSISTANT

## 2018-06-14 PROCEDURE — 80048 BASIC METABOLIC PNL TOTAL CA: CPT | Performed by: PHYSICIAN ASSISTANT

## 2018-06-14 PROCEDURE — 94640 AIRWAY INHALATION TREATMENT: CPT

## 2018-06-14 PROCEDURE — 99232 SBSQ HOSP IP/OBS MODERATE 35: CPT | Performed by: INTERNAL MEDICINE

## 2018-06-14 PROCEDURE — 94664 DEMO&/EVAL PT USE INHALER: CPT

## 2018-06-14 PROCEDURE — 84100 ASSAY OF PHOSPHORUS: CPT | Performed by: PHYSICIAN ASSISTANT

## 2018-06-14 PROCEDURE — 93306 TTE W/DOPPLER COMPLETE: CPT | Performed by: INTERNAL MEDICINE

## 2018-06-14 PROCEDURE — 83735 ASSAY OF MAGNESIUM: CPT | Performed by: PHYSICIAN ASSISTANT

## 2018-06-14 RX ADMIN — ASPIRIN 81 MG 81 MG: 81 TABLET ORAL at 08:42

## 2018-06-14 RX ADMIN — HYDRALAZINE HYDROCHLORIDE 25 MG: 25 TABLET, FILM COATED ORAL at 14:37

## 2018-06-14 RX ADMIN — IPRATROPIUM BROMIDE AND ALBUTEROL SULFATE 3 ML: .5; 3 SOLUTION RESPIRATORY (INHALATION) at 14:12

## 2018-06-14 RX ADMIN — AMIODARONE HYDROCHLORIDE 100 MG: 200 TABLET ORAL at 08:43

## 2018-06-14 RX ADMIN — POLYETHYLENE GLYCOL 3350 17 G: 17 POWDER, FOR SOLUTION ORAL at 08:32

## 2018-06-14 RX ADMIN — TORSEMIDE 20 MG: 20 TABLET ORAL at 08:47

## 2018-06-14 RX ADMIN — FERROUS SULFATE TAB 325 MG (65 MG ELEMENTAL FE) 325 MG: 325 (65 FE) TAB at 08:30

## 2018-06-14 RX ADMIN — HEPARIN SODIUM 5000 UNITS: 5000 INJECTION, SOLUTION INTRAVENOUS; SUBCUTANEOUS at 05:53

## 2018-06-14 RX ADMIN — HEPARIN SODIUM 5000 UNITS: 5000 INJECTION, SOLUTION INTRAVENOUS; SUBCUTANEOUS at 14:40

## 2018-06-14 RX ADMIN — IPRATROPIUM BROMIDE AND ALBUTEROL SULFATE 3 ML: .5; 3 SOLUTION RESPIRATORY (INHALATION) at 07:54

## 2018-06-14 RX ADMIN — IPRATROPIUM BROMIDE AND ALBUTEROL SULFATE 3 ML: .5; 3 SOLUTION RESPIRATORY (INHALATION) at 19:38

## 2018-06-14 RX ADMIN — IPRATROPIUM BROMIDE AND ALBUTEROL SULFATE 3 ML: .5; 3 SOLUTION RESPIRATORY (INHALATION) at 02:30

## 2018-06-14 RX ADMIN — LEVOTHYROXINE SODIUM 50 MCG: 50 TABLET ORAL at 05:53

## 2018-06-14 RX ADMIN — HEPARIN SODIUM 5000 UNITS: 5000 INJECTION, SOLUTION INTRAVENOUS; SUBCUTANEOUS at 22:00

## 2018-06-14 RX ADMIN — HYDRALAZINE HYDROCHLORIDE 25 MG: 25 TABLET, FILM COATED ORAL at 05:53

## 2018-06-14 RX ADMIN — PANTOPRAZOLE SODIUM 40 MG: 40 TABLET, DELAYED RELEASE ORAL at 08:42

## 2018-06-14 RX ADMIN — HYDRALAZINE HYDROCHLORIDE 25 MG: 25 TABLET, FILM COATED ORAL at 22:00

## 2018-06-14 RX ADMIN — METOPROLOL TARTRATE 25 MG: 25 TABLET, FILM COATED ORAL at 20:51

## 2018-06-14 RX ADMIN — DOCUSATE SODIUM 100 MG: 100 CAPSULE, LIQUID FILLED ORAL at 22:00

## 2018-06-14 RX ADMIN — AMLODIPINE BESYLATE 10 MG: 10 TABLET ORAL at 08:43

## 2018-06-14 RX ADMIN — ISOSORBIDE MONONITRATE 30 MG: 30 TABLET, EXTENDED RELEASE ORAL at 08:47

## 2018-06-14 RX ADMIN — METOPROLOL TARTRATE 25 MG: 25 TABLET, FILM COATED ORAL at 08:42

## 2018-06-14 RX ADMIN — METHYLPREDNISOLONE SODIUM SUCCINATE 20 MG: 40 INJECTION, POWDER, FOR SOLUTION INTRAMUSCULAR; INTRAVENOUS at 08:44

## 2018-06-14 RX ADMIN — METHYLPREDNISOLONE SODIUM SUCCINATE 20 MG: 40 INJECTION, POWDER, FOR SOLUTION INTRAMUSCULAR; INTRAVENOUS at 20:48

## 2018-06-14 NOTE — PROGRESS NOTES
Progress Note - Pulmonary   Cristhian Hill 80 y o  male MRN: [id number]  Unit/Bed#: ICU 03 Encounter: [id number]    Assessment:  Acute on chronic hypoxemic respiratory failure  Patient does have oxygen desaturation with ambulation  Even on 10 L his O2 saturation today walking decreased to 85%  Echocardiogram done yesterday shows LV systolic function be decreased  Left ventricular ejection fraction is 35-40% with severe diffuse hypokinesis  The bioprosthetic TAVR appears to be function satisfactory  Estimated pulmonary artery pressure was 65 mm Hg  This is similar to previous echocardiogram  Iron deficiency anemia  Patient has been started on iron supplement  Hemoglobin 7 6 today  First stool specimen for occult blood negative  Mild COPD exacerbation improved  KJ on chronic kidney disease  PATITO A  Patient has been compliant with using his CPAP at home    Plan:  Cause of his persistent oxygen saturation with activity likely secondary to his severe pulmonary hypertension, cardiomyopathy and COPD  Use 10 L of oxygen with any activity  Iron supplement is ordered  Continue Solu-Medrol 20 mg IV every 12 hr for now and neb treatments with DuoNeb every 6 hr  If the patient hold stable with consider changing to oral prednisone soon  Bipap 12/5 with oxygen at night      Subjective:   Patient states there has been some slight improvement in his shortness of breath with activity  He is not having any chest pain or leg swelling    Objective:     Vitals: Blood pressure 140/65, pulse 66, temperature 98 °F (36 7 °C), temperature source Temporal, resp  rate 20, height 5' 10" (1 778 m), weight 99 2 kg (218 lb 11 1 oz), SpO2 98 %  ,Body mass index is 31 38 kg/m²        Intake/Output Summary (Last 24 hours) at 06/14/18 1612  Last data filed at 06/14/18 1445   Gross per 24 hour   Intake              720 ml   Output             3200 ml   Net            -2480 ml       Physical Exam: Physical Exam   Constitutional: He is oriented to person, place, and time  He appears well-developed and well-nourished  No distress  On 6 L at rest O2 saturation is 96%   HENT:   Head: Normocephalic  Nose: Nose normal    Mouth/Throat: Oropharynx is clear and moist  No oropharyngeal exudate  Eyes: Conjunctivae are normal  Pupils are equal, round, and reactive to light  Neck: Neck supple  No JVD present  No tracheal deviation present  Cardiovascular: Normal rate, regular rhythm and normal heart sounds  Pulmonary/Chest: Effort normal    Lung sounds are diminished  There are few faint inspiratory wheezes left lower lobe   Abdominal: Soft  He exhibits no distension  There is no tenderness  There is no guarding  Musculoskeletal: He exhibits no edema  Lymphadenopathy:     He has no cervical adenopathy  Neurological: He is alert and oriented to person, place, and time  Skin: Skin is warm and dry  No rash noted  Psychiatric: He has a normal mood and affect  His behavior is normal  Thought content normal         Labs: I have personally reviewed pertinent lab results  , ABG: No results found for: PHART, CCM9DQM, PO2ART, XTF9NUP, L3BVJOLP, BEART, SOURCE, BNP: No results found for: BNP, CBC: Lab Results   Component Value Date    WBC 8 90 06/14/2018    HGB 7 6 (L) 06/14/2018    HCT 24 5 (L) 06/14/2018    MCV 88 06/14/2018     06/14/2018    ADJUSTEDWBC 6 70 10/04/2016    MCH 27 1 06/14/2018    MCHC 31 0 (L) 06/14/2018    RDW 15 3 (H) 06/14/2018    MPV 10 7 06/14/2018    NRBC 0 06/14/2018   , CMP: Lab Results   Component Value Date     (L) 06/14/2018     10/16/2017    K 4 7 06/14/2018    K 4 6 10/16/2017    CL 98 (L) 06/14/2018    CL 95 (L) 10/16/2017    CO2 31 06/14/2018    CO2 26 10/16/2017    ANIONGAP 5 06/14/2018    ANIONGAP 13 9 10/08/2015    BUN 55 (H) 06/14/2018    BUN 39 (H) 10/16/2017    CREATININE 2 02 (H) 06/14/2018    CREATININE 2 06 (H) 10/16/2017    GLUCOSE 181 (H) 06/14/2018    GLUCOSE 109 (H) 10/16/2017    CALCIUM 8 9 06/14/2018    CALCIUM 9 7 10/16/2017    AST 47 (H) 06/11/2018    AST 39 10/16/2017    ALT 33 06/11/2018    ALT 15 10/16/2017    ALKPHOS 59 06/11/2018    ALKPHOS 61 10/16/2017    PROT 7 9 06/11/2018    PROT 7 9 10/16/2017    BILITOT 0 30 06/11/2018    BILITOT 0 4 10/16/2017    EGFR 29 06/14/2018   , PT/INR:   Lab Results   Component Value Date    INR 1 16 06/11/2018   , Troponin: No results found for: TROPONIN    Imaging and other studies: I have personally reviewed pertinent reports     and I have personally reviewed pertinent films in PACS

## 2018-06-14 NOTE — PROGRESS NOTES
[facility] Progress Note  Patient: José Horan 80 y o  male   MRN: [id number]  PCP: Teri Saul DO  Unit/Bed#: ICU 03 Encounter: [id number]  Date Of Visit: 18    Subjective:   He ambulates with 10 LNC today  No cp    Objective:   Vitals:   Temp (24hrs), Av 8 °F (36 6 °C), Min:97 6 °F (36 4 °C), Max:98 1 °F (36 7 °C)    HR:  [57-71] 64  Resp:  [20-43] 20  BP: (113-146)/(58-74) 139/63  SpO2:  [93 %-99 %] 98 %  Body mass index is 31 38 kg/m²  Intake/Output Summary (Last 24 hours) at 18 1423  Last data filed at 18 1250   Gross per 24 hour   Intake              720 ml   Output             3400 ml   Net            -2680 ml       Physical Exam:   General: Speak full sentence, NAD  HEENT: EOMI, anicteric, oral moist, no oral thrush or mucosal lesion, neck supple, no mass or JVD  Chest: CTAB, BS diminished b/l, no audible wheeze  Cardiac: RRR, S1/S2, No murmur  Abd: S/ND/NT/BS+  MSK: No LE pitting edema, pulses intact  Neuro: AAOx3, moving all extremities  Psychiatric: Mood with normal affect    Additional Data:     Labs:    Results from last 7 days  Lab Units 18  0440   WBC Thousand/uL 8 90   HEMOGLOBIN g/dL 7 6*   HEMATOCRIT % 24 5*   PLATELETS Thousands/uL 240   NEUTROS PCT % 85*   LYMPHS PCT % 8*   MONOS PCT % 6   EOS PCT % 0       Results from last 7 days  Lab Units 18  0440  18  1415   SODIUM mmol/L 134*  < > 139   POTASSIUM mmol/L 4 7  < > 4 8   CHLORIDE mmol/L 98*  < > 100   CO2 mmol/L 31  < > 28   BUN mg/dL 55*  < > 51*   CREATININE mg/dL 2 02*  < > 2 42*   CALCIUM mg/dL 8 9  < > 8 6   TOTAL PROTEIN g/dL  --   --  7 9   BILIRUBIN TOTAL mg/dL  --   --  0 30   ALK PHOS U/L  --   --  59   ALT U/L  --   --  33   AST U/L  --   --  47*   GLUCOSE RANDOM mg/dL 181*  < > 159*   < > = values in this interval not displayed      Results from last 7 days  Lab Units 18  1415   INR  1 16       Recent Results (from the past 24 hour(s))   CBC and differential Collection Time: 06/14/18  4:40 AM   Result Value Ref Range    WBC 8 90 4 31 - 10 16 Thousand/uL    RBC 2 80 (L) 3 88 - 5 62 Million/uL    Hemoglobin 7 6 (L) 12 0 - 17 0 g/dL    Hematocrit 24 5 (L) 36 5 - 49 3 %    MCV 88 82 - 98 fL    MCH 27 1 26 8 - 34 3 pg    MCHC 31 0 (L) 31 4 - 37 4 g/dL    RDW 15 3 (H) 11 6 - 15 1 %    MPV 10 7 8 9 - 12 7 fL    Platelets 837 713 - 293 Thousands/uL    nRBC 0 /100 WBCs    Neutrophils Relative 85 (H) 43 - 75 %    Immat GRANS % 1 0 - 2 %    Lymphocytes Relative 8 (L) 14 - 44 %    Monocytes Relative 6 4 - 12 %    Eosinophils Relative 0 0 - 6 %    Basophils Relative 0 0 - 1 %    Neutrophils Absolute 7 59 1 85 - 7 62 Thousands/µL    Immature Grans Absolute 0 09 0 00 - 0 20 Thousand/uL    Lymphocytes Absolute 0 72 0 60 - 4 47 Thousands/µL    Monocytes Absolute 0 49 0 17 - 1 22 Thousand/µL    Eosinophils Absolute 0 00 0 00 - 0 61 Thousand/µL    Basophils Absolute 0 01 0 00 - 0 10 Thousands/µL   Basic metabolic panel    Collection Time: 06/14/18  4:40 AM   Result Value Ref Range    Sodium 134 (L) 136 - 145 mmol/L    Potassium 4 7 3 5 - 5 3 mmol/L    Chloride 98 (L) 100 - 108 mmol/L    CO2 31 21 - 32 mmol/L    Anion Gap 5 4 - 13 mmol/L    BUN 55 (H) 5 - 25 mg/dL    Creatinine 2 02 (H) 0 60 - 1 30 mg/dL    Glucose 181 (H) 65 - 140 mg/dL    Calcium 8 9 8 3 - 10 1 mg/dL    eGFR 29 ml/min/1 73sq m   Magnesium    Collection Time: 06/14/18  4:40 AM   Result Value Ref Range    Magnesium 2 1 1 6 - 2 6 mg/dL   Phosphorus    Collection Time: 06/14/18  4:40 AM   Result Value Ref Range    Phosphorus 3 6 2 3 - 4 1 mg/dL       Cultures:         Imaging:  Xr Chest Portable    Result Date: 6/11/2018  Narrative: CHEST INDICATION:   SOB   "SOB X FEW HOURS, HX COPD AND CHF" COMPARISON:  Chest x-ray dated 3/29/2018, echocardiogram dated 3/30/2018 (LV ejection fraction approximately 25%)  EXAM PERFORMED/VIEWS:  XR CHEST PORTABLE FINDINGS: No pneumothorax is seen   The cardiac silhouette is enlarged, as before  There is some mild prominence of the central pulmonary vessels  There is also mild hypoaeration of the right lower lung field with blunting of the costophrenic sulcus, suspect small pleural effusion/atelectasis with some pleural fluid tracking along the convexity of the right hemithorax laterally  Some patchy opacities are seen in the lower lung fields bilaterally  Lungs otherwise are grossly clear  The aorta is calcified but well-defined  The visualized bones appear intact  Some degenerative changes of the left glenohumeral joint are redemonstrated  Impression: Enlarged cardiac silhouette with pulmonary vascular prominence, suspected small right pleural effusion, and bilateral airspace opacities  Consider a component of fluid overload/CHF  Superimposed infection could be considered in the appropriate clinical  setting  Workstation performed: [id number]     Ct Chest Wo Contrast    Result Date: 6/11/2018  Narrative: CT CHEST WITHOUT IV CONTRAST INDICATION:   Shortness of breath  COMPARISON: June 8, 2017 TECHNIQUE: CT examination of the chest was performed without intravenous contrast   Axial, sagittal, and coronal 2D reformatted images were created from the source data and submitted for interpretation  Radiation dose length product (DLP) for this visit:  780 69 mGy-cm   This examination, like all CT scans performed in the [facility], was performed utilizing techniques to minimize radiation dose exposure, including the use of iterative  reconstruction and automated exposure control  FINDINGS: LUNGS:  Density along the right minor fissure likely representing a lymph node  Mild groundglass lingular regions could relate to atelectasis and/or pneumonia  Unchanged appearance of chronic emphysematous changes and possible chronic fibrosis/interstitial change as previously suggested  PLEURA:  Small right pleural effusion   HEART/GREAT VESSELS:  Atherosclerotic changes thoracic aorta and coronary arteries  MEDIASTINUM AND JANAE:  Unchanged appearance of precarinal, right paratracheal and probable right hilar lymphadenopathy  CHEST WALL AND LOWER NECK:   Unremarkable  VISUALIZED STRUCTURES IN THE UPPER ABDOMEN:  Unremarkable  OSSEOUS STRUCTURES:  No acute fracture or destructive osseous lesion  Impression: 1  Unchanged small right pleural effusion which could be loculated although this is difficult to evaluate without contrast  2   Unchanged appearance of diffuse lung emphysematous/interstitial/fibrotic changes  3   Lingular groundglass regions could represent atelectasis  rather than pneumonia, correlate with lab values and symptomatology  Workstation performed: [id number]     Nm Lung Ventilation / Perfusion    Result Date: 6/12/2018  Narrative: VENTILATION AND PERFUSION SCAN INDICATION:  Chest pain, shortness of breath  Dyspnea, cardiac origin suspected elevated d-dimer COMPARISON:  Chest radiograph  6/11/2018, CT chest dated 6/11/2018 TECHNIQUE:  Posterior ventilation imaging was performed after the inhalation of 32 mCi nebulized Tc-99m DTPA with deposition of less than 1 mCi of activity within the lungs  Multiplanar perfusion imaging was next performed following the intravenous administration of 4 2 mCi Tc-99m labeled MAA  FINDINGS:  Ventilation imaging demonstrates heterogeneous distribution of radiotracer with some central deposition which could be seen with COPD and/or reactive airway disease  Perfusion imaging demonstrates several small areas of decreased radiotracer uptake matched to the ventilation imaging but otherwise homogeneous distribution of the radiopharmaceutical throughout both lungs  There is no significant  segmental or subsegmental defect  Impression: Ventilation imaging demonstrates heterogeneous distribution of radiotracer with some central deposition which could be seen with COPD and/or reactive airway disease  The probability for pulmonary embolus is low   Workstation performed: [id number]     Imaging Reports Reviewed by myself    Last 24 Hours Medication List:     Current Facility-Administered Medications:     acetaminophen (TYLENOL) tablet 650 mg, 650 mg, Oral, Q6H PRN, Katy Fregoso, CRNP, 650 mg at 06/12/18 2032    amiodarone tablet 100 mg, 100 mg, Oral, Daily, CHICHI HamiltonNP, 100 mg at 06/14/18 0843    amLODIPine (NORVASC) tablet 10 mg, 10 mg, Oral, Daily, CHICHI HamiltonNP, 10 mg at 06/14/18 9871    aspirin chewable tablet 81 mg, 81 mg, Oral, Daily, CHICHI HamiltonNP, 81 mg at 06/14/18 0842    docusate sodium (COLACE) capsule 100 mg, 100 mg, Oral, HS, Klarissa Sharma CRNP, 100 mg at 06/13/18 2119    ferrous sulfate tablet 325 mg, 325 mg, Oral, Daily With Breakfast, [place], , 325 mg at 06/14/18 0830    heparin (porcine) subcutaneous injection 5,000 Units, 5,000 Units, Subcutaneous, Q8H [facility], [facility], 5,000 Units at 06/14/18 0553    hydrALAZINE (APRESOLINE) tablet 25 mg, 25 mg, Oral, Q8H [facility], CHICHI HamiltonNP, 25 mg at 06/14/18 0553    ipratropium-albuterol (DUO-NEB) 0 5-2 5 mg/3 mL inhalation solution 3 mL, 3 mL, Nebulization, Q6H, CHICHI HamiltonNP, 3 mL at 06/14/18 1412    isosorbide mononitrate (IMDUR) 24 hr tablet 30 mg, 30 mg, Oral, Daily, CHICHI HamiltonNP, 30 mg at 06/14/18 0847    levothyroxine tablet 50 mcg, 50 mcg, Oral, Early Morning, CHICHI HamiltonNP, 50 mcg at 06/14/18 0553    methylPREDNISolone sodium succinate (Solu-MEDROL) injection 20 mg, 20 mg, Intravenous, Q12H [facility], Linda Davila, DO, 20 mg at 06/14/18 0844    metoprolol tartrate (LOPRESSOR) tablet 25 mg, 25 mg, Oral, Q12H [facility], ROC Hamilton, 25 mg at 06/14/18 0842    pantoprazole (PROTONIX) EC tablet 40 mg, 40 mg, Oral, Daily, ROC Hamilton, 40 mg at 06/14/18 0842    polyethylene glycol (MIRALAX) packet 17 g, 17 g, Oral, Daily, ROC Hamilton, 17 g at 06/14/18 0832    torsemide (DEMADEX) tablet 20 mg, 20 mg, Oral, Daily, Linda Davila, , 20 mg at 06/14/18 0847     Assessment and Plans:  Hospital Problem List:   Principal Problem:    Acute on chronic respiratory failure with hypoxia ([address] )  Active Problems:    COPD with acute exacerbation (HCC)    Paroxysmal atrial fibrillation (HCC)    CAD in native artery    Hypertension    PATITO (obstructive sleep apnea)    Class 1 obesity in adult    Combined systolic and diastolic cardiac dysfunction    Hypothyroidism    Iron deficiency anemia    Elevated d-dimer    79year old male with a history of Centrilobular emphysema, Chronic hypoxemic respiratory failure on 6 LNC at home, PATITO on CPAP 10cm H2O, paroxysmal AFib, chronic combined systolic and diastolic CHF, who presents with worsening shortness of breath, BARCENAS, and drop of O2 saturation for 2 weeks  He required BiPAP and was admitted into ICU step-down for close monitoring on 6/11/2018  He has been clinically improving therefore transferred to medicine floor on 6/13/2018  Acute on chronic respiratory failure with hypoxia  More likely multifactorial 2/2 severe diffuse hypokinesis & decreased cardiac output from cardiomyopathy, severe pulmonary hypertension, COPD exacerbation  Repeat TTE on 6/13/2018 (compared to 3/30/2018): EF 35~40 %  Severe diffuse hypokinesis  Moderate to severe pulmonary hypertension    CT chest: no evidence of PNA  Procalcitonin low: does not support bacterial infx origin  ACS r/o  V/Q scan shows low probability of PE  Titrate O2 to keep SpO2>90%  Solumedrol slow taper as tolerated  Bronchodilators  PT/OT eval and tx  Pt declined STR, just wants to go home    COPD with acute exacerbation  CT chest: unchanged diffuse emphysematous/fibrotic changes     Combined systolic and diastolic CHF  Likely mild acute exacerbation  S/p Lasix 20 mg IV x1 in ER for acute component, no signs of fluid overload at this time  On home torsemide 20mg po daily now     PATITO (obstructive sleep apnea)  Use BiPAP 12/5 at bedtime given his acute on chronic respiratory failure    Elevated d-dimer  V/Q scan showed low probability of PE    Acute on Chronic Kidney Injury  CKD stage III  Resolved as of 6/13/2018  Baseline Cr 1 6~1 9  Avoid nephrotoxic agents     Hypertension  CAD in native artery  Continue with aspirin, metoprolol, Imdur, hydralazine, statin     Paroxysmal atrial fibrillation  Currently in normal sinus rhythm  Continue amiodarone, metoprolol, asa  No on AC at home    S/P TAVR (transcatheter aortic valve replacement)  R/o ACS as above    Peripheral vascular disease  Continue aspirin, statins      GERD (gastroesophageal reflux disease)  Continue PPI     Iron deficiency anemia  Continue ferrous sulfate     Diabetic polyneuropathy associated with type 2 diabetes mellitus   Repeat LwL2k=9 3     Hypothyroidism  Continue Synthyroid      Multiple pulmonary nodules  Patient has history of chronic small pulmonary nodule, 4 x 8 mm as seen on last CT scan earlier this month with no changes seen since last nodule in 2013, stable, continue to monitor per guidelines     Pleural effusion, right  Chronic right-sided pleural effusion, small, continue to monitor    DVT Prophylaxis: on Heparin sc, SCD  Code Status: Level 1 - Full Code  Disposition: anticipate d/c home vs STR once clinically improved      Signed by:  Sangeetha Walsh MD  Attending Hospitalist  Page#: [phone] (Hours 7am to 7pm)  6/14/2018 2:23 PM

## 2018-06-14 NOTE — CASE MANAGEMENT
Continued Stay Review  Date: 6/14/18  Vital Signs: /63 (BP Location: Left arm)   Pulse 64   Temp 98 °F (36 7 °C) (Temporal)   Resp 20   Ht 5' 10" (1 778 m)   Wt 99 2 kg (218 lb 11 1 oz)   SpO2 95%   BMI 31 38 kg/m²   Medications:   Scheduled Meds:   Current Facility-Administered Medications:  acetaminophen 650 mg Oral Q6H PRN ROC Locke   amiodarone 100 mg Oral Daily Klarissa ROC Nunez   amLODIPine 10 mg Oral Daily Klarissa ROC Nunez   aspirin 81 mg Oral Daily Klarissa ROC Nunez   docusate sodium 100 mg Oral HS Klarissa ROC Nunez   ferrous sulfate 325 mg Oral Daily With Breakfast Linda Davila DO   heparin (porcine) 5,000 Units Subcutaneous Q8H [facility] Linda Davila DO   hydrALAZINE 25 mg Oral Q8H [facility] ROC Hamilton   ipratropium-albuterol 3 mL Nebulization Q6H ROC Hamilton   isosorbide mononitrate 30 mg Oral Daily ROC Hamilton   levothyroxine 50 mcg Oral Early Morning ROC Hamilton   methylPREDNISolone sodium succinate 20 mg Intravenous Q12H [facility] Linda Davila DO   metoprolol tartrate 25 mg Oral Q12H [facility] ROC Hamilton   pantoprazole 40 mg Oral Daily KlarissaROC Topete   polyethylene glycol 17 g Oral Daily KlarissaROC Topete   torsemide 20 mg Oral Daily Linda Davila DO   Continuous Infusions:    PRN Meds:   acetaminophen  Abnormal Labs/Diagnostic Results:   HGB 7 6  CL 98 BUN 55 CR 2 02 GLUC 181 GFR 29   Age/Sex: 80 y o  male   Assessment/Plan:MED SURG  O2 @ 10LTR NC, LUNGS WITH DECREASED BREATH SOUNDS NOTED  REMAINS ON SLOW IV STEROIDS TAPER FOR RESP FAILURE WITH HYPOXIA 2ND COPD EXACERBATION  Discharge Plan: TBD  Thank you,  [facility] in the [facility] by Markus Henderson for 2017  Network Utilization Review Department  Phone: [phone];  Fax [phone]  ATTENTION: The Network Utilization Review Department is now centralized for our 7 Facilities  Make a note that we have a new phone and fax numbers for our Department  Please call with any questions or concerns to [phone] and carefully follow the prompts so that you are directed to the right person  All voicemails are confidential  Fax any determinations, approvals, denials, and requests for initial or continue stay review clinical to [phone]  Due to HIGH CALL volume, it would be easier if you could please send faxed requests to expedite your requests and in part, help us provide discharge notifications faster

## 2018-06-14 NOTE — PLAN OF CARE
Problem: PHYSICAL THERAPY ADULT  Goal: Performs mobility at highest level of function for planned discharge setting  See evaluation for individualized goals  Outcome: Progressing  Prognosis: Good  Problem List: Decreased strength, Decreased range of motion, Decreased endurance, Impaired balance, Decreased mobility, Decreased coordination, Decreased safety awareness, Pain, Impaired hearing  Assessment: Pt is improving with trans, amb, and amb endurance  Pt with mild SOB after activities with SAO2 decreasing  Pt will benefit from cont skilled PT services  Recommendation:  (STR vs home w serv as prev;home PT pending progress)          See flowsheet documentation for full assessment

## 2018-06-14 NOTE — PHYSICAL THERAPY NOTE
PT TREATMENT     06/14/18 1029   Pain Assessment   Pain Assessment No/denies pain   Restrictions/Precautions   Other Precautions Contact/isolation;O2;Fall Risk;Bed Alarm; Chair Alarm  (decreasing O2 sats)   General   Chart Reviewed Yes   Family/Caregiver Present No   Subjective   Subjective "good"   Transfers   Sit to Stand 4  Minimal assistance   Additional items Assist x 1;Verbal cues   Stand to Sit 5  Supervision   Additional items Assist x 1;Verbal cues   Ambulation/Elevation   Gait pattern Forward Flexion   Gait Assistance (min A/S)   Additional items Assist x 1;Verbal cues; Tactile cues   Assistive Device Rolling walker   Distance 6L O2 at rest 97%, amb with RW x 20 feet in room, SAO2 decreased to 84%;rest;Increased O2 to 10L per MD recommendation yesterday - pt amb 20 feet in room on 10 L SAO2 95%, pt continued to stand and use urinal, then pt amb an additional 20 feet in room on 10 L and SAO2 decreased to 85%  Pt sat in chair to rest, SAO2 on 10L to 95% after 1 minute  O2 returned to 6L  Balance   Static Sitting Good   Static Standing Fair  (with RW)   Dynamic Standing Fair  (with RW)   Ambulatory Fair  (with RW)   Activity Tolerance   Activity Tolerance Patient limited by fatigue  (mild SOB and decreasing SAO2 with mobility)   Exercises   Hip Flexion 10 reps;Bilateral;Sitting   Hip Abduction 10 reps;Bilateral;Sitting   Knee AROM Long Arc Quad 10 reps;Bilateral;Sitting   Ankle Pumps 10 reps;Bilateral;Sitting   Assessment   Assessment Pt is improving with trans, amb, and amb endurance  Pt with mild SOB after activities with SAO2 decreasing  Pt will benefit from cont skilled PT services  Goals   Treatment Day 2   Plan   Treatment/Interventions ADL retraining;Functional transfer training;LE strengthening/ROM; Therapeutic exercise; Endurance training;Patient/family training;Bed mobility;Gait training   PT Frequency 5x/wk   Recommendation   Recommendation (STR vs home w serv as prev;home PT pending progress) Equipment Recommended (cont amb with RW)   Licensure   [place] License Number  [place], [place] [id number]

## 2018-06-15 ENCOUNTER — TELEPHONE (OUTPATIENT)
Dept: FAMILY MEDICINE CLINIC | Facility: CLINIC | Age: 83
End: 2018-06-15

## 2018-06-15 LAB
HEMOCCULT STL QL: NEGATIVE

## 2018-06-15 PROCEDURE — 94660 CPAP INITIATION&MGMT: CPT

## 2018-06-15 PROCEDURE — 97116 GAIT TRAINING THERAPY: CPT

## 2018-06-15 PROCEDURE — 97530 THERAPEUTIC ACTIVITIES: CPT

## 2018-06-15 PROCEDURE — 99232 SBSQ HOSP IP/OBS MODERATE 35: CPT | Performed by: INTERNAL MEDICINE

## 2018-06-15 PROCEDURE — 94760 N-INVAS EAR/PLS OXIMETRY 1: CPT

## 2018-06-15 PROCEDURE — 99231 SBSQ HOSP IP/OBS SF/LOW 25: CPT | Performed by: INTERNAL MEDICINE

## 2018-06-15 PROCEDURE — 94640 AIRWAY INHALATION TREATMENT: CPT

## 2018-06-15 RX ADMIN — IPRATROPIUM BROMIDE AND ALBUTEROL SULFATE 3 ML: .5; 3 SOLUTION RESPIRATORY (INHALATION) at 07:08

## 2018-06-15 RX ADMIN — TORSEMIDE 20 MG: 20 TABLET ORAL at 09:14

## 2018-06-15 RX ADMIN — METOPROLOL TARTRATE 25 MG: 25 TABLET, FILM COATED ORAL at 09:14

## 2018-06-15 RX ADMIN — AMLODIPINE BESYLATE 10 MG: 10 TABLET ORAL at 09:14

## 2018-06-15 RX ADMIN — METHYLPREDNISOLONE SODIUM SUCCINATE 20 MG: 40 INJECTION, POWDER, FOR SOLUTION INTRAMUSCULAR; INTRAVENOUS at 21:14

## 2018-06-15 RX ADMIN — DOCUSATE SODIUM 100 MG: 100 CAPSULE, LIQUID FILLED ORAL at 21:23

## 2018-06-15 RX ADMIN — ISOSORBIDE MONONITRATE 30 MG: 30 TABLET, EXTENDED RELEASE ORAL at 09:14

## 2018-06-15 RX ADMIN — HYDRALAZINE HYDROCHLORIDE 25 MG: 25 TABLET, FILM COATED ORAL at 15:13

## 2018-06-15 RX ADMIN — FERROUS SULFATE TAB 325 MG (65 MG ELEMENTAL FE) 325 MG: 325 (65 FE) TAB at 08:02

## 2018-06-15 RX ADMIN — POLYETHYLENE GLYCOL 3350 17 G: 17 POWDER, FOR SOLUTION ORAL at 09:16

## 2018-06-15 RX ADMIN — IPRATROPIUM BROMIDE AND ALBUTEROL SULFATE 3 ML: .5; 3 SOLUTION RESPIRATORY (INHALATION) at 01:26

## 2018-06-15 RX ADMIN — HEPARIN SODIUM 5000 UNITS: 5000 INJECTION, SOLUTION INTRAVENOUS; SUBCUTANEOUS at 05:22

## 2018-06-15 RX ADMIN — HEPARIN SODIUM 5000 UNITS: 5000 INJECTION, SOLUTION INTRAVENOUS; SUBCUTANEOUS at 15:14

## 2018-06-15 RX ADMIN — ASPIRIN 81 MG 81 MG: 81 TABLET ORAL at 09:14

## 2018-06-15 RX ADMIN — HYDRALAZINE HYDROCHLORIDE 25 MG: 25 TABLET, FILM COATED ORAL at 21:24

## 2018-06-15 RX ADMIN — IPRATROPIUM BROMIDE AND ALBUTEROL SULFATE 3 ML: .5; 3 SOLUTION RESPIRATORY (INHALATION) at 14:37

## 2018-06-15 RX ADMIN — METHYLPREDNISOLONE SODIUM SUCCINATE 20 MG: 40 INJECTION, POWDER, FOR SOLUTION INTRAMUSCULAR; INTRAVENOUS at 09:16

## 2018-06-15 RX ADMIN — HYDRALAZINE HYDROCHLORIDE 25 MG: 25 TABLET, FILM COATED ORAL at 05:23

## 2018-06-15 RX ADMIN — HEPARIN SODIUM 5000 UNITS: 5000 INJECTION, SOLUTION INTRAVENOUS; SUBCUTANEOUS at 21:28

## 2018-06-15 RX ADMIN — IPRATROPIUM BROMIDE AND ALBUTEROL SULFATE 3 ML: .5; 3 SOLUTION RESPIRATORY (INHALATION) at 19:29

## 2018-06-15 RX ADMIN — LEVOTHYROXINE SODIUM 50 MCG: 50 TABLET ORAL at 05:22

## 2018-06-15 RX ADMIN — AMIODARONE HYDROCHLORIDE 100 MG: 200 TABLET ORAL at 09:15

## 2018-06-15 RX ADMIN — PANTOPRAZOLE SODIUM 40 MG: 40 TABLET, DELAYED RELEASE ORAL at 09:14

## 2018-06-15 RX ADMIN — METOPROLOL TARTRATE 25 MG: 25 TABLET, FILM COATED ORAL at 21:23

## 2018-06-15 NOTE — NURSING NOTE
Pt ambulated in department on 02(10L) with mild-mod sob which pt states is his usual  Denies CP  Tolerated ambulation well

## 2018-06-15 NOTE — TELEPHONE ENCOUNTER
FORMS FOR DR Fernando Florence WERE DROPPED OFF TO BE FILLED OUT FOR THE [facility], THEY CAN BE FAXED BACK  AT THE NURSE'S DESK IN [place]

## 2018-06-15 NOTE — PROGRESS NOTES
[facility] Progress Note  Patient: Terrall Hamman 80 y o  male   MRN: [id number]  PCP: Morris Ortiz, DO  Unit/Bed#: ICU 03 Encounter: [id number]  Date Of Visit: 06/15/18    Subjective: On 6 LNC at resting w/ SpO2 96%  On 10 LNC while ambulating  Feeling not ready to go home today  He declined STR  He lives with a "lady friend", has a day health aid (comes in for a few hours in the morning)  Objective:   Vitals:   Temp (24hrs), Av 1 °F (36 7 °C), Min:97 8 °F (36 6 °C), Max:98 4 °F (36 9 °C)    HR:  [65-76] 76  Resp:  [19-20] 19  BP: (131-151)/(56-70) 131/56  SpO2:  [95 %-98 %] 95 %  Body mass index is 31 38 kg/m²         Intake/Output Summary (Last 24 hours) at 06/15/18 1056  Last data filed at 06/15/18 0837   Gross per 24 hour   Intake              390 ml   Output             2700 ml   Net            -2310 ml       Physical Exam:   General: Speak full sentence, NAD  HEENT: EOMI, anicteric, oral moist, neck supple, no mass or JVD  Chest: CTAB, BS diminished b/l, few audible wheeze b/l  Cardiac: RRR, S1/S2, No murmur  Abd: S/ND/NT/BS+  MSK: No LE pitting edema, pulses intact  Neuro: AAOx3, moving all extremities  Psychiatric: Mood with normal affect    Additional Data:     Labs:    Results from last 7 days  Lab Units 18  0440   WBC Thousand/uL 8 90   HEMOGLOBIN g/dL 7 6*   HEMATOCRIT % 24 5*   PLATELETS Thousands/uL 240   NEUTROS PCT % 85*   LYMPHS PCT % 8*   MONOS PCT % 6   EOS PCT % 0       Results from last 7 days  Lab Units 18  0440  18  1415   SODIUM mmol/L 134*  < > 139   POTASSIUM mmol/L 4 7  < > 4 8   CHLORIDE mmol/L 98*  < > 100   CO2 mmol/L 31  < > 28   BUN mg/dL 55*  < > 51*   CREATININE mg/dL 2 02*  < > 2 42*   CALCIUM mg/dL 8 9  < > 8 6   TOTAL PROTEIN g/dL  --   --  7 9   BILIRUBIN TOTAL mg/dL  --   --  0 30   ALK PHOS U/L  --   --  59   ALT U/L  --   --  33   AST U/L  --   --  47*   GLUCOSE RANDOM mg/dL 181*  < > 159*   < > = values in this interval not displayed  Results from last 7 days  Lab Units 06/11/18  1415   INR  1 16       No results found for this or any previous visit (from the past 24 hour(s))  Cultures:         Imaging:  Xr Chest Portable    Result Date: 6/11/2018  Narrative: CHEST INDICATION:   SOB   "SOB X FEW HOURS, HX COPD AND CHF" COMPARISON:  Chest x-ray dated 3/29/2018, echocardiogram dated 3/30/2018 (LV ejection fraction approximately 25%)  EXAM PERFORMED/VIEWS:  XR CHEST PORTABLE FINDINGS: No pneumothorax is seen  The cardiac silhouette is enlarged, as before  There is some mild prominence of the central pulmonary vessels  There is also mild hypoaeration of the right lower lung field with blunting of the costophrenic sulcus, suspect small pleural effusion/atelectasis with some pleural fluid tracking along the convexity of the right hemithorax laterally  Some patchy opacities are seen in the lower lung fields bilaterally  Lungs otherwise are grossly clear  The aorta is calcified but well-defined  The visualized bones appear intact  Some degenerative changes of the left glenohumeral joint are redemonstrated  Impression: Enlarged cardiac silhouette with pulmonary vascular prominence, suspected small right pleural effusion, and bilateral airspace opacities  Consider a component of fluid overload/CHF  Superimposed infection could be considered in the appropriate clinical  setting  Workstation performed: [id number]     Ct Chest Wo Contrast    Result Date: 6/11/2018  Narrative: CT CHEST WITHOUT IV CONTRAST INDICATION:   Shortness of breath  COMPARISON: June 8, 2017 TECHNIQUE: CT examination of the chest was performed without intravenous contrast   Axial, sagittal, and coronal 2D reformatted images were created from the source data and submitted for interpretation  Radiation dose length product (DLP) for this visit:  780 69 mGy-cm     This examination, like all CT scans performed in the [facility], was performed utilizing techniques to minimize radiation dose exposure, including the use of iterative  reconstruction and automated exposure control  FINDINGS: LUNGS:  Density along the right minor fissure likely representing a lymph node  Mild groundglass lingular regions could relate to atelectasis and/or pneumonia  Unchanged appearance of chronic emphysematous changes and possible chronic fibrosis/interstitial change as previously suggested  PLEURA:  Small right pleural effusion  HEART/GREAT VESSELS:  Atherosclerotic changes thoracic aorta and coronary arteries  MEDIASTINUM AND JANAE:  Unchanged appearance of precarinal, right paratracheal and probable right hilar lymphadenopathy  CHEST WALL AND LOWER NECK:   Unremarkable  VISUALIZED STRUCTURES IN THE UPPER ABDOMEN:  Unremarkable  OSSEOUS STRUCTURES:  No acute fracture or destructive osseous lesion  Impression: 1  Unchanged small right pleural effusion which could be loculated although this is difficult to evaluate without contrast  2   Unchanged appearance of diffuse lung emphysematous/interstitial/fibrotic changes  3   Lingular groundglass regions could represent atelectasis  rather than pneumonia, correlate with lab values and symptomatology  Workstation performed: [id number]     Nm Lung Ventilation / Perfusion    Result Date: 6/12/2018  Narrative: VENTILATION AND PERFUSION SCAN INDICATION:  Chest pain, shortness of breath  Dyspnea, cardiac origin suspected elevated d-dimer COMPARISON:  Chest radiograph  6/11/2018, CT chest dated 6/11/2018 TECHNIQUE:  Posterior ventilation imaging was performed after the inhalation of 32 mCi nebulized Tc-99m DTPA with deposition of less than 1 mCi of activity within the lungs  Multiplanar perfusion imaging was next performed following the intravenous administration of 4 2 mCi Tc-99m labeled MAA   FINDINGS:  Ventilation imaging demonstrates heterogeneous distribution of radiotracer with some central deposition which could be seen with COPD and/or reactive airway disease  Perfusion imaging demonstrates several small areas of decreased radiotracer uptake matched to the ventilation imaging but otherwise homogeneous distribution of the radiopharmaceutical throughout both lungs  There is no significant  segmental or subsegmental defect  Impression: Ventilation imaging demonstrates heterogeneous distribution of radiotracer with some central deposition which could be seen with COPD and/or reactive airway disease  The probability for pulmonary embolus is low   Workstation performed: [id number]     Imaging Reports Reviewed by myself    Last 24 Hours Medication List:     Current Facility-Administered Medications:     acetaminophen (TYLENOL) tablet 650 mg, 650 mg, Oral, Q6H PRN, CHICHI VelascoNP, 650 mg at 06/12/18 2032    amiodarone tablet 100 mg, 100 mg, Oral, Daily, CHICHI HamiltonNP, 100 mg at 06/15/18 0915    amLODIPine (NORVASC) tablet 10 mg, 10 mg, Oral, Daily, CHICHI HamiltnoNP, 10 mg at 06/15/18 1195    aspirin chewable tablet 81 mg, 81 mg, Oral, Daily, CHICHI VelascoNP, 81 mg at 06/15/18 0914    docusate sodium (COLACE) capsule 100 mg, 100 mg, Oral, HS, CHICHI HamiltonNP, 100 mg at 06/14/18 2200    ferrous sulfate tablet 325 mg, 325 mg, Oral, Daily With Breakfast, Linda Lola, DO, 325 mg at 06/15/18 0802    heparin (porcine) subcutaneous injection 5,000 Units, 5,000 Units, Subcutaneous, Q8H [address], Linda Lola, DO, 5,000 Units at 06/15/18 0522    hydrALAZINE (APRESOLINE) tablet 25 mg, 25 mg, Oral, Q8H [address], CHICHI HamiltonNP, 25 mg at 06/15/18 0523    ipratropium-albuterol (DUO-NEB) 0 5-2 5 mg/3 mL inhalation solution 3 mL, 3 mL, Nebulization, Q6H, CHICHI HamiltonNP, 3 mL at 06/15/18 0708    isosorbide mononitrate (IMDUR) 24 hr tablet 30 mg, 30 mg, Oral, Daily, Klarissa R Isaak-Grimes, CRNP, 30 mg at 06/15/18 0914    levothyroxine tablet 50 mcg, 50 mcg, Oral, Early Morning, Sheridan Odor, CRNP, 50 mcg at 06/15/18 0522    methylPREDNISolone sodium succinate (Solu-MEDROL) injection 20 mg, 20 mg, Intravenous, Q12H [address], Linda Lola, DO, 20 mg at 06/15/18 0916    metoprolol tartrate (LOPRESSOR) tablet 25 mg, 25 mg, Oral, Q12H CRISTOPHER, Klarissa Sharma CRNP, 25 mg at 06/15/18 0914    pantoprazole (PROTONIX) EC tablet 40 mg, 40 mg, Oral, Daily, Klarissa Sharma CRNP, 40 mg at 06/15/18 0914    polyethylene glycol (MIRALAX) packet 17 g, 17 g, Oral, Daily, CHICHI HamiltonNP, 17 g at 06/15/18 0916    torsemide (DEMADEX) tablet 20 mg, 20 mg, Oral, Daily, Linda Lola, DO, 20 mg at 06/15/18 0914     Assessment and Plans:  Hospital Problem List:   Principal Problem:    Acute on chronic respiratory failure with hypoxia ([facility] )  Active Problems:    COPD with acute exacerbation (HCC)    Paroxysmal atrial fibrillation (HCC)    CAD in native artery    Hypertension    PATITO (obstructive sleep apnea)    Class 1 obesity in adult    Combined systolic and diastolic cardiac dysfunction    Hypothyroidism    Iron deficiency anemia    Elevated d-dimer    79year old male with a history of Centrilobular emphysema, Chronic hypoxemic respiratory failure on 6 LNC at home, PATITO on CPAP 10cm H2O, paroxysmal AFib, chronic combined systolic and diastolic CHF, who presents with worsening shortness of breath, BARCENAS, and drop of O2 saturation for 2 weeks  He required BiPAP and was admitted into ICU step-down for close monitoring on 6/11/2018  He has been clinically improving therefore transferred to medicine floor on 6/13/2018  Acute on chronic respiratory failure with hypoxia  More likely multifactorial 2/2 severe pulmonary hypertension, COPD exacerbation, severe diffuse hypokinesis & decreased cardiac output from cardiomyopathy  Repeat TTE on 6/13/2018 (compared to 3/30/2018): EF 35~40 %  Severe diffuse hypokinesis  Moderate to severe pulmonary hypertension    CT chest: no evidence of PNA  Procalcitonin low: does not support bacterial infx origin  ACS r/o  V/Q scan shows low probability of PE  Titrate O2 to keep SpO2>90%, now on 6 LNC at rest, 10 LNC during activity  Solumedrol slow taper, now on 20mg iv bid  Bronchodilators  PT/OT eval and tx  Pt declined STR, just wants to go home    COPD with acute exacerbation  CT chest: unchanged diffuse emphysematous/fibrotic changes  Plan see above     Combined systolic and diastolic CHF  Likely mild acute exacerbation  S/p Lasix 20 mg IV x1 in ER for acute component, no signs of fluid overload at this time  On home torsemide 20mg po daily now     PATITO (obstructive sleep apnea)  Use BiPAP 12/5 at bedtime given his acute on chronic respiratory failure    Elevated d-dimer  V/Q scan showed low probability of PE    Acute on Chronic Kidney Injury  CKD stage III  Resolved as of 6/13/2018  Baseline Cr 1 6~1 9  Avoid nephrotoxic agents     Hypertension  CAD in native artery  Continue with aspirin, metoprolol, Imdur, hydralazine, statin     Paroxysmal atrial fibrillation  Currently in normal sinus rhythm  Continue amiodarone, metoprolol, asa  No on AC at home    S/P TAVR (transcatheter aortic valve replacement)  R/o ACS as above    Peripheral vascular disease  Continue aspirin, statins      GERD (gastroesophageal reflux disease)  Continue PPI     Iron deficiency anemia  H/H slowly drop, 7 6/24 5 today  Stool guaiac neg x 2  F/u H/H am  Continue ferrous sulfate     Diabetic polyneuropathy associated with type 2 diabetes mellitus   Repeat PjL3t=0 3     Hypothyroidism  Continue Synthyroid      Multiple pulmonary nodules  Patient has history of chronic small pulmonary nodule, 4 x 8 mm as seen on last CT scan earlier this month with no changes seen since last nodule in 2013, stable, continue to monitor per guidelines     Pleural effusion, right  Chronic right-sided pleural effusion, small, continue to monitor    DVT Prophylaxis: on Heparin sc, SCD     Code Status: Level 1 - Full Code  Disposition: anticipate d/c home vs STR once clinically improved      Signed by:  Randolph Lora MD  Attending Hospitalist  Page#: [phone] (Hours 7am to 7pm)  6/15/2018 10:56 AM

## 2018-06-15 NOTE — CASE MANAGEMENT
Continued Stay Review    Date: 6/15/18    Vital Signs: /56 (BP Location: Left arm)   Pulse 76   Temp 98 4 °F (36 9 °C) (Temporal)   Resp 19   Ht 5' 10" (1 778 m)   Wt 99 2 kg (218 lb 11 1 oz)   SpO2 95%   BMI 31 38 kg/m²     Medications:   Scheduled Meds:   Current Facility-Administered Medications:  acetaminophen 650 mg Oral Q6H PRN CindyROC Brandon   amiodarone 100 mg Oral Daily Klarissa R CHICHI SharmaNP   amLODIPine 10 mg Oral Daily Klarissa R Zachary, CHICHINP   aspirin 81 mg Oral Daily Klarissa R Zachary, ROC   docusate sodium 100 mg Oral HS Klarissa R ROC Sharma   ferrous sulfate 325 mg Oral Daily With Breakfast Linda Davila, DO   heparin (porcine) 5,000 Units Subcutaneous Q8H [address] Lindagonzález Davila, DO   hydrALAZINE 25 mg Oral Q8H [address] KlarissaROC Topete   ipratropium-albuterol 3 mL Nebulization Q6H Klarissa ROC Nunez   isosorbide mononitrate 30 mg Oral Daily Klarissa R ROC Sharma   levothyroxine 50 mcg Oral Early Morning Klarissa ROC Nunez   methylPREDNISolone sodium succinate 20 mg Intravenous Q12H [address] Lindagonzález Davila, DO   metoprolol tartrate 25 mg Oral Q12H [address] KlarissaROC Topete   pantoprazole 40 mg Oral Daily Klarissa ROC Nunez   polyethylene glycol 17 g Oral Daily Klarissa R ROC Sharma   torsemide 20 mg Oral Daily Linda Davila, DO     Continuous Infusions:    PRN Meds:   acetaminophen  Abnormal Labs/Diagnostic Results:   Age/Sex: 80 y o  male   Assessment/Plan:MED SURG  ACUTE ON CHRONIC RESP FAILURE WITH HYPOXIA 2ND COPD EXACERBATION  CURRENT O2 REQUIREMENTS: O2 10LTR WITH AMBULATION & 6LTR WITH REST & BIPAP QHS/PRN  LUNGS WITH DECREASED BREATH SOUNDS & FEW BILATERAL AUDIBLE WHEEZING, IV STEROIDS IN PROGRESS     Discharge Plan: TBD

## 2018-06-15 NOTE — PLAN OF CARE
CARDIOVASCULAR - ADULT     Maintains optimal cardiac output and hemodynamic stability Progressing     Absence of cardiac dysrhythmias or at baseline rhythm Progressing        DISCHARGE PLANNING     Discharge to home or other facility with appropriate resources Progressing        DISCHARGE PLANNING - CARE MANAGEMENT     Discharge to post-acute care or home with appropriate resources Progressing        INFECTION - ADULT     Absence or prevention of progression during hospitalization Progressing     Absence of fever/infection during neutropenic period Progressing        Knowledge Deficit     Patient/family/caregiver demonstrates understanding of disease process, treatment plan, medications, and discharge instructions Progressing        METABOLIC, FLUID AND ELECTROLYTES - ADULT     Electrolytes maintained within normal limits Progressing     Fluid balance maintained Progressing        Potential for Falls     Patient will remain free of falls Progressing        Prexisting or High Potential for Compromised Skin Integrity     Skin integrity is maintained or improved Progressing        RESPIRATORY - ADULT     Achieves optimal ventilation and oxygenation Progressing        SAFETY ADULT     Maintain or return to baseline ADL function Progressing     Maintain or return mobility status to optimal level Progressing     Patient will remain free of falls Progressing

## 2018-06-15 NOTE — PLAN OF CARE
Problem: PHYSICAL THERAPY ADULT  Goal: Performs mobility at highest level of function for planned discharge setting  See evaluation for individualized goals  Outcome: Progressing  Prognosis: Good  Problem List: Decreased endurance, Impaired balance, Decreased mobility  Assessment: Per RN to walk on 10L, however O2 tank has settings for 8L  or 15L   RN recommended using 8L and adjust if necessary  Pt  walked 3/4 of the distance on 8L, sats began to decrease for last 1/4 of the walk  Lowest was 83%, which increased to 87% with standing rest and deep breathing trhough nose  Georgette Ormond Pt  returned to room, seated on commode at request of pt  O2 recovered to 91%  Pt dependent for hygiene after BM  PT cleaned pt with wet cloth and transferred back to chair with all needs in reach, legs elevated/  O2 at 90%on 5L from wall oxygen  RN made aware  Pt  will benefit from continued PT  Recommendation:  (home with service as prior to admit; home PT)          See flowsheet documentation for full assessment

## 2018-06-15 NOTE — PROGRESS NOTES
Progress Note - Pulmonary   Cam Luna 80 y o  male MRN: [id number]  Unit/Bed#: ICU 03 Encounter: [id number]    Assessment:  Acute on chronic hypoxemic respiratory failure  This is multifactorial and related to acute exacerbation of COPD as well as severe pulmonary artery hypertension and cardiomyopathy  Patient has having some mild improvement in his breathing with the IV steroids  Iron deficiency anemia  Thus far 2 stool specimens for occult blood were negative  Hemoglobin 7 6 today  Chronic kidney disease  History of TAVR  PATITO    Plan:  Would continue IV Solu-Medrol at 20 mg every 12 hr for now and neb treatments with DuoNeb  Patient is on iron supplement and will recheck CBCin Am as hemoglobin slightly lower and also type and screen in event hemoglobin to decreases further  I spoke with hospitalist, Dr Andres Needs  Subjective:   Still with shortness of breath with ambulating  Objective:     Vitals: Blood pressure 131/56, pulse 76, temperature 98 4 °F (36 9 °C), temperature source Temporal, resp  rate 19, height 5' 10" (1 778 m), weight 99 2 kg (218 lb 11 1 oz), SpO2 100 %  ,Body mass index is 31 38 kg/m²  Intake/Output Summary (Last 24 hours) at 06/15/18 1600  Last data filed at 06/15/18 1524   Gross per 24 hour   Intake              570 ml   Output             2725 ml   Net            -2155 ml       Physical Exam: Physical Exam     Labs: I have personally reviewed pertinent lab results  , ABG: No results found for: PHART, BUT0UGV, PO2ART, EDZ6KGK, M8XLULVR, BEART, SOURCE, BNP: No results found for: BNP, CBC:   Lab Results   Component Value Date    WBC 8 90 06/14/2018    HGB 7 6 (L) 06/14/2018    HCT 24 5 (L) 06/14/2018    MCV 88 06/14/2018     06/14/2018    ADJUSTEDWBC 6 70 10/04/2016    MCH 27 1 06/14/2018    MCHC 31 0 (L) 06/14/2018    RDW 15 3 (H) 06/14/2018    MPV 10 7 06/14/2018    NRBC 0 06/14/2018   , CMP:   Lab Results   Component Value Date     (L) 06/14/2018     10/16/2017    K 4 7 06/14/2018    K 4 6 10/16/2017    CL 98 (L) 06/14/2018    CL 95 (L) 10/16/2017    CO2 31 06/14/2018    CO2 26 10/16/2017    ANIONGAP 5 06/14/2018    ANIONGAP 13 9 10/08/2015    BUN 55 (H) 06/14/2018    BUN 39 (H) 10/16/2017    CREATININE 2 02 (H) 06/14/2018    CREATININE 2 06 (H) 10/16/2017    GLUCOSE 181 (H) 06/14/2018    GLUCOSE 109 (H) 10/16/2017    CALCIUM 8 9 06/14/2018    CALCIUM 9 7 10/16/2017    AST 47 (H) 06/11/2018    AST 39 10/16/2017    ALT 33 06/11/2018    ALT 15 10/16/2017    ALKPHOS 59 06/11/2018    ALKPHOS 61 10/16/2017    PROT 7 9 06/11/2018    PROT 7 9 10/16/2017    BILITOT 0 30 06/11/2018    BILITOT 0 4 10/16/2017    EGFR 29 06/14/2018   , PT/INR:   Lab Results   Component Value Date    INR 1 16 06/11/2018   , Troponin: No results found for: TROPONIN    Imaging and other studies: I have personally reviewed pertinent reports     and I have personally reviewed pertinent films in PACS

## 2018-06-15 NOTE — RESPIRATORY THERAPY NOTE
RT Protocol Note  Nya Painting 80 y o  male MRN: [id number]  Unit/Bed#: ICU 03 Encounter: [id number]    Assessment    Principal Problem:    Acute on chronic respiratory failure with hypoxia ([facility] )  Active Problems:    COPD with acute exacerbation (HCC)    Paroxysmal atrial fibrillation (HCC)    CAD in native artery    Hypertension    PATITO (obstructive sleep apnea)    Class 1 obesity in adult    Combined systolic and diastolic cardiac dysfunction    Hypothyroidism    Iron deficiency anemia    Elevated d-dimer      Home Pulmonary Medications:   duoneb  Home Devices/Therapy: Home O2    Past Medical History:   Diagnosis Date    Allergic rhinitis 06/11/2010    Aortic stenosis, severe     Bacterial pneumonia 06/08/2007    Bladder neck obstruction 12/23/2010    BPH (benign prostatic hyperplasia)     Bronchitis, chronic obstructive, with exacerbation ([facility] ) 01/30/2012    CAD (coronary artery disease)     stent 2014    Carcinoma ([place] [address] )     resolved [id number]    Cardiomegaly 02/13/2007    Cataract of both eyes     CH[facility]congestive heart failure) ([facility])     Chronic allergic conjunctivitis     last assessed [id number]    Chronic kidney disease (CKD)     CKD (chronic kidney disease), stage III     CKD (chronic kidney disease), stage III     COPD (chronic obstructive pulmonary disease) ([facility] )     Decubitus ulcer     resolved [id number]    Dermatomycosis     last assessed [id number]    Diabetes mellitus type 2, noninsulin dependent [facility] )     Dyspnea on exertion     last assessed [id number]    Eczema     last assessed [id number]    Edema     last assessed [id number]    Epistaxis 12/01/2004    Esophagitis, erosive     Exposure to scabies     resolved [id number]    Fracture of rib     last assessed [id number]    H/O aortic valve replacement     resolved [id number]    H/O blood clots     History of DVT (deep vein thrombosis)     left posterior tibial/peroneal veins    History of non-ST elevation myocardial infarction (NSTEMI)     History of pneumonia     Hyperlipidemia     Hypertension     Internal hemorrhoids 09/13/2006    LBBB (left bundle branch block)     MRSA (methicillin resistant staph aureus) culture positive     NSTEMI (non-ST elevated myocardial infarction) ([place] [address] )     resolved [id number]    Obstructive sleep apnea on CPAP     severe PATITO with AHI of 106 and uses CPAP at 10 cm H2O with 2 l/m oxygen    Paroxysmal atrial fibrillation ([place] [address] )     Phimosis 09/01/2010    severe pt had circumcision 9/2010    Pulmonary fibrosis ([place] [address] )     PVD (peripheral vascular disease) ([place] [address] )     Rotator cuff tendonitis     lsat assessed [id number]    Skin abscess 12/21/2004    Hip    Skin neoplasm     last assessed [id number]    Tachycardia 12/01/2004    Trigger finger     last assessed [id number]    Type 2 diabetes mellitus ([place] [address] ) 12/01/2004    Venous thrombosis 05/16/2007    Venous thrombosis 05/16/2007     Social History     Social History    Marital status:       Spouse name: N/A    Number of children: N/A    Years of education: N/A     Social History Main Topics    Smoking status: Former Smoker     Packs/day: 1 00     Years: 35 00     Types: Cigarettes     Quit date: 1/1/1987    Smokeless tobacco: Never Used      Comment: quit 50 years ago, per [facility]    Alcohol use Yes      Comment: socially    Drug use: No    Sexual activity: No      Comment: , lives at home with wife, ambulates with cane at home     Other Topics Concern    None     Social History Narrative    Lives with his girlfriend    Denied pets/animals    Denied travel history           Subjective         Objective    Physical Exam:   Assessment Type: Post-treatment  General Appearance: Awake, Alert  Respiratory Pattern: Normal  Chest Assessment: Chest expansion symmetrical  Bilateral Breath Sounds: Diminished, Clear  Cough: Non-productive  O2 Device: nc    Vitals:  Blood pressure 131/56, pulse 76, temperature 98 4 °F (36 9 °C), temperature source Temporal, resp  rate 19, height 5' 10" (1 778 m), weight 99 2 kg (218 lb 11 1 oz), SpO2 95 %  Imaging and other studies: I have personally reviewed pertinent reports  O2 Device: nc     Plan             Resp Comments: patient sleeping   patient remains on bipap

## 2018-06-15 NOTE — PHYSICAL THERAPY NOTE
PT TREATMENT     06/15/18 4945   Pain Assessment   Pain Assessment No/denies pain   Restrictions/Precautions   Other Precautions O2;Fall Risk;Bed Alarm; Chair Alarm   General   Chart Reviewed Yes   Family/Caregiver Present No   Cognition   Overall Cognitive Status WFL   Arousal/Participation Cooperative   Attention Within functional limits   Orientation Level Oriented X4   Following Commands Follows all commands and directions without difficulty   Subjective   Subjective Pt  likes to walk  Uses O2 at home on 6L   Transfers   Sit to Stand 4  Minimal assistance  (from chair)   Additional items Assist x 1;Verbal cues   Stand to Sit 5  Supervision   Additional items Assist x 1;Verbal cues   Toilet transfer 4  Minimal assistance   Additional items Verbal cues; Commode;Armrests   Ambulation/Elevation   Gait pattern WNL   Gait Assistance 4  Minimal assist   Additional items Verbal cues   Assistive Device Rolling walker  (O2 at 8L)   Distance 150 feet with 2 brief standing rests   Activity Tolerance   Activity Tolerance (Limited by O2 sats: 94%pre; 83% during;90% post)   Assessment   Prognosis Good   Problem List Decreased endurance; Impaired balance;Decreased mobility   Assessment Per RN to walk on 10L, however O2 tank has settings for 8L  or 15L   RN recommended using 8L and adjust if necessary  Pt  walked 3/4 of the distance on 8L, sats began to decrease for last 1/4 of the walk  Lowest was 83%, which increased to 87% with standing rest and deep breathing trhough nose Sherre Soulier Pt  returned to room, seated on commode at request of pt  O2 recovered to 91%  Pt dependent for hygiene after BM  PT cleaned pt with wet cloth and transferred back to chair with all needs in reach, legs elevated/  O2 at 90%on 5L from wall oxygen  RN made aware  Pt  will benefit from continued PT     Goals   Patient Goals to go home soon   Treatment Day 3   Plan   Treatment/Interventions Functional transfer training;DENIS strengthening/ROM; Therapeutic exercise; Endurance training;Patient/family training;Equipment eval/education; Bed mobility;Gait training;Spoke to nursing   Progress Progressing toward goals   Recommendation   Recommendation (home with service as prior to admit; home PT)   Licensure   [place] License Number  Jaylen Nayak PT [id number]

## 2018-06-16 VITALS
BODY MASS INDEX: 31.31 KG/M2 | SYSTOLIC BLOOD PRESSURE: 122 MMHG | HEART RATE: 54 BPM | WEIGHT: 218.7 LBS | HEIGHT: 70 IN | OXYGEN SATURATION: 93 % | DIASTOLIC BLOOD PRESSURE: 58 MMHG | TEMPERATURE: 98.4 F | RESPIRATION RATE: 20 BRPM

## 2018-06-16 LAB
ABO GROUP BLD: NORMAL
BLD GP AB SCN SERPL QL: NEGATIVE
HCT VFR BLD AUTO: 24.3 % (ref 36.5–49.3)
HGB BLD-MCNC: 7.6 G/DL (ref 12–17)
RH BLD: POSITIVE
SPECIMEN EXPIRATION DATE: NORMAL

## 2018-06-16 PROCEDURE — 85018 HEMOGLOBIN: CPT | Performed by: INTERNAL MEDICINE

## 2018-06-16 PROCEDURE — 85014 HEMATOCRIT: CPT | Performed by: INTERNAL MEDICINE

## 2018-06-16 PROCEDURE — 94760 N-INVAS EAR/PLS OXIMETRY 1: CPT

## 2018-06-16 PROCEDURE — 99238 HOSP IP/OBS DSCHRG MGMT 30/<: CPT | Performed by: INTERNAL MEDICINE

## 2018-06-16 PROCEDURE — 86901 BLOOD TYPING SEROLOGIC RH(D): CPT | Performed by: INTERNAL MEDICINE

## 2018-06-16 PROCEDURE — 97110 THERAPEUTIC EXERCISES: CPT

## 2018-06-16 PROCEDURE — 94640 AIRWAY INHALATION TREATMENT: CPT

## 2018-06-16 PROCEDURE — 86900 BLOOD TYPING SEROLOGIC ABO: CPT | Performed by: INTERNAL MEDICINE

## 2018-06-16 PROCEDURE — 99232 SBSQ HOSP IP/OBS MODERATE 35: CPT | Performed by: INTERNAL MEDICINE

## 2018-06-16 PROCEDURE — 86850 RBC ANTIBODY SCREEN: CPT | Performed by: INTERNAL MEDICINE

## 2018-06-16 RX ORDER — FERROUS SULFATE 325(65) MG
325 TABLET ORAL
Qty: 30 TABLET | Refills: 0 | Status: SHIPPED | OUTPATIENT
Start: 2018-06-17 | End: 2018-06-17

## 2018-06-16 RX ORDER — PREDNISONE 20 MG/1
TABLET ORAL
Qty: 15 TABLET | Refills: 0 | Status: SHIPPED | OUTPATIENT
Start: 2018-06-17 | End: 2018-06-17 | Stop reason: ALTCHOICE

## 2018-06-16 RX ADMIN — HYDRALAZINE HYDROCHLORIDE 25 MG: 25 TABLET, FILM COATED ORAL at 15:55

## 2018-06-16 RX ADMIN — HYDRALAZINE HYDROCHLORIDE 25 MG: 25 TABLET, FILM COATED ORAL at 06:21

## 2018-06-16 RX ADMIN — HEPARIN SODIUM 5000 UNITS: 5000 INJECTION, SOLUTION INTRAVENOUS; SUBCUTANEOUS at 06:20

## 2018-06-16 RX ADMIN — METHYLPREDNISOLONE SODIUM SUCCINATE 20 MG: 40 INJECTION, POWDER, FOR SOLUTION INTRAMUSCULAR; INTRAVENOUS at 09:25

## 2018-06-16 RX ADMIN — IPRATROPIUM BROMIDE AND ALBUTEROL SULFATE 3 ML: .5; 3 SOLUTION RESPIRATORY (INHALATION) at 07:24

## 2018-06-16 RX ADMIN — ISOSORBIDE MONONITRATE 30 MG: 30 TABLET, EXTENDED RELEASE ORAL at 09:27

## 2018-06-16 RX ADMIN — IPRATROPIUM BROMIDE AND ALBUTEROL SULFATE 3 ML: .5; 3 SOLUTION RESPIRATORY (INHALATION) at 14:42

## 2018-06-16 RX ADMIN — TORSEMIDE 20 MG: 20 TABLET ORAL at 09:28

## 2018-06-16 RX ADMIN — METOPROLOL TARTRATE 25 MG: 25 TABLET, FILM COATED ORAL at 09:26

## 2018-06-16 RX ADMIN — IPRATROPIUM BROMIDE AND ALBUTEROL SULFATE 3 ML: .5; 3 SOLUTION RESPIRATORY (INHALATION) at 02:56

## 2018-06-16 RX ADMIN — AMIODARONE HYDROCHLORIDE 100 MG: 200 TABLET ORAL at 09:27

## 2018-06-16 RX ADMIN — AMLODIPINE BESYLATE 10 MG: 10 TABLET ORAL at 09:27

## 2018-06-16 RX ADMIN — ASPIRIN 81 MG 81 MG: 81 TABLET ORAL at 09:27

## 2018-06-16 RX ADMIN — PANTOPRAZOLE SODIUM 40 MG: 40 TABLET, DELAYED RELEASE ORAL at 09:27

## 2018-06-16 RX ADMIN — FERROUS SULFATE TAB 325 MG (65 MG ELEMENTAL FE) 325 MG: 325 (65 FE) TAB at 09:28

## 2018-06-16 RX ADMIN — LEVOTHYROXINE SODIUM 50 MCG: 50 TABLET ORAL at 06:21

## 2018-06-16 NOTE — PROGRESS NOTES
Progress Note - Pulmonary   Vaishali Zhao 80 y o  male MRN: [id number]  Unit/Bed#: [address] [place] [address] Encounter: [id number]    Assessment:  Acute on chronic hypoxemic respiratory failure  He has had some improvement in his exertional dyspnea  This was likely related to his acute exacerbation of COPD as well as his severe pulmonary hypertension cardiomyopathy  Some improvement with IV Solu-Medrol  Iron deficiency anemia  Three stool specimens were negative for occult blood  Hemoglobin stable at 7 6  Chronic kidney disease  History of TAVR  PATITO  Mild lower lobe pulmonary fibrosis  Chronic combined systolic and diastolic congestive heart failure    Plan:  Patient is going to be discharged home today  Would placed on tapering dose of prednisone starting at 40 mg per day  He can vary is oxygen anywhere from 4 L at rest up to 10 liters/minute with activity  Patient does have his own pulse oximeter home and monitors this  Iron supplement for his iron deficiency anemia  He will continue to use CPAP at 10 cm water 6 L oxygen at bedtime  Subjective:   Patient not having any shortness of breath at rest   No new problems since yesterday    Objective:     Vitals: Blood pressure 147/67, pulse 64, temperature 98 °F (36 7 °C), temperature source Temporal, resp  rate 18, height 5' 10" (1 778 m), weight 99 2 kg (218 lb 11 1 oz), SpO2 96 %  ,Body mass index is 31 38 kg/m²  Intake/Output Summary (Last 24 hours) at 06/16/18 1343  Last data filed at 06/16/18 0905   Gross per 24 hour   Intake              970 ml   Output             3175 ml   Net            -2205 ml       Physical Exam: Physical Exam   Constitutional: He is oriented to person, place, and time  He appears well-developed and well-nourished  No distress  HENT:   Head: Normocephalic  Nose: Nose normal    Mouth/Throat: Oropharynx is clear and moist  No oropharyngeal exudate  Eyes: Conjunctivae are normal  Pupils are equal, round, and reactive to light     Neck: Neck supple  No JVD present  No tracheal deviation present  Cardiovascular: Normal rate, regular rhythm and normal heart sounds  Pulmonary/Chest: Effort normal    Lungs - decreased breath sounds but clear   Abdominal: Soft  He exhibits no distension  There is no tenderness  There is no guarding  Musculoskeletal: He exhibits no edema  Lymphadenopathy:     He has no cervical adenopathy  Neurological: He is alert and oriented to person, place, and time  Skin: Skin is warm and dry  No rash noted  Psychiatric: He has a normal mood and affect  His behavior is normal  Thought content normal         Labs: I have personally reviewed pertinent lab results  , ABG: No results found for: PHART, WUD3KPQ, PO2ART, CLE3SWQ, N1PMNAIU, BEART, SOURCE, BNP: No results found for: BNP, CBC: Lab Results   Component Value Date    WBC 8 90 06/14/2018    HGB 7 6 (L) 06/16/2018    HCT 24 3 (L) 06/16/2018    MCV 88 06/14/2018     06/14/2018    ADJUSTEDWBC 6 70 10/04/2016    MCH 27 1 06/14/2018    MCHC 31 0 (L) 06/14/2018    RDW 15 3 (H) 06/14/2018    MPV 10 7 06/14/2018    NRBC 0 06/14/2018   , CMP: Lab Results   Component Value Date     (L) 06/14/2018     10/16/2017    K 4 7 06/14/2018    K 4 6 10/16/2017    CL 98 (L) 06/14/2018    CL 95 (L) 10/16/2017    CO2 31 06/14/2018    CO2 26 10/16/2017    ANIONGAP 5 06/14/2018    ANIONGAP 13 9 10/08/2015    BUN 55 (H) 06/14/2018    BUN 39 (H) 10/16/2017    CREATININE 2 02 (H) 06/14/2018    CREATININE 2 06 (H) 10/16/2017    GLUCOSE 181 (H) 06/14/2018    GLUCOSE 109 (H) 10/16/2017    CALCIUM 8 9 06/14/2018    CALCIUM 9 7 10/16/2017    AST 47 (H) 06/11/2018    AST 39 10/16/2017    ALT 33 06/11/2018    ALT 15 10/16/2017    ALKPHOS 59 06/11/2018    ALKPHOS 61 10/16/2017    PROT 7 9 06/11/2018    PROT 7 9 10/16/2017    BILITOT 0 30 06/11/2018    BILITOT 0 4 10/16/2017    EGFR 29 06/14/2018   , PT/INR:   Lab Results   Component Value Date    INR 1 16 06/11/2018   , Troponin: No results found for: TROPONIN    Imaging and other studies: I have personally reviewed pertinent reports     and I have personally reviewed pertinent films in PACS

## 2018-06-16 NOTE — PLAN OF CARE
Problem: RESPIRATORY - ADULT  Goal: Achieves optimal ventilation and oxygenation  INTERVENTIONS:  - Assess for changes in respiratory status  - Respiratory Therapy support as indicated  - Continue with Home 02 at 6-8LPM   - Continue with Cpap +10 cmH20  - Bipap on standby at bedside   - Continue with nebulizer treatments     Outcome: Progressing

## 2018-06-16 NOTE — NURSING NOTE
Patient discharged to home  All discharge instructions reviewed with pt and significant other  Opportunity for questions provided  Pt left via wheelchair to harsha

## 2018-06-16 NOTE — PHYSICAL THERAPY NOTE
PT TREATMENT     06/16/18 1025   Pain Assessment   Pain Assessment No/denies pain   Restrictions/Precautions   Other Precautions Fall Risk;O2   General   Chart Reviewed Yes   Family/Caregiver Present No   Subjective   Subjective "They are moving me upstairs soon"   Transfers   Sit to Stand 4  Minimal assistance   Additional items Assist x 1;Verbal cues   Stand to Sit 5  Supervision   Additional items Assist x 1;Verbal cues   Ambulation/Elevation   Gait Assistance 4  Minimal assist   Additional items Assist x 1;Verbal cues; Tactile cues   Assistive Device Rolling walker   Distance SAO2 at rest on 2 5L 94%, with amb x 20 feet in room 81%;O2 increased to 8L and at rest SAO2 93% and with amb x 100 feet decreased to 81%  Balance   Static Sitting Good   Static Standing Fair   Dynamic Standing Fair -   Ambulatory Fair -   Activity Tolerance   Activity Tolerance Patient limited by fatigue  (SOB and decreasing SAO2 with activity)   Assessment   Assessment Pt continues to make progress with trans, amb and amb endurance  Goals   Treatment Day 4   Plan   Treatment/Interventions ADL retraining;Functional transfer training;LE strengthening/ROM; Therapeutic exercise; Endurance training;Patient/family training;Bed mobility;Gait training   PT Frequency 5x/wk   Recommendation   Recommendation (Home with services;home PT)   Additional Comments Pt will benefit from STR prior to dc home but states "no rehab", I'm going home!    Licensure   [place] License Number  [id number] [place] [address] [id number]

## 2018-06-16 NOTE — DISCHARGE SUMMARY
Discharge Summary - [address] [facility]  Patient Information: Virginia Conley 80 y o  male MRN: [id number]  Unit/Bed#: [address] [place] [address] Encounter: [id number]    Admission Date: 6/11/2018  Discharge Date: 6/16/2018    Admitting Physician: Surendra Brand DO  Discharging Physician/Practitioner: Filiberto Kearns MD  PCP: Sharifa Monson DO    Reason for Admission: Shortness of Breath (Patient states that he started with SOB with ambulation " a few hours ago"   Hx COPD, CHF  )    Admission Diagnoses:  CHF (congestive heart failure) ([facility] ) [I50 9]  Difficulty breathing [R06 89]  COPD exacerbation ([place] [address] ) [J44 1]    Discharge Diagnoses:    Acute on chronic respiratory failure with hypoxia ([facility] )    COPD with acute exacerbation (HCC)    Combined systolic and diastolic cardiac dysfunction    Paroxysmal atrial fibrillation (HCC)    CAD in native artery    Hypertension    PATITO (obstructive sleep apnea)    Class 1 obesity in adult    Hypothyroidism    Iron deficiency anemia    Elevated d-dimer    Consultations During Hospital Stay:  [facility] Course:     79year old male with a history of Centrilobular emphysema, Chronic hypoxemic respiratory failure on 6 LNC at home, PATITO on CPAP 10cm H2O, paroxysmal AFib, chronic combined systolic and diastolic CHF, who presents with worsening shortness of breath, BARCENAS, and drop of O2 saturation for 2 weeks  He required BiPAP and was admitted into ICU step-down for close monitoring on 6/11/2018  He has been clinically improving therefore transferred to medicine floor on 6/13/2018      Acute on chronic respiratory failure with hypoxia  More likely multifactorial 2/2 severe pulmonary hypertension, COPD exacerbation, severe diffuse hypokinesis & decreased cardiac output from cardiomyopathy  Repeat TTE on 6/13/2018 (compared to 3/30/2018): EF 35~40 %  Severe diffuse hypokinesis  Moderate to severe pulmonary hypertension    CT chest: no evidence of PNA  V/Q scan shows low probability of PE  ACS r/o'ed  Procalcitonin low: does not support bacterial infx origin  Titrate O2 to keep SpO2>90%, now on 6 LNC at rest, 10 LNC during activity  S/p solumedrol slow taper, switched to prednisone 40mg x 5 days, 20mg x 5 days after discharge as per Pulmonology  Continue home bronchodilators  PT/OT completed   Pt declined STR, just wants to go home     COPD with acute exacerbation  CT chest: unchanged diffuse emphysematous/fibrotic changes  Plan see above     Combined systolic and diastolic CHF  Likely mild acute exacerbation  S/p Lasix 20 mg IV x1 in ER for acute component, no signs of fluid overload at this time  Continue home torsemide 20mg po daily     PATITO (obstructive sleep apnea)  Use BiPAP 12/5 at bedtime given his acute on chronic respiratory failure     Elevated d-dimer  V/Q scan showed low probability of PE     Acute on Chronic Kidney Injury  CKD stage III  Resolved as of 6/13/2018  Baseline Cr 1 6~1 9  Avoid nephrotoxic agents     Hypertension  CAD in native artery  Continue with aspirin, metoprolol, Imdur, hydralazine, statin     Paroxysmal atrial fibrillation  Currently in normal sinus rhythm  Continue amiodarone, metoprolol, asa  No on AC at home     S/P TAVR (transcatheter aortic valve replacement)  R/o ACS as above     Peripheral vascular disease  Continue aspirin, statins      GERD (gastroesophageal reflux disease)  Continue PPI     Iron deficiency anemia  H/H stabe  Stool guaiac neg x 3  Continue ferrous sulfate      Diabetic polyneuropathy associated with type 2 diabetes mellitus   Repeat EdJ4a=5 3  Continue diabetic diet     Hypothyroidism  Continue Synthyroid      Multiple pulmonary nodules  Patient has history of chronic small pulmonary nodule, 4 x 8 mm as seen on last CT scan earlier this month with no changes seen since last nodule in 2013, stable, continue to monitor per guidelines     Pleural effusion, right  Chronic right-sided pleural effusion, small, continue to monitor     DVT Prophylaxis: on Heparin sc, SCD  Code Status: Level 1 - Full Code  Disposition: Discharge to home  He declined STR  He usually uses 6 LNC O2 at resting, upto 10 LNC O2 while ambulating  He lives with a "lady friend", who helps him with transportation etc  He has a day health aid (comes in for a few hours in the morning)  Imaging while in hospital:  Xr Chest Portable    Result Date: 6/11/2018  Narrative: CHEST INDICATION:   SOB   "SOB X FEW HOURS, HX COPD AND CHF" COMPARISON:  Chest x-ray dated 3/29/2018, echocardiogram dated 3/30/2018 (LV ejection fraction approximately 25%)  EXAM PERFORMED/VIEWS:  XR CHEST PORTABLE FINDINGS: No pneumothorax is seen  The cardiac silhouette is enlarged, as before  There is some mild prominence of the central pulmonary vessels  There is also mild hypoaeration of the right lower lung field with blunting of the costophrenic sulcus, suspect small pleural effusion/atelectasis with some pleural fluid tracking along the convexity of the right hemithorax laterally  Some patchy opacities are seen in the lower lung fields bilaterally  Lungs otherwise are grossly clear  The aorta is calcified but well-defined  The visualized bones appear intact  Some degenerative changes of the left glenohumeral joint are redemonstrated  Impression: Enlarged cardiac silhouette with pulmonary vascular prominence, suspected small right pleural effusion, and bilateral airspace opacities  Consider a component of fluid overload/CHF  Superimposed infection could be considered in the appropriate clinical  setting  Workstation performed: [id number]     Ct Chest Wo Contrast    Result Date: 6/11/2018  Narrative: CT CHEST WITHOUT IV CONTRAST INDICATION:   Shortness of breath   COMPARISON: June 8, 2017 TECHNIQUE: CT examination of the chest was performed without intravenous contrast   Axial, sagittal, and coronal 2D reformatted images were created from the source data and submitted for interpretation  Radiation dose length product (DLP) for this visit:  780 69 mGy-cm   This examination, like all CT scans performed in the [facility], was performed utilizing techniques to minimize radiation dose exposure, including the use of iterative  reconstruction and automated exposure control  FINDINGS: LUNGS:  Density along the right minor fissure likely representing a lymph node  Mild groundglass lingular regions could relate to atelectasis and/or pneumonia  Unchanged appearance of chronic emphysematous changes and possible chronic fibrosis/interstitial change as previously suggested  PLEURA:  Small right pleural effusion  HEART/GREAT VESSELS:  Atherosclerotic changes thoracic aorta and coronary arteries  MEDIASTINUM AND JANAE:  Unchanged appearance of precarinal, right paratracheal and probable right hilar lymphadenopathy  CHEST WALL AND LOWER NECK:   Unremarkable  VISUALIZED STRUCTURES IN THE UPPER ABDOMEN:  Unremarkable  OSSEOUS STRUCTURES:  No acute fracture or destructive osseous lesion  Impression: 1  Unchanged small right pleural effusion which could be loculated although this is difficult to evaluate without contrast  2   Unchanged appearance of diffuse lung emphysematous/interstitial/fibrotic changes  3   Lingular groundglass regions could represent atelectasis  rather than pneumonia, correlate with lab values and symptomatology  Workstation performed: [id number]     Nm Lung Ventilation / Perfusion    Result Date: 6/12/2018  Narrative: VENTILATION AND PERFUSION SCAN INDICATION:  Chest pain, shortness of breath  Dyspnea, cardiac origin suspected elevated d-dimer COMPARISON:  Chest radiograph  6/11/2018, CT chest dated 6/11/2018 TECHNIQUE:  Posterior ventilation imaging was performed after the inhalation of 32 mCi nebulized Tc-99m DTPA with deposition of less than 1 mCi of activity within the lungs    Multiplanar perfusion imaging was next performed following the intravenous administration of 4 2 mCi Tc-99m labeled MAA  FINDINGS:  Ventilation imaging demonstrates heterogeneous distribution of radiotracer with some central deposition which could be seen with COPD and/or reactive airway disease  Perfusion imaging demonstrates several small areas of decreased radiotracer uptake matched to the ventilation imaging but otherwise homogeneous distribution of the radiopharmaceutical throughout both lungs  There is no significant  segmental or subsegmental defect  Impression: Ventilation imaging demonstrates heterogeneous distribution of radiotracer with some central deposition which could be seen with COPD and/or reactive airway disease  The probability for pulmonary embolus is low  Workstation performed: [id number]       Allergies:   No Known Allergies    Discharge Medications:  Current Discharge Medication List      START taking these medications    Details   ferrous sulfate 325 (65 Fe) mg tablet Take 1 tablet (325 mg total) by mouth daily with breakfast for 30 days  Qty: 30 tablet, Refills: 0    Associated Diagnoses: Iron deficiency anemia, unspecified iron deficiency anemia type           Current Discharge Medication List      CONTINUE these medications which have CHANGED    Details   predniSONE 20 mg tablet Take 2 tablets x 5 days then 1 tab x 5  days  Qty: 15 tablet, Refills: 0    Associated Diagnoses: COPD exacerbation ([facility] )           Current Discharge Medication List      CONTINUE these medications which have NOT CHANGED    Details   acetaminophen (TYLENOL) 325 mg tablet Take 2 tablets (650 mg total) by mouth every 6 (six) hours as needed for mild pain    Qty: 30 tablet, Refills: 0      albuterol (PROAIR HFA) 90 mcg/act inhaler Inhale 2 puffs every 4 (four) hours as needed for wheezing or shortness of breath  Qty: 18 g, Refills: 5    Associated Diagnoses: Centrilobular emphysema (HCC)      amiodarone 100 mg tablet Take 1 tablet (100 mg total) by mouth daily  Qty: 90 tablet, Refills: 3 Associated Diagnoses: PAF (paroxysmal atrial fibrillation) ([facility])      amLODIPine (NORVASC) 10 mg tablet TAKE 1 TABLET DAILY  Qty: 90 tablet, Refills: 3    Associated Diagnoses: Essential hypertension      BREO ELLIPTA USE 1 INHALATION DAILY  Qty: 1 each, Refills: 5    Associated Diagnoses: COPD (chronic obstructive pulmonary disease) with chronic bronchitis ([facility])      docusate sodium (COLACE) 100 mg capsule Take 100 mg by mouth daily at bedtime  fenofibrate (TRICOR) 145 mg tablet Take 145 mg by mouth daily  hydrALAZINE (APRESOLINE) 25 mg tablet Take 1 tablet (25 mg total) by mouth every 8 (eight) hours  Qty: 90 tablet, Refills: 0    Associated Diagnoses: Hypertension      ipratropium-albuterol (DUO-NEB) 0 5-2 5 mg/3 mL nebulizer solution Take 1 vial (3 mL total) by nebulization 4 (four) times a day for 90 days  Qty: 1080 mL, Refills: 3    Associated Diagnoses: Centrilobular emphysema ([facility])      simvastatin (ZOCOR) 20 mg tablet TAKE 1 TABLET DAILY  Qty: 90 tablet, Refills: 1    Associated Diagnoses: Hyperlipidemia LDL goal <100      tolterodine (DETROL LA) 4 mg 24 hr capsule Take 1 capsule by mouth daily      torsemide (DEMADEX) 20 mg tablet Take by mouth      aspirin 81 mg chewable tablet Chew daily      B Complex Vitamins (VITAMIN B COMPLEX PO) Take by mouth      cyanocobalamin (VITAMIN B-12) 100 mcg tablet Take by mouth      isosorbide mononitrate (IMDUR) 30 mg 24 hr tablet Take 1 tablet (30 mg total) by mouth daily  Qty: 30 tablet, Refills: 0    Associated Diagnoses: Hypertension      levothyroxine 50 mcg tablet TAKE 1 TABLET BY MOUTH  DAILY  Qty: 90 tablet, Refills: 1    Associated Diagnoses: Other specified hypothyroidism      metoprolol tartrate (LOPRESSOR) 25 mg tablet Take 1 tablet (25 mg total) by mouth every 12 (twelve) hours    Qty: 60 tablet, Refills: 2      oxyCODONE-acetaminophen (PERCOCET) 5-325 mg per tablet Take 1 tablet by mouth every 6 (six) hours as needed for moderate pain for up to 10 doses Max Daily Amount: 4 tablets  Qty: 10 tablet, Refills: 0      pantoprazole (PROTONIX) 40 mg tablet take 1 tablet by mouth once daily  Qty: 90 tablet, Refills: 3    Associated Diagnoses: Gastroesophageal reflux disease with esophagitis      polyethylene glycol (MIRALAX) 17 g packet Take 17 g by mouth daily for 30 days  Qty: 510 g, Refills: 0           Current Discharge Medication List          Medications were reconciliated and instructions were given to Mr Fabiana Linares at bedside prior to the discharge  Discharge Day Visit/Exam:   Subjective:  /67   Pulse 64   Temp 98 °F (36 7 °C) (Temporal)   Resp 18   Ht 5' 10" (1 778 m)   Wt 99 2 kg (218 lb 11 1 oz)   SpO2 96%   BMI 31 38 kg/m²   General: Speak full sentence, NAD  HEENT: EOMI, anicteric, oral moist, neck supple, no mass or JVD  Chest: CTAB, BS diminished b/l, few audible wheeze b/l  Cardiac: RRR, S1/S2, No murmur  Abd: S/ND/NT/BS+  MSK: No LE pitting edema, pulses intact  Neuro: AAOx3, moving all extremities  Psychiatric: Mood with normal affect    Patient Instructions: Activity: activity as tolerated  Diet: cardiac diet and diabetic diet  Wound Care: none needed    Discharge instructions/Information to patient and family:(Discharge Medications and Follow up):  See after visit summary for information provided to patient and family  Provisions for Follow-Up Care:  See after visit summary for information related to follow-up care and any pertinent home health orders  Follow-up Informations:  Cassie Leung DO  [address] [address]  [phone]    Follow up in 1 week(s)  JOSE L Romano DO  [facility]  [phone]    Follow up in 1 week(s)  SOB       Disposition: Home    Planned Readmission: No     Discharge Statement:  I spent 20 minutes discharging the patient  This time was spent on the day of discharge  I had direct contact with the patient on the day of discharge  Greater than 50% of the total time was spent examining patient, answering all patient questions, arranging and discussing plan of care with patient as well as directly providing post-discharge instructions  Additional time then spent on discharge activities  Discharge Medications:  See after visit summary for reconciled discharge medications provided to patient and family

## 2018-06-17 DIAGNOSIS — D50.9 IRON DEFICIENCY ANEMIA, UNSPECIFIED IRON DEFICIENCY ANEMIA TYPE: ICD-10-CM

## 2018-06-17 DIAGNOSIS — J44.1 COPD EXACERBATION (HCC): ICD-10-CM

## 2018-06-17 DIAGNOSIS — J44.1 COPD WITH ACUTE EXACERBATION (HCC): Primary | ICD-10-CM

## 2018-06-17 RX ORDER — IRON POLYSACCHARIDE COMPLEX 150 MG
150 CAPSULE ORAL DAILY
Qty: 30 CAPSULE | Refills: 2 | Status: SHIPPED | OUTPATIENT
Start: 2018-06-17 | End: 2018-08-30 | Stop reason: SDUPTHER

## 2018-06-17 RX ORDER — PREDNISONE 10 MG/1
TABLET ORAL
Qty: 30 TABLET | Refills: 0 | Status: SHIPPED | OUTPATIENT
Start: 2018-06-17 | End: 2018-06-21 | Stop reason: ALTCHOICE

## 2018-06-17 NOTE — PROGRESS NOTES
Patient was discharged from the hospital yesterday and prescription for his prednisone taper and his iron supplement did not get to the pharmacy    I will represcribed his prednisone 40 mg for 3 days with 10 mg taper every 4th day and Ferrex 150 mg 1 tablet daily as his iron supplement

## 2018-06-18 ENCOUNTER — TRANSITIONAL CARE MANAGEMENT (OUTPATIENT)
Dept: FAMILY MEDICINE CLINIC | Facility: CLINIC | Age: 83
End: 2018-06-18

## 2018-06-18 ENCOUNTER — TELEPHONE (OUTPATIENT)
Dept: RESPIRATORY THERAPY | Facility: HOSPITAL | Age: 83
End: 2018-06-18

## 2018-06-18 NOTE — TELEPHONE ENCOUNTER
Communication: Hopitial discharge follow-up call  Date of Discharge: 6/16/18  30 Day Readmission: no    Identified Risk for Readmission Per Risk Stratification: Low Risk  Current Disposition: Home    FEV1 %: 58  Gold Stage of COPD: Moderate  History   Smoking Status    Former Smoker    Packs/day: 1 00    Years: 35 00    Types: Cigarettes    Quit date: 1/1/1987   Smokeless Tobacco    Never Used     Comment: quit 50 years ago, per [facility]        Symptoms:  Wheezing: No  Cough: No  Difficulty Breathing (Dyspnea): No  Fever: No  Sputum Expectoration: No    Oxygenation:  Pulse Oximetry: unknown  Room air: Yes  Oxygen Used: No    Zone Tool Status: Yellow  Follow up Appointments: PCP: 6/22/18 and Pulmonary: next week pending    Counseling and Topics Reviewed:  Energy conservation, Physician follow-ups, Medication compliance    Did you feel ready to be discharged from the hospital? Yes    Were you given written and/or verbal educational materials specific to your COPD diagnosis? Yes    Were you given the opportunity to walk in the hallways with staff assistance to assess your breathing? Yes    Were you instructed on when to return to your normal activities (ie: work, social activities, etc)? Yes    Did staff offer to assist you in making follow-up appointments with your family doctor or other specialists? N    Narrative Summary: (ie: respiratory status, sputum production, persistent wheezing or coughing)  Patient was discharged over the weekend  Prednisone taper called in to pharmacy, as well as Iron  Patient is relaxing on his recliner with air conditioner on  His wife states he is feeling much better  Reviewed PCP appt, Dr Orestes Culp follow-up, medication, activity limitation  No problems at this time

## 2018-06-20 ENCOUNTER — OFFICE VISIT (OUTPATIENT)
Dept: PULMONOLOGY | Facility: MEDICAL CENTER | Age: 83
End: 2018-06-20
Payer: COMMERCIAL

## 2018-06-20 ENCOUNTER — TELEPHONE (OUTPATIENT)
Dept: ADMINISTRATIVE | Facility: HOSPITAL | Age: 83
End: 2018-06-20

## 2018-06-20 VITALS
WEIGHT: 218 LBS | TEMPERATURE: 97 F | HEART RATE: 72 BPM | DIASTOLIC BLOOD PRESSURE: 48 MMHG | RESPIRATION RATE: 20 BRPM | BODY MASS INDEX: 31.28 KG/M2 | OXYGEN SATURATION: 80 % | SYSTOLIC BLOOD PRESSURE: 110 MMHG

## 2018-06-20 DIAGNOSIS — G47.33 OSA (OBSTRUCTIVE SLEEP APNEA): Chronic | ICD-10-CM

## 2018-06-20 DIAGNOSIS — J96.11 CHRONIC HYPOXEMIC RESPIRATORY FAILURE (HCC): ICD-10-CM

## 2018-06-20 DIAGNOSIS — J44.1 COPD WITH ACUTE EXACERBATION (HCC): Primary | ICD-10-CM

## 2018-06-20 DIAGNOSIS — D50.9 IRON DEFICIENCY ANEMIA, UNSPECIFIED IRON DEFICIENCY ANEMIA TYPE: ICD-10-CM

## 2018-06-20 DIAGNOSIS — J43.2 CENTRILOBULAR EMPHYSEMA (HCC): ICD-10-CM

## 2018-06-20 PROCEDURE — 99214 OFFICE O/P EST MOD 30 MIN: CPT | Performed by: INTERNAL MEDICINE

## 2018-06-20 PROCEDURE — 1111F DSCHRG MED/CURRENT MED MERGE: CPT | Performed by: INTERNAL MEDICINE

## 2018-06-20 NOTE — PROGRESS NOTES
Assessment/Plan:     Problem List Items Addressed This Visit        Respiratory    PATITO (obstructive sleep apnea) (Chronic)     Moderate PATITO with good compliance to CPAP therapy  No excessive daytime somnolence  Efren Clakr will continue with CPAP at 10 cm H2O with 5 liters of oxygen at bedtime         COPD with acute exacerbation (HCC) - Primary     Resolving acute exacerbation of COPD  Efren Clark is now on prednisone taper and will be started 10 milligram per day for 3 more days then discontinue  He wheezes nebulizer with DuoNeb twice a day    Last spirometry showed his FEV1 to be 1 85 liters or 76 percent predicted consistent moderate airflow obstruction  Continue Breo 100 mcg 1 puff daily    Will check spirometry next office visit         Chronic hypoxemic respiratory failure ([facility] )     Efren Clark will continue oxygen at a range at 3 liters up to 10 liters/minute with activity  He uses 3 liters/minute at rest during the day and with any walking or think he will often go up to 8 liters/minute  He does have a home concentrator with a 10 l/m flow rate capability    His chronic hypoxemia is related to his COPD and also ischemic cardiomyopathy with severe PA hypertension  His estimated pulmonary artery pressure is 65 millimeters Hg on recent echocardiogram and also LV systolic function is diffusely hypokinetic with ejection fraction 35-40%            Other    Iron deficiency anemia (Chronic)     Patient has iron deficiency anemia  Most recent hemoglobin is 7 6 in the hospital and serum iron was decreased at 23 and ferritin 33  During his hospitalization this past June stool specimens x3 were obtain and occult blood was negative  Last colonoscopy was 10 years ago  I did prescribe Ferrex 150 mg daily and in 2 weeks Efren Clark will have CBC and BMP done    He also has chronic kidney disease         Relevant Orders    CBC and differential      Other Visit Diagnoses     Centrilobular emphysema ([address] )        Relevant Orders Basic metabolic panel            Return in about 6 weeks (around 8/1/2018)  All questions are answered to the patient's satisfaction and understanding  He verbalizes understanding  He is encouraged to call with any further questions or concerns  Portions of the record may have been created with voice recognition software  Occasional wrong word or "sound a like" substitutions may have occurred due to the inherent limitations of voice recognition software  Read the chart carefully and recognize, using context, where substitutions have occurred  Electronically Signed by Felicia Saenz DO    ______________________________________________________________________    Chief Complaint:   Chief Complaint   Patient presents with   [facility] F/U     CHF, COPD ex, Heart Failure, A-Fib    Sleep Apnea       Patient ID: Gila Casey is a 80 y o  y o  male has a past medical history of Allergic rhinitis (06/11/2010); Aortic stenosis, severe; Bacterial pneumonia (06/08/2007); Bladder neck obstruction (12/23/2010); BPH (benign prostatic hyperplasia); Bronchitis, chronic obstructive, with exacerbation ([facility] ) (01/30/2012); CAD (coronary artery disease); Carcinoma ([facility] ); Cardiomegaly (02/13/2007); Cataract of both eyes; CHF (congestive heart failure) ([facility] ); Chronic allergic conjunctivitis; Chronic kidney disease (CKD); CKD (chronic kidney disease), stage III; CKD (chronic kidney disease), stage III; COPD (chronic obstructive pulmonary disease) ([facility] ); Decubitus ulcer; Dermatomycosis; Diabetes mellitus type 2, noninsulin dependent ([facility] ); Dyspnea on exertion; Eczema; Edema; Epistaxis (12/01/2004); Esophagitis, erosive; Exposure to scabies; Fracture of rib; H/O aortic valve replacement; H/O blood clots; History of DVT (deep vein thrombosis); History of non-ST elevation myocardial infarction (NSTEMI); History of pneumonia; Hyperlipidemia; Hypertension; Internal hemorrhoids (09/13/2006); LBBB (left bundle branch block);  MRSA (methicillin resistant staph aureus) culture positive; NSTEMI (non-ST elevated myocardial infarction) ([place] [address] ); Obstructive sleep apnea on CPAP; Paroxysmal atrial fibrillation ([place] [address] ); Phimosis (09/01/2010); Pulmonary fibrosis ([place] [address] ); PVD (peripheral vascular disease) ([place] [address] ); Rotator cuff tendonitis; Skin abscess (12/21/2004); Skin neoplasm; Tachycardia (12/01/2004); Trigger finger; Type 2 diabetes mellitus ([place] [address] ) (12/01/2004); Venous thrombosis (05/16/2007); and Venous thrombosis (05/16/2007)  6/20/2018  LEANDRO Rodrigues presents for a follow-up visit today after recent hospitalization at [address] [place] [address] for worsening dyspnea  He was hospitalized from June 11 to 16, 2018  His exertional dyspnea is multifactorial related to acute exacerbation of COPD for which he was placed on IV steroids  Iron deficiency anemia, PA hypertension and perhaps related to his chronic diastolic and systolic congestive heart failure  A ventilation perfusion lung scan was performed on June 12 but there is no evidence to suggest pulmonary embolism  CT of his chest without contrast showed a very small right pleural effusion which is chronic and there are some diffuse emphysematous changes some mild scarring noted with no new changes  Echocardiogram revealed stable cardiomyopathy  Left ventricular systolic function was impaired with ejection fraction 35-40 percent  Diffuse hypokinesis of the ventricle was noted  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  Dk Bowles also has severe pulmonary hypertension but PA pressure was stable at 65 mm Hg compared to previous echo done in March 2018  Dk Bowles was having increasing shortness of breath at home prior to the admission  Even on 3 liters of oxygen is O2 saturations were dropping into the 80%range with activity  He is having less exertional dyspnea now  He is using his oxygen anywhere from 3 two 8 liters/minute depending on his activity level  He does have iron deficiency anemia  His hemoglobin prior to discharge was 7 6  Serum iron was decreased at 23 and ferritin 33  He did have 3 stool specimen checked for occult blood in all were negative  Last colonoscopy had was 10 years ago  He denies any melena  He is now taken Ferrex 150 milligram once a day since his discharge from the hospital     Overall he is less short of breath than he was prior to his admission to the hospital   Does have chronic kidney disease and serum creatinine was 2 02 prior to his discharge    Wetzel also has moderate obstructive sleep apnea and uses CPAP at 10 centimeters water with 5 liters of oxygen at bedtime     Last spirometry showed anFEV1 of 1 85 liters or 76 percent of predicted        Review of Systems   Constitutional: Negative for chills, fever and unexpected weight change  HENT: Negative for congestion, rhinorrhea and sore throat  Eyes: Negative for discharge and redness  Respiratory: Positive for shortness of breath  Cardiovascular: Negative for chest pain, palpitations and leg swelling  Gastrointestinal: Negative for abdominal distention, abdominal pain and nausea  Endocrine: Negative for polydipsia and polyphagia  Genitourinary: Negative for dysuria  Musculoskeletal: Negative for joint swelling and myalgias  Skin: Negative for rash  Neurological: Negative for light-headedness  Smoking history: He reports that he quit smoking about 31 years ago  His smoking use included Cigarettes  He has a 35 00 pack-year smoking history   He has never used smokeless tobacco     The following portions of the patient's history were reviewed and updated as appropriate: allergies, current medications, past family history, past medical history, past social history, past surgical history and problem list     Immunization History   Administered Date(s) Administered    Pneumococcal Conjugate 13-Valent 06/12/2015     Current Outpatient Prescriptions   Medication Sig Dispense Refill    acetaminophen (TYLENOL) 325 mg tablet Take 2 tablets (650 mg total) by mouth every 6 (six) hours as needed for mild pain  30 tablet 0    albuterol (PROAIR HFA) 90 mcg/act inhaler Inhale 2 puffs every 4 (four) hours as needed for wheezing or shortness of breath 18 g 5    amiodarone 100 mg tablet Take 1 tablet (100 mg total) by mouth daily 90 tablet 3    amLODIPine (NORVASC) 10 mg tablet TAKE 1 TABLET DAILY 90 tablet 3    aspirin 81 mg chewable tablet Chew daily      B Complex Vitamins (VITAMIN B COMPLEX PO) Take by mouth      BREO ELLIPTA USE 1 INHALATION DAILY 1 each 5    cyanocobalamin (VITAMIN B-12) 100 mcg tablet Take by mouth      docusate sodium (COLACE) 100 mg capsule Take 100 mg by mouth daily at bedtime   fenofibrate (TRICOR) 145 mg tablet Take 145 mg by mouth daily   hydrALAZINE (APRESOLINE) 25 mg tablet Take 1 tablet (25 mg total) by mouth every 8 (eight) hours 90 tablet 0    ipratropium-albuterol (DUO-NEB) 0 5-2 5 mg/3 mL nebulizer solution Take 1 vial (3 mL total) by nebulization 4 (four) times a day for 90 days 1080 mL 3    iron polysaccharides (FERREX 150) 150 mg capsule Take 1 capsule (150 mg total) by mouth daily for 30 days 30 capsule 2    isosorbide mononitrate (IMDUR) 30 mg 24 hr tablet Take 1 tablet (30 mg total) by mouth daily 30 tablet 0    levothyroxine 50 mcg tablet TAKE 1 TABLET BY MOUTH  DAILY 90 tablet 1    metoprolol tartrate (LOPRESSOR) 25 mg tablet Take 1 tablet (25 mg total) by mouth every 12 (twelve) hours   60 tablet 2    oxyCODONE-acetaminophen (PERCOCET) 5-325 mg per tablet Take 1 tablet by mouth every 6 (six) hours as needed for moderate pain for up to 10 doses Max Daily Amount: 4 tablets 10 tablet 0    pantoprazole (PROTONIX) 40 mg tablet take 1 tablet by mouth once daily 90 tablet 3    predniSONE 10 mg tablet Take 4 tablets x3 days then 3 tablets x3 days then 2 tablets x3 days then 1 tablet x3 days 30 tablet 0    simvastatin (ZOCOR) 20 mg tablet TAKE 1 TABLET DAILY 90 tablet 1    tolterodine (DETROL LA) 4 mg 24 hr capsule Take 1 capsule by mouth daily      torsemide (DEMADEX) 20 mg tablet Take by mouth      polyethylene glycol (MIRALAX) 17 g packet Take 17 g by mouth daily for 30 days 510 g 0     No current facility-administered medications for this visit  Allergies: Patient has no known allergies  Objective:  Vitals:    06/20/18 1454   BP: (!) 110/48   BP Location: Left arm   Patient Position: Sitting   Cuff Size: Adult   Pulse: 72   Resp: 20   Temp: (!) 97 °F (36 1 °C)   TempSrc: Oral   SpO2: (!) 80%   Weight: 98 9 kg (218 lb)   Oxygen Therapy  SpO2: (!) 80 %    Wt Readings from Last 3 Encounters:   06/20/18 98 9 kg (218 lb)   06/14/18 99 2 kg (218 lb 11 1 oz)   05/14/18 98 9 kg (218 lb)     Body mass index is 31 28 kg/m²  Physical Exam   Constitutional: He is oriented to person, place, and time  He appears well-developed and well-nourished  No distress  Oxygen saturation at rest on 3 L of oxygen is 95%  No conversational dyspnea   HENT:   Head: Normocephalic  Nose: Nose normal    Mouth/Throat: Oropharynx is clear and moist  No oropharyngeal exudate  Eyes: Conjunctivae are normal  Pupils are equal, round, and reactive to light  Neck: Neck supple  No JVD present  No tracheal deviation present  Cardiovascular: Normal rate, regular rhythm and normal heart sounds  Pulmonary/Chest: Effort normal    There are few faint expiratory wheezes in the lower lobes   Abdominal: Soft  He exhibits no distension  There is no tenderness  There is no guarding  Musculoskeletal: He exhibits no edema  Lymphadenopathy:     He has no cervical adenopathy  Neurological: He is alert and oriented to person, place, and time  Skin: Skin is warm and dry  No rash noted  Psychiatric: He has a normal mood and affect   His behavior is normal  Thought content normal        Lab Review:   Lab Results   Component Value Date     (L) 06/14/2018     10/16/2017    K 4 7 06/14/2018    K 4 6 10/16/2017    CL 98 (L) 06/14/2018    CL 95 (L) 10/16/2017    CO2 31 06/14/2018    CO2 26 10/16/2017    BUN 55 (H) 06/14/2018    BUN 39 (H) 10/16/2017    CREATININE 2 02 (H) 06/14/2018    CREATININE 2 06 (H) 10/16/2017    GLUCOSE 181 (H) 06/14/2018    GLUCOSE 109 (H) 10/16/2017    CALCIUM 8 9 06/14/2018    CALCIUM 9 7 10/16/2017         Diagnostics:  I have personally reviewed pertinent reports  ESS:    Xr Chest Portable    Result Date: 6/11/2018  Narrative: CHEST INDICATION:   SOB   "SOB X FEW HOURS, HX COPD AND CHF" COMPARISON:  Chest x-ray dated 3/29/2018, echocardiogram dated 3/30/2018 (LV ejection fraction approximately 25%)  EXAM PERFORMED/VIEWS:  XR CHEST PORTABLE FINDINGS: No pneumothorax is seen  The cardiac silhouette is enlarged, as before  There is some mild prominence of the central pulmonary vessels  There is also mild hypoaeration of the right lower lung field with blunting of the costophrenic sulcus, suspect small pleural effusion/atelectasis with some pleural fluid tracking along the convexity of the right hemithorax laterally  Some patchy opacities are seen in the lower lung fields bilaterally  Lungs otherwise are grossly clear  The aorta is calcified but well-defined  The visualized bones appear intact  Some degenerative changes of the left glenohumeral joint are redemonstrated  Impression: Enlarged cardiac silhouette with pulmonary vascular prominence, suspected small right pleural effusion, and bilateral airspace opacities  Consider a component of fluid overload/CHF  Superimposed infection could be considered in the appropriate clinical  setting  Workstation performed: [id number]     Ct Chest Wo Contrast    Result Date: 6/11/2018  Narrative: CT CHEST WITHOUT IV CONTRAST INDICATION:   Shortness of breath   COMPARISON: June 8, 2017 TECHNIQUE: CT examination of the chest was performed without intravenous contrast   Axial, sagittal, and coronal 2D reformatted images were created from the source data and submitted for interpretation  Radiation dose length product (DLP) for this visit:  780 69 mGy-cm   This examination, like all CT scans performed in the [facility], was performed utilizing techniques to minimize radiation dose exposure, including the use of iterative  reconstruction and automated exposure control  FINDINGS: LUNGS:  Density along the right minor fissure likely representing a lymph node  Mild groundglass lingular regions could relate to atelectasis and/or pneumonia  Unchanged appearance of chronic emphysematous changes and possible chronic fibrosis/interstitial change as previously suggested  PLEURA:  Small right pleural effusion  HEART/GREAT VESSELS:  Atherosclerotic changes thoracic aorta and coronary arteries  MEDIASTINUM AND JANAE:  Unchanged appearance of precarinal, right paratracheal and probable right hilar lymphadenopathy  CHEST WALL AND LOWER NECK:   Unremarkable  VISUALIZED STRUCTURES IN THE UPPER ABDOMEN:  Unremarkable  OSSEOUS STRUCTURES:  No acute fracture or destructive osseous lesion  Impression: 1  Unchanged small right pleural effusion which could be loculated although this is difficult to evaluate without contrast  2   Unchanged appearance of diffuse lung emphysematous/interstitial/fibrotic changes  3   Lingular groundglass regions could represent atelectasis  rather than pneumonia, correlate with lab values and symptomatology  Workstation performed: [id number]     Nm Lung Ventilation / Perfusion    Result Date: 6/12/2018  Narrative: VENTILATION AND PERFUSION SCAN INDICATION:  Chest pain, shortness of breath   Dyspnea, cardiac origin suspected elevated d-dimer COMPARISON:  Chest radiograph  6/11/2018, CT chest dated 6/11/2018 TECHNIQUE:  Posterior ventilation imaging was performed after the inhalation of 32 mCi nebulized Tc-99m DTPA with deposition of less than 1 mCi of activity within the lungs   Multiplanar perfusion imaging was next performed following the intravenous administration of 4 2 mCi Tc-99m labeled MAA  FINDINGS:  Ventilation imaging demonstrates heterogeneous distribution of radiotracer with some central deposition which could be seen with COPD and/or reactive airway disease  Perfusion imaging demonstrates several small areas of decreased radiotracer uptake matched to the ventilation imaging but otherwise homogeneous distribution of the radiopharmaceutical throughout both lungs  There is no significant  segmental or subsegmental defect  Impression: Ventilation imaging demonstrates heterogeneous distribution of radiotracer with some central deposition which could be seen with COPD and/or reactive airway disease  The probability for pulmonary embolus is low   Workstation performed: [id number]

## 2018-06-21 RX ORDER — PREDNISONE 20 MG/1
TABLET ORAL
Qty: 15 TABLET | Refills: 0 | Status: SHIPPED | OUTPATIENT
Start: 2018-06-21 | End: 2018-07-18

## 2018-06-21 RX ORDER — POLYETHYLENE GLYCOL 3350 17 G/17G
POWDER, FOR SOLUTION ORAL
Refills: 0 | COMMUNITY
Start: 2018-04-30 | End: 2018-07-18

## 2018-06-21 NOTE — ASSESSMENT & PLAN NOTE
Fili Munoz will continue oxygen at a range at 3 liters up to 10 liters/minute with activity  He uses 3 liters/minute at rest during the day and with any walking or think he will often go up to 8 liters/minute  He does have a home concentrator with a 10 l/m flow rate capability    His chronic hypoxemia is related to his COPD and also ischemic cardiomyopathy with severe PA hypertension    His estimated pulmonary artery pressure is 65 millimeters Hg on recent echocardiogram and also LV systolic function is diffusely hypokinetic with ejection fraction 35-40%

## 2018-06-21 NOTE — ASSESSMENT & PLAN NOTE
Patient has iron deficiency anemia  Most recent hemoglobin is 7 6 in the hospital and serum iron was decreased at 23 and ferritin 33  During his hospitalization this past June stool specimens x3 were obtain and occult blood was negative  Last colonoscopy was 10 years ago  I did prescribe Ferrex 150 mg daily and in 2 weeks Romina Lainez will have CBC and BMP done    He also has chronic kidney disease

## 2018-06-21 NOTE — ASSESSMENT & PLAN NOTE
Resolving acute exacerbation of COPD  Berto Cortez is now on prednisone taper and will be started 10 milligram per day for 3 more days then discontinue  He wheezes nebulizer with DuoNeb twice a day    Last spirometry showed his FEV1 to be 1 85 liters or 76 percent predicted consistent moderate airflow obstruction      Continue Breo 100 mcg 1 puff daily    Will check spirometry next office visit

## 2018-06-21 NOTE — ASSESSMENT & PLAN NOTE
Moderate PATITO with good compliance to CPAP therapy    No excessive daytime somnolence  Camilla Prim will continue with CPAP at 10 cm H2O with 5 liters of oxygen at bedtime

## 2018-06-22 ENCOUNTER — OFFICE VISIT (OUTPATIENT)
Dept: FAMILY MEDICINE CLINIC | Facility: CLINIC | Age: 83
End: 2018-06-22
Payer: COMMERCIAL

## 2018-06-22 VITALS
HEIGHT: 70 IN | BODY MASS INDEX: 31.35 KG/M2 | RESPIRATION RATE: 20 BRPM | HEART RATE: 88 BPM | DIASTOLIC BLOOD PRESSURE: 60 MMHG | SYSTOLIC BLOOD PRESSURE: 130 MMHG | WEIGHT: 219 LBS

## 2018-06-22 DIAGNOSIS — E03.8 OTHER SPECIFIED HYPOTHYROIDISM: Chronic | ICD-10-CM

## 2018-06-22 DIAGNOSIS — E78.5 HYPERLIPIDEMIA, UNSPECIFIED HYPERLIPIDEMIA TYPE: Chronic | ICD-10-CM

## 2018-06-22 DIAGNOSIS — I10 ESSENTIAL HYPERTENSION: Chronic | ICD-10-CM

## 2018-06-22 DIAGNOSIS — I25.10 CAD IN NATIVE ARTERY: Primary | Chronic | ICD-10-CM

## 2018-06-22 DIAGNOSIS — D50.9 IRON DEFICIENCY ANEMIA, UNSPECIFIED IRON DEFICIENCY ANEMIA TYPE: Chronic | ICD-10-CM

## 2018-06-22 DIAGNOSIS — D50.8 OTHER IRON DEFICIENCY ANEMIA: ICD-10-CM

## 2018-06-22 PROCEDURE — 99496 TRANSJ CARE MGMT HIGH F2F 7D: CPT | Performed by: FAMILY MEDICINE

## 2018-06-22 RX ORDER — ATORVASTATIN CALCIUM 10 MG/1
20 TABLET, FILM COATED ORAL DAILY
Qty: 90 TABLET | Refills: 3 | Status: SHIPPED | OUTPATIENT
Start: 2018-06-22 | End: 2018-12-14 | Stop reason: SDUPTHER

## 2018-06-22 RX ORDER — LEVOTHYROXINE SODIUM 0.05 MG/1
50 TABLET ORAL DAILY
Qty: 90 TABLET | Refills: 0
Start: 2018-06-22 | End: 2018-11-04 | Stop reason: SDUPTHER

## 2018-06-22 NOTE — PROGRESS NOTES
Assessment/Plan:    No problem-specific Assessment & Plan notes found for this encounter  Cad/htn/chol stable  Use of statins for the purposes of CVD/CVA risks reduction was advised according to USPSTF guidelines along with appropriate continued liver monitoring  Thyroid stable, labs done in hosp, repeat in 6m  Anemia new, recheck cbc and iron in 1 mo on iro   Diagnoses and all orders for this visit:    CAD in native artery    Hyperlipidemia, unspecified hyperlipidemia type  -     atorvastatin (LIPITOR) 10 mg tablet; Take 2 tablets (20 mg total) by mouth daily    Essential hypertension  -     Comprehensive metabolic panel; Future    Other specified hypothyroidism  -     levothyroxine 50 mcg tablet; Take 1 tablet (50 mcg total) by mouth daily    Iron deficiency anemia, unspecified iron deficiency anemia type    Other iron deficiency anemia  -     CBC and differential; Future  -     Iron Saturation %; Future    Other orders  -     polyethylene glycol (GLYCOLAX) powder; TAKE 1 CAPFUL(17G) BY MOUTH EVERY DAY IN 4-6 OZ OF H20              Return in about 6 months (around 12/22/2018), or if symptoms worsen or fail to improve  Subjective:      Patient ID: Sara Sanz is a 80 y o  male  Chief Complaint   Patient presents with   [facility] follow up     D/C 6/16/18 [facility]       HPI  Off prednisone  10mg last day today  occas missed doses  Still on Breo  Has primary pulm HTN  Gets desat to 70s with minimal activity  Denies depression  Wt about same  Daily  HHA not VNA  VNA weekly  On iron now , new  No real trigger for sob  Mostly compliant with nebs  No pneumonia or DVT on CT chest  No CHF this time    HOMEBOUND QUALIFICATIONS REVIEW:  >>Cannot leave home without considerable taxing effort   y  >>Requires the aid of supportive devices(wheelchair or walker)    cane  >>Requires the use of special transportation   n  >>Needs the assistance of another person   y  >>Has a condition that leaving the home is medically contraindicated   n  >>Symptoms of the disease process worsen when leaving the home   y if humid  >>Has a skilled need requiring a medical professional  y      The following portions of the patient's history were reviewed and updated as appropriate: allergies, current medications, past family history, past medical history, past social history, past surgical history and problem list     Review of Systems   Constitutional: Negative for chills and fever  Respiratory: Positive for shortness of breath  Cardiovascular: Negative for chest pain and leg swelling  Current Outpatient Prescriptions   Medication Sig Dispense Refill    albuterol (PROAIR HFA) 90 mcg/act inhaler Inhale 2 puffs every 4 (four) hours as needed for wheezing or shortness of breath 18 g 5    amiodarone 100 mg tablet Take 1 tablet (100 mg total) by mouth daily 90 tablet 3    amLODIPine (NORVASC) 10 mg tablet TAKE 1 TABLET DAILY 90 tablet 3    aspirin 81 mg chewable tablet Chew daily      BREO ELLIPTA USE 1 INHALATION DAILY 1 each 5    docusate sodium (COLACE) 100 mg capsule Take 100 mg by mouth daily at bedtime   fenofibrate (TRICOR) 145 mg tablet Take 145 mg by mouth daily        hydrALAZINE (APRESOLINE) 25 mg tablet Take 1 tablet (25 mg total) by mouth every 8 (eight) hours 90 tablet 0    ipratropium-albuterol (DUO-NEB) 0 5-2 5 mg/3 mL nebulizer solution Take 1 vial (3 mL total) by nebulization 4 (four) times a day for 90 days 1080 mL 3    iron polysaccharides (FERREX 150) 150 mg capsule Take 1 capsule (150 mg total) by mouth daily for 30 days 30 capsule 2    isosorbide mononitrate (IMDUR) 30 mg 24 hr tablet Take 1 tablet (30 mg total) by mouth daily 30 tablet 0    levothyroxine 50 mcg tablet Take 1 tablet (50 mcg total) by mouth daily 90 tablet 0    metoprolol tartrate (LOPRESSOR) 25 mg tablet Take 1 tablet (25 mg total) by mouth every 12 (twelve) hours  60 tablet 2    pantoprazole (PROTONIX) 40 mg tablet take 1 tablet by mouth once daily 90 tablet 3    polyethylene glycol (GLYCOLAX) powder TAKE 1 CAPFUL(17G) BY MOUTH EVERY DAY IN 4-6 OZ OF H20  0    simvastatin (ZOCOR) 20 mg tablet TAKE 1 TABLET DAILY 90 tablet 1    tolterodine (DETROL LA) 4 mg 24 hr capsule Take 1 capsule by mouth daily      torsemide (DEMADEX) 20 mg tablet Take by mouth      atorvastatin (LIPITOR) 10 mg tablet Take 2 tablets (20 mg total) by mouth daily 90 tablet 3    B Complex Vitamins (VITAMIN B COMPLEX PO) Take by mouth      cyanocobalamin (VITAMIN B-12) 100 mcg tablet Take by mouth      oxyCODONE-acetaminophen (PERCOCET) 5-325 mg per tablet Take 1 tablet by mouth every 6 (six) hours as needed for moderate pain for up to 10 doses Max Daily Amount: 4 tablets 10 tablet 0    predniSONE 20 mg tablet take 2 tablets by mouth DAILY FOR 5 DAYS-THEN TAKE 1 TABLET DAILY X 5 DAYS 15 tablet 0     No current facility-administered medications for this visit  Objective:    /60   Pulse 88   Resp 20   Ht 5' 10" (1 778 m)   Wt 99 3 kg (219 lb)   BMI 31 42 kg/m²        Physical Exam   Constitutional: He appears well-developed  HENT:   Head: Normocephalic  Eyes: Conjunctivae are normal    Neck: Neck supple  Cardiovascular: Normal rate and intact distal pulses  Pulmonary/Chest: Effort normal  No respiratory distress  Abdominal: Soft  Musculoskeletal: He exhibits no edema or deformity  Neurological: He is alert  Skin: Skin is warm and dry  Psychiatric: His behavior is normal  Thought content normal    Nursing note and vitals reviewed      compression stockings on          Chase Garcia DO

## 2018-06-25 DIAGNOSIS — I10 HYPERTENSION: Chronic | ICD-10-CM

## 2018-06-25 RX ORDER — ISOSORBIDE MONONITRATE 30 MG/1
TABLET, EXTENDED RELEASE ORAL
Qty: 30 TABLET | Refills: 0 | Status: SHIPPED | OUTPATIENT
Start: 2018-06-25 | End: 2018-07-18

## 2018-06-25 RX ORDER — HYDRALAZINE HYDROCHLORIDE 25 MG/1
TABLET, FILM COATED ORAL
Qty: 90 TABLET | Refills: 0 | Status: SHIPPED | OUTPATIENT
Start: 2018-06-25 | End: 2018-08-11 | Stop reason: SDUPTHER

## 2018-07-06 ENCOUNTER — OFFICE VISIT (OUTPATIENT)
Dept: PODIATRY | Facility: CLINIC | Age: 83
End: 2018-07-06
Payer: COMMERCIAL

## 2018-07-06 VITALS — HEIGHT: 70 IN | BODY MASS INDEX: 31.35 KG/M2 | HEART RATE: 88 BPM | WEIGHT: 219 LBS | RESPIRATION RATE: 18 BRPM

## 2018-07-06 DIAGNOSIS — M79.672 PAIN IN BOTH FEET: ICD-10-CM

## 2018-07-06 DIAGNOSIS — L84 CORNS: ICD-10-CM

## 2018-07-06 DIAGNOSIS — I70.209 PERIPHERAL ARTERIOSCLEROSIS (HCC): Primary | Chronic | ICD-10-CM

## 2018-07-06 DIAGNOSIS — E11.42 DIABETIC POLYNEUROPATHY ASSOCIATED WITH TYPE 2 DIABETES MELLITUS (HCC): Chronic | ICD-10-CM

## 2018-07-06 DIAGNOSIS — M79.671 PAIN IN BOTH FEET: ICD-10-CM

## 2018-07-06 DIAGNOSIS — B35.1 ONYCHOMYCOSIS: ICD-10-CM

## 2018-07-06 PROCEDURE — 99211 OFF/OP EST MAY X REQ PHY/QHP: CPT | Performed by: PODIATRIST

## 2018-07-06 PROCEDURE — 11056 PARNG/CUTG B9 HYPRKR LES 2-4: CPT | Performed by: PODIATRIST

## 2018-07-06 NOTE — PROGRESS NOTES
Procedures   Foot Exam       Discussion/Summary  The patient was counseled regarding diagnostic results,-- instructions for management,-- prognosis,-- patient and family education,-- risks and benefits of treatment options,-- importance of compliance with treatment  Patient is able to Self-Care  Possible side effects of new medications were reviewed with the patient/guardian today  The treatment plan was reviewed with the patient/guardian  The patient/guardian understands and agrees with the treatment plan   Patient's shoes and socks removed  Right Foot/Ankle   Right Foot Inspection  Skin Exam: callus and callus               Toe Exam: swelling  Sensory   Vibration: diminished  Proprioception: diminished   Monofilament testing: diminished  Vascular  Capillary refills: < 3 seconds  The right DP pulse is 1+  The right PT pulse is 1+       Left Foot/Ankle  Left Foot Inspection  Skin Exam: callus              Toe Exam: swelling                   Sensory   Vibration: diminished  Proprioception: diminished  Monofilament: diminished  Vascular  Capillary refills: < 3 seconds  The left DP pulse is 1+  The left PT pulse is 1+  Assign Risk Category:  Deformity present; Loss of protective sensation; Weak pulses       Risk: 2     Chief Complaint  Routine nail care      History of Present Illness  HPI: Patient complains of pain in his feet with ambulation  Patient has no history of trauma  Patient has pain wearing shoes       Review of Systems   Constitutional: chills   Eyes: eyesight problems    ENT: hearing loss   Cardiovascular: No complaints of chest pain, no palpitations, no leg claudication or lower extremity edema   Respiratory: shortness of breath   Gastrointestinal: No complaints of abdominal pain, no constipation, no nausea or vomiting, no diarrhea or bloody stools   Genitourinary: No complaints of dysuria or incontinence, no hesitancy, no nocturia   Musculoskeletal: as noted in HPI   Integumentary: No complaints of skin rash or lesion, no itching or dry skin, no skin wounds   Neurological: No complaints of headache, no confusion, no numbness or tingling, no dizziness   Psychiatric: No suicidal thoughts, no anxiety, no depression   Endocrine: muscle weakness,-- frequent urination-- and-- excessive thirst       Active Problems  1  Abnormal gait (781 2) (R26 9)  2  Acquired ankle/foot deformity (736 70) (M21 969)  3  Actinic keratosis (702 0) (L57 0)  4  Allergic rhinitis (477 9) (J30 9)  5  Arteriosclerosis of arteries of extremities (440 20) (I70 209)  6  Atrial fibrillation (427 31) (I48 91)  7  Benign essential HTN (401 1) (I10)  8  BPH (benign prostatic hyperplasia) (600 00) (N40 0)  9  Bursitis, subacromial (726 19) (M75 50)  10  CAD in native artery (414 01) (I25 10)  11  Callus (700) (L84)  12  Centrilobular emphysema (492 8) (J43 2)  13  Cervical radiculopathy (723 4) (M54 12)  14  Denied: History of Chest tightness  15  Chronic hypoxemic respiratory failure (518 83,799 02) (J96 11)  16  Chronic kidney disease, stage 3 (585 3) (N18 3)  17  Chronic left shoulder pain (719 41,338 29) (M25 512,G89 29)  18  Chronic systolic congestive heart failure (428 22,428 0) (I50 22)  19  Congestive heart failure (428 0) (I50 9)  20  COPD (chronic obstructive pulmonary disease) (496) (J44 9)  21  Denied: History of Cough  22  Depression screening (V79 0) (Z13 89)  23  Difficulty walking (719 7) (R26 2)  24  Dizziness (780 4) (R42)  25  Esophagitis determined by endoscopy (530 10) (K20 9)  26  Foot pain, bilateral (729 5) (M79 671,M79 672)  27  Heart failure, left, 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)  28  High triglycerides (272 1) (E78 1)  29  History of aortic valve stenosis (V12 59) (Z86 79)  30  Hyperlipidemia LDL goal <100 (272 4) (E78 5)  31  Hyperthyroidism (242 90) (E05 90)  32  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)  33  Hyponatremia (276 1) (E87 1)  34  Hypothyroidism (244 9) (E03 9)  35  Immunization due (V05 9) (Z23)  36  Obesity with alveolar hypoventilation, unspecified obesity severity (278 03) (E66 2)  37  Obstructive sleep apnea (327 23) (G47 33)  38  Onychomycosis (110 1) (B35 1)  39  Osteoarthritis of left shoulder (715 91) (M19 012)  40  Osteoarthritis, knee/lower leg (715 96) (M17 9)  41  Peripheral vascular disease (443 9) (I73 9)  42  Pleural Effusion  43  Prediabetes (790 29) (R73 09)  44  Primary osteoarthritis of left shoulder (715 11) (M19 012)  45  Pulmonary fibrosis (515) (J84 10)  46  Pulmonary nodule, left (793 11) (R91 1)  47  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)  48  Screening for cardiovascular, respiratory, and genitourinary diseases  (V81 2,V81 4,V81 6) (Z13 6,Z13 83,Z13 89)  49  Screening for neurological condition (V80 09) (Z13 89)  50   Sleep related hypoventilation in conditions classified elsewhere (327 26) (G47 36)     Past Medical History   · Acute maxillary sinusitis (461 0) (J01 00)   · History of Acute maxillary sinusitis, recurrence not specified (461 0) (J01 00)   · Acute upper respiratory infection (465 9) (J06 9)   · History of Acute upper respiratory infection (465 9) (J06 9)   · History of Bladder neck obstruction (596 0) (N32 0)   · History of Bronchitis, chronic obstructive, with exacerbation (491 21) (J44 1)   · History of Cataract of both eyes (366 9) (H26 9)   · History of Cellulitis (682 9) (L03 90)   · History of Cellulitis (682 9) (L03 90)   · History of Cellulitis of foot (682 7) (L03 119)   · History of Cellulitis of neck (682 1) (E97 356)   · Denied: History of Chest tightness   · History of Chronic allergic conjunctivitis (372 14) (H10 45)   · Denied: History of Cough   · History of 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)   · History of Diabetes type 2, uncontrolled (250 02) (E11 65)   · History of Dyspnea on exertion (786 09) (R06 09)   · History of Exposure to scabies (V01 89) (Z20 89)   · History of Foreign Body In The Right Auditory Canal (931)   · H/O blood clots (V12 51) (Z86 718)   · History of acute bronchitis (V12 69) (Z87 09)   · History of acute bronchitis (V12 69) (Z87 09)   · History of acute bronchitis (V12 69) (Z87 09)   · History of allergic rhinitis (V12 69) (Z87 09)   · History of aortic valve replacement (V43 3) (Z95 2)   · History of backache (V13 59) (Z87 39)   · History of bacterial pneumonia (V12 61) (Z87 01)   · History of bronchitis (V12 69) (Z87 09)   · History of carcinoma (V10 90) (Z85 9)   · History of cardiomegaly (V12 59) (Z86 79)   · History of constipation (V12 79) (Z87 19)   · History of decubitus ulcer (V13 3) (Z87 2)   · History of dermatitis (V13 3) (Z87 2)   · History of dizziness (V13 89) (G91 715)   · History of eczema (V13 3) (Z87 2)   · History of edema (V13 89) (P87 266)   · History of epistaxis (V12 69) (A36 032)   · History of fever (V13 89) (M67 059)   · History of fracture of rib (V15 51) (Z87 81)   · History of influenza (V12 09) (Z87 09)   · History of phimosis (V13 89) (D51 142)   · History of shortness of breath (V13 89) (J66 546)   · History of tinea corporis (V12 09) (Z86 19)   · History of tinea corporis (V12 09) (Z86 19)   · History of Internal Hemorrhoids (455 0)   · History of Knee Sprain (844 9)   · Late Effects Of Sprain Or Strain (905 7)   · History of Noninfected skin tear of right lower extremity, initial encounter (891 0)(S81 811A)   · History of Non-ST elevation myocardial infarction (NSTEMI) of indeterminate age   · History of Normal routine physical examination (V70 0) (Z00 00)   · Otalgia, unspecified laterality (388 70) (H92 09)   · History of Otalgia, unspecified laterality (388 70) (H92 09)   · History of Pneumonia (V12 61)   · History of Pneumonia (486) (J18 9)   · History of Postop check (V67 00) (Z09)   · History of Rotator cuff tendinitis (726 10) (M75 80)   · Skin Abscess Of Hip (682 6)   · History of Skin neoplasm (239 2) (D49 2)   · History of Sprain and strain (848 9) (T14 8XXA)   · History of Tachycardia (785 0) (R00 0)   · History of Trigger finger (727 03) (M65 30)   · History of Type 2 diabetes mellitus (250 00) (E11 9)   · Venous thrombosis (453 9) (I82 90)     The active problems and past medical history were reviewed and updated today       Surgical History   · History of Aortic Valve Replacement Transcatheter   · History of Cataract Surgery   · History of Hip Replacement   · History of Knee Replacement   · History of Surgery Penis Circumcision Except [place]   · History of Thoracentesis (Diagnostic)   · History of Tonsillectomy     The surgical history was reviewed and updated today        Family History  Mother    · Family history of arthritis (V17 7) (Z82 61)   · Family history of coronary artery disease (V17 3) (Z82 49)   · Family history of hypertension (V17 49) (Z82 49)  Father    · Family history of arthritis (V17 7) (Z82 61)   · Family history of Prostate cancer  Family History    · Denied: Family history of Pulmonary Disease     The family history was reviewed and updated today        Social History   · Being A Social Drinker   · Denied: Drug use (305 90) (F19 90)   · Former smoker (V15 82) (Z87 891)   · Pt quit smoking 50 yrs ago    · History of Former smoker (V15 82) (Z87 891)   · History of Former smoker (V15 82) (Z87 891)   · QUIT SMOKING 25-35 YEARS AGO AND HE SMOKED 1 PPD FOR 45YEARS    · Denied: History of Pets / animals   · Denied: History of Recreational drug use   · Denied: History of Travel history  The social history was reviewed and updated today       Current Meds  1  Amiodarone HCl - 100 MG Oral Tablet; TAKE 1 TABLET DAILY (CONTACT CARDIOLOGIST FOR ADDITIONAL REFILLS); Therapy: 12DUC9433 to (Last Rx:2017)  Requested for: 45JBK0408 Ordered  2  AmLODIPine Besylate 10 MG Oral Tablet; TAKE 1 TABLET DAILY; Therapy: 13LYQ1546 to (Last Rx:2017)  Requested for: 37CUJ6226 Ordered  3  Aspirin 81 MG Oral Tablet Chewable; 2 daily; Therapy: (Omkar Jamison) to Recorded  4  Breo Ellipta 100-25 MCG/INH Inhalation Aerosol Powder Breath Activated; INHALE 1 PUFFS Daily; Therapy: 50ANK7205 to (Brenna Aleman) Recorded  5  Cialis 2 5 MG Oral Tablet; Take 1 tablet daily as directed; Therapy: 92PPV6666 to (Evaluate:2017)  Requested for: 82BXI9548; Last Rx:34Qdr1466 Ordered  6  Colace 100 MG Oral Capsule; 1 Two Times A Day, As Needed; Therapy: 07TOJ1351 to [facility] for: 29ARB7183 Recorded  7  Fenofibrate 145 MG Oral Tablet; TAKE 1 TABLET BY MOUTH EVERY OTHER DAY; Therapy: 50LWD6775 to (74 831 591)  Requested for: 50OKT5412; Last Rx:2017 Ordered  8  Ipratropium-Albuterol 0 5-2 5 (3) MG/3ML Inhalation Solution; Use 1 vial in nebulizer 4 times daily; Therapy: 00Cnz7736 to (Evaluate:92Cfa8311)  Requested for: 46SIT9368; Last Rx:2016 Ordered  9  Levothyroxine Sodium 50 MCG Oral Tablet; Take 1 tablet daily, JESSICA; Therapy: 14VBJ9857 to (Last Rx:2017)  Requested for: 23GSX3222 Ordered  10  Metoprolol Tartrate 25 MG Oral Tablet; take 1 tablet by mouth twice a day; [place] No: [id number] to (Emelia Dears)  Requested for: 98EPN4167; Last  Rx:2017 Ordered  11  Simvastatin 40 MG Oral Tablet; take 1 tablet by mouth once daily;  Therapy: (CTYSIXJP:39AHJ0155) to Recorded  12  Tolterodine Tartrate ER 4 MG Oral Capsule Extended Release 24 Hour; TAKE 1  CAPSULE ONCE DAILY;  Therapy: 60LMP8233 to (PPRVNRJQ:74HJT7525)  Requested for: 13IOY8370; Last  Rx:85Jgk3110 Ordered  13  Torsemide 20 MG Oral Tablet; TAKE 1 TABLET DAILY ALTERNATING WITH 2 TABLETS  (40 MG) DAILY;  Therapy: 37SMA2409 to (Emelia Dears)  Requested for: 03REM7210; Last  Rx:2017 Ordered  14  Vitamin B Complex CAPS;  Therapy: (Venus Sandoval) to Recorded  15  Vitamin B-12 TABS;  Therapy: (Venus Clarity) to Recorded  16  Vitamin C TABS;  Therapy: (Venus Clarity) to Recorded  17   Vitamin E 400 UNIT Oral Capsule;  Therapy: (Venus Clarity) to Recorded     The medication list was reviewed and updated today       Allergies  1  No Known Drug Allergies     Vitals          Height 5 ft 9 in 5 ft 9 in   Weight 218 lb  218 lb    BMI Calculated 32 19 32 19   BSA Calculated 2 14 2 14      Physical Exam  Left Foot: Appearance: Normal except as noted: excessive pronation-- and-- pes planus  Great toe deformities include a bunion  Tenderness: None except the distal first metatarsal-- and-- distal fifth metatarsal    Right Foot: Appearance: Normal except as noted: excessive pronation-- and-- pes planus  Great toe deformities include a bunion  Tenderness: None except the distal first metatarsal-- and-- distal fifth metatarsal    Left Ankle: ROM: limited ROM in all planes   Right Ankle: ROM: limited ROM in all planes   Neurological Exam: performed  Light touch was intact bilaterally  Vibratory sensation was intact bilaterally  Response to monofilament test was intact bilaterally  Deep tendon reflexes: patellar reflex present bilaterally-- and-- achilles reflex present bilaterally  Vascular Exam: performed Dorsalis pedis pulses were One/4 bilaterally  Posterior tibial pulses were One/4 bilaterally  Elevation Pallor: present bilaterally  Dependence rubor was present bilaterally  Capillary refill time was Q9 findings bilateral  Negative digital hair noted  Positive abnormal cooling bilateral, but-- between 1-3 seconds bilaterally  Toenails: All of the toenails were elongated,-- hypertrophied,-- discolored,-- tender-- and-- Mycotic  Hyperkeratosis: present on both first sub metatarsals-- and-- present on both fifth sub metatarsals  Shoe Gear Evaluation: performed ()  Recommendation(s): custom inlays       Procedure  Foot exam performed  All mycotic nails debrided  Plantar lesions debrided  Patient tolerated procedures well without pain or complication  Patient will moisturize after bathing  He will return for follow-up            There are no diagnoses linked to this encounter        Subjective:       Patient ID: Genaro Murray is a 80 y  o  male      HPI     The following portions of the patient's history were reviewed and updated as appropriate: allergies, current medications, past family history, past medical history, past social history, past surgical history and problem list      Review of Systems       Objective:      Foot Exam     Right Foot/Ankle      Inspection and Palpation  Skin Exam: callus;      Neurovascular  Dorsalis pedis: 1+  Posterior tibial: 1+        Left Foot/Ankle       Inspection and Palpation  Skin Exam: callus;      Neurovascular  Dorsalis pedis: 1+  Posterior tibial: 1+        Physical Exam   Cardiovascular: Pulses are weak pulses  Pulses:       Dorsalis pedis pulses are 1+ on the right side, and 1+ on the left side         Posterior tibial pulses are 1+ on the right side, and 1+ on the left side  Feet:   Right Foot:   Skin Integrity: Positive for callus     Left Foot:   Skin Integrity: Positive for callus

## 2018-07-18 ENCOUNTER — APPOINTMENT (EMERGENCY)
Dept: RADIOLOGY | Facility: HOSPITAL | Age: 83
DRG: 291 | End: 2018-07-18
Payer: COMMERCIAL

## 2018-07-18 ENCOUNTER — HOSPITAL ENCOUNTER (INPATIENT)
Facility: HOSPITAL | Age: 83
LOS: 5 days | Discharge: HOME HEALTH | DRG: 291 | End: 2018-07-23
Attending: EMERGENCY MEDICINE | Admitting: ANESTHESIOLOGY
Payer: COMMERCIAL

## 2018-07-18 DIAGNOSIS — J44.9 COPD WITHOUT EXACERBATION (HCC): ICD-10-CM

## 2018-07-18 DIAGNOSIS — I50.9 CHF (CONGESTIVE HEART FAILURE) (HCC): Primary | ICD-10-CM

## 2018-07-18 DIAGNOSIS — J44.9 MODERATE COPD (CHRONIC OBSTRUCTIVE PULMONARY DISEASE) (HCC): ICD-10-CM

## 2018-07-18 PROBLEM — I27.20 PULMONARY HYPERTENSION (HCC): Status: ACTIVE | Noted: 2018-07-18

## 2018-07-18 LAB
ALBUMIN SERPL BCP-MCNC: 3 G/DL (ref 3.5–5)
ALP SERPL-CCNC: 56 U/L (ref 46–116)
ALT SERPL W P-5'-P-CCNC: 17 U/L (ref 12–78)
ANION GAP SERPL CALCULATED.3IONS-SCNC: 10 MMOL/L (ref 4–13)
APTT PPP: 30 SECONDS (ref 24–36)
AST SERPL W P-5'-P-CCNC: 38 U/L (ref 5–45)
ATRIAL RATE: 78 BPM
BACTERIA UR QL AUTO: ABNORMAL /HPF
BASOPHILS # BLD AUTO: 0.04 THOUSANDS/ΜL (ref 0–0.1)
BASOPHILS NFR BLD AUTO: 0 % (ref 0–1)
BILIRUB SERPL-MCNC: 0.4 MG/DL (ref 0.2–1)
BILIRUB UR QL STRIP: NEGATIVE
BUN SERPL-MCNC: 34 MG/DL (ref 5–25)
CALCIUM SERPL-MCNC: 8.8 MG/DL (ref 8.3–10.1)
CHLORIDE SERPL-SCNC: 104 MMOL/L (ref 100–108)
CLARITY UR: CLEAR
CO2 SERPL-SCNC: 28 MMOL/L (ref 21–32)
COLOR UR: YELLOW
CREAT SERPL-MCNC: 2.14 MG/DL (ref 0.6–1.3)
EOSINOPHIL # BLD AUTO: 0.23 THOUSAND/ΜL (ref 0–0.61)
EOSINOPHIL NFR BLD AUTO: 2 % (ref 0–6)
ERYTHROCYTE [DISTWIDTH] IN BLOOD BY AUTOMATED COUNT: 15.9 % (ref 11.6–15.1)
GFR SERPL CREATININE-BSD FRML MDRD: 27 ML/MIN/1.73SQ M
GLUCOSE SERPL-MCNC: 91 MG/DL (ref 65–140)
GLUCOSE UR STRIP-MCNC: NEGATIVE MG/DL
HCT VFR BLD AUTO: 32.3 % (ref 36.5–49.3)
HGB BLD-MCNC: 9.7 G/DL (ref 12–17)
HGB UR QL STRIP.AUTO: NEGATIVE
IMM GRANULOCYTES # BLD AUTO: 0.09 THOUSAND/UL (ref 0–0.2)
IMM GRANULOCYTES NFR BLD AUTO: 1 % (ref 0–2)
INR PPP: 1.14 (ref 0.86–1.16)
KETONES UR STRIP-MCNC: NEGATIVE MG/DL
LEUKOCYTE ESTERASE UR QL STRIP: NEGATIVE
LYMPHOCYTES # BLD AUTO: 1.6 THOUSANDS/ΜL (ref 0.6–4.47)
LYMPHOCYTES NFR BLD AUTO: 17 % (ref 14–44)
MCH RBC QN AUTO: 25.9 PG (ref 26.8–34.3)
MCHC RBC AUTO-ENTMCNC: 30 G/DL (ref 31.4–37.4)
MCV RBC AUTO: 86 FL (ref 82–98)
MONOCYTES # BLD AUTO: 0.82 THOUSAND/ΜL (ref 0.17–1.22)
MONOCYTES NFR BLD AUTO: 8 % (ref 4–12)
NEUTROPHILS # BLD AUTO: 6.94 THOUSANDS/ΜL (ref 1.85–7.62)
NEUTS SEG NFR BLD AUTO: 72 % (ref 43–75)
NITRITE UR QL STRIP: NEGATIVE
NON-SQ EPI CELLS URNS QL MICRO: ABNORMAL /HPF
NRBC BLD AUTO-RTO: 0 /100 WBCS
NT-PROBNP SERPL-MCNC: 7739 PG/ML
P AXIS: 22 DEGREES
PH UR STRIP.AUTO: 8 [PH] (ref 5–9)
PLATELET # BLD AUTO: 437 THOUSANDS/UL (ref 149–390)
PMV BLD AUTO: 10.6 FL (ref 8.9–12.7)
POTASSIUM SERPL-SCNC: 4.4 MMOL/L (ref 3.5–5.3)
PR INTERVAL: 214 MS
PROT SERPL-MCNC: 7.3 G/DL (ref 6.4–8.2)
PROT UR STRIP-MCNC: ABNORMAL MG/DL
PROTHROMBIN TIME: 11.9 SECONDS (ref 9.4–11.7)
QRS AXIS: -60 DEGREES
QRSD INTERVAL: 140 MS
QT INTERVAL: 410 MS
QTC INTERVAL: 467 MS
RBC # BLD AUTO: 3.75 MILLION/UL (ref 3.88–5.62)
RBC #/AREA URNS AUTO: ABNORMAL /HPF
SODIUM SERPL-SCNC: 142 MMOL/L (ref 136–145)
SP GR UR STRIP.AUTO: 1.01 (ref 1–1.03)
T WAVE AXIS: 96 DEGREES
TROPONIN I SERPL-MCNC: <0.02 NG/ML
UROBILINOGEN UR QL STRIP.AUTO: 0.2 E.U./DL
VENTRICULAR RATE: 78 BPM
WBC # BLD AUTO: 9.72 THOUSAND/UL (ref 4.31–10.16)
WBC #/AREA URNS AUTO: ABNORMAL /HPF

## 2018-07-18 PROCEDURE — 87147 CULTURE TYPE IMMUNOLOGIC: CPT | Performed by: ANESTHESIOLOGY

## 2018-07-18 PROCEDURE — 71045 X-RAY EXAM CHEST 1 VIEW: CPT

## 2018-07-18 PROCEDURE — 84484 ASSAY OF TROPONIN QUANT: CPT | Performed by: EMERGENCY MEDICINE

## 2018-07-18 PROCEDURE — 93010 ELECTROCARDIOGRAM REPORT: CPT | Performed by: INTERNAL MEDICINE

## 2018-07-18 PROCEDURE — 93005 ELECTROCARDIOGRAM TRACING: CPT

## 2018-07-18 PROCEDURE — 96374 THER/PROPH/DIAG INJ IV PUSH: CPT

## 2018-07-18 PROCEDURE — 94640 AIRWAY INHALATION TREATMENT: CPT

## 2018-07-18 PROCEDURE — 85610 PROTHROMBIN TIME: CPT | Performed by: EMERGENCY MEDICINE

## 2018-07-18 PROCEDURE — 83880 ASSAY OF NATRIURETIC PEPTIDE: CPT | Performed by: EMERGENCY MEDICINE

## 2018-07-18 PROCEDURE — 36415 COLL VENOUS BLD VENIPUNCTURE: CPT | Performed by: EMERGENCY MEDICINE

## 2018-07-18 PROCEDURE — 99232 SBSQ HOSP IP/OBS MODERATE 35: CPT | Performed by: ANESTHESIOLOGY

## 2018-07-18 PROCEDURE — 81001 URINALYSIS AUTO W/SCOPE: CPT | Performed by: EMERGENCY MEDICINE

## 2018-07-18 PROCEDURE — 96375 TX/PRO/DX INJ NEW DRUG ADDON: CPT

## 2018-07-18 PROCEDURE — 85025 COMPLETE CBC W/AUTO DIFF WBC: CPT | Performed by: EMERGENCY MEDICINE

## 2018-07-18 PROCEDURE — 99291 CRITICAL CARE FIRST HOUR: CPT

## 2018-07-18 PROCEDURE — 87081 CULTURE SCREEN ONLY: CPT | Performed by: ANESTHESIOLOGY

## 2018-07-18 PROCEDURE — 80053 COMPREHEN METABOLIC PANEL: CPT | Performed by: EMERGENCY MEDICINE

## 2018-07-18 PROCEDURE — 94760 N-INVAS EAR/PLS OXIMETRY 1: CPT

## 2018-07-18 PROCEDURE — 85730 THROMBOPLASTIN TIME PARTIAL: CPT | Performed by: EMERGENCY MEDICINE

## 2018-07-18 PROCEDURE — 94660 CPAP INITIATION&MGMT: CPT

## 2018-07-18 RX ORDER — PANTOPRAZOLE SODIUM 40 MG/1
40 TABLET, DELAYED RELEASE ORAL
Status: DISCONTINUED | OUTPATIENT
Start: 2018-07-18 | End: 2018-07-23 | Stop reason: HOSPADM

## 2018-07-18 RX ORDER — TOLTERODINE TARTRATE 1 MG/1
1 TABLET, EXTENDED RELEASE ORAL DAILY
COMMUNITY
End: 2018-10-16 | Stop reason: SDUPTHER

## 2018-07-18 RX ORDER — ASPIRIN 81 MG/1
81 TABLET, CHEWABLE ORAL DAILY
Status: DISCONTINUED | OUTPATIENT
Start: 2018-07-18 | End: 2018-07-23 | Stop reason: HOSPADM

## 2018-07-18 RX ORDER — METHYLPREDNISOLONE SODIUM SUCCINATE 125 MG/2ML
125 INJECTION, POWDER, LYOPHILIZED, FOR SOLUTION INTRAMUSCULAR; INTRAVENOUS ONCE
Status: COMPLETED | OUTPATIENT
Start: 2018-07-18 | End: 2018-07-18

## 2018-07-18 RX ORDER — HEPARIN SODIUM 5000 [USP'U]/ML
5000 INJECTION, SOLUTION INTRAVENOUS; SUBCUTANEOUS EVERY 8 HOURS SCHEDULED
Status: DISCONTINUED | OUTPATIENT
Start: 2018-07-18 | End: 2018-07-21

## 2018-07-18 RX ORDER — AMLODIPINE BESYLATE 10 MG/1
10 TABLET ORAL DAILY
Status: DISCONTINUED | OUTPATIENT
Start: 2018-07-18 | End: 2018-07-23 | Stop reason: HOSPADM

## 2018-07-18 RX ORDER — ACETAMINOPHEN 325 MG/1
650 TABLET ORAL EVERY 6 HOURS PRN
Status: DISCONTINUED | OUTPATIENT
Start: 2018-07-18 | End: 2018-07-22

## 2018-07-18 RX ORDER — FUROSEMIDE 10 MG/ML
40 INJECTION INTRAMUSCULAR; INTRAVENOUS ONCE
Status: COMPLETED | OUTPATIENT
Start: 2018-07-18 | End: 2018-07-18

## 2018-07-18 RX ORDER — LEVOTHYROXINE SODIUM 0.05 MG/1
50 TABLET ORAL
Status: DISCONTINUED | OUTPATIENT
Start: 2018-07-18 | End: 2018-07-23 | Stop reason: HOSPADM

## 2018-07-18 RX ORDER — ISOSORBIDE MONONITRATE 30 MG/1
30 TABLET, EXTENDED RELEASE ORAL DAILY
Status: DISCONTINUED | OUTPATIENT
Start: 2018-07-18 | End: 2018-07-23 | Stop reason: HOSPADM

## 2018-07-18 RX ORDER — ATORVASTATIN CALCIUM 20 MG/1
20 TABLET, FILM COATED ORAL
Status: DISCONTINUED | OUTPATIENT
Start: 2018-07-18 | End: 2018-07-23 | Stop reason: HOSPADM

## 2018-07-18 RX ORDER — FENOFIBRATE 145 MG/1
145 TABLET, COATED ORAL DAILY
Status: DISCONTINUED | OUTPATIENT
Start: 2018-07-18 | End: 2018-07-23 | Stop reason: HOSPADM

## 2018-07-18 RX ORDER — IPRATROPIUM BROMIDE AND ALBUTEROL SULFATE 2.5; .5 MG/3ML; MG/3ML
3 SOLUTION RESPIRATORY (INHALATION) ONCE
Status: COMPLETED | OUTPATIENT
Start: 2018-07-18 | End: 2018-07-18

## 2018-07-18 RX ORDER — IPRATROPIUM BROMIDE AND ALBUTEROL SULFATE 2.5; .5 MG/3ML; MG/3ML
3 SOLUTION RESPIRATORY (INHALATION)
Status: DISCONTINUED | OUTPATIENT
Start: 2018-07-18 | End: 2018-07-23 | Stop reason: HOSPADM

## 2018-07-18 RX ORDER — CHLORHEXIDINE GLUCONATE 0.12 MG/ML
15 RINSE ORAL EVERY 12 HOURS SCHEDULED
Status: DISCONTINUED | OUTPATIENT
Start: 2018-07-18 | End: 2018-07-20

## 2018-07-18 RX ORDER — AMIODARONE HYDROCHLORIDE 200 MG/1
100 TABLET ORAL DAILY
Status: DISCONTINUED | OUTPATIENT
Start: 2018-07-18 | End: 2018-07-23 | Stop reason: HOSPADM

## 2018-07-18 RX ADMIN — IPRATROPIUM BROMIDE AND ALBUTEROL SULFATE 3 ML: .5; 3 SOLUTION RESPIRATORY (INHALATION) at 14:33

## 2018-07-18 RX ADMIN — METOPROLOL TARTRATE 25 MG: 25 TABLET ORAL at 13:45

## 2018-07-18 RX ADMIN — FENOFIBRATE 145 MG: 145 TABLET ORAL at 14:51

## 2018-07-18 RX ADMIN — IPRATROPIUM BROMIDE AND ALBUTEROL SULFATE 3 ML: .5; 3 SOLUTION RESPIRATORY (INHALATION) at 09:21

## 2018-07-18 RX ADMIN — CHLORHEXIDINE GLUCONATE 15 ML: 1.2 RINSE ORAL at 13:43

## 2018-07-18 RX ADMIN — IPRATROPIUM BROMIDE AND ALBUTEROL SULFATE 3 ML: .5; 3 SOLUTION RESPIRATORY (INHALATION) at 16:17

## 2018-07-18 RX ADMIN — METHYLPREDNISOLONE SODIUM SUCCINATE 125 MG: 125 INJECTION, POWDER, FOR SOLUTION INTRAMUSCULAR; INTRAVENOUS at 09:21

## 2018-07-18 RX ADMIN — CHLORHEXIDINE GLUCONATE 15 ML: 1.2 RINSE ORAL at 20:45

## 2018-07-18 RX ADMIN — HEPARIN SODIUM 5000 UNITS: 5000 INJECTION, SOLUTION INTRAVENOUS; SUBCUTANEOUS at 22:39

## 2018-07-18 RX ADMIN — ISOSORBIDE MONONITRATE 30 MG: 30 TABLET, EXTENDED RELEASE ORAL at 13:46

## 2018-07-18 RX ADMIN — ASPIRIN 81 MG 81 MG: 81 TABLET ORAL at 13:45

## 2018-07-18 RX ADMIN — IPRATROPIUM BROMIDE AND ALBUTEROL SULFATE 3 ML: .5; 3 SOLUTION RESPIRATORY (INHALATION) at 19:37

## 2018-07-18 RX ADMIN — ATORVASTATIN CALCIUM 20 MG: 20 TABLET, FILM COATED ORAL at 17:33

## 2018-07-18 RX ADMIN — FUROSEMIDE 40 MG: 10 INJECTION, SOLUTION INTRAMUSCULAR; INTRAVENOUS at 09:22

## 2018-07-18 RX ADMIN — AMLODIPINE BESYLATE 10 MG: 10 TABLET ORAL at 13:44

## 2018-07-18 RX ADMIN — METOPROLOL TARTRATE 25 MG: 25 TABLET ORAL at 20:47

## 2018-07-18 RX ADMIN — HEPARIN SODIUM 5000 UNITS: 5000 INJECTION, SOLUTION INTRAVENOUS; SUBCUTANEOUS at 13:46

## 2018-07-18 RX ADMIN — AMIODARONE HYDROCHLORIDE 100 MG: 200 TABLET ORAL at 13:45

## 2018-07-18 RX ADMIN — ACETAMINOPHEN 650 MG: 325 TABLET, FILM COATED ORAL at 20:46

## 2018-07-18 NOTE — ED PROCEDURE NOTE
PROCEDURE  ECG 12 Lead Documentation  Date/Time: 7/18/2018 8:27 AM  Performed by: Beuford Goodell by: Regan Acuna     ECG reviewed by me, the ED Provider: yes    Patient location:  ED  Interpretation:     Interpretation: abnormal    Rate:     ECG rate:  78    ECG rate assessment: normal    Rhythm:     Rhythm: sinus rhythm    Ectopy:     Ectopy: PAC    QRS:     QRS axis:  Left  Conduction:     Conduction: abnormal      Abnormal conduction: non-specific intraventricular conduction delay    ST segments:     ST segments:  Normal  T waves:     T waves: normal    Q waves:     Q waves:  V1 and V2         Man [place] DO  07/18/18 [id number]

## 2018-07-18 NOTE — ED PROVIDER NOTES
History  Chief Complaint   Patient presents with    Shortness of Breath     pt SOB with 5 lb weight gain overnight per wife  pt on O2 at home NC  pt 68% pulse ox RA on arrival  O2 sat climbed to 98% when placed on NR mask during triage     Patient presents from home for evaluation of dyspnea and chest tightness  Wife reports 5 lb weight gain overnight  On home O2  History provided by:  Patient and spouse  History limited by:  Severe respiratory distress   used: No    Shortness of Breath   Associated symptoms: no fever        Prior to Admission Medications   Prescriptions Last Dose Informant Patient Reported? Taking? B Complex Vitamins (VITAMIN B COMPLEX PO)  Self Yes No   Sig: Take by mouth   BREO ELLIPTA  Self No No   Sig: USE 1 INHALATION DAILY   albuterol (PROAIR HFA) 90 mcg/act inhaler  Self No No   Sig: Inhale 2 puffs every 4 (four) hours as needed for wheezing or shortness of breath   amLODIPine (NORVASC) 10 mg tablet  Self No No   Sig: TAKE 1 TABLET DAILY   amiodarone 100 mg tablet  Self No No   Sig: Take 1 tablet (100 mg total) by mouth daily   aspirin 81 mg chewable tablet  Self Yes No   Sig: Chew daily   atorvastatin (LIPITOR) 10 mg tablet   No No   Sig: Take 2 tablets (20 mg total) by mouth daily   cyanocobalamin (VITAMIN B-12) 100 mcg tablet  Self Yes No   Sig: Take by mouth   docusate sodium (COLACE) 100 mg capsule  Self Yes No   Sig: Take 100 mg by mouth daily at bedtime  fenofibrate (TRICOR) 145 mg tablet  Self Yes No   Sig: Take 145 mg by mouth daily     hydrALAZINE (APRESOLINE) 25 mg tablet   No No   Sig: take 1 tablet by mouth every 8 hours   ipratropium-albuterol (DUO-NEB) 0 5-2 5 mg/3 mL nebulizer solution  Self No No   Sig: Take 1 vial (3 mL total) by nebulization 4 (four) times a day for 90 days   iron polysaccharides (FERREX 150) 150 mg capsule  Self No No   Sig: Take 1 capsule (150 mg total) by mouth daily for 30 days   isosorbide mononitrate (IMDUR) 30 mg 24 hr tablet  Self No No   Sig: Take 1 tablet (30 mg total) by mouth daily   levothyroxine 50 mcg tablet   No No   Sig: Take 1 tablet (50 mcg total) by mouth daily   metoprolol tartrate (LOPRESSOR) 25 mg tablet  Self No No   Sig: Take 1 tablet (25 mg total) by mouth every 12 (twelve) hours     pantoprazole (PROTONIX) 40 mg tablet  Self No No   Sig: take 1 tablet by mouth once daily   tolterodine (DETROL) 1 mg tablet   Yes Yes   Sig: Take 1 mg by mouth daily   torsemide (DEMADEX) 20 mg tablet  Self Yes No   Sig: Take by mouth      Facility-Administered Medications: None       Past Medical History:   Diagnosis Date    Allergic rhinitis 06/11/2010    Aortic stenosis, severe     Bacterial pneumonia 06/08/2007    Bladder neck obstruction 12/23/2010    BPH (benign prostatic hyperplasia)     [facility], [facility] obstructive[facility] 01/30/2012    CAD (coronary artery disease)     stent 2014    Carcinoma ([facility] )     resolved [id number]    Cardiomegaly 02/13/2007    Cataract of both eyes     [facility]     Chronic allergic conjunctivitis     last assessed [id number]    Chronic kidney disease (CKD)     CKD (chronic kidney disease), stage III     CKD (chronic kidney disease), stage III     COPD (chronic obstructive pulmonary disease) [facility]     Decubitus ulcer     resolved [id number]    Dermatomycosis     last assessed [id number]    Diabetes mellitus type 2, noninsulin dependent ([facility] )     Dyspnea on exertion     last assessed [id number]    Eczema     last assessed [id number]    Edema     last assessed [id number]    Epistaxis 12/01/2004    Esophagitis, erosive     Exposure to scabies     resolved [id number]    Fracture of rib     last assessed [id number]    H/O aortic valve replacement     resolved [id number]    H/O blood clots     History of DVT (deep vein thrombosis)     left posterior tibial/peroneal veins    History of non-ST elevation myocardial infarction (NSTEMI)     History of pneumonia     Hyperlipidemia     Hypertension     Internal hemorrhoids 2006    LBBB (left bundle branch block)     MRSA (methicillin resistant staph aureus) culture positive     NSTEMI (non-ST elevated myocardial infarction) ([facility] )     resolved 2016    Obstructive sleep apnea on CPAP     severe PATITO with AHI of 106 and uses CPAP at 10 cm H2O with 2 l/m oxygen    Paroxysmal atrial fibrillation ([facility] )     Phimosis 2010    severe pt had circumcision 2010    [facility] fibrosis [facility]     PVD (peripheral vascular disease) ([facility]     Rotator cuff tendonitis     lsat assessed [id number]    Skin abscess 2004    Hip    Skin neoplasm     last assessed [id number]    Tachycardia 2004    Trigger finger     last assessed [id number]    Type 2 diabetes mellitus ([facility] ) 2004    Venous thrombosis 2007    Venous thrombosis 2007       Past Surgical History:   Procedure Laterality Date    CARDIAC CATHETERIZATION  03/10/2016    Showed 60-70% mid LAD lesion next to stent    CATARACT EXTRACTION      CIRCUMCISION, NON-      ESOPHAGOGASTRODUODENOSCOPY N/A 3/14/2016    Procedure: ESOPHAGOGASTRODUODENOSCOPY (EGD); Surgeon: Leeroy Hamlin MD;  Location: [facility];   Service:     EYE SURGERY      FRACTURE SURGERY      JOINT REPLACEMENT      both hips replaced    OTHER SURGICAL HISTORY      H/O thoracentesis (diagnostic)    VA REPLACE AORTIC VALVE OPENFEMORAL ARTERY APPROACH N/A 2016    Procedure: TRANSFEMORAL TAVR WITH 26MM CALIX MAKAYLA S3 TISSUE VALVE; PINA ;  Surgeon: Arthur Taylor DO;  Location:  MAIN OR;  Service: Cardiac Surgery    REPLACEMENT TOTAL KNEE Bilateral 1991 and     TISSUE AORTIC VALVE REPLACEMENT      transfemoral, transcatheter    TONSILLECTOMY      TONSILLECTOMY AND ADENOIDECTOMY      TOTAL HIP ARTHROPLASTY      Bilateral       Family History   Problem Relation Age of Onset    Coronary artery disease Mother     Arthritis Mother     Hypertension Mother     Rheum arthritis Father     Prostate cancer Father      I have reviewed and agree with the history as documented  Social History   Substance Use Topics    Smoking status: Former Smoker     Packs/day: 1 00     Years: 35 00     Types: Cigarettes     Quit date: 1/1/1987    Smokeless tobacco: Never Used      Comment: quit 50 years ago, per [employer]    Alcohol use Yes      Comment: socially        Review of Systems   Constitutional: Positive for unexpected weight change  Negative for fever  Respiratory: Positive for chest tightness and shortness of breath  Cardiovascular: Positive for leg swelling  All other systems reviewed and are negative  Physical Exam  Physical Exam   Constitutional: He is oriented to person, place, and time  He appears distressed  Eyes: Pupils are equal, round, and reactive to light  Neck: Normal range of motion  Cardiovascular: Normal rate, regular rhythm and intact distal pulses  Pulmonary/Chest: He is in respiratory distress  He has rales  Rales bilateral lower lung fields, moderate respiratory distress   Abdominal: Soft  There is no tenderness  Musculoskeletal: Normal range of motion  He exhibits edema  Neurological: He is alert and oriented to person, place, and time  Skin: Capillary refill takes less than 2 seconds  He is diaphoretic  Nursing note and vitals reviewed        Vital Signs  ED Triage Vitals   Temperature Pulse Respirations Blood Pressure SpO2   07/18/18 0824 07/18/18 0824 07/18/18 0824 07/18/18 0824 07/18/18 0824   98 9 °F (37 2 °C) 80 20 149/67 98 %      Temp Source Heart Rate Source Patient Position - Orthostatic VS BP Location FiO2 (%)   07/18/18 0824 07/18/18 0824 07/18/18 0824 07/18/18 0824 07/18/18 1305   Tympanic Monitor Sitting Right arm 50      Pain Score       07/18/18 0824       No Pain           Vitals:    07/18/18 1200 07/18/18 1215 07/18/18 1230 07/18/18 1305   BP: 140/69   Pulse: 84 80 74 89   Patient Position - Orthostatic VS:    Lying       Visual Acuity      ED Medications  Medications   amiodarone tablet 100 mg (not administered)   amLODIPine (NORVASC) tablet 10 mg (not administered)   aspirin chewable tablet 81 mg (not administered)   atorvastatin (LIPITOR) tablet 20 mg (not administered)   fenofibrate (TRICOR) tablet 145 mg (not administered)   ipratropium-albuterol (DUO-NEB) 0 5-2 5 mg/3 mL inhalation solution 3 mL (not administered)   levothyroxine tablet 50 mcg (not administered)   isosorbide mononitrate (IMDUR) 24 hr tablet 30 mg (not administered)   metoprolol tartrate (LOPRESSOR) tablet 25 mg (not administered)   pantoprazole (PROTONIX) EC tablet 40 mg (not administered)   chlorhexidine (PERIDEX) 0 12 % oral rinse 15 mL (not administered)   heparin (porcine) subcutaneous injection 5,000 Units (not administered)   ipratropium-albuterol (DUO-NEB) 0 5-2 5 mg/3 mL inhalation solution 3 mL (3 mL Nebulization Given 7/18/18 0921)   methylPREDNISolone sodium succinate (Solu-MEDROL) injection 125 mg (125 mg Intravenous Given 7/18/18 0921)   furosemide (LASIX) injection 40 mg (40 mg Intravenous Given 7/18/18 0922)       Diagnostic Studies  Results Reviewed     Procedure Component Value Units Date/Time    B-type natriuretic peptide [[id number]]  (Abnormal) Collected:  07/18/18 0831    Lab Status:  Final result Specimen:  Blood from Arm, Left Updated:  07/18/18 1017     NT-proBNP 7,739 (H) pg/mL     Troponin I [[id number]]  (Normal) Collected:  07/18/18 0831    Lab Status:  Final result Specimen:  Blood from Arm, Left Updated:  07/18/18 1017     Troponin I <0 02 ng/mL     Comprehensive metabolic panel [[id number]]  (Abnormal) Collected:  07/18/18 0831    Lab Status:  Final result Specimen:  Blood from Arm, Left Updated:  07/18/18 1010     Sodium 142 mmol/L      Potassium 4 4 mmol/L      Chloride 104 mmol/L      CO2 28 mmol/L      Anion Gap 10 mmol/L      BUN 34 (H) mg/dL Creatinine 2 14 (H) mg/dL      Glucose 91 mg/dL      Calcium 8 8 mg/dL      AST 38 U/L      ALT 17 U/L      Alkaline Phosphatase 56 U/L      Total Protein 7 3 g/dL      Albumin 3 0 (L) g/dL      Total Bilirubin 0 40 mg/dL      eGFR 27 ml/min/1 73sq m     Narrative:         National Kidney Disease Education Program recommendations are as follows:  GFR calculation is accurate only with a steady state creatinine  Chronic Kidney disease less than 60 ml/min/1 73 sq  meters  Kidney failure less than 15 ml/min/1 73 sq  meters      Protime-INR [[id number]]  (Abnormal) Collected:  07/18/18 0831    Lab Status:  Final result Specimen:  Blood from Arm, Left Updated:  07/18/18 1006     Protime 11 9 (H) seconds      INR 1 14    APTT [[id number]]  (Normal) Collected:  07/18/18 0831    Lab Status:  Final result Specimen:  Blood from Arm, Left Updated:  07/18/18 1006     PTT 30 seconds     Urine Microscopic [[id number]]  (Abnormal) Collected:  07/18/18 0935    Lab Status:  Final result Specimen:  Urine from Urine, Clean Catch Updated:  07/18/18 0955     RBC, UA None Seen /hpf      WBC, UA 0-1 (A) /hpf      Epithelial Cells None Seen /hpf      Bacteria, UA Occasional /hpf     CBC and differential [[id number]]  (Abnormal) Collected:  07/18/18 0831    Lab Status:  Final result Specimen:  Blood from Arm, Left Updated:  07/18/18 0952     WBC 9 72 Thousand/uL      RBC 3 75 (L) Million/uL      Hemoglobin 9 7 (L) g/dL      Hematocrit 32 3 (L) %      MCV 86 fL      MCH 25 9 (L) pg      MCHC 30 0 (L) g/dL      RDW 15 9 (H) %      MPV 10 6 fL      Platelets 278 (H) Thousands/uL      nRBC 0 /100 WBCs      Neutrophils Relative 72 %      Immat GRANS % 1 %      Lymphocytes Relative 17 %      Monocytes Relative 8 %      Eosinophils Relative 2 %      Basophils Relative 0 %      Neutrophils Absolute 6 94 Thousands/µL      Immature Grans Absolute 0 09 Thousand/uL      Lymphocytes Absolute 1 60 Thousands/µL      Monocytes Absolute 0 82 Thousand/µL Eosinophils Absolute 0 23 Thousand/µL      Basophils Absolute 0 04 Thousands/µL     UA w Reflex to Microscopic [[id number]]  (Abnormal) Collected:  07/18/18 0935    Lab Status:  Final result Specimen:  Urine from Urine, Clean Catch Updated:  07/18/18 0945     Color, UA Yellow     Clarity, UA Clear     Specific Gravity, UA 1 010     pH, UA 8 0     Leukocytes, UA Negative     Nitrite, UA Negative     Protein, UA 30 (1+) (A) mg/dl      Glucose, UA Negative mg/dl      Ketones, UA Negative mg/dl      Urobilinogen, UA 0 2 E U /dl      Bilirubin, UA Negative     Blood, UA Negative                 XR chest 1 view portable   Final Result by Viv Fisher DO (07/18 8940)      Small right-sided pleural effusion/atelectasis  Some patchy opacities are seen in the bilateral lower lung fields which could represent atelectasis, scarring or infection depending on the clinical setting  Enlarged cardiac silhouette and prominence of the interstitial markings  Consider a component of fluid overload/CHF  Other findings as above  Workstation performed: [id number]                    Procedures  Procedures       Phone Contacts  ED Phone Contact    ED Course                               MDM  Number of Diagnoses or Management Options  CHF (congestive heart failure) [facility]):   Diagnosis management comments: Pulse ox 65% on RA indicating poor oxygenation  Pulse ox 95% on NRB indicating adequate oxygenation  CXR: right effusion, PVC as read by me    Patient placed on high flow nasal cannula on arrival with improvement in symptoms  Patient started on diuretics with good output  Trialed off high flow but would desaturate into the 80s, placed back on high flow and ICU contacted for MSDU admission          Amount and/or Complexity of Data Reviewed  Clinical lab tests: ordered and reviewed  Tests in the radiology section of CPT®: ordered and reviewed  Decide to obtain previous medical records or to obtain history from someone other than the patient: yes  Obtain history from someone other than the patient: yes  Review and summarize past medical records: yes  Discuss the patient with other providers: yes  Independent visualization of images, tracings, or specimens: yes    Patient Progress  Patient progress: stable    The patient presented with a condition in which there was a high probability of imminent or life-threatening deterioration, and critical care services (excluding separately billable procedures) totalled 30-74 minutes  Disposition  Final diagnoses:   CHF (congestive heart failure) ([facility] )     Time reflects when diagnosis was documented in both MDM as applicable and the Disposition within this note     Time User Action Codes Description Comment    7/18/2018 11:01 AM Juancarlos Tyler Add [I50 9] CHF (congestive heart failure) [facility])       ED Disposition     ED Disposition Condition Comment    Admit  Case was discussed with Luz Marina Christine and the patient's admission status was agreed to be admission to the service of Dr Brett Goldmann          Follow-up Information    None         Current Discharge Medication List      CONTINUE these medications which have NOT CHANGED    Details   tolterodine (DETROL) 1 mg tablet Take 1 mg by mouth daily      albuterol (PROAIR HFA) 90 mcg/act inhaler Inhale 2 puffs every 4 (four) hours as needed for wheezing or shortness of breath  Qty: 18 g, Refills: 5    Associated Diagnoses: Centrilobular emphysema (HCC)      amiodarone 100 mg tablet Take 1 tablet (100 mg total) by mouth daily  Qty: 90 tablet, Refills: 3    Associated Diagnoses: PAF (paroxysmal atrial fibrillation) (HCC)      amLODIPine (NORVASC) 10 mg tablet TAKE 1 TABLET DAILY  Qty: 90 tablet, Refills: 3    Associated Diagnoses: Essential hypertension      aspirin 81 mg chewable tablet Chew daily      atorvastatin (LIPITOR) 10 mg tablet Take 2 tablets (20 mg total) by mouth daily  Qty: 90 tablet, Refills: 3    Comments: Replaces the simvastatin  Associated Diagnoses: Hyperlipidemia, unspecified hyperlipidemia type      B Complex Vitamins (VITAMIN B COMPLEX PO) Take by mouth      BREO ELLIPTA USE 1 INHALATION DAILY  Qty: 1 each, Refills: 5    Associated Diagnoses: 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)      cyanocobalamin (VITAMIN B-12) 100 mcg tablet Take by mouth      docusate sodium (COLACE) 100 mg capsule Take 100 mg by mouth daily at bedtime  fenofibrate (TRICOR) 145 mg tablet Take 145 mg by mouth daily  hydrALAZINE (APRESOLINE) 25 mg tablet take 1 tablet by mouth every 8 hours  Qty: 90 tablet, Refills: 0    Associated Diagnoses: Hypertension      ipratropium-albuterol (DUO-NEB) 0 5-2 5 mg/3 mL nebulizer solution Take 1 vial (3 mL total) by nebulization 4 (four) times a day for 90 days  Qty: 1080 mL, Refills: 3    Associated Diagnoses: Centrilobular emphysema (HCC)      iron polysaccharides (FERREX 150) 150 mg capsule Take 1 capsule (150 mg total) by mouth daily for 30 days  Qty: 30 capsule, Refills: 2    Associated Diagnoses: Iron deficiency anemia, unspecified iron deficiency anemia type      isosorbide mononitrate (IMDUR) 30 mg 24 hr tablet Take 1 tablet (30 mg total) by mouth daily  Qty: 30 tablet, Refills: 0    Associated Diagnoses: Hypertension      levothyroxine 50 mcg tablet Take 1 tablet (50 mcg total) by mouth daily  Qty: 90 tablet, Refills: 0    Associated Diagnoses: Other specified hypothyroidism      metoprolol tartrate (LOPRESSOR) 25 mg tablet Take 1 tablet (25 mg total) by mouth every 12 (twelve) hours  Qty: 60 tablet, Refills: 2      pantoprazole (PROTONIX) 40 mg tablet take 1 tablet by mouth once daily  Qty: 90 tablet, Refills: 3    Associated Diagnoses: Gastroesophageal reflux disease with esophagitis      torsemide (DEMADEX) 20 mg tablet Take by mouth           No discharge procedures on file      ED Provider  Electronically Signed by           Zhou Reddy DO  07/18/18 6363

## 2018-07-18 NOTE — ASSESSMENT & PLAN NOTE
· History of EF 35 to 40% with severe pulmonary hypertension likely secondary to COPD  · Patient normally on torsemide daily and states he has been compliant this recently  · No further IV diuresis needed today, restart oral torsemide  · Continue to monitor I&Os

## 2018-07-18 NOTE — PLAN OF CARE
Problem: MUSCULOSKELETAL - ADULT  Goal: Maintain or return mobility to safest level of function  INTERVENTIONS:  - Assess patient's ability to carry out ADLs; assess patient's baseline for ADL function and identify physical deficits which impact ability to perform ADLs (bathing, care of mouth/teeth, toileting, grooming, dressing, etc )  - Assess/evaluate cause of self-care deficits   - Assess range of motion  - Assess patient's mobility; develop plan if impaired  - Assess patient's need for assistive devices and provide as appropriate  - Encourage maximum independence but intervene and supervise when necessary  - Involve family in performance of ADLs  - Assess for home care needs following discharge   - Request OT consult to assist with ADL evaluation and planning for discharge  - Provide patient education as appropriate   Outcome: Progressing

## 2018-07-18 NOTE — ASSESSMENT & PLAN NOTE
· No evidence of wheezing on exam, no increased sputum production recently  · Continue duo nebs and Breo  · Completed prednisone taper in June for a COPD exacerbation but not chronically on steroids

## 2018-07-18 NOTE — H&P
History and Physical - Critical Care  Adrianna Jaquez 80 y o  male MRN: [id number]  Unit/Bed#: ICU 10 Encounter: [id number]     Reason for Admission / Chief Complaint: shortness of breath     History of Present Illness:  Adrianna Jaquez is a 80 y o  male with past medical history of moderate COPD with an FEV1 of 1 85 L, chronic hypoxemic respiratory failure with an EEG continuous oxygen use of 6 L at baseline and 10 L with exertion, obstructive sleep apnea with use of CPAP of 10, CKD 3, diabetes type 2, atrial fibrillation not currently on anticoagulation, history of DVT, peripheral vascular disease, aortic stenosis status post bioprosthetic aortic valve replacement  who presents with shortness of breath  Patient states this began somewhat suddenly yesterday and the last few days he states he was using more oxygen and having oxygenation issues at home  He states he has not noticed an increase in sputum production or change in his sputum color  He denies any fevers at home  In the emergency department, was hypoxic on nasal cannula required high-flow nasal cannula to maintain saturations greater than 90%  He did not have leukocytosis or fever or other signs of infection  Chest x-ray was concerning for some mild CHF, he was given 40 mg of Lasix and has been diuresing appropriately  History obtained from chart review and the patient       Past Medical History:  Past Medical History:   Diagnosis Date    Allergic rhinitis 06/11/2010    Aortic stenosis, severe     Bacterial pneumonia 06/08/2007    Bladder neck obstruction 12/23/2010    BPH (benign prostatic hyperplasia)     Bronchitis, chronic obstructive, with exacerbation ([facility] ) 01/30/2012    CAD (coronary artery disease)     stent 2014    Carcinoma ([facility] )     resolved [id number]    Cardiomegaly 02/13/2007    Cataract of both eyes     CHF (congestive heart failure) (HCC)     Chronic allergic conjunctivitis     last assessed [id number]    Chronic kidney disease (CKD)     CKD (chronic kidney disease), stage III     CKD (chronic kidney disease), stage III     COPD (chronic obstructive pulmonary disease) [facility] )     Decubitus ulcer     resolved [id number]    Dermatomycosis     last assessed [id number]    Diabetes mellitus type 2, noninsulin dependent ([facility] )     Dyspnea on exertion     last assessed [id number]    Eczema     last assessed [id number]    Edema     last assessed [id number]    Epistaxis 2004    Esophagitis, erosive     Exposure to scabies     resolved [id number]    Fracture of rib     last assessed [id number]    H/O aortic valve replacement     resolved [id number]    H/O blood clots     History of DVT (deep vein thrombosis)     left posterior tibial/peroneal veins    History of non-ST elevation myocardial infarction (NSTEMI)     History of pneumonia     Hyperlipidemia     Hypertension     Internal hemorrhoids 2006    LBBB (left bundle branch block)     MRSA (methicillin resistant staph aureus) culture positive     NSTEMI (non-ST elevated myocardial infarction) ([facility] )     resolved [id number]    Obstructive sleep apnea on CPAP     severe PATITO with AHI of 106 and uses CPAP at 10 cm H2O with 2 l/m oxygen    Paroxysmal atrial fibrillation ([facility] )     Phimosis 2010    severe pt had circumcision 2010    [facility]     PVD (peripheral vascular disease) ([facility]     Rotator cuff tendonitis     lsat assessed [id number]    Skin abscess 2004    Hip    Skin neoplasm     last assessed [id number]    Tachycardia 2004    Trigger finger     last assessed [id number]    Type 2 diabetes mellitus ([facility] ) 2004    Venous thrombosis 2007    Venous thrombosis 2007        Past Surgical History:  Past Surgical History:   Procedure Laterality Date    CARDIAC CATHETERIZATION  03/10/2016    Showed 60-70% mid LAD lesion next to stent    CATARACT EXTRACTION      CIRCUMCISION, NON-      ESOPHAGOGASTRODUODENOSCOPY N/A 3/14/2016    Procedure: ESOPHAGOGASTRODUODENOSCOPY (EGD); Surgeon: Ny Alvarenga MD;  Location: [facility]; Service:     EYE SURGERY      FRACTURE SURGERY      JOINT REPLACEMENT      both hips replaced    OTHER SURGICAL HISTORY      H/O thoracentesis (diagnostic)    MI REPLACE AORTIC VALVE OPENFEMORAL ARTERY APPROACH N/A 8/23/2016    Procedure: TRANSFEMORAL TAVR WITH 26MM CALIX MAKAYLA S3 TISSUE VALVE; PINA ;  Surgeon: Tre Padron DO;  Location: [facility];  Service: Cardiac Surgery    REPLACEMENT TOTAL KNEE Bilateral 01/01/1991 1991 and 2001    TISSUE AORTIC VALVE REPLACEMENT      transfemoral, transcatheter    TONSILLECTOMY      TONSILLECTOMY AND ADENOIDECTOMY      TOTAL HIP ARTHROPLASTY      Bilateral        Past Family History:  Family History   Problem Relation Age of Onset    Coronary artery disease Mother     Arthritis Mother     Hypertension Mother    [facility] Rheum arthritis Father     Prostate cancer Father         Social History:  History   Smoking Status    Former Smoker    Packs/day: 1 00    Years: 35 00    Types: Cigarettes    Quit date: 1/1/1987   Smokeless Tobacco    Never Used     Comment: quit 50 years ago, per [employer]     History   Alcohol Use    Yes     Comment: socially     History   Drug Use No     Marital Status:    Exercise History:      Medications:  Current Facility-Administered Medications   Medication Dose Route Frequency    amiodarone tablet 100 mg  100 mg Oral Daily    amLODIPine (NORVASC) tablet 10 mg  10 mg Oral Daily    aspirin chewable tablet 81 mg  81 mg Oral Daily    atorvastatin (LIPITOR) tablet 20 mg  20 mg Oral Daily With Dinner    chlorhexidine (PERIDEX) 0 12 % oral rinse 15 mL  15 mL Swish & Spit Q12H [facility]    fenofibrate (TRICOR) tablet 145 mg  145 mg Oral Daily    heparin (porcine) subcutaneous injection 5,000 Units  5,000 Units Subcutaneous Q8H [facility]    ipratropium-albuterol (DUO-NEB) 0 5-2 5 mg/3 mL inhalation solution 3 mL  3 mL Nebulization 4x Daily    isosorbide mononitrate (IMDUR) 24 hr tablet 30 mg  30 mg Oral Daily    levothyroxine tablet 50 mcg  50 mcg Oral Early Morning    metoprolol tartrate (LOPRESSOR) tablet 25 mg  25 mg Oral Q12H [address]    pantoprazole (PROTONIX) EC tablet 40 mg  40 mg Oral Early Morning     Home medications:  Prior to Admission medications    Medication Sig Start Date End Date Taking? Authorizing Provider   albuterol (PROAIR HFA) 90 mcg/act inhaler Inhale 2 puffs every 4 (four) hours as needed for wheezing or shortness of breath 6/5/18   Jose Trujillo, DO   amiodarone 100 mg tablet Take 1 tablet (100 mg total) by mouth daily 3/6/18   Estrada Baron MD   amLODIPine (NORVASC) 10 mg tablet TAKE 1 TABLET DAILY 4/10/18   Justina Barcenas, DO   aspirin 81 mg chewable tablet Chew daily    Historical Provider, MD   atorvastatin (LIPITOR) 10 mg tablet Take 2 tablets (20 mg total) by mouth daily 6/22/18   Justina Barcenas, DO   B Complex Vitamins (VITAMIN B COMPLEX PO) Take by mouth    Historical Provider, MD Estrellita Bender USE 1 INHALATION DAILY 4/16/18   ROC Guillen   cyanocobalamin (VITAMIN B-12) 100 mcg tablet Take by mouth    Historical Provider, MD   docusate sodium (COLACE) 100 mg capsule Take 100 mg by mouth daily at bedtime  Historical Provider, MD   fenofibrate (TRICOR) 145 mg tablet Take 145 mg by mouth daily      Historical Provider, MD   hydrALAZINE (APRESOLINE) 25 mg tablet take 1 tablet by mouth every 8 hours 6/25/18   Justina Barcenas, DO   ipratropium-albuterol (DUO-NEB) 0 5-2 5 mg/3 mL nebulizer solution Take 1 vial (3 mL total) by nebulization 4 (four) times a day for 90 days 6/5/18 9/3/18  Jose Trujillo, DO   iron polysaccharides (FERREX 150) 150 mg capsule Take 1 capsule (150 mg total) by mouth daily for 30 days 6/17/18 7/17/18  Jose Trujillo, DO   isosorbide mononitrate (IMDUR) 30 mg 24 hr tablet Take 1 tablet (30 mg total) by mouth daily 4/2/18 ROC Lamar   levothyroxine 50 mcg tablet Take 1 tablet (50 mcg total) by mouth daily 6/22/18   Binu Rucker, DO   metoprolol tartrate (LOPRESSOR) 25 mg tablet Take 1 tablet (25 mg total) by mouth every 12 (twelve) hours  8/27/16   Saad Poole PA-C   pantoprazole (PROTONIX) 40 mg tablet take 1 tablet by mouth once daily 5/24/18   Binu Cure, DO   tolterodine (DETROL) 1 mg tablet Take 1 mg by mouth daily    Historical Provider, MD   torsemide (DEMADEX) 20 mg tablet Take by mouth 9/9/16   Historical Provider, MD   isosorbide mononitrate (IMDUR) 30 mg 24 hr tablet take 1 tablet by mouth once daily 6/25/18 7/18/18  Binu Rucker, DO   oxyCODONE-acetaminophen (PERCOCET) 5-325 mg per tablet Take 1 tablet by mouth every 6 (six) hours as needed for moderate pain for up to 10 doses Max Daily Amount: 4 tablets 10/5/16 7/18/18  Aimee Grijalva, DO   polyethylene glycol (GLYCOLAX) powder TAKE 1 CAPFUL(17G) BY MOUTH EVERY DAY IN 4-6 OZ OF H20 4/30/18 7/18/18  Historical Provider, MD   predniSONE 20 mg tablet take 2 tablets by mouth DAILY FOR 5 DAYS-THEN TAKE 1 TABLET DAILY X 5 DAYS 6/21/18 7/18/18  Isaura Andrade,    simvastatin (ZOCOR) 20 mg tablet TAKE 1 TABLET DAILY 3/21/18 7/18/18  Binu [facility], DO   tolterodine (DETROL LA) 4 mg 24 hr capsule Take 1 capsule by mouth daily 5/30/14 7/18/18  Historical Provider, MD     Allergies:  No Known Allergies     ROS:   Review of Systems   Respiratory: Positive for cough and shortness of breath  Negative for wheezing  Cardiovascular: Positive for chest pain  All other systems reviewed and are negative         Vitals:  Vitals:    07/18/18 1230 07/18/18 1305 07/18/18 1400 07/18/18 1434   BP:  140/69 162/67    BP Location:  Right arm     Pulse: 74 89 86    Resp: 14 22 (!) 24    Temp:  98 1 °F (36 7 °C)     TempSrc:  Tympanic     SpO2: 99% (!) 89% 91% 93%   Weight:  98 2 kg (216 lb 7 9 oz)     Height:  5' 10" (1 778 m)       Temperature:   Temp (24hrs), Av 5 °F (36 9 °C), Min:98 1 °F (36 7 °C), Max:98 9 °F (37 2 °C)    Current: Temperature: 98 1 °F (36 7 °C)     Weights:   IBW: 73 kg  Body mass index is 31 06 kg/m²  Hemodynamic Monitoring:  N/A     Non-Invasive/Invasive Ventilation Settings:  Respiratory    Lab Data (Last 4 hours)    None         O2/Vent Data (Last 4 hours)    None              No results found for: PHART, SSK3MBH, PO2ART, MOG4GWZ, D5MDZJOT, BEART, SOURCE  SpO2: SpO2: 97 %, SpO2 Activity: SpO2 Activity: At Rest     Physical Exam:  Physical Exam   Constitutional: He is oriented to person, place, and time  He appears well-developed and well-nourished  No distress  HENT:   Head: Normocephalic and atraumatic  Neck: No JVD present  Cardiovascular: Normal rate and regular rhythm  No murmur heard  Pulmonary/Chest: Effort normal  No respiratory distress  He has wheezes  Mild crackles at bases   Abdominal: Soft  Bowel sounds are normal  He exhibits no distension  Musculoskeletal: He exhibits edema  Neurological: He is alert and oriented to person, place, and time  Nursing note and vitals reviewed         Labs:    Results from last 7 days  Lab Units 18  0831   WBC Thousand/uL 9 72   HEMOGLOBIN g/dL 9 7*   HEMATOCRIT % 32 3*   PLATELETS Thousands/uL 437*   NEUTROS PCT % 72   MONOS PCT % 8      Results from last 7 days  Lab Units 18  0831   SODIUM mmol/L 142   POTASSIUM mmol/L 4 4   CHLORIDE mmol/L 104   CO2 mmol/L 28   BUN mg/dL 34*   CREATININE mg/dL 2 14*   CALCIUM mg/dL 8 8   TOTAL PROTEIN g/dL 7 3   BILIRUBIN TOTAL mg/dL 0 40   ALK PHOS U/L 56   ALT U/L 17   AST U/L 38   GLUCOSE RANDOM mg/dL 91                Results from last 7 days  Lab Units 18  0831   INR  1 14   PTT seconds 30           0  Lab Value Date/Time   TROPONINI <0 02 2018 0831   TROPONINI <0 02 2018 0206   TROPONINI <0 02 2018 2020   TROPONINI 0 02 2018 1415   TROPONINI <0 02 2018   TROPONINI 0 05 (H) 2017 0531   TROPONINI 0 07 (H) 01/06/2017 2348   TROPONINI 0 07 (H) 01/06/2017 1800   TROPONINI 0 02 11/25/2016 0600   TROPONINI 0 02 11/24/2016 2329   TROPONINI <0 02 11/24/2016 1938   TROPONINI 0 04 (H) 05/26/2016 1703   TROPONINI 0 03 05/26/2016 1312   TROPONINI 0 02 05/26/2016 0950   TROPONINI <0 02 05/16/2016 1154   TROPONINI 8 73 (H) 03/05/2016 1247   TROPONINI 10 10 (H) 03/05/2016 0534   TROPONINI 6 59 (H) 03/04/2016 2341   TROPONINI 1 93 (H) 03/04/2016 1936   TROPONINI 0 66 (H) 03/04/2016 1335        Imaging: CXR I have personally reviewed pertinent reports  and I have personally reviewed pertinent films in PACS  EKG: NSR This was personally reviewed by myself  Micro:  Lab Results   Component Value Date    BLOODCX No Growth After 5 Days  01/06/2017    BLOODCX No Growth After 5 Days  01/06/2017    BLOODCX No Growth After 5 Days  03/06/2016    URINECX No Growth <1000 cfu/mL 03/04/2016    SPUTUMCULTUR 4+ Growth of  03/29/2018    SPUTUMCULTUR 2+ Growth of Candida sp  presumptively albicans 03/09/2016    SPUTUMCULTUR 2+ Growth of Candida sp  presumptively glabrata 03/09/2016    SPUTUMCULTUR 2+ Growth of Mixed Respiratory Palak 03/09/2016       Assessment/Plan:      * Acute on chronic respiratory failure with hypoxia (HCC)   Assessment & Plan    · Chronically require 6 L nasal cannula with up to 10 L with exertion  · Chest x-ray with some evidence of volume overload, possible A/C CHF causing increasing oxygen requirements   Chronic effusion and scarring present on CXR  · Currently requiring HFNC 50% 35L  · No evidence of infection or COPD exacerbation currently        Acute on chronic combined systolic and diastolic CHF (congestive heart failure) (HCC)   Assessment & Plan    · History of EF 35 to 40% with severe pulmonary hypertension likely secondary to a COPD  · Patient normally on torsemide daily and states he has been compliant this recently  · Received 40 mg of Lasix in the emergency department has been diuresing adequately  · Continue to monitor I&Os  · Diuresis further as needed        COPD without exacerbation ([facility] )   Assessment & Plan    · No evidence of wheezing on exam, no increased sputum production recently  · Continue duo nebs and Breo  · Completed prednisone taper in June for a COPD exacerbation but not chronically on steroids        Pulmonary hypertension (HCC)   Assessment & Plan    · Moderate to severe on last echo  · Likely secondary to underlying COPD        PATITO (obstructive sleep apnea)   Assessment & Plan    · Continue CPAP at night        CAD in native artery   Assessment & Plan    · Continue Lopressor and Imdur as tolerated        Paroxysmal atrial fibrillation (HCC)   Assessment & Plan    · Continue beta-blockers as tolerated        Hypertension   Assessment & Plan    · Continue antihypertensives as tolerated        Hyperlipidemia   Assessment & Plan    · Continue statin        Hypothyroidism   Assessment & Plan    · Continue Synthroid                           Disposition: Admit to stepdown     VTE Pharmacologic Prophylaxis: Heparin  VTE Mechanical Prophylaxis: sequential compression device     Invasive lines and devices: Invasive Devices     Peripheral Intravenous Line            Peripheral IV 07/18/18 Antecubital less than 1 day                 Code Status: Level 3 - DNAR and DNI  POA:    POLST:       Given critical illness, patient length of stay will require greater than two midnights  Counseling / Coordination of Care  Total Critical Care time spent 45 minutes excluding procedures, teaching and family updates  Portions of the record may have been created with voice recognition software  Occasional wrong word or "sound a like" substitutions may have occurred due to the inherent limitations of voice recognition software  Read the chart carefully and recognize, using context, where substitutions have occurred          Archana Castillo PA-C

## 2018-07-18 NOTE — ASSESSMENT & PLAN NOTE
· Chronically require 6 L nasal cannula with up to 10 L with exertion  · Chest x-ray with some evidence of volume overload, possible A/C CHF causing increasing oxygen requirements  Chronic effusion and scarring present on CXR  · Diuresed well over night and this morning he is on nasal cannula 6 L and oxygen saturations are in the mid 90s    He subjectively does feel improved  · No evidence of infection or COPD exacerbation currently

## 2018-07-19 LAB
ALBUMIN SERPL BCP-MCNC: 2.7 G/DL (ref 3.5–5)
ALP SERPL-CCNC: 48 U/L (ref 46–116)
ALT SERPL W P-5'-P-CCNC: 17 U/L (ref 12–78)
ANION GAP SERPL CALCULATED.3IONS-SCNC: 7 MMOL/L (ref 4–13)
AST SERPL W P-5'-P-CCNC: 27 U/L (ref 5–45)
BASOPHILS # BLD AUTO: 0.01 THOUSANDS/ΜL (ref 0–0.1)
BASOPHILS NFR BLD AUTO: 0 % (ref 0–1)
BILIRUB SERPL-MCNC: 0.3 MG/DL (ref 0.2–1)
BUN SERPL-MCNC: 38 MG/DL (ref 5–25)
CALCIUM SERPL-MCNC: 8.3 MG/DL (ref 8.3–10.1)
CHLORIDE SERPL-SCNC: 102 MMOL/L (ref 100–108)
CO2 SERPL-SCNC: 28 MMOL/L (ref 21–32)
CREAT SERPL-MCNC: 2.27 MG/DL (ref 0.6–1.3)
EOSINOPHIL # BLD AUTO: 0.02 THOUSAND/ΜL (ref 0–0.61)
EOSINOPHIL NFR BLD AUTO: 0 % (ref 0–6)
ERYTHROCYTE [DISTWIDTH] IN BLOOD BY AUTOMATED COUNT: 15.9 % (ref 11.6–15.1)
GFR SERPL CREATININE-BSD FRML MDRD: 25 ML/MIN/1.73SQ M
GLUCOSE SERPL-MCNC: 133 MG/DL (ref 65–140)
HCT VFR BLD AUTO: 29.3 % (ref 36.5–49.3)
HGB BLD-MCNC: 8.8 G/DL (ref 12–17)
IMM GRANULOCYTES # BLD AUTO: 0.06 THOUSAND/UL (ref 0–0.2)
IMM GRANULOCYTES NFR BLD AUTO: 1 % (ref 0–2)
LYMPHOCYTES # BLD AUTO: 1.61 THOUSANDS/ΜL (ref 0.6–4.47)
LYMPHOCYTES NFR BLD AUTO: 20 % (ref 14–44)
MAGNESIUM SERPL-MCNC: 2 MG/DL (ref 1.6–2.6)
MCH RBC QN AUTO: 25.6 PG (ref 26.8–34.3)
MCHC RBC AUTO-ENTMCNC: 30 G/DL (ref 31.4–37.4)
MCV RBC AUTO: 85 FL (ref 82–98)
MONOCYTES # BLD AUTO: 0.73 THOUSAND/ΜL (ref 0.17–1.22)
MONOCYTES NFR BLD AUTO: 9 % (ref 4–12)
MRSA NOSE QL CULT: ABNORMAL
MRSA NOSE QL CULT: ABNORMAL
NEUTROPHILS # BLD AUTO: 5.81 THOUSANDS/ΜL (ref 1.85–7.62)
NEUTS SEG NFR BLD AUTO: 70 % (ref 43–75)
NRBC BLD AUTO-RTO: 0 /100 WBCS
PHOSPHATE SERPL-MCNC: 3.4 MG/DL (ref 2.3–4.1)
PLATELET # BLD AUTO: 381 THOUSANDS/UL (ref 149–390)
PMV BLD AUTO: 10.1 FL (ref 8.9–12.7)
POTASSIUM SERPL-SCNC: 3.9 MMOL/L (ref 3.5–5.3)
PROT SERPL-MCNC: 6.8 G/DL (ref 6.4–8.2)
RBC # BLD AUTO: 3.44 MILLION/UL (ref 3.88–5.62)
SODIUM SERPL-SCNC: 137 MMOL/L (ref 136–145)
WBC # BLD AUTO: 8.24 THOUSAND/UL (ref 4.31–10.16)

## 2018-07-19 PROCEDURE — 80053 COMPREHEN METABOLIC PANEL: CPT | Performed by: PHYSICIAN ASSISTANT

## 2018-07-19 PROCEDURE — 83735 ASSAY OF MAGNESIUM: CPT | Performed by: PHYSICIAN ASSISTANT

## 2018-07-19 PROCEDURE — 85025 COMPLETE CBC W/AUTO DIFF WBC: CPT | Performed by: PHYSICIAN ASSISTANT

## 2018-07-19 PROCEDURE — 94660 CPAP INITIATION&MGMT: CPT

## 2018-07-19 PROCEDURE — 94760 N-INVAS EAR/PLS OXIMETRY 1: CPT

## 2018-07-19 PROCEDURE — 99232 SBSQ HOSP IP/OBS MODERATE 35: CPT | Performed by: ANESTHESIOLOGY

## 2018-07-19 PROCEDURE — 84100 ASSAY OF PHOSPHORUS: CPT | Performed by: PHYSICIAN ASSISTANT

## 2018-07-19 PROCEDURE — 94640 AIRWAY INHALATION TREATMENT: CPT

## 2018-07-19 RX ORDER — IRON POLYSACCHARIDE COMPLEX 150 MG
150 CAPSULE ORAL DAILY
Status: DISCONTINUED | OUTPATIENT
Start: 2018-07-20 | End: 2018-07-23 | Stop reason: HOSPADM

## 2018-07-19 RX ORDER — DOCUSATE SODIUM 100 MG/1
100 CAPSULE, LIQUID FILLED ORAL
Status: DISCONTINUED | OUTPATIENT
Start: 2018-07-19 | End: 2018-07-23 | Stop reason: HOSPADM

## 2018-07-19 RX ORDER — FLUTICASONE FUROATE AND VILANTEROL 100; 25 UG/1; UG/1
1 POWDER RESPIRATORY (INHALATION) DAILY
Status: DISCONTINUED | OUTPATIENT
Start: 2018-07-20 | End: 2018-07-23 | Stop reason: HOSPADM

## 2018-07-19 RX ORDER — UBIDECARENONE 75 MG
100 CAPSULE ORAL DAILY
Status: DISCONTINUED | OUTPATIENT
Start: 2018-07-20 | End: 2018-07-23 | Stop reason: HOSPADM

## 2018-07-19 RX ORDER — TORSEMIDE 20 MG/1
20 TABLET ORAL DAILY
Status: DISCONTINUED | OUTPATIENT
Start: 2018-07-19 | End: 2018-07-23 | Stop reason: HOSPADM

## 2018-07-19 RX ADMIN — FENOFIBRATE 145 MG: 145 TABLET ORAL at 08:51

## 2018-07-19 RX ADMIN — ISOSORBIDE MONONITRATE 30 MG: 30 TABLET, EXTENDED RELEASE ORAL at 08:50

## 2018-07-19 RX ADMIN — METOPROLOL TARTRATE 25 MG: 25 TABLET ORAL at 08:50

## 2018-07-19 RX ADMIN — CHLORHEXIDINE GLUCONATE 15 ML: 1.2 RINSE ORAL at 21:17

## 2018-07-19 RX ADMIN — ASPIRIN 81 MG 81 MG: 81 TABLET ORAL at 08:51

## 2018-07-19 RX ADMIN — HEPARIN SODIUM 5000 UNITS: 5000 INJECTION, SOLUTION INTRAVENOUS; SUBCUTANEOUS at 14:05

## 2018-07-19 RX ADMIN — PANTOPRAZOLE SODIUM 40 MG: 40 TABLET, DELAYED RELEASE ORAL at 06:01

## 2018-07-19 RX ADMIN — ATORVASTATIN CALCIUM 20 MG: 20 TABLET, FILM COATED ORAL at 17:25

## 2018-07-19 RX ADMIN — HEPARIN SODIUM 5000 UNITS: 5000 INJECTION, SOLUTION INTRAVENOUS; SUBCUTANEOUS at 21:17

## 2018-07-19 RX ADMIN — TORSEMIDE 20 MG: 20 TABLET ORAL at 14:03

## 2018-07-19 RX ADMIN — AMLODIPINE BESYLATE 10 MG: 10 TABLET ORAL at 08:50

## 2018-07-19 RX ADMIN — METOPROLOL TARTRATE 25 MG: 25 TABLET ORAL at 21:17

## 2018-07-19 RX ADMIN — LEVOTHYROXINE SODIUM 50 MCG: 50 TABLET ORAL at 06:01

## 2018-07-19 RX ADMIN — IPRATROPIUM BROMIDE AND ALBUTEROL SULFATE 3 ML: .5; 3 SOLUTION RESPIRATORY (INHALATION) at 15:42

## 2018-07-19 RX ADMIN — AMIODARONE HYDROCHLORIDE 100 MG: 200 TABLET ORAL at 08:49

## 2018-07-19 RX ADMIN — IPRATROPIUM BROMIDE AND ALBUTEROL SULFATE 3 ML: .5; 3 SOLUTION RESPIRATORY (INHALATION) at 20:10

## 2018-07-19 RX ADMIN — HEPARIN SODIUM 5000 UNITS: 5000 INJECTION, SOLUTION INTRAVENOUS; SUBCUTANEOUS at 06:13

## 2018-07-19 RX ADMIN — CHLORHEXIDINE GLUCONATE 15 ML: 1.2 RINSE ORAL at 08:49

## 2018-07-19 RX ADMIN — ACETAMINOPHEN 650 MG: 325 TABLET, FILM COATED ORAL at 17:24

## 2018-07-19 RX ADMIN — DOCUSATE SODIUM 100 MG: 100 CAPSULE, LIQUID FILLED ORAL at 21:17

## 2018-07-19 RX ADMIN — IPRATROPIUM BROMIDE AND ALBUTEROL SULFATE 3 ML: .5; 3 SOLUTION RESPIRATORY (INHALATION) at 12:11

## 2018-07-19 RX ADMIN — IPRATROPIUM BROMIDE AND ALBUTEROL SULFATE 3 ML: .5; 3 SOLUTION RESPIRATORY (INHALATION) at 08:15

## 2018-07-19 NOTE — CASE MANAGEMENT
Continued Stay Review  Date: 7/19/18  Vital Signs: /84   Pulse 97   Temp 97 5 °F (36 4 °C) (Tympanic)   Resp (!) 28   Ht 5' 10" (1 778 m)   Wt 98 2 kg (216 lb 7 9 oz)   SpO2 94%   BMI 31 06 kg/m²   Medications:   Scheduled Meds:  Current Facility-Administered Medications:  acetaminophen 650 mg Oral Q6H PRN ROC Juarez   amiodarone 100 mg Oral Daily Yessy Crump PA-C   amLODIPine 10 mg Oral Daily Yessy Crump PA-C   aspirin 81 mg Oral Daily Yessy Crump PA-C   atorvastatin 20 mg Oral Daily With JO ANN Torres   chlorhexidine 15 mL Swish & Spit Q12H [address] Yessy Crump PA-C   cyanocobalamin 100 mcg Oral Daily ROC Vega   docusate sodium 100 mg Oral HS ROC Juarez   fenofibrate 145 mg Oral Daily Yessy Crump PA-C   fluticasone-vilanterol 1 puff Inhalation Daily ROC Vega   heparin (porcine) 5,000 Units Subcutaneous Q8H [address] Yessy Crump PA-C   ipratropium-albuterol 3 mL Nebulization 4x Daily Yessy Crump PA-C   iron polysaccharides 150 mg Oral Daily ROC Juarez   isosorbide mononitrate 30 mg Oral Daily Yessy Crump PA-C   levothyroxine 50 mcg Oral Early Morning Yessy Crump PA-C   metoprolol tartrate 25 mg Oral Q12H [address] Yessy Crump PA-C   pantoprazole 40 mg Oral Early Morning Yessy Crump PA-C   torsemide 20 mg Oral Daily Madeline Tolentino MD   Continuous Infusions:   PRN Meds:   acetaminophen  Abnormal Labs/Diagnostic Results:   HGB 8 8 BUN 38 CR 2 27 ALB 2 7 GFR 25   Age/Sex: 80 y o  male   Assessment/Plan: TRANSFER TO MED SURG LOC   ACUTE RESPIRATORY FAILURE WITH HYPOXIA & COMBINED CHF  LASIX TO BE TRANSITIONED TO TORSEMIDE  LUNGS WITH EXPIRATORY WHEEZING NOTED, TRIAL O2 @ 6LTR NS WITH CPAP QHS FOR PATITO  Barbie Muniz Discharge Plan: TBD  Thank you,  [facility]  Network Utilization Review Department  Phone: [phone];  Fax [phone]  ATTENTION: The Network Utilization Review Department is now centralized for our 9 Facilities  Make a note that we have a new phone and fax numbers for our Department  Please call with any questions or concerns to [phone] and carefully follow the prompts so that you are directed to the right person  All voicemails are confidential  Fax any determinations, approvals, denials, and requests for initial or continue stay review clinical to [phone]  Due to HIGH CALL volume, it would be easier if you could please send faxed requests to expedite your requests and in part, help us provide discharge notifications faster

## 2018-07-19 NOTE — PROGRESS NOTES
Progress Note - ICU Transfer to SD/MS tele   Musa Carrasco 80 y o  male MRN: [id number]  [address]   Unit/Bed#: ICU 10 Encounter: [id number]    Code Status: Level 3 - DNAR and DNI  POA:    POLST:      Reason for ICU admission: Acute on chronic hypoxic respiratory failure    Active problems:   Principal Problem:    Acute on chronic respiratory failure with hypoxia ([facility] )  Active Problems:    Acute on chronic combined systolic and diastolic CHF (congestive heart failure) ([place] [address] )    COPD without exacerbation (HCC)    Pulmonary hypertension (HCC)    PATITO (obstructive sleep apnea)    CAD in native artery    Paroxysmal atrial fibrillation ([facility] )    Hypertension    Hyperlipidemia    S/P TAVR (transcatheter aortic valve replacement)    Hypothyroidism  Resolved Problems:    * No resolved hospital problems  *      Consultants: None    History of Present Illness: Patient is an 80year old male presenting on 7/18 with a complaint of worsening shortness of breath  He has a past medical historyof moderate COPD, chronic hypoxic respiratory failure on 6L NC at rest and 10L with exertion, obstructive sleep apnea with CPAP at night, CKD stage 3, atrial fibrillation, peripheral vascular disease, and a bioprosthetic aortic valve replacement  He was given Lasix in the emergency room, however required significant high flow nasal cannula so was admitted to the stepdown unit  Summary of clinical course: On arrival to the stepdown unit, he was titrated to nasal cannula  He was placed on his home CPAP and transitioned to his home torsemide  At this point, he is felt to be stable for transition to a medical surgical floor with telemetry       Recent or scheduled procedures:   7/19 CXR- Small right-sided pleural effusion/atelectasis, some patchy opacities are seen in the bilateral lower lung fields, enlarged cardiac silhouette and prominence of the interstitial markings    Outstanding/pending diagnostics: PT/OT    Cultures:   7/18 MRSA- Positive       Mobilization Plan: Out of bed with assistance    Nutrition Plan: Oral diet    Discharge Plan:   Patient should be ready for discharge to home after PT/OT evaluation, oxygen monitoring on home diuretics    Initial Physical Therapy Recommendations: Pending  Initial Occupational Therapy Recommendations: Pending  Initial /Plan: Home with services    Home medications that are not reordered and reason why:   Hydralazine- blood pressures normal after aggressive diuresis     Specific Diagnosis Plan:    Heart Failure:  Diuresis plan: Return to oral torsemide    Spoke with Dr Bobbi Terrell  regarding transfer  Please call [phone] with any questions or concerns  Portions of the record may have been created with voice recognition software  Occasional wrong word or "sound a like" substitutions may have occurred due to the inherent limitations of voice recognition software  Read the chart carefully and recognize, using context, where substitutions have occurred      ROC Lopez

## 2018-07-19 NOTE — SOCIAL WORK
DASH discussion completed  Discussed goals of making sure pt's needs are met upon discharge, pt's preferences are taken into account, pt understands her health condition, medications and symptoms to watch for after returning home and pt is aware of any follow up appointments recommended by hospital physician  CM spoke with the pt at the bedside  Pt lives with his  and noted that he is in [facility], although has a [address] address in [place]  Pt has a cane, walker, WC, shower benches and grab bars at home  He does have [facility] through the [place] [address] and noted that he has [facility] also with [employer]  Pt does not feel he has any other needs, d/w him poss need for STR in the future and he noted that he is declining to go  He has been there before without any benefit to him  Pt noted that will want to go home  Pt does not drive and he uses the [facility] in [place], [place]  Referral sent out to [facility] ROSARIO for resumption of services when pt is medically stable for discharge

## 2018-07-19 NOTE — CASE MANAGEMENT
Initial Clinical Review  Admission: Date/Time/Statement: 7/18/18 @ 1102   Orders Placed This Encounter   Procedures    Inpatient Admission (expected length of stay for this patient is greater than two midnights)     Standing Status:   Standing     Number of Occurrences:   1     Order Specific Question:   Admitting Physician     Answer:   Manju Koehler [[id number]]     Order Specific Question:   Level of Care     Answer:   Level 1 Stepdown [13]     Order Specific Question:   Estimated length of stay     Answer:   More than 2 Midnights     Order Specific Question:   Certification     Answer:   I certify that inpatient services are medically necessary for this patient for a duration of greater than two midnights  See H&P and MD Progress Notes for additional information about the patient's course of treatment  ED: Date/Time/Mode of Arrival:   ED Arrival Information     Expected Arrival Acuity Means of Arrival Escorted By Service Admission Type    - 7/18/2018 08:17 Urgent Walk-In Family Member Critical Care/ICU Urgent    Arrival Complaint    difficulty breathing      Chief Complaint:   Chief Complaint   Patient presents with    Shortness of Breath     pt SOB with 5 lb weight gain overnight per wife  pt on O2 at home NC  pt 68% pulse ox RA on arrival  O2 sat climbed to 98% when placed on NR mask during triage   History of Illness:   Patient presents from home for evaluation of dyspnea and chest tightness  Wife reports 5 lb weight gain overnight   On home O2  ED Vital Signs:   ED Triage Vitals   Temperature Pulse Respirations Blood Pressure SpO2   07/18/18 0824 07/18/18 0824 07/18/18 0824 07/18/18 0824 07/18/18 0824   98 9 °F (37 2 °C) 80 20 149/67 98 %      Temp Source Heart Rate Source Patient Position - Orthostatic VS BP Location FiO2 (%)   07/18/18 0824 07/18/18 0824 07/18/18 0824 07/18/18 0824 07/18/18 1305   Tympanic Monitor Sitting Right arm 50      Pain Score       07/18/18 0824       No Pain        Wt Readings from Last 1 Encounters:   07/19/18 98 2 kg (216 lb 7 9 oz)   Vital Signs (abnormal):   RR 42, 24, 25  O2 SAT 86%  Abnormal Labs/Diagnostic Test Results:   HGB 9 7  BNP 7739 BUN 34 CR 2 14 GFR 27   U/A+PROT  CXR=Small right-sided pleural effusion/atelectasis  Some patchy opacities are seen in the bilateral lower lung fields which could represent atelectasis, scarring or infection depending on the clinical setting  Enlarged cardiac silhouette and prominence of the interstitial markings  Consider a component of fluid overload/CHF  EKG=  Ventricular Rate BPM 78    Atrial Rate BPM 78    HI Interval ms 214    QRSD Interval ms 140    QT Interval ms 410    QTC Interval ms 467    P [place] degrees 22    QRS Axis degrees -60    T Wave Axis degrees 96      Sinus rhythm with 1st degree A-V block with Premature atrial complexes  Left axis deviation  Non-specific intra-ventricular conduction block  Cannot rule out Septal infarct , age undetermined  Abnormal ECG  When compared with ECG of 12-JUN-2018 06:31,  Premature ventricular complexes are no longer Present      ED Treatment:   Medication Administration from 07/18/2018 0817 to 07/18/2018 1302       Date/Time Order Dose Route Action Action by Comments     07/18/2018 0921 ipratropium-albuterol (DUO-NEB) 0 5-2 5 mg/3 mL inhalation solution 3 mL 3 mL Nebulization Given Julio Hills RN      07/18/2018 0637 methylPREDNISolone sodium succinate (Solu-MEDROL) injection 125 mg 125 mg Intravenous Given Julio Hills RN      07/18/2018 0534 furosemide (LASIX) injection 40 mg 40 mg Intravenous Given Julio Hills RN       Past Medical/Surgical History:    Active Ambulatory Problems     Diagnosis Date Noted    COPD without exacerbation ([facility] [facility] ) 03/15/2016    Paroxysmal atrial fibrillation (HCC)     CAD in native artery     BPH (benign prostatic hyperplasia)     Hyperlipidemia     Hypertension     S/P TAVR (transcatheter aortic valve replacement) 08/23/2016    Ambulatory dysfunction 08/23/2016    Hearing difficulty of right ear 10/05/2016    Chronic hypoxemic respiratory failure ([facility] ) 11/27/2016    PATITO (obstructive sleep apnea) 11/27/2016    Pleural effusion, right (chronic) 11/28/2016    Multiple pulmonary nodules 01/07/2017    Class 1 obesity in adult 01/07/2017    Osteoarthritis of knee 01/07/2017    Peripheral vascular disease ([facility] ) 01/07/2017    Diabetic polyneuropathy associated with type 2 diabetes mellitus ([facility] ) 02/23/2018    Peripheral arteriosclerosis ([facility] ) 02/23/2018    Actinic keratosis 05/10/2013    Allergic rhinitis 05/30/2014    Cervical radiculopathy 09/26/2016    Acute on chronic respiratory failure with hypoxia ([facility] ) 11/12/2017    CKD (chronic kidney disease) 08/18/2013    Chronic left shoulder pain 04/26/2017    Acute on chronic combined systolic and diastolic CHF (congestive heart failure) ([place] [address] ) 05/08/2017    Hypothyroidism 04/26/2017    GERD (gastroesophageal reflux disease) 03/29/2018    Elevated d-dimer 06/11/2018    MICKEY (iron deficiency anemia) 06/22/2018    Corns 07/06/2018    Pain in both feet 07/06/2018    Onychomycosis 07/06/2018     Resolved Ambulatory Problems     Diagnosis Date Noted    CHF exacerbation ([place] [address] ) 03/04/2016    Tachycardia 03/04/2016    Chronic systolic heart failure ([place] [address] ) 03/15/2016    Non-ST elevation MI (NSTEMI) ([place] [address] ) 03/15/2016    Rib fracture 05/16/2016    Fall 05/17/2016    Acute exacerbation of CHF (congestive heart failure) ([place] [address] ) 05/26/2016    Aortic stenosis, severe     Diabetes mellitus type 2, noninsulin dependent ([place] [address] )     1st degree AV block 08/23/2016    Sinus bradycardia 08/23/2016    Hematuria 08/23/2016    Pleural effusion, right 08/23/2016    Multiple falls 08/23/2016    Physical deconditioning 08/23/2016    Leukocytosis 08/24/2016    Acute blood loss anemia 08/24/2016    Age-related cognitive decline 08/25/2016    Constipation 10/02/2016    Fecal impaction in rectum ([place] [address] ) 10/02/2016    Acute on chronic low back pain 10/02/2016    Elevated TSH 10/05/2016    Abnormal gait 10/05/2016    Melena 10/05/2016    Dyspnea on exertion 11/24/2016    Non-sustained ventricular tachycardia ([facility] [address] ) 01/06/2017    Left heart failure ([facility] [address] ) 01/07/2017    Corns 02/23/2018    Pain in both feet 02/23/2018    Arteriosclerosis of arteries of extremities (HCC) 08/25/2017    Centrilobular emphysema ([address] ) 11/12/2017    Esophagitis determined by endoscopy 03/17/2016    Heart failure, left, with LVEF >40% ([address] ) 04/07/2016    Hyperthyroidism 12/09/2016    Hypertonicity of bladder 02/21/2014    Hyponatremia 03/19/2014    Obesity with alveolar hypoventilation, unspecified obesity severity ([address] ) 07/07/2016    Obstructive sleep apnea 09/04/2012    Onychomycosis 08/25/2017    Osteoarthritis of left shoulder 05/10/2017    Pleural effusion 04/28/2014    Prediabetes 10/07/2015    Pulmonary fibrosis ([address] ) 04/28/2014    Pulmonary nodule, left 10/08/2015    Sleep related hypoventilation in conditions classified elsewhere 09/04/2012    Bacterial conjunctivitis of right eye 03/29/2018    Iron deficiency anemia 03/29/2018    Generalized weakness 03/31/2018    Acute on Chronic Kidney Injury 06/11/2018    Atypical chest pain 06/11/2018     Past Medical History:   Diagnosis Date    Allergic rhinitis 06/11/2010    Aortic stenosis, severe     Bacterial pneumonia 06/08/2007    Bladder neck obstruction 12/23/2010    BPH (benign prostatic hyperplasia)     Bronchitis, chronic obstructive, with exacerbation ([address] ) 01/30/2012    CAD (coronary artery disease)     Carcinoma ([address] )     Cardiomegaly 02/13/2007    Cataract of both eyes     [facility]     Chronic allergic conjunctivitis     Chronic kidney disease (CKD)     CKD (chronic kidney disease), stage III     CKD (chronic kidney disease), stage III     COPD (chronic obstructive pulmonary disease) ([facility])     Decubitus ulcer     Dermatomycosis     Diabetes mellitus type 2, noninsulin dependent ([place] [address] )     Dyspnea on exertion     Eczema     Edema     Epistaxis 12/01/2004    Esophagitis, erosive     Exposure to scabies     Fracture of rib     H/O aortic valve replacement     H/O blood clots     History of DVT (deep vein thrombosis)     History of non-ST elevation myocardial infarction (NSTEMI)     History of pneumonia     Hyperlipidemia     Hypertension     Internal hemorrhoids 09/13/2006    LBBB (left bundle branch block)     MRSA (methicillin resistant staph aureus) culture positive     NSTEMI (non-ST elevated [facility] infarction) [facility]     Obstructive sleep apnea on CPAP     Paroxysmal atrial fibrillation ([facility])     Phimosis 09/01/2010    Pulmonary fibrosis ([facility]     PVD (peripheral vascular disease) ([facility])     Rotator cuff tendonitis     Skin abscess 12/21/2004    Skin neoplasm     Tachycardia 12/01/2004    Trigger finger     Type 2 diabetes mellitus ([place] [address] ) 12/01/2004    Venous thrombosis 05/16/2007    Venous thrombosis 05/16/2007   Admitting Diagnosis: Shortness of breath [R06 02]  CHF (congestive heart failure) ([place] [address] ) [I50 9]  Age/Sex: 80 y o  male  Assessment/Plan:   Acute on chronic respiratory failure with hypoxia ([facility])   Assessment & Plan     · Chronically require 6 L nasal cannula with up to 10 L with exertion  · Chest x-ray with some evidence of volume overload, possible A/C CHF causing increasing oxygen requirements   Chronic effusion and scarring present on CXR  · Currently requiring HFNC 50% 35L       Acute on chronic combined systolic and diastolic CHF (congestive heart failure) ([facility])   Assessment & Plan     · History of EF 35 to 40% with severe pulmonary hypertension likely secondary to a COPD  · Patient normally on torsemide daily and states he has been compliant this recently  · Received 40 mg of Lasix in the emergency department has been diuresing adequately  · Continue to monitor I&Os  · Diuresis further as needed   Admission Orders:  MED SURG  VENODYNES  O2 @ 50%  Scheduled Meds:   Current Facility-Administered Medications:  acetaminophen 650 mg Oral Q6H PRN ROC Olmedo   amiodarone 100 mg Oral Daily Yessy Crump PA-C   amLODIPine 10 mg Oral Daily Yessy Crump PA-C   aspirin 81 mg Oral Daily Yessy Crump PA-C   atorvastatin 20 mg Oral Daily With Key[employer] ANN   chlorhexidine 15 mL Swish & Spit Q12H [address] Yessy Crump PA-C   fenofibrate 145 mg Oral Daily Yessy Crump PA-C   heparin (porcine) 5,000 Units Subcutaneous Q8H [address] Yessy Crump PA-C   ipratropium-albuterol 3 mL Nebulization 4x Daily Yessy Crump PA-C   isosorbide mononitrate 30 mg Oral Daily Yessy Crump PA-C   levothyroxine 50 mcg Oral Early Morning Yessy Crump PA-C   metoprolol tartrate 25 mg Oral Q12H [address] Yessy Crump PA-C   pantoprazole 40 mg Oral Early Morning Yessy Crump PA-C   Continuous Infusions:    PRN Meds:   acetaminophen  Thank you,  Madina Wilkins  [facility] Utilization Review Department  Phone: [phone]; Fax [phone]  ATTENTION: The Network Utilization Review Department is now centralized for our 9 Facilities  Make a note that we have a new phone and fax numbers for our Department  Please call with any questions or concerns to [phone] and carefully follow the prompts so that you are directed to the right person  All voicemails are confidential  Fax any determinations, approvals, denials, and requests for initial or continue stay review clinical to [phone]  Due to HIGH CALL volume, it would be easier if you could please send faxed requests to expedite your requests and in part, help us provide discharge notifications faster

## 2018-07-19 NOTE — PROGRESS NOTES
Daily Progress Note - Critical Care/ Stepdown   Mahogany Rdz 80 y o  male MRN: [id number]  Unit/Bed#: ICU 10 Encounter: [id number]    ______________________________________________________________________  Assessment:   Principal Problem:    Acute on chronic respiratory failure with hypoxia ([facility])  Active Problems:    Acute on chronic combined systolic and diastolic CHF (congestive heart failure) ([facility])    COPD without exacerbation ([facility])    Pulmonary hypertension ([facility])    PATITO (obstructive sleep apnea)    CAD in native artery    Paroxysmal atrial fibrillation ([facility] )    Hypertension    Hyperlipidemia    S/P TAVR (transcatheter aortic valve replacement)    Hypothyroidism  Resolved Problems:    * No resolved hospital problems  *      * Acute on chronic respiratory failure with hypoxia ([facility])   Assessment & Plan    · Chronically require 6 L nasal cannula with up to 10 L with exertion  · Chest x-ray with some evidence of volume overload, possible A/C CHF causing increasing oxygen requirements  Chronic effusion and scarring present on CXR  · Diuresed well over night and this morning he is on nasal cannula 6 L and oxygen saturations are in the mid 90s    He subjectively does feel improved  · No evidence of infection or COPD exacerbation currently        Acute on chronic combined systolic and diastolic CHF (congestive heart failure) ([facility])   Assessment & Plan    · History of EF 35 to 40% with severe pulmonary hypertension likely secondary to COPD  · Patient normally on torsemide daily and states he has been compliant this recently  · No further IV diuresis needed today, restart oral torsemide  · Continue to monitor I&Os        COPD without exacerbation ([facility])   Assessment & Plan    · No evidence of wheezing on exam, no increased sputum production recently  · Continue duo nebs and Breo  · Completed prednisone taper in June for a COPD exacerbation but not chronically on steroids        Pulmonary hypertension ([facility] )   Assessment & Plan · Moderate to severe on last echo  · Likely secondary to underlying COPD        PATITO (obstructive sleep apnea)   Assessment & Plan    · Continue CPAP at night        CAD in native artery   Assessment & Plan    · Continue Lopressor and Imdur as tolerated        Paroxysmal atrial fibrillation (HCC)   Assessment & Plan    · Continue beta-blockers as tolerated        Hypertension   Assessment & Plan    · Continue antihypertensives as tolerated        Hyperlipidemia   Assessment & Plan    · Continue statin        Hypothyroidism   Assessment & Plan    · Continue Synthroid            Plan:    Neuro:   · Delirium: CAM ICU positive no   · If yes, intervention:  Environmental control  · Pain controlled with:  No pain currently  · Pain score: none  · Regulate sleep/wake cycle    CV:   · Rhythm: NSR  · Follow rhythm on telemetry    Pulm:   · Incentive spirometry  · On home oxygen requirements    GI:   · Nutrition/diet plan: Tolerating oral diet  · Stress ulcer prophylaxis: No prophylaxis needed  · Bowel regimen: None currently  · Last BM:     FEN:   · Fluid/Diuretic plan: No intervention  · Goal 24 hour fluid balance:  Even to negative  Restart oral diuretics  · Electrolytes repleted: yes  · Goal: K >4 0, Mag >2 0, and Phos >3 0    :   · Indwelling Tong present: no     ID:   · Trend temps and WBC count    Heme:   · Trend hgb and plts    Endo:   · Glycemic control plan: Blood glucose controlled on current regimen    Msk/Skin:  · Mobility goal:   · PT consult: yes  · OT consult: yes  · Frequent turning and off-loading    Family:  · Family updated within 24 hours: no   · Family meeting planned today: no     Lines:  · PIV    VTE Prophylaxis:  · Pharmacologic Prophylaxis: Heparin  · Mechanical Prophylaxis: sequential compression device    Disposition: Transfer to general medical floor    Code Status: Level 3 - DNAR and DNI    Counseling / Coordination of Care  Total time spent today 33 minutes   Greater than 50% of total time was spent with the patient and / or family counseling and / or coordination of care  A description of the counseling / coordination of care: Adjusting medications, discussing with patient  ______________________________________________________________________    HPI/24hr events:  No acute events overnight  Tolerate CPAP    ______________________________________________________________________    Physical Exam:   Physical Exam   Constitutional: He is oriented to person, place, and time  He appears well-developed and well-nourished  No distress  HENT:   Head: Normocephalic and atraumatic  Neck: No JVD present  Cardiovascular: Normal rate and regular rhythm  No murmur heard  Pulmonary/Chest: Effort normal  No respiratory distress  Scattered expiratory wheezes   Abdominal: Soft  Bowel sounds are normal  He exhibits no distension  Musculoskeletal: He exhibits no edema  Neurological: He is alert and oriented to person, place, and time  Nursing note and vitals reviewed  ______________________________________________________________________  Vitals:    18 1120 18 1206 18 1300 18 1400   BP:   118/56 118/58   BP Location:       Pulse:   59 60   Resp:   14 14   Temp: 98 °F (36 7 °C)      TempSrc: Tympanic      SpO2:  92% 98% 97%   Weight:       Height:                  Temperature:   Temp (24hrs), Av 7 °F (37 1 °C), Min:97 5 °F (36 4 °C), Max:100 2 °F (37 9 °C)    Current Temperature: 98 °F (36 7 °C)    Weights:   IBW: 73 kg    Body mass index is 31 06 kg/m²    Weight (last 2 days)     Date/Time   Weight    18 0600  98 2 (216 49)    18 1305  98 2 (216 49)    18 0824  99 8 (220)              Hemodynamic Monitoring:  N/A       Non-Invasive/Invasive Ventilation Settings:  Respiratory    Lab Data (Last 4 hours)    None         O2/Vent Data (Last 4 hours)    None              No results found for: PHART, VCB2RWT, PO2ART, WZH4NNS, A5TVGXOB, BEART, SOURCE  SpO2: SpO2: 97 %, SpO2 Activity: SpO2 Activity: At Rest, SpO2 Device: O2 Device: Nasal cannula, Capnography: Capnography: No    Intake and Outputs:  I/O       07/17 0701 - 07/18 0700 07/18 0701 - 07/19 0700 07/19 0701 - 07/20 0700    P  O   600 360    Total Intake(mL/kg)  600 (6 1) 360 (3 7)    Urine (mL/kg/hr)  3125 450 (0 6)    Total Output   3125 450    Net   -2525 -90                   Nutrition:        Diet Orders            Start     Ordered    07/19/18 1212  Diet Cardiac; Lo Cholesterol  Diet effective now     Question Answer Comment   Diet Type Cardiac    Cardiac Lo Cholesterol    RD to adjust diet per protocol?  Yes        07/19/18 1211    07/18/18 1407  Room Service  Once     Question:  Type of Service  Answer:  Room Service - Appropriate with Assistance    07/18/18 1407            Labs:     Results from last 7 days  Lab Units 07/19/18  0614 07/18/18  0831   WBC Thousand/uL 8 24 9 72   HEMOGLOBIN g/dL 8 8* 9 7*   HEMATOCRIT % 29 3* 32 3*   PLATELETS Thousands/uL 381 437*   NEUTROS PCT % 70 72   MONOS PCT % 9 8       Results from last 7 days  Lab Units 07/19/18  0614 07/18/18  0831   SODIUM mmol/L 137 142   POTASSIUM mmol/L 3 9 4 4   CHLORIDE mmol/L 102 104   CO2 mmol/L 28 28   BUN mg/dL 38* 34*   CREATININE mg/dL 2 27* 2 14*   CALCIUM mg/dL 8 3 8 8   TOTAL PROTEIN g/dL 6 8 7 3   BILIRUBIN TOTAL mg/dL 0 30 0 40   ALK PHOS U/L 48 56   ALT U/L 17 17   AST U/L 27 38   GLUCOSE RANDOM mg/dL 133 91       Results from last 7 days  Lab Units 07/19/18  0614   MAGNESIUM mg/dL 2 0     Lab Results   Component Value Date    PHOS 3 4 07/19/2018    PHOS 3 6 06/14/2018    PHOS 3 8 06/13/2018        Results from last 7 days  Lab Units 07/18/18  0831   INR  1 14   PTT seconds 30       0  Lab Value Date/Time   TROPONINI <0 02 07/18/2018 0831   TROPONINI <0 02 06/12/2018 0206   TROPONINI <0 02 06/11/2018 2020   TROPONINI 0 02 06/11/2018 1415   TROPONINI <0 02 03/28/2018 2034   TROPONINI 0 05 (H) 01/07/2017 0531   TROPONINI 0 07 (H) 01/06/2017 2348   TROPONINI 0 07 (H) 01/06/2017 1800   TROPONINI 0 02 11/25/2016 0600   TROPONINI 0 02 11/24/2016 2329   TROPONINI <0 02 11/24/2016 1938   TROPONINI 0 04 (H) 05/26/2016 1703   TROPONINI 0 03 05/26/2016 1312   TROPONINI 0 02 05/26/2016 0950   TROPONINI <0 02 05/16/2016 1154   TROPONINI 8 73 (H) 03/05/2016 1247   TROPONINI 10 10 (H) 03/05/2016 0534   TROPONINI 6 59 (H) 03/04/2016 2341   TROPONINI 1 93 (H) 03/04/2016 1936   TROPONINI 0 66 (H) 03/04/2016 1335         ABG:No results found for: PHART, VOG8GXW, PO2ART, HJT6ZJB, R2OXLUJY, BEART, SOURCE    Imaging:   EKG:     Micro:  Lab Results   Component Value Date    BLOODCX No Growth After 5 Days  01/06/2017    BLOODCX No Growth After 5 Days  01/06/2017    BLOODCX No Growth After 5 Days   03/06/2016    URINECX No Growth <1000 cfu/mL 03/04/2016    SPUTUMCULTUR 4+ Growth of  03/29/2018    SPUTUMCULTUR 2+ Growth of Candida sp  presumptively albicans 03/09/2016    SPUTUMCULTUR 2+ Growth of Candida sp  presumptively glabrata 03/09/2016    SPUTUMCULTUR 2+ Growth of Mixed Respiratory [place] 03/09/2016       Allergies: No Known Allergies  Medications:   Scheduled Meds:  Current Facility-Administered Medications:  acetaminophen 650 mg Oral Q6H PRN ROC Coats   amiodarone 100 mg Oral Daily Yessy Crump PA-C   amLODIPine 10 mg Oral Daily Yessy Crump PABAKARI   aspirin 81 mg Oral Daily Yessy Crump PA-C   atorvastatin 20 mg Oral Daily With JO ANN Torres   chlorhexidine 15 mL Swish & Spit Q12H [address] Yessy Curmp PA-C   fenofibrate 145 mg Oral Daily ED Zambrano-DAVID   heparin (porcine) 5,000 Units Subcutaneous Q8H [address] Yessy Crump PA-C   ipratropium-albuterol 3 mL Nebulization 4x Daily Yessy Crump PA-C   isosorbide mononitrate 30 mg Oral Daily Yessy Crump PA-C   levothyroxine 50 mcg Oral Early Morning Yessy Crump PA-C   metoprolol tartrate 25 mg Oral Q12H [address] Yessy Crump PA-C   pantoprazole 40 mg Oral Early Morning Mihir Snyder PA-C torsemide 20 mg Oral Daily Adryan Kwong MD     Continuous Infusions:   PRN Meds:    acetaminophen 650 mg Q6H PRN       Invasive lines and devices:   Invasive Devices     Peripheral Intravenous Line            Peripheral IV 07/18/18 Antecubital 1 day                   SIGNATURE: Henrik Metz PA-C  DATE: July 19, 2018

## 2018-07-19 NOTE — PLAN OF CARE
Problem: DISCHARGE PLANNING - CARE MANAGEMENT  Goal: Discharge to post-acute care or home with appropriate resources  INTERVENTIONS:  - Conduct assessment to determine patient/family and health care team treatment goals, and need for post-acute services based on payer coverage, community resources, and patient preferences, and barriers to discharge  - Address psychosocial, clinical, and financial barriers to discharge as identified in assessment in conjunction with the patient/family and health care team  - Arrange appropriate level of post-acute services according to patient's   needs and preference and payer coverage in collaboration with the physician and health care team  - Communicate with and update the patient/family, physician, and health care team regarding progress on the discharge plan  - Arrange appropriate transportation to post-acute venues  Return to home with services  Outcome: Progressing

## 2018-07-20 LAB
ANION GAP SERPL CALCULATED.3IONS-SCNC: 7 MMOL/L (ref 4–13)
BASOPHILS # BLD AUTO: 0.04 THOUSANDS/ΜL (ref 0–0.1)
BASOPHILS NFR BLD AUTO: 1 % (ref 0–1)
BUN SERPL-MCNC: 42 MG/DL (ref 5–25)
CALCIUM SERPL-MCNC: 8.6 MG/DL (ref 8.3–10.1)
CHLORIDE SERPL-SCNC: 102 MMOL/L (ref 100–108)
CO2 SERPL-SCNC: 29 MMOL/L (ref 21–32)
CREAT SERPL-MCNC: 2.28 MG/DL (ref 0.6–1.3)
EOSINOPHIL # BLD AUTO: 0.2 THOUSAND/ΜL (ref 0–0.61)
EOSINOPHIL NFR BLD AUTO: 2 % (ref 0–6)
ERYTHROCYTE [DISTWIDTH] IN BLOOD BY AUTOMATED COUNT: 16 % (ref 11.6–15.1)
GFR SERPL CREATININE-BSD FRML MDRD: 25 ML/MIN/1.73SQ M
GLUCOSE SERPL-MCNC: 106 MG/DL (ref 65–140)
HCT VFR BLD AUTO: 30.3 % (ref 36.5–49.3)
HGB BLD-MCNC: 9.2 G/DL (ref 12–17)
IMM GRANULOCYTES # BLD AUTO: 0.06 THOUSAND/UL (ref 0–0.2)
IMM GRANULOCYTES NFR BLD AUTO: 1 % (ref 0–2)
LYMPHOCYTES # BLD AUTO: 2.34 THOUSANDS/ΜL (ref 0.6–4.47)
LYMPHOCYTES NFR BLD AUTO: 27 % (ref 14–44)
MAGNESIUM SERPL-MCNC: 2.1 MG/DL (ref 1.6–2.6)
MCH RBC QN AUTO: 26.2 PG (ref 26.8–34.3)
MCHC RBC AUTO-ENTMCNC: 30.4 G/DL (ref 31.4–37.4)
MCV RBC AUTO: 86 FL (ref 82–98)
MONOCYTES # BLD AUTO: 0.73 THOUSAND/ΜL (ref 0.17–1.22)
MONOCYTES NFR BLD AUTO: 8 % (ref 4–12)
NEUTROPHILS # BLD AUTO: 5.43 THOUSANDS/ΜL (ref 1.85–7.62)
NEUTS SEG NFR BLD AUTO: 61 % (ref 43–75)
NRBC BLD AUTO-RTO: 0 /100 WBCS
PHOSPHATE SERPL-MCNC: 3.9 MG/DL (ref 2.3–4.1)
PLATELET # BLD AUTO: 355 THOUSANDS/UL (ref 149–390)
PMV BLD AUTO: 10.1 FL (ref 8.9–12.7)
POTASSIUM SERPL-SCNC: 4.2 MMOL/L (ref 3.5–5.3)
RBC # BLD AUTO: 3.51 MILLION/UL (ref 3.88–5.62)
SODIUM SERPL-SCNC: 138 MMOL/L (ref 136–145)
WBC # BLD AUTO: 8.8 THOUSAND/UL (ref 4.31–10.16)

## 2018-07-20 PROCEDURE — G8987 SELF CARE CURRENT STATUS: HCPCS

## 2018-07-20 PROCEDURE — 84100 ASSAY OF PHOSPHORUS: CPT | Performed by: PHYSICIAN ASSISTANT

## 2018-07-20 PROCEDURE — 83735 ASSAY OF MAGNESIUM: CPT | Performed by: PHYSICIAN ASSISTANT

## 2018-07-20 PROCEDURE — 85025 COMPLETE CBC W/AUTO DIFF WBC: CPT | Performed by: PHYSICIAN ASSISTANT

## 2018-07-20 PROCEDURE — 80048 BASIC METABOLIC PNL TOTAL CA: CPT | Performed by: PHYSICIAN ASSISTANT

## 2018-07-20 PROCEDURE — 94668 MNPJ CHEST WALL SBSQ: CPT

## 2018-07-20 PROCEDURE — 94640 AIRWAY INHALATION TREATMENT: CPT

## 2018-07-20 PROCEDURE — G8978 MOBILITY CURRENT STATUS: HCPCS

## 2018-07-20 PROCEDURE — 97163 PT EVAL HIGH COMPLEX 45 MIN: CPT

## 2018-07-20 PROCEDURE — 94760 N-INVAS EAR/PLS OXIMETRY 1: CPT

## 2018-07-20 PROCEDURE — 94664 DEMO&/EVAL PT USE INHALER: CPT

## 2018-07-20 PROCEDURE — 87070 CULTURE OTHR SPECIMN AEROBIC: CPT | Performed by: STUDENT IN AN ORGANIZED HEALTH CARE EDUCATION/TRAINING PROGRAM

## 2018-07-20 PROCEDURE — 99232 SBSQ HOSP IP/OBS MODERATE 35: CPT | Performed by: NURSE PRACTITIONER

## 2018-07-20 PROCEDURE — G8988 SELF CARE GOAL STATUS: HCPCS

## 2018-07-20 PROCEDURE — 97167 OT EVAL HIGH COMPLEX 60 MIN: CPT

## 2018-07-20 PROCEDURE — 87205 SMEAR GRAM STAIN: CPT | Performed by: STUDENT IN AN ORGANIZED HEALTH CARE EDUCATION/TRAINING PROGRAM

## 2018-07-20 PROCEDURE — G8979 MOBILITY GOAL STATUS: HCPCS

## 2018-07-20 PROCEDURE — 99254 IP/OBS CNSLTJ NEW/EST MOD 60: CPT | Performed by: INTERNAL MEDICINE

## 2018-07-20 PROCEDURE — 94660 CPAP INITIATION&MGMT: CPT

## 2018-07-20 PROCEDURE — 94762 N-INVAS EAR/PLS OXIMTRY CONT: CPT

## 2018-07-20 RX ORDER — LANOLIN ALCOHOL/MO/W.PET/CERES
6 CREAM (GRAM) TOPICAL
Status: DISCONTINUED | OUTPATIENT
Start: 2018-07-20 | End: 2018-07-23 | Stop reason: HOSPADM

## 2018-07-20 RX ADMIN — Medication 150 MG: at 08:41

## 2018-07-20 RX ADMIN — ATORVASTATIN CALCIUM 20 MG: 20 TABLET, FILM COATED ORAL at 16:08

## 2018-07-20 RX ADMIN — ACETAMINOPHEN 650 MG: 325 TABLET, FILM COATED ORAL at 08:41

## 2018-07-20 RX ADMIN — FLUTICASONE FUROATE AND VILANTEROL TRIFENATATE 1 PUFF: 100; 25 POWDER RESPIRATORY (INHALATION) at 08:43

## 2018-07-20 RX ADMIN — AMIODARONE HYDROCHLORIDE 100 MG: 200 TABLET ORAL at 08:41

## 2018-07-20 RX ADMIN — LEVOTHYROXINE SODIUM 50 MCG: 50 TABLET ORAL at 05:46

## 2018-07-20 RX ADMIN — DOCUSATE SODIUM 100 MG: 100 CAPSULE, LIQUID FILLED ORAL at 21:08

## 2018-07-20 RX ADMIN — IPRATROPIUM BROMIDE AND ALBUTEROL SULFATE 3 ML: .5; 3 SOLUTION RESPIRATORY (INHALATION) at 21:06

## 2018-07-20 RX ADMIN — HEPARIN SODIUM 5000 UNITS: 5000 INJECTION, SOLUTION INTRAVENOUS; SUBCUTANEOUS at 21:09

## 2018-07-20 RX ADMIN — VITAM B12 100 MCG: 100 TAB at 08:43

## 2018-07-20 RX ADMIN — PANTOPRAZOLE SODIUM 40 MG: 40 TABLET, DELAYED RELEASE ORAL at 05:46

## 2018-07-20 RX ADMIN — ISOSORBIDE MONONITRATE 30 MG: 30 TABLET, EXTENDED RELEASE ORAL at 08:41

## 2018-07-20 RX ADMIN — MUPIROCIN 1 APPLICATION: 20 OINTMENT TOPICAL at 23:58

## 2018-07-20 RX ADMIN — AMLODIPINE BESYLATE 10 MG: 10 TABLET ORAL at 08:41

## 2018-07-20 RX ADMIN — METOPROLOL TARTRATE 25 MG: 25 TABLET ORAL at 08:46

## 2018-07-20 RX ADMIN — MELATONIN 6 MG: at 21:09

## 2018-07-20 RX ADMIN — TORSEMIDE 20 MG: 20 TABLET ORAL at 08:41

## 2018-07-20 RX ADMIN — FENOFIBRATE 145 MG: 145 TABLET ORAL at 08:43

## 2018-07-20 RX ADMIN — HEPARIN SODIUM 5000 UNITS: 5000 INJECTION, SOLUTION INTRAVENOUS; SUBCUTANEOUS at 05:46

## 2018-07-20 RX ADMIN — IPRATROPIUM BROMIDE AND ALBUTEROL SULFATE 3 ML: .5; 3 SOLUTION RESPIRATORY (INHALATION) at 11:14

## 2018-07-20 RX ADMIN — IPRATROPIUM BROMIDE AND ALBUTEROL SULFATE 3 ML: .5; 3 SOLUTION RESPIRATORY (INHALATION) at 15:23

## 2018-07-20 RX ADMIN — HEPARIN SODIUM 5000 UNITS: 5000 INJECTION, SOLUTION INTRAVENOUS; SUBCUTANEOUS at 14:03

## 2018-07-20 RX ADMIN — ASPIRIN 81 MG 81 MG: 81 TABLET ORAL at 08:41

## 2018-07-20 RX ADMIN — IPRATROPIUM BROMIDE AND ALBUTEROL SULFATE 3 ML: .5; 3 SOLUTION RESPIRATORY (INHALATION) at 07:31

## 2018-07-20 RX ADMIN — CEFTRIAXONE 1000 MG: 1 INJECTION, SOLUTION INTRAVENOUS at 23:58

## 2018-07-20 RX ADMIN — METOPROLOL TARTRATE 25 MG: 25 TABLET ORAL at 21:09

## 2018-07-20 RX ADMIN — CHLORHEXIDINE GLUCONATE 15 ML: 1.2 RINSE ORAL at 08:41

## 2018-07-20 NOTE — OCCUPATIONAL THERAPY NOTE
OT EVALUATION     07/20/18 1340   Note Type   Note type Eval only   Restrictions/Precautions   Other Precautions Chair Alarm; Bed Alarm; Fall Risk;O2   Pain Assessment   Pain Assessment 0-10   Pain Score 7   Pain Type Chronic pain   Pain Location Shoulder   Pain Orientation Bilateral   Hospital Pain Intervention(s) Medication (See MAR); Repositioned; Other (Comment); Distraction   Response to Interventions some relief   Home Living   Type of [address] One level;Elevator   Bathroom Shower/Tub Tub/shower unit   Bathroom Toilet Raised   Bathroom Equipment Grab bars in shower; Shower chair;Hand-held shower;Grab bars around toilet   Bathroom Accessibility Accessible via walker   Home Equipment Walker;Cane;Reacher;Sock aid;Long-handled shoehorn  ([address] dressing stick and LH sponge)   Prior Function   Level of [place] Independent with ADLs and functional mobility; Needs assistance with IADLs   Lives With Significant other   Receives Help From Family   ADL Assistance Independent   IADLs Needs assistance   Falls in the last 6 months 0   Comments Independent ambulation primarily with SPC PTA, uses RW with basket in the bathroom     Psychosocial   Psychosocial (WDL) WDL   Subjective   Subjective "The pain in my sholders starts at a 5 and goes up above 10"   ADL   Where Assessed Chair   Eating Assistance 5  Supervision/Setup   Grooming Assistance 4  Minimal Assistance   UB Bathing Assistance 3  Moderate Assistance   LB Bathing Assistance 3  Moderate Assistance   UB Dressing Assistance 4  Minimal Assistance   LB Dressing Assistance 3  Moderate [address]  4  Minimal Assistance   Transfers   Sit to Stand 4  Minimal assistance   Additional items Assist x 1;Verbal cues   Stand to Sit 4  Minimal assistance   Additional items Assist x 1;Verbal cues   Stand pivot 4  Minimal assistance   Additional items Assist x 1;Verbal cues   Functional Mobility   Functional Mobility 4  Minimal assistance   Additional Comments 30 feet   Additional items Rolling walker   Balance   Static Sitting Normal   Dynamic Sitting Fair   Static Standing Fair   Dynamic Standing Fair -   Ambulatory Fair +  (with RW)   Activity Tolerance   Activity Tolerance Patient limited by fatigue;Patient limited by pain   RUE Assessment   RUE Assessment X   RUE Overall AROM   R Shoulder Flexion 80   LUE Assessment   LUE Assessment X   LUE Overall AROM   L Shoulder Flexion 80   Hand Function   Gross Motor Coordination Functional   Fine Motor Coordination Functional   Sensation   Light Touch No apparent deficits   Vision-Basic Assessment   Current Vision Wears glasses all the time   Visual History Cataracts;Surgical intervention   Perception   Motor Planning Appears intact   Cognition   Overall Cognitive Status WFL   Arousal/Participation Alert; Cooperative   Attention Within functional limits   Orientation Level Oriented X4   Memory Decreased short term memory   Following Commands Follows multistep commands with increased time or repetition   Assessment   Limitation Decreased ADL status; Decreased UE ROM; Decreased UE strength;Decreased Safe judgement during ADL;Decreased endurance;Decreased self-care trans;Decreased high-level ADLs  (decreased balance)   Prognosis Good   Assessment Patient evaluated by Occupational Therapy  Patient admitted with Acute on chronic respiratory failure with hypoxia ([facility] [address] )  The patients occupational profile, medical and therapy history includes a extensive additional review of physical, cognitive, or psychosocial history related to current functional performance    Comorbidities affecting functional mobility and ADLS include: moderate COPD, chronic hypoxemic respiratory failure, continuous oxygen use of 6 L at baseline and 10 L with exertion, obstructive sleep apnea with use of CPAP of 10, CKD 3, diabetes type 2, atrial fibrillation, DVT, peripheral vascular disease, aortic stenosis, aortic valve replacement  T2 DM, CKD, CHF, HTN, bilateral hip NAYELY, MRSA, Rotator cuff tendonitis,  ambulatory dysfunction, obesity, OA, polyneuropathy  Prior to admission, patient was independent with functional mobility with SPC mostly and RW, independent with ADLS, requiring assist for IADLS, living with significant other in a 1 level home with no steps to enter and ambulating household distance  The evaluation identifies the following performance deficits: decreased ROM, impaired balance, decreased endurance, increased fall risk, new onset of impairment of functional mobility, decreased ADLS, decreased IADLS, pain, decreased activity tolerance, decreased safety awareness, SOB upon exertion and decreased strength, that result in activity limitations and/or participation restrictions  This evaluation requires clinical decision making of high complexity, because the patient presents with comorbidites that affect occupational performance and required significant modification of tasks or assistance with consideration of multiple treatment options  The Barthel Index was used as a functional outcome tool presenting with a score of 50, indicating marked limitations of functional mobility and ADLS  Patient will benefit from skilled Occupational Therapy services to address above deficits and facilitate a safe return to prior level of function  Goals   Patient Goals " To get home and do for myself "   STG Time Frame (1-7)   Short Term Goal #1 Patient will increase standing tolerance to 4 minutes during ADL task to decrease assistance level and decrease fall risk; Patient will increase bed mobility to supervision in preparation for ADLS and transfers;  Patient will increase functional mobility to and from bathroom with rolling walker with min assist to increase performance with ADLS and to use a toilet; Patient will tolerate 10 minutes of UE ROM/strengthening to increase general activity tolerance and performance in ADLS/IADLS; Patient will be able to to verbalize understanding and perform energy conservation/proper body mechanics during ADLS and functional mobility at least 75% of the time with minimal cueing to decrease signs of fatigue and increase stamina to return to prior level of function; Patient will increase dynamic sitting balance to supervision to improve the ability to sit at edge of bed or on a chair for ADLS;  Patient will increase static standing balance to min assist to improve postural stability and decrease fall risk during standing ADLS and transfers  LTG Time Frame (8-14)   Long Term Goal #1 Patient will increase standing tolerance to 8 minutes during ADL task to decrease assistance level and decrease fall risk; Patient will increase bed mobility to independent in preparation for ADLS and transfers; Patient will increase functional mobility to and from bathroom with single point cane independently to increase performance with ADLS and to use a toilet; Patient will tolerate 15 minutes of UE ROM/strengthening to increase general activity tolerance and performance in ADLS/IADLS; Patient will be able to to verbalize understanding and perform energy conservation/proper body mechanics during ADLS and functional mobility at least 90% of the time with nocueing to decrease signs of fatigue and increase stamina to return to prior level of function; Patient will increase dynamic sitting balance to independent to improve the ability to sit at edge of bed or on a chair for ADLS;  Patient will increase dynamic standing balance to supervision to improve postural stability and decrease fall risk during standing ADLS and transfers  Functional Transfer Goals   Pt Will Perform All Functional Transfers With mod indep; With assistive devices; With good judgment/safety  (LTG - Independent)   ADL Goals   Pt Will Perform All ADL's In chair; With min assist;With adaptive equipment; With good judgment/safety  (LTG - Independent with AE)   Plan   Treatment Interventions ADL retraining;Functional transfer training;UE strengthening/ROM; Endurance training;Patient/family training;Equipment evaluation/education; Compensatory technique education; Energy conservation   Goal Expiration Date 08/03/18   Treatment Day 0   OT Frequency 3-5x/wk   Recommendation   OT Discharge Recommendation Home with family support  (and Home OT)   Barthel Index   Feeding 10   Bathing 0   Grooming Score 0   Dressing Score 5   Bladder Score 10   Bowels Score 10   Toilet Use Score 5   Transfers (Bed/Chair) Score 10   Mobility (Level Surface) Score 0   Stairs Score 0   Barthel Index Score 50   Licensure   [place] License Number  Nataly Lance Kurtz MS, OTR/L, Lic# [id number]

## 2018-07-20 NOTE — PHYSICAL THERAPY NOTE
PT EVALUATION       07/20/18 1005   Note Type   Note type Eval only   Pain Assessment   Pain Assessment No/denies pain   Home Living   Type of [address] One level  (elevator to enter)   [address] [place] [address] chair   Home Equipment Cane;Walker; Wheelchair-manual  (02)   Prior Function   Level of [place] (ambulatory with cane/02 indoors, limited endurance)   Lives With Significant other   Receives Help From Family  (HHA)   ADL Assistance (pt reprts he toilets and showers independently)   Falls in the last 6 months 0   Restrictions/Precautions   Other Precautions O2;Fall Risk   General   Additional Pertinent History Pt admitted with CHF/SOB now improving   Cognition   Overall Cognitive Status WFL   RLE Assessment   RLE Assessment (ROM WFL, MMT 4/5)   LLE Assessment   LLE Assessment (ROM WFL, MMT 4/5)   Transfers   Sit to Stand 4  Minimal assistance   Stand to Sit 4  Minimal assistance   Ambulation/Elevation   Gait Assistance 4  Minimal assist   Assistive Device Rolling walker  (8L02)   Distance 100 feet   Balance   Static Sitting Good   Dynamic Sitting Fair   Static Standing Fair +   Dynamic Standing Fair   Endurance Deficit   Endurance Deficit (Sp02 dropped to 77% on 8L02, recovered with few min rest)   Activity Tolerance   Activity Tolerance Patient tolerated treatment well;Patient limited by fatigue   Assessment   Prognosis Good   Problem List Decreased strength;Decreased endurance; Impaired balance;Decreased mobility   Assessment Patient seen for Physical Therapy evaluation  Patient admitted with Acute on chronic respiratory failure with hypoxia ([facility] [address] )  Comorbidities affecting patient's physical performance include: COPD, afib, CAD, htn, ambulatory dysfunction, obesity, OA, DM with polyneuropathy, CKD, CHF  Personal factors affecting patient at time of initial evaluation include: ambulating with assistive device, inability to navigate community distances and limited home support   Prior to admission, patient was independent with functional mobility with cane  Please find objective findings from Physical Therapy assessment regarding body systems outlined above with impairments and limitations including weakness, decreased endurance, decreased activity tolerance, decreased functional mobility tolerance, fall risk and SOB upon exertion  The Barthel Index was used as a functional outcome tool presenting with a score of 55 today indicating marked limitations of functional mobility and ADLS  Patient's clinical presentation is currently unstable/unpredictable as seen in patient's presentation of vital sign response, increased fall risk, new onset of impairment of functional mobility and decreased endurance  Pt would benefit from continued Physical Therapy treatment to address deficits as defined above and maximize level of functional mobility  As demonstrated by objective findings, the assigned level of complexity for this evaluation is high  Goals   Patient Goals "to get stronger and go home"   STG Expiration Date 07/27/18   Short Term Goal #1 supervision bed mobility, supervision transfers, supervision ambulation with walker 50 feet   LTG Expiration Date 08/03/18   Long Term Goal #1 independent bed mobility, independent transfers, independent ambulation with walker 100 feet so pt can navigate his home    Treatment Day 0   Plan   Treatment/Interventions ADL retraining;Functional transfer training;LE strengthening/ROM; Therapeutic exercise; Endurance training;Bed mobility;Gait training;Equipment eval/education;Patient/family training   PT Frequency 5x/wk   Recommendation   Recommendation Home PT;24 hour supervision/assist   Equipment Recommended (pt has 02, cane, walker)   Barthel Index   Feeding 10   Bathing 0   Grooming Score 5   Dressing Score 5   Bladder Score 10   Bowels Score 10   Toilet Use Score 5   Transfers (Bed/Chair) Score 10   Mobility (Level Surface) Score 0   Stairs Score 0   Barthel Index Score [place] [address] License Number  [facility] Brochure PT  [id number]

## 2018-07-20 NOTE — PROGRESS NOTES
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 CHF (congestive heart failure) ([facility] )   Assessment & Plan    · History of EF 35 to 40% with severe pulmonary hypertension likely secondary to COPD  · Patient normally on torsemide daily and states he has been compliant this recently  · Continue oral torsemide  · Continue to monitor I&Os            * Acute on chronic respiratory failure with hypoxia (HCC)   Assessment & Plan    · Chronically require 6 L nasal cannula with up to 10 L with exertion  · Chest x-ray with some evidence of volume overload, possible A/C CHF causing increasing oxygen requirements  Chronic effusion and scarring present on CXR  · Diuresed well with IV Lasix, now on po Torsemide but continues with shortness of breath  · Reports a poor nights sleep due to BiPAP issues   · Continue supplemental oxygen   · Requesting to see Dr Sarah Santos, consult placed         COPD without exacerbation [facility])   Assessment & Plan    Mild exp wheezing on exam  Completed prednisone taper in June for a COPD exacerbation but not chronically on steroids    · Continue duo nebs and Breo  · Consulted pulmonology per patient request          Pulmonary hypertension ([facility] )   Assessment & Plan    · Moderate to severe on last echo  · Likely secondary to underlying COPD        Hypothyroidism   Assessment & Plan    · Continue Synthroid        PATITO (obstructive sleep apnea)   Assessment & Plan    · Continue CPAP at night        Hypertension   Assessment & Plan    · Continue antihypertensives as tolerated        Hyperlipidemia   Assessment & Plan    · Continue statin        CAD in native artery   Assessment & Plan    · Continue Lopressor and Imdur         Paroxysmal atrial fibrillation [facility])   Assessment & Plan · Continue beta-blockers as tolerated          VTE Pharmacologic Prophylaxis:   Pharmacologic: Heparin  Mechanical VTE Prophylaxis in Place: Yes    Patient Centered Rounds: I have performed bedside rounds with nursing staff today  Discussions with Specialists or Other Care Team Provider: Nursing, SANDRA, my attending     Education and Discussions with Family / Patient: I have answered all questions to the best of my ability  Time Spent for Care: 20 minutes  More than 50% of total time spent on counseling and coordination of care as described above  Current Length of Stay: 2 day(s)    Current Patient Status: Inpatient   Certification Statement: The patient will continue to require additional inpatient hospital stay due to respiratory failure with hypoxia likely due to volume overload and COPD     Discharge Plan: Patient is not medically stable for discharge today as breathing worsened since yesterday, likely due to poor BiPAP tolerance and frequent interruptions during the night  Code Status: Level 3 - DNAR and DNI      Subjective:   Overall, patient feels worse today compared to yesterday  States he had a poor night's sleep due to BiPAP intolerance as the mask was not fitting correctly and respiratory was in every hour to adjust mask  He does not feel well enough to be discharge and requested to see pulmonology, Dr Ruben Carpio HA, CP, abd pain, N/V/D  Objective:     Vitals:   Temp (24hrs), Av 1 °F (36 7 °C), Min:97 6 °F (36 4 °C), Max:99 2 °F (37 3 °C)    HR:  [62-73] 65  Resp:  [16-25] 22  BP: (114-151)/(59-78) 133/63  SpO2:  [92 %-100 %] 96 %  Body mass index is 30 94 kg/m²  Input and Output Summary (last 24 hours):        Intake/Output Summary (Last 24 hours) at 18 1658  Last data filed at 18 1501   Gross per 24 hour   Intake              900 ml   Output             2400 ml   Net            -1500 ml       Physical Exam:     Physical Exam   Constitutional: He is oriented to person, place, and time  He appears well-developed  No distress  OOB in chair on supplemental oxygen, conversational dyspnea      HENT:   Head: Normocephalic  Neck: Normal range of motion  Cardiovascular: Normal rate and regular rhythm  Pulmonary/Chest: Effort normal  Tachypnea noted  No respiratory distress  He has decreased breath sounds in the right lower field and the left lower field  He has wheezes in the right lower field and the left lower field  He has no rhonchi  He has no rales  Abdominal: Soft  Bowel sounds are normal  He exhibits no distension  There is no tenderness  Musculoskeletal: Normal range of motion  He exhibits no edema or tenderness  Neurological: He is alert and oriented to person, place, and time  He has normal reflexes  Skin: Skin is warm and dry  No rash noted  He is not diaphoretic  Psychiatric: He has a normal mood and affect  Judgment normal    Nursing note and vitals reviewed  Additional Data:     Labs:      Results from last 7 days  Lab Units 07/20/18  0555   WBC Thousand/uL 8 80   HEMOGLOBIN g/dL 9 2*   HEMATOCRIT % 30 3*   PLATELETS Thousands/uL 355   NEUTROS PCT % 61   LYMPHS PCT % 27   MONOS PCT % 8   EOS PCT % 2       Results from last 7 days  Lab Units 07/20/18  0555 07/19/18  0614   SODIUM mmol/L 138 137   POTASSIUM mmol/L 4 2 3 9   CHLORIDE mmol/L 102 102   CO2 mmol/L 29 28   BUN mg/dL 42* 38*   CREATININE mg/dL 2 28* 2 27*   CALCIUM mg/dL 8 6 8 3   TOTAL PROTEIN g/dL  --  6 8   BILIRUBIN TOTAL mg/dL  --  0 30   ALK PHOS U/L  --  48   ALT U/L  --  17   AST U/L  --  27   GLUCOSE RANDOM mg/dL 106 133       Results from last 7 days  Lab Units 07/18/18  0831   INR  1 14       * I Have Reviewed All Lab Data Listed Above  * Additional Pertinent Lab Tests Reviewed:  All Labs Within Last 24 Hours Reviewed    Imaging:    Imaging Reports Reviewed Today Include: CXR   Imaging Personally Reviewed by Myself Includes:  None    Recent Cultures (last 7 days): Last 24 Hours Medication List:     Current Facility-Administered Medications:  acetaminophen 650 mg Oral Q6H PRN Foye Gess, ROC   amiodarone 100 mg Oral Daily Yessy Crump PA-C   amLODIPine 10 mg Oral Daily Yessy Crump PA-C   aspirin 81 mg Oral Daily Yessy Crump PA-C   atorvastatin 20 mg Oral Daily With JO ANN Torres   cyanocobalamin 100 mcg Oral Daily Foye Gess, CRNP   docusate sodium 100 mg Oral HS Foye Gess, CRDAYLIN   fenofibrate 145 mg Oral Daily Yessy Crump PA-C   fluticasone-vilanterol 1 puff Inhalation Daily ROC Vega   heparin (porcine) 5,000 Units Subcutaneous Q8H [address] Yessy Crump PA-C   ipratropium-albuterol 3 mL Nebulization 4x Daily Yessy Crump PA-C   iron polysaccharides 150 mg Oral Daily Foye Gess, CRDAYLIN   isosorbide mononitrate 30 mg Oral Daily Yessy Crump PA-C   levothyroxine 50 mcg Oral Early Morning Yessy Crump PA-C   melatonin 6 mg Oral HS ROC Hamilton   metoprolol tartrate 25 mg Oral Q12H [address] Yessy Crump PA-C   pantoprazole 40 mg Oral Early Morning Yessy Crump PA-C   torsemide 20 mg Oral Daily Shar Nixon MD        Today, Patient Was Seen By: ROC Cobb    ** Please Note: Dictation voice to text software may have been used in the creation of this document   **

## 2018-07-20 NOTE — ASSESSMENT & PLAN NOTE
· Chronically require 6 L nasal cannula with up to 10 L with exertion  · Chest x-ray with some evidence of volume overload, possible A/C CHF causing increasing oxygen requirements   Chronic effusion and scarring present on CXR  · Diuresed well with IV Lasix, now on po Torsemide but continues with shortness of breath  · Reports a poor nights sleep due to BiPAP issues   · Continue supplemental oxygen   · Requesting to see Dr Aditya Bethea, consult placed

## 2018-07-20 NOTE — SOCIAL WORK
Pt currently on o2 and bipap at night, per AMINATA pt does have this at home and should not have any other o2 needs when he returns to home    D/W IMM, letter signed and provided to pt at bedside

## 2018-07-20 NOTE — CONSULTS
Consultation - Pulmonary Medicine   Terrall Hamman 80 y o  male MRN: [id number]  Unit/Bed#: ICU 10 Encounter: [id number]      Assessment:  Dyspnea due to acute on chronic systolic and diastolic congestive heart failure  He has underlying ischemic cardiomyopathy with left ventricular ejection fraction of 35-40% with secondary pulmonary artery hypertension  Increased cough with increased mucus production appears to have acute bronchitis  Moderate COPD  Chronic hypoxemic respiratory failure due to COPD and pulmonary hypertension due to ischemic cardiomyopathy  Moderate obstructive sleep apnea for which patient uses CPAP of 10 with 5 L of oxygen at bedtime  Iron deficiency anemia  Chronic kidney disease    Plan:   I did order sputum culture and will start patient on IV ceftriaxone 1 g daily for underlying acute bronchitis  Continue CPAP at bedtime 10 cm water with 5 L of oxygen at bedtime  Neb treatments with DuoNeb q i d  as ordered continue Breo 100 mcg 1 puff daily  Patient has been converted back to his oral torsemide 20 mg daily      History of Present Illness   Physician Requesting Consult: Deya Silverman MD  Reason for Consult / Principal Problem: dyspnea, COPD  Hx and PE limited by: none      HPI: Terrall Hamman is a 80y o  year old male who presents from home complaining of worsening shortness of breath and chest tightness over the past few days  Also has had increased cough and today states he S expectorated some small amount of thick green mucus sometimes dark blood in it  He was not have any fever chills at home  He is usually on 6 L of oxygen at home at rest and will increased up to 8 L per minute with activity  He does not have any chest pain he denies any fever chills  Chest x-ray showed chronic small right pleural effusion some slight increased interstitial markings particularly lower lobes  Serum BNP was elevated 7,739 troponin was negative    He was given 40 mg of IV Lasix initially appeared to have some improvement in his breathing  One point he was placed on heated high-flow oxygen set at 50% oxygen 40 liters/minute the his improved and now is back on 6 liters/minute of oxygen  He also received 125 mg of Solu-Medrol in emergency room  Fili Munoz does have a history of chronic anemia hemoglobin was stable at 9 2  White blood cell count was normal at 8 8    He does have chronic kidney disease serum creatinine is 2 28  He is receiving neb treatments with DuoNeb 4 times a day and does take Breo 100 mcg 1 puff daily    Fili Munoz does have moderate obstructive sleep apnea and uses CPAP at 10 cm water with 5 L of oxygen at bedtime    He also had recent COPD exacerbation and recently finished a tapering dose of prednisone  Last FEV1 was 1 85 L or 76% predicted consistent with moderate airflow obstruction    He does have chronic hypoxemic respiratory failure and uses oxygen at home which she varies from 3 L up to 8-10 with walking    He does have underlying cardiomyopathy and LV systolic function shows ejection fraction 35-40%  He also has severe PA hypertension and iron deficiency anemia  Fili Munoz did have TAVR procedure in the past     His breathing is improved today                Review of Systems   Constitutional: Negative for chills, fever and unexpected weight change  HENT: Negative for congestion, rhinorrhea and sore throat  Eyes: Negative for discharge and redness  Respiratory: Positive for cough, shortness of breath and wheezing  Cardiovascular: Negative for chest pain, palpitations and leg swelling  Gastrointestinal: Negative for abdominal distention, abdominal pain and nausea  Endocrine: Negative for polydipsia and polyphagia  Genitourinary: Negative for dysuria  Musculoskeletal: Negative for joint swelling and myalgias  Skin: Negative for rash  Neurological: Negative for light-headedness         Historical Information   Past Medical History:   Diagnosis Date    Allergic rhinitis 06/11/2010    Aortic stenosis, severe     Bacterial pneumonia 06/08/2007    Bladder neck obstruction 12/23/2010    BPH (benign prostatic hyperplasia)     Bron[facility], chronic obstructive, [facility] 01/30/2012    CAD (coronary artery disease)     stent 2014    Carcinoma ([facility] )     resolved [id number]    Cardiomegaly 02/13/2007    Cataract of both eyes     [facility] failure[facility]     Chronic allergic conjunctivitis     last assessed [id number]    Chronic kidney disease (CKD)     CKD (chronic kidney disease), stage III     CKD (chronic kidney disease), stage III     COPD (chronic obstructive pulmonary disease) ([facility]     Decubitus ulcer     resolved [id number]    Dermatomycosis     last assessed [id number]    Diabetes mellitus type 2, noninsulin dependent ([facility] )     Dyspnea on exertion     last assessed [id number]    Eczema     last assessed [id number]    Edema     last assessed [id number]    Epistaxis 12/01/2004    Esophagitis, erosive     Exposure to scabies     resolved [id number]    Fracture of rib     last assessed [id number]    H/O aortic valve replacement     resolved [id number]    H/O blood clots     History of DVT (deep vein thrombosis)     left posterior tibial/peroneal veins    History of non-ST elevation myocardial infarction (NSTEMI)     History of pneumonia     Hyperlipidemia     Hypertension     Internal hemorrhoids 09/13/2006    LBBB (left bundle branch block)     MRSA (methicillin resistant staph aureus) culture positive     NSTEMI (non-ST elevated myocardial infarction) ([facility] )     resolved [id number]    Obstructive sleep apnea on CPAP     severe PATITO with AHI of 106 and uses CPAP at 10 cm H2O with 2 l/m oxygen    Paroxysmal atrial fibrillation ([facility] )     Phimosis 09/01/2010    severe pt had circumcision 9/2010    Pulmonary fibrosis [facility]     PVD (peripheral vascular disease) ([facility] )     Rotator cuff tendonitis     lsat assessed [id number]    Skin abscess 2004    Hip    Skin neoplasm     last assessed [id number]    Tachycardia 2004    Trigger finger     last assessed [id number]    Type 2 diabetes mellitus ([facility] ) 2004    Venous thrombosis 2007    Venous thrombosis 2007     Past Surgical History:   Procedure Laterality Date    CARDIAC CATHETERIZATION  03/10/2016    Showed 60-70% mid LAD lesion next to stent    CATARACT EXTRACTION      CIRCUMCISION, NON-      ESOPHAGOGASTRODUODENOSCOPY N/A 3/14/2016    Procedure: ESOPHAGOGASTRODUODENOSCOPY (EGD); Surgeon: Kina Bender MD;  Location[facility];   Service:     EYE SURGERY      FRACTURE SURGERY      JOINT REPLACEMENT      both hips replaced    OTHER SURGICAL HISTORY      H/O thoracentesis (diagnostic)    OH REPLACE AORTIC VALVE OPENFEMORAL ARTERY APPROACH N/A 2016    Procedure: TRANSFEMORAL TAVR WITH 26MM CALIX MAKAYLA S3 TISSUE VALVE; PINA ;  Surgeon: Sherine Vargas DO;  Location: [facility];  Service: Cardiac Surgery    REPLACEMENT TOTAL KNEE Bilateral 1991 and     TISSUE AORTIC VALVE REPLACEMENT      transfemoral, transcatheter    TONSILLECTOMY      TONSILLECTOMY AND ADENOIDECTOMY      TOTAL HIP ARTHROPLASTY      Bilateral     Social History   History   Alcohol Use    Yes     Comment: socially     History   Drug Use No     History   Smoking Status    Former Smoker    Packs/day: 1 00    Years: 35 00    Types: Cigarettes    Quit date: 1987   Smokeless Tobacco    Never Used     Comment: quit 50 years ago, per [employer]         Family History:   Family History   Problem Relation Age of Onset    Coronary artery disease Mother     Arthritis Mother     Hypertension Mother     Rheum arthritis Father     Prostate cancer Father        Meds/Allergies     Current Facility-Administered Medications:     acetaminophen (TYLENOL) tablet 650 mg, 650 mg, Oral, Q6H PRN, ROC Goncalves, 650 mg at 18 0841   amiodarone tablet 100 mg, 100 mg, Oral, Daily, SHONDA ZambranoC, 100 mg at 07/20/18 0841    amLODIPine (NORVASC) tablet 10 mg, 10 mg, Oral, Daily, SHONDA ZambranoC, 10 mg at 07/20/18 1399    aspirin chewable tablet 81 mg, 81 mg, Oral, Daily, SHONDA ZambranoC, 81 mg at 07/20/18 0841    atorvastatin (LIPITOR) tablet 20 mg, 20 mg, Oral, Daily With Dinner, ED Zambrano-C, 20 mg at 07/20/18 1608    cyanocobalamin (VITAMIN B-12) tablet 100 mcg, 100 mcg, Oral, Daily, Foye Gess, CRNP, 100 mcg at 07/20/18 0843    docusate sodium (COLACE) capsule 100 mg, 100 mg, Oral, HS, Foye Gess, CRNP, 100 mg at 07/19/18 2117    fenofibrate (TRICOR) tablet 145 mg, 145 mg, Oral, Daily, SHONDA ZambranoC, 145 mg at 07/20/18 0843    fluticasone-vilanterol (BREO ELLIPTA) 100-25 mcg/inh inhaler 1 puff, 1 puff, Inhalation, Daily, Foye Gess, CRNP, 1 puff at 07/20/18 0843    heparin (porcine) subcutaneous injection 5,000 Units, 5,000 Units, Subcutaneous, Q8H [address], 5,000 Units at 07/20/18 1403 **AND** Platelet count, , , Once, Yessy Crump PA-C    ipratropium-albuterol (DUO-NEB) 0 5-2 5 mg/3 mL inhalation solution 3 mL, 3 mL, Nebulization, 4x Daily, Yessy Crump PA-C, 3 mL at 07/20/18 1523    iron polysaccharides (NIFEREX) capsule 150 mg, 150 mg, Oral, Daily, Foye Gess, CRNP, 150 mg at 07/20/18 0841    isosorbide mononitrate (IMDUR) 24 hr tablet 30 mg, 30 mg, Oral, Daily, Yessy Crump PA-C, 30 mg at 07/20/18 0841    levothyroxine tablet 50 mcg, 50 mcg, Oral, Early Morning, Yessy Crump PA-C, 50 mcg at 07/20/18 0546    melatonin tablet 6 mg, 6 mg, Oral, HS, Klarissa Sharma, ROC    metoprolol tartrate (LOPRESSOR) tablet 25 mg, 25 mg, Oral, Q12H [address], Yessy Crump PA-C, 25 mg at 07/20/18 0846    pantoprazole (PROTONIX) EC tablet 40 mg, 40 mg, Oral, Early Morning, Yessy Crump PA-C, 40 mg at 07/20/18 0546    torsemide (DEMADEX) tablet 20 mg, 20 mg, Oral, Daily, Shar Nixon MD, 20 mg at 07/20/18 0841    Prior to Admission medications    Medication Sig Start Date End Date Taking? Authorizing Provider   albuterol (PROAIR HFA) 90 mcg/act inhaler Inhale 2 puffs every 4 (four) hours as needed for wheezing or shortness of breath 6/5/18   Redia [place], DO   amiodarone 100 mg tablet Take 1 tablet (100 mg total) by mouth daily 3/6/18   Frank King MD   amLODIPine (NORVASC) 10 mg tablet TAKE 1 TABLET DAILY 4/10/18   [facility], DO   aspirin 81 mg chewable tablet Chew daily    Historical Provider, MD   atorvastatin (LIPITOR) 10 mg tablet Take 2 tablets (20 mg total) by mouth daily 6/22/18   [facility], DO   B Complex Vitamins (VITAMIN B COMPLEX PO) Take by mouth    Historical Provider, MD Man Pickup USE 1 INHALATION DAILY 4/16/18   ROC Mejia   cyanocobalamin (VITAMIN B-12) 100 mcg tablet Take by mouth    Historical Provider, MD   docusate sodium (COLACE) 100 mg capsule Take 100 mg by mouth daily at bedtime  Historical Provider, MD   fenofibrate (TRICOR) 145 mg tablet Take 145 mg by mouth daily  Historical Provider, MD   hydrALAZINE (APRESOLINE) 25 mg tablet take 1 tablet by mouth every 8 hours 6/25/18   [facility], DO   ipratropium-albuterol (DUO-NEB) 0 5-2 5 mg/3 mL nebulizer solution Take 1 vial (3 mL total) by nebulization 4 (four) times a day for 90 days 6/5/18 9/3/18  Redia Madhu, DO   iron polysaccharides (FERREX 150) 150 mg capsule Take 1 capsule (150 mg total) by mouth daily for 30 days 6/17/18 7/17/18  Redia [place], DO   isosorbide mononitrate (IMDUR) 30 mg 24 hr tablet Take 1 tablet (30 mg total) by mouth daily 4/2/18   Sherren Six, CRNP   levothyroxine 50 mcg tablet Take 1 tablet (50 mcg total) by mouth daily 6/22/18   [facility], DO   metoprolol tartrate (LOPRESSOR) 25 mg tablet Take 1 tablet (25 mg total) by mouth every 12 (twelve) hours   8/27/16   Agapito Beatty PA-C   pantoprazole (PROTONIX) 40 mg tablet take 1 tablet by mouth once daily 5/24/18   Justina Turcioss, DO   tolterodine (DETROL) 1 mg tablet Take 1 mg by mouth daily    Historical Provider, MD sepulvedaemide [facility]) 20 mg tablet Take by mouth 9/9/16   Historical Provider, MD       No Known Allergies    Objective   Vitals: Blood pressure 152/67, pulse 72, temperature 99 °F (37 2 °C), temperature source Temporal, resp  rate 22, height 5' 10" (1 778 m), weight 97 8 kg (215 lb 9 8 oz), SpO2 97 %  ,Body mass index is 30 94 kg/m²  Intake/Output Summary (Last 24 hours) at 07/20/18 1956  Last data filed at 07/20/18 1801   Gross per 24 hour   Intake              840 ml   Output             2650 ml   Net            -1810 ml     Invasive Devices     Peripheral Intravenous Line            Peripheral IV 07/18/18 Antecubital 2 days                Physical Exam   Constitutional: He is oriented to person, place, and time  He appears well-developed and well-nourished  No distress  On 6 l/m - 96%   HENT:   Head: Normocephalic  Nose: Nose normal    Mouth/Throat: Oropharynx is clear and moist  No oropharyngeal exudate  Eyes: Conjunctivae are normal  Pupils are equal, round, and reactive to light  Neck: Neck supple  No JVD present  No tracheal deviation present  Cardiovascular: Normal rate, regular rhythm and normal heart sounds  Pulmonary/Chest: Effort normal    Lung sounds are decreased with some expiratory wheezes and rhonchi in the lower lobes   Abdominal: Soft  He exhibits no distension  There is no tenderness  There is no guarding  Musculoskeletal:   Mild edema of lower tibial surfaces   Lymphadenopathy:     He has no cervical adenopathy  Neurological: He is alert and oriented to person, place, and time  Skin: Skin is warm and dry  No rash noted  Psychiatric: He has a normal mood and affect   His behavior is normal  Thought content normal        Lab Results: ABG: No results found for: PHART, XXY0TII, PO2ART, XUV5ZQN, Z0ODJIYD, BEART, SOURCE, BNP: No results found for: BNP, CBC: Lab Results   Component Value Date    WBC 8 80 07/20/2018    HGB 9 2 (L) 07/20/2018    HCT 30 3 (L) 07/20/2018    MCV 86 07/20/2018     07/20/2018    ADJUSTEDWBC 6 70 10/04/2016    MCH 26 2 (L) 07/20/2018    MCHC 30 4 (L) 07/20/2018    RDW 16 0 (H) 07/20/2018    MPV 10 1 07/20/2018    NRBC 0 07/20/2018   , CMP: Lab Results   Component Value Date     07/20/2018     10/16/2017    K 4 2 07/20/2018    K 4 6 10/16/2017     07/20/2018    CL 95 (L) 10/16/2017    CO2 29 07/20/2018    CO2 26 10/16/2017    ANIONGAP 7 07/20/2018    ANIONGAP 13 9 10/08/2015    BUN 42 (H) 07/20/2018    BUN 39 (H) 10/16/2017    CREATININE 2 28 (H) 07/20/2018    CREATININE 2 06 (H) 10/16/2017    GLUCOSE 106 07/20/2018    GLUCOSE 109 (H) 10/16/2017    CALCIUM 8 6 07/20/2018    CALCIUM 9 7 10/16/2017    AST 27 07/19/2018    AST 39 10/16/2017    ALT 17 07/19/2018    ALT 15 10/16/2017    ALKPHOS 48 07/19/2018    ALKPHOS 61 10/16/2017    PROT 6 8 07/19/2018    PROT 7 9 10/16/2017    BILITOT 0 30 07/19/2018    BILITOT 0 4 10/16/2017    EGFR 25 07/20/2018   , PT/INR:   Lab Results   Component Value Date    INR 1 14 07/18/2018   , Troponin: No results found for: TROPONIN    Imaging Studies: I have personally reviewed pertinent reports  and I have personally reviewed pertinent films in PACS    EKG, Pathology, and Other Studies: I have personally reviewed pertinent reports  VTE Prophylaxis: Heparin    Code Status: Level 3 - DNAR and DNI    Counseling/Coordination of Care: Total floor / unit time spent today 30 minutes  Greater than 50% of total time was spent with the patient and / or family counseling and / or coordination of care  A description of the counseling / coordination of care: I reviewed patient's chart, chest radiograph, discussed diagnosis with him and also his significant other

## 2018-07-20 NOTE — PLAN OF CARE
DISCHARGE PLANNING     Discharge to home or other facility with appropriate resources Progressing        DISCHARGE PLANNING - CARE MANAGEMENT     Discharge to post-acute care or home with appropriate resources Progressing        INFECTION - ADULT     Absence or prevention of progression during hospitalization Progressing     Absence of fever/infection during neutropenic period Progressing        Knowledge Deficit     Patient/family/caregiver demonstrates understanding of disease process, treatment plan, medications, and discharge instructions Progressing        METABOLIC, FLUID AND ELECTROLYTES - ADULT     Electrolytes maintained within normal limits Progressing     Fluid balance maintained Progressing        MUSCULOSKELETAL - ADULT     Maintain or return mobility to safest level of function Progressing        PAIN - ADULT     Verbalizes/displays adequate comfort level or baseline comfort level Progressing        Prexisting or High Potential for Compromised Skin Integrity     Skin integrity is maintained or improved Progressing        RESPIRATORY - ADULT     Achieves optimal ventilation and oxygenation Progressing        SAFETY ADULT     Patient will remain free of falls Progressing     Maintain or return to baseline ADL function Progressing     Maintain or return mobility status to optimal level Progressing        SKIN/TISSUE INTEGRITY - ADULT     Skin integrity remains intact Progressing

## 2018-07-20 NOTE — ASSESSMENT & PLAN NOTE
· History of EF 35 to 40% with severe pulmonary hypertension likely secondary to COPD  · Patient normally on torsemide daily and states he has been compliant this recently  · Continue oral torsemide  · Continue to monitor I&Os

## 2018-07-20 NOTE — ASSESSMENT & PLAN NOTE
Mild exp wheezing on exam  Completed prednisone taper in June for a COPD exacerbation but not chronically on steroids    · Continue duo nebs and Breo  · Consulted pulmonology per patient request

## 2018-07-20 NOTE — CASE MANAGEMENT
Continued Stay Review  Date: 7/20/18  Vital Signs: /78 (BP Location: Right arm)   Pulse 64   Temp 97 9 °F (36 6 °C) (Temporal)   Resp 22   Ht 5' 10" (1 778 m)   Wt 97 8 kg (215 lb 9 8 oz)   SpO2 96%   BMI 30 94 kg/m²   Medications:   Scheduled Meds:   Current Facility-Administered Medications:  acetaminophen 650 mg Oral Q6H PRN ROC Short   amiodarone 100 mg Oral Daily Yessy Crump PA-C   amLODIPine 10 mg Oral Daily Yessy Crump PA-C   aspirin 81 mg Oral Daily Yessy Crump PA-C   atorvastatin 20 mg Oral Daily With JO ANN Torres   cyanocobalamin 100 mcg Oral Daily ROC Short   docusate sodium 100 mg Oral HS ROC Short   fenofibrate 145 mg Oral Daily Yessy Crump PA-C   fluticasone-vilanterol 1 puff Inhalation Daily ROC Vega   heparin (porcine) 5,000 Units Subcutaneous Q8H [address] Yessy Crump PA-C   ipratropium-albuterol 3 mL Nebulization 4x Daily Yessy Crump PA-C   iron polysaccharides 150 mg Oral Daily ROC Short   isosorbide mononitrate 30 mg Oral Daily Yessy Crump PA-C   levothyroxine 50 mcg Oral Early Morning Yessy Crump PA-C   melatonin 6 mg Oral HS ROC Hamilton   metoprolol tartrate 25 mg Oral Q12H [address] Yessy Crump PA-C   pantoprazole 40 mg Oral Early Morning Yessy Crump PA-C   torsemide 20 mg Oral Daily Anaya Nice MD   Continuous Infusions:    PRN Meds:   acetaminophen  Abnormal Labs/Diagnostic Results:   HGB 9 2 BUN 42 CR 2 28 GFR 25   Age/Sex: 80 y o  male   Assessment/Plan:   RESPIRATORY STATUS WORSENED SINCE YESTERDAY, POOR BIPAP TOLERANCE, PERSISTENT EXPIRATORY WHEEZING NOTED, CONVERSATIONALLY DYSPNEIC, PULMONARY CONSULTED  O2 @ 6LTR NC, UP TO 10 LTR WITH EXERTION  Discharge Plan: TBD  Thank you,  [id number] Utilization Review Department  Phone: [phone];  Fax [phone]  ATTENTION: The Network Utilization Review Department is now centralized for our 9 Facilities  Make a note that we have a new phone and fax numbers for our Department  Please call with any questions or concerns to [phone] and carefully follow the prompts so that you are directed to the right person  All voicemails are confidential  Fax any determinations, approvals, denials, and requests for initial or continue stay review clinical to [phone]  Due to HIGH CALL volume, it would be easier if you could please send faxed requests to expedite your requests and in part, help us provide discharge notifications faster

## 2018-07-21 LAB
ANION GAP SERPL CALCULATED.3IONS-SCNC: 3 MMOL/L (ref 4–13)
BUN SERPL-MCNC: 39 MG/DL (ref 5–25)
CALCIUM SERPL-MCNC: 8.5 MG/DL (ref 8.3–10.1)
CHLORIDE SERPL-SCNC: 102 MMOL/L (ref 100–108)
CO2 SERPL-SCNC: 32 MMOL/L (ref 21–32)
CREAT SERPL-MCNC: 2.03 MG/DL (ref 0.6–1.3)
ERYTHROCYTE [DISTWIDTH] IN BLOOD BY AUTOMATED COUNT: 15.9 % (ref 11.6–15.1)
GFR SERPL CREATININE-BSD FRML MDRD: 29 ML/MIN/1.73SQ M
GLUCOSE SERPL-MCNC: 101 MG/DL (ref 65–140)
HCT VFR BLD AUTO: 29.3 % (ref 36.5–49.3)
HGB BLD-MCNC: 8.7 G/DL (ref 12–17)
MAGNESIUM SERPL-MCNC: 2 MG/DL (ref 1.6–2.6)
MCH RBC QN AUTO: 25.7 PG (ref 26.8–34.3)
MCHC RBC AUTO-ENTMCNC: 29.7 G/DL (ref 31.4–37.4)
MCV RBC AUTO: 86 FL (ref 82–98)
PLATELET # BLD AUTO: 345 THOUSANDS/UL (ref 149–390)
PMV BLD AUTO: 10.4 FL (ref 8.9–12.7)
POTASSIUM SERPL-SCNC: 4.1 MMOL/L (ref 3.5–5.3)
RBC # BLD AUTO: 3.39 MILLION/UL (ref 3.88–5.62)
SODIUM SERPL-SCNC: 137 MMOL/L (ref 136–145)
WBC # BLD AUTO: 8.2 THOUSAND/UL (ref 4.31–10.16)

## 2018-07-21 PROCEDURE — 85027 COMPLETE CBC AUTOMATED: CPT | Performed by: NURSE PRACTITIONER

## 2018-07-21 PROCEDURE — 83735 ASSAY OF MAGNESIUM: CPT | Performed by: NURSE PRACTITIONER

## 2018-07-21 PROCEDURE — 99232 SBSQ HOSP IP/OBS MODERATE 35: CPT | Performed by: NURSE PRACTITIONER

## 2018-07-21 PROCEDURE — 80048 BASIC METABOLIC PNL TOTAL CA: CPT | Performed by: NURSE PRACTITIONER

## 2018-07-21 PROCEDURE — 94762 N-INVAS EAR/PLS OXIMTRY CONT: CPT

## 2018-07-21 PROCEDURE — 94760 N-INVAS EAR/PLS OXIMETRY 1: CPT

## 2018-07-21 PROCEDURE — 94660 CPAP INITIATION&MGMT: CPT

## 2018-07-21 PROCEDURE — 94640 AIRWAY INHALATION TREATMENT: CPT

## 2018-07-21 RX ORDER — HEPARIN SODIUM 5000 [USP'U]/ML
5000 INJECTION, SOLUTION INTRAVENOUS; SUBCUTANEOUS EVERY 12 HOURS SCHEDULED
Status: DISCONTINUED | OUTPATIENT
Start: 2018-07-21 | End: 2018-07-23 | Stop reason: HOSPADM

## 2018-07-21 RX ADMIN — IPRATROPIUM BROMIDE AND ALBUTEROL SULFATE 3 ML: .5; 3 SOLUTION RESPIRATORY (INHALATION) at 08:06

## 2018-07-21 RX ADMIN — PANTOPRAZOLE SODIUM 40 MG: 40 TABLET, DELAYED RELEASE ORAL at 05:48

## 2018-07-21 RX ADMIN — IPRATROPIUM BROMIDE AND ALBUTEROL SULFATE 3 ML: .5; 3 SOLUTION RESPIRATORY (INHALATION) at 11:14

## 2018-07-21 RX ADMIN — AMLODIPINE BESYLATE 10 MG: 10 TABLET ORAL at 08:59

## 2018-07-21 RX ADMIN — ACETAMINOPHEN 650 MG: 325 TABLET, FILM COATED ORAL at 22:02

## 2018-07-21 RX ADMIN — ATORVASTATIN CALCIUM 20 MG: 20 TABLET, FILM COATED ORAL at 17:01

## 2018-07-21 RX ADMIN — MUPIROCIN 1 APPLICATION: 20 OINTMENT TOPICAL at 09:01

## 2018-07-21 RX ADMIN — TORSEMIDE 20 MG: 20 TABLET ORAL at 08:57

## 2018-07-21 RX ADMIN — FENOFIBRATE 145 MG: 145 TABLET ORAL at 09:00

## 2018-07-21 RX ADMIN — IPRATROPIUM BROMIDE AND ALBUTEROL SULFATE 3 ML: .5; 3 SOLUTION RESPIRATORY (INHALATION) at 15:26

## 2018-07-21 RX ADMIN — METOPROLOL TARTRATE 25 MG: 25 TABLET ORAL at 21:49

## 2018-07-21 RX ADMIN — ASPIRIN 81 MG 81 MG: 81 TABLET ORAL at 08:59

## 2018-07-21 RX ADMIN — DOCUSATE SODIUM 100 MG: 100 CAPSULE, LIQUID FILLED ORAL at 21:50

## 2018-07-21 RX ADMIN — VITAM B12 100 MCG: 100 TAB at 09:00

## 2018-07-21 RX ADMIN — Medication 150 MG: at 08:57

## 2018-07-21 RX ADMIN — LEVOTHYROXINE SODIUM 50 MCG: 50 TABLET ORAL at 05:48

## 2018-07-21 RX ADMIN — METOPROLOL TARTRATE 25 MG: 25 TABLET ORAL at 08:57

## 2018-07-21 RX ADMIN — AMIODARONE HYDROCHLORIDE 100 MG: 200 TABLET ORAL at 08:58

## 2018-07-21 RX ADMIN — HEPARIN SODIUM 5000 UNITS: 5000 INJECTION, SOLUTION INTRAVENOUS; SUBCUTANEOUS at 05:48

## 2018-07-21 RX ADMIN — FLUTICASONE FUROATE AND VILANTEROL TRIFENATATE 1 PUFF: 100; 25 POWDER RESPIRATORY (INHALATION) at 08:56

## 2018-07-21 RX ADMIN — MUPIROCIN 1 APPLICATION: 20 OINTMENT TOPICAL at 21:49

## 2018-07-21 RX ADMIN — HEPARIN SODIUM 5000 UNITS: 5000 INJECTION, SOLUTION INTRAVENOUS; SUBCUTANEOUS at 21:50

## 2018-07-21 RX ADMIN — MELATONIN 6 MG: at 21:50

## 2018-07-21 RX ADMIN — IPRATROPIUM BROMIDE AND ALBUTEROL SULFATE 3 ML: .5; 3 SOLUTION RESPIRATORY (INHALATION) at 19:24

## 2018-07-21 RX ADMIN — ISOSORBIDE MONONITRATE 30 MG: 30 TABLET, EXTENDED RELEASE ORAL at 08:59

## 2018-07-21 NOTE — ASSESSMENT & PLAN NOTE
· Chronically require 6 L nasal cannula with up to 10 L with exertion  · Chest x-ray with some evidence of volume overload, possible A/C CHF causing increasing oxygen requirements  Chronic effusion and scarring present on CXR  · Reports a poor sleep due to BiPAP beeping from ill-fitting mask  · Diuresed well with IV Lasix, now on po Torsemide  · Requested to see Dr Albert Kelley, consult placed   · Dr Albert Kelley felt patient had underlying acute bronchitis, therefore, started patient on Rocephin 1 g IV daily   Received 3 doses thus far  · Will transition to oral Ceftin, discharge with F/U with pulmonology   · Continue supplemental oxygen and supportive measures

## 2018-07-21 NOTE — ASSESSMENT & PLAN NOTE
Moderate to severe on last echo, likely secondary to underlying COPD  · Continue medical management and F/U with pulmonology

## 2018-07-21 NOTE — ASSESSMENT & PLAN NOTE
Mild exp wheezing on exam  Completed prednisone taper in June for a COPD exacerbation but not chronically on steroids  · Continue duo nebs and Breo  · Per pulmonology, patient has underlying acute bronchitis  · Started on Rocephin 1 g IV daily, received 3 doses   Will transition to oral Ceftin and have pt follow-up with pulmonology as outpatient

## 2018-07-21 NOTE — PROGRESS NOTES
Progress Note - Cristhian Hill 10/24/1930, 80 y o  male MRN: [id number]    Unit/Bed#: ICU 10 Encounter: [id number]    Primary Care Provider: Sobia Okeefe DO   Date and time admitted to hospital: 7/18/2018  8:22 AM        Acute on chronic combined systolic and diastolic CHF (congestive heart failure) ([address] )   Assessment & Plan    · History of EF 35 to 40% with severe pulmonary hypertension likely secondary to COPD  · Patient normally on torsemide daily and states he has been compliant this recently  · Continue oral torsemide  · Continue to monitor I&Os        * Acute on chronic respiratory failure with hypoxia (HCC)   Assessment & Plan    · Chronically require 6 L nasal cannula with up to 10 L with exertion  · Chest x-ray with some evidence of volume overload, possible A/C CHF causing increasing oxygen requirements  Chronic effusion and scarring present on CXR  · Reports a poor nights sleep due to BiPAP issues   · Diuresed well with IV Lasix, now on po Torsemide but continues with shortness of breath   · Requesting to see Dr Albert Kelley, consult placed   · Dr Albert Kelley felt patient had underlying acute bronchitis, therefore, started patient on Rocephin 1 g IV daily  · Continue supplemental oxygen and supportive measures        COPD without exacerbation ([facility] [address] )   Assessment & Plan    Mild exp wheezing on exam  Completed prednisone taper in June for a COPD exacerbation but not chronically on steroids    · Continue duo nebs and Breo  · Consulted pulmonology per patient request    · Per pulmonology, patient has underlying acute bronchitis  · Started on Rocephin 1 g IV daily         Pulmonary hypertension ([facility] [place] [address] )   Assessment & Plan    · Moderate to severe on last echo  · Likely secondary to underlying COPD        Hypothyroidism   Assessment & Plan    · Continue Synthroid        PATITO (obstructive sleep apnea)   Assessment & Plan    · Continue CPAP at night        Hypertension   Assessment & Plan    · Continue antihypertensives as tolerated        Hyperlipidemia   Assessment & Plan    · Continue statin        CAD in native artery   Assessment & Plan    · Continue Lopressor and Imdur         Paroxysmal atrial fibrillation (HCC)   Assessment & Plan    · Continue beta-blockers as tolerated          VTE Pharmacologic Prophylaxis:   Pharmacologic: Heparin  Mechanical VTE Prophylaxis in Place: Yes    Patient Centered Rounds: I have performed bedside rounds with nursing staff today  Discussions with Specialists or Other Care Team Provider: Nursing, SANDRA, my attending     Education and Discussions with Family / Patient: I have answered all questions to the best of my ability  Time Spent for Care: 20 minutes  More than 50% of total time spent on counseling and coordination of care as described above  Current Length of Stay: 3 day(s)    Current Patient Status: Inpatient   Certification Statement: The patient will continue to require additional inpatient hospital stay due to respiratory failure with hypoxia likely due to volume overload, COPD, and acute bronchitis    Discharge Plan: Patient is not medically stable for discharge today due to acute bronchitis requiring IV Rocephin and re-evaluation by pulmonology  Code Status: Level 3 - DNAR and DNI      Subjective:   Overall, patient feels improved today  He appears to be in better spirits  States he get a better night's sleep as his BiPAP was not be peeing as much as the night prior, however, he did not get a full night's rest as the BiPAP continue to go off occasionally  Patient does feel better than he did the day prior, but he does not feel at his baseline yet  He continues with some dyspnea with conversation  Continues with a moist, nonproductive cough  Denies any headache, dizziness, chest pain, abdominal pain, nausea, vomiting, diarrhea      Objective:     Vitals:   Temp (24hrs), Av 4 °F (36 9 °C), Min:97 6 °F (36 4 °C), Max:99 °F (37 2 °C)    HR:  [56-77] 60  Resp:  [] 20  BP: (129-157)/(60-70) 134/60  SpO2:  [92 %-100 %] 96 %  Body mass index is 30 94 kg/m²  Input and Output Summary (last 24 hours): Intake/Output Summary (Last 24 hours) at 07/21/18 1754  Last data filed at 07/21/18 1645   Gross per 24 hour   Intake             1130 ml   Output             2575 ml   Net            -1445 ml       Physical Exam:     Physical Exam   Constitutional: He is oriented to person, place, and time  He appears well-developed  No distress  Resting out of bed in chair on supplemental oxygen, appears comfortable   HENT:   Head: Normocephalic  Neck: Normal range of motion  Cardiovascular: Normal rate and regular rhythm  Pulmonary/Chest: Effort normal  No respiratory distress  He has decreased breath sounds in the right lower field and the left lower field  He has wheezes  He has no rhonchi  He has rales in the right lower field  Abdominal: Soft  Bowel sounds are normal  He exhibits no distension  There is no tenderness  Musculoskeletal: Normal range of motion  He exhibits no edema or tenderness  Neurological: He is alert and oriented to person, place, and time  Skin: Skin is warm and dry  He is not diaphoretic  Psychiatric: He has a normal mood and affect  His behavior is normal    Nursing note and vitals reviewed        Additional Data:     Labs:      Results from last 7 days  Lab Units 07/21/18  0547 07/20/18  0555   WBC Thousand/uL 8 20 8 80   HEMOGLOBIN g/dL 8 7* 9 2*   HEMATOCRIT % 29 3* 30 3*   PLATELETS Thousands/uL 345 355   NEUTROS PCT %  --  61   LYMPHS PCT %  --  27   MONOS PCT %  --  8   EOS PCT %  --  2       Results from last 7 days  Lab Units 07/21/18  0547  07/19/18  0614   SODIUM mmol/L 137  < > 137   POTASSIUM mmol/L 4 1  < > 3 9   CHLORIDE mmol/L 102  < > 102   CO2 mmol/L 32  < > 28   BUN mg/dL 39*  < > 38*   CREATININE mg/dL 2 03*  < > 2 27*   CALCIUM mg/dL 8 5  < > 8 3   TOTAL PROTEIN g/dL  --   --  6 8   BILIRUBIN TOTAL mg/dL --   --  0 30   ALK PHOS U/L  --   --  48   ALT U/L  --   --  17   AST U/L  --   --  27   GLUCOSE RANDOM mg/dL 101  < > 133   < > = values in this interval not displayed  Results from last 7 days  Lab Units 07/18/18  0831   INR  1 14       * I Have Reviewed All Lab Data Listed Above  * Additional Pertinent Lab Tests Reviewed:  All Labs Within Last 24 Hours Reviewed    Imaging:    Imaging Reports Reviewed Today Include: CXR   Imaging Personally Reviewed by Myself Includes:  None    Recent Cultures (last 7 days):       Results from last 7 days  Lab Units 07/20/18  1809   SPUTUM CULTURE  2+ Growth of    GRAM STAIN RESULT  Rare Polys  Rare Epithelial cells per low power field  2+ Gram positive cocci in clusters  Rare Gram positive cocci in pairs       Last 24 Hours Medication List:     Current Facility-Administered Medications:  acetaminophen 650 mg Oral Q6H PRN Priya Lee, ROC    amiodarone 100 mg Oral Daily Yessy Crump PA-C    amLODIPine 10 mg Oral Daily Yessy Crump PA-C    aspirin 81 mg Oral Daily Yessy Crump PA-C    atorvastatin 20 mg Oral Daily With Vouchr Zechariah Crump PA-C    cefTRIAXone 1,000 mg Intravenous Q24H Brigido Huang DO Last Rate: Stopped (07/21/18 0030)   cyanocobalamin 100 mcg Oral Daily ROC Vega    docusate sodium 100 mg Oral HS Priya Lee, ROC    fenofibrate 145 mg Oral Daily Yessy Crump PA-C    fluticasone-vilanterol 1 puff Inhalation Daily ROC Vega    heparin (porcine) 5,000 Units Subcutaneous Q12H [address] [place], ROC    ipratropium-albuterol 3 mL Nebulization 4x Daily Yessy Crump PA-C    iron polysaccharides 150 mg Oral Daily Priya Reason, ROC    isosorbide mononitrate 30 mg Oral Daily Yessy Crump PA-C    levothyroxine 50 mcg Oral Early Morning ED Zambrano-C    melatonin 6 mg Oral HS ROC [place]    metoprolol tartrate 25 mg Oral Q12H [address] Yessy Crump PA-C    mupirocin  Nasal Q12H [facility] DO Dale    pantoprazole 40 mg Oral Early Morning Yessy Crump PA-C    torsemide 20 mg Oral Daily Maikol Velasco MD         Today, Patient Was Seen By: ROC [place]    ** Please Note: Dictation voice to text software may have been used in the creation of this document   **

## 2018-07-21 NOTE — CASE MANAGEMENT
Continued Stay Review    Date: 7/21/18    Vital Signs: /61 (BP Location: Right arm)   Pulse 56   Temp 98 4 °F (36 9 °C) (Temporal)   Resp 20   Ht 5' 10" (1 778 m)   Wt 97 8 kg (215 lb 9 8 oz)   SpO2 100%   BMI 30 94 kg/m²     Medications:   Scheduled Meds:   Current Facility-Administered Medications:  acetaminophen 650 mg Oral Q6H PRN ROC [place]    amiodarone 100 mg Oral Daily Yessy Crump PA-C    amLODIPine 10 mg Oral Daily Yessy Crump PA-C    aspirin 81 mg Oral Daily Yessy Crump PA-C    atorvastatin 20 mg Oral Daily With OSMANI Crump PA-C    cefTRIAXone 1,000 mg Intravenous Q24H Leonel Ladd DO Last Rate: Stopped (07/21/18 0030)   cyanocobalamin 100 mcg Oral Daily ROC Vega    docusate sodium 100 mg Oral HS ROC [place]    fenofibrate 145 mg Oral Daily Yessy Crump PA-C    fluticasone-vilanterol 1 puff Inhalation Daily ROC Vega    heparin (porcine) 5,000 Units Subcutaneous Q12H [facility] ROC Ramey    ipratropium-albuterol 3 mL Nebulization 4x Daily Yessy Crump PA-C    iron polysaccharides 150 mg Oral Daily ROC [place]    isosorbide mononitrate 30 mg Oral Daily Yessy Crump PA-C    levothyroxine 50 mcg Oral Early Morning Yessy Crump PA-C    melatonin 6 mg Oral HS ROC [place]    metoprolol tartrate 25 mg Oral Q12H [facility] Yessy Crump PA-C    mupirocin  Nasal Q12H [facility] Madi Hunter DO    pantoprazole 40 mg Oral Early Morning Yessy Crump PA-C    torsemide 20 mg Oral Daily Víctor Barcenas MD      Continuous Infusions:    PRN Meds:   acetaminophen    Abnormal Labs/Diagnostic Results: ANION GAP 3 BUN/CR 39/2 03 H/H 8 7/29 3     Age/Sex: 80 y o  male     PULMONARY CONSULT  Assessment:  Dyspnea due to acute on chronic systolic and diastolic congestive heart failure    He has underlying ischemic cardiomyopathy with left ventricular ejection fraction of 35-40% with secondary pulmonary artery hypertension  Increased cough with increased mucus production appears to have acute bronchitis  Moderate COPD  Chronic hypoxemic respiratory failure due to COPD and pulmonary hypertension due to ischemic cardiomyopathy  Moderate obstructive sleep apnea for which patient uses CPAP of 10 with 5 L of oxygen at bedtime  Iron deficiency anemia  Chronic kidney disease  Plan:   I did order sputum culture and will start patient on IV ceftriaxone 1 g daily for underlying acute bronchitis  Continue CPAP at bedtime 10 cm water with 5 L of oxygen at bedtime  Neb treatments with DuoNeb q i d  as ordered continue Breo 100 mcg 1 puff daily  Patient has been converted back to his oral torsemide 20 mg daily     ATTENDING    He continues with some dyspnea with conversation  Acute on chronic respiratory failure with hypoxia (HCC)   Assessment & Plan     · Chronically require 6 L nasal cannula with up to 10 L with exertion  · Chest x-ray with some evidence of volume overload, possible A/C CHF causing increasing oxygen requirements  Chronic effusion and scarring present on CXR  · Reports a poor nights sleep due to BiPAP issues   · Diuresed well with IV Lasix, now on po Torsemide but continues with shortness of breath   · Requesting to see Dr Alexandra Aj, consult placed   · Dr Alexandra Aj felt patient had underlying acute bronchitis, therefore, started patient on Rocephin 1 g IV daily  · Continue supplemental oxygen and supportive measures     COPD without exacerbation ([facility] [address] )   Assessment & Plan     Mild exp wheezing on exam  Completed prednisone taper in June for a COPD exacerbation but not chronically on steroids    · Continue duo nebs and Breo  · Consulted pulmonology per patient request    · Per pulmonology, patient has underlying acute bronchitis  · Started on Rocephin 1 g IV daily       Certification Statement: The patient will continue to require additional inpatient hospital stay due to respiratory failure with hypoxia likely due to volume overload, COPD, and acute bronchitis

## 2018-07-21 NOTE — PLAN OF CARE
DISCHARGE PLANNING     Discharge to home or other facility with appropriate resources Progressing        DISCHARGE PLANNING - CARE MANAGEMENT     Discharge to post-acute care or home with appropriate resources Progressing        INFECTION - ADULT     Absence or prevention of progression during hospitalization Progressing     Absence of fever/infection during neutropenic period Progressing        Knowledge Deficit     Patient/family/caregiver demonstrates understanding of disease process, treatment plan, medications, and discharge instructions Progressing        METABOLIC, FLUID AND ELECTROLYTES - ADULT     Electrolytes maintained within normal limits Progressing     Fluid balance maintained Progressing        MUSCULOSKELETAL - ADULT     Maintain or return mobility to safest level of function Progressing        PAIN - ADULT     Verbalizes/displays adequate comfort level or baseline comfort level Progressing        Potential for Falls     Patient will remain free of falls Progressing        Prexisting or High Potential for Compromised Skin Integrity     Skin integrity is maintained or improved Progressing        RESPIRATORY - ADULT     Achieves optimal ventilation and oxygenation Progressing        SAFETY ADULT     Patient will remain free of falls Progressing     Maintain or return to baseline ADL function Progressing     Maintain or return mobility status to optimal level Progressing        SKIN/TISSUE INTEGRITY - ADULT     Skin integrity remains intact Progressing

## 2018-07-21 NOTE — ASSESSMENT & PLAN NOTE
History of EF 35 to 40% with severe pulmonary hypertension likely secondary to COPD  · Patient normally on torsemide daily and states he has been compliant this recently  · Continue torsemide  · Continue to monitor I&Os, daily weights

## 2018-07-22 LAB
ANION GAP SERPL CALCULATED.3IONS-SCNC: 2 MMOL/L (ref 4–13)
BACTERIA SPT RESP CULT: NORMAL
BUN SERPL-MCNC: 34 MG/DL (ref 5–25)
CALCIUM SERPL-MCNC: 8.4 MG/DL (ref 8.3–10.1)
CHLORIDE SERPL-SCNC: 101 MMOL/L (ref 100–108)
CO2 SERPL-SCNC: 33 MMOL/L (ref 21–32)
CREAT SERPL-MCNC: 1.83 MG/DL (ref 0.6–1.3)
GFR SERPL CREATININE-BSD FRML MDRD: 32 ML/MIN/1.73SQ M
GLUCOSE SERPL-MCNC: 96 MG/DL (ref 65–140)
GRAM STN SPEC: NORMAL
POTASSIUM SERPL-SCNC: 4.1 MMOL/L (ref 3.5–5.3)
SODIUM SERPL-SCNC: 136 MMOL/L (ref 136–145)

## 2018-07-22 PROCEDURE — 99232 SBSQ HOSP IP/OBS MODERATE 35: CPT | Performed by: NURSE PRACTITIONER

## 2018-07-22 PROCEDURE — 94640 AIRWAY INHALATION TREATMENT: CPT

## 2018-07-22 PROCEDURE — 80048 BASIC METABOLIC PNL TOTAL CA: CPT | Performed by: NURSE PRACTITIONER

## 2018-07-22 PROCEDURE — 94660 CPAP INITIATION&MGMT: CPT

## 2018-07-22 PROCEDURE — 94760 N-INVAS EAR/PLS OXIMETRY 1: CPT

## 2018-07-22 RX ORDER — ACETAMINOPHEN 325 MG/1
650 TABLET ORAL EVERY 6 HOURS PRN
Status: DISCONTINUED | OUTPATIENT
Start: 2018-07-22 | End: 2018-07-23 | Stop reason: HOSPADM

## 2018-07-22 RX ADMIN — METOPROLOL TARTRATE 25 MG: 25 TABLET ORAL at 21:50

## 2018-07-22 RX ADMIN — LEVOTHYROXINE SODIUM 50 MCG: 50 TABLET ORAL at 06:06

## 2018-07-22 RX ADMIN — CEFTRIAXONE 1000 MG: 1 INJECTION, SOLUTION INTRAVENOUS at 23:25

## 2018-07-22 RX ADMIN — ACETAMINOPHEN 650 MG: 325 TABLET, FILM COATED ORAL at 22:10

## 2018-07-22 RX ADMIN — FLUTICASONE FUROATE AND VILANTEROL TRIFENATATE 1 PUFF: 100; 25 POWDER RESPIRATORY (INHALATION) at 09:57

## 2018-07-22 RX ADMIN — CEFTRIAXONE 1000 MG: 1 INJECTION, SOLUTION INTRAVENOUS at 00:14

## 2018-07-22 RX ADMIN — ISOSORBIDE MONONITRATE 30 MG: 30 TABLET, EXTENDED RELEASE ORAL at 10:01

## 2018-07-22 RX ADMIN — MUPIROCIN 1 APPLICATION: 20 OINTMENT TOPICAL at 09:56

## 2018-07-22 RX ADMIN — IPRATROPIUM BROMIDE AND ALBUTEROL SULFATE 3 ML: .5; 3 SOLUTION RESPIRATORY (INHALATION) at 15:02

## 2018-07-22 RX ADMIN — IPRATROPIUM BROMIDE AND ALBUTEROL SULFATE 3 ML: .5; 3 SOLUTION RESPIRATORY (INHALATION) at 21:16

## 2018-07-22 RX ADMIN — IPRATROPIUM BROMIDE AND ALBUTEROL SULFATE 3 ML: .5; 3 SOLUTION RESPIRATORY (INHALATION) at 08:18

## 2018-07-22 RX ADMIN — PANTOPRAZOLE SODIUM 40 MG: 40 TABLET, DELAYED RELEASE ORAL at 06:06

## 2018-07-22 RX ADMIN — METOPROLOL TARTRATE 25 MG: 25 TABLET ORAL at 10:01

## 2018-07-22 RX ADMIN — HEPARIN SODIUM 5000 UNITS: 5000 INJECTION, SOLUTION INTRAVENOUS; SUBCUTANEOUS at 21:51

## 2018-07-22 RX ADMIN — MELATONIN 6 MG: at 21:50

## 2018-07-22 RX ADMIN — AMLODIPINE BESYLATE 10 MG: 10 TABLET ORAL at 10:01

## 2018-07-22 RX ADMIN — DOCUSATE SODIUM 100 MG: 100 CAPSULE, LIQUID FILLED ORAL at 21:51

## 2018-07-22 RX ADMIN — IPRATROPIUM BROMIDE AND ALBUTEROL SULFATE 3 ML: .5; 3 SOLUTION RESPIRATORY (INHALATION) at 11:49

## 2018-07-22 RX ADMIN — TORSEMIDE 20 MG: 20 TABLET ORAL at 10:00

## 2018-07-22 RX ADMIN — ASPIRIN 81 MG 81 MG: 81 TABLET ORAL at 10:01

## 2018-07-22 RX ADMIN — ACETAMINOPHEN 650 MG: 325 TABLET, FILM COATED ORAL at 11:04

## 2018-07-22 RX ADMIN — AMIODARONE HYDROCHLORIDE 100 MG: 200 TABLET ORAL at 10:01

## 2018-07-22 RX ADMIN — Medication 150 MG: at 10:01

## 2018-07-22 RX ADMIN — HEPARIN SODIUM 5000 UNITS: 5000 INJECTION, SOLUTION INTRAVENOUS; SUBCUTANEOUS at 10:00

## 2018-07-22 RX ADMIN — MUPIROCIN 1 APPLICATION: 20 OINTMENT TOPICAL at 23:07

## 2018-07-22 RX ADMIN — VITAM B12 100 MCG: 100 TAB at 09:56

## 2018-07-22 RX ADMIN — ATORVASTATIN CALCIUM 20 MG: 20 TABLET, FILM COATED ORAL at 15:31

## 2018-07-22 RX ADMIN — FENOFIBRATE 145 MG: 145 TABLET ORAL at 09:56

## 2018-07-22 NOTE — CASE MANAGEMENT
Continued Stay Review    Date: 7/22/18    Vital Signs: /85 (BP Location: Left arm)   Pulse 61   Temp 98 2 °F (36 8 °C) (Temporal)   Resp 20   Ht 5' 10" (1 778 m)   Wt 97 8 kg (215 lb 9 8 oz)   SpO2 98%   BMI 30 94 kg/m²     GMF  OXYGEN  BMP CBC   TYLENOL  Medications:   Scheduled Meds:   Current Facility-Administered Medications:  acetaminophen 650 mg Oral Q6H PRN ROC Vega    amiodarone 100 mg Oral Daily Yessy Crump PA-C    amLODIPine 10 mg Oral Daily Yessy Crump PA-C    aspirin 81 mg Oral Daily Yessy Crump PA-C    atorvastatin 20 mg Oral Daily With OSMANI Crump PA-C    cefTRIAXone 1,000 mg Intravenous Q24H Rashid Mckinley DO Last Rate: 1,000 mg (07/22/18 0014)   cyanocobalamin 100 mcg Oral Daily ROC Vega    docusate sodium 100 mg Oral HS ROC Vega    fenofibrate 145 mg Oral Daily Yessy Crump PA-C    fluticasone-vilanterol 1 puff Inhalation Daily ROC Vega    heparin (porcine) 5,000 Units Subcutaneous Q12H [address] ROC [place]    ipratropium-albuterol 3 mL Nebulization 4x Daily Yessy Crump PA-C    iron polysaccharides 150 mg Oral Daily ROC Vega    isosorbide mononitrate 30 mg Oral Daily Yessy Crump PA-C    levothyroxine 50 mcg Oral Early Morning Yessy Crump PA-C    melatonin 6 mg Oral HS ROC [place]    metoprolol tartrate 25 mg Oral Q12H [address] Yessy Crump PA-C    mupirocin  Nasal Q12H [address] Madi Hunter DO    pantoprazole 40 mg Oral Early Morning Yessy Crump PA-C    torsemide 20 mg Oral Daily Madison Lin MD      Continuous Infusions:    PRN Meds:   acetaminophen    Abnormal Labs/Diagnostic Results: CO2 33 ANION GAP 2 BUN/CR 34/1 83    Age/Sex: 80 y o  male     ATTENDING  Acute on chronic combined systolic and diastolic CHF  * Acute on chronic respiratory failure with hypoxia    · Continue torsemide  · Continue to monitor I&Os, daily weights   · Reports a poor sleep due to BiPAP beeping from ill-fitting mask  · Diuresed well with IV Lasix, now on po Torsemide  · Requested to see Dr Monica Rose, consult placed   · Dr Monica Rose felt patient had underlying acute bronchitis, therefore, started patient on Rocephin 1 g IV daily   Received 2 doses thus far  · Continue supplemental oxygen and supportive measures   Paroxysmal atrial fibrillation   · Continue beta-blockers as tolerated  · F/U with cardiology   Certification Statement: The patient will continue to require additional inpatient hospital stay due to respiratory failure with hypoxia likely due to volume overload, COPD, and acute bronchitis

## 2018-07-22 NOTE — PROGRESS NOTES
Progress Note - Tor Mention 10/24/1930, 80 y o  male MRN: [id number]    Unit/Bed#: ICU 10 Encounter: [id number]    Primary Care Provider: Tripp Claros DO   Date and time admitted to hospital: 7/18/2018  8:22 AM        Acute on chronic combined systolic and diastolic CHF (congestive heart failure) ([facility] )   Assessment & Plan    History of EF 35 to 40% with severe pulmonary hypertension likely secondary to COPD  · Patient normally on torsemide daily and states he has been compliant this recently  · Continue torsemide  · Continue to monitor I&Os, daily weights         * Acute on chronic respiratory failure with hypoxia (HCC)   Assessment & Plan    · Chronically require 6 L nasal cannula with up to 10 L with exertion  · Chest x-ray with some evidence of volume overload, possible A/C CHF causing increasing oxygen requirements  Chronic effusion and scarring present on CXR  · Reports a poor sleep due to BiPAP beeping from ill-fitting mask  · Diuresed well with IV Lasix, now on po Torsemide  · Requested to see Dr Abraham Balbuena, consult placed   · Dr Abraham Balbuena felt patient had underlying acute bronchitis, therefore, started patient on Rocephin 1 g IV daily  Received 2 doses thus far  · Will transition to oral Ceftin, discharge with F/U with pulmonology   · Continue supplemental oxygen and supportive measures        Moderate COPD (chronic obstructive pulmonary disease) (HCC)   Assessment & Plan    Mild exp wheezing on exam  Completed prednisone taper in June for a COPD exacerbation but not chronically on steroids  · Continue duo nebs and Breo  · Per pulmonology, patient has underlying acute bronchitis  · Started on Rocephin 1 g IV daily, received 2 doses   Will transition to oral Ceftin and have pt follow-up with pulmonology as outpatient         Pulmonary hypertension ([facility] )   Assessment & Plan    Moderate to severe on last echo, likely secondary to underlying COPD  · Continue medical management and F/U with pulmonology Hypothyroidism   Assessment & Plan    · Continue Synthroid        PATITO (obstructive sleep apnea)   Assessment & Plan    · Continue CPAP at night        S/P TAVR (transcatheter aortic valve replacement)   Assessment & Plan    F/U with cardiology         Hypertension   Assessment & Plan    · Continue antihypertensives as tolerated        Hyperlipidemia   Assessment & Plan    · Continue statin        CAD in native artery   Assessment & Plan    · Continue Lopressor and Imdur         Paroxysmal atrial fibrillation (HCC)   Assessment & Plan    HR ranging in the 50-70s   · Continue beta-blockers as tolerated  · F/U with cardiology           VTE Pharmacologic Prophylaxis:   Pharmacologic: Heparin  Mechanical VTE Prophylaxis in Place: Yes    Patient Centered Rounds: I have performed bedside rounds with nursing staff today  Discussions with Specialists or Other Care Team Provider: Nursing, CM, my attending     Education and Discussions with Family / Patient: I have answered all questions to the best of my ability  Time Spent for Care: 20 minutes  More than 50% of total time spent on counseling and coordination of care as described above  Current Length of Stay: 4 day(s)    Current Patient Status: Inpatient   Certification Statement: The patient will continue to require additional inpatient hospital stay due to respiratory failure with hypoxia likely due to volume overload, COPD, and acute bronchitis    Discharge Plan: Patient is not medically stable for discharge today due to acute bronchitis requiring IV Rocephin and re-evaluation by pulmonology  Code Status: Level 3 - DNAR and DNI      Subjective:   Patient observed resting out of bed in chair with feet elevated  States he had another poor night's sleep and he feels lousy  Patient states his breathing is not at baseline he does not feel stable for discharge   Patient states "my doctors, Dr Yvette Garza and Dr Juli Michaud want me here till Monday - end of discussion "  Patient reports a good appetite  Complains of intermittent headache and shoulder pain for which Tylenol relieves his discomfort  Denies any chest pain, palpitations, abdominal pain, nausea, vomiting, or diarrhea  Objective:     Vitals:   Temp (24hrs), Av 2 °F (36 8 °C), Min:97 8 °F (36 6 °C), Max:98 8 °F (37 1 °C)    HR:  [52-75] 61  Resp:  [18-20] 20  BP: (132-158)/(60-85) 140/85  SpO2:  [95 %-100 %] 98 %  Body mass index is 30 94 kg/m²  Input and Output Summary (last 24 hours): Intake/Output Summary (Last 24 hours) at 18 1254  Last data filed at 18 1146   Gross per 24 hour   Intake              480 ml   Output             2775 ml   Net            -2295 ml       Physical Exam:     Physical Exam   Constitutional: He is oriented to person, place, and time  He appears well-developed  No distress  Resting OOB in chair on supplemental oxygen, no distress    HENT:   Head: Normocephalic  Neck: Normal range of motion  Cardiovascular: Normal rate and regular rhythm  Pulmonary/Chest: Effort normal  No respiratory distress  He has decreased breath sounds in the right lower field and the left lower field  He has wheezes in the right lower field and the left lower field  He has no rhonchi  He has no rales  Course breath sounds in RLL   Abdominal: Soft  Bowel sounds are normal  He exhibits no distension  There is no tenderness  Musculoskeletal: Normal range of motion  He exhibits edema (trace BLE)  He exhibits no tenderness  Neurological: He is alert and oriented to person, place, and time  Skin: Skin is warm and dry  No rash noted  He is not diaphoretic  Psychiatric: He has a normal mood and affect  His behavior is normal    Nursing note and vitals reviewed        Additional Data:     Labs:      Results from last 7 days  Lab Units 18  0547 18  0555   WBC Thousand/uL 8 20 8 80   HEMOGLOBIN g/dL 8 7* 9 2*   HEMATOCRIT % 29 3* 30 3*   PLATELETS Thousands/uL 345 355   NEUTROS PCT %  --  61   LYMPHS PCT %  --  27   MONOS PCT %  --  8   EOS PCT %  --  2       Results from last 7 days  Lab Units 07/22/18  0528  07/19/18  0614   SODIUM mmol/L 136  < > 137   POTASSIUM mmol/L 4 1  < > 3 9   CHLORIDE mmol/L 101  < > 102   CO2 mmol/L 33*  < > 28   BUN mg/dL 34*  < > 38*   CREATININE mg/dL 1 83*  < > 2 27*   CALCIUM mg/dL 8 4  < > 8 3   TOTAL PROTEIN g/dL  --   --  6 8   BILIRUBIN TOTAL mg/dL  --   --  0 30   ALK PHOS U/L  --   --  48   ALT U/L  --   --  17   AST U/L  --   --  27   GLUCOSE RANDOM mg/dL 96  < > 133   < > = values in this interval not displayed  Results from last 7 days  Lab Units 07/18/18  0831   INR  1 14       * I Have Reviewed All Lab Data Listed Above  * Additional Pertinent Lab Tests Reviewed:  All Labs Within Last 24 Hours Reviewed    Imaging:    Imaging Reports Reviewed Today Include: CXR   Imaging Personally Reviewed by Myself Includes:  None    Recent Cultures (last 7 days):       Results from last 7 days  Lab Units 07/20/18  1809   SPUTUM CULTURE  3+ Growth of    GRAM STAIN RESULT  Rare Polys  Rare Epithelial cells per low power field  2+ Gram positive cocci in clusters  Rare Gram positive cocci in pairs       Last 24 Hours Medication List:     Current Facility-Administered Medications:  acetaminophen 650 mg Oral Q6H PRN Mikhail ROC Mcmahon    amiodarone 100 mg Oral Daily Yessy Crump PA-C    amLODIPine 10 mg Oral Daily Yessy Crump PA-C    aspirin 81 mg Oral Daily Yessy Crump PA-C    atorvastatin 20 mg Oral Daily With [employer] KELLY Crump PA-C    cefTRIAXone 1,000 mg Intravenous Q24H Nany Donahue,  Last Rate: 1,000 mg (07/22/18 0014)   cyanocobalamin 100 mcg Oral Daily ROC Vega    docusate sodium 100 mg Oral HS MikhailROC Shrestha    fenofibrate 145 mg Oral Daily eYssy Crump PA-C    fluticasone-vilanterol 1 puff Inhalation Daily ROC Vega    heparin (porcine) 5,000 Units Subcutaneous Q12H [facility] Evelyn Li, ROC    ipratropium-albuterol 3 mL Nebulization 4x Daily Yessy Crump PA-C    iron polysaccharides 150 mg Oral Daily Catina ROC Lyon    isosorbide mononitrate 30 mg Oral Daily Yessy Crump PA-C    levothyroxine 50 mcg Oral Early Morning Yessy Crump PA-C    melatonin 6 mg Oral HS ROC Hamilton    metoprolol tartrate 25 mg Oral Q12H [address] Yessy Crump PA-C    mupirocin  Nasal Q12H [address] Madi Hunter,     pantoprazole 40 mg Oral Early Morning Yessy Crump PA-C    torsemide 20 mg Oral Daily Rachel Doty MD         Today, Patient Was Seen By: Sherren Six, CRNP    ** Please Note: Dictation voice to text software may have been used in the creation of this document   **

## 2018-07-22 NOTE — DISCHARGE SUMMARY
Discharge- Chico Boehringer 10/24/1930, 80 y o  male MRN: [id number]    Unit/Bed#: 2 [place] [address] Encounter: [id number]    Primary Care Provider: Da Romano DO   Date and time admitted to hospital: 7/18/2018  8:22 AM        Acute on chronic combined systolic and diastolic CHF (congestive heart failure) ([facility])   Assessment & Plan    History of EF 35 to 40% with severe pulmonary hypertension likely secondary to COPD  · Patient normally on torsemide daily and states he has been compliant this recently  · Continue torsemide  · Continue to monitor I&Os, daily weights         * Acute on chronic respiratory failure with hypoxia (HCC)   Assessment & Plan    · Chronically require 6 L nasal cannula with up to 10 L with exertion  · Chest x-ray with some evidence of volume overload, possible A/C CHF causing increasing oxygen requirements  Chronic effusion and scarring present on CXR  · Reports a poor sleep due to BiPAP beeping from ill-fitting mask  · Diuresed well with IV Lasix, now on po Torsemide  · Requested to see Dr Pam Espinoza, consult placed   · Dr Pma Espinoza felt patient had underlying acute bronchitis, therefore, started patient on Rocephin 1 g IV daily  Received 3 doses thus far  · Will transition to oral Ceftin, discharge with F/U with pulmonology   · Continue supplemental oxygen and supportive measures        Moderate COPD (chronic obstructive pulmonary disease) ([facility])   Assessment & Plan    Mild exp wheezing on exam  Completed prednisone taper in June for a COPD exacerbation but not chronically on steroids  · Continue duo nebs and Breo  · Per pulmonology, patient has underlying acute bronchitis  · Started on Rocephin 1 g IV daily, received 3 doses   Will transition to oral Ceftin and have pt follow-up with pulmonology as outpatient         Pulmonary hypertension ([facility] )   Assessment & Plan    Moderate to severe on last echo, likely secondary to underlying COPD  · Continue medical management and F/U with pulmonology Hypothyroidism   Assessment & Plan    · Continue Synthroid        PATITO (obstructive sleep apnea)   Assessment & Plan    · Continue CPAP at night        S/P TAVR (transcatheter aortic valve replacement)   Assessment & Plan    F/U with cardiology         Hypertension   Assessment & Plan    · Continue antihypertensives as tolerated        Hyperlipidemia   Assessment & Plan    · Continue statin        CAD in native artery   Assessment & Plan    · Continue Lopressor and Imdur         Paroxysmal atrial fibrillation (HCC)   Assessment & Plan    HR ranging in the 50-70s   · Continue beta-blockers as tolerated  · F/U with cardiology               Discharging Physician / Practitioner: Christina Lipscomb  PCP: Isaac Tabor DO  Admission Date:   Admission Orders     Ordered        07/18/18 1102  Inpatient Admission (expected length of stay for this patient is greater than two midnights)  Once             Discharge Date: 07/23/18    Resolved Problems  Date Reviewed: 7/22/2018    None          Consultations During Hospital Stay:  · Pulmonology  · Critical care    Procedures Performed:     · CXR: Small right-sided pleural effusion/atelectasis   Some patchy opacities are seen in the bilateral lower lung fields which could represent atelectasis, scarring or infection depending on the clinical setting  Enlarged cardiac silhouette and prominence of the interstitial markings   Consider a component of fluid overload/CHF  Significant Findings / Test Results:     · Acute CHF, bronchitis     Incidental Findings:   · None     Test Results Pending at Discharge (will require follow up):    · None     Outpatient Tests Requested:  · None    Complications:  None    Reason for Admission: Shortness of breath    Hospital Course:     Nya Painting is a 80 y o  male patient with a PMH including CHF, COPD on chronic home O2, T2DM, CKD, pHTN who originally presented to the hospital on 7/18/2018 due to progressive shortness of breath that seemed to be worsening  Patient uses 3-6 L/min O2 at home but needed up to 9-10 L/min with extertion  Patient reports being compliant with Torsemide, but missed one dose during the week  He denies fever or chills  Patient was admitted for acute on chronic heart failure  He was initially managed in the step-down unit with IV Lasix and placed on Optiflow  Patient was given Solumedrol 125 mg IV x1 in ED  He was managed with scheduled nebulizer treatments and IV Rocephin for underlying bronchitis  He did not require any steroids  Patient's sputum culture showed mixed respiratory rachana  He was transitioned to oral Torsemide for several days prior to discharge  On day of discharge, his breathing is near baseline  He will be discharged with scheduled nebulizers and PO Ceftin for 2 days to complete a 5-day course of antibiotics  He is to follow-up with pulmonology and cardiology  Please see above list of diagnoses and related plan for additional information  Condition at Discharge: stable     Discharge Day Visit / Exam:     Subjective:  Overall, patient feels better in terms of shortness of breath  Reports another 'lousy night's sleep because of the BiPAP mask ' When I told him he is ready for discharge home, he responded with 'You must work for [employer] because you want to kick me out " I'm not leaving until Dr Annmarie Juarez clears me  Denies HA, dizziness, lightheadedness, chest pain, shortness of breath, abdominal pain, N/V/D  Tolerating diet  No issues urinating  Vitals: Blood Pressure: 136/63 (07/23/18 0855)  Pulse: 60 (07/23/18 0855)  Temperature: 98 2 °F (36 8 °C) (07/23/18 0855)  Temp Source: Oral (07/23/18 0855)  Respirations: 18 (07/23/18 0855)  Height: 5' 10" (177 8 cm) (07/18/18 1305)  Weight - Scale: 97 8 kg (215 lb 9 8 oz) (07/20/18 0600)  SpO2: 99 % (07/23/18 1137)  Exam:   Physical Exam   Constitutional: He is oriented to person, place, and time  He appears well-developed   No distress  HENT:   Head: Normocephalic  Neck: Normal range of motion  Cardiovascular: Normal rate and regular rhythm  Pulmonary/Chest: Effort normal and breath sounds normal  No respiratory distress  He has no wheezes  He has no rhonchi  He has no rales  Faint exp wheezing   Abdominal: Soft  Bowel sounds are normal  He exhibits no distension  There is no tenderness  Musculoskeletal: Normal range of motion  He exhibits no edema or tenderness  Neurological: He is alert and oriented to person, place, and time  Skin: Skin is warm and dry  He is not diaphoretic  There is pallor  Psychiatric: He has a normal mood and affect  His behavior is normal  Judgment normal    Nursing note and vitals reviewed  Discussion with Family: Significant other at bedside     Discharge instructions/Information to patient and family:   See after visit summary for information provided to patient and family  Provisions for Follow-Up Care:  See after visit summary for information related to follow-up care and any pertinent home health orders  Disposition:     Home    For Discharges to [facility]F:   · Not Applicable to this Patient - Not Applicable to this Patient    Planned Readmission: None     Discharge Statement:  I spent > 35 minutes discharging the patient  This time was spent on the day of discharge  I had direct contact with the patient on the day of discharge  Greater than 50% of the total time was spent examining patient, answering all patient questions, arranging and discussing plan of care with patient as well as directly providing post-discharge instructions  Additional time then spent on discharge activities  Coordination with pulmonology  Discharge Medications:  See after visit summary for reconciled discharge medications provided to patient and family        ** Please Note: This note has been constructed using a voice recognition system **

## 2018-07-23 VITALS
HEIGHT: 70 IN | RESPIRATION RATE: 18 BRPM | SYSTOLIC BLOOD PRESSURE: 120 MMHG | TEMPERATURE: 97 F | OXYGEN SATURATION: 92 % | BODY MASS INDEX: 30.87 KG/M2 | HEART RATE: 59 BPM | DIASTOLIC BLOOD PRESSURE: 56 MMHG | WEIGHT: 215.61 LBS

## 2018-07-23 LAB
ANION GAP SERPL CALCULATED.3IONS-SCNC: 3 MMOL/L (ref 4–13)
BUN SERPL-MCNC: 33 MG/DL (ref 5–25)
CALCIUM SERPL-MCNC: 8.5 MG/DL (ref 8.3–10.1)
CHLORIDE SERPL-SCNC: 101 MMOL/L (ref 100–108)
CO2 SERPL-SCNC: 31 MMOL/L (ref 21–32)
CREAT SERPL-MCNC: 1.79 MG/DL (ref 0.6–1.3)
ERYTHROCYTE [DISTWIDTH] IN BLOOD BY AUTOMATED COUNT: 15.7 % (ref 11.6–15.1)
GFR SERPL CREATININE-BSD FRML MDRD: 33 ML/MIN/1.73SQ M
GLUCOSE SERPL-MCNC: 90 MG/DL (ref 65–140)
HCT VFR BLD AUTO: 29.4 % (ref 36.5–49.3)
HGB BLD-MCNC: 9 G/DL (ref 12–17)
MCH RBC QN AUTO: 26.1 PG (ref 26.8–34.3)
MCHC RBC AUTO-ENTMCNC: 30.6 G/DL (ref 31.4–37.4)
MCV RBC AUTO: 85 FL (ref 82–98)
PLATELET # BLD AUTO: 323 THOUSANDS/UL (ref 149–390)
PMV BLD AUTO: 10.1 FL (ref 8.9–12.7)
POTASSIUM SERPL-SCNC: 4.2 MMOL/L (ref 3.5–5.3)
RBC # BLD AUTO: 3.45 MILLION/UL (ref 3.88–5.62)
SODIUM SERPL-SCNC: 135 MMOL/L (ref 136–145)
WBC # BLD AUTO: 7.54 THOUSAND/UL (ref 4.31–10.16)

## 2018-07-23 PROCEDURE — 80048 BASIC METABOLIC PNL TOTAL CA: CPT | Performed by: NURSE PRACTITIONER

## 2018-07-23 PROCEDURE — 97116 GAIT TRAINING THERAPY: CPT

## 2018-07-23 PROCEDURE — 94668 MNPJ CHEST WALL SBSQ: CPT

## 2018-07-23 PROCEDURE — 97110 THERAPEUTIC EXERCISES: CPT

## 2018-07-23 PROCEDURE — 99232 SBSQ HOSP IP/OBS MODERATE 35: CPT | Performed by: INTERNAL MEDICINE

## 2018-07-23 PROCEDURE — 94760 N-INVAS EAR/PLS OXIMETRY 1: CPT

## 2018-07-23 PROCEDURE — 85027 COMPLETE CBC AUTOMATED: CPT | Performed by: NURSE PRACTITIONER

## 2018-07-23 PROCEDURE — 99239 HOSP IP/OBS DSCHRG MGMT >30: CPT | Performed by: NURSE PRACTITIONER

## 2018-07-23 PROCEDURE — 94640 AIRWAY INHALATION TREATMENT: CPT

## 2018-07-23 RX ORDER — LANOLIN ALCOHOL/MO/W.PET/CERES
6 CREAM (GRAM) TOPICAL
Refills: 0
Start: 2018-07-23 | End: 2018-08-03 | Stop reason: ALTCHOICE

## 2018-07-23 RX ORDER — CEFUROXIME AXETIL 500 MG/1
500 TABLET ORAL EVERY 12 HOURS SCHEDULED
Qty: 4 TABLET | Refills: 0 | Status: SHIPPED | OUTPATIENT
Start: 2018-07-23 | End: 2018-07-25

## 2018-07-23 RX ADMIN — IPRATROPIUM BROMIDE AND ALBUTEROL SULFATE 3 ML: .5; 3 SOLUTION RESPIRATORY (INHALATION) at 15:15

## 2018-07-23 RX ADMIN — LEVOTHYROXINE SODIUM 50 MCG: 50 TABLET ORAL at 06:07

## 2018-07-23 RX ADMIN — METOPROLOL TARTRATE 25 MG: 25 TABLET ORAL at 08:59

## 2018-07-23 RX ADMIN — VITAM B12 100 MCG: 100 TAB at 09:01

## 2018-07-23 RX ADMIN — IPRATROPIUM BROMIDE AND ALBUTEROL SULFATE 3 ML: .5; 3 SOLUTION RESPIRATORY (INHALATION) at 11:30

## 2018-07-23 RX ADMIN — FENOFIBRATE 145 MG: 145 TABLET ORAL at 08:59

## 2018-07-23 RX ADMIN — ASPIRIN 81 MG 81 MG: 81 TABLET ORAL at 08:59

## 2018-07-23 RX ADMIN — AMLODIPINE BESYLATE 10 MG: 10 TABLET ORAL at 09:05

## 2018-07-23 RX ADMIN — IPRATROPIUM BROMIDE AND ALBUTEROL SULFATE 3 ML: .5; 3 SOLUTION RESPIRATORY (INHALATION) at 08:35

## 2018-07-23 RX ADMIN — PANTOPRAZOLE SODIUM 40 MG: 40 TABLET, DELAYED RELEASE ORAL at 06:07

## 2018-07-23 RX ADMIN — ATORVASTATIN CALCIUM 20 MG: 20 TABLET, FILM COATED ORAL at 16:18

## 2018-07-23 RX ADMIN — AMIODARONE HYDROCHLORIDE 100 MG: 200 TABLET ORAL at 09:05

## 2018-07-23 RX ADMIN — ISOSORBIDE MONONITRATE 30 MG: 30 TABLET, EXTENDED RELEASE ORAL at 08:59

## 2018-07-23 RX ADMIN — MUPIROCIN 1 APPLICATION: 20 OINTMENT TOPICAL at 09:00

## 2018-07-23 RX ADMIN — FLUTICASONE FUROATE AND VILANTEROL TRIFENATATE 1 PUFF: 100; 25 POWDER RESPIRATORY (INHALATION) at 09:00

## 2018-07-23 RX ADMIN — HEPARIN SODIUM 5000 UNITS: 5000 INJECTION, SOLUTION INTRAVENOUS; SUBCUTANEOUS at 08:59

## 2018-07-23 RX ADMIN — TORSEMIDE 20 MG: 20 TABLET ORAL at 08:59

## 2018-07-23 RX ADMIN — ACETAMINOPHEN 650 MG: 325 TABLET, FILM COATED ORAL at 06:39

## 2018-07-23 RX ADMIN — Medication 150 MG: at 08:59

## 2018-07-23 NOTE — DISCHARGE INSTRUCTIONS
Acute bronchitis:  Take Ceftin twice a day for 2 more days  Take your nebulizer three times a day   Continue supplemental oxygen  CPAP at night  Continue Torsemide 20 mg daily   Follow-up with Dr Armida Roman and Dr Evelin Holbrook

## 2018-07-23 NOTE — PLAN OF CARE
DISCHARGE PLANNING     Discharge to home or other facility with appropriate resources Completed        DISCHARGE PLANNING - CARE MANAGEMENT     Discharge to post-acute care or home with appropriate resources Completed        INFECTION - ADULT     Absence or prevention of progression during hospitalization Completed     Absence of fever/infection during neutropenic period Completed        Knowledge Deficit     Patient/family/caregiver demonstrates understanding of disease process, treatment plan, medications, and discharge instructions Completed        METABOLIC, FLUID AND ELECTROLYTES - ADULT     Electrolytes maintained within normal limits Completed     Fluid balance maintained Completed        MUSCULOSKELETAL - ADULT     Maintain or return mobility to safest level of function Completed        PAIN - ADULT     Verbalizes/displays adequate comfort level or baseline comfort level Completed        Potential for Falls     Patient will remain free of falls Completed        Prexisting or High Potential for Compromised Skin Integrity     Skin integrity is maintained or improved Completed        RESPIRATORY - ADULT     Achieves optimal ventilation and oxygenation Completed        SAFETY ADULT     Patient will remain free of falls Completed     Maintain or return to baseline ADL function Completed     Maintain or return mobility status to optimal level Completed        SKIN/TISSUE INTEGRITY - ADULT     Skin integrity remains intact Completed

## 2018-07-23 NOTE — OCCUPATIONAL THERAPY NOTE
Occupational Therapy  Attempted to see pt for OT services  Pt has declined at this time  OT will continue to follow as appropriate    Johnnie Bumpers OTR/L [id number]

## 2018-07-23 NOTE — PHYSICAL THERAPY NOTE
PT TREATMENT     07/23/18 1410   Pain Assessment   Pain Assessment 0-10   Pain Score 5   Pain Type Chronic pain   Pain Location Shoulder;Arm   Pain Orientation Bilateral   Restrictions/Precautions   Other Precautions O2;Fall Risk   General   Chart Reviewed Yes   Family/Caregiver Present No   Cognition   Overall Cognitive Status WFL   Arousal/Participation Cooperative   Attention Within functional limits   Following Commands Follows all commands and directions without difficulty   Subjective   Subjective "i don't know when I'm going home"   Transfers   Sit to Stand 4  Minimal assistance   Additional items Assist x 1;Verbal cues; Bedrails   Stand to Sit 5  Supervision   Additional items Verbal cues;Armrests   Ambulation/Elevation   Gait pattern Short stride; Forward Flexion   Gait Assistance 4  Minimal assist   Additional items Assist x 1;Verbal cues   Assistive Device Rolling walker  (O2 at 6L)   Distance 80 feet   Balance   Ambulatory Fair +   Activity Tolerance   Activity Tolerance Patient limited by fatigue  (sats dropped to 78% with walk, increased to 94% w/8L/seated)   Exercises   Hip Flexion Sitting;10 reps;Bilateral   Hip Abduction Sitting;10 reps;Bilateral   Hip Adduction Sitting;10 reps;Bilateral  (pillow squeeze)   Knee AROM Long Arc Quad Sitting;10 reps;Bilateral   Ankle Pumps Sitting;10 reps;Bilateral  (heel toe raises)   Assessment   Prognosis Good   Problem List Decreased strength;Decreased endurance;Decreased mobility   Assessment Pt  does well with physically walking, however primarily limited by drop in O2   Requires 2 standing rests during walk  Once in chair, increased O2 to 8L to return sats to 94%  After resting, completed seated exercises and O2 stayed at 92% on 6 L  Cont  PT to improve endurance  Goals   Patient Goals To increase endurance and go home   Treatment Day 2   Plan   Treatment/Interventions Functional transfer training; Therapeutic exercise; Endurance training;Patient/family training;Equipment eval/education; Bed mobility;Gait training   Progress Progressing toward goals   Recommendation   Recommendation (Home with home PT)   Licensure   [place] License Number  Delma Sever Tonna Mote PT  [id number]   in chair at end of session with all needs in reach

## 2018-07-23 NOTE — PROGRESS NOTES
Progress Note - Pulmonary   Josefina Alfaro 80 y o  male MRN: [id number]  Unit/Bed#: [address] [place] [address] Encounter: [id number]      Assessment/Plan:     1   acute on chronic systolic and diastolic CHF exacerbation with underlying ischemic cardiomyopathy and pulmonary hypertension he did undergo diuresis  2   Acute bronchitis status post Rocephin therapy  Sputum culture with mixed respiratory rachana    3  Obstructive sleep apnea for which he is using BiPAP here  Okay to discharge from pulmonary standpoint      ______________________________________________________________________    Subjective: Pt seen and examined at bedside  Breathing is back to baseline  Cough is improved  States he has not slept well  Vitals:   Temp:  [97 °F (36 1 °C)-98 7 °F (37 1 °C)] 97 °F (36 1 °C)  HR:  [59-72] 59  Resp:  [18] 18  BP: (120-136)/(56-76) 120/56  FiO2 (%):  [40] 40  Weight (last 2 days) before discharge     None        Oxygen Therapy  SpO2: 92 %  O2 Flow Rate (L/min): 6 L/min    Nutrition:        Diet Orders            Start     Ordered    07/21/18 1253  Diet Cardiac; Cardiac Step 1  Diet effective now     Question Answer Comment   Diet Type Cardiac    Cardiac Cardiac Step 1    RD to adjust diet per protocol? Yes        07/21/18 1253    07/18/18 1407  Room Service  Once     Question:  Type of Service  Answer:  Room Service - Appropriate with Assistance    07/18/18 1407          Ins/Outs:   I/O       07/21 0701 - 07/22 0700 07/22 0701 - 07/23 0700 07/23 0701 - 07/24 0700    P  O  960      Total Intake(mL/kg) 960 (9 8)      Urine (mL/kg/hr) 3150 (1 3) 2350 (1)     Total Output 3150 2350      Net -2190 -2350                   Lines/Drains:  Invasive Devices          No matching active lines, drains, or airways           Active medications:  Scheduled Meds:  PRN Meds:  ____________________________________________________________________      Physical Exam   Constitutional: He is oriented to person, place, and time   He appears well-nourished  HENT:   Head: Atraumatic  Eyes: EOM are normal    Neck: Neck supple  Cardiovascular: Normal rate and regular rhythm  Pulmonary/Chest: Effort normal and breath sounds normal    Abdominal: Soft  Bowel sounds are normal    Musculoskeletal: Normal range of motion  He exhibits no edema  Neurological: He is alert and oriented to person, place, and time  Skin: Skin is warm and dry  Psychiatric: He has a normal mood and affect  His behavior is normal    Vitals reviewed  ____________________________________________________________________    Labs:   CBC:   Results from last 7 days  Lab Units 07/23/18  0612 07/21/18  0547 07/20/18  0555   WBC Thousand/uL 7 54 8 20 8 80   HEMOGLOBIN g/dL 9 0* 8 7* 9 2*   HEMATOCRIT % 29 4* 29 3* 30 3*   MCV fL 85 86 86   PLATELETS Thousands/uL 323 345 355     CMP:   Results from last 7 days  Lab Units 07/23/18  0612 07/22/18  0528 07/21/18  0547  07/19/18  0614 07/18/18  0831   SODIUM mmol/L 135* 136 137  < > 137 142   POTASSIUM mmol/L 4 2 4 1 4 1  < > 3 9 4 4   CHLORIDE mmol/L 101 101 102  < > 102 104   CO2 mmol/L 31 33* 32  < > 28 28   BUN mg/dL 33* 34* 39*  < > 38* 34*   CREATININE mg/dL 1 79* 1 83* 2 03*  < > 2 27* 2 14*   GLUCOSE RANDOM mg/dL 90 96 101  < > 133 91   CALCIUM mg/dL 8 5 8 4 8 5  < > 8 3 8 8   AST U/L  --   --   --   --  27 38   ALT U/L  --   --   --   --  17 17   ALK PHOS U/L  --   --   --   --  48 56   TOTAL PROTEIN g/dL  --   --   --   --  6 8 7 3   BILIRUBIN TOTAL mg/dL  --   --   --   --  0 30 0 40   EGFR ml/min/1 73sq m 33 32 29  < > 25 27   < > = values in this interval not displayed    Magnesium:   Results from last 7 days  Lab Units 07/21/18  0547   MAGNESIUM mg/dL 2 0     Phosphorous:   Results from last 7 days  Lab Units 07/20/18  0555   PHOSPHORUS mg/dL 3 9     Troponin:   Results from last 7 days  Lab Units 07/18/18  0831   TROPONIN I ng/mL <0 02     PT/INR:   Results from last 7 days  Lab Units 07/18/18  0831   PTT seconds 30   INR  1 14     Lactic Acid:     BNP:   Results from last 7 days  Lab Units 07/18/18  0831   NT-PRO BNP pg/mL 7,739*       Imaging:  No new imaging      Micro: Lab Results   Component Value Date    BLOODCX No Growth After 5 Days  01/06/2017    BLOODCX No Growth After 5 Days  01/06/2017    BLOODCX No Growth After 5 Days  03/06/2016    URINECX No Growth <1000 cfu/mL 03/04/2016    SPUTUMCULTUR 3+ Growth of  07/20/2018    SPUTUMCULTUR 4+ Growth of  03/29/2018    SPUTUMCULTUR 2+ Growth of Candida sp  presumptively albicans 03/09/2016    SPUTUMCULTUR 2+ Growth of Candida sp  presumptively glabrata 03/09/2016    SPUTUMCULTUR 2+ Growth of Mixed Respiratory [place] 03/09/2016    MRSACULTURE (A) 07/18/2018     Methicillin Resistant Staphylococcus aureus isolated    MRSACULTURE  07/18/2018     Please note: This patient requires contact precautions            Code Status: Level 3 - DNAR and DNI

## 2018-07-23 NOTE — SOCIAL WORK
PT TO D/C TO HOME WITH RESUMPTION OF [facility], AVS/F2F SENT VIA [employer], IMM DISCUSSED  DASH discussion completed  Discussed goals of making sure pt's  needs are met upon discharge, pt's preferences are taken into account, pt understands health condition, medications and symptoms to watch for after returning home and pt is aware of any follow up appointments recommended by hospital physician

## 2018-07-23 NOTE — NURSING NOTE
Pt left with wife  Medication reviewed   Nando confirmed pt has VNA set up for home  Aarti Lucas NP clear pt to discharge home

## 2018-07-24 ENCOUNTER — TELEPHONE (OUTPATIENT)
Dept: FAMILY MEDICINE CLINIC | Facility: CLINIC | Age: 83
End: 2018-07-24

## 2018-07-24 ENCOUNTER — TRANSITIONAL CARE MANAGEMENT (OUTPATIENT)
Dept: FAMILY MEDICINE CLINIC | Facility: CLINIC | Age: 83
End: 2018-07-24

## 2018-07-24 NOTE — TELEPHONE ENCOUNTER
Dr Uriostegui Meals,    When you go to [facility] home tomorrow, he is living with his girlfriend Julito Cohen:     Address [place], [place] She is "technically in [address] [place] [address] Dr she said she is NOT in [place]"  She is 7 miles from [place] and approx 12 miles from [facility]   Please call [phone] with any questions regarding directions Vickey Dalal LPN

## 2018-07-25 ENCOUNTER — TRANSITIONAL CARE MANAGEMENT (OUTPATIENT)
Dept: FAMILY MEDICINE CLINIC | Facility: CLINIC | Age: 83
End: 2018-07-25

## 2018-07-25 NOTE — TELEPHONE ENCOUNTER
I scheduled an appt for him with Dr Caro Serra on 8/13 since Parris Marquis thinks he will be ok to come in then and he will only see Dr Caro Serra in our office [facility]

## 2018-07-25 NOTE — TELEPHONE ENCOUNTER
I received your request to visit Chico Araujo in [place], [place]    Please realize that I do not go into [place]

## 2018-07-25 NOTE — PROGRESS NOTES
Rosy Grant re scheduled the appt for 8/13 with Dr Kenyon Arevalo since he will only see Dr Kenyon Arevalo here   She thinks he will be stronger by then and ready to come in [facility]

## 2018-07-26 NOTE — CASE MANAGEMENT
Aisha Schneider RN Registered Nurse Signed   Case Management Date of Service: 7/22/2018 12:22 PM         []Hide copied text  Continued Stay Review     Date: 7/22/18     Vital Signs: /85 (BP Location: Left arm)   Pulse 61   Temp 98 2 °F (36 8 °C) (Temporal)   Resp 20   Ht 5' 10" (1 778 m)   Wt 97 8 kg (215 lb 9 8 oz)   SpO2 98%   BMI 30 94 kg/m²      GMF  OXYGEN  BMP CBC   TYLENOL  Medications:   Scheduled Meds:   Current Facility-Administered Medications:  acetaminophen 650 mg Oral Q6H PRN ROC Reed     amiodarone 100 mg Oral Daily Yessy Crump PA-C     amLODIPine 10 mg Oral Daily Yessy Crump PA-C     aspirin 81 mg Oral Daily Yessy Crump PA-C     atorvastatin 20 mg Oral Daily With JO ANN Torres     cefTRIAXone 1,000 mg Intravenous Q24H Juancarlos Mantilla DO Last Rate: 1,000 mg (07/22/18 0014)   cyanocobalamin 100 mcg Oral Daily ROC Reed     docusate sodium 100 mg Oral HS ROC Reed     fenofibrate 145 mg Oral Daily Yessy Crump PA-C     fluticasone-vilanterol 1 puff Inhalation Daily ROC Reed     heparin (porcine) 5,000 Units Subcutaneous Q12H [facility] ROC Young     ipratropium-albuterol 3 mL Nebulization 4x Daily Yessy Crump PA-C     iron polysaccharides 150 mg Oral Daily RCO Reed     isosorbide mononitrate 30 mg Oral Daily Yessy Crump PA-C     levothyroxine 50 mcg Oral Early Morning Yessy Crump PA-C     melatonin 6 mg Oral HS ROC Hamilton     metoprolol tartrate 25 mg Oral Q12H [facility] Yessy Crump PA-C     mupirocin   Nasal Q12H [facility] Madi Hunter DO     pantoprazole 40 mg Oral Early Morning Yessy Crump PA-C     torsemide 20 mg Oral Daily Ilda Craranza MD        Continuous Infusions:    PRN Meds:   acetaminophen     Abnormal Labs/Diagnostic Results: CO2 33 ANION GAP 2 BUN/CR 34/1 83     Age/Sex: 80 y o  male      ATTENDING  Acute on chronic combined systolic and diastolic CHF  * Acute on chronic respiratory failure with hypoxia  ·   Continue torsemide  · Continue to monitor I&Os, daily weights   · Reports a poor sleep due to BiPAP beeping from ill-fitting mask  · Diuresed well with IV Lasix, now on po Torsemide  · Requested to see Dr Radha Gonzalez, consult placed   · Dr Radha Gonzalez felt patient had underlying acute bronchitis, therefore, started patient on Rocephin 1 g IV daily   Received 2 doses thus far  · Continue supplemental oxygen and supportive measures   Paroxysmal atrial fibrillation   · Continue beta-blockers as tolerated  · F/U with cardiology   Certification Statement: The patient will continue to require additional inpatient hospital stay due to respiratory failure with hypoxia likely due to volume overload, COPD, and acute bronchitis

## 2018-07-27 NOTE — CASE MANAGEMENT
Notification of Discharge  This is a Notification of Discharge from our facility [address]  Please be advised that this patient has been discharge from our facility  Below you will find the admission and discharge date and time including the patients disposition  PRESENTATION DATE: 7/18/2018  8:22 AM  IP ADMISSION DATE: 7/18/18 1102  DISCHARGE DATE: 7/23/2018  4:34 PM  DISPOSITION: Home with [address] [place] [address] in the [facility] Utilization Review Department  Phone: [phone]; Fax [phone]  ATTENTION: The Network Utilization Review Department is now centralized for our 9 Facilities  Make a note that we have a new phone and fax numbers for our Department  Please call with any questions or concerns to [phone] and carefully follow the prompts so that you are directed to the right person  All voicemails are confidential  Fax any determinations, approvals, denials, and requests for initial or continue stay review clinical to [phone]  Due to HIGH CALL volume, it would be easier if you could please send faxed requests to expedite your requests and in part, help us provide discharge notifications faster

## 2018-08-02 ENCOUNTER — TELEPHONE (OUTPATIENT)
Dept: FAMILY MEDICINE CLINIC | Facility: CLINIC | Age: 83
End: 2018-08-02

## 2018-08-02 NOTE — TELEPHONE ENCOUNTER
I would not like to call in something stronger without seeing him, looks like he has a complicated medical history  Can he make appointment?

## 2018-08-02 NOTE — TELEPHONE ENCOUNTER
Spoke with Enrique Orozco  Pt is currently taking 650 mg of tylenol x2 daily  Pt states it is no longer helping him, if possible can we send something stronger for him  He does not want any "oxycodone" or prescriptions that get you "hooked"  Dr Philippe Barcenas is not in the office until next week  Please advise   Izzy Banks

## 2018-08-02 NOTE — TELEPHONE ENCOUNTER
Home health PT Nuno Degroot called to let us know that he visited the patient today and his shoulder pain was an 8 out of 10    FYI

## 2018-08-03 ENCOUNTER — OFFICE VISIT (OUTPATIENT)
Dept: PULMONOLOGY | Facility: MEDICAL CENTER | Age: 83
End: 2018-08-03
Payer: COMMERCIAL

## 2018-08-03 VITALS
TEMPERATURE: 97.8 F | WEIGHT: 211 LBS | RESPIRATION RATE: 12 BRPM | SYSTOLIC BLOOD PRESSURE: 124 MMHG | OXYGEN SATURATION: 92 % | HEART RATE: 77 BPM | HEIGHT: 70 IN | DIASTOLIC BLOOD PRESSURE: 46 MMHG | BODY MASS INDEX: 30.21 KG/M2

## 2018-08-03 DIAGNOSIS — G89.29 CHRONIC PAIN OF BOTH SHOULDERS: ICD-10-CM

## 2018-08-03 DIAGNOSIS — M25.512 CHRONIC PAIN OF BOTH SHOULDERS: ICD-10-CM

## 2018-08-03 DIAGNOSIS — G47.33 OSA (OBSTRUCTIVE SLEEP APNEA): Chronic | ICD-10-CM

## 2018-08-03 DIAGNOSIS — J43.2 CENTRILOBULAR EMPHYSEMA (HCC): ICD-10-CM

## 2018-08-03 DIAGNOSIS — I50.42 CHRONIC COMBINED SYSTOLIC AND DIASTOLIC CHF, NYHA CLASS 2 AND ACA/AHA STAGE C: ICD-10-CM

## 2018-08-03 DIAGNOSIS — J96.11 CHRONIC HYPOXEMIC RESPIRATORY FAILURE (HCC): Primary | ICD-10-CM

## 2018-08-03 DIAGNOSIS — M25.511 CHRONIC PAIN OF BOTH SHOULDERS: ICD-10-CM

## 2018-08-03 DIAGNOSIS — I50.32 CHRONIC DIASTOLIC (CONGESTIVE) HEART FAILURE (HCC): ICD-10-CM

## 2018-08-03 DIAGNOSIS — D50.9 IRON DEFICIENCY ANEMIA, UNSPECIFIED IRON DEFICIENCY ANEMIA TYPE: ICD-10-CM

## 2018-08-03 PROCEDURE — 99214 OFFICE O/P EST MOD 30 MIN: CPT | Performed by: INTERNAL MEDICINE

## 2018-08-03 PROCEDURE — 1111F DSCHRG MED/CURRENT MED MERGE: CPT | Performed by: INTERNAL MEDICINE

## 2018-08-03 RX ORDER — TORSEMIDE 20 MG/1
20 TABLET ORAL 2 TIMES DAILY PRN
Qty: 60 TABLET | Refills: 5 | Status: SHIPPED | OUTPATIENT
Start: 2018-08-03 | End: 2018-10-16 | Stop reason: SDUPTHER

## 2018-08-03 NOTE — PROGRESS NOTES
Assessment/Plan:     Problem List Items Addressed This Visit        Respiratory    PATITO (obstructive sleep apnea) (Chronic)     Mariposa Butler has moderate obstructive sleep apnea and will continue to use CPAP at 10 cm water with 6 L of oxygen at nighttime  He has been compliant with using his CPAP is not having any excessive daytime somnolence         Chronic hypoxemic respiratory failure ([facility] [address] ) - Primary     Mariposa Butler has chronic hypoxemic respiratory failure  This is related to COPD and also related to severe pulmonary artery hypertension from chronic systolic and diastolic congestive heart failure  He will sometimes have his oxygen 4 liters/minute when he is riding a car otherwise he keeps it on 6 liters/minute continuous at rest at home  He will creased it to 7 8 L with walking and when he takes shower up to 9 liters/minute  Even at 6 liters/minute when he walks 30 feet is O2 saturation would drop into the high 70% range    I did advise him continue 6 L at rest but he can use 8 liters/minute to 9 liters/minute with any activity  Centrilobular emphysema ([facility] [address] )     Mariposa Butler has moderate COPD with FEV1 of 1 85 L or 76% of predicted  He will continue Breo 100 mcg 1 puff daily and uses nebulizer DuoNeb usually twice a day  I told me could increase this to 4 times a day when he had increased shortness of breath            Cardiovascular and Mediastinum    Chronic combined systolic and diastolic CHF, 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)     Mariposa Butler has chronic systolic and diastolic congestive heart failure  LV ejection fraction is 35-40% and he has secondary severe pulmonary hypertension    He will continue torsemide 20 mg once a day but if he has ever increased leg edema he could take 20 mg of the twice a day for 2-3 days in a row  Other    MICKEY (iron deficiency anemia)     Mariposa Butler has iron deficiency anemia  Last hemoglobin was 9 0 on July 23rd  He will continue Ferrex 150 mg once a day  Chronic pain of both shoulders     Twin has had increased pain in both shoulders  He does have chronic shoulder pain and recent chest x-ray done July 18 shows degenerative joint disease of both shoulders  He normally takes Tylenol 650 mg twice a day but states recently this is not really control his pain well  He did requested analgesic at least for short-term  I did prescribe tramadol 37 5 mg-is 325 mg acetaminophen -1 tablet twice a day as needed for his shoulder pain  If this did not complete controls pain he can even acetominophen 325 mg tablet to the ultracet 37 5 mg/325 mg twice a day    I did discussed possible side effects from this medication          Relevant Medications    traMADol-acetaminophen (ULTRACET) 37 5-325 mg per tablet      Other Visit Diagnoses     Chronic diastolic (congestive) heart failure (HCC)        Relevant Medications    torsemide (DEMADEX) 20 mg tablet            Return in about 6 weeks (around 9/14/2018)  All questions are answered to the patient's satisfaction and understanding  He verbalizes understanding  He is encouraged to call with any further questions or concerns  Portions of the record may have been created with voice recognition software  Occasional wrong word or "sound a like" substitutions may have occurred due to the inherent limitations of voice recognition software  Read the chart carefully and recognize, using context, where substitutions have occurred  Electronically Signed by Licha Hutchinson DO    ______________________________________________________________________    Chief Complaint:   Chief Complaint   Patient presents with    Follow-up     6w    Shortness of Breath     occurs all the time  with little exertion    Cough     productive in the morning  (clear/ light yellow)       Patient ID: Twin is a 80 y o  y o  male has a past medical history of Allergic rhinitis (06/11/2010); Aortic stenosis, severe;  Bacterial pneumonia (06/08/2007); Bladder neck obstruction (12/23/2010); BPH (benign prostatic hyperplasia); Bronchitis, chronic obstructive, with exacerbation ([place] [id number] ) (01/30/2012); CAD (coronary artery disease); Carcinoma ([place] [id number] ); Cardiomegaly (02/13/2007); Cataract of both eyes; CHF (congestive heart failure) ([place] [id number] ); Chronic allergic conjunctivitis; Chronic kidney disease (CKD); CKD (chronic kidney disease), stage III; CKD (chronic kidney disease), stage III; COPD (chronic obstructive pulmonary disease) ([place] [id number] ); Decubitus ulcer; Dermatomycosis; Diabetes mellitus type 2, noninsulin dependent ([place] [address] ); Dyspnea on exertion; Eczema; Edema; Epistaxis (12/01/2004); Esophagitis, erosive; Exposure to scabies; Fracture of rib; H/O aortic valve replacement; H/O blood clots; History of DVT (deep vein thrombosis); History of non-ST elevation myocardial infarction (NSTEMI); History of pneumonia; Hyperlipidemia; Hypertension; Internal hemorrhoids (09/13/2006); LBBB (left bundle branch block); MRSA (methicillin resistant staph aureus) culture positive; NSTEMI (non-ST elevated myocardial infarction) ([place] [id number] ); Obstructive sleep apnea on CPAP; Paroxysmal atrial fibrillation ([place] [id number] ); Phimosis (09/01/2010); Pulmonary fibrosis ([place] [id number] ); PVD (peripheral vascular disease) ([place] [address] ); Rotator cuff tendonitis; Skin abscess (12/21/2004); Skin neoplasm; Tachycardia (12/01/2004); Trigger finger; Type 2 diabetes mellitus ([place] [address] ) (12/01/2004); Venous thrombosis (05/16/2007); and Venous thrombosis (05/16/2007)  8/3/2018  LEANDRO Lainez had recent admission to the hospital from 7/18 to 7/23/18  He was having increased weight of 5 lb since some increased shortness of breath  He was treated for acute on chronic combined systolic and diastolic congestive heart failure  His LV systolic function is reduced with ejection fraction of 35-40%  He also has pulmonary artery hypertension  He did have evidence of acute bronchitis and was treated with IV ceftriaxone and then transition to oral Ceftin    Sputum culture showed mixed respiratory rachana  He was having increased cough that that time productive for some yellow to green mucus  He also has iron deficiency anemia is taking Ferrex 150 mg once a day  He does have history of prior TAVR procedure and also has chronic kidney disease  Kedar Monterroso does have COPD with last FEV1 being 1 85 L or 76% of predicted  He does have chronic hypoxemic respiratory failure and uses oxygen at home usually 6 L at rest and he will increase it up to 9 L when he is taking a shower  Overall he is feeling fairly stable since his discharge  His cough has improved  He does have shortness of breath with walking just 30 feet but this has been stable  He complains he has had some slight increased bilateral shoulder pain since his hospitalization  Normally at home he takes 650 mg of Tylenol twice a day for his chronic shoulder pain  It has been bothering him in making it harder to do some of his daily activities  He did request a analgesic other than Tylenol  He does have moderate obstructive sleep apnea has been compliant with using CPAP set at 10 cm water with 6 L of oxygen  He has no nocturnal dyspnea or excessive daytime somnolence  Review of Systems   Constitutional: Negative for chills, fever and unexpected weight change  HENT: Negative for congestion, rhinorrhea and sore throat  Eyes: Negative for discharge and redness  Respiratory: Positive for shortness of breath  Negative for wheezing  Cardiovascular: Negative for chest pain, palpitations and leg swelling  Gastrointestinal: Negative for abdominal distention, abdominal pain and nausea  Endocrine: Negative for polydipsia and polyphagia  Genitourinary: Negative for dysuria  Musculoskeletal: Negative for joint swelling and myalgias  Has chronic pain in both shoulders which is somewhat worse   Skin: Negative for rash  Neurological: Negative for light-headedness         Smoking history: He reports that he quit smoking about 31 years ago  His smoking use included Cigarettes  He has a 35 00 pack-year smoking history  He has never used smokeless tobacco     The following portions of the patient's history were reviewed and updated as appropriate: allergies, current medications, past family history, past medical history, past social history, past surgical history and problem list     Immunization History   Administered Date(s) Administered    Pneumococcal Conjugate 13-Valent 06/12/2015     Current Outpatient Prescriptions   Medication Sig Dispense Refill    albuterol (PROAIR HFA) 90 mcg/act inhaler Inhale 2 puffs every 4 (four) hours as needed for wheezing or shortness of breath 18 g 5    amiodarone 100 mg tablet Take 1 tablet (100 mg total) by mouth daily 90 tablet 3    amLODIPine (NORVASC) 10 mg tablet TAKE 1 TABLET DAILY 90 tablet 3    aspirin 81 mg chewable tablet Chew daily      atorvastatin (LIPITOR) 10 mg tablet Take 2 tablets (20 mg total) by mouth daily 90 tablet 3    B Complex Vitamins (VITAMIN B COMPLEX PO) Take by mouth      BREO ELLIPTA USE 1 INHALATION DAILY 1 each 5    cyanocobalamin (VITAMIN B-12) 100 mcg tablet Take by mouth      docusate sodium (COLACE) 100 mg capsule Take 100 mg by mouth daily at bedtime   fenofibrate (TRICOR) 145 mg tablet Take 145 mg by mouth daily   ipratropium-albuterol (DUO-NEB) 0 5-2 5 mg/3 mL nebulizer solution Take 1 vial (3 mL total) by nebulization 4 (four) times a day for 90 days 1080 mL 3    isosorbide mononitrate (IMDUR) 30 mg 24 hr tablet Take 1 tablet (30 mg total) by mouth daily 30 tablet 0    levothyroxine 50 mcg tablet Take 1 tablet (50 mcg total) by mouth daily 90 tablet 0    metoprolol tartrate (LOPRESSOR) 25 mg tablet Take 1 tablet (25 mg total) by mouth every 12 (twelve) hours   60 tablet 2    pantoprazole (PROTONIX) 40 mg tablet take 1 tablet by mouth once daily 90 tablet 3    tolterodine (DETROL) 1 mg tablet Take 1 mg by mouth daily      torsemide (DEMADEX) 20 mg tablet Take 1 tablet (20 mg total) by mouth 2 (two) times a day as needed (leg edema) 60 tablet 5    iron polysaccharides (FERREX 150) 150 mg capsule Take 1 capsule (150 mg total) by mouth daily for 30 days 30 capsule 2    traMADol-acetaminophen (ULTRACET) 37 5-325 mg per tablet Take 1 tablet by mouth 2 (two) times a day as needed for moderate pain 30 tablet 0     No current facility-administered medications for this visit  Allergies: Patient has no known allergies  Objective:  Vitals:    08/03/18 0935   BP: (!) 124/46   BP Location: Left arm   Patient Position: Sitting   Cuff Size: Standard   Pulse: 77   Resp: 12   Temp: 97 8 °F (36 6 °C)   TempSrc: Oral   SpO2: 92%   Weight: 95 7 kg (211 lb)   Height: 5' 10" (1 778 m)   Oxygen Therapy  SpO2: 92 %    Wt Readings from Last 3 Encounters:   08/03/18 95 7 kg (211 lb)   07/20/18 97 8 kg (215 lb 9 8 oz)   07/06/18 99 3 kg (219 lb)     Body mass index is 30 28 kg/m²  Physical Exam   Constitutional: He is oriented to person, place, and time  He appears well-developed and well-nourished  No distress  On 6 liters/minute at rest O2 saturation varied from 95-97%   HENT:   Head: Normocephalic  Nose: Nose normal    Mouth/Throat: Oropharynx is clear and moist  No oropharyngeal exudate  Eyes: Conjunctivae are normal  Pupils are equal, round, and reactive to light  Neck: Neck supple  No JVD present  No tracheal deviation present  Cardiovascular: Normal rate, regular rhythm and normal heart sounds  Pulmonary/Chest: Effort normal  He has no rales  There are few inspiratory crackles left lower lobe  Abdominal: Soft  He exhibits no distension  There is no tenderness  There is no guarding  Musculoskeletal:   +1 edema of lower tibial surfaces   Lymphadenopathy:     He has no cervical adenopathy  Neurological: He is alert and oriented to person, place, and time  Skin: Skin is warm and dry  No rash noted  Psychiatric: He has a normal mood and affect  His behavior is normal  Thought content normal        Lab Review:   Lab Results   Component Value Date     (L) 07/23/2018     10/16/2017    K 4 2 07/23/2018    K 4 6 10/16/2017     07/23/2018    CL 95 (L) 10/16/2017    CO2 31 07/23/2018    CO2 26 10/16/2017    BUN 33 (H) 07/23/2018    BUN 39 (H) 10/16/2017    CREATININE 1 79 (H) 07/23/2018    CREATININE 2 06 (H) 10/16/2017    GLUCOSE 90 07/23/2018    GLUCOSE 109 (H) 10/16/2017    CALCIUM 8 5 07/23/2018    CALCIUM 9 7 10/16/2017     Lab Results   Component Value Date    WBC 7 54 07/23/2018    WBC 8 4 09/18/2017    HGB 9 0 (L) 07/23/2018    HGB 11 5 (L) 09/18/2017    HCT 29 4 (L) 07/23/2018    HCT 35 6 (L) 09/18/2017    MCV 85 07/23/2018    MCV 86 09/18/2017     07/23/2018     06/08/2017       Diagnostics:  I have personally reviewed pertinent reports  Result Date: 7/18/2018  Narrative: CHEST INDICATION:   SOB  COMPARISON:  Chest x-ray dated 6/11/2018, nuclear medicine scan 6/12/2018 EXAM PERFORMED/VIEWS:  XR CHEST PORTABLE FINDINGS: No pneumothorax is seen  There is mild hypoaeration of the right lower lung field with blunting of the costophrenic sulcus suggesting pleural effusion/atelectasis  Some pleural fluid is seen tracking along the convexity of the right hemithorax and into  the apex  Some ill-defined opacities are seen in the bilateral lower lung fields which could represent atelectasis or scarring or infection depending on the clinical setting  Mild prominence of the interstitial markings, probably chronic parenchymal changes although interstitial edema is also considered  The aorta is calcified and tortuous  TAVR aortic valve replacement is again seen  The cardiac silhouette appears enlarged, as before  There is some mild prominence of the right pulmonary artery, stable  The mediastinal contours are otherwise unremarkable   Degenerative changes of the bilateral shoulders with narrowing of the subacromial space on the right  There may be a component of chronic rotator cuff pathology  The visualized bones appear intact  Impression: Small right-sided pleural effusion/atelectasis  Some patchy opacities are seen in the bilateral lower lung fields which could represent atelectasis, scarring or infection depending on the clinical setting  Enlarged cardiac silhouette and prominence of the interstitial markings  Consider a component of fluid overload/CHF  Other findings as above   Workstation performed: [id number]

## 2018-08-03 NOTE — ASSESSMENT & PLAN NOTE
Manny Gee has moderate COPD with FEV1 of 1 85 L or 76% of predicted  He will continue Breo 100 mcg 1 puff daily and uses nebulizer DuoNeb usually twice a day    I told me could increase this to 4 times a day when he had increased shortness of breath

## 2018-08-03 NOTE — ASSESSMENT & PLAN NOTE
Luis De Los Santos has chronic hypoxemic respiratory failure  This is related to COPD and also related to severe pulmonary artery hypertension from chronic systolic and diastolic congestive heart failure  He will sometimes have his oxygen 4 liters/minute when he is riding a car otherwise he keeps it on 6 liters/minute continuous at rest at home  He will creased it to 7 8 L with walking and when he takes shower up to 9 liters/minute  Even at 6 liters/minute when he walks 30 feet is O2 saturation would drop into the high 70% range    I did advise him continue 6 L at rest but he can use 8 liters/minute to 9 liters/minute with any activity

## 2018-08-03 NOTE — ASSESSMENT & PLAN NOTE
Tod Reed has chronic systolic and diastolic congestive heart failure  LV ejection fraction is 35-40% and he has secondary severe pulmonary hypertension    He will continue torsemide 20 mg once a day but if he has ever increased leg edema he could take 20 mg of the twice a day for 2-3 days in a row

## 2018-08-03 NOTE — ASSESSMENT & PLAN NOTE
Amy Davis has iron deficiency anemia  Last hemoglobin was 9 0 on July 23rd  He will continue Ferrex 150 mg once a day

## 2018-08-03 NOTE — ASSESSMENT & PLAN NOTE
Chester has moderate obstructive sleep apnea and will continue to use CPAP at 10 cm water with 6 L of oxygen at nighttime    He has been compliant with using his CPAP is not having any excessive daytime somnolence

## 2018-08-03 NOTE — ASSESSMENT & PLAN NOTE
Sapphire Chris has had increased pain in both shoulders  He does have chronic shoulder pain and recent chest x-ray done July 18 shows degenerative joint disease of both shoulders  He normally takes Tylenol 650 mg twice a day but states recently this is not really control his pain well  He did requested analgesic at least for short-term  I did prescribe tramadol 37 5 mg-is 325 mg acetaminophen -1 tablet twice a day as needed for his shoulder pain    If this did not complete controls pain he can even acetominophen 325 mg tablet to the ultracet 37 5 mg/325 mg twice a day    I did discussed possible side effects from this medication

## 2018-08-04 DIAGNOSIS — I10 HYPERTENSION: Chronic | ICD-10-CM

## 2018-08-06 RX ORDER — ISOSORBIDE MONONITRATE 30 MG/1
TABLET, EXTENDED RELEASE ORAL
Qty: 30 TABLET | Refills: 0 | Status: SHIPPED | OUTPATIENT
Start: 2018-08-06 | End: 2018-08-30 | Stop reason: SDUPTHER

## 2018-08-10 DIAGNOSIS — I10 HYPERTENSION: Chronic | ICD-10-CM

## 2018-08-11 RX ORDER — HYDRALAZINE HYDROCHLORIDE 25 MG/1
TABLET, FILM COATED ORAL
Qty: 90 TABLET | Refills: 0 | Status: SHIPPED | OUTPATIENT
Start: 2018-08-11 | End: 2018-09-12 | Stop reason: SDUPTHER

## 2018-08-13 ENCOUNTER — TELEPHONE (OUTPATIENT)
Dept: FAMILY MEDICINE CLINIC | Facility: CLINIC | Age: 83
End: 2018-08-13

## 2018-08-13 NOTE — TELEPHONE ENCOUNTER
FYI    Physical therapist Brooke nguyễn called states before physical therapy was started pt complained of bilateral shoulder and arm pain of 8/10      After PT pt states  Bilateral shoulder/arm pain is a 6&7/10 af/rma

## 2018-08-14 ENCOUNTER — TELEPHONE (OUTPATIENT)
Dept: FAMILY MEDICINE CLINIC | Facility: CLINIC | Age: 83
End: 2018-08-14

## 2018-08-14 NOTE — TELEPHONE ENCOUNTER
Treva Francisco from [facility] called to report patient complaining of bilateral arm pain 8/10  Only when weight baring no pain while resting   Any concerns Alley Herbert can be reached at [phone]

## 2018-08-21 DIAGNOSIS — M25.511 CHRONIC PAIN OF BOTH SHOULDERS: ICD-10-CM

## 2018-08-21 DIAGNOSIS — M25.512 CHRONIC PAIN OF BOTH SHOULDERS: ICD-10-CM

## 2018-08-21 DIAGNOSIS — G89.29 CHRONIC PAIN OF BOTH SHOULDERS: ICD-10-CM

## 2018-08-28 DIAGNOSIS — M25.511 CHRONIC PAIN OF BOTH SHOULDERS: ICD-10-CM

## 2018-08-28 DIAGNOSIS — M25.512 CHRONIC PAIN OF BOTH SHOULDERS: ICD-10-CM

## 2018-08-28 DIAGNOSIS — G89.29 CHRONIC PAIN OF BOTH SHOULDERS: ICD-10-CM

## 2018-08-30 DIAGNOSIS — D50.9 IRON DEFICIENCY ANEMIA, UNSPECIFIED IRON DEFICIENCY ANEMIA TYPE: ICD-10-CM

## 2018-08-30 DIAGNOSIS — I10 HYPERTENSION: Chronic | ICD-10-CM

## 2018-08-31 RX ORDER — ISOSORBIDE MONONITRATE 30 MG/1
TABLET, EXTENDED RELEASE ORAL
Qty: 30 TABLET | Refills: 0 | Status: SHIPPED | OUTPATIENT
Start: 2018-08-31 | End: 2018-09-06 | Stop reason: SDUPTHER

## 2018-08-31 RX ORDER — POLYSACCHARIDE-IRON COMPLEX 150 MG/1
CAPSULE ORAL
Qty: 30 CAPSULE | Refills: 2 | Status: SHIPPED | OUTPATIENT
Start: 2018-08-31

## 2018-09-05 DIAGNOSIS — I10 HYPERTENSION: Chronic | ICD-10-CM

## 2018-09-06 DIAGNOSIS — I20.8 CHRONIC STABLE ANGINA (HCC): Primary | ICD-10-CM

## 2018-09-06 DIAGNOSIS — I10 ESSENTIAL HYPERTENSION: Chronic | ICD-10-CM

## 2018-09-06 DIAGNOSIS — I48.0 PAF (PAROXYSMAL ATRIAL FIBRILLATION) (HCC): ICD-10-CM

## 2018-09-06 RX ORDER — ISOSORBIDE MONONITRATE 30 MG/1
30 TABLET, EXTENDED RELEASE ORAL DAILY
Qty: 30 TABLET | Refills: 5 | Status: SHIPPED | OUTPATIENT
Start: 2018-09-06 | End: 2018-09-07

## 2018-09-06 RX ORDER — AMLODIPINE BESYLATE 10 MG/1
10 TABLET ORAL DAILY
Qty: 30 TABLET | Refills: 5 | Status: SHIPPED | OUTPATIENT
Start: 2018-09-06 | End: 2018-11-09 | Stop reason: SDUPTHER

## 2018-09-07 RX ORDER — ISOSORBIDE MONONITRATE 30 MG/1
TABLET, EXTENDED RELEASE ORAL
Qty: 30 TABLET | Refills: 0 | Status: SHIPPED | OUTPATIENT
Start: 2018-09-07

## 2018-09-12 ENCOUNTER — OFFICE VISIT (OUTPATIENT)
Dept: CARDIOLOGY CLINIC | Facility: CLINIC | Age: 83
End: 2018-09-12
Payer: COMMERCIAL

## 2018-09-12 VITALS — SYSTOLIC BLOOD PRESSURE: 120 MMHG | HEART RATE: 51 BPM | DIASTOLIC BLOOD PRESSURE: 60 MMHG | OXYGEN SATURATION: 97 %

## 2018-09-12 DIAGNOSIS — I50.42 CHRONIC COMBINED SYSTOLIC AND DIASTOLIC CHF, NYHA CLASS 2 AND ACA/AHA STAGE C: ICD-10-CM

## 2018-09-12 DIAGNOSIS — E03.8 OTHER SPECIFIED HYPOTHYROIDISM: Chronic | ICD-10-CM

## 2018-09-12 DIAGNOSIS — G47.33 OSA (OBSTRUCTIVE SLEEP APNEA): Chronic | ICD-10-CM

## 2018-09-12 DIAGNOSIS — I48.0 PAROXYSMAL ATRIAL FIBRILLATION (HCC): Chronic | ICD-10-CM

## 2018-09-12 DIAGNOSIS — M25.512 CHRONIC PAIN OF BOTH SHOULDERS: ICD-10-CM

## 2018-09-12 DIAGNOSIS — I10 ESSENTIAL HYPERTENSION: Chronic | ICD-10-CM

## 2018-09-12 DIAGNOSIS — I10 HYPERTENSION: Chronic | ICD-10-CM

## 2018-09-12 DIAGNOSIS — I73.9 PERIPHERAL VASCULAR DISEASE (HCC): Chronic | ICD-10-CM

## 2018-09-12 DIAGNOSIS — I25.10 CAD IN NATIVE ARTERY: Chronic | ICD-10-CM

## 2018-09-12 DIAGNOSIS — J96.11 CHRONIC HYPOXEMIC RESPIRATORY FAILURE (HCC): ICD-10-CM

## 2018-09-12 DIAGNOSIS — M25.511 CHRONIC PAIN OF BOTH SHOULDERS: ICD-10-CM

## 2018-09-12 DIAGNOSIS — G89.29 CHRONIC PAIN OF BOTH SHOULDERS: ICD-10-CM

## 2018-09-12 DIAGNOSIS — N18.30 STAGE 3 CHRONIC KIDNEY DISEASE (HCC): ICD-10-CM

## 2018-09-12 DIAGNOSIS — Z95.2 S/P TAVR (TRANSCATHETER AORTIC VALVE REPLACEMENT): Chronic | ICD-10-CM

## 2018-09-12 DIAGNOSIS — E78.2 MIXED HYPERLIPIDEMIA: Chronic | ICD-10-CM

## 2018-09-12 DIAGNOSIS — I27.20 PULMONARY HYPERTENSION (HCC): ICD-10-CM

## 2018-09-12 PROCEDURE — 99214 OFFICE O/P EST MOD 30 MIN: CPT | Performed by: INTERNAL MEDICINE

## 2018-09-12 RX ORDER — HYDRALAZINE HYDROCHLORIDE 25 MG/1
TABLET, FILM COATED ORAL
Qty: 90 TABLET | Refills: 0 | Status: SHIPPED | OUTPATIENT
Start: 2018-09-12 | End: 2018-10-12 | Stop reason: SDUPTHER

## 2018-09-12 NOTE — PROGRESS NOTES
Progress Note - Cardiology Office  Fer Conway 80 y o  male MRN: [id number]  : 10/24/1930  Encounter: [id number]      Assessment:     1  Paroxysmal atrial fibrillation (HCC)    2  CAD in native artery    3  Chronic hypoxemic respiratory failure (HCC)    4  Pulmonary hypertension ([facility] )    5  Chronic combined systolic and diastolic CHF, 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)    6  Stage 3 chronic kidney disease    7  S/P TAVR (transcatheter aortic valve replacement)    8  Mixed hyperlipidemia    9  Chronic pain of both shoulders    10  Other specified hypothyroidism    11  PATITO (obstructive sleep apnea)    12  Essential hypertension    13  Peripheral vascular disease ([facility] )        Discussion Summary and Plan:  1  Chronic systolic and diastolic heart failure [place] heart Association class 3  Patient advised to be compliant with diuretics  Currently he is in compensated state  Will continue diuretics as he is taking before  2  Known moderate COPD with history of chronic hypoxia on oxygen therapy at home  Not actively wheezing  He is on chronic oxygen therapy  3   History of severe aortic stenosis status post TAVR in 2017  Repeat echo shows valve is functioning adequately  Ejection fraction around 35%  Could benefit from biventricular pacemaker  Patient does not want to do any aggressive measures now  4   CAD status post angioplasty of LAD in   Has residual 50-60% lad stenosis on medical Rx  No symptoms of angina  5  Dyslipidemia on statin  Tolerating well  Patient is tolerating Lipitor 10 milligram without any issues  6  Paroxysmal atrial fibrillation currently in sinus suppressed with low-dose amiodarone  Heart rate is 50-60 beats per minute  7  Hypothyroidism  On levothyroxine management as per PMD  8  Moderate obesity with recent loss of weight  9   chronic bilateral shoulder pain  Follow up with PMD recommended  This is the patient's main complaint at this time    10  Hypertension seemed to well controlled with current therapy  11  Chronic kidney disease is stage III  Creatinine is stable around 2  Counseling :  A description of the counseling  above reviewed in detail  Patient's ability to self care: Yes  Medication side effect reviewed with patient in detail and all their questions answered to their satisfaction  HPI :     Renaldo Scott is a 80y o  year old male who came for follow up  Patient has past medical history significant for moderate COPD, chronic combined systolic and diastolic heart failure, severe aortic stenosis status post TAVR in August of 2017, CAD status post angioplasty of LAD, chronic LBBB, CKD stage 3, type 2 diabetes mellitus, dyslipidemia, paroxysmal atrial fibrillation suppressed with amiodarone in sinus rhythm with no reoccurrence, CO2 dependent due to chronic hypoxia  with history of coronary artery disease status post angioplasty of LAD with a residual 60% mid stenosis, severe aortic stenosis status post TAVR in [facility], known moderate to severe COPD, paroxysmal atrial fibrillation currently suppressed with amiodarone, on home oxygen therapy, chronic LBBB, CRI, hypertension, history of diastolic heart failure who was recently admitted to [facility] with a COPD exacerbation and Astelin heart failure was discharged home but was not taking his Demadex as prescribed  For couple days he did not take it at all and now he has started back  He was feeling short of breath particularly when he exerts  Since he started back on it he has been feeling a lot better  He still get hypoxic when he walks short distances  He denies any chest pain, any fever, any wheezing, any chills, any nausea or any increased leg swelling or weight gain  04/30/2018  Patient was recently admitted to [facility] with acute tracheobronchitis and COPD along with some diastolic heart failure  He recovered in nursing home    He still dropped his oxygen when he walks but as per him he quickly goes back to 94-95  Denies any leg swelling  No chest pain  Has chronic shortness of breath  No fever no chills no nausea no vomiting  No cough no other significant complaint  09/12/2018  Above reviewed  Patient came for follow-up  His main complaint is about bilateral shoulder pain and muscle soreness which is getting worse as per him and because of that he cannot move  He denies any chest pain  Denies any shortness of breath  No fever no chills no nausea no vomiting no leg swelling no other significant complaint  He is trying to lose weight  He was admitted in July with chronic diastolic heart failure with exacerbation and now much improved  Review of Systems   Constitutional: Positive for activity change, fatigue and unexpected weight change  Respiratory: Positive for shortness of breath  Chronic not changed   Musculoskeletal: Positive for arthralgias and gait problem  Bilateral shoulder pain   Psychiatric/Behavioral: Positive for sleep disturbance  The patient is nervous/anxious  All other systems reviewed and are negative        Historical Information   Past Medical History:   Diagnosis Date    Allergic rhinitis 06/11/2010    Aortic stenosis, severe     Bacterial pneumonia 06/08/2007    Bladder neck obstruction 12/23/2010    BPH (benign prostatic hyperplasia)     Bronchitis, chronic obstructive, with exacerbation ([facility] ) 01/30/2012    CAD (coronary artery disease)     stent 2014    Carcinoma ([facility] )     resolved [id number]    Cardiomegaly 02/13/2007    Cataract of both eyes     CHF (congestive heart failure) (HCC)     Chronic allergic conjunctivitis     last assessed [id number]    Chronic kidney disease (CKD)     CKD (chronic kidney disease), stage III     CKD (chronic kidney disease), stage III     COPD (chronic obstructive pulmonary disease) ([facility] )     Decubitus ulcer     resolved [id number]    Dermatomycosis     last assessed [id number]    Diabetes mellitus type 2, noninsulin dependent ([facility] )     Dyspnea on exertion     last assessed [id number]    Eczema     last assessed [id number]    Edema     last assessed [id number]    Epistaxis 2004    Esophagitis, erosive     Exposure to scabies     resolved [id number]    Fracture of rib     last assessed [id number]    H/O aortic valve replacement     resolved [id number]    H/O blood clots     History of DVT (deep vein thrombosis)     left posterior tibial/peroneal veins    History of non-ST elevation myocardial infarction (NSTEMI)     History of pneumonia     Hyperlipidemia     Hypertension     Internal hemorrhoids 2006    LBBB (left bundle branch block)     MRSA (methicillin resistant staph aureus) culture positive     NSTEMI (non-ST elevated myocardial infarction) ([facility] )     resolved [id number]    Obstructive sleep apnea on CPAP     severe PATITO with AHI of 106 and uses CPAP at 10 cm H2O with 2 l/m oxygen    Paroxysmal atrial fibrillation ([facility] )     Phimosis 2010    severe pt had circumcision 2010    Pulmonary fibrosis ([facility] )     PVD (peripheral vascular disease) ([facility] )     Rotator cuff tendonitis     lsat assessed [id number]    Skin abscess 2004    Hip    Skin neoplasm     last assessed [id number]    Tachycardia 2004    Trigger finger     last assessed [id number]    Type 2 diabetes mellitus ([facility] ) 2004    Venous thrombosis 2007    Venous thrombosis 2007     Past Surgical History:   Procedure Laterality Date    CARDIAC CATHETERIZATION  03/10/2016    Showed 60-70% mid LAD lesion next to stent    CATARACT EXTRACTION      CIRCUMCISION, NON-      ESOPHAGOGASTRODUODENOSCOPY N/A 3/14/2016    Procedure: ESOPHAGOGASTRODUODENOSCOPY (EGD); Surgeon: Bhumi Clemens MD;  Location: [facility];   Service:     EYE SURGERY      FRACTURE SURGERY      JOINT REPLACEMENT      both hips replaced    OTHER SURGICAL HISTORY      H/O thoracentesis (diagnostic)    SC REPLACE AORTIC VALVE OPENFEMORAL ARTERY APPROACH N/A 8/23/2016    Procedure: TRANSFEMORAL TAVR WITH 26MM CALIX MAKAYLA S3 TISSUE VALVE; PINA ;  Surgeon: Félix Jaime DO;  Location: [facility];  Service: Cardiac Surgery    REPLACEMENT TOTAL KNEE Bilateral 01/01/1991 1991 and 2001    TISSUE AORTIC VALVE REPLACEMENT      transfemoral, transcatheter    TONSILLECTOMY      TONSILLECTOMY AND ADENOIDECTOMY      TOTAL HIP ARTHROPLASTY      Bilateral     History   Alcohol Use    Yes     Comment: socially     History   Drug Use No     History   Smoking Status    Former Smoker    Packs/day: 1 00    Years: 35 00    Types: Cigarettes    Quit date: 1/1/1987   Smokeless Tobacco    Never Used     Comment: quit 50 years ago, per [employer]     Family History:   Family History   Problem Relation Age of Onset    Coronary artery disease Mother     Arthritis Mother     Hypertension Mother     Rheum arthritis Father     Prostate cancer Father        Meds/Allergies     No Known Allergies    Current Outpatient Prescriptions:     albuterol (PROAIR HFA) 90 mcg/act inhaler, Inhale 2 puffs every 4 (four) hours as needed for wheezing or shortness of breath, Disp: 18 g, Rfl: 5    amiodarone 100 mg tablet, Take 1 tablet (100 mg total) by mouth daily, Disp: 90 tablet, Rfl: 3    amLODIPine (NORVASC) 10 mg tablet, Take 1 tablet (10 mg total) by mouth daily, Disp: 30 tablet, Rfl: 5    aspirin 81 mg chewable tablet, Chew daily, Disp: , Rfl:     atorvastatin (LIPITOR) 10 mg tablet, Take 2 tablets (20 mg total) by mouth daily, Disp: 90 tablet, Rfl: 3    B Complex Vitamins (VITAMIN B COMPLEX PO), Take by mouth, Disp: , Rfl:     BREO ELLIPTA, USE 1 INHALATION DAILY, Disp: 1 each, Rfl: 5    cyanocobalamin (VITAMIN B-12) 100 mcg tablet, Take by mouth, Disp: , Rfl:     docusate sodium (COLACE) 100 mg capsule, Take 100 mg by mouth daily at bedtime  , Disp: , Rfl:    fenofibrate (TRICOR) 145 mg tablet, Take 145 mg by mouth daily  , Disp: , Rfl:     hydrALAZINE (APRESOLINE) 25 mg tablet, take 1 tablet by mouth every 8 hours, Disp: 90 tablet, Rfl: 0    IFEREX 150 150 MG capsule, take 1 capsule by mouth once daily, Disp: 30 capsule, Rfl: 2    isosorbide mononitrate (IMDUR) 30 mg 24 hr tablet, take 1 tablet by mouth once daily, Disp: 30 tablet, Rfl: 0    levothyroxine 50 mcg tablet, Take 1 tablet (50 mcg total) by mouth daily, Disp: 90 tablet, Rfl: 0    metoprolol tartrate (LOPRESSOR) 25 mg tablet, Take 1 tablet (25 mg total) by mouth every 12 (twelve) hours  , Disp: 60 tablet, Rfl: 2    pantoprazole (PROTONIX) 40 mg tablet, take 1 tablet by mouth once daily, Disp: 90 tablet, Rfl: 3    tolterodine (DETROL) 1 mg tablet, Take 1 mg by mouth daily, Disp: , Rfl:     torsemide (DEMADEX) 20 mg tablet, Take 1 tablet (20 mg total) by mouth 2 (two) times a day as needed (leg edema), Disp: 60 tablet, Rfl: 5    traMADol-acetaminophen (ULTRACET) 37 5-325 mg per tablet, TAKE 1 TABLET BY MOUTH TWICE A DAY AS NEEDED FOR MODERATE PAIN, Disp: 30 tablet, Rfl: 0    traMADol-acetaminophen (ULTRACET) 37 5-325 mg per tablet, Take 1 tablet by mouth 2 (two) times a day as needed for moderate pain, Disp: 30 tablet, Rfl: 0    Vitals: Blood pressure 120/60, pulse (!) 51, SpO2 97 %  There is no height or weight on file to calculate BMI  Vitals:     BP Readings from Last 3 Encounters:   09/12/18 120/60   08/03/18 (!) 124/46   07/23/18 120/56         Physical Exam   Constitutional: He is oriented to person, place, and time  He appears well-developed  No distress  HENT:   Head: Normocephalic and atraumatic  Eyes: Pupils are equal, round, and reactive to light  Neck: Normal range of motion  Neck supple  No JVD present  No thyromegaly present  Cardiovascular:   S1-S2 regular with a harsh 3/6 ejection systolic murmur,    Pulmonary/Chest: No respiratory distress  He has no wheezes  He has no rales  Bilateral air entry with chronic rhonchi and wheezing not worse than before  He is on chronic oxygen therapy   Abdominal: Soft  Bowel sounds are normal  He exhibits no distension  There is no tenderness  There is no rebound  Musculoskeletal: Normal range of motion  He exhibits no edema or tenderness  Neurological: He is alert and oriented to person, place, and time  Skin: Skin is warm and dry  He is not diaphoretic  Psychiatric: He has a normal mood and affect  His behavior is normal  Judgment normal          Diagnostic Studies Review Cardio:    Echocardiogram/PINA: Echo Doppler done of in September of 2017 shows EF 40%, paradoxical septal motion, mildly dilated LV, the lateral right atrium and left atrium, mild-to-moderate TR with PA pressure 65 mm of mercury, bioprosthetic valve at aortic position which is Villarreal Esdras TAVR valve with normal function and mean gradient of 9 mm of mercury with no paravalvular leak  Mild-to-moderate MR noted  As compared to old study patient's EF is now around 40% and aortic stenosis has improved  Catheterization: Cardiac catheterization done shows he has 50-60% LAD stenosis  There is a patent stent seen in the mid and distal LAD  Nonobstructive disease seen in the right coronary artery and circumflex  This was done in March 2016  Other: Chest x-ray done few weeks ago shows evidence of interstitial fibrosis  No pneumonia no CHF  ECG Report: 6/27/2017 tablet EKG shows sinus rhythm with first-degree AV block, IVCD or incomplete LBBB  Not changed from old EKG  Patient had history of rate-related LBBB     Twelve lead EKG 04/30/2018 shows normal sinus rhythm heart rate 65 beats per minute  IVCD    No change from old EKG    Cardiac testing:   Results for orders placed during the hospital encounter of 03/28/18   Echo complete with contrast if indicated    [address]  [phone]    Transthoracic Echocardiogram  2D, M-mode, Doppler, and Color Doppler    Study date:  30-Mar-2018    Patient: Nedra Rivera  MR number: [id number]  Account number: [de-identified]  : 24-Oct-1930  Age: 80 years  Gender: Male  Status: Routine  Location: Bedside  Height: 70 in  Weight: 213 lb  BP: 146/ 65 mmHg    Indications: Coronary Artery Disease    Diagnoses: I50 9 - Heart failure, unspecified    Sonographer:  Ole Oviedo RCS, RVS  Primary Physician:  Jailene Fang DO  Referring Physician:  Demetrius Martinez MD  Group:  [id number]  Interpreting Physician:  Clark Du MD    SUMMARY    LEFT VENTRICLE:  Size was at the upper limits of normal   Systolic function was moderately reduced  Ejection fraction was estimated in the range of 35 % to 40 % to be 35 %  There was moderate diffuse hypokinesis  Wall thickness was mildly increased  There was mild concentric hypertrophy  Doppler parameters were consistent with abnormal left ventricular relaxation (grade 1 diastolic dysfunction)  VENTRICULAR SEPTUM:  There was moderate paradoxical motion  These changes are consistent with LBBB  RIGHT VENTRICLE:  The size was at the upper limits of normal     LEFT ATRIUM:  The atrium was moderately dilated  RIGHT ATRIUM:  The atrium was mildly dilated  MITRAL VALVE:  There was mild to moderate annular calcification  There was mild regurgitation  AORTIC VALVE:  There was trace regurgitation  TRICUSPID VALVE:  There was moderate regurgitation  Estimated peak PA pressure was 70 mmHg  The findings suggest severe pulmonary hypertension  PULMONIC VALVE:  There was mild regurgitation  HISTORY: PRIOR HISTORY: HTN, PVD, Atrial Fibrillation, LBBB, CHF, CAD, COPD, TAVR    PROCEDURE: The procedure was performed at the bedside  This was a routine study  The transthoracic approach was used  The study included complete 2D imaging, M-mode, complete spectral Doppler, and color Doppler   The heart rate was 75 bpm,  at the start of the study  Images were obtained from the parasternal, apical, subcostal, and suprasternal notch acoustic windows  Echocardiographic views were limited due to poor acoustic window availability  This was a technically  difficult study  LEFT VENTRICLE: Size was at the upper limits of normal  Systolic function was moderately reduced  Ejection fraction was estimated in the range of 35 % to 40 % to be 35 %  There was moderate diffuse hypokinesis  Wall thickness was mildly  increased  There was mild concentric hypertrophy  DOPPLER: Doppler parameters were consistent with abnormal left ventricular relaxation (grade 1 diastolic dysfunction)  VENTRICULAR SEPTUM: Thickness was mildly increased  There was moderate paradoxical motion  These changes are consistent with LBBB  RIGHT VENTRICLE: The size was at the upper limits of normal  Systolic function was low normal     LEFT ATRIUM: The atrium was moderately dilated  RIGHT ATRIUM: The atrium was mildly dilated  MITRAL VALVE: There was mild to moderate annular calcification  There was lower normal leaflet separation  DOPPLER: There was no evidence for stenosis  There was mild regurgitation  AORTIC VALVE: TAVR had been performed previously  Peak velocity 2 6 to 2 7 m/sec  Mean gradient 16-17 mm and peak gradient of 28-30 mm of hg and FATMATA 1 30 to 1 35 cm2 DOPPLER: There was trace regurgitation  TRICUSPID VALVE: DOPPLER: There was moderate regurgitation  Estimated peak PA pressure was 70 mmHg  The findings suggest severe pulmonary hypertension  PULMONIC VALVE: DOPPLER: There was mild regurgitation  PERICARDIUM: There was no thickening or calcification  There was no pericardial effusion  AORTA: The root exhibited normal size  SYSTEMIC VEINS: IVC: The inferior vena cava was upper normal in size   Respirophasic changes were normal     SYSTEM MEASUREMENT TABLES    2D mode  AoR Diam 2D: 2 6 cm  LA Diam (2D): 5 3 cm  LA/Ao (2D): 2 04  FS (2D Teich): 6 82 %  IVSd (2D): 1 58 cm  LVDEV: 152 cm³  LVESV: 129 cm³  LVIDd(2D): 5 57 cm  LVISd (2D): 5 19 cm  LVOT Area 2D: 3 8 cm squared  LVPWd (2D): 1 39 cm  SV (Teich): 23 cm³    Apical four chamber  LVEF A4C: 25 %    Unspecified Scan Mode  FATMATA Cont Eq (Peak Pasquale): 1 56 cm squared  FATMATA Cont Eq (VTI): 1 61 cm squared  LVOT (VTI): 22 1 cm  LVOT Diam : 2 2 cm  LVOT Vmax: 1070 mm/s  LVOT Vmax; Mean: 1040 mm/s  Peak Grad ; Mean: 4 mm[Hg]  SV (LVOT): 82 cm³  VTI;Mean: 3 mm[Hg]  MV Peak A Pasquale: 1150 mm/s  MV Peak E Pasquale  Mean: 718 mm/s  MVA (PHT): 1 95 cm squared  PHT: 110 ms  Max P mm[Hg]  V Max: 3760 mm/s  Vmax: 3550 mm/s  RA Area: 22 5 cm squared  RA Volume: 66 8 cm³    [employer] Accredited Echocardiography Laboratory    Prepared and electronically signed by    Shagufta Theodore MD  Signed 30-Mar-2018 16:35:19         Results for orders placed during the hospital encounter of 16   Cardiac catheterization    Narrative Cardiac Catheterization Operative Report    Primary care doctor: Dr Jacob Moore     Cardiologist is Dr Neymar Ritter    Date of procedure:  3/10/2016    Brief history: -51-year-old male with history of chronic renal   insufficiency, CAD, status post angioplasty of LAD, known severe COPD who   was admitted with hypoxic respiratory failure and has a troponin of 10  He   has a known history of severe aortic stenosis along with moderate   pulmonary hypertension  Hence he was scheduled for complete heart cath    Procedure Details  The risks, benefits, complications, including risk of stroke, heart   attack, bleeding and even death but not limited to at along with treatment   options, and expected outcomes were discussed with the patient  The   patient and/or family concurred with the proposed plan, giving informed   consent  Patient was brought to the cath lab after IV hydration was begun   and oral premedication was given  He was further sedated with midazolam   and fentanyl   He was prepped and draped in the usual manner  Using the   modified Seldinger access technique, a 6 Pashto sheath was placed in the   right common femoral artery without any complications    A left heart   catheterization was done  Angiograms were also done  End of the procedure   patient was [facility] to [facility] area without any complications  He   tolerated the procedure well  After the procedure was completed, sedation was stopped and the sheaths   and catheters were all removed  Hemostasis was achieved with fem stop  Access was obtained in the right femoral artery as well as right femoral   vein    Equipment used:   1  JR4 for right coronary angiography  2  JL 4 0 for left coronary angiography   3  LV gram with AL1    Findings:  1  Dominance: Right dominant coronary system    2  Left main:  Is a normal-size vessel it has mild calcification noted  It   bifurcates into large LAD and a circumflex system    3  LAD: LAD is a large-size vessel and it has proximal calcification  Just   after septal it has focal about 60-65% stenosis  This stent in the LAD is   patent after the stenosis  No other focal stenosis seen  4  Circumflex is a nondominant medium size vessel  It has proximally   35-40% stenosis seen  5  RCA: RCA is large vessel and has mild luminal irregularities causing   35% stenosis proximally and mid area  RPL branch has around 35% stenosis   in the mid area  5  LV gram: LV gram was not done as patient has renal insufficiency  However there was 25 mm gradient across aortic valve  Right heart catheter meters: Aortic pressure 128/74  LV pressure 152/10  Card to call output  About 5 L  PA pressure 55 mmHg  RV pressure 55/10  RA mean 8-10  Wedge pressure was not very accurate as patient has lots of respiratory   motions  Itis to be around 16 minutes of mercury  Estimated Blood Loss: Minimal  Complications:  None; patient tolerated the procedure well  Recommendation: Continue medical therapy   Continue risk factor   modification and patient will have a functional stress test as an   outpatient to see if that LAD needs to be done  It seemed to be stable   lesion and less likely to be the cause of patient's non-ST elevation MI  We'll continue other Rx before  Check BMP and CBC  See orders     Disposition: Hemodynamically stable and PACU - hemodynamically stable  Condition: stable       Imaging:  Chest X-Ray:   Xr Chest Pa & Lateral    Result Date: 10/16/2017  Impression Interstitial fibrosis and chronic pleural changes on the right  No acute infiltrate is seen Workstation performed: [id number]     Xr Chest Pa & Lateral    Result Date: 5/9/2017  Impression Loculated pleural effusion on the right appears smaller  Bibasilar atelectasis  Workstation performed: [id number]     CT-scan of the chest:     No CTA results available for this patient    Lab Review   Lab Results   Component Value Date    WBC 7 54 07/23/2018    HGB 9 0 (L) 07/23/2018    HCT 29 4 (L) 07/23/2018    MCV 85 07/23/2018    RDW 15 7 (H) 07/23/2018     07/23/2018     BMP:  Lab Results   Component Value Date     (L) 07/23/2018    K 4 2 07/23/2018     07/23/2018    CO2 31 07/23/2018    ANIONGAP 13 9 10/08/2015    BUN 33 (H) 07/23/2018    CREATININE 1 79 (H) 07/23/2018    GLUCOSE 109 (H) 10/16/2017    CALCIUM 8 5 07/23/2018    EGFR 33 07/23/2018    MG 2 0 07/21/2018     LFT:  Lab Results   Component Value Date    AST 27 07/19/2018    ALT 17 07/19/2018    ALKPHOS 48 07/19/2018    PROT 7 9 10/16/2017    BILITOT 0 4 10/16/2017       Lab Results   Component Value Date    HGBA1C 5 3 06/12/2018     Lipid Profile:   Lab Results   Component Value Date    CHOL 97 (L) 10/16/2017    HDL 37 (L) 10/16/2017    LDLCALC 45 10/16/2017    TRIG 77 10/16/2017     Lab Results   Component Value Date    CHOL 97 (L) 10/16/2017    CHOL 110 06/08/2017     Lab Results   Component Value Date    CKTOTAL 111 01/06/2017    TROPONINI <0 02 07/18/2018     Dr Richard Salmeron Emily Hawthorne MD [facility]      "This note has been constructed using a voice recognition system  Therefore there may be syntax, spelling, and/or grammatical errors   Please call if you have any questions  "

## 2018-09-18 ENCOUNTER — OFFICE VISIT (OUTPATIENT)
Dept: PODIATRY | Facility: CLINIC | Age: 83
End: 2018-09-18
Payer: COMMERCIAL

## 2018-09-18 VITALS
DIASTOLIC BLOOD PRESSURE: 60 MMHG | WEIGHT: 211 LBS | RESPIRATION RATE: 17 BRPM | BODY MASS INDEX: 30.21 KG/M2 | HEIGHT: 70 IN | SYSTOLIC BLOOD PRESSURE: 120 MMHG

## 2018-09-18 DIAGNOSIS — B35.1 ONYCHOMYCOSIS: ICD-10-CM

## 2018-09-18 DIAGNOSIS — M79.672 PAIN IN BOTH FEET: ICD-10-CM

## 2018-09-18 DIAGNOSIS — M79.671 PAIN IN BOTH FEET: ICD-10-CM

## 2018-09-18 DIAGNOSIS — I73.9 PERIPHERAL VASCULAR DISEASE (HCC): Primary | Chronic | ICD-10-CM

## 2018-09-18 DIAGNOSIS — L84 CORNS: ICD-10-CM

## 2018-09-18 PROCEDURE — 99211 OFF/OP EST MAY X REQ PHY/QHP: CPT | Performed by: PODIATRIST

## 2018-09-18 PROCEDURE — 11056 PARNG/CUTG B9 HYPRKR LES 2-4: CPT | Performed by: PODIATRIST

## 2018-09-19 ENCOUNTER — OFFICE VISIT (OUTPATIENT)
Dept: FAMILY MEDICINE CLINIC | Facility: CLINIC | Age: 83
End: 2018-09-19
Payer: COMMERCIAL

## 2018-09-19 ENCOUNTER — TELEPHONE (OUTPATIENT)
Dept: FAMILY MEDICINE CLINIC | Facility: CLINIC | Age: 83
End: 2018-09-19

## 2018-09-19 VITALS
DIASTOLIC BLOOD PRESSURE: 80 MMHG | BODY MASS INDEX: 30.21 KG/M2 | WEIGHT: 211 LBS | RESPIRATION RATE: 20 BRPM | HEART RATE: 60 BPM | HEIGHT: 70 IN | SYSTOLIC BLOOD PRESSURE: 120 MMHG | TEMPERATURE: 98 F

## 2018-09-19 DIAGNOSIS — M25.511 CHRONIC PAIN OF BOTH SHOULDERS: ICD-10-CM

## 2018-09-19 DIAGNOSIS — M25.512 CHRONIC PAIN OF BOTH SHOULDERS: ICD-10-CM

## 2018-09-19 DIAGNOSIS — I10 ESSENTIAL HYPERTENSION: Chronic | ICD-10-CM

## 2018-09-19 DIAGNOSIS — I48.0 PAROXYSMAL ATRIAL FIBRILLATION (HCC): Chronic | ICD-10-CM

## 2018-09-19 DIAGNOSIS — G89.29 CHRONIC PAIN OF BOTH SHOULDERS: ICD-10-CM

## 2018-09-19 DIAGNOSIS — I25.10 CAD IN NATIVE ARTERY: Primary | Chronic | ICD-10-CM

## 2018-09-19 PROCEDURE — 99214 OFFICE O/P EST MOD 30 MIN: CPT | Performed by: FAMILY MEDICINE

## 2018-09-19 RX ORDER — MELOXICAM 15 MG/1
15 TABLET ORAL DAILY
Qty: 90 TABLET | Refills: 0 | Status: SHIPPED | OUTPATIENT
Start: 2018-09-19 | End: 2018-11-23

## 2018-09-19 RX ORDER — DULOXETIN HYDROCHLORIDE 30 MG/1
30 CAPSULE, DELAYED RELEASE ORAL DAILY
Qty: 30 CAPSULE | Refills: 1 | Status: SHIPPED | OUTPATIENT
Start: 2018-09-19 | End: 2018-11-10 | Stop reason: SDUPTHER

## 2018-09-19 NOTE — TELEPHONE ENCOUNTER
Spoke w Pt wife [facility]  She will be picking  Pt script for Voltaren gel  on Friday which will be at the     [facility]

## 2018-09-19 NOTE — PROGRESS NOTES
Assessment/Plan:    No problem-specific Assessment & Plan notes found for this encounter  CAD stable  htn stable  PAF stable  Ongoing pain, nonsurgical shoulder issues very limiting---px mgmt suggested but try mobic or voltaren gel  Start duloxetine for mood and OA pain, recheck 1m  Dc tramadol advised, risks but also interaction with cymbalta     Diagnoses and all orders for this visit:    CAD in native artery    Paroxysmal atrial fibrillation (HCC)    Essential hypertension    Chronic pain of both shoulders  -     meloxicam (MOBIC) 15 mg tablet; Take 1 tablet (15 mg total) by mouth daily for 90 days As needed  -     diclofenac sodium (VOLTAREN) 1 %; Apply 4 g topically 4 (four) times a day Both shoulders and knees  -     Ambulatory referral to Pain Management; Future  -     DULoxetine (CYMBALTA) 30 mg delayed release capsule; Take 1 capsule (30 mg total) by mouth daily              Return in about 1 month (around 10/19/2018)  Subjective:      Patient ID: Doug Early is a 80 y o  male  Chief Complaint   Patient presents with    Follow-up     f/u breathing issues  prcma       HPI  Right shoulder worse than left  Ongoing  Hx of steroid injections  nonsurgical candidate -  Avallone  Affects sleep and life  Sanjay Cardoso gave him ultracet  Advised its an opioid  Breathing ok per pt  Last pulm eval was---- next week pending  Cardio Dr Cheng Erwin doxycycline for 7d, finished 1 w ago, for uri per wife      The following portions of the patient's history were reviewed and updated as appropriate: allergies, current medications, past family history, past medical history, past social history, past surgical history and problem list     Review of Systems   Constitutional: Negative for fever  Respiratory: Positive for shortness of breath  Negative for chest tightness  Musculoskeletal: Positive for arthralgias  Neurological: Negative for dizziness           Current Outpatient Prescriptions   Medication Sig Dispense Refill    albuterol (PROAIR HFA) 90 mcg/act inhaler Inhale 2 puffs every 4 (four) hours as needed for wheezing or shortness of breath 18 g 5    amiodarone 100 mg tablet Take 1 tablet (100 mg total) by mouth daily 90 tablet 3    amLODIPine (NORVASC) 10 mg tablet Take 1 tablet (10 mg total) by mouth daily 30 tablet 5    aspirin 81 mg chewable tablet Chew daily      B Complex Vitamins (VITAMIN B COMPLEX PO) Take by mouth      BREO ELLIPTA USE 1 INHALATION DAILY 1 each 5    cyanocobalamin (VITAMIN B-12) 100 mcg tablet Take by mouth      docusate sodium (COLACE) 100 mg capsule Take 100 mg by mouth daily at bedtime   fenofibrate (TRICOR) 145 mg tablet Take 145 mg by mouth daily   IFEREX 150 150 MG capsule take 1 capsule by mouth once daily 30 capsule 2    isosorbide mononitrate (IMDUR) 30 mg 24 hr tablet take 1 tablet by mouth once daily 30 tablet 0    levothyroxine 50 mcg tablet Take 1 tablet (50 mcg total) by mouth daily 90 tablet 0    metoprolol tartrate (LOPRESSOR) 25 mg tablet Take 1 tablet (25 mg total) by mouth every 12 (twelve) hours   60 tablet 2    pantoprazole (PROTONIX) 40 mg tablet take 1 tablet by mouth once daily 90 tablet 3    tolterodine (DETROL) 1 mg tablet Take 1 mg by mouth daily      torsemide (DEMADEX) 20 mg tablet Take 1 tablet (20 mg total) by mouth 2 (two) times a day as needed (leg edema) 60 tablet 5    traMADol-acetaminophen (ULTRACET) 37 5-325 mg per tablet Take 1 tablet by mouth 2 (two) times a day as needed for moderate pain 30 tablet 0    atorvastatin (LIPITOR) 10 mg tablet Take 2 tablets (20 mg total) by mouth daily 90 tablet 3    diclofenac sodium (VOLTAREN) 1 % Apply 4 g topically 4 (four) times a day Both shoulders and knees 5 Tube 5    DULoxetine (CYMBALTA) 30 mg delayed release capsule Take 1 capsule (30 mg total) by mouth daily 30 capsule 1    hydrALAZINE (APRESOLINE) 25 mg tablet take 1 tablet by mouth every 8 hours 90 tablet 0    meloxicam (MOBIC) 15 mg tablet Take 1 tablet (15 mg total) by mouth daily for 90 days As needed 90 tablet 0     No current facility-administered medications for this visit  Objective:    /80   Pulse 60   Temp 98 °F (36 7 °C)   Resp 20   Ht 5' 10" (1 778 m)   Wt 95 7 kg (211 lb)   BMI 30 28 kg/m²        Physical Exam   Constitutional: He appears well-developed  HENT:   Head: Normocephalic  Eyes: Conjunctivae are normal    Neck: Neck supple  Cardiovascular: Normal rate and intact distal pulses  Pulmonary/Chest: Effort normal  No respiratory distress  Abdominal: Soft  Musculoskeletal: He exhibits no edema or deformity  Neurological: He is alert  Skin: Skin is warm and dry  Psychiatric: His behavior is normal  Thought content normal    Nursing note and vitals reviewed      mild LE edema but compression stockings on  NC oxygen on  Very limited shoulder rom and elevation with significant pain    Justina Barcenas DO

## 2018-09-20 NOTE — PROGRESS NOTES
Procedures   Foot Exam       Discussion/Summary  The patient was counseled regarding diagnostic results,-- instructions for management,-- prognosis,-- patient and family education,-- risks and benefits of treatment options,-- importance of compliance with treatment  Patient is able to Self-Care  Possible side effects of new medications were reviewed with the patient/guardian today  The treatment plan was reviewed with the patient/guardian  The patient/guardian understands and agrees with the treatment plan   Patient's shoes and socks removed  Right Foot/Ankle   Right Foot Inspection  Skin Exam: callus and callus               Toe Exam: swelling  Sensory   Vibration: diminished  Proprioception: diminished   Monofilament testing: diminished  Vascular  Capillary refills: < 3 seconds  The right DP pulse is 1+  The right PT pulse is 1+       Left Foot/Ankle  Left Foot Inspection  Skin Exam: callus              Toe Exam: swelling                   Sensory   Vibration: diminished  Proprioception: diminished  Monofilament: diminished  Vascular  Capillary refills: < 3 seconds  The left DP pulse is 1+  The left PT pulse is 1+  Assign Risk Category:  Deformity present; Loss of protective sensation; Weak pulses       Risk: 2     Chief Complaint  Routine nail care      History of Present Illness  HPI: Patient complains of pain in his feet with ambulation  Patient has no history of trauma  Patient has pain wearing shoes       Review of Systems   Constitutional: chills   Eyes: eyesight problems    ENT: hearing loss   Cardiovascular: No complaints of chest pain, no palpitations, no leg claudication or lower extremity edema   Respiratory: shortness of breath   Gastrointestinal: No complaints of abdominal pain, no constipation, no nausea or vomiting, no diarrhea or bloody stools   Genitourinary: No complaints of dysuria or incontinence, no hesitancy, no nocturia   Musculoskeletal: as noted in HPI   Integumentary: No complaints of skin rash or lesion, no itching or dry skin, no skin wounds   Neurological: No complaints of headache, no confusion, no numbness or tingling, no dizziness   Psychiatric: No suicidal thoughts, no anxiety, no depression   Endocrine: muscle weakness,-- frequent urination-- and-- excessive thirst       Active Problems  1  Abnormal gait (781 2) (R26 9)  2  Acquired ankle/foot deformity (736 70) (M21 969)  3  Actinic keratosis (702 0) (L57 0)  4  Allergic rhinitis (477 9) (J30 9)  5  Arteriosclerosis of arteries of extremities (440 20) (I70 209)  6  Atrial fibrillation (427 31) (I48 91)  7  Benign essential HTN (401 1) (I10)  8  BPH (benign prostatic hyperplasia) (600 00) (N40 0)  9  Bursitis, subacromial (726 19) (M75 50)  10  CAD in native artery (414 01) (I25 10)  11  Callus (700) (L84)  12  Centrilobular emphysema (492 8) (J43 2)  13  Cervical radiculopathy (723 4) (M54 12)  14  Denied: History of Chest tightness  15  Chronic hypoxemic respiratory failure (518 83,799 02) (J96 11)  16  Chronic kidney disease, stage 3 (585 3) (N18 3)  17  Chronic left shoulder pain (719 41,338 29) (M25 512,G89 29)  18  Chronic systolic congestive heart failure (428 22,428 0) (I50 22)  19  Congestive heart failure (428 0) (I50 9)  20  COPD (chronic obstructive pulmonary disease) (496) (J44 9)  21  Denied: History of Cough  22  Depression screening (V79 0) (Z13 89)  23  Difficulty walking (719 7) (R26 2)  24  Dizziness (780 4) (R42)  25  Esophagitis determined by endoscopy (530 10) (K20 9)  26  Foot pain, bilateral (729 5) (M79 671,M79 672)  27  Heart failure, left, 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)  28  High triglycerides (272 1) (E78 1)  29  History of aortic valve stenosis (V12 59) (Z86 79)  30  Hyperlipidemia LDL goal <100 (272 4) (E78 5)  31  Hyperthyroidism (242 90) (E05 90)  32  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)  33  Hyponatremia (276 1) (E87 1)  34  Hypothyroidism (244 9) (E03 9)  35  Immunization due (V05 9) (Z23)  36  Obesity with alveolar hypoventilation, unspecified obesity severity (278 03) (E66 2)  37  Obstructive sleep apnea (327 23) (G47 33)  38  Onychomycosis (110 1) (B35 1)  39  Osteoarthritis of left shoulder (715 91) (M19 012)  40  Osteoarthritis, knee/lower leg (715 96) (M17 9)  41  Peripheral vascular disease (443 9) (I73 9)  42  Pleural Effusion  43  Prediabetes (790 29) (R73 09)  44  Primary osteoarthritis of left shoulder (715 11) (M19 012)  45  Pulmonary fibrosis (515) (J84 10)  46  Pulmonary nodule, left (793 11) (R91 1)  47  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)  48  Screening for cardiovascular, respiratory, and genitourinary diseases  (V81 2,V81 4,V81 6) (Z13 6,Z13 83,Z13 89)  49  Screening for neurological condition (V80 09) (Z13 89)  50   Sleep related hypoventilation in conditions classified elsewhere (327 26) (G47 36)     Past Medical History   · Acute maxillary sinusitis (461 0) (J01 00)   · History of Acute maxillary sinusitis, recurrence not specified (461 0) (J01 00)   · Acute upper respiratory infection (465 9) (J06 9)   · History of Acute upper respiratory infection (465 9) (J06 9)   · History of Bladder neck obstruction (596 0) (N32 0)   · History of Bronchitis, chronic obstructive, with exacerbation (491 21) (J44 1)   · History of Cataract of both eyes (366 9) (H26 9)   · History of Cellulitis (682 9) (L03 90)   · History of Cellulitis (682 9) (L03 90)   · History of Cellulitis of foot (682 7) (L03 119)   · History of Cellulitis of neck (682 1) (Q08 091)   · Denied: History of Chest tightness   · History of Chronic allergic conjunctivitis (372 14) (H10 45)   · Denied: History of Cough   · History of 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)   · History of Diabetes type 2, uncontrolled (250 02) (E11 65)   · History of Dyspnea on exertion (786 09) (R06 09)   · History of Exposure to scabies (V01 89) (Z20 89)   · History of Foreign Body In The Right Auditory Canal (931)   · H/O blood clots (V12 51) (Z86 718)   · History of acute bronchitis (V12 69) (Z87 09)   · History of acute bronchitis (V12 69) (Z87 09)   · History of acute bronchitis (V12 69) (Z87 09)   · History of allergic rhinitis (V12 69) (Z87 09)   · History of aortic valve replacement (V43 3) (Z95 2)   · History of backache (V13 59) (Z87 39)   · History of bacterial pneumonia (V12 61) (Z87 01)   · History of bronchitis (V12 69) (Z87 09)   · History of carcinoma (V10 90) (Z85 9)   · History of cardiomegaly (V12 59) (Z86 79)   · History of constipation (V12 79) (Z87 19)   · History of decubitus ulcer (V13 3) (Z87 2)   · History of dermatitis (V13 3) (Z87 2)   · History of dizziness (V13 89) (D24 191)   · History of eczema (V13 3) (Z87 2)   · History of edema (V13 89) (T47 006)   · History of epistaxis (V12 69) (T77 700)   · History of fever (V13 89) (W42 113)   · History of fracture of rib (V15 51) (Z87 81)   · History of influenza (V12 09) (Z87 09)   · History of phimosis (V13 89) (Q08 667)   · History of shortness of breath (V13 89) (V28 369)   · History of tinea corporis (V12 09) (Z86 19)   · History of tinea corporis (V12 09) (Z86 19)   · History of Internal Hemorrhoids (455 0)   · History of Knee Sprain (844 9)   · Late Effects Of Sprain Or Strain (905 7)   · History of Noninfected skin tear of right lower extremity, initial encounter (891 0)(S81 811A)   · History of Non-ST elevation myocardial infarction (NSTEMI) of indeterminate age   · History of Normal routine physical examination (V70 0) (Z00 00)   · Otalgia, unspecified laterality (388 70) (H92 09)   · History of Otalgia, unspecified laterality (388 70) (H92 09)   · History of Pneumonia (V12 61)   · History of Pneumonia (486) (J18 9)   · History of Postop check (V67 00) (Z09)   · History of Rotator cuff tendinitis (726 10) (M75 80)   · Skin Abscess Of Hip (682 6)   · History of Skin neoplasm (239 2) (D49 2)   · History of Sprain and strain (848 9) (T14 8XXA)   · History of Tachycardia (785 0) (R00 0)   · History of Trigger finger (727 03) (M65 30)   · History of Type 2 diabetes mellitus (250 00) (E11 9)   · Venous thrombosis (453 9) (I82 90)     The active problems and past medical history were reviewed and updated today       Surgical History   · History of Aortic Valve Replacement Transcatheter   · History of Cataract Surgery   · History of Hip Replacement   · History of Knee Replacement   · History of Surgery Penis Circumcision Except [place]   · History of Thoracentesis (Diagnostic)   · History of Tonsillectomy     The surgical history was reviewed and updated today        Family History  Mother    · Family history of arthritis (V17 7) (Z82 61)   · Family history of coronary artery disease (V17 3) (Z82 49)   · Family history of hypertension (V17 49) (Z82 49)  Father    · Family history of arthritis (V17 7) (Z82 61)   · Family history of Prostate cancer  Family History    · Denied: Family history of Pulmonary Disease     The family history was reviewed and updated today        Social History   · Being A Social Drinker   · Denied: Drug use (305 90) (F19 90)   · Former smoker (V15 82) (Z87 891)   · Pt quit smoking 50 yrs ago    · History of Former smoker (V15 82) (Z87 891)   · History of Former smoker (V15 82) (Z87 891)   · QUIT SMOKING 25-35 YEARS AGO AND HE SMOKED 1 PPD FOR 45YEARS    · Denied: History of Pets / animals   · Denied: History of Recreational drug use   · Denied: History of Travel history  The social history was reviewed and updated today       Current Meds  1  Amiodarone HCl - 100 MG Oral Tablet; TAKE 1 TABLET DAILY (CONTACT CARDIOLOGIST FOR ADDITIONAL REFILLS); Therapy: 98ZIB0066 to (Last Rx:2017)  Requested for: 03UJU0659 Ordered  2  AmLODIPine Besylate 10 MG Oral Tablet; TAKE 1 TABLET DAILY; Therapy: 02RTU3649 to (Last Rx:2017)  Requested for: 11DRY5706 Ordered  3  Aspirin 81 MG Oral Tablet Chewable; 2 daily; Therapy: (Colletta Browning) to Recorded  4  Breo Ellipta 100-25 MCG/INH Inhalation Aerosol Powder Breath Activated; INHALE 1 PUFFS Daily; Therapy: 97WZB8563 to (Vera Mccollum) Recorded  5  Cialis 2 5 MG Oral Tablet; Take 1 tablet daily as directed; Therapy: 26NIP5923 to (Evaluate:22Nov2017)  Requested for: 19TJM0662; Last Rx:06Qhx7002 Ordered  6  Colace 100 MG Oral Capsule; 1 Two Times A Day, As Needed; Therapy: 34XIV4742 to [facility] for: 42XUA2106 Recorded  7  Fenofibrate 145 MG Oral Tablet; TAKE 1 TABLET BY MOUTH EVERY OTHER DAY; Therapy: 36PYR4126 to (74 831 591)  Requested for: 67GBC5222; Last Rx:03Nov2017 Ordered  8  Ipratropium-Albuterol 0 5-2 5 (3) MG/3ML Inhalation Solution; Use 1 vial in nebulizer 4 times daily; Therapy: 01Aga2375 to (Evaluate:60Mqs6573)  Requested for: 86JGQ4895; Last Rx:09May2016 Ordered  9  Levothyroxine Sodium 50 MCG Oral Tablet; Take 1 tablet daily, JESSICA; Therapy: 11QPK1447 to (Last Rx:27Nov2017)  Requested for: 21BRO7016 Ordered  10  Metoprolol Tartrate 25 MG Oral Tablet; take 1 tablet by mouth twice a day; Thania Kwong: 44UFB6405 to (Vane Duran)  Requested for: 94OIP5767; Last  Rx:03Nov2017 Ordered  11  Simvastatin 40 MG Oral Tablet; take 1 tablet by mouth once daily;  Therapy: (XJQVZIYK:00EZW0013) to Recorded  12  Tolterodine Tartrate ER 4 MG Oral Capsule Extended Release 24 Hour; TAKE 1  CAPSULE ONCE DAILY;  Therapy: 58MMO6293 to (ELLENXIL:20MHG3769)  Requested for: 49EQF2254; Last  Rx:73Hut1770 Ordered  13  Torsemide 20 MG Oral Tablet; TAKE 1 TABLET DAILY ALTERNATING WITH 2 TABLETS  (40 MG) DAILY;  Therapy: 26MUC4162 to (Vane Duran)  Requested for: 82GZL2918; Last  Rx:03Nov2017 Ordered  14  Vitamin B Complex CAPS;  Therapy: (Pierre Mccray) to Recorded  15  Vitamin B-12 TABS;  Therapy: (Pierre Mccray) to Recorded  16  Vitamin C TABS;  Therapy: (Pierre Mccray) to Recorded  17   Vitamin E 400 UNIT Oral Capsule;  Therapy: (Pierre Mccray) to Recorded     The medication list was reviewed and updated today       Allergies  1  No Known Drug Allergies     Vitals          Height 5 ft 9 in 5 ft 9 in   Weight 218 lb  218 lb    BMI Calculated 32 19 32 19   BSA Calculated 2 14 2 14      Physical Exam  Left Foot: Appearance: Normal except as noted: excessive pronation-- and-- pes planus  Great toe deformities include a bunion  Tenderness: None except the distal first metatarsal-- and-- distal fifth metatarsal    Right Foot: Appearance: Normal except as noted: excessive pronation-- and-- pes planus  Great toe deformities include a bunion  Tenderness: None except the distal first metatarsal-- and-- distal fifth metatarsal    Left Ankle: ROM: limited ROM in all planes   Right Ankle: ROM: limited ROM in all planes   Neurological Exam: performed  Light touch was intact bilaterally  Vibratory sensation was intact bilaterally  Response to monofilament test was intact bilaterally  Deep tendon reflexes: patellar reflex present bilaterally-- and-- achilles reflex present bilaterally  Vascular Exam: performed Dorsalis pedis pulses were One/4 bilaterally  Posterior tibial pulses were One/4 bilaterally  Elevation Pallor: present bilaterally  Dependence rubor was present bilaterally  Capillary refill time was Q9 findings bilateral  Negative digital hair noted  Positive abnormal cooling bilateral, but-- between 1-3 seconds bilaterally  Toenails: All of the toenails were elongated,-- hypertrophied,-- discolored,-- tender-- and-- Mycotic  Hyperkeratosis: present on both first sub metatarsals-- and-- present on both fifth sub metatarsals  Shoe Gear Evaluation: performed ()  Recommendation(s): custom inlays       Procedure  Foot exam performed  All mycotic nails debrided  Plantar lesions debrided  Patient tolerated procedures well without pain or complication  Patient will moisturize after bathing  He will return for follow-up            There are no diagnoses linked to this encounter        Subjective:  Patient has pain in his feet with ambulation  No history of trauma      Patient ID: Genaro Murray is a 80 y  o  male        Objective:      Foot Exam     Right Foot/Ankle      Inspection and Palpation  Skin Exam: callus;      Neurovascular  Dorsalis pedis: 1+  Posterior tibial: 1+        Left Foot/Ankle       Inspection and Palpation  Skin Exam: callus;      Neurovascular  Dorsalis pedis: 1+  Posterior tibial: 1+        Physical Exam   Cardiovascular: Pulses are weak pulses  Pulses:       Dorsalis pedis pulses are 1+ on the right side, and 1+ on the left side         Posterior tibial pulses are 1+ on the right side, and 1+ on the left side  Feet:   Right Foot:   Skin Integrity: Positive for callus     Left Foot:   Skin Integrity: Positive for callus

## 2018-09-24 DIAGNOSIS — M25.512 CHRONIC PAIN OF BOTH SHOULDERS: ICD-10-CM

## 2018-09-24 DIAGNOSIS — G89.29 CHRONIC PAIN OF BOTH SHOULDERS: ICD-10-CM

## 2018-09-24 DIAGNOSIS — M25.511 CHRONIC PAIN OF BOTH SHOULDERS: ICD-10-CM

## 2018-09-26 ENCOUNTER — OFFICE VISIT (OUTPATIENT)
Dept: PULMONOLOGY | Facility: MEDICAL CENTER | Age: 83
End: 2018-09-26
Payer: COMMERCIAL

## 2018-09-26 VITALS
HEIGHT: 70 IN | OXYGEN SATURATION: 96 % | DIASTOLIC BLOOD PRESSURE: 46 MMHG | WEIGHT: 213 LBS | RESPIRATION RATE: 12 BRPM | SYSTOLIC BLOOD PRESSURE: 104 MMHG | BODY MASS INDEX: 30.49 KG/M2 | TEMPERATURE: 97.9 F | HEART RATE: 98 BPM

## 2018-09-26 DIAGNOSIS — I50.42 CHRONIC COMBINED SYSTOLIC AND DIASTOLIC CHF, NYHA CLASS 2 AND ACA/AHA STAGE C: ICD-10-CM

## 2018-09-26 DIAGNOSIS — J96.11 CHRONIC HYPOXEMIC RESPIRATORY FAILURE (HCC): ICD-10-CM

## 2018-09-26 DIAGNOSIS — N18.32 CHRONIC KIDNEY DISEASE (CKD) STAGE G3B/A1, MODERATELY DECREASED GLOMERULAR FILTRATION RATE (GFR) BETWEEN 30-44 ML/MIN/1.73 SQUARE METER AND ALBUMINURIA CREATININE RATIO LESS THAN 30 MG/G (HCC): Primary | ICD-10-CM

## 2018-09-26 DIAGNOSIS — J43.2 CENTRILOBULAR EMPHYSEMA (HCC): ICD-10-CM

## 2018-09-26 DIAGNOSIS — G47.33 OSA (OBSTRUCTIVE SLEEP APNEA): Chronic | ICD-10-CM

## 2018-09-26 DIAGNOSIS — D50.9 IRON DEFICIENCY ANEMIA, UNSPECIFIED IRON DEFICIENCY ANEMIA TYPE: ICD-10-CM

## 2018-09-26 PROCEDURE — 99213 OFFICE O/P EST LOW 20 MIN: CPT | Performed by: INTERNAL MEDICINE

## 2018-09-26 RX ORDER — PREDNISONE 10 MG/1
TABLET ORAL DAILY
COMMUNITY
End: 2018-09-26 | Stop reason: ALTCHOICE

## 2018-10-01 ENCOUNTER — OFFICE VISIT (OUTPATIENT)
Dept: PAIN MEDICINE | Facility: CLINIC | Age: 83
End: 2018-10-01
Payer: COMMERCIAL

## 2018-10-01 VITALS
RESPIRATION RATE: 12 BRPM | HEART RATE: 67 BPM | DIASTOLIC BLOOD PRESSURE: 50 MMHG | BODY MASS INDEX: 30.56 KG/M2 | SYSTOLIC BLOOD PRESSURE: 134 MMHG | HEIGHT: 70 IN

## 2018-10-01 DIAGNOSIS — G89.29 CHRONIC PAIN OF BOTH SHOULDERS: Primary | ICD-10-CM

## 2018-10-01 DIAGNOSIS — M19.012 PRIMARY OSTEOARTHRITIS OF SHOULDERS, BILATERAL: ICD-10-CM

## 2018-10-01 DIAGNOSIS — M25.512 CHRONIC PAIN OF BOTH SHOULDERS: Primary | ICD-10-CM

## 2018-10-01 DIAGNOSIS — M25.511 CHRONIC PAIN OF BOTH SHOULDERS: Primary | ICD-10-CM

## 2018-10-01 DIAGNOSIS — M19.011 PRIMARY OSTEOARTHRITIS OF SHOULDERS, BILATERAL: ICD-10-CM

## 2018-10-01 PROCEDURE — 99204 OFFICE O/P NEW MOD 45 MIN: CPT | Performed by: ANESTHESIOLOGY

## 2018-10-01 NOTE — PROGRESS NOTES
Assessment:  1  Chronic pain of both shoulders    2  Primary osteoarthritis of shoulders, bilateral        Plan:  Orders Placed This Encounter   Procedures    Ambulatory referral to Orthopedic Surgery     Standing Status:   Future     Standing Expiration Date:   4/1/2019     Referral Priority:   Routine     Referral Type:   Consult - AMB     Referral Reason:   Specialty Services Required     Referred to Provider:   Rupesh Thacker MD     Requested Specialty:   Orthopedic Surgery     Number of Visits Requested:   1     Expiration Date:   10/1/2019     My impressions and treatment recommendations were discussed in detail with the patient, who verbalized understanding and had no further questions  The patient reports that he has osteoarthritis of the bilateral shoulders  He reports excellent pain relief following a shoulder injection that Dr Nader Guzman had performed about 1 year ago  I asked him why he never followed up with Dr Nader Guzman and he stated that he is not sure  He states that he would be happy to return to Dr Nader Guzman to continue the shoulder corticosteroid injections  As such, I have provided him with a referral to see Dr Nader Guzman at today's visit  Follow-up is planned on an as-needed basis  Discharge instructions were provided  I personally saw and examined the patient and I agree with the above discussed plan of care  History of Present Illness:    Renetta Ko is a 80 y o  male who presents to [facility] for initial evaluation of the above stated pain complaints  The patient has a past medical and chronic pain history as outlined in the assessment section  He was referred by Dr Emma Griffith  The patient reports a several month history of pain in his bilateral shoulders  He describes his pain as severe and 10+ out of 10 on the verbal numerical pain rating scale    He states that his pain is constant in nature and worse in the morning, afternoon, evening  He describes his pain as shooting, sharp, dull/aching, and pins and needles    The patient reports weakness in his upper extremities  He states that lying down decreases pain while bending and exercise increases pain  He states that heat/ice treatment and chiropractic manipulation provided moderate pain relief  He is currently retired  The patient does report that a shoulder injection with corticosteroid in the past by Dr Corrinne Pates gave him significant relief of symptoms  He is agreeable to see Dr Corrinne Pates regarding undergoing further injections into his shoulders  Review of Systems:    Review of Systems   Constitutional: Negative for fever and unexpected weight change  HENT: Negative for trouble swallowing  Eyes: Negative for visual disturbance  Respiratory: Negative for shortness of breath and wheezing  Cardiovascular: Negative for chest pain and palpitations  Gastrointestinal: Negative for constipation, diarrhea, nausea and vomiting  Endocrine: Negative for cold intolerance, heat intolerance and polydipsia  Genitourinary: Negative for difficulty urinating and frequency  Musculoskeletal: Positive for gait problem (decreased range range of motion)  Negative for arthralgias, joint swelling and myalgias  Skin: Negative for rash  Neurological: Positive for weakness (muscle weakness)  Negative for dizziness, seizures, syncope and headaches  Hematological: Bruises/bleeds easily  Psychiatric/Behavioral: Negative for dysphoric mood  All other systems reviewed and are negative          Patient Active Problem List   Diagnosis    Moderate COPD (chronic obstructive pulmonary disease) (HCC)    Paroxysmal atrial fibrillation ([address] )    CAD in native artery    BPH (benign prostatic hyperplasia)    Hyperlipidemia    Hypertension    S/P TAVR (transcatheter aortic valve replacement)    Ambulatory dysfunction    Hearing difficulty of right ear    Chronic hypoxemic respiratory failure ([facility] )    PATITO (obstructive sleep apnea)    Pleural effusion, right (chronic)    Multiple pulmonary nodules    Class 1 obesity in adult    Osteoarthritis of knee    Peripheral vascular disease ([facility])    Diabetic polyneuropathy associated with type 2 diabetes mellitus ([place] [address] )    Peripheral arteriosclerosis ([facility])    Actinic keratosis    Allergic rhinitis    Centrilobular emphysema ([facility])    Cervical radiculopathy    Acute on chronic respiratory failure with hypoxia (HCC)    CKD (chronic kidney disease)    Chronic left shoulder pain    Chronic combined systolic and diastolic CHF, 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 ([facility])    Hypothyroidism    GERD (gastroesophageal reflux disease)    Elevated d-dimer    MICKEY (iron deficiency anemia)    Corns    Pain in both feet    Onychomycosis    Pulmonary hypertension (HCC)    Chronic pain of both shoulders       Past Medical History:   Diagnosis Date    Allergic rhinitis 06/11/2010    Aortic stenosis, severe     Bacterial pneumonia 06/08/2007    Bladder neck obstruction 12/23/2010    BPH (benign prostatic hyperplasia)     Bronchitis, chronic obstructive, with exacerbation ([place] [address] ) 01/30/2012    CAD (coronary artery disease)     stent 2014    Carcinoma ([place] [address] )     resolved [id number]    Cardiomegaly 02/13/2007    Cataract of both eyes     CHF (congestive heart failure) ([facility])     Chronic allergic conjunctivitis     last assessed [id number]    Chronic kidney disease (CKD)     CKD (chronic kidney disease), stage III (HCC)     CKD (chronic kidney disease), stage III (HCC)     COPD (chronic obstructive pulmonary disease) ([place] [address] )     Decubitus ulcer     resolved [id number]    Dermatomycosis     last assessed [id number]    Diabetes mellitus type 2, noninsulin dependent (HCC)     Dyspnea on exertion     last assessed [id number]    Eczema     last assessed [id number]    Edema     last assessed [id number]    Epistaxis 12/01/2004    Esophagitis, erosive     Exposure to scabies     resolved [id number]    Fracture of rib     last assessed [id number]    H/O aortic valve replacement     resolved [id number]    H/O blood clots     History of DVT (deep vein thrombosis)     left posterior tibial/peroneal veins    History of non-ST elevation myocardial infarction (NSTEMI)     History of pneumonia     Hyperlipidemia     Hypertension     Internal hemorrhoids 2006    LBBB (left bundle branch block)     MRSA (methicillin resistant staph aureus) culture positive     NSTEMI (non-ST elevated myocardial infarction) ([facility] )     resolved [id number]    Obstructive sleep apnea on CPAP     severe PATITO with AHI of 106 and uses CPAP at 10 cm H2O with 2 l/m oxygen    Paroxysmal atrial fibrillation ([facility] )     Phimosis 2010    severe pt had circumcision 2010    Pulmonary fibrosis ([facility] )     PVD (peripheral vascular disease) ([facility] )     Rotator cuff tendonitis     lsat assessed [id number]    Skin abscess 2004    Hip    Skin neoplasm     last assessed [id number]    Tachycardia 2004    Trigger finger     last assessed [id number]    Type 2 diabetes mellitus ([facility] ) 2004    Venous thrombosis 2007    Venous thrombosis 2007       Past Surgical History:   Procedure Laterality Date    CARDIAC CATHETERIZATION  03/10/2016    Showed 60-70% mid LAD lesion next to stent    CATARACT EXTRACTION      CIRCUMCISION, NON-      ESOPHAGOGASTRODUODENOSCOPY N/A 3/14/2016    Procedure: ESOPHAGOGASTRODUODENOSCOPY (EGD); Surgeon: Sagrario Sandoval MD;  Location: [facility];   Service:     EYE SURGERY      FRACTURE SURGERY      JOINT REPLACEMENT      both hips replaced    OTHER SURGICAL HISTORY      H/O thoracentesis (diagnostic)    MA REPLACE AORTIC VALVE OPENFEMORAL ARTERY APPROACH N/A 2016    Procedure: TRANSFEMORAL TAVR WITH 26MM CALIX MAKAYLA S3 TISSUE VALVE; PINA ;  Surgeon: Cas Mata DO;  Location:  MAIN OR;  Service: Cardiac Surgery  REPLACEMENT TOTAL KNEE Bilateral 01/01/1991 1991 and 2001    TISSUE AORTIC VALVE REPLACEMENT      transfemoral, transcatheter    TONSILLECTOMY      TONSILLECTOMY AND ADENOIDECTOMY      TOTAL HIP ARTHROPLASTY      Bilateral       Family History   Problem Relation Age of Onset    Coronary artery disease Mother     Arthritis Mother     Hypertension Mother     Rheum arthritis Father     Prostate cancer Father        Social History     Occupational History    Not on file  Social History Main Topics    Smoking status: Former Smoker     Packs/day: 1 00     Years: 35 00     Types: Cigarettes     Quit date: 1/1/1987    Smokeless tobacco: Never Used      Comment: quit 50 years ago, per [employer]    Alcohol use Yes      Comment: socially    Drug use: No    Sexual activity: No      Comment: , lives at home with wife, ambulates with cane at home         Current Outpatient Prescriptions:     albuterol (PROAIR HFA) 90 mcg/act inhaler, Inhale 2 puffs every 4 (four) hours as needed for wheezing or shortness of breath, Disp: 18 g, Rfl: 5    amiodarone 100 mg tablet, Take 1 tablet (100 mg total) by mouth daily, Disp: 90 tablet, Rfl: 3    amLODIPine (NORVASC) 10 mg tablet, Take 1 tablet (10 mg total) by mouth daily, Disp: 30 tablet, Rfl: 5    aspirin 81 mg chewable tablet, Chew daily, Disp: , Rfl:     atorvastatin (LIPITOR) 10 mg tablet, Take 2 tablets (20 mg total) by mouth daily, Disp: 90 tablet, Rfl: 3    B Complex Vitamins (VITAMIN B COMPLEX PO), Take by mouth, Disp: , Rfl:     BREO ELLIPTA, USE 1 INHALATION DAILY, Disp: 1 each, Rfl: 5    cyanocobalamin (VITAMIN B-12) 100 mcg tablet, Take by mouth, Disp: , Rfl:     docusate sodium (COLACE) 100 mg capsule, Take 100 mg by mouth daily at bedtime  , Disp: , Rfl:     DULoxetine (CYMBALTA) 30 mg delayed release capsule, Take 1 capsule (30 mg total) by mouth daily, Disp: 30 capsule, Rfl: 1    fenofibrate (TRICOR) 145 mg tablet, Take 145 mg by mouth daily  , Disp: , Rfl:     hydrALAZINE (APRESOLINE) 25 mg tablet, take 1 tablet by mouth every 8 hours, Disp: 90 tablet, Rfl: 0    IFEREX 150 150 MG capsule, take 1 capsule by mouth once daily, Disp: 30 capsule, Rfl: 2    isosorbide mononitrate (IMDUR) 30 mg 24 hr tablet, take 1 tablet by mouth once daily, Disp: 30 tablet, Rfl: 0    levothyroxine 50 mcg tablet, Take 1 tablet (50 mcg total) by mouth daily, Disp: 90 tablet, Rfl: 0    meloxicam (MOBIC) 15 mg tablet, Take 1 tablet (15 mg total) by mouth daily for 90 days As needed, Disp: 90 tablet, Rfl: 0    metoprolol tartrate (LOPRESSOR) 25 mg tablet, Take 1 tablet (25 mg total) by mouth every 12 (twelve) hours  , Disp: 60 tablet, Rfl: 2    pantoprazole (PROTONIX) 40 mg tablet, take 1 tablet by mouth once daily, Disp: 90 tablet, Rfl: 3    tolterodine (DETROL) 1 mg tablet, Take 1 mg by mouth daily, Disp: , Rfl:     torsemide (DEMADEX) 20 mg tablet, Take 1 tablet (20 mg total) by mouth 2 (two) times a day as needed (leg edema), Disp: 60 tablet, Rfl: 5    traMADol-acetaminophen (ULTRACET) 37 5-325 mg per tablet, Take 1 tablet by mouth 2 (two) times a day as needed for moderate pain, Disp: 30 tablet, Rfl: 0    diclofenac sodium (VOLTAREN) 1 %, Apply 4 g topically 4 (four) times a day Both shoulders and knees (Patient not taking: Reported on 10/1/2018 ), Disp: 5 Tube, Rfl: 5    No Known Allergies    Physical Exam:    /50 (BP Location: Right arm, Patient Position: Sitting, Cuff Size: Standard)   Pulse 67   Resp 12   Ht 5' 10" (1 778 m)   BMI 30 56 kg/m²     Constitutional: overweight  Eyes: anicteric  HEENT: grossly intact  Neck: supple, symmetric, trachea midline and no masses   Pulmonary:even and unlabored  Cardiovascular:No edema or pitting edema present  Skin:Normal without rashes or lesions and well hydrated  Psychiatric:Mood and affect appropriate  Neurologic:Cranial Nerves II-XII grossly intact  Musculoskeletal:in wheelchair, active range of motion modalities of the bilateral shoulders are limited to the 90 degree samina  The the there is pain on palpation of the posterior, superior, and anterior portion of the bilateral shoulder joints      Orders Placed This Encounter   Procedures    Ambulatory referral to Orthopedic Surgery

## 2018-10-03 NOTE — PROGRESS NOTES
Assessment/Plan:     Problem List Items Addressed This Visit        Respiratory    PATITO (obstructive sleep apnea) (Chronic)    Centrilobular emphysema ([facility] )     Charley Arcos has moderate COPD and will continue Breo 100 mcg 1 puff daily and nebulized treatment DuoNeb 4 times a day as needed         Chronic hypoxemic respiratory failure ([facility] )     Charley Arcos will continue with his oxygen anywhere from 6-9 as needed with activity  Cardiovascular and Mediastinum    Acute on chronic combined systolic and diastolic heart failure (HCC)       Other    MICKEY (iron deficiency anemia)    Relevant Orders    CBC and differential      Other Visit Diagnoses     Chronic kidney disease (CKD) stage G3b/A1, moderately decreased glomerular filtration rate (GFR) between 30-44 mL/min/1 73 square meter and albuminuria creatinine ratio less than 30 mg/g    -  Primary    Relevant Orders    Basic metabolic panel            Return in about 3 months (around 12/26/2018)  All questions are answered to the patient's satisfaction and understanding  He verbalizes understanding  He is encouraged to call with any further questions or concerns  Portions of the record may have been created with voice recognition software  Occasional wrong word or "sound a like" substitutions may have occurred due to the inherent limitations of voice recognition software  Read the chart carefully and recognize, using context, where substitutions have occurred  Electronically Signed by Bj Freeman DO    ______________________________________________________________________    Chief Complaint:   Chief Complaint   Patient presents with    Follow-up     1m    Shortness of Breath     alright        Patient ID: Charley Arcos is a 80 y o  y o  male has a past medical history of Allergic rhinitis (06/11/2010); Aortic stenosis, severe; Bacterial pneumonia (06/08/2007); Bladder neck obstruction (12/23/2010); BPH (benign prostatic hyperplasia);  Bronchitis, chronic obstructive, with exacerbation ([place] [address] ) (01/30/2012); CAD (coronary artery disease); Carcinoma ([place] [address] ); Cardiomegaly (02/13/2007); Cataract of both eyes; CHF (congestive heart failure) ([place] [address] ); Chronic allergic conjunctivitis; Chronic kidney disease (CKD); CKD (chronic kidney disease), stage III (HCC); CKD (chronic kidney disease), stage III (HCC); COPD (chronic obstructive pulmonary disease) ([place] [address] ); Decubitus ulcer; Dermatomycosis; Diabetes mellitus type 2, noninsulin dependent ([place] [address] ); Dyspnea on exertion; Eczema; Edema; Epistaxis (12/01/2004); Esophagitis, erosive; Exposure to scabies; Fracture of rib; H/O aortic valve replacement; H/O blood clots; History of DVT (deep vein thrombosis); History of non-ST elevation myocardial infarction (NSTEMI); History of pneumonia; Hyperlipidemia; Hypertension; Internal hemorrhoids (09/13/2006); LBBB (left bundle branch block); MRSA (methicillin resistant staph aureus) culture positive; NSTEMI (non-ST elevated myocardial infarction) ([place] [address] ); Obstructive sleep apnea on CPAP; Paroxysmal atrial fibrillation ([place] [address] ); Phimosis (09/01/2010); Pulmonary fibrosis ([place] [address] ); PVD (peripheral vascular disease) ([place] [address] ); Rotator cuff tendonitis; Skin abscess (12/21/2004); Skin neoplasm; Tachycardia (12/01/2004); Trigger finger; Type 2 diabetes mellitus ([place] [address] ) (12/01/2004); Venous thrombosis (05/16/2007); and Venous thrombosis (05/16/2007)  9/26/2018  Patient presents today for follow-up visit  HPI    Jose R Satnamaurabrooklynnotf has chronic hypoxemic respiratory failure secondary to COPD and severe pulmonary artery hypertension  Also has chronic systolic and diastolic congestive heart failure  He uses oxygen anywhere from 6-8  Last spirometry showed FEV1 of 1 85 L or 76% of predicted  He also has history of iron deficiency anemia  He does have moderate obstructive sleep apnea uses CPAP of 10 with 6 L of oxygen at bedtime  He is not have any excessive daytime somnolence  No nocturnal dyspnea    He does get short of breath with just walking 20-30 feet  No chest pain    Review of Systems   Constitutional: Negative for chills, fever and unexpected weight change  HENT: Negative for congestion, rhinorrhea and sore throat  Eyes: Negative for redness  Respiratory: Positive for shortness of breath  Cardiovascular: Negative for chest pain and palpitations  Gastrointestinal: Negative for abdominal distention, abdominal pain and nausea  Endocrine: Negative for polydipsia and polyphagia  Musculoskeletal: Negative for joint swelling  Skin: Negative for rash  Neurological: Negative for light-headedness  Smoking history: He reports that he quit smoking about 31 years ago  His smoking use included Cigarettes  He has a 35 00 pack-year smoking history  He has never used smokeless tobacco     The following portions of the patient's history were reviewed and updated as appropriate: allergies, past family history, past medical history, past social history, past surgical history and problem list     Immunization History   Administered Date(s) Administered    Pneumococcal Conjugate 13-Valent 06/12/2015     No current facility-administered medications for this visit        Current Outpatient Prescriptions   Medication Sig Dispense Refill    metoprolol tartrate (LOPRESSOR) 25 mg tablet Take 1 tablet (25 mg total) by mouth every 12 (twelve) hours 180 tablet 3     Facility-Administered Medications Ordered in Other Visits   Medication Dose Route Frequency Provider Last Rate Last Dose    acetaminophen (TYLENOL) tablet 325 mg  325 mg Oral Q12H PRN Will Guerrero MD        And    traMADol (ULTRAM) tablet 50 mg  50 mg Oral Q12H PRN Will Guerrero MD        amiodarone tablet 100 mg  100 mg Oral Daily Will Guerrero MD   100 mg at 12/01/18 0936    amLODIPine (NORVASC) tablet 5 mg  5 mg Oral Daily Will Guerrero MD   5 mg at 12/01/18 0939    aspirin (ECOTRIN LOW STRENGTH) EC tablet 81 mg  81 mg Oral Daily Will Guerrero MD   81 mg at 12/01/18 5383  cyanocobalamin (VITAMIN B-12) tablet 100 mcg  100 mcg Oral Daily Erin Segura MD   100 mcg at 12/01/18 0940    diclofenac sodium (VOLTAREN) 1 % topical gel 2 g  2 g Topical Q12H [facility]   2 g at 12/01/18 0940    docusate sodium (COLACE) capsule 100 mg  100 mg Oral HS Erin Segura MD   100 mg at 11/30/18 2146    fluticasone-vilanterol (BREO ELLIPTA) 100-25 mcg/inh inhaler 1 puff  1 puff Inhalation Daily Erin Segura MD   1 puff at 12/01/18 0935    furosemide (LASIX) injection 60 mg  60 mg Intravenous BID (diuretic) Narendra Ennis MD   60 mg at 12/01/18 1616    heparin (porcine) subcutaneous injection 5,000 Units  5,000 Units Subcutaneous [facility] Erin Segura MD   5,000 Units at 12/01/18 1349    hydrALAZINE (APRESOLINE) tablet 25 mg  25 mg Oral Q8H Erin Segura MD   25 mg at 12/01/18 1212    ipratropium-albuterol (DUO-NEB) 0 5-2 5 mg/3 mL inhalation solution 3 mL  3 mL Nebulization Q4H PRN Erin Segura MD        ipratropium-albuterol (DUO-NEB) 0 5-2 5 mg/3 mL inhalation solution 3 mL  3 mL Nebulization 4x Daily Erin Segura MD   3 mL at 12/01/18 1603    iron polysaccharides (NIFEREX) capsule 150 mg  150 mg Oral Daily Erin Segura MD   150 mg at 12/01/18 0939    isosorbide mononitrate (IMDUR) 24 hr tablet 30 mg  30 mg Oral Daily Erin Segura MD   30 mg at 12/01/18 6070    levothyroxine tablet 50 mcg  50 mcg Oral Daily Erin Segura MD   50 mcg at 12/01/18 0608    metoprolol tartrate (LOPRESSOR) tablet 25 mg  25 mg Oral Q12H 3630 Eduardo Todd MD   25 mg at 12/01/18 0936    mupirocin (BACTROBAN) 2 % nasal ointment   Nasal Q12H 3630 Eduardo Todd MD   1 application at 80/86/68 0941    ondansetron (ZOFRAN) injection 4 mg  4 mg Intravenous Q6H PRN Erin Segura MD        oxybutynin (DITROPAN) tablet 5 mg  5 mg Oral Daily Erin Segura MD   5 mg at 12/01/18 0980    pantoprazole (PROTONIX) EC tablet 40 mg  40 mg Oral Daily Erin Segura MD   40 mg at 12/01/18 1308     Allergies: Patient has no known allergies  Objective:  Vitals:    09/26/18 1252 09/26/18 1301   BP: (!) 104/46    BP Location: Left arm    Patient Position: Sitting    Cuff Size: Adult    Pulse: 98    Resp: 12    Temp: 97 9 °F (36 6 °C)    TempSrc: Oral    SpO2: 96% 96%   Weight: 96 6 kg (213 lb)    Height: 5' 10" (1 778 m)    Oxygen Therapy  SpO2: 96 %  Oxygen Therapy: Supplemental oxygen  O2 Delivery Method: Nasal cannula  O2 Flow Rate (L/min): 6 L/min    Wt Readings from Last 3 Encounters:   12/01/18 105 kg (232 lb 2 3 oz)   11/18/18 98 4 kg (217 lb)   11/09/18 103 kg (227 lb)     Body mass index is 30 56 kg/m²  Physical Exam   Constitutional: He is oriented to person, place, and time  He appears well-developed and well-nourished  No distress  HENT:   Head: Normocephalic  Nose: Nose normal    Mouth/Throat: Oropharynx is clear and moist  No oropharyngeal exudate  Eyes: Pupils are equal, round, and reactive to light  Conjunctivae are normal    Neck: Neck supple  No JVD present  No tracheal deviation present  Cardiovascular: Normal rate, regular rhythm and normal heart sounds  Pulmonary/Chest: Effort normal    Lungs are clear but decreased   Abdominal: Soft  He exhibits no distension  There is no tenderness  There is no guarding  Musculoskeletal:   Minimal edema   Lymphadenopathy:     He has no cervical adenopathy  Neurological: He is alert and oriented to person, place, and time  Skin: Skin is warm and dry  No rash noted  Psychiatric: He has a normal mood and affect   His behavior is normal  Thought content normal        Lab Review:   Lab Results   Component Value Date     10/16/2017    K 3 9 12/01/2018    K 5 0 11/14/2018     12/01/2018    CL 98 11/14/2018    CO2 30 12/01/2018    CO2 24 11/14/2018    BUN 53 (H) 12/01/2018    BUN 51 (H) 11/14/2018    CREATININE 2 88 (H) 12/01/2018    CREATININE 2 06 (H) 10/16/2017    GLUCOSE 109 (H) 10/16/2017    CALCIUM 8 7 12/01/2018    CALCIUM 9 7 10/16/2017     Lab Results Component Value Date    WBC 6 81 12/01/2018    WBC 8 4 09/18/2017    HGB 8 9 (L) 12/01/2018    HGB 11 5 (L) 09/18/2017    HCT 29 0 (L) 12/01/2018    HCT 35 6 (L) 09/18/2017    MCV 85 12/01/2018    MCV 86 09/18/2017     12/01/2018     06/08/2017

## 2018-10-10 ENCOUNTER — OFFICE VISIT (OUTPATIENT)
Dept: OBGYN CLINIC | Facility: CLINIC | Age: 83
End: 2018-10-10
Payer: COMMERCIAL

## 2018-10-10 ENCOUNTER — APPOINTMENT (OUTPATIENT)
Dept: RADIOLOGY | Facility: CLINIC | Age: 83
End: 2018-10-10
Payer: COMMERCIAL

## 2018-10-10 VITALS — WEIGHT: 216 LBS | HEIGHT: 70 IN | BODY MASS INDEX: 30.92 KG/M2

## 2018-10-10 DIAGNOSIS — M19.011 PRIMARY OSTEOARTHRITIS OF SHOULDERS, BILATERAL: Primary | ICD-10-CM

## 2018-10-10 DIAGNOSIS — M25.512 BILATERAL SHOULDER PAIN, UNSPECIFIED CHRONICITY: ICD-10-CM

## 2018-10-10 DIAGNOSIS — M25.511 BILATERAL SHOULDER PAIN, UNSPECIFIED CHRONICITY: ICD-10-CM

## 2018-10-10 DIAGNOSIS — M19.012 PRIMARY OSTEOARTHRITIS OF SHOULDERS, BILATERAL: Primary | ICD-10-CM

## 2018-10-10 PROCEDURE — 73030 X-RAY EXAM OF SHOULDER: CPT

## 2018-10-10 PROCEDURE — 99214 OFFICE O/P EST MOD 30 MIN: CPT | Performed by: ORTHOPAEDIC SURGERY

## 2018-10-10 PROCEDURE — 20610 DRAIN/INJ JOINT/BURSA W/O US: CPT | Performed by: ORTHOPAEDIC SURGERY

## 2018-10-10 RX ORDER — BUPIVACAINE HYDROCHLORIDE 2.5 MG/ML
1 INJECTION, SOLUTION INFILTRATION; PERINEURAL
Status: COMPLETED | OUTPATIENT
Start: 2018-10-10 | End: 2018-10-10

## 2018-10-10 RX ORDER — DEXAMETHASONE SODIUM PHOSPHATE 100 MG/10ML
40 INJECTION INTRAMUSCULAR; INTRAVENOUS
Status: COMPLETED | OUTPATIENT
Start: 2018-10-10 | End: 2018-10-10

## 2018-10-10 RX ADMIN — DEXAMETHASONE SODIUM PHOSPHATE 40 MG: 100 INJECTION INTRAMUSCULAR; INTRAVENOUS at 12:35

## 2018-10-10 RX ADMIN — BUPIVACAINE HYDROCHLORIDE 1 ML: 2.5 INJECTION, SOLUTION INFILTRATION; PERINEURAL at 12:35

## 2018-10-10 NOTE — PROGRESS NOTES
Assessment/Plan:  1  Primary osteoarthritis of shoulders, bilateral     2  Bilateral shoulder pain, unspecified chronicity  XR shoulder 2+ vw right    XR shoulder 2+ vw left       Scribe Attestation    I,:   Lidia Torres MA am acting as a scribe while in the presence of the attending physician :        I,:   Avani Zapata MD personally performed the services described in this documentation    as scribed in my presence :              Genaro's signs and symptoms are consistent with severe bilateral shoulder osteoarthritis demonstrated on x-ray today with his left being more severe than his right  On examination today he does have loss of range of motion and he is tender to palpation over the glenohumeral joint  Due to medial comorbidity's he is not a surgical candidate  Conservative treatment options were discussed with him and his son today in the form of bilateral steroid injections and physical therapy  He deferred physical therapy at this time  Bilateral shoulder steroid injections were provided in the office today without any complications  He may follow up in 3 months for repeat clinical evaluation  Subjective:   Suraj Patino is a 80 y o  male who presents to the office today for evaluation of bilateral shoulder pain  He states this has been ongoing for many years but has been progressively getting worse  He states his right shoulder is worse than his left today but this can change depending on the day  He states his bilateral shoulder pain is constant and achy and is located in the anterior aspect of both of his shoulders that will radiate down to both of his elbows  He notes increased pain with shoulder movement  He does take Tylenol and Meloxicam as needed for his pain complaints which he states does provide minimal relief in his pain complaints  He did undergo one CSI in June of 2017 for his left shoulder which he states did provided him with 3 months of relief of his symptoms         Review of Systems   Constitutional: Negative for chills and fever  HENT: Positive for hearing loss  Negative for drooling and sneezing  Eyes: Negative for redness  Respiratory: Negative for cough and wheezing  Gastrointestinal: Negative for nausea and vomiting  Musculoskeletal: Positive for arthralgias  Negative for joint swelling and myalgias  Skin: Positive for wound  Neurological: Negative for weakness and numbness  Psychiatric/Behavioral: Negative for behavioral problems  The patient is not nervous/anxious            Past Medical History:   Diagnosis Date    Allergic rhinitis 06/11/2010    Aortic stenosis, severe     Bacterial pneumonia 06/08/2007    Bladder neck obstruction 12/23/2010    BPH (benign prostatic hyperplasia)     Bron[facility], chronic [facility] 01/30/2012    CAD (coronary artery disease)     stent 2014    Carcinoma ([facility] )     resolved [id number]    Cardiomegaly 02/13/2007    Cataract of both eyes     CHF (congestive heart failure) (HCC)     Chronic allergic conjunctivitis     last assessed [id number]    Chronic kidney disease (CKD)     CKD (chronic kidney disease), stage III (HCC)     CKD (chronic kidney disease), stage III (HCC)     COPD (chronic obstructive pulmonary disease) ([facility] )     Decubitus ulcer     resolved [id number]    Dermatomycosis     last assessed [id number]    Diabetes mellitus type 2, noninsulin dependent ([facility] )     Dyspnea on exertion     last assessed [id number]    Eczema     last assessed [id number]    Edema     last assessed [id number]    Epistaxis 12/01/2004    Esophagitis, erosive     Exposure to scabies     resolved [id number]    Fracture of rib     last assessed [id number]    H/O aortic valve replacement     resolved [id number]    H/O blood clots     History of DVT (deep vein thrombosis)     left posterior tibial/peroneal veins    History of non-ST elevation myocardial infarction (NSTEMI)     History of pneumonia     Hyperlipidemia     Hypertension     Internal hemorrhoids 2006    LBBB (left bundle branch block)     MRSA (methicillin resistant staph aureus) culture positive     NSTEMI (non-ST elevated myocardial infarction) [facility] )     resolved 2016    Obstructive sleep apnea on CPAP     severe PATITO with AHI of 106 and uses CPAP at 10 cm H2O with 2 l/m oxygen    Paroxysmal atrial fibrillation ([facility] )     Phimosis 2010    severe pt had circumcision 2010    Pulmonary fibrosis ([facility] )     [facility]peripheral vascular [facility]     Rotator cuff tendonitis     lsat assessed [id number]    Skin abscess 2004    Hip    Skin neoplasm     last assessed [id number]    Tachycardia 2004    Trigger finger     last assessed [id number]    Type 2 diabetes mellitus ([facility] ) 2004    Venous thrombosis 2007    Venous thrombosis 2007       Past Surgical History:   Procedure Laterality Date    CARDIAC CATHETERIZATION  03/10/2016    Showed 60-70% mid LAD lesion next to stent    CATARACT EXTRACTION      CIRCUMCISION, NON-      ESOPHAGOGASTRODUODENOSCOPY N/A 3/14/2016    Procedure: ESOPHAGOGASTRODUODENOSCOPY (EGD); Surgeon: Renaldo Rivas MD;  Location: [facility];   Service:     EYE SURGERY      FRACTURE SURGERY      JOINT REPLACEMENT      both hips replaced    OTHER SURGICAL HISTORY      H/O thoracentesis (diagnostic)    MI REPLACE AORTIC VALVE OPENFEMORAL ARTERY APPROACH N/A 2016    Procedure: TRANSFEMORAL TAVR WITH 26MM CALIX MAKAYLA S3 TISSUE VALVE; PINA ;  Surgeon: Ochoa Mejia DO;  Location:  MAIN OR;  Service: Cardiac Surgery    REPLACEMENT TOTAL KNEE Bilateral 1991 and     TISSUE AORTIC VALVE REPLACEMENT      transfemoral, transcatheter    TONSILLECTOMY      TONSILLECTOMY AND ADENOIDECTOMY      TOTAL HIP ARTHROPLASTY      Bilateral       Family History   Problem Relation Age of Onset    Coronary artery disease Mother     Arthritis Mother  Hypertension Mother     Rheum arthritis Father     Prostate cancer Father        Social History     Occupational History    Not on file  Social History Main Topics    Smoking status: Former Smoker     Packs/day: 1 00     Years: 35 00     Types: Cigarettes     Quit date: 1/1/1987    Smokeless tobacco: Never Used      Comment: quit 50 years ago, per [employer]    Alcohol use Yes      Comment: socially    Drug use: No    Sexual activity: No      Comment: , lives at home with wife, ambulates with cane at home         Current Outpatient Prescriptions:     albuterol (PROAIR HFA) 90 mcg/act inhaler, Inhale 2 puffs every 4 (four) hours as needed for wheezing or shortness of breath, Disp: 18 g, Rfl: 5    amiodarone 100 mg tablet, Take 1 tablet (100 mg total) by mouth daily, Disp: 90 tablet, Rfl: 3    amLODIPine (NORVASC) 10 mg tablet, Take 1 tablet (10 mg total) by mouth daily, Disp: 30 tablet, Rfl: 5    aspirin 81 mg chewable tablet, Chew daily, Disp: , Rfl:     atorvastatin (LIPITOR) 10 mg tablet, Take 2 tablets (20 mg total) by mouth daily, Disp: 90 tablet, Rfl: 3    B Complex Vitamins (VITAMIN B COMPLEX PO), Take by mouth, Disp: , Rfl:     BREO ELLIPTA, USE 1 INHALATION DAILY, Disp: 1 each, Rfl: 5    cyanocobalamin (VITAMIN B-12) 100 mcg tablet, Take by mouth, Disp: , Rfl:     docusate sodium (COLACE) 100 mg capsule, Take 100 mg by mouth daily at bedtime  , Disp: , Rfl:     DULoxetine (CYMBALTA) 30 mg delayed release capsule, Take 1 capsule (30 mg total) by mouth daily, Disp: 30 capsule, Rfl: 1    fenofibrate (TRICOR) 145 mg tablet, Take 145 mg by mouth daily  , Disp: , Rfl:     hydrALAZINE (APRESOLINE) 25 mg tablet, take 1 tablet by mouth every 8 hours, Disp: 90 tablet, Rfl: 0    IFEREX 150 150 MG capsule, take 1 capsule by mouth once daily, Disp: 30 capsule, Rfl: 2    isosorbide mononitrate (IMDUR) 30 mg 24 hr tablet, take 1 tablet by mouth once daily, Disp: 30 tablet, Rfl: 0   levothyroxine 50 mcg tablet, Take 1 tablet (50 mcg total) by mouth daily, Disp: 90 tablet, Rfl: 0    meloxicam (MOBIC) 15 mg tablet, Take 1 tablet (15 mg total) by mouth daily for 90 days As needed, Disp: 90 tablet, Rfl: 0    metoprolol tartrate (LOPRESSOR) 25 mg tablet, Take 1 tablet (25 mg total) by mouth every 12 (twelve) hours  , Disp: 60 tablet, Rfl: 2    pantoprazole (PROTONIX) 40 mg tablet, take 1 tablet by mouth once daily, Disp: 90 tablet, Rfl: 3    tolterodine (DETROL) 1 mg tablet, Take 1 mg by mouth daily, Disp: , Rfl:     torsemide (DEMADEX) 20 mg tablet, Take 1 tablet (20 mg total) by mouth 2 (two) times a day as needed (leg edema), Disp: 60 tablet, Rfl: 5    traMADol-acetaminophen (ULTRACET) 37 5-325 mg per tablet, Take 1 tablet by mouth 2 (two) times a day as needed for moderate pain, Disp: 30 tablet, Rfl: 0    diclofenac sodium (VOLTAREN) 1 %, Apply 4 g topically 4 (four) times a day Both shoulders and knees (Patient not taking: Reported on 10/1/2018 ), Disp: 5 Tube, Rfl: 5    No Known Allergies    Objective: There were no vitals filed for this visit  Right Shoulder Exam     Tenderness   Right shoulder tenderness location: glenohumeral joint  Range of Motion   Active Abduction: 80   External Rotation: 10   Internal Rotation 90 degrees: 0     Muscle Strength   Abduction: 5/5   Internal Rotation: 5/5   External Rotation: 5/5     Other   Erythema: absent  Sensation: normal  Pulse: present    Comments:  Crepitus noted with ROM      Left Shoulder Exam     Tenderness   Left shoulder tenderness location: glenohumeral joint      Range of Motion   Active Abduction: 70   Left shoulder external rotation: -5    Internal Rotation 90 degrees: 0     Muscle Strength   Abduction: 4/5   Internal Rotation: 5/5   External Rotation: 5/5     Other   Erythema: absent  Sensation: normal  Pulse: present     Comments:  Crepitus noted with ROM              Physical Exam   Constitutional: He is oriented to person, place, and time  He appears well-developed and well-nourished  HENT:   Head: Atraumatic  Eyes: Conjunctivae are normal    Neck: Normal range of motion  Cardiovascular: Normal rate  Pulmonary/Chest: Effort normal    Musculoskeletal:   As noted in HPI   Neurological: He is alert and oriented to person, place, and time  Skin: Skin is warm and dry  Psychiatric: He has a normal mood and affect   His behavior is normal  Judgment and thought content normal      Large joint arthrocentesis  Date/Time: 10/10/2018 12:35 PM  Consent given by: patient  Site marked: site marked  Timeout: Immediately prior to procedure a time out was called to verify the correct patient, procedure, equipment, support staff and site/side marked as required   Supporting Documentation  Indications: pain   Procedure Details  Location: shoulder - L glenohumeral  Preparation: Patient was prepped and draped in the usual sterile fashion  Needle size: 22 G  Ultrasound guidance: no  Medications administered: 1 mL bupivacaine 0 25 %; 40 mg dexamethasone 100 mg/10 mL    Patient tolerance: patient tolerated the procedure well with no immediate complications  Dressing:  Sterile dressing applied  Large joint arthrocentesis  Date/Time: 10/10/2018 12:35 PM  Consent given by: patient  Site marked: site marked  Timeout: Immediately prior to procedure a time out was called to verify the correct patient, procedure, equipment, support staff and site/side marked as required   Supporting Documentation  Indications: pain   Procedure Details  Location: shoulder - R glenohumeral  Needle size: 22 G  Ultrasound guidance: no  Medications administered: 1 mL bupivacaine 0 25 %; 40 mg dexamethasone 100 mg/10 mL    Patient tolerance: patient tolerated the procedure well with no immediate complications  Dressing:  Sterile dressing applied        I have personally reviewed pertinent films in PACS and my interpretation is as follows:X-ray bilateral shoulder performed in the office today demonstrates severe bilateral glenohumeral joint osteoarthritis with the left being more severe than the right

## 2018-10-12 DIAGNOSIS — I10 HYPERTENSION: Chronic | ICD-10-CM

## 2018-10-12 RX ORDER — HYDRALAZINE HYDROCHLORIDE 25 MG/1
TABLET, FILM COATED ORAL
Qty: 90 TABLET | Refills: 0 | Status: SHIPPED | OUTPATIENT
Start: 2018-10-12 | End: 2018-11-20 | Stop reason: SDUPTHER

## 2018-10-16 ENCOUNTER — OFFICE VISIT (OUTPATIENT)
Dept: FAMILY MEDICINE CLINIC | Facility: CLINIC | Age: 83
End: 2018-10-16
Payer: COMMERCIAL

## 2018-10-16 VITALS
SYSTOLIC BLOOD PRESSURE: 124 MMHG | RESPIRATION RATE: 16 BRPM | BODY MASS INDEX: 30.64 KG/M2 | WEIGHT: 214 LBS | HEART RATE: 68 BPM | HEIGHT: 70 IN | TEMPERATURE: 97.4 F | DIASTOLIC BLOOD PRESSURE: 70 MMHG

## 2018-10-16 DIAGNOSIS — I10 ESSENTIAL HYPERTENSION: Primary | Chronic | ICD-10-CM

## 2018-10-16 DIAGNOSIS — N32.81 OAB (OVERACTIVE BLADDER): ICD-10-CM

## 2018-10-16 DIAGNOSIS — M25.511 CHRONIC PAIN OF BOTH SHOULDERS: ICD-10-CM

## 2018-10-16 DIAGNOSIS — E78.2 MIXED HYPERLIPIDEMIA: ICD-10-CM

## 2018-10-16 DIAGNOSIS — M25.512 CHRONIC LEFT SHOULDER PAIN: Chronic | ICD-10-CM

## 2018-10-16 DIAGNOSIS — J30.89 NON-SEASONAL ALLERGIC RHINITIS, UNSPECIFIED TRIGGER: ICD-10-CM

## 2018-10-16 DIAGNOSIS — G89.29 CHRONIC LEFT SHOULDER PAIN: Chronic | ICD-10-CM

## 2018-10-16 DIAGNOSIS — I50.32 CHRONIC DIASTOLIC (CONGESTIVE) HEART FAILURE (HCC): ICD-10-CM

## 2018-10-16 DIAGNOSIS — I48.0 PAROXYSMAL ATRIAL FIBRILLATION (HCC): Chronic | ICD-10-CM

## 2018-10-16 DIAGNOSIS — M25.512 CHRONIC PAIN OF BOTH SHOULDERS: ICD-10-CM

## 2018-10-16 DIAGNOSIS — G89.29 CHRONIC PAIN OF BOTH SHOULDERS: ICD-10-CM

## 2018-10-16 PROCEDURE — 99214 OFFICE O/P EST MOD 30 MIN: CPT | Performed by: FAMILY MEDICINE

## 2018-10-16 RX ORDER — TORSEMIDE 20 MG/1
20 TABLET ORAL DAILY
Qty: 30 TABLET | Refills: 5 | Status: SHIPPED | OUTPATIENT
Start: 2018-10-16 | End: 2018-11-09 | Stop reason: SDUPTHER

## 2018-10-16 RX ORDER — TOLTERODINE TARTRATE 2 MG/1
2 TABLET, EXTENDED RELEASE ORAL DAILY
Qty: 90 TABLET | Refills: 1 | Status: SHIPPED | OUTPATIENT
Start: 2018-10-16 | End: 2019-04-17 | Stop reason: SDUPTHER

## 2018-10-16 NOTE — PROGRESS NOTES
Assessment/Plan:    No problem-specific Assessment & Plan notes found for this encounter  Mood/OA ok on cymbalta, wiling to continue  OAB not better, increase detrol  Recheck 1m  htn stable  Creat elevated after torsemide increase, reduce to 1 qd and recheck 1w  Cardio f/u for CHF  pulm f/u for copd and oral steroid use    Retry nasalcrom for allergic rhinitis suspected     Diagnoses and all orders for this visit:    Essential hypertension  -     Comprehensive metabolic panel; Future    Paroxysmal atrial fibrillation (HCC)    Chronic left shoulder pain    Chronic pain of both shoulders    Chronic diastolic (congestive) heart failure (HCC)  -     torsemide (DEMADEX) 20 mg tablet; Take 1 tablet (20 mg total) by mouth daily    Mixed hyperlipidemia  -     Lipid Panel with Direct LDL reflex; Future    OAB (overactive bladder)  -     tolterodine (DETROL) 2 mg tablet; Take 1 tablet (2 mg total) by mouth daily    Non-seasonal allergic rhinitis, unspecified trigger              Return in about 1 month (around 11/16/2018) for Recheck  Subjective:      Patient ID: Earlyne January is a 80 y o  male  Chief Complaint   Patient presents with    Hypertension    Hyperlipidemia     [facility] l[facility]       HPI  Creat up to 2 5  Was on torsemide 40mg/d  K good  Labs reviewed  meds reviewed    Sinuses runny  Clear   Several days  No fever  No yellow/green mucus    Prednisone from "aspire"    Has nasalcrom otc  Has at home   Can start    Urinary frequency ongoing  The following portions of the patient's history were reviewed and updated as appropriate: allergies, current medications, past family history, past medical history, past social history, past surgical history and problem list     Review of Systems   Constitutional: Positive for chills  Negative for fever           Current Outpatient Prescriptions   Medication Sig Dispense Refill    albuterol (PROAIR HFA) 90 mcg/act inhaler Inhale 2 puffs every 4 (four) hours as needed for wheezing or shortness of breath 18 g 5    amiodarone 100 mg tablet Take 1 tablet (100 mg total) by mouth daily 90 tablet 3    amLODIPine (NORVASC) 10 mg tablet Take 1 tablet (10 mg total) by mouth daily 30 tablet 5    aspirin 81 mg chewable tablet Chew daily      atorvastatin (LIPITOR) 10 mg tablet Take 2 tablets (20 mg total) by mouth daily 90 tablet 3    B Complex Vitamins (VITAMIN B COMPLEX PO) Take by mouth      BREO ELLIPTA USE 1 INHALATION DAILY 1 each 5    cyanocobalamin (VITAMIN B-12) 100 mcg tablet Take by mouth      diclofenac sodium (VOLTAREN) 1 % Apply 4 g topically 4 (four) times a day Both shoulders and knees 5 Tube 5    docusate sodium (COLACE) 100 mg capsule Take 100 mg by mouth daily at bedtime   DULoxetine (CYMBALTA) 30 mg delayed release capsule Take 1 capsule (30 mg total) by mouth daily 30 capsule 1    fenofibrate (TRICOR) 145 mg tablet Take 145 mg by mouth daily   hydrALAZINE (APRESOLINE) 25 mg tablet take 1 tablet by mouth every 8 hours 90 tablet 0    IFEREX 150 150 MG capsule take 1 capsule by mouth once daily 30 capsule 2    isosorbide mononitrate (IMDUR) 30 mg 24 hr tablet take 1 tablet by mouth once daily 30 tablet 0    levothyroxine 50 mcg tablet Take 1 tablet (50 mcg total) by mouth daily 90 tablet 0    metoprolol tartrate (LOPRESSOR) 25 mg tablet Take 1 tablet (25 mg total) by mouth every 12 (twelve) hours   60 tablet 2    pantoprazole (PROTONIX) 40 mg tablet take 1 tablet by mouth once daily 90 tablet 3    tolterodine (DETROL) 2 mg tablet Take 1 tablet (2 mg total) by mouth daily 90 tablet 1    torsemide (DEMADEX) 20 mg tablet Take 1 tablet (20 mg total) by mouth daily 30 tablet 5    meloxicam (MOBIC) 15 mg tablet Take 1 tablet (15 mg total) by mouth daily for 90 days As needed (Patient not taking: Reported on 10/16/2018 ) 90 tablet 0    traMADol-acetaminophen (ULTRACET) 37 5-325 mg per tablet Take 1 tablet by mouth 2 (two) times a day as needed for moderate pain (Patient not taking: Reported on 10/16/2018 ) 30 tablet 0     No current facility-administered medications for this visit  Objective:    /70   Pulse 68   Temp (!) 97 4 °F (36 3 °C)   Resp 16   Ht 5' 10" (1 778 m)   Wt 97 1 kg (214 lb)   BMI 30 71 kg/m²        Physical Exam   Constitutional: He appears well-developed  No distress  HENT:   Head: Normocephalic  Mouth/Throat: No oropharyngeal exudate  Eyes: Conjunctivae are normal  No scleral icterus  Neck: Neck supple  Cardiovascular: Normal rate and intact distal pulses  Pulmonary/Chest: Effort normal  No respiratory distress  He has no wheezes  Abdominal: Soft  There is no tenderness  There is no rebound  Musculoskeletal: He exhibits edema  He exhibits no deformity  Neurological: He is alert  Skin: Skin is warm and dry  No rash noted  He is not diaphoretic  Psychiatric: His behavior is normal  Thought content normal    Nursing note and vitals reviewed               Keke Choi DO

## 2018-10-16 NOTE — PATIENT INSTRUCTIONS
Please go back to torsemide 20mg 1x/d instead of 2 per day due to your kidneys  We can recheck the kidney blood test after about 1 week of the change  We can also recheck cholesterol level at the same time but its fasting  You can try nasalcrom for the runny nose issue  A nasal steroid spray could be offered in the future  We can try a slightly higher dose of the tolerodine for your urinary frequency issues

## 2018-10-24 DIAGNOSIS — E78.5 DYSLIPIDEMIA: Primary | ICD-10-CM

## 2018-10-24 RX ORDER — FENOFIBRATE 145 MG/1
145 TABLET, COATED ORAL DAILY
Qty: 90 TABLET | Refills: 3 | Status: SHIPPED | OUTPATIENT
Start: 2018-10-24

## 2018-10-29 LAB
ALBUMIN SERPL-MCNC: 3.8 G/DL (ref 3.5–4.7)
ALBUMIN/GLOB SERPL: 1.3 {RATIO} (ref 1.2–2.2)
ALP SERPL-CCNC: 40 IU/L (ref 39–117)
ALT SERPL-CCNC: 14 IU/L (ref 0–44)
AST SERPL-CCNC: 32 IU/L (ref 0–40)
BILIRUB SERPL-MCNC: 0.6 MG/DL (ref 0–1.2)
BUN SERPL-MCNC: 38 MG/DL (ref 8–27)
BUN/CREAT SERPL: 17 (ref 10–24)
CALCIUM SERPL-MCNC: 9.3 MG/DL (ref 8.6–10.2)
CHLORIDE SERPL-SCNC: 100 MMOL/L (ref 96–106)
CHOLEST SERPL-MCNC: 130 MG/DL (ref 100–199)
CO2 SERPL-SCNC: 25 MMOL/L (ref 20–29)
CREAT SERPL-MCNC: 2.25 MG/DL (ref 0.76–1.27)
GLOBULIN SER-MCNC: 3 G/DL (ref 1.5–4.5)
GLUCOSE SERPL-MCNC: 98 MG/DL (ref 65–99)
HDLC SERPL-MCNC: 51 MG/DL
LABCORP COMMENT: NORMAL
LDLC SERPL CALC-MCNC: 61 MG/DL (ref 0–99)
MICRODELETION SYND BLD/T FISH: NORMAL
MICRODELETION SYND BLD/T FISH: NORMAL
POTASSIUM SERPL-SCNC: 4.8 MMOL/L (ref 3.5–5.2)
PROT SERPL-MCNC: 6.8 G/DL (ref 6–8.5)
SL AMB EGFR AFRICAN AMERICAN: 29 ML/MIN/1.73
SL AMB EGFR NON AFRICAN AMERICAN: 25 ML/MIN/1.73
SL AMB PDF IMAGE: NORMAL
SODIUM SERPL-SCNC: 140 MMOL/L (ref 134–144)
TRIGL SERPL-MCNC: 88 MG/DL (ref 0–149)

## 2018-11-04 DIAGNOSIS — E03.8 OTHER SPECIFIED HYPOTHYROIDISM: Chronic | ICD-10-CM

## 2018-11-04 RX ORDER — LEVOTHYROXINE SODIUM 0.05 MG/1
TABLET ORAL
Qty: 30 TABLET | Refills: 5 | Status: SHIPPED | OUTPATIENT
Start: 2018-11-04

## 2018-11-09 ENCOUNTER — HOSPITAL ENCOUNTER (OUTPATIENT)
Dept: RADIOLOGY | Facility: HOSPITAL | Age: 83
Discharge: HOME | End: 2018-11-09
Attending: FAMILY MEDICINE
Payer: COMMERCIAL

## 2018-11-09 ENCOUNTER — OFFICE VISIT (OUTPATIENT)
Dept: FAMILY MEDICINE CLINIC | Facility: CLINIC | Age: 83
End: 2018-11-09
Payer: COMMERCIAL

## 2018-11-09 ENCOUNTER — TRANSCRIBE ORDERS (OUTPATIENT)
Dept: ADMINISTRATIVE | Facility: HOSPITAL | Age: 83
End: 2018-11-09

## 2018-11-09 VITALS
TEMPERATURE: 97.2 F | HEIGHT: 70 IN | HEART RATE: 72 BPM | BODY MASS INDEX: 32.5 KG/M2 | RESPIRATION RATE: 16 BRPM | WEIGHT: 227 LBS | SYSTOLIC BLOOD PRESSURE: 90 MMHG | DIASTOLIC BLOOD PRESSURE: 60 MMHG

## 2018-11-09 DIAGNOSIS — J44.9 CHRONIC OBSTRUCTIVE PULMONARY DISEASE (HCC): ICD-10-CM

## 2018-11-09 DIAGNOSIS — I48.0 PAROXYSMAL ATRIAL FIBRILLATION (HCC): Primary | Chronic | ICD-10-CM

## 2018-11-09 DIAGNOSIS — I50.9 HEART FAILURE (HCC): ICD-10-CM

## 2018-11-09 DIAGNOSIS — I10 ESSENTIAL HYPERTENSION: Chronic | ICD-10-CM

## 2018-11-09 DIAGNOSIS — I25.10 ATHEROSCLEROTIC HEART DISEASE OF NATIVE CORONARY ARTERY WITHOUT ANGINA PECTORIS: ICD-10-CM

## 2018-11-09 DIAGNOSIS — I10 ESSENTIAL (PRIMARY) HYPERTENSION: ICD-10-CM

## 2018-11-09 DIAGNOSIS — N18.30 CHRONIC KIDNEY DISEASE, STAGE III (MODERATE) (HCC): ICD-10-CM

## 2018-11-09 DIAGNOSIS — I50.1 LEFT VENTRICULAR FAILURE (HCC): ICD-10-CM

## 2018-11-09 DIAGNOSIS — I50.32 CHRONIC DIASTOLIC (CONGESTIVE) HEART FAILURE (HCC): ICD-10-CM

## 2018-11-09 PROCEDURE — 99214 OFFICE O/P EST MOD 30 MIN: CPT | Performed by: FAMILY MEDICINE

## 2018-11-09 PROCEDURE — 71046 X-RAY EXAM CHEST 2 VIEWS: CPT

## 2018-11-09 RX ORDER — AMLODIPINE BESYLATE 5 MG/1
5 TABLET ORAL DAILY
Qty: 90 TABLET | Refills: 0 | Status: SHIPPED | OUTPATIENT
Start: 2018-11-09 | End: 2019-01-09

## 2018-11-09 RX ORDER — TORSEMIDE 20 MG/1
TABLET ORAL
Qty: 90 TABLET | Refills: 1 | Status: SHIPPED | OUTPATIENT
Start: 2018-11-09 | End: 2018-11-23 | Stop reason: SDUPTHER

## 2018-11-09 RX ORDER — AMLODIPINE BESYLATE 5 MG/1
5 TABLET ORAL DAILY
Qty: 90 TABLET | Refills: 1 | Status: SHIPPED | OUTPATIENT
Start: 2018-11-09 | End: 2018-11-09 | Stop reason: SDUPTHER

## 2018-11-09 NOTE — PATIENT INSTRUCTIONS
Please stay on the torsemide 20mg/d alternate with 10mg/d but reduce the amlodipine from 10mg/d to 5mg/d because we do not want your blood pressure to be too low (namely, the top number should be over 110 but less than 140)  If your home readings of SBP stay less than 110 after 5-7 days, then stop the amlodipine entirely  Please get labwork done soon, early next week nonfasting would be ok to check your kidneys  You should start drinking more water daily, start with twice as much, starting now

## 2018-11-09 NOTE — PROGRESS NOTES
Assessment/Plan:    No problem-specific Assessment & Plan notes found for this encounter  Diagnoses and all orders for this visit:    Essential hypertension  -     amLODIPine (NORVASC) 5 mg tablet; Take 1 tablet (5 mg total) by mouth daily  -     Basic metabolic panel; Future  -     XR chest pa & lateral; Future    Chronic diastolic (congestive) heart failure (HCC)  -     torsemide (DEMADEX) 20 mg tablet; Take 20mg/d alternating with 10mg/d  -     XR chest pa & lateral; Future              Return in about 1 month (around 12/9/2018)  Subjective:      Patient ID: Elisa Dimas is a 80 y o  male  Chief Complaint   Patient presents with    Follow-up     review blood work       HPI  Diuretic changed-reduced    Now on torsemide 20mg alt with 10mg, was on 20mg/d  Up 5 pounds in 3 days  Legs don't feel heavier  Sob same  No orthopnea  Sleeping more  Not dizzy or lightheaded  Sees Dr Eugene Jenkins  Wt does fluctuate  Not a big water drinker  Aware of risks      The following portions of the patient's history were reviewed and updated as appropriate: allergies, current medications, past family history, past medical history, past social history, past surgical history and problem list     Review of Systems      Current Outpatient Prescriptions   Medication Sig Dispense Refill    albuterol (PROAIR HFA) 90 mcg/act inhaler Inhale 2 puffs every 4 (four) hours as needed for wheezing or shortness of breath 18 g 5    amiodarone 100 mg tablet Take 1 tablet (100 mg total) by mouth daily 90 tablet 3    amLODIPine (NORVASC) 5 mg tablet Take 1 tablet (5 mg total) by mouth daily 90 tablet 1    aspirin 81 mg chewable tablet Chew daily      atorvastatin (LIPITOR) 10 mg tablet Take 2 tablets (20 mg total) by mouth daily 90 tablet 3    B Complex Vitamins (VITAMIN B COMPLEX PO) Take by mouth      BREO ELLIPTA USE 1 INHALATION DAILY 1 each 5    cyanocobalamin (VITAMIN B-12) 100 mcg tablet Take by mouth      diclofenac sodium (VOLTAREN) 1 % Apply 4 g topically 4 (four) times a day Both shoulders and knees 5 Tube 5    docusate sodium (COLACE) 100 mg capsule Take 100 mg by mouth daily at bedtime   DULoxetine (CYMBALTA) 30 mg delayed release capsule Take 1 capsule (30 mg total) by mouth daily 30 capsule 1    fenofibrate (TRICOR) 145 mg tablet Take 1 tablet (145 mg total) by mouth daily 90 tablet 3    hydrALAZINE (APRESOLINE) 25 mg tablet take 1 tablet by mouth every 8 hours 90 tablet 0    IFEREX 150 150 MG capsule take 1 capsule by mouth once daily 30 capsule 2    isosorbide mononitrate (IMDUR) 30 mg 24 hr tablet take 1 tablet by mouth once daily 30 tablet 0    levothyroxine 50 mcg tablet take 1 tablet by mouth once daily 30 tablet 5    meloxicam (MOBIC) 15 mg tablet Take 1 tablet (15 mg total) by mouth daily for 90 days As needed 90 tablet 0    metoprolol tartrate (LOPRESSOR) 25 mg tablet Take 1 tablet (25 mg total) by mouth every 12 (twelve) hours  60 tablet 2    pantoprazole (PROTONIX) 40 mg tablet take 1 tablet by mouth once daily 90 tablet 3    tolterodine (DETROL) 2 mg tablet Take 1 tablet (2 mg total) by mouth daily 90 tablet 1    torsemide (DEMADEX) 20 mg tablet Take 20mg/d alternating with 10mg/d 90 tablet 1    traMADol-acetaminophen (ULTRACET) 37 5-325 mg per tablet Take 1 tablet by mouth 2 (two) times a day as needed for moderate pain 30 tablet 0     No current facility-administered medications for this visit          Objective:    BP 90/60   Pulse 72   Temp (!) 97 2 °F (36 2 °C)   Resp 16   Ht 5' 10" (1 778 m)   Wt 103 kg (227 lb)   BMI 32 57 kg/m²        Physical Exam           Christina Stokes DO

## 2018-11-10 DIAGNOSIS — M25.512 CHRONIC PAIN OF BOTH SHOULDERS: ICD-10-CM

## 2018-11-10 DIAGNOSIS — M25.511 CHRONIC PAIN OF BOTH SHOULDERS: ICD-10-CM

## 2018-11-10 DIAGNOSIS — G89.29 CHRONIC PAIN OF BOTH SHOULDERS: ICD-10-CM

## 2018-11-10 RX ORDER — DULOXETIN HYDROCHLORIDE 30 MG/1
CAPSULE, DELAYED RELEASE ORAL
Qty: 90 CAPSULE | Refills: 1 | Status: SHIPPED | OUTPATIENT
Start: 2018-11-10 | End: 2018-12-09 | Stop reason: HOSPADM

## 2018-11-15 DIAGNOSIS — N17.9 AKI (ACUTE KIDNEY INJURY) (HCC): Primary | ICD-10-CM

## 2018-11-15 LAB
BUN SERPL-MCNC: 51 MG/DL (ref 8–27)
BUN/CREAT SERPL: 15 (ref 10–24)
CALCIUM SERPL-MCNC: 9.5 MG/DL (ref 8.6–10.2)
CHLORIDE SERPL-SCNC: 98 MMOL/L (ref 96–106)
CO2 SERPL-SCNC: 24 MMOL/L (ref 20–29)
CREAT SERPL-MCNC: 3.49 MG/DL (ref 0.76–1.27)
GLUCOSE SERPL-MCNC: 132 MG/DL (ref 65–99)
LABCORP COMMENT: NORMAL
MICRODELETION SYND BLD/T FISH: NORMAL
POTASSIUM SERPL-SCNC: 5 MMOL/L (ref 3.5–5.2)
SL AMB EGFR AFRICAN AMERICAN: 17 ML/MIN/1.73
SL AMB EGFR NON AFRICAN AMERICAN: 15 ML/MIN/1.73
SODIUM SERPL-SCNC: 140 MMOL/L (ref 134–144)

## 2018-11-16 ENCOUNTER — APPOINTMENT (INPATIENT)
Dept: RADIOLOGY | Facility: HOSPITAL | Age: 83
DRG: 682 | End: 2018-11-16
Payer: COMMERCIAL

## 2018-11-16 ENCOUNTER — HOSPITAL ENCOUNTER (INPATIENT)
Facility: HOSPITAL | Age: 83
LOS: 3 days | Discharge: HOME HEALTH | DRG: 682 | End: 2018-11-19
Attending: FAMILY MEDICINE | Admitting: STUDENT IN AN ORGANIZED HEALTH CARE EDUCATION/TRAINING PROGRAM
Payer: COMMERCIAL

## 2018-11-16 ENCOUNTER — APPOINTMENT (INPATIENT)
Dept: NON INVASIVE DIAGNOSTICS | Facility: HOSPITAL | Age: 83
DRG: 682 | End: 2018-11-16
Payer: COMMERCIAL

## 2018-11-16 DIAGNOSIS — G89.29 CHRONIC PAIN OF BOTH SHOULDERS: ICD-10-CM

## 2018-11-16 DIAGNOSIS — E78.2 MIXED HYPERLIPIDEMIA: Chronic | ICD-10-CM

## 2018-11-16 DIAGNOSIS — L84 CORNS: ICD-10-CM

## 2018-11-16 DIAGNOSIS — G89.29 CHRONIC LEFT SHOULDER PAIN: Chronic | ICD-10-CM

## 2018-11-16 DIAGNOSIS — N32.81 OAB (OVERACTIVE BLADDER): ICD-10-CM

## 2018-11-16 DIAGNOSIS — N17.9 ACUTE RENAL FAILURE (HCC): Primary | ICD-10-CM

## 2018-11-16 DIAGNOSIS — I50.42 CHRONIC COMBINED SYSTOLIC AND DIASTOLIC CHF, NYHA CLASS 2 AND ACA/AHA STAGE C: ICD-10-CM

## 2018-11-16 DIAGNOSIS — K21.00 GASTROESOPHAGEAL REFLUX DISEASE WITH ESOPHAGITIS: Chronic | ICD-10-CM

## 2018-11-16 DIAGNOSIS — I10 ESSENTIAL HYPERTENSION: Chronic | ICD-10-CM

## 2018-11-16 DIAGNOSIS — I27.20 PULMONARY HYPERTENSION (HCC): ICD-10-CM

## 2018-11-16 DIAGNOSIS — B35.1 ONYCHOMYCOSIS: ICD-10-CM

## 2018-11-16 DIAGNOSIS — E11.42 DIABETIC POLYNEUROPATHY ASSOCIATED WITH TYPE 2 DIABETES MELLITUS (HCC): Chronic | ICD-10-CM

## 2018-11-16 DIAGNOSIS — J90 PLEURAL EFFUSION, RIGHT: Chronic | ICD-10-CM

## 2018-11-16 DIAGNOSIS — M79.672 PAIN IN BOTH FEET: ICD-10-CM

## 2018-11-16 DIAGNOSIS — M25.511 CHRONIC PAIN OF BOTH SHOULDERS: ICD-10-CM

## 2018-11-16 DIAGNOSIS — M25.512 CHRONIC LEFT SHOULDER PAIN: Chronic | ICD-10-CM

## 2018-11-16 DIAGNOSIS — R26.2 AMBULATORY DYSFUNCTION: Chronic | ICD-10-CM

## 2018-11-16 DIAGNOSIS — M54.12 CERVICAL RADICULOPATHY: Chronic | ICD-10-CM

## 2018-11-16 DIAGNOSIS — R79.89 ELEVATED D-DIMER: ICD-10-CM

## 2018-11-16 DIAGNOSIS — N17.9 ACUTE KIDNEY INJURY SUPERIMPOSED ON CHRONIC KIDNEY DISEASE (HCC): ICD-10-CM

## 2018-11-16 DIAGNOSIS — D50.9 IRON DEFICIENCY ANEMIA, UNSPECIFIED IRON DEFICIENCY ANEMIA TYPE: ICD-10-CM

## 2018-11-16 DIAGNOSIS — I48.0 PAROXYSMAL ATRIAL FIBRILLATION (HCC): Chronic | ICD-10-CM

## 2018-11-16 DIAGNOSIS — E66.9 CLASS 1 OBESITY IN ADULT: Chronic | ICD-10-CM

## 2018-11-16 DIAGNOSIS — M25.512 CHRONIC PAIN OF BOTH SHOULDERS: ICD-10-CM

## 2018-11-16 DIAGNOSIS — R91.8 MULTIPLE PULMONARY NODULES: Chronic | ICD-10-CM

## 2018-11-16 DIAGNOSIS — H91.91 HEARING DIFFICULTY OF RIGHT EAR: Chronic | ICD-10-CM

## 2018-11-16 DIAGNOSIS — N18.9 ACUTE KIDNEY INJURY SUPERIMPOSED ON CHRONIC KIDNEY DISEASE (HCC): ICD-10-CM

## 2018-11-16 DIAGNOSIS — M79.671 PAIN IN BOTH FEET: ICD-10-CM

## 2018-11-16 DIAGNOSIS — M17.10 OSTEOARTHRITIS OF KNEE: Chronic | ICD-10-CM

## 2018-11-16 DIAGNOSIS — I70.209 PERIPHERAL ARTERIOSCLEROSIS (HCC): Chronic | ICD-10-CM

## 2018-11-16 DIAGNOSIS — G47.33 OSA (OBSTRUCTIVE SLEEP APNEA): Chronic | ICD-10-CM

## 2018-11-16 DIAGNOSIS — E03.8 OTHER SPECIFIED HYPOTHYROIDISM: Chronic | ICD-10-CM

## 2018-11-16 DIAGNOSIS — J30.89 NON-SEASONAL ALLERGIC RHINITIS, UNSPECIFIED TRIGGER: Chronic | ICD-10-CM

## 2018-11-16 DIAGNOSIS — E11.9 TYPE 2 DIABETES MELLITUS, WITHOUT LONG-TERM CURRENT USE OF INSULIN (HCC): Chronic | ICD-10-CM

## 2018-11-16 DIAGNOSIS — J43.2 CENTRILOBULAR EMPHYSEMA (HCC): ICD-10-CM

## 2018-11-16 DIAGNOSIS — I25.10 CAD IN NATIVE ARTERY: Chronic | ICD-10-CM

## 2018-11-16 DIAGNOSIS — J96.11 CHRONIC RESPIRATORY FAILURE WITH HYPOXIA (HCC): ICD-10-CM

## 2018-11-16 DIAGNOSIS — N18.30 STAGE 3 CHRONIC KIDNEY DISEASE (HCC): ICD-10-CM

## 2018-11-16 DIAGNOSIS — Z22.322 MRSA NASAL COLONIZATION: ICD-10-CM

## 2018-11-16 DIAGNOSIS — N40.0 BPH (BENIGN PROSTATIC HYPERPLASIA): Chronic | ICD-10-CM

## 2018-11-16 DIAGNOSIS — J44.9 MODERATE COPD (CHRONIC OBSTRUCTIVE PULMONARY DISEASE) (HCC): ICD-10-CM

## 2018-11-16 DIAGNOSIS — Z95.2 S/P TAVR (TRANSCATHETER AORTIC VALVE REPLACEMENT): Chronic | ICD-10-CM

## 2018-11-16 DIAGNOSIS — L57.0 ACTINIC KERATOSIS: Chronic | ICD-10-CM

## 2018-11-16 DIAGNOSIS — I73.9 PERIPHERAL VASCULAR DISEASE (HCC): Chronic | ICD-10-CM

## 2018-11-16 DIAGNOSIS — J96.11 CHRONIC HYPOXEMIC RESPIRATORY FAILURE (HCC): ICD-10-CM

## 2018-11-16 PROBLEM — I50.32 CHRONIC DIASTOLIC (CONGESTIVE) HEART FAILURE (HCC): Status: RESOLVED | Noted: 2018-10-16 | Resolved: 2018-11-16

## 2018-11-16 LAB
ALBUMIN SERPL BCP-MCNC: 3.1 G/DL (ref 3.5–5)
ALP SERPL-CCNC: 49 U/L (ref 46–116)
ALT SERPL W P-5'-P-CCNC: 25 U/L (ref 12–78)
ANION GAP SERPL CALCULATED.3IONS-SCNC: 5 MMOL/L (ref 4–13)
AST SERPL W P-5'-P-CCNC: 38 U/L (ref 5–45)
BACTERIA UR QL AUTO: ABNORMAL /HPF
BILIRUB SERPL-MCNC: 0.6 MG/DL (ref 0.2–1)
BILIRUB UR QL STRIP: NEGATIVE
BUN SERPL-MCNC: 55 MG/DL (ref 5–25)
CALCIUM SERPL-MCNC: 9 MG/DL (ref 8.3–10.1)
CHLORIDE SERPL-SCNC: 104 MMOL/L (ref 100–108)
CLARITY UR: CLEAR
CO2 SERPL-SCNC: 32 MMOL/L (ref 21–32)
COLOR UR: YELLOW
CREAT SERPL-MCNC: 3.17 MG/DL (ref 0.6–1.3)
ERYTHROCYTE [DISTWIDTH] IN BLOOD BY AUTOMATED COUNT: 18 % (ref 11.6–15.1)
GFR SERPL CREATININE-BSD FRML MDRD: 17 ML/MIN/1.73SQ M
GLUCOSE SERPL-MCNC: 104 MG/DL (ref 65–140)
GLUCOSE UR STRIP-MCNC: NEGATIVE MG/DL
HCT VFR BLD AUTO: 32.3 % (ref 36.5–49.3)
HGB BLD-MCNC: 9.8 G/DL (ref 12–17)
HGB UR QL STRIP.AUTO: NEGATIVE
HYALINE CASTS #/AREA URNS LPF: ABNORMAL /LPF
KETONES UR STRIP-MCNC: NEGATIVE MG/DL
LEUKOCYTE ESTERASE UR QL STRIP: NEGATIVE
MAGNESIUM SERPL-MCNC: 2.2 MG/DL (ref 1.6–2.6)
MCH RBC QN AUTO: 26.6 PG (ref 26.8–34.3)
MCHC RBC AUTO-ENTMCNC: 30.3 G/DL (ref 31.4–37.4)
MCV RBC AUTO: 88 FL (ref 82–98)
NITRITE UR QL STRIP: NEGATIVE
NON-SQ EPI CELLS URNS QL MICRO: ABNORMAL /HPF
NT-PROBNP SERPL-MCNC: ABNORMAL PG/ML
PH UR STRIP.AUTO: 6.5 [PH] (ref 5–9)
PLATELET # BLD AUTO: 334 THOUSANDS/UL (ref 149–390)
PLATELET # BLD AUTO: 341 THOUSANDS/UL (ref 149–390)
PMV BLD AUTO: 10.1 FL (ref 8.9–12.7)
PMV BLD AUTO: 10.3 FL (ref 8.9–12.7)
POTASSIUM SERPL-SCNC: 4.5 MMOL/L (ref 3.5–5.3)
PROT SERPL-MCNC: 6.8 G/DL (ref 6.4–8.2)
PROT UR STRIP-MCNC: ABNORMAL MG/DL
RBC # BLD AUTO: 3.68 MILLION/UL (ref 3.88–5.62)
RBC #/AREA URNS AUTO: ABNORMAL /HPF
SODIUM SERPL-SCNC: 141 MMOL/L (ref 136–145)
SP GR UR STRIP.AUTO: 1.01 (ref 1–1.03)
UROBILINOGEN UR QL STRIP.AUTO: 0.2 E.U./DL
WBC # BLD AUTO: 8.37 THOUSAND/UL (ref 4.31–10.16)
WBC #/AREA URNS AUTO: ABNORMAL /HPF

## 2018-11-16 PROCEDURE — 83880 ASSAY OF NATRIURETIC PEPTIDE: CPT | Performed by: STUDENT IN AN ORGANIZED HEALTH CARE EDUCATION/TRAINING PROGRAM

## 2018-11-16 PROCEDURE — 85027 COMPLETE CBC AUTOMATED: CPT | Performed by: STUDENT IN AN ORGANIZED HEALTH CARE EDUCATION/TRAINING PROGRAM

## 2018-11-16 PROCEDURE — 94640 AIRWAY INHALATION TREATMENT: CPT

## 2018-11-16 PROCEDURE — 83735 ASSAY OF MAGNESIUM: CPT | Performed by: STUDENT IN AN ORGANIZED HEALTH CARE EDUCATION/TRAINING PROGRAM

## 2018-11-16 PROCEDURE — 71045 X-RAY EXAM CHEST 1 VIEW: CPT

## 2018-11-16 PROCEDURE — 94660 CPAP INITIATION&MGMT: CPT

## 2018-11-16 PROCEDURE — 76770 US EXAM ABDO BACK WALL COMP: CPT

## 2018-11-16 PROCEDURE — 81001 URINALYSIS AUTO W/SCOPE: CPT | Performed by: STUDENT IN AN ORGANIZED HEALTH CARE EDUCATION/TRAINING PROGRAM

## 2018-11-16 PROCEDURE — 85049 AUTOMATED PLATELET COUNT: CPT | Performed by: STUDENT IN AN ORGANIZED HEALTH CARE EDUCATION/TRAINING PROGRAM

## 2018-11-16 PROCEDURE — 93306 TTE W/DOPPLER COMPLETE: CPT | Performed by: INTERNAL MEDICINE

## 2018-11-16 PROCEDURE — 99223 1ST HOSP IP/OBS HIGH 75: CPT | Performed by: STUDENT IN AN ORGANIZED HEALTH CARE EDUCATION/TRAINING PROGRAM

## 2018-11-16 PROCEDURE — 80053 COMPREHEN METABOLIC PANEL: CPT | Performed by: STUDENT IN AN ORGANIZED HEALTH CARE EDUCATION/TRAINING PROGRAM

## 2018-11-16 PROCEDURE — 87081 CULTURE SCREEN ONLY: CPT | Performed by: STUDENT IN AN ORGANIZED HEALTH CARE EDUCATION/TRAINING PROGRAM

## 2018-11-16 PROCEDURE — 94760 N-INVAS EAR/PLS OXIMETRY 1: CPT

## 2018-11-16 PROCEDURE — 93306 TTE W/DOPPLER COMPLETE: CPT

## 2018-11-16 PROCEDURE — 99223 1ST HOSP IP/OBS HIGH 75: CPT | Performed by: INTERNAL MEDICINE

## 2018-11-16 PROCEDURE — 87147 CULTURE TYPE IMMUNOLOGIC: CPT | Performed by: STUDENT IN AN ORGANIZED HEALTH CARE EDUCATION/TRAINING PROGRAM

## 2018-11-16 RX ORDER — ASPIRIN 81 MG/1
81 TABLET, CHEWABLE ORAL DAILY
Status: DISCONTINUED | OUTPATIENT
Start: 2018-11-16 | End: 2018-11-19 | Stop reason: HOSPADM

## 2018-11-16 RX ORDER — HYDRALAZINE HYDROCHLORIDE 25 MG/1
25 TABLET, FILM COATED ORAL EVERY 8 HOURS SCHEDULED
Status: DISCONTINUED | OUTPATIENT
Start: 2018-11-16 | End: 2018-11-19 | Stop reason: HOSPADM

## 2018-11-16 RX ORDER — IPRATROPIUM BROMIDE AND ALBUTEROL SULFATE 2.5; .5 MG/3ML; MG/3ML
3 SOLUTION RESPIRATORY (INHALATION) EVERY 2 HOUR PRN
Status: DISCONTINUED | OUTPATIENT
Start: 2018-11-16 | End: 2018-11-19

## 2018-11-16 RX ORDER — PANTOPRAZOLE SODIUM 40 MG/1
40 TABLET, DELAYED RELEASE ORAL
Status: DISCONTINUED | OUTPATIENT
Start: 2018-11-16 | End: 2018-11-19 | Stop reason: HOSPADM

## 2018-11-16 RX ORDER — IPRATROPIUM BROMIDE AND ALBUTEROL SULFATE 2.5; .5 MG/3ML; MG/3ML
3 SOLUTION RESPIRATORY (INHALATION)
Status: DISCONTINUED | OUTPATIENT
Start: 2018-11-16 | End: 2018-11-19

## 2018-11-16 RX ORDER — UBIDECARENONE 75 MG
100 CAPSULE ORAL DAILY
Status: DISCONTINUED | OUTPATIENT
Start: 2018-11-16 | End: 2018-11-19 | Stop reason: HOSPADM

## 2018-11-16 RX ORDER — SODIUM CHLORIDE 9 MG/ML
50 INJECTION, SOLUTION INTRAVENOUS CONTINUOUS
Status: DISCONTINUED | OUTPATIENT
Start: 2018-11-16 | End: 2018-11-17

## 2018-11-16 RX ORDER — DOCUSATE SODIUM 100 MG/1
100 CAPSULE, LIQUID FILLED ORAL
Status: DISCONTINUED | OUTPATIENT
Start: 2018-11-16 | End: 2018-11-19 | Stop reason: HOSPADM

## 2018-11-16 RX ORDER — AMLODIPINE BESYLATE 5 MG/1
5 TABLET ORAL DAILY
Status: DISCONTINUED | OUTPATIENT
Start: 2018-11-16 | End: 2018-11-19 | Stop reason: HOSPADM

## 2018-11-16 RX ORDER — FLUTICASONE FUROATE AND VILANTEROL 100; 25 UG/1; UG/1
1 POWDER RESPIRATORY (INHALATION) DAILY
Status: DISCONTINUED | OUTPATIENT
Start: 2018-11-16 | End: 2018-11-19 | Stop reason: HOSPADM

## 2018-11-16 RX ORDER — OXYBUTYNIN CHLORIDE 5 MG/1
5 TABLET ORAL DAILY
Status: DISCONTINUED | OUTPATIENT
Start: 2018-11-16 | End: 2018-11-19 | Stop reason: HOSPADM

## 2018-11-16 RX ORDER — ISOSORBIDE MONONITRATE 30 MG/1
30 TABLET, EXTENDED RELEASE ORAL DAILY
Status: DISCONTINUED | OUTPATIENT
Start: 2018-11-16 | End: 2018-11-19 | Stop reason: HOSPADM

## 2018-11-16 RX ORDER — LEVOTHYROXINE SODIUM 0.05 MG/1
50 TABLET ORAL
Status: DISCONTINUED | OUTPATIENT
Start: 2018-11-16 | End: 2018-11-19 | Stop reason: HOSPADM

## 2018-11-16 RX ORDER — AMIODARONE HYDROCHLORIDE 200 MG/1
100 TABLET ORAL DAILY
Status: DISCONTINUED | OUTPATIENT
Start: 2018-11-16 | End: 2018-11-19 | Stop reason: HOSPADM

## 2018-11-16 RX ORDER — FENOFIBRATE 145 MG/1
145 TABLET, COATED ORAL DAILY
Status: DISCONTINUED | OUTPATIENT
Start: 2018-11-16 | End: 2018-11-19 | Stop reason: HOSPADM

## 2018-11-16 RX ORDER — TRAMADOL HYDROCHLORIDE 50 MG/1
50 TABLET ORAL EVERY 12 HOURS PRN
Status: DISCONTINUED | OUTPATIENT
Start: 2018-11-16 | End: 2018-11-19 | Stop reason: HOSPADM

## 2018-11-16 RX ORDER — HEPARIN SODIUM 5000 [USP'U]/ML
5000 INJECTION, SOLUTION INTRAVENOUS; SUBCUTANEOUS EVERY 8 HOURS SCHEDULED
Status: DISCONTINUED | OUTPATIENT
Start: 2018-11-16 | End: 2018-11-19 | Stop reason: HOSPADM

## 2018-11-16 RX ORDER — DULOXETIN HYDROCHLORIDE 30 MG/1
30 CAPSULE, DELAYED RELEASE ORAL DAILY
Status: DISCONTINUED | OUTPATIENT
Start: 2018-11-16 | End: 2018-11-19 | Stop reason: HOSPADM

## 2018-11-16 RX ADMIN — DULOXETINE HYDROCHLORIDE 30 MG: 30 CAPSULE, DELAYED RELEASE ORAL at 13:06

## 2018-11-16 RX ADMIN — DOCUSATE SODIUM 100 MG: 100 CAPSULE, LIQUID FILLED ORAL at 22:24

## 2018-11-16 RX ADMIN — FENOFIBRATE 145 MG: 145 TABLET ORAL at 13:05

## 2018-11-16 RX ADMIN — IPRATROPIUM BROMIDE AND ALBUTEROL SULFATE 3 ML: .5; 3 SOLUTION RESPIRATORY (INHALATION) at 15:40

## 2018-11-16 RX ADMIN — OXYBUTYNIN CHLORIDE 5 MG: 5 TABLET ORAL at 13:06

## 2018-11-16 RX ADMIN — IPRATROPIUM BROMIDE AND ALBUTEROL SULFATE 3 ML: .5; 3 SOLUTION RESPIRATORY (INHALATION) at 19:39

## 2018-11-16 RX ADMIN — IPRATROPIUM BROMIDE AND ALBUTEROL SULFATE 3 ML: .5; 3 SOLUTION RESPIRATORY (INHALATION) at 12:20

## 2018-11-16 RX ADMIN — HYDRALAZINE HYDROCHLORIDE 25 MG: 25 TABLET ORAL at 22:24

## 2018-11-16 RX ADMIN — LEVOTHYROXINE SODIUM 50 MCG: 50 TABLET ORAL at 13:05

## 2018-11-16 RX ADMIN — SODIUM CHLORIDE 50 ML/HR: 0.9 INJECTION, SOLUTION INTRAVENOUS at 17:09

## 2018-11-16 RX ADMIN — ISOSORBIDE MONONITRATE 30 MG: 30 TABLET, EXTENDED RELEASE ORAL at 13:06

## 2018-11-16 RX ADMIN — IPRATROPIUM BROMIDE AND ALBUTEROL SULFATE 3 ML: .5; 3 SOLUTION RESPIRATORY (INHALATION) at 23:39

## 2018-11-16 RX ADMIN — AMLODIPINE BESYLATE 5 MG: 5 TABLET ORAL at 13:06

## 2018-11-16 RX ADMIN — ASPIRIN 81 MG 81 MG: 81 TABLET ORAL at 13:06

## 2018-11-16 RX ADMIN — METOPROLOL TARTRATE 25 MG: 25 TABLET, FILM COATED ORAL at 13:06

## 2018-11-16 RX ADMIN — HEPARIN SODIUM 5000 UNITS: 5000 INJECTION, SOLUTION INTRAVENOUS; SUBCUTANEOUS at 22:24

## 2018-11-16 RX ADMIN — HEPARIN SODIUM 5000 UNITS: 5000 INJECTION, SOLUTION INTRAVENOUS; SUBCUTANEOUS at 14:18

## 2018-11-16 RX ADMIN — METOPROLOL TARTRATE 25 MG: 25 TABLET, FILM COATED ORAL at 22:24

## 2018-11-16 RX ADMIN — HYDRALAZINE HYDROCHLORIDE 25 MG: 25 TABLET ORAL at 14:18

## 2018-11-16 RX ADMIN — PANTOPRAZOLE SODIUM 40 MG: 40 TABLET, DELAYED RELEASE ORAL at 13:06

## 2018-11-16 RX ADMIN — VITAM B12 100 MCG: 100 TAB at 14:19

## 2018-11-16 RX ADMIN — AMIODARONE HYDROCHLORIDE 100 MG: 200 TABLET ORAL at 13:06

## 2018-11-16 NOTE — ASSESSMENT & PLAN NOTE
· Last spirometry showed his FEV1 to be 1 85 liters or 76 percent predicted consistent moderate airflow obstruction    · Cont home meds

## 2018-11-16 NOTE — H&P
H&P- Trinna Oven 10/24/1930, 80 y o  male MRN: [id number]    Unit/Bed#: [address] [place] [address] Encounter: [id number]    Primary Care Provider: Glynn Rollins DO   Date and time admitted to hospital: 11/16/2018 10:38 AM        * Acute kidney injury superimposed on chronic kidney disease ([facility] )   Assessment & Plan    · Creatinine on admission 3 14, baseline 1 8  · Rising creatinine on diuretics however patient appears fluid overloaded  · Will attempt gentle hydration overnight with [email or web address]  · UA pending  · Renal US and bladder scans qshift pending  · Close monitoring of renal function, UOP and I/Os  · Hold diuretic for now  · Nephrology consulted to follow     Chronic combined systolic and diastolic CHF, 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)   Assessment & Plan    · BNP elevated at 21,000 (baseline 2-3000), and patient with worsening LE edema however has been having rising creatine with increase in diuretic as outpt, may be intravascularly depleted but fluid overloaded in extravascular space  · Dry weight may be 207lbs but pt unsure, current weight is 221lbs  · Last echo 6/2018 with EF 35% with grade 1 diastolic dysfunction  · CXR pending  · Repeat Echo pending  · Cardio consulted, recs appreciated  · Monitor I/O and weight closely  · Will attempt gentle hydration overnight but if does not improve will change course to aggressive diuresis  Pulmonary hypertension (HCC)   Assessment & Plan    · Moderate to severe on last echo, likely secondary to underlying COPD     Chronic respiratory failure with hypoxia (HCC)   Assessment & Plan    · Pt on 6L NC at home, with up to 10L with exertion   Appears at baseline  · CXR pending  · Cont with home meds, added PRN nebs if needed     PATITO (obstructive sleep apnea)   Assessment & Plan    · Moderate PATITO  · Cont CPAP qHS     Ambulatory dysfunction   Assessment & Plan    · PT/OT eval     Hypertension   Assessment & Plan    Cont home meds     Type 2 diabetes mellitus, without long-term current use of insulin [facility])   Assessment & Plan    Lab Results   Component Value Date    HGBA1C 5 3 06/12/2018       No results for input(s): POCGLU in the last 72 hours  Blood Sugar Average: Last 72 hrs:       CAD in native artery   Assessment & Plan    Cont home meds     Paroxysmal atrial fibrillation (HCC)   Assessment & Plan    · Cont beta blockers  · HR in goal range     Moderate COPD (chronic obstructive pulmonary disease) ([facility])   Assessment & Plan    · Last spirometry showed his FEV1 to be 1 85 liters or 76 percent predicted consistent moderate airflow obstruction  · Cont home meds         VTE Prophylaxis: Heparin  / sequential compression device   Code Status: Level 1 - Full Code    Anticipated Length of Stay:  Patient will be admitted on an Inpatient basis with an anticipated length of stay of  > 2 midnights  Justification for Hospital Stay: IV fluids, possible IV diuretics    Total Time for Visit, including Counseling / Coordination of Care: 45 minutes  Greater than 50% of this total time spent on direct patient counseling and coordination of care  Chief Complaint:   No chief complaint on file  History of Present Illness:    Carla Dorado is a 80 y o  male with a PMH of COPD with chronic hypoxic resp failure on 6L O2, PATITO, T2DM, CKD3, CHF, Afib not on anticoagulation, hx DVT, PAD, AS s/p bioprosthetic aortic valve replacement  who presents at the urging of his PCP because of renal failure  He states that he has been having worsening of his CHF, with LE edema and his doctor has been increasing his diuretics to help, but in the process his creatinine has been rising  He had blood work done 2 days ago and his creatinine has conitnued to rise for his PCP advised him to be get admitted to the hospital for workup  He admits to chronic SOB and wheezing from his COPD, which is at baseline, and LE edema  He denies orthopnea  He thinks his dry weight is around 207lbs   Follows with Dr Lizzy Guerra for cardio  Review of Systems:    Review of Systems   Constitutional: Negative for chills, fatigue and fever  HENT: Negative  Respiratory: Positive for wheezing (at baseline)  Negative for cough, chest tightness and shortness of breath (at baseline)  Cardiovascular: Positive for leg swelling  Negative for chest pain  Gastrointestinal: Negative  Genitourinary: Negative  All other systems reviewed and are negative        Past Medical and Surgical History:     Past Medical History:   Diagnosis Date    Allergic rhinitis 06/11/2010    Aortic stenosis, severe     Bacterial pneumonia 06/08/2007    Bladder neck obstruction 12/23/2010    BPH (benign prostatic hyperplasia)     Bron[facility], [facility] obstructive, [facility] 01/30/2012    CAD (coronary artery disease)     stent 2014    Carcinoma ([place] [address] )     resolved [id number]    Cardiomegaly 02/13/2007    Cataract of both eyes     CH[facility] (congestive heart failure) ([facility])     Chronic allergic conjunctivitis     last assessed [id number]    Chronic kidney disease (CKD)     CKD (chronic kidney disease), stage III (HCC)     CKD (chronic kidney disease), stage III (HCC)     COPD (chronic obstructive pulmonary disease) ([facility] )     Decubitus ulcer     resolved [id number]    Dermatomycosis     last assessed [id number]    Diabetes mellitus type 2, noninsulin dependent ([facility] )     Dyspnea on exertion     last assessed [id number]    Eczema     last assessed [id number]    Edema     last assessed [id number]    Epistaxis 12/01/2004    Esophagitis, erosive     Exposure to scabies     resolved [id number]    Fracture of rib     last assessed [id number]    H/O aortic valve replacement     resolved [id number]    H/O blood clots     History of DVT (deep vein thrombosis)     left posterior tibial/peroneal veins    History of non-ST elevation myocardial infarction (NSTEMI)     History of pneumonia     Hyperlipidemia     Hypertension     Internal hemorrhoids 2006    LBBB (left bundle branch block)     MRSA (methicillin resistant staph aureus) culture positive     NSTEMI (non-ST elevated myocardial infarction) ([facility] )     resolved 2016    Obstructive sleep apnea on CPAP     severe PATITO with AHI of 106 and uses CPAP at 10 cm H2O with 2 l/m oxygen    Paroxysmal atrial fibrillation ([facility] [address] )     Phimosis 2010    severe pt had circumcision 2010    [facility]     PVD (peripheral vascular disease) ([facility] )     Rotator cuff tendonitis     lsat assessed [id number]    Skin abscess 2004    Hip    Skin neoplasm     last assessed [id number]    Tachycardia 2004    Trigger finger     last assessed [id number]    Type 2 diabetes mellitus ([facility] ) 2004    Venous thrombosis 2007    Venous thrombosis 2007       Past Surgical History:   Procedure Laterality Date    CARDIAC CATHETERIZATION  03/10/2016    Showed 60-70% mid LAD lesion next to stent    CATARACT EXTRACTION      CIRCUMCISION, NON-      ESOPHAGOGASTRODUODENOSCOPY N/A 3/14/2016    Procedure: ESOPHAGOGASTRODUODENOSCOPY (EGD); Surgeon: Manny Nur MD;  Location: [facility]; Service:     EYE SURGERY      FRACTURE SURGERY      JOINT REPLACEMENT      both hips replaced    OTHER SURGICAL HISTORY      H/O thoracentesis (diagnostic)    FL REPLACE AORTIC VALVE OPENFEMORAL ARTERY APPROACH N/A 2016    Procedure: TRANSFEMORAL TAVR WITH 26MM CALIX MAKAYLA S3 TISSUE VALVE; PINA ;  Surgeon: Shanthi Hoyt DO;  Location: [facility];  Service: Cardiac Surgery    REPLACEMENT TOTAL KNEE Bilateral 1991 and     TISSUE AORTIC VALVE REPLACEMENT      transfemoral, transcatheter    TONSILLECTOMY      TONSILLECTOMY AND ADENOIDECTOMY      TOTAL HIP ARTHROPLASTY      Bilateral       Meds/Allergies:    Prior to Admission medications    Medication Sig Start Date End Date Taking?  Authorizing Provider   albuterol [facility] HFA) 90 mcg/act inhaler Inhale 2 puffs every 4 (four) hours as needed for wheezing or shortness of breath 6/5/18   [facility], DO   amiodarone 100 mg tablet Take 1 tablet (100 mg total) by mouth daily 3/6/18   Kristian Carter MD   amLODIPine (NORVASC) 5 mg tablet Take 1 tablet (5 mg total) by mouth daily 11/9/18 Yetta Comings, DO   aspirin 81 mg chewable tablet Chew daily    Historical Provider, MD   atorvastatin (LIPITOR) 10 mg tablet Take 2 tablets (20 mg total) by mouth daily 6/22/18 Yetta Comings, DO   B Complex Vitamins (VITAMIN B COMPLEX PO) Take by mouth    Historical Provider, MD Kamila Chamberlain USE 1 INHALATION DAILY 4/16/18   ROC Ray   cyanocobalamin (VITAMIN B-12) 100 mcg tablet Take by mouth    Historical Provider, MD   diclofenac sodium (VOLTAREN) 1 % Apply 4 g topically 4 (four) times a day Both shoulders and knees 9/19/18 Yetta Comings, DO   docusate sodium (COLACE) 100 mg capsule Take 100 mg by mouth daily at bedtime  Historical Provider, MD   DULoxetine (CYMBALTA) 30 mg delayed release capsule take 1 capsule by mouth once daily 11/10/18   Yetta Comings, DO   fenofibrate (TRICOR) 145 mg tablet Take 1 tablet (145 mg total) by mouth daily 10/24/18   Kristian Carter MD   hydrALAZINE (APRESOLINE) 25 mg tablet take 1 tablet by mouth every 8 hours 10/12/18   Kristian Carter MD   IFEREX 150 150 MG capsule take 1 capsule by mouth once daily 8/31/18   [facility], DO   isosorbide mononitrate (IMDUR) 30 mg 24 hr tablet take 1 tablet by mouth once daily 9/7/18   Kristian Carter MD   levothyroxine 50 mcg tablet take 1 tablet by mouth once daily 11/4/18 Yetta Comings, DO   meloxicam (MOBIC) 15 mg tablet Take 1 tablet (15 mg total) by mouth daily for 90 days As needed 9/19/18 12/18/18 Yetta Comings, DO   metoprolol tartrate (LOPRESSOR) 25 mg tablet Take 1 tablet (25 mg total) by mouth every 12 (twelve) hours   8/27/16   Soledad Yo PA-C   pantoprazole (PROTONIX) 40 mg tablet take 1 tablet by mouth once daily 5/24/18   Glynn Rollins DO   tolterodine (DETROL) 2 mg tablet Take 1 tablet (2 mg total) by mouth daily 10/16/18   Glynn Rollins DO   torsemide [facility]) 20 mg tablet Take 20mg/d alternating with 10mg/d 11/9/18   Glynn Rollins DO   traMADol-acetaminophen (ULTRACET) 37 5-325 mg per tablet Take 1 tablet by mouth 2 (two) times a day as needed for moderate pain 9/24/18   ROC Iraheta       Allergies: No Known Allergies    Social History:     Marital Status:    Substance Use History:   History   Alcohol Use    Yes     Comment: socially     History   Smoking Status    Former Smoker    Packs/day: 1 00    Years: 35 00    Types: Cigarettes    Quit date: 1/1/1987   Smokeless Tobacco    Never Used     Comment: quit 50 years ago, per [employer]     History   Drug Use No       Family History:    non-contributory    Physical Exam:     Vitals:   Blood Pressure: 134/63 (11/16/18 1100)  Pulse: 65 (11/16/18 1540)  Temperature: 98 6 °F (37 °C) (11/16/18 1100)  Temp Source: Oral (11/16/18 1100)  Respirations: 20 (11/16/18 1540)  Height: 5' 10" (177 8 cm) (11/16/18 1100)  Weight - Scale: 101 kg (221 lb 9 oz) (11/16/18 1100)  SpO2: 96 % (11/16/18 1540)    Physical Exam   Constitutional: He is oriented to person, place, and time  He appears well-developed  No distress  HENT:   Head: Normocephalic and atraumatic  Neck: JVD present  Cardiovascular: Normal rate and regular rhythm  Murmur heard  Pulmonary/Chest: Effort normal and breath sounds normal  No respiratory distress  He has no wheezes  He has no rales  Clear but decreased   Abdominal: Soft  Bowel sounds are normal  He exhibits no distension  There is no tenderness  There is no rebound and no guarding  Musculoskeletal: He exhibits edema  He exhibits no tenderness or deformity  Neurological: He is alert and oriented to person, place, and time  Skin: Skin is warm and dry     Psychiatric: He has a normal mood and affect  His behavior is normal    Nursing note and vitals reviewed  Additional Data:     Lab Results: I have personally reviewed pertinent reports  Results from last 7 days  Lab Units 11/16/18  1328   WBC Thousand/uL 8 37   HEMOGLOBIN g/dL 9 8*   HEMATOCRIT % 32 3*   PLATELETS Thousands/uL 341  334       Results from last 7 days  Lab Units 11/16/18  1328   SODIUM mmol/L 141   POTASSIUM mmol/L 4 5   CHLORIDE mmol/L 104   CO2 mmol/L 32   BUN mg/dL 55*   CREATININE mg/dL 3 17*   CALCIUM mg/dL 9 0   TOTAL BILIRUBIN mg/dL 0 60   ALK PHOS U/L 49   ALT U/L 25   AST U/L 38                       Imaging: I have personally reviewed pertinent reports  XR chest portable   Final Result by Shahida 6, DO (11/16 9033)      Stable chest with bibasilar scarring and right-sided pleural effusion extending along the chest wall  Workstation performed: [id number]         US kidney and bladder   Final Result by Barbara Gay MD (16/27 7594)      Suboptimal visualization of the left kidney  No evidence of hydronephrosis  Workstation performed: [id number]             XR chest portable   Final Result      Stable chest with bibasilar scarring and right-sided pleural effusion extending along the chest wall  Workstation performed: [id number]         US kidney and bladder   Final Result      Suboptimal visualization of the left kidney  No evidence of hydronephrosis  Workstation performed: [id number]             EKG, Pathology, and Other Studies Reviewed on Admission:   · EKG: NSR, LBBB, no acute changes    Allscripts / Epic Records Reviewed: Yes     ** Please Note: This note has been constructed using a voice recognition system   **  Symptoms

## 2018-11-16 NOTE — ASSESSMENT & PLAN NOTE
· Pt on 6L NC at home, with up to 10L with exertion   Appears at baseline  · CXR pending  · Cont with home meds, added PRN nebs if needed

## 2018-11-16 NOTE — ASSESSMENT & PLAN NOTE
Lab Results   Component Value Date    HGBA1C 5 3 06/12/2018       No results for input(s): POCGLU in the last 72 hours      Blood Sugar Average: Last 72 hrs:

## 2018-11-16 NOTE — CONSULTS
Consultation - Cardiology   Martin Medina 80 y o  male MRN: [id number]  Unit/Bed#: 2 Coreen Berkowitz 315-02 Encounter: [id number]    Assessment/Plan     Assessment:  1  Acute kidney injury on chronic kidney disease stage 3  2  Cor pulmonale with lower extremity edema in a patient with history of pulmonary hypertension, morbid obesity, hypoxia respiratory failure, obstructive sleep apnea and interstitial fibrosis  3  Chronic combined systolic and diastolic heart failure with ejection fraction of 35% and grade 1 diastolic dysfunction  4  Hypertension  5  Coronary artery disease with known lesion 60% to the LAD    6  History of paroxysmal atrial fibrillation, maintaining sinus rhythm with amiodarone  7  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    Plan:  Patient has been admitted to the hospitalist service  1  At this time we will attempt IV hydration with normal saline at 50 mL/hour over night and monitor patient's renal function  2  Will continue to hold diuretic at this time and re-evaluate patient in a m  3  Continue with sequential compression sleeves  And encourage patient to wear supportive stockings when at home  Elevate legs as much as possible  4  Monitor I& O, labs and vital signs  History of Present Illness   Physician Requesting Consult: Jt Nicholas MD  Reason for Consult / Principal Problem:  Acute kidney injury on chronic kidney disease stage 3, known chronic combined systolic and diastolic heart failure with ejection fraction 35%, lower extremity edema which appears to be right-sided  History of pulmonary hypertension with PA pressure 60 mm Hg   HPI: Martin Medina is a 80y o  year old male with a history of hypertension, aortic stenosis for which he underwent TAVR 08/20/2017, interstitial fibrosis, coronary artery disease with known a 60% LAD blockage, obstructive sleep apnea using CPAP and pulmonary hypertension with PA pressure approximately 60 mm Hg    Patient was a direct admission to the hospitalist service for acute kidney injury on chronic kidney disease stage 3  Patient follows with Dr Nain Marr from Cardiology and also with [facility]   Patient was seen by Dr Nain Marr in September of 2018 at that time in his note he remarks that patient was not compliant with his diuretic therapy and patient had been counseled  Per Dr Nain Marr so patient was ordered for Demadex 20 mg b i d   Patient resumed taking this medication as instructed  And was noted to have an increase in his BUN and creatinine  Baseline creatinine appears to be 1 8  The end of October patient's BUN was 38 with creatinine of 2 25  Labs were checked on 11/14/2018 patient was found to have a potassium of 5 with BUN of 51 and creatinine of 3 49  Patient's Demadex was decreased to once a day with 20 alternating with 10 mg  Labs were rechecked with a BUN of 55 and creatinine of 3 17 today  Patient was subsequently admitted for further evaluation  Despite patient's pro BNP being 21,190 on admission, patient states he does not have orthopnea or paroxysmal nocturnal dyspnea  He does note dyspnea with exertion but states it has not seemed to have changed  Patient does note that his Yara Gaviota is 205 lb  His weight today in the hospital was 221  Patient was surprise of that because he states he believes he only gained 6-8 lb  Per his home scale  Lungs are unremarkable, patient does have peripheral edema of lower extremities, but this most likely is due to his pulmonary hypertension and right-sided failure  Case was discussed with Dr Darren Comer from Nephrology, at this time we will trial gentle IV hydration over night to see if this improves his renal function    Patient does not appear to be volume overloaded or in congestive heart failure so we will hold diuretics and re-evaluate patient in the a m     Inpatient consult to Cardiology  Consult performed by: Deepti Deras ordered by: Tasha Colby          Review of Systems   Constitutional: Negative for activity change, appetite change and fatigue  HENT: Negative for congestion, postnasal drip, sneezing and trouble swallowing  Eyes: Negative for photophobia, redness and visual disturbance  Respiratory: Positive for cough, shortness of breath and wheezing  Negative for apnea and chest tightness  Patient feels these are at his baseline   Cardiovascular: Positive for leg swelling  Negative for chest pain and palpitations  Denies orthopnea or paroxysmal nocturnal dyspnea   Gastrointestinal: Positive for abdominal distention  Negative for diarrhea, nausea and vomiting  Endocrine: Negative for polydipsia, polyphagia and polyuria  Genitourinary: Negative  Musculoskeletal: Positive for arthralgias and back pain  Chronic bilateral shoulder pain   Allergic/Immunologic: Negative  Neurological: Negative  Hematological: Negative  Psychiatric/Behavioral: Negative          Historical Information   Past Medical History:   Diagnosis Date    Allergic rhinitis 06/11/2010    Aortic stenosis, severe     Bacterial pneumonia 06/08/2007    Bladder neck obstruction 12/23/2010    BPH (benign prostatic hyperplasia)     [facility], [facility] obstructive[facility] 01/30/2012    CAD (coronary artery disease)     stent 2014    Carcinoma [facility]     resolved [id number]    Cardiomegaly 02/13/2007    Cataract of both eyes     CHF (congestive heart failure) (HCC)     Chronic allergic conjunctivitis     last assessed [id number]    Chronic kidney disease (CKD)     CKD (chronic kidney disease), stage III (HCC)     CKD (chronic kidney disease), stage III (HCC)     COPD [facility] obstructive pulmonary disease) [facility]     Decubitus ulcer     resolved [id number]    Dermatomycosis     last assessed [id number]    Diabetes mellitus type 2, noninsulin dependent ([facility] )     Dyspnea on exertion     last assessed [id number]    Eczema     last assessed [id number]    Edema     last assessed [id number]    Epistaxis 2004    Esophagitis, erosive     Exposure to scabies     resolved [id number]    Fracture of rib     last assessed [id number]    H/O aortic valve replacement     resolved [id number]    H/O blood clots     History of DVT (deep vein thrombosis)     left posterior tibial/peroneal veins    History of non-ST elevation myocardial infarction (NSTEMI)     History of pneumonia     Hyperlipidemia     Hypertension     Internal hemorrhoids 2006    LBBB (left bundle branch block)     MRSA (methicillin resistant staph aureus) culture positive     NSTEMI (non-ST elevated myocardial infarction) ([facility] )     resolved [id number]    Obstructive sleep apnea on CPAP     severe PATITO with AHI of 106 and uses CPAP at 10 cm H2O with 2 l/m oxygen    Paroxysmal atrial fibrillation ([facility] )     Phimosis 2010    severe pt had circumcision 2010    Pulmonary fibrosis ([facility]     [facility]     Rotator cuff tendonitis     lsat assessed [id number]    Skin abscess 2004    Hip    Skin neoplasm     last assessed [id number]    Tachycardia 2004    Trigger finger     last assessed [id number]    Type 2 diabetes mellitus ([facility] ) 2004    Venous thrombosis 2007    Venous thrombosis 2007     Past Surgical History:   Procedure Laterality Date    CARDIAC CATHETERIZATION  03/10/2016    Showed 60-70% mid LAD lesion next to stent    CATARACT EXTRACTION      CIRCUMCISION, NON-      ESOPHAGOGASTRODUODENOSCOPY N/A 3/14/2016    Procedure: ESOPHAGOGASTRODUODENOSCOPY (EGD); Surgeon: Schuyler Rodriguez MD;  Location: [facility];   Service:     EYE SURGERY      FRACTURE SURGERY      JOINT REPLACEMENT      both hips replaced    OTHER SURGICAL HISTORY      H/O thoracentesis (diagnostic)    WV REPLACE AORTIC VALVE OPENFEMORAL ARTERY APPROACH N/A 2016    Procedure: TRANSFEMORAL TAVR WITH 26MM CALIX MAKAYLA S3 TISSUE VALVE; PINA ;  Surgeon: Margo Ventura DO;  Location: [facility];  Service: Cardiac Surgery    REPLACEMENT TOTAL KNEE Bilateral 01/01/1991 1991 and 2001    TISSUE AORTIC VALVE REPLACEMENT      transfemoral, transcatheter    TONSILLECTOMY      TONSILLECTOMY AND ADENOIDECTOMY      TOTAL HIP ARTHROPLASTY      Bilateral     History   Alcohol Use    Yes     Comment: socially     History   Drug Use No     History   Smoking Status    Former Smoker    Packs/day: 1 00    Years: 35 00    Types: Cigarettes    Quit date: 1/1/1987   Smokeless Tobacco    Never Used     Comment: quit 50 years ago, per [employer]     Family History:   Family History   Problem Relation Age of Onset    Coronary artery disease Mother     Arthritis Mother     Hypertension Mother     Rheum arthritis Father     Prostate cancer Father        Meds/Allergies   all current active meds have been reviewed, current meds:   Current Facility-Administered Medications   Medication Dose Route Frequency    amiodarone tablet 100 mg  100 mg Oral Daily    amLODIPine (NORVASC) tablet 5 mg  5 mg Oral Daily    aspirin chewable tablet 81 mg  81 mg Oral Daily    cyanocobalamin (VITAMIN B-12) tablet 100 mcg  100 mcg Oral Daily    docusate sodium (COLACE) capsule 100 mg  100 mg Oral HS    DULoxetine (CYMBALTA) delayed release capsule 30 mg  30 mg Oral Daily    fenofibrate (TRICOR) tablet 145 mg  145 mg Oral Daily    fluticasone-vilanterol (BREO ELLIPTA) 100-25 mcg/inh inhaler 1 puff  1 puff Inhalation Daily    heparin (porcine) subcutaneous injection 5,000 Units  5,000 Units Subcutaneous Q8H [address]    hydrALAZINE (APRESOLINE) tablet 25 mg  25 mg Oral Q8H [address]    ipratropium-albuterol (DUO-NEB) 0 5-2 5 mg/3 mL inhalation solution 3 mL  3 mL Nebulization Q4H    And    ipratropium-albuterol (DUO-NEB) 0 5-2 5 mg/3 mL inhalation solution 3 mL  3 mL Nebulization Q2H PRN    isosorbide mononitrate (IMDUR) 24 hr tablet 30 mg  30 mg Oral Daily    levothyroxine tablet 50 mcg 50 mcg Oral Early Morning    metoprolol tartrate (LOPRESSOR) tablet 25 mg  25 mg Oral Q12H [address]    oxybutynin (DITROPAN) tablet 5 mg  5 mg Oral Daily    pantoprazole (PROTONIX) EC tablet 40 mg  40 mg Oral Early Morning    sodium chloride 0 9 % infusion  50 mL/hr Intravenous Continuous    traMADol (ULTRAM) tablet 50 mg  50 mg Oral Q12H PRN    and PTA meds:   Prior to Admission Medications   Prescriptions Last Dose Informant Patient Reported? Taking? B Complex Vitamins (VITAMIN B COMPLEX PO)  Self Yes No   Sig: Take by mouth   BREO ELLIPTA  Self No No   Sig: USE 1 INHALATION DAILY   DULoxetine (CYMBALTA) 30 mg delayed release capsule   No No   Sig: take 1 capsule by mouth once daily   IFEREX 150 150 MG capsule  Self No No   Sig: take 1 capsule by mouth once daily   albuterol (PROAIR HFA) 90 mcg/act inhaler  Self No No   Sig: Inhale 2 puffs every 4 (four) hours as needed for wheezing or shortness of breath   amLODIPine (NORVASC) 5 mg tablet   No No   Sig: Take 1 tablet (5 mg total) by mouth daily   amiodarone 100 mg tablet  Self No No   Sig: Take 1 tablet (100 mg total) by mouth daily   aspirin 81 mg chewable tablet  Self Yes No   Sig: Chew daily   atorvastatin (LIPITOR) 10 mg tablet  Self No No   Sig: Take 2 tablets (20 mg total) by mouth daily   cyanocobalamin (VITAMIN B-12) 100 mcg tablet  Self Yes No   Sig: Take by mouth   diclofenac sodium (VOLTAREN) 1 %   No No   Sig: Apply 4 g topically 4 (four) times a day Both shoulders and knees   docusate sodium (COLACE) 100 mg capsule  Self Yes No   Sig: Take 100 mg by mouth daily at bedtime     fenofibrate (TRICOR) 145 mg tablet   No No   Sig: Take 1 tablet (145 mg total) by mouth daily   hydrALAZINE (APRESOLINE) 25 mg tablet   No No   Sig: take 1 tablet by mouth every 8 hours   isosorbide mononitrate (IMDUR) 30 mg 24 hr tablet  Self No No   Sig: take 1 tablet by mouth once daily   levothyroxine 50 mcg tablet   No No   Sig: take 1 tablet by mouth once daily meloxicam (MOBIC) 15 mg tablet   No No   Sig: Take 1 tablet (15 mg total) by mouth daily for 90 days As needed   metoprolol tartrate (LOPRESSOR) 25 mg tablet  Self No No   Sig: Take 1 tablet (25 mg total) by mouth every 12 (twelve) hours  pantoprazole (PROTONIX) 40 mg tablet  Self No No   Sig: take 1 tablet by mouth once daily   tolterodine (DETROL) 2 mg tablet   No No   Sig: Take 1 tablet (2 mg total) by mouth daily   torsemide (DEMADEX) 20 mg tablet   No No   Sig: Take 20mg/d alternating with 10mg/d   traMADol-acetaminophen (ULTRACET) 37 5-325 mg per tablet   No No   Sig: Take 1 tablet by mouth 2 (two) times a day as needed for moderate pain      Facility-Administered Medications: None     No Known Allergies    Objective   Vitals: Blood pressure 134/63, pulse 65, temperature 98 6 °F (37 °C), temperature source Oral, resp  rate 20, height 5' 10" (1 778 m), weight 101 kg (221 lb 9 oz), SpO2 96 %  Orthostatic Blood Pressures      Most Recent Value   Blood Pressure  134/63 filed at 11/16/2018 1100   Patient Position - Orthostatic VS  Lying filed at 11/16/2018 1100            Intake/Output Summary (Last 24 hours) at 11/16/18 1640  Last data filed at 11/16/18 1455   Gross per 24 hour   Intake              480 ml   Output              125 ml   Net              355 ml       Invasive Devices     Peripheral Intravenous Line            Peripheral IV 11/16/18 Left Forearm less than 1 day                Physical Exam   Constitutional: He is oriented to person, place, and time  He appears well-developed and well-nourished  No distress  HENT:   Head: Normocephalic and atraumatic  Eyes: Pupils are equal, round, and reactive to light  Right eye exhibits no discharge  Left eye exhibits no discharge  Neck: Normal range of motion  Neck supple  JVD present  No thyromegaly present  Cardiovascular: Regular rhythm  No extrasystoles are present  Exam reveals no gallop  Murmur heard     Systolic murmur is present with a grade of 2/6   Soft murmur heard at left midclavicular line 4th 5th intercostal space   Pulmonary/Chest: Effort normal    Decreased but essentially clear  Patient with chronic loculated right pleural effusion   Abdominal: Soft  Bowel sounds are normal  He exhibits distension  Musculoskeletal: He exhibits edema  +2 to 3 lower extremity edema to slightly above knees bilaterally   Neurological: He is alert and oriented to person, place, and time  Skin: Skin is warm and dry  He is not diaphoretic  Psychiatric: He has a normal mood and affect  Nursing note and vitals reviewed  Lab Results:   I have personally reviewed pertinent lab results      CBC with diff:   Results from last 7 days  Lab Units 11/16/18  1328   WBC Thousand/uL 8 37   RBC Million/uL 3 68*   HEMOGLOBIN g/dL 9 8*   HEMATOCRIT % 32 3*   MCV fL 88   MCH pg 26 6*   MCHC g/dL 30 3*   RDW % 18 0*   MPV fL 10 3  10 1   PLATELETS Thousands/uL 341  334     CMP:   Results from last 7 days  Lab Units 11/16/18  1328   SODIUM mmol/L 141   POTASSIUM mmol/L 4 5   CHLORIDE mmol/L 104   CO2 mmol/L 32   BUN mg/dL 55*   CREATININE mg/dL 3 17*   CALCIUM mg/dL 9 0   AST U/L 38   ALT U/L 25   ALK PHOS U/L 49   EGFR ml/min/1 73sq m 17     Troponin:   0  Lab Value Date/Time   TROPONINI <0 02 07/18/2018 0831   TROPONINI <0 02 06/12/2018 0206   TROPONINI <0 02 06/11/2018 2020   TROPONINI 0 02 06/11/2018 1415   TROPONINI <0 02 03/28/2018 2034   TROPONINI 0 05 (H) 01/07/2017 0531   TROPONINI 0 07 (H) 01/06/2017 2348   TROPONINI 0 07 (H) 01/06/2017 1800   TROPONINI 0 02 11/25/2016 0600   TROPONINI 0 02 11/24/2016 2329   TROPONINI <0 02 11/24/2016 1938   TROPONINI 0 04 (H) 05/26/2016 1703   TROPONINI 0 03 05/26/2016 1312   TROPONINI 0 02 05/26/2016 0950   TROPONINI <0 02 05/16/2016 1154   TROPONINI 8 73 (H) 03/05/2016 1247   TROPONINI 10 10 (H) 03/05/2016 0534   TROPONINI 6 59 (H) 03/04/2016 2341   TROPONINI 1 93 (H) 03/04/2016 1936   TROPONINI 0 66 (H) 03/04/2016 1335     BNP:   Results from last 7 days  Lab Units 11/16/18  1328   POTASSIUM mmol/L 4 5   CHLORIDE mmol/L 104   CO2 mmol/L 32   BUN mg/dL 55*   CREATININE mg/dL 3 17*   CALCIUM mg/dL 9 0   EGFR ml/min/1 73sq m 17     Coags:     TSH:     Magnesium:   Results from last 7 days  Lab Units 11/16/18  1328   MAGNESIUM mg/dL 2 2     Lipid Profile:     Imaging: I have personally reviewed pertinent reports  EKG:  Sinus rhythm with left bundle branch block which is chronic  VTE Prophylaxis: Sequential compression device (Venodyne)     Code Status: Level 1 - Full Code  Advance Directive and Living Will: Yes    Power of :    POLST:      Counseling / Coordination of Care  Total floor / unit time spent today 60 minutes  Greater than 50% of total time was spent with the patient and / or family counseling and / or coordination of care  A description of the counseling / coordination of care:  Peripheral edema which appears to be right-sided and acute kidney injury on chronic kidney disease  Genaro Huber

## 2018-11-16 NOTE — ASSESSMENT & PLAN NOTE
· Creatinine on admission 3 14, baseline 1 8  · Rising creatinine on diuretics however patient appears fluid overloaded  · Will attempt gentle hydration overnight with [email or web address]  · UA pending  · Renal US and bladder scans qshift pending  · Close monitoring of renal function, UOP and I/Os     · Hold diuretic for now  · Nephrology consulted to follow

## 2018-11-16 NOTE — ASSESSMENT & PLAN NOTE
· BNP elevated at 21,000 (baseline 2-3000), and patient with worsening LE edema however has been having rising creatine with increase in diuretic as outpt, may be intravascularly depleted but fluid overloaded in extravascular space  · Dry weight may be 207lbs but pt unsure, current weight is 221lbs  · Last echo 6/2018 with EF 35% with grade 1 diastolic dysfunction  · CXR pending  · Repeat Echo pending  · Cardio consulted, recs appreciated  · Monitor I/O and weight closely  · Will attempt gentle hydration overnight but if does not improve will change course to aggressive diuresis

## 2018-11-16 NOTE — PLAN OF CARE
Problem: RESPIRATORY - ADULT  Goal: Achieves optimal ventilation and oxygenation  INTERVENTIONS:  - Assess for changes in respiratory status  - Assess for changes in mentation and behavior  - Position to facilitate oxygenation and minimize respiratory effort  - Oxygen administration by appropriate delivery method based on oxygen saturation (per order) or ABGs  - Encourage broncho-pulmonary hygiene including cough, deep breathe, Incentive Spirometry  - Assess and instruct to report SOB or any respiratory difficulty  - Respiratory Therapy support as indicated  Outcome: Progressing      Comments: Patient admitted and ordered for nebulizer treatments  Will update POC x 3 days 11/19/18  David Page RRT

## 2018-11-17 LAB
ALBUMIN SERPL BCP-MCNC: 2.9 G/DL (ref 3.5–5)
ANION GAP SERPL CALCULATED.3IONS-SCNC: 11 MMOL/L (ref 4–13)
BUN SERPL-MCNC: 47 MG/DL (ref 5–25)
CALCIUM SERPL-MCNC: 9 MG/DL (ref 8.3–10.1)
CHLORIDE SERPL-SCNC: 103 MMOL/L (ref 100–108)
CO2 SERPL-SCNC: 27 MMOL/L (ref 21–32)
CREAT SERPL-MCNC: 2.84 MG/DL (ref 0.6–1.3)
ERYTHROCYTE [DISTWIDTH] IN BLOOD BY AUTOMATED COUNT: 17.7 % (ref 11.6–15.1)
GFR SERPL CREATININE-BSD FRML MDRD: 19 ML/MIN/1.73SQ M
GLUCOSE SERPL-MCNC: 114 MG/DL (ref 65–140)
HCT VFR BLD AUTO: 32.2 % (ref 36.5–49.3)
HGB BLD-MCNC: 9.8 G/DL (ref 12–17)
MCH RBC QN AUTO: 26.3 PG (ref 26.8–34.3)
MCHC RBC AUTO-ENTMCNC: 30.4 G/DL (ref 31.4–37.4)
MCV RBC AUTO: 86 FL (ref 82–98)
PLATELET # BLD AUTO: 328 THOUSANDS/UL (ref 149–390)
PMV BLD AUTO: 10.7 FL (ref 8.9–12.7)
POTASSIUM SERPL-SCNC: 4.2 MMOL/L (ref 3.5–5.3)
RBC # BLD AUTO: 3.73 MILLION/UL (ref 3.88–5.62)
SODIUM SERPL-SCNC: 141 MMOL/L (ref 136–145)
WBC # BLD AUTO: 9.09 THOUSAND/UL (ref 4.31–10.16)

## 2018-11-17 PROCEDURE — 94640 AIRWAY INHALATION TREATMENT: CPT

## 2018-11-17 PROCEDURE — G8979 MOBILITY GOAL STATUS: HCPCS

## 2018-11-17 PROCEDURE — 94660 CPAP INITIATION&MGMT: CPT

## 2018-11-17 PROCEDURE — G8978 MOBILITY CURRENT STATUS: HCPCS

## 2018-11-17 PROCEDURE — 82040 ASSAY OF SERUM ALBUMIN: CPT | Performed by: STUDENT IN AN ORGANIZED HEALTH CARE EDUCATION/TRAINING PROGRAM

## 2018-11-17 PROCEDURE — 80048 BASIC METABOLIC PNL TOTAL CA: CPT | Performed by: STUDENT IN AN ORGANIZED HEALTH CARE EDUCATION/TRAINING PROGRAM

## 2018-11-17 PROCEDURE — 94760 N-INVAS EAR/PLS OXIMETRY 1: CPT

## 2018-11-17 PROCEDURE — 99232 SBSQ HOSP IP/OBS MODERATE 35: CPT | Performed by: INTERNAL MEDICINE

## 2018-11-17 PROCEDURE — 99232 SBSQ HOSP IP/OBS MODERATE 35: CPT | Performed by: STUDENT IN AN ORGANIZED HEALTH CARE EDUCATION/TRAINING PROGRAM

## 2018-11-17 PROCEDURE — 99222 1ST HOSP IP/OBS MODERATE 55: CPT | Performed by: INTERNAL MEDICINE

## 2018-11-17 PROCEDURE — 85027 COMPLETE CBC AUTOMATED: CPT | Performed by: STUDENT IN AN ORGANIZED HEALTH CARE EDUCATION/TRAINING PROGRAM

## 2018-11-17 PROCEDURE — 97163 PT EVAL HIGH COMPLEX 45 MIN: CPT

## 2018-11-17 RX ORDER — FUROSEMIDE 10 MG/ML
40 INJECTION INTRAMUSCULAR; INTRAVENOUS ONCE
Status: COMPLETED | OUTPATIENT
Start: 2018-11-17 | End: 2018-11-17

## 2018-11-17 RX ORDER — ACETAMINOPHEN 325 MG/1
650 TABLET ORAL EVERY 6 HOURS PRN
Status: DISCONTINUED | OUTPATIENT
Start: 2018-11-17 | End: 2018-11-19 | Stop reason: HOSPADM

## 2018-11-17 RX ADMIN — FUROSEMIDE 40 MG: 10 INJECTION, SOLUTION INTRAMUSCULAR; INTRAVENOUS at 14:45

## 2018-11-17 RX ADMIN — DULOXETINE HYDROCHLORIDE 30 MG: 30 CAPSULE, DELAYED RELEASE ORAL at 10:44

## 2018-11-17 RX ADMIN — IPRATROPIUM BROMIDE AND ALBUTEROL SULFATE 3 ML: .5; 3 SOLUTION RESPIRATORY (INHALATION) at 19:53

## 2018-11-17 RX ADMIN — ASPIRIN 81 MG 81 MG: 81 TABLET ORAL at 10:45

## 2018-11-17 RX ADMIN — FLUTICASONE FUROATE AND VILANTEROL TRIFENATATE 1 PUFF: 100; 25 POWDER RESPIRATORY (INHALATION) at 10:46

## 2018-11-17 RX ADMIN — IPRATROPIUM BROMIDE AND ALBUTEROL SULFATE 3 ML: .5; 3 SOLUTION RESPIRATORY (INHALATION) at 23:58

## 2018-11-17 RX ADMIN — IPRATROPIUM BROMIDE AND ALBUTEROL SULFATE 3 ML: .5; 3 SOLUTION RESPIRATORY (INHALATION) at 14:34

## 2018-11-17 RX ADMIN — HYDRALAZINE HYDROCHLORIDE 25 MG: 25 TABLET ORAL at 15:22

## 2018-11-17 RX ADMIN — METOPROLOL TARTRATE 25 MG: 25 TABLET, FILM COATED ORAL at 10:46

## 2018-11-17 RX ADMIN — AMIODARONE HYDROCHLORIDE 100 MG: 200 TABLET ORAL at 10:46

## 2018-11-17 RX ADMIN — IPRATROPIUM BROMIDE AND ALBUTEROL SULFATE 3 ML: .5; 3 SOLUTION RESPIRATORY (INHALATION) at 15:21

## 2018-11-17 RX ADMIN — DOCUSATE SODIUM 100 MG: 100 CAPSULE, LIQUID FILLED ORAL at 21:42

## 2018-11-17 RX ADMIN — HYDRALAZINE HYDROCHLORIDE 25 MG: 25 TABLET ORAL at 21:42

## 2018-11-17 RX ADMIN — ACETAMINOPHEN 650 MG: 325 TABLET, FILM COATED ORAL at 01:02

## 2018-11-17 RX ADMIN — IPRATROPIUM BROMIDE AND ALBUTEROL SULFATE 3 ML: .5; 3 SOLUTION RESPIRATORY (INHALATION) at 12:24

## 2018-11-17 RX ADMIN — IPRATROPIUM BROMIDE AND ALBUTEROL SULFATE 3 ML: .5; 3 SOLUTION RESPIRATORY (INHALATION) at 08:15

## 2018-11-17 RX ADMIN — LEVOTHYROXINE SODIUM 50 MCG: 50 TABLET ORAL at 05:50

## 2018-11-17 RX ADMIN — VITAM B12 100 MCG: 100 TAB at 10:45

## 2018-11-17 RX ADMIN — HEPARIN SODIUM 5000 UNITS: 5000 INJECTION, SOLUTION INTRAVENOUS; SUBCUTANEOUS at 21:42

## 2018-11-17 RX ADMIN — FENOFIBRATE 145 MG: 145 TABLET ORAL at 10:45

## 2018-11-17 RX ADMIN — AMLODIPINE BESYLATE 5 MG: 5 TABLET ORAL at 10:45

## 2018-11-17 RX ADMIN — HYDRALAZINE HYDROCHLORIDE 25 MG: 25 TABLET ORAL at 05:50

## 2018-11-17 RX ADMIN — HEPARIN SODIUM 5000 UNITS: 5000 INJECTION, SOLUTION INTRAVENOUS; SUBCUTANEOUS at 05:50

## 2018-11-17 RX ADMIN — METOPROLOL TARTRATE 25 MG: 25 TABLET, FILM COATED ORAL at 21:42

## 2018-11-17 RX ADMIN — IPRATROPIUM BROMIDE AND ALBUTEROL SULFATE 3 ML: .5; 3 SOLUTION RESPIRATORY (INHALATION) at 04:14

## 2018-11-17 RX ADMIN — ISOSORBIDE MONONITRATE 30 MG: 30 TABLET, EXTENDED RELEASE ORAL at 10:45

## 2018-11-17 RX ADMIN — OXYBUTYNIN CHLORIDE 5 MG: 5 TABLET ORAL at 10:45

## 2018-11-17 RX ADMIN — ACETAMINOPHEN 650 MG: 325 TABLET, FILM COATED ORAL at 21:42

## 2018-11-17 RX ADMIN — HEPARIN SODIUM 5000 UNITS: 5000 INJECTION, SOLUTION INTRAVENOUS; SUBCUTANEOUS at 15:22

## 2018-11-17 RX ADMIN — PANTOPRAZOLE SODIUM 40 MG: 40 TABLET, DELAYED RELEASE ORAL at 05:51

## 2018-11-17 NOTE — UTILIZATION REVIEW
[address]  Utilization Review Department  Phone: [phone]; Fax [phone]  [email or web address]  ATTENTION: Please call with any questions or concerns to [phone]  and carefully listen to the prompts so that you are directed to the right person  Send all requests for admission clinical reviews, approved or denied determinations and any other requests to fax [phone]   All voicemails are confidential       Continued Stay Review    Date: 18 ACUTE MED SURG LEVEL OF CARE    Vital Signs: /73 (BP Location: Right arm)   Pulse 70   Temp 98 2 °F (36 8 °C) (Oral)   Resp 18   Ht 5' 10" (1 778 m)   Wt 101 kg (221 lb 9 oz)   SpO2 (!) 85%   BMI 31 79 kg/m²      Temp (24hrs), Av °F (36 7 °C), Min:97 9 °F (36 6 °C), Max:98 °F (36 7 °C)   Temp:  [97 9 °F (36 6 °C)-98 °F (36 7 °C)] 98 °F (36 7 °C)  HR:  [63-74] 63  Resp:  [18-20] 18  BP: (132-170)/(62-87) 170/70  SpO2:  [91 %-97 %] 97 %  Body mass index is 31 79 kg/m²       Input and Output Summary (last 24 hours):   Last data filed at 18 1401    Gross per 24 hour   Intake              720 ml   Output             1125 ml   Net             -405 ml        Diet Cardiac; Cardiac        IV ACCESS      Medications:   Scheduled Meds:   Current Facility-Administered Medications:  acetaminophen 650 mg Oral Q6H PRN ROC Stanley   amiodarone 100 mg Oral Daily Jacob Eduardo MD   amLODIPine 5 mg Oral Daily    aspirin 81 mg Oral Daily    cyanocobalamin 100 mcg Oral Daily    docusate sodium 100 mg Oral HS    DULoxetine 30 mg Oral Daily    fenofibrate 145 mg Oral Daily    fluticasone-vilanterol 1 puff Inhalation Daily    furosemide 40 mg Intravenous Once    heparin (porcine) 5,000 Units Subcutaneous Q8H [address]    hydrALAZINE 25 mg Oral Q8H [address]    ipratropium-albuterol 3 mL Nebulization Q4H    And       ipratropium-albuterol 3 mL Nebulization Q2H PRN    isosorbide mononitrate 30 mg Oral Daily levothyroxine 50 mcg Oral Early Morning    metoprolol tartrate 25 mg Oral Q12H CRISTOPHER    oxybutynin 5 mg Oral Daily    pantoprazole 40 mg Oral Early Morning    traMADol 50 mg Oral Q12H PRN      PRN Meds:      acetaminophen    NEB Tx with  ipratropium-albuterol q4hrs prn given x 4/ 24 hrs    traMADol      LABS/Diagnostic Results:   Results from last 7 days  Lab Units 11/17/18  0558 11/16/18  1328   WBC Thousand/uL 9 09 8 37   HEMOGLOBIN g/dL 9 8* 9 8*   HEMATOCRIT % 32 2* 32 3*   PLATELETS Thousands/uL 328 341  334       Results from last 7 days  Lab Units 11/17/18  0558 11/16/18  1328 11/14/18  1005   SODIUM mmol/L 141 141 140   POTASSIUM mmol/L 4 2 4 5 5 0   CHLORIDE mmol/L 103 104 98   CO2 mmol/L 27 32 24   BUN mg/dL 47* 55* 51*   CREATININE mg/dL 2 84* 3 17*  --    CALCIUM mg/dL 9 0 9 0  --    TOTAL BILIRUBIN mg/dL  --  0 60  --    ALK PHOS U/L  --  49  --    ALT U/L  --  25  --    AST U/L  --  38      Albumin [[id number]] (Abnormal) Collected: 11/17/18 0558   Lab Status: Final result Specimen: Blood from Arm, Right Updated: 11/17/18 0706    Albumin 2 9 (L) 3 5 - 5 0 g/dL        Age/Sex: 80 y o  male     Assessment/Plan:    Acute kidney injury superimposed on chronic kidney disease (HCC)   Assessment & Plan     · Creatinine on admission 3 14, baseline 1 8-2  · Patient with rising creatinine on diuretics indicating intravascular volume depletion, however patient appears clinically to be volume overloaded with edema and elevated BNP  · UA bland  · Renal US was negative for hydronephrosis  · Cont to hold diuretic for now  · Nephrology and cardio consulted to follow  · Started on gentle hydration overnight with [email or web address]  · Creatinine decreased today, cont IVF for another 1L       Chronic combined systolic and diastolic CHF, 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 ([facility] [address] )   Assessment & Plan     · BNP elevated at 21,000 (baseline 2-3000), and patient with worsening LE edema however has been having rising creatine with increase in diuretic as outpt, may be intravascularly depleted but fluid overloaded in extravascular space  · Dry weight may be 207lbs but pt unsure, admission weight is 221lbs  · Last echo 6/2018 with EF 35% with grade 1 diastolic dysfunction  · Repeat echo unchanged  · CXR showed no pulmonary edema  · Cardio consulted, recs appreciated  · Monitor I/O and weight closely  · Restart diuresis once creatinine back down      Pulmonary hypertension (HCC)   Assessment & Plan     · Moderate to severe on echo, likely secondary to underlying COPD      Chronic respiratory failure with hypoxia (HCC)   Assessment & Plan     · Pt on 6L NC at home, with up to 10L with exertion  Appears at baseline  · CXR with no acute findings  · Cont with home meds, added PRN nebs if needed      PATITO (obstructive sleep apnea)   Assessment & Plan     · Moderate PATITO  · Cont CPAP qHS      Ambulatory dysfunction   Assessment & Plan     · PT/OT rec home vs STR      Hypertension   Assessment & Plan     Cont home meds      Type 2 diabetes mellitus, without long-term current use of insulin ([facility])   Assessment & Plan             Lab Results   Component Value Date     HGBA1C 5 3 06/12/2018         No results for input(s): POCGLU in the last 72 hours      Blood Sugar Average: Last 72 hrs:         CAD in native artery   Assessment & Plan     Cont home meds      Paroxysmal atrial fibrillation (HCC)   Assessment & Plan     · Cont beta blockers  · Currently in NSR      Moderate COPD (chronic obstructive pulmonary disease) ([facility])   Assessment & Plan     · Last spirometry showed his FEV1 to be 1 85 liters or 76 percent predicted consistent moderate airflow obstruction  · Cont home meds          VTE Pharmacologic Prophylaxis:   Pharmacologic: Heparin  Mechanical VTE Prophylaxis in Place: Yes     Current Length of Stay: 1 day(s)  Current Patient Status: Inpatient           Nephrology  Consults Date of Service: 11/17/2018  9:21 AM     Assessment / Plan:  1  Renal  The patient is chronic kidney disease and he is followed by 1 of my partners in the office  It appears that in the past creatinine baseline was around 1 8 then last month was around 2 2  Outpatient labs revealed significant increasing creatinine so he was referred in for admission  The patient states he was taking Demadex 20 mg a day on a regular basis and prior to the admission when a creatinine was increasing he had the dose reduced  Overnight he had fluids started with low input rate  Will hold diuretic  Creatinine is starting to improve which reports some form of effective volume depletion  Keeping in mind patient does have congestive heart failure with low left ventricular ejection fraction so we need to find maintenance dose of diuretic that suitable    The patient did complete renal ultrasound that showed no hydronephrosis and nurse told me that postvoid residuals have been negligible so he is emptying his bladder well      Will continue this IV fluid normal saline and discontinue after this L completed  Hold diuretic today unless needed for shortness of breath  Plan to initiate diuretic tomorrow    Discharge Plan:   TO BE DETERMINED    CASE MANAGEMENT FOLLOWING CLOSELY FOR ALL DISCHARGE NEEDS

## 2018-11-17 NOTE — CONSULTS
2           Consultation - Nephrology   Jordy Greenfield 80 y o  male MRN: [id number]  Unit/Bed#: [facility] 315-02 Encounter: [id number]      Assessment/Plan     Assessment / Plan:  1  Renal  The patient is chronic kidney disease and he is followed by 1 of my partners in the office  It appears that in the past creatinine baseline was around 1 8 then last month was around 2 2  Outpatient labs revealed significant increasing creatinine so he was referred in for admission  The patient states he was taking Demadex 20 mg a day on a regular basis and prior to the admission when a creatinine was increasing he had the dose reduced  Overnight he had fluids started with low input rate  Will hold diuretic  Creatinine is starting to improve which reports some form of effective volume depletion  Keeping in mind patient does have congestive heart failure with low left ventricular ejection fraction so we need to find maintenance dose of diuretic that suitable  The patient did complete renal ultrasound that showed no hydronephrosis and nurse told me that postvoid residuals have been negligible so he is emptying his bladder well  Will continue this IV fluid normal saline and discontinue after this L completed  Hold diuretic today unless needed for shortness of breath  Plan to initiate diuretic tomorrow      History of Present Illness   Physician Requesting Consult: Memory Canavan, MD  Reason for Consult / Principal Problem:  Acute on chronic kidney disease  Hx and PE limited by:   HPI: Jordy Greenfield is a 80y o  year old male who has a history of chronic kidney disease who states that he was taking his regular diuretic dose over couple months his blood test for his kidney function has been worsening  He subsequently had some reduction in his diuretic dose and outpatient labs revealed creatinine continue to increase and he was sent over for admission  We were asked to see him regarding this    History obtained from chart review and the patient  Inpatient consult to Nephrology  Consult performed by: Elan Sanchez ordered by: Vivienne Del Rio          Review of Systems   Constitutional: Negative for appetite change, chills and fever  HENT: Negative  Eyes: Negative  Respiratory: Negative for cough, choking, chest tightness and shortness of breath  Cardiovascular: Negative for chest pain and leg swelling  Gastrointestinal: Negative for abdominal distention, abdominal pain, diarrhea, nausea and vomiting  Genitourinary: Negative for difficulty urinating, dysuria and hematuria  Musculoskeletal: Negative for arthralgias  Skin: Negative for rash  Neurological: Negative for dizziness and light-headedness         Historical Information   Patient Active Problem List   Diagnosis    Moderate COPD (chronic obstructive pulmonary disease) ([facility])    [facility]    CAD in native artery    BPH (benign prostatic hyperplasia)    Type 2 diabetes mellitus, without long-term current use of insulin ([facility])    Hyperlipidemia    Hypertension    S/P TAVR (transcatheter aortic valve replacement)    Ambulatory dysfunction    Hearing difficulty of right ear    Chronic hypoxemic respiratory failure ([facility])    PATITO (obstructive sleep apnea)    Pleural effusion, right (chronic)    Multiple pulmonary nodules    Class 1 obesity in adult    Osteoarthritis of knee    Peripheral vascular disease ([facility]    Diabetic polyneuropathy associated with type 2 diabetes mellitus ([facility] )    Peripheral arteriosclerosis (HCC)    Actinic keratosis    Allergic rhinitis    Centrilobular emphysema (HCC)    Cervical radiculopathy    Chronic respiratory failure with hypoxia (HCC)    CKD (chronic kidney disease)    Chronic left shoulder pain    Chronic combined systolic and diastolic CHF, 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)    Hypothyroidism    GERD (gastroesophageal reflux disease)    Acute kidney injury superimposed on chronic kidney disease ([facility] )    Elevated d-dimer    MICKEY (iron deficiency anemia)    Corns    Pain in both feet    Onychomycosis    Pulmonary hypertension (HCC)    Chronic pain of both shoulders    OAB (overactive bladder)     Past Medical History:   Diagnosis Date    Allergic rhinitis 06/11/2010    Aortic stenosis, severe     Bacterial pneumonia 06/08/2007    Bladder neck obstruction 12/23/2010    BPH (benign prostatic hyperplasia)     Bronchitis, chronic obstructive, with exacerbation ([place] [address] ) 01/30/2012    CAD (coronary artery disease)     stent 2014    Carcinoma ([place] [address] )     resolved [id number]    Cardiomegaly 02/13/2007    Cataract of both eyes     CH[facility]congestive heart failure) [facility]     Chronic allergic conjunctivitis     last assessed [id number]    Chronic kidney disease (CKD)     CKD (chronic kidney disease), stage III (HCC)     CKD (chronic kidney disease), stage III (HCC)     COPD (chronic obstructive pulmonary disease) ([facility] )     Decubitus ulcer     resolved [id number]    Dermatomycosis     last assessed [id number]    Diabetes mellitus type 2, noninsulin dependent ([place] [address] )     Dyspnea on exertion     last assessed [id number]    Eczema     last assessed [id number]    Edema     last assessed [id number]    Epistaxis 12/01/2004    Esophagitis, erosive     Exposure to scabies     resolved [id number]    Fracture of rib     last assessed [id number]    H/O aortic valve replacement     resolved [id number]    H/O blood clots     History of DVT (deep vein thrombosis)     left posterior tibial/peroneal veins    History of non-ST elevation myocardial infarction (NSTEMI)     History of pneumonia     Hyperlipidemia     Hypertension     Internal hemorrhoids 09/13/2006    LBBB (left bundle branch block)     MRSA (methicillin resistant staph aureus) culture positive     NSTEMI (non-ST elevated myocardial infarction) ([facility] )     resolved [id number]    Obstructive sleep apnea on CPAP     severe PATITO with AHI of 106 and uses CPAP at 10 cm H2O with 2 l/m oxygen    Paroxysmal atrial fibrillation ([facility] )     Phimosis 2010    severe pt had circumcision 2010    Pulmonary fibrosis ([facility] )     PVD (peripheral vascular disease) ([facility] )     Rotator cuff tendonitis     lsat assessed [id number]    Skin abscess 2004    Hip    Skin neoplasm     last assessed [id number]    Tachycardia 2004    Trigger finger     last assessed [id number]    Type 2 diabetes mellitus ([facility] ) 2004    Venous thrombosis 2007    Venous thrombosis 2007     Past Surgical History:   Procedure Laterality Date    CARDIAC CATHETERIZATION  03/10/2016    Showed 60-70% mid LAD lesion next to stent    CATARACT EXTRACTION      CIRCUMCISION, NON-      ESOPHAGOGASTRODUODENOSCOPY N/A 3/14/2016    Procedure: ESOPHAGOGASTRODUODENOSCOPY (EGD); Surgeon: Hugh Kaiser MD;  Location: [facility];   Service:     EYE SURGERY      FRACTURE SURGERY      JOINT REPLACEMENT      both hips replaced    OTHER SURGICAL HISTORY      H/O thoracentesis (diagnostic)    ND REPLACE AORTIC VALVE OPENFEMORAL ARTERY APPROACH N/A 2016    Procedure: TRANSFEMORAL TAVR WITH 26MM CALIX MAKAYLA S3 TISSUE VALVE; PINA ;  Surgeon: Letty Martinez DO;  Location:  MAIN OR;  Service: Cardiac Surgery    REPLACEMENT TOTAL KNEE Bilateral 1991 and     TISSUE AORTIC VALVE REPLACEMENT      transfemoral, transcatheter    TONSILLECTOMY      TONSILLECTOMY AND ADENOIDECTOMY      TOTAL HIP ARTHROPLASTY      Bilateral     Social History   History   Alcohol Use    Yes     Comment: socially     History   Drug Use No     History   Smoking Status    Former Smoker    Packs/day: 1 00    Years: 35 00    Types: Cigarettes    Quit date: 1987   Smokeless Tobacco    Never Used     Comment: quit 50 years ago, per [employer]     Family History   Problem Relation Age of Onset    Coronary artery disease Mother     Arthritis Mother    Roxanne Gusman Hypertension Mother     Rheum arthritis Father     Prostate cancer Father        Meds/Allergies   current meds:   Current Facility-Administered Medications   Medication Dose Route Frequency    acetaminophen (TYLENOL) tablet 650 mg  650 mg Oral Q6H PRN    amiodarone tablet 100 mg  100 mg Oral Daily    amLODIPine (NORVASC) tablet 5 mg  5 mg Oral Daily    aspirin chewable tablet 81 mg  81 mg Oral Daily    cyanocobalamin (VITAMIN B-12) tablet 100 mcg  100 mcg Oral Daily    docusate sodium (COLACE) capsule 100 mg  100 mg Oral HS    DULoxetine (CYMBALTA) delayed release capsule 30 mg  30 mg Oral Daily    fenofibrate (TRICOR) tablet 145 mg  145 mg Oral Daily    fluticasone-vilanterol (BREO ELLIPTA) 100-25 mcg/inh inhaler 1 puff  1 puff Inhalation Daily    heparin (porcine) subcutaneous injection 5,000 Units  5,000 Units Subcutaneous Q8H [address]    hydrALAZINE (APRESOLINE) tablet 25 mg  25 mg Oral Q8H [address]    ipratropium-albuterol (DUO-NEB) 0 5-2 5 mg/3 mL inhalation solution 3 mL  3 mL Nebulization Q4H    And    ipratropium-albuterol (DUO-NEB) 0 5-2 5 mg/3 mL inhalation solution 3 mL  3 mL Nebulization Q2H PRN    isosorbide mononitrate (IMDUR) 24 hr tablet 30 mg  30 mg Oral Daily    levothyroxine tablet 50 mcg  50 mcg Oral Early Morning    metoprolol tartrate (LOPRESSOR) tablet 25 mg  25 mg Oral Q12H CRISTOPHER    oxybutynin (DITROPAN) tablet 5 mg  5 mg Oral Daily    pantoprazole (PROTONIX) EC tablet 40 mg  40 mg Oral Early Morning    sodium chloride 0 9 % infusion  50 mL/hr Intravenous Continuous    traMADol (ULTRAM) tablet 50 mg  50 mg Oral Q12H PRN     No Known Allergies    Objective     Intake/Output Summary (Last 24 hours) at 11/17/18 0921  Last data filed at 11/17/18 0726   Gross per 24 hour   Intake              480 ml   Output             1125 ml   Net             -645 ml     Body mass index is 31 79 kg/m²      Invasive Devices:        PHYSICAL EXAM:  /87 (BP Location: Right arm)   Pulse 69   Temp 98 °F (36 7 °C) (Oral)   Resp 20   Ht 5' 10" (1 778 m)   Wt 101 kg (221 lb 9 oz)   SpO2 96%   BMI 31 79 kg/m²     Physical Exam   Constitutional: He is oriented to person, place, and time  No distress  HENT:   Head: Atraumatic  Mouth/Throat: No oropharyngeal exudate  Eyes: No scleral icterus  Neck: Normal range of motion  Neck supple  No JVD present  Cardiovascular: Normal rate  Exam reveals no friction rub  Positive edema  Pulmonary/Chest: Effort normal and breath sounds normal  No respiratory distress  He has no wheezes  He has no rales  Decreased breath sounds bases   Abdominal: Soft  He exhibits no distension  There is no tenderness  There is no rebound  Neurological: He is alert and oriented to person, place, and time           Current Weight: Weight - Scale: 101 kg (221 lb 9 oz)  First Weight: Weight - Scale: 101 kg (221 lb 9 oz)    Lab Results:      Results from last 7 days  Lab Units 11/17/18  0558   WBC Thousand/uL 9 09   HEMOGLOBIN g/dL 9 8*   HEMATOCRIT % 32 2*   PLATELETS Thousands/uL 328       Results from last 7 days  Lab Units 11/17/18  0558   POTASSIUM mmol/L 4 2   CHLORIDE mmol/L 103   CO2 mmol/L 27   BUN mg/dL 47*   CREATININE mg/dL 2 84*   CALCIUM mg/dL 9 0       Results from last 7 days  Lab Units 11/17/18  0558 11/16/18  1328   POTASSIUM mmol/L 4 2 4 5   CHLORIDE mmol/L 103 104   CO2 mmol/L 27 32   BUN mg/dL 47* 55*   CREATININE mg/dL 2 84* 3 17*   CALCIUM mg/dL 9 0 9 0   ALK PHOS U/L  --  49   ALT U/L  --  25   AST U/L  --  38

## 2018-11-17 NOTE — PHYSICAL THERAPY NOTE
PT EVALUATION   11/17/18 9906   Pain Assessment   Pain Assessment 0-10   Pain Score 6  (B shoulder areas/chronic pain)   Home Living   Type of Home House   Home Layout One level;Elevator   Bathroom Equipment Shower chair   Home Equipment Walker;Cane;Wheelchair-manual  (rollator, O2)   Additional Comments patient using roller walker prior to admission independently in home and with 8 liters O2   Prior Function   Level of [place] Independent with ADLs and functional mobility   Lives With Significant other   Receives Help From Family;Home health  ([facility] 7 days per week for 4+hours)   ADL Assistance Independent   IADLs Needs assistance   Comments patient with [facility] for meal prep, household tasks and ADL assist if needed, but patient shares cooking responsibilities and showers independently with chair   Restrictions/Precautions   Other Precautions Chair Alarm; Bed Alarm; Fall Risk;Pain;O2   General   Additional Pertinent History chart reviewed, patient admitted with KJ, blood work demonstated issues   Family/Caregiver Present No   Cognition   Overall Cognitive Status WFL   Arousal/Participation Cooperative   Orientation Level Oriented X4   Following Commands Follows all commands and directions without difficulty   RLE Assessment   RLE Assessment (ROM WFL, strength 3+/5)   LLE Assessment   LLE Assessment (ROM WFL, strength 3+/5)   Coordination   Movements are Fluid and Coordinated 0   Bed Mobility   Supine to Sit 4  Minimal assistance   Additional items Assist x 1   Sit to Supine 4  Minimal assistance   Additional items Assist x 1   Transfers   Sit to Stand 4  Minimal assistance   Additional items Assist x 1   Stand to Sit 4  Minimal assistance   Additional items Assist x 1   Ambulation/Elevation   Gait Assistance 4  Minimal assist   Additional items Assist x 1;Verbal cues; Tactile cues   Assistive Device Rolling walker   Distance 5 feet with change in direction and patient reporting fatigue and wanting to sleep due to only 3 hours of sleep last evening  gait with wide base of support   Balance   Static Sitting Fair   Dynamic Sitting Fair -   Static Standing Fair -   Dynamic Standing Fair -   Ambulatory Poor +   Activity Tolerance   Activity Tolerance Patient limited by fatigue;Patient limited by pain  (limited by SOB)   Nurse Made Aware yes   Assessment   Prognosis Good   Problem List Decreased strength;Decreased range of motion;Decreased endurance; Impaired balance;Decreased mobility; Decreased coordination;Pain  (SOB/8 liters O2)   Assessment Patient seen for Physical Therapy evaluation  Patient admitted with Acute kidney injury superimposed on chronic kidney disease ([facility] [address] )  Comorbidities affecting patient's physical performance include: COPD with chronic hypoxic resp failure on 6L O2, PATITO, T2DM, CKD3, CHF, Afib not on anticoagulation, hx DVT, PAD, AS s/p bioprosthetic aortic valve replacement  who presents at the urging of his PCP because of renal failure  Personal factors affecting patient at time of initial evaluation include: ambulating with assistive device, inability to ambulate household distances, inability to navigate community distances, inability to navigate level surfaces without external assistance, inability to perform dynamic tasks in community, limited home support, inability to perform caregiver support/tasks, inability to perform physical activity, inability to perform ADLS and inability to perform IADLS    Prior to admission, patient was independent with functional mobility with walker, independent with ADLS, requiring assist for IADLS, ambulating household distance, ambulating community distances and home with family assist   Please find objective findings from Physical Therapy assessment regarding body systems outlined above with impairments and limitations including weakness, decreased ROM, impaired balance, decreased endurance, impaired coordination, gait deviations, pain, decreased activity tolerance, decreased functional mobility tolerance, fall risk and SOB upon exertion  The Barthel Index was used as a functional outcome tool presenting with a score of 45 today indicating marked limitations of functional mobility and ADLS  Patient's clinical presentation is currently unstable/unpredictable as seen in patient's presentation of vital sign response, changing level of pain, increased fall risk, new onset of impairment of functional mobility, decreased endurance and new onset of weakness  Pt would benefit from continued Physical Therapy treatment to address deficits as defined above and maximize level of functional mobility  As demonstrated by objective findings, the assigned level of complexity for this evaluation is high  Goals   Patient Goals breathe better, get stronger   STG Expiration Date 11/24/18   Short Term Goal #1 transfers and gait with roller walker with supervision   Short Term Goal #2 gait endurance to functional household distances, strength BLEs 3+/4- all to meet patient goal of improved breathing and strength    LTG Expiration Date 12/01/18   Long Term Goal #1 transfers and gait with roller walker independently   Long Term Goal #2 gait endurance to functional community distances, strength BLEs 4-/5   Treatment Day 0   Plan   Treatment/Interventions ADL retraining;Functional transfer training;LE strengthening/ROM; Therapeutic exercise; Endurance training;Patient/family training;Equipment eval/education; Bed mobility;Gait training; Compensatory technique education   PT Frequency 5x/wk   Recommendation   Recommendation (STR vs home with services depending on functional mobility)   Barthel Index   Feeding 10   Bathing 0   Grooming Score 0   Dressing Score 5   Bladder Score 5   Bowels Score 10   Toilet Use Score 5   Transfers (Bed/Chair) Score 10   Mobility (Level Surface) Score 0   Stairs Score 0   Barthel Index Score 39   Licensure   [place] License Number  Lauren Gee PT [id number]

## 2018-11-17 NOTE — UTILIZATION REVIEW
[facility] Utilization Review Department  Phone: [phone]; Fax [phone]  [email or web address]  ATTENTION: Please call with any questions or concerns to [phone]  and carefully listen to the prompts so that you are directed to the right person  Send all requests for admission clinical reviews, approved or denied determinations and any other requests to fax [phone]  All voicemails are confidential       Initial Clinical Review    Admission: Date/Time/Statement: 11/16/18 AT 1157    Orders Placed This Encounter   Procedures    Inpatient Admission     Standing Status:   Standing     Number of Occurrences:   1     Order Specific Question:   Admitting Physician     Answer:   Agustín Hurst [[id number]]     Order Specific Question:   Level of Care     Answer:   Med Surg [16]     Order Specific Question:   Estimated length of stay     Answer:   More than 2 Midnights     Order Specific Question:   Certification     Answer:   I certify that inpatient services are medically necessary for this patient for a duration of greater than two midnights  See H&P and MD Progress Notes for additional information about the patient's course of treatment  ED: Date/Time/Mode of Arrival:   ED Arrival Information     Patient not seen in ED                       Chief Complaint:   ACUTE RENAL FAILURE PER PCP     History of Present Illness:   Gonzalez Ren is a 80 y o  male with a PMH of COPD with chronic hypoxic resp failure on 6L O2, PATITO, T2DM, CKD3, CHF, Afib not on anticoagulation, hx DVT, PAD, AS s/p bioprosthetic aortic valve replacement  who presents at the urging of his PCP because of renal failure  He states that he has been having worsening of his CHF, with LE edema and his doctor has been increasing his diuretics to help, but in the process his creatinine has been rising   He had blood work done 2 days ago and his creatinine has conitnued to rise for his PCP advised him to be get admitted to the hospital for workup  He admits to chronic SOB and wheezing from his COPD,  and LE edema         Review of Systems:  Constitutional: Negative for chills, fatigue and fever  Respiratory: Positive for wheezing (at baseline)  Negative for cough, chest tightness and shortness of breath (at baseline)  Cardiovascular: Positive for leg swelling  Negative for chest pain  Physical Exam   Constitutional: He is oriented to person, place, and time  He appears well-developed  No distress  Neck: JVD present  Cardiovascular: Normal rate and regular rhythm  Murmur heard  Pulmonary/Chest: Effort normal and breath sounds normal  No respiratory distress  He has no wheezes  He has no rales  Clear but decreased   Musculoskeletal: He exhibits edema          Vitals   Temperature Pulse Respirations Blood Pressure SpO2   11/16/18 1100 11/16/18 1100 11/16/18 1100 11/16/18 1100 11/16/18 1220   98 6 °F (37 °C) 68 18 134/63 93 %      Temp Source Heart Rate Source Patient Position - Orthostatic VS BP Location FiO2 (%)   11/16/18 1100 11/16/18 2020 11/16/18 1100 11/16/18 1100 --   Oral Monitor Lying Left arm       Pain Score       11/17/18 0102       3        Wt Readings from Last 1 Encounters:   11/16/18 101 kg (221 lb 9 oz)       LABS/Diagnostic Test Results:  Results from last 7 days  Lab Units 11/16/18  1328   WBC Thousand/uL 8 37   HEMOGLOBIN g/dL 9 8*   HEMATOCRIT % 32 3*   PLATELETS Thousands/uL 341  334       Results from last 7 days  Lab Units 11/16/18  1328   SODIUM mmol/L 141   POTASSIUM mmol/L 4 5   CHLORIDE mmol/L 104   CO2 mmol/L 32   BUN mg/dL 55*   CREATININE mg/dL 3 17*   CALCIUM mg/dL 9 0   TOTAL BILIRUBIN mg/dL 0 60   ALK PHOS U/L 49   ALT U/L 25   AST U/L 38      NT-BNP PRO [[id number]] (Abnormal) Collected: 11/16/18 1328   Lab Status: Final result Specimen: Blood from Arm, Left Updated: 11/16/18 1416    NT-proBNP 21,190 (H) <450 pg/mL        CHEST X RAY -  Stable chest with bibasilar scarring and right-sided pleural effusion extending along the chest wall  · EKG (Per H+P): NSR, LBBB, no acute changes    Past Medical/Surgical History:    Active Ambulatory Problems     Diagnosis Date Noted    Moderate COPD (chronic obstructive pulmonary disease) ([facility] ) 03/15/2016    Paroxysmal atrial fibrillation ([facility] )     CAD in native artery     BPH (benign prostatic hyperplasia)     Type 2 diabetes mellitus, without long-term current use of insulin (HCC)     Hyperlipidemia     Hypertension     S/P TAVR (transcatheter aortic valve replacement) 08/23/2016    Ambulatory dysfunction 08/23/2016    Hearing difficulty of right ear 10/05/2016    Chronic hypoxemic respiratory failure ([facility] ) 11/27/2016    PATITO (obstructive sleep apnea) 11/27/2016    Pleural effusion, right (chronic) 11/28/2016    Multiple pulmonary nodules 01/07/2017    Class 1 obesity in adult 01/07/2017    Osteoarthritis of knee 01/07/2017    Peripheral vascular disease ([facility] ) 01/07/2017    Diabetic polyneuropathy associated with type 2 diabetes mellitus ([facility] ) 02/23/2018    Peripheral arteriosclerosis ([facility] ) 02/23/2018    Actinic keratosis 05/10/2013    Allergic rhinitis 05/30/2014    Centrilobular emphysema ([facility] ) 11/12/2017    Cervical radiculopathy 09/26/2016    Chronic respiratory failure with hypoxia ([facility] ) 11/12/2017    CKD (chronic kidney disease) 08/18/2013    Chronic left shoulder pain 04/26/2017    Chronic combined systolic and diastolic CHF, 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 ([facility] [address] ) 05/08/2017    Hypothyroidism 04/26/2017    GERD (gastroesophageal reflux disease) 03/29/2018    Acute kidney injury superimposed on chronic kidney disease ([address] ) 06/11/2018    Elevated d-dimer 06/11/2018    MICKEY (iron deficiency anemia) 06/22/2018    Corns 07/06/2018    Pain in both feet 07/06/2018    Onychomycosis 07/06/2018    Pulmonary hypertension ([facility] ) 07/18/2018    Chronic pain of both shoulders 08/03/2018    OAB (overactive bladder) 10/16/2018     Resolved Ambulatory Problems     Diagnosis Date Noted    CHF exacerbation ([place] [address] ) 03/04/2016    Tachycardia 03/04/2016    Chronic systolic heart failure ([place] [address] ) 03/15/2016    Non-ST elevation MI (NSTEMI) ([place] [address] ) 03/15/2016    Rib fracture 05/16/2016    Fall 05/17/2016    Acute exacerbation of CHF (congestive heart failure) ([place] [address] ) 05/26/2016    Aortic stenosis, severe     1st degree AV block 08/23/2016    Sinus bradycardia 08/23/2016    Hematuria 08/23/2016    Pleural effusion, right 08/23/2016    Multiple falls 08/23/2016    Physical deconditioning 08/23/2016    Leukocytosis 08/24/2016    Acute blood loss anemia 08/24/2016    Age-related cognitive decline 08/25/2016    Constipation 10/02/2016    Fecal impaction in rectum ([place] [address] ) 10/02/2016    Acute on chronic low back pain 10/02/2016    Elevated TSH 10/05/2016    Abnormal gait 10/05/2016    Melena 10/05/2016    Dyspnea on exertion 11/24/2016    Non-sustained ventricular tachycardia ([place] [address] ) 01/06/2017    Left heart failure ([place] [address] ) 01/07/2017    Corns 02/23/2018    Pain in both feet 02/23/2018    Arteriosclerosis of arteries of extremities (HCC) 08/25/2017    Esophagitis determined by endoscopy 03/17/2016    Heart failure, left, with LVEF >40% ([place] [address] ) 04/07/2016    Hyperthyroidism 12/09/2016    Hypertonicity of bladder 02/21/2014    Hyponatremia 03/19/2014    Obesity with alveolar hypoventilation, unspecified obesity severity ([place] [address] ) 07/07/2016    Obstructive sleep apnea 09/04/2012    Onychomycosis 08/25/2017    Osteoarthritis of left shoulder 05/10/2017    Pleural effusion 04/28/2014    Prediabetes 10/07/2015    Pulmonary fibrosis ([place] [address] ) 04/28/2014    Pulmonary nodule, left 10/08/2015    Sleep related hypoventilation in conditions classified elsewhere 09/04/2012    Bacterial conjunctivitis of right eye 03/29/2018    Iron deficiency anemia 03/29/2018    Generalized weakness 03/31/2018    Atypical chest pain 06/11/2018    Chronic diastolic (congestive) heart failure ([place] [address] ) 10/16/2018     Past Medical History:   Diagnosis Date    Allergic rhinitis 06/11/2010    Aortic stenosis, severe     Bacterial pneumonia 06/08/2007    Bladder neck obstruction 12/23/2010    BPH (benign prostatic hyperplasia)     Bronchitis, chronic obstructive, with exacerbation ([place] [address] ) 01/30/2012    CAD (coronary artery disease)     Carcinoma ([place] [address] )     Cardiomegaly 02/13/2007    Cataract of both eyes     CH[facility]congestive heart failure) ([facility])     Chronic allergic conjunctivitis     Chronic kidney disease (CKD)     CKD (chronic kidney disease), stage III ([facility])     CKD (chronic kidney disease), stage III ([facility])     COPD (chronic obstructive pulmonary disease) ([facility])     Decubitus ulcer     Dermatomycosis     Diabetes mellitus type 2, noninsulin dependent ([place] [address] )     Dyspnea on exertion     Eczema     Edema     Epistaxis 12/01/2004    Esophagitis, erosive     Exposure to scabies     Fracture of rib     H/O aortic valve replacement     H/O blood clots     History of DVT (deep vein thrombosis)     History of non-ST elevation myocardial infarction (NSTEMI)     History of pneumonia     Hyperlipidemia     Hypertension     Internal hemorrhoids 09/13/2006    LBBB (left bundle branch block)     MRSA (methicillin resistant staph aureus) culture positive     NSTEMI (non-ST elevated myocardial infarction) ([facility])     Obstructive sleep apnea on CPAP     Paroxysmal atrial fibrillation ([facility])     Phimosis 09/01/2010    Pulmonary fibrosis ([facility])     PVD (peripheral vascular disease) ([facility])     Rotator cuff tendonitis     Skin abscess 12/21/2004    Skin neoplasm     Tachycardia 12/01/2004    Trigger finger     Type 2 diabetes mellitus ([place] [address] ) 12/01/2004    Venous thrombosis 05/16/2007    Venous thrombosis 05/16/2007       Admitting Diagnosis: KJ (acute kidney injury) ([place] [address] )    Age/Sex: 80 y o  male    Assessment/Plan:   * Acute kidney injury superimposed on chronic kidney disease ([facility] [address] )   Assessment & Plan     · Creatinine on admission 3 14, baseline 1 8  · Rising creatinine on diuretics however patient appears fluid overloaded  · Will attempt gentle hydration overnight with [email or web address]  · UA pending  · Renal US and bladder scans qshift pending  · Close monitoring of renal function, UOP and I/Os  · Hold diuretic for now  · Nephrology consulted to follow      Chronic combined systolic and diastolic CHF, 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)   Assessment & Plan     · BNP elevated at 21,000 (baseline 2-3000), and patient with worsening LE edema however has been having rising creatine with increase in diuretic as outpt, may be intravascularly depleted but fluid overloaded in extravascular space  · Dry weight may be 207lbs but pt unsure, current weight is 221lbs  · Last echo 6/2018 with EF 35% with grade 1 diastolic dysfunction  · CXR pending  · Repeat Echo pending  · Cardio consulted, recs appreciated  · Monitor I/O and weight closely  · Will attempt gentle hydration overnight but if does not improve will change course to aggressive diuresis        Pulmonary hypertension (HCC)   Assessment & Plan     · Moderate to severe on last echo, likely secondary to underlying COPD      Chronic respiratory failure with hypoxia (HCC)   Assessment & Plan     · Pt on 6L NC at home, with up to 10L with exertion   Appears at baseline  · CXR pending  · Cont with home meds, added PRN nebs if needed      PATITO (obstructive sleep apnea)   Assessment & Plan     · Moderate PATITO  · Cont CPAP qHS      Ambulatory dysfunction   Assessment & Plan     · PT/OT eval      Hypertension   Assessment & Plan     Cont home meds      Type 2 diabetes mellitus, without long-term current use of insulin (HCC)   Assessment & Plan             Lab Results   Component Value Date     HGBA1C 5 3 06/12/2018         No results for input(s): POCGLU in the last 72 hours      Blood Sugar Average: Last 72 hrs:         CAD in native artery   Assessment & Plan     Cont home meds      Paroxysmal atrial fibrillation (HCC)   Assessment & Plan     · Cont beta blockers  · HR in goal range      Moderate COPD (chronic obstructive pulmonary disease) (HCC)   Assessment & Plan     · Last spirometry showed his FEV1 to be 1 85 liters or 76 percent predicted consistent moderate airflow obstruction  · Cont home meds          VTE Prophylaxis: Heparin  / sequential compression device   Code Status: Level 1 - Full Code     Anticipated Length of Stay:  Patient will be admitted on an Inpatient basis with an anticipated length of stay of  > 2 midnights       Justification for Hospital Stay: IV fluids, possible IV diuretics        Admission Orders:  11/16/18 AT 1157  ADMIT INPATIENT  VS Q4HRS    Up as Tolerated  SCD      Diet Cardiac; Cardiac     Continuous IV Infusions:   sodium chloride 50 mL/hr Last Rate: 50 mL/hr (11/16/18 1709)       Scheduled Meds:   Current Facility-Administered Medications:  acetaminophen 650 mg Oral Q6H PRN ROC Boyd    amiodarone 100 mg Oral Daily Michael Levy MD    amLODIPine 5 mg Oral Daily Michael Levy MD    aspirin 81 mg Oral Daily Michael Levy MD    cyanocobalamin 100 mcg Oral Daily Michael Levy MD    docusate sodium 100 mg Oral HS Michael Levy MD    DULoxetine 30 mg Oral Daily Michael Lvey MD    fenofibrate 145 mg Oral Daily Michael Levy MD    fluticasone-vilanterol 1 puff Inhalation Daily Michael Levy MD    heparin (porcine) 5,000 Units Subcutaneous Q8H 3630 Eduardo Todd MD    hydrALAZINE 25 mg Oral Q8H 3630 Eduardo Todd MD    ipratropium-albuterol 3 mL Nebulization Q4H Michael Levy MD    And        ipratropium-albuterol 3 mL Nebulization Q2H PRN Michael Levy MD    isosorbide mononitrate 30 mg Oral Daily Michael Levy MD    levothyroxine 50 mcg Oral Early Morning Michael Levy MD    metoprolol tartrate 25 mg Oral Q12H 3630 Eduardo Todd MD    oxybutynin 5 mg Oral Daily Michael Levy MD    pantoprazole 40 mg Oral Early Morning Casie Khan MD    sodium chloride 50 mL/hr Intravenous Continuous Shakila Parmar MD Last Rate: 50 mL/hr (11/16/18 1709)   traMADol 50 mg Oral Q12H PRN Casie Khan MD        PRN Meds:      acetaminophen    NEB Tx with ipratropium-albuterol **AND** ipratropium-albuterol q4hrs prn given x 4/ 24 hrs    TraMADol      CONSULT RENAL    PT EVAL    OT EVAL

## 2018-11-17 NOTE — PLAN OF CARE
DISCHARGE PLANNING     Discharge to home or other facility with appropriate resources Progressing        DISCHARGE PLANNING - CARE MANAGEMENT     Discharge to post-acute care or home with appropriate resources Progressing        INFECTION - ADULT     Absence or prevention of progression during hospitalization Progressing        Knowledge Deficit     Patient/family/caregiver demonstrates understanding of disease process, treatment plan, medications, and discharge instructions Progressing        PAIN - ADULT     Verbalizes/displays adequate comfort level or baseline comfort level Progressing        Prexisting or High Potential for Compromised Skin Integrity     Skin integrity is maintained or improved Progressing        RESPIRATORY - ADULT     Achieves optimal ventilation and oxygenation Progressing        SAFETY ADULT     Patient will remain free of falls Progressing     Maintain or return to baseline ADL function Progressing     Maintain or return mobility status to optimal level Progressing

## 2018-11-17 NOTE — ASSESSMENT & PLAN NOTE
· BNP elevated at 21,000 (baseline 2-3000), and patient with worsening LE edema however has been having rising creatine with increase in diuretic as outpt, may be intravascularly depleted but fluid overloaded in extravascular space     · Dry weight may be 207lbs but pt unsure, admission weight is 221lbs  · Last echo 6/2018 with EF 35% with grade 1 diastolic dysfunction  · Repeat echo unchanged  · CXR showed no pulmonary edema  · Cardio consulted, recs appreciated  · Monitor I/O and weight closely  · Restart diuresis once creatinine back down

## 2018-11-17 NOTE — ASSESSMENT & PLAN NOTE
· Pt on 6L NC at home, with up to 10L with exertion   Appears at baseline  · CXR with no acute findings  · Cont with home meds, added PRN nebs if needed

## 2018-11-17 NOTE — PROGRESS NOTES
Progress Note - Suraj Patino 10/24/1930, 80 y o  male MRN: [id number]    Unit/Bed#: [address] [place] [address] Encounter: [id number]    Primary Care Provider: Chastity Riley DO   Date and time admitted to hospital: 11/16/2018 10:38 AM        * Acute kidney injury superimposed on chronic kidney disease ([facility] [address] )   Assessment & Plan    · Creatinine on admission 3 14, baseline 1 8-2  · Patient with rising creatinine on diuretics indicating intravascular volume depletion, however patient appears clinically to be volume overloaded with edema and elevated BNP  · UA bland  · Renal US was negative for hydronephrosis  · Cont to hold diuretic for now  · Nephrology and cardio consulted to follow  · Started on gentle hydration overnight with [email or web address]  · Creatinine decreased today, cont IVF for another 1L      Chronic combined systolic and diastolic CHF, 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)   Assessment & Plan    · BNP elevated at 21,000 (baseline 2-3000), and patient with worsening LE edema however has been having rising creatine with increase in diuretic as outpt, may be intravascularly depleted but fluid overloaded in extravascular space  · Dry weight may be 207lbs but pt unsure, admission weight is 221lbs  · Last echo 6/2018 with EF 35% with grade 1 diastolic dysfunction  · Repeat echo unchanged  · CXR showed no pulmonary edema  · Cardio consulted, recs appreciated  · Monitor I/O and weight closely  · Restart diuresis once creatinine back down     Pulmonary hypertension (HCC)   Assessment & Plan    · Moderate to severe on echo, likely secondary to underlying COPD     Chronic respiratory failure with hypoxia (HCC)   Assessment & Plan    · Pt on 6L NC at home, with up to 10L with exertion   Appears at baseline  · CXR with no acute findings  · Cont with home meds, added PRN nebs if needed     PATITO (obstructive sleep apnea)   Assessment & Plan    · Moderate PATITO  · Cont CPAP qHS     Ambulatory dysfunction   Assessment & Plan    · PT/OT rec home vs STR     Hypertension   Assessment & Plan    Cont home meds     Type 2 diabetes mellitus, without long-term current use of insulin ([facility])   Assessment & Plan    Lab Results   Component Value Date    HGBA1C 5 3 2018       No results for input(s): POCGLU in the last 72 hours  Blood Sugar Average: Last 72 hrs:       CAD in native artery   Assessment & Plan    Cont home meds     Paroxysmal atrial fibrillation ([facility])   Assessment & Plan    · Cont beta blockers  · Currently in NSR     Moderate COPD (chronic obstructive pulmonary disease) ([facility])   Assessment & Plan    · Last spirometry showed his FEV1 to be 1 85 liters or 76 percent predicted consistent moderate airflow obstruction  · Cont home meds           VTE Pharmacologic Prophylaxis:   Pharmacologic: Heparin  Mechanical VTE Prophylaxis in Place: Yes    Patient Centered Rounds: I have performed bedside rounds with nursing staff today  Discussions with Specialists or Other Care Team Provider: Yes  Education and Discussions with Family / Patient:Yes  Time Spent for Care: 30 minutes  More than 50% of total time spent on counseling and coordination of care as described above  Current Length of Stay: 1 day(s)  Current Patient Status: Inpatient     Discharge Plan: pending    Code Status: Level 1 - Full Code      Subjective:   Denies complaints  No chest pain or SOB  Has been urinating a lot since arrival        Objective:     Vitals:   Temp (24hrs), Av °F (36 7 °C), Min:97 9 °F (36 6 °C), Max:98 °F (36 7 °C)    Temp:  [97 9 °F (36 6 °C)-98 °F (36 7 °C)] 98 °F (36 7 °C)  HR:  [63-74] 63  Resp:  [18-20] 18  BP: (132-170)/(62-87) 170/70  SpO2:  [91 %-97 %] 97 %  Body mass index is 31 79 kg/m²  Input and Output Summary (last 24 hours):      Intake/Output Summary (Last 24 hours) at 18 1401  Last data filed at 18 0937   Gross per 24 hour   Intake              720 ml   Output             1125 ml   Net             -405 ml        Physical Exam:     Physical Exam   Constitutional: He is oriented to person, place, and time  He appears well-developed  No distress  HENT:   Head: Normocephalic and atraumatic  Neck: JVD present  Cardiovascular: Normal rate, regular rhythm and normal heart sounds  No murmur heard  Pulmonary/Chest: Effort normal and breath sounds normal  No respiratory distress  He has no wheezes  He has no rales  Clear but decreased lung sounds   Abdominal: Soft  Bowel sounds are normal  He exhibits no distension  There is no tenderness  There is no rebound and no guarding  Musculoskeletal: He exhibits edema  He exhibits no tenderness or deformity  Neurological: He is alert and oriented to person, place, and time  Skin: Skin is warm and dry  Psychiatric: He has a normal mood and affect  His behavior is normal    Nursing note and vitals reviewed  Additional Data:     Labs:      Results from last 7 days  Lab Units 11/17/18  0558 11/16/18  1328   WBC Thousand/uL 9 09 8 37   HEMOGLOBIN g/dL 9 8* 9 8*   HEMATOCRIT % 32 2* 32 3*   PLATELETS Thousands/uL 328 341  334       Results from last 7 days  Lab Units 11/17/18  0558 11/16/18  1328 11/14/18  1005   SODIUM mmol/L 141 141 140   POTASSIUM mmol/L 4 2 4 5 5 0   CHLORIDE mmol/L 103 104 98   CO2 mmol/L 27 32 24   BUN mg/dL 47* 55* 51*   CREATININE mg/dL 2 84* 3 17*  --    CALCIUM mg/dL 9 0 9 0  --    TOTAL BILIRUBIN mg/dL  --  0 60  --    ALK PHOS U/L  --  49  --    ALT U/L  --  25  --    AST U/L  --  38  --              Lab Results   Component Value Date/Time    HGBA1C 5 3 06/12/2018 06:14 AM               * I Have Reviewed All Lab Data Listed Above  * Additional Pertinent Lab Tests Reviewed: All Labs Within Last 24 Hours Reviewed    Imaging:     XR chest portable   Final Result by Marilu Morocho DO (11/16 7589)      Stable chest with bibasilar scarring and right-sided pleural effusion extending along the chest wall              Workstation performed: [id number] US kidney and bladder   Final Result by Geovanny Lopez MD (11/16 5667)      Suboptimal visualization of the left kidney  No evidence of hydronephrosis  Workstation performed: [id number]           Imaging Reports Reviewed by myself    Cultures:   Blood Culture:   Lab Results   Component Value Date    BLOODCX No Growth After 5 Days  01/06/2017    BLOODCX No Growth After 5 Days  01/06/2017    BLOODCX No Growth After 5 Days  03/06/2016    BLOODCX No Growth After 5 Days  03/06/2016    BLOODCX No Growth After 5 Days  03/04/2016    BLOODCX No Growth After 5 Days   03/04/2016     Urine Culture:   Lab Results   Component Value Date    URINECX No Growth <1000 cfu/mL 03/04/2016     Sputum Culture: No components found for: SPUTUMCX  Wound Culture: No results found for: WOUNDCULT    Last 24 Hours Medication List:     Current Facility-Administered Medications:  acetaminophen 650 mg Oral Q6H PRN ROC Payne    amiodarone 100 mg Oral Daily Mariea Snowball, MD    amLODIPine 5 mg Oral Daily Mariea Snowball, MD    aspirin 81 mg Oral Daily NurseGridea Snowball, MD    cyanocobalamin 100 mcg Oral Daily NurseGridea Snowball, MD    docusate sodium 100 mg Oral HS Mariea Snowball, MD    DULoxetine 30 mg Oral Daily NurseGridea Snowball, MD    fenofibrate 145 mg Oral Daily NurseGridea Snowball, MD    fluticasone-vilanterol 1 puff Inhalation Daily Easton Vazquez MD    heparin (porcine) 5,000 Units Subcutaneous Q8H [address] [facility], [facility]    hydrALAZINE 25 mg Oral Q8H [facility], MD    ipratropium-albuterol 3 mL Nebulization Q4H Easton Vazquez MD    And        ipratropium-albuterol 3 mL Nebulization Q2H PRN Easton Vazquez MD    isosorbide mononitrate 30 mg Oral Daily Mariea Snowball, MD    levothyroxine 50 mcg Oral Early Morning NurseGridjay Vazquze MD    metoprolol tartrate 25 mg Oral Q12H [address] [facility], MD    oxybutynin 5 mg Oral Daily Mariea Snowball, MD    pantoprazole 40 mg Oral Early Morning NurseGridjay Snowball, MD    sodium chloride 50 mL/hr Intravenous Continuous Rogelio Disla MD Last Rate: 50 mL/hr (11/16/18 2833)   traMADol 50 mg Oral Q12H PRN Radha Sánchez MD         Today, Patient Was Seen By: Radha Sánchez MD    ** Please Note: Dragon 360 Dictation voice to text software may have been used in the creation of this document   **

## 2018-11-17 NOTE — PROGRESS NOTES
Progress Note - Cardiology   Catheryn Files 80 y o  male MRN: [id number]  Unit/Bed#: [address] [address] Encounter: [id number]  11/17/18  1:27 PM        Assessment:  1  Acute on chronic combined systolic and diastolic congestive heart failure with primarily right sided failure with pulmonary hypertension, lower extremity edema  Patient has had significant improvement in renal function and has maintained a good urine output overnight  Agree with nephrology assessment to begin diuretic use tomorrow  2  Paroxysmal atrial fibrillation - patient remains in sinus rhythm with amiodarone  3  Hypertension - continue amlodipine  4  Dyslipidemia - continue statin and fenofibrate  5  Acute kidney injury - given improvement to fluid, was likely prerenal in etiology  Repeat BMP in AM  6  Aortic stenosis with previous TAVR - valve gradients unchanged      Plan:  As above      Subjective: Catheryn Files denies any chest pain still with edema  Feels fatigued      Meds/Allergies   current meds:   Current Facility-Administered Medications   Medication Dose Route Frequency    acetaminophen (TYLENOL) tablet 650 mg  650 mg Oral Q6H PRN    amiodarone tablet 100 mg  100 mg Oral Daily    amLODIPine (NORVASC) tablet 5 mg  5 mg Oral Daily    aspirin chewable tablet 81 mg  81 mg Oral Daily    cyanocobalamin (VITAMIN B-12) tablet 100 mcg  100 mcg Oral Daily    docusate sodium (COLACE) capsule 100 mg  100 mg Oral HS    DULoxetine (CYMBALTA) delayed release capsule 30 mg  30 mg Oral Daily    fenofibrate (TRICOR) tablet 145 mg  145 mg Oral Daily    fluticasone-vilanterol (BREO ELLIPTA) 100-25 mcg/inh inhaler 1 puff  1 puff Inhalation Daily    heparin (porcine) subcutaneous injection 5,000 Units  5,000 Units Subcutaneous Q8H [address]    hydrALAZINE (APRESOLINE) tablet 25 mg  25 mg Oral Q8H [address]    ipratropium-albuterol (DUO-NEB) 0 5-2 5 mg/3 mL inhalation solution 3 mL  3 mL Nebulization Q4H    And    ipratropium-albuterol (DUO-NEB) 0 5-2 5 mg/3 mL inhalation solution 3 mL  3 mL Nebulization Q2H PRN    isosorbide mononitrate (IMDUR) 24 hr tablet 30 mg  30 mg Oral Daily    levothyroxine tablet 50 mcg  50 mcg Oral Early Morning    metoprolol tartrate (LOPRESSOR) tablet 25 mg  25 mg Oral Q12H [address]    oxybutynin (DITROPAN) tablet 5 mg  5 mg Oral Daily    pantoprazole (PROTONIX) EC tablet 40 mg  40 mg Oral Early Morning    sodium chloride 0 9 % infusion  50 mL/hr Intravenous Continuous    traMADol (ULTRAM) tablet 50 mg  50 mg Oral Q12H PRN     No Known Allergies    Objective   Vitals: Blood pressure 170/70, pulse 63, temperature 98 °F (36 7 °C), temperature source Oral, resp  rate 18, height 5' 10" (1 778 m), weight 101 kg (221 lb 9 oz), SpO2 97 %  , Body mass index is 31 79 kg/m²  Intake/Output Summary (Last 24 hours) at 11/17/18 1327  Last data filed at 11/17/18 0937   Gross per 24 hour   Intake              720 ml   Output             1125 ml   Net             -405 ml       Physical Exam   Constitutional: He appears healthy  No distress  HENT:   Nose: Nose normal    Mouth/Throat: Oropharynx is clear  Eyes: Pupils are equal, round, and reactive to light  Conjunctivae are normal    Neck: Neck supple  No JVD present  Cardiovascular: Normal rate and regular rhythm  Exam reveals no distant heart sounds and no friction rub  Murmur heard  Pulmonary/Chest: Effort normal and breath sounds normal  He has no wheezes  He has no rales  Abdominal: Soft  He exhibits no distension  There is no tenderness  Musculoskeletal: He exhibits edema  Neurological: He is alert and oriented to person, place, and time  Skin: Skin is warm and dry  No rash noted         Lab Results:     Troponins:       Recent Results (from the past 24 hour(s))   CBC    Collection Time: 11/16/18  1:28 PM   Result Value Ref Range    WBC 8 37 4 31 - 10 16 Thousand/uL    RBC 3 68 (L) 3 88 - 5 62 Million/uL    Hemoglobin 9 8 (L) 12 0 - 17 0 g/dL    Hematocrit 32 3 (L) 36 5 - 49 3 %    MCV 88 82 - 98 fL    MCH 26 6 (L) 26 8 - 34 3 pg    MCHC 30 3 (L) 31 4 - 37 4 g/dL    RDW 18 0 (H) 11 6 - 15 1 %    Platelets 160 551 - 142 Thousands/uL    MPV 10 1 8 9 - 12 7 fL   Comprehensive metabolic panel    Collection Time: 11/16/18  1:28 PM   Result Value Ref Range    Sodium 141 136 - 145 mmol/L    Potassium 4 5 3 5 - 5 3 mmol/L    Chloride 104 100 - 108 mmol/L    CO2 32 21 - 32 mmol/L    ANION GAP 5 4 - 13 mmol/L    BUN 55 (H) 5 - 25 mg/dL    Creatinine 3 17 (H) 0 60 - 1 30 mg/dL    Glucose 104 65 - 140 mg/dL    Calcium 9 0 8 3 - 10 1 mg/dL    AST 38 5 - 45 U/L    ALT 25 12 - 78 U/L    Alkaline Phosphatase 49 46 - 116 U/L    Total Protein 6 8 6 4 - 8 2 g/dL    Albumin 3 1 (L) 3 5 - 5 0 g/dL    Total Bilirubin 0 60 0 20 - 1 00 mg/dL    eGFR 17 ml/min/1 73sq m   Magnesium    Collection Time: 11/16/18  1:28 PM   Result Value Ref Range    Magnesium 2 2 1 6 - 2 6 mg/dL   NT-BNP PRO    Collection Time: 11/16/18  1:28 PM   Result Value Ref Range    NT-proBNP 21,190 (H) <450 pg/mL   Platelet count    Collection Time: 11/16/18  1:28 PM   Result Value Ref Range    Platelets 944 948 - 787 Thousands/uL    MPV 10 3 8 9 - 12 7 fL   Basic metabolic panel    Collection Time: 11/17/18  5:58 AM   Result Value Ref Range    Sodium 141 136 - 145 mmol/L    Potassium 4 2 3 5 - 5 3 mmol/L    Chloride 103 100 - 108 mmol/L    CO2 27 21 - 32 mmol/L    ANION GAP 11 4 - 13 mmol/L    BUN 47 (H) 5 - 25 mg/dL    Creatinine 2 84 (H) 0 60 - 1 30 mg/dL    Glucose 114 65 - 140 mg/dL    Calcium 9 0 8 3 - 10 1 mg/dL    eGFR 19 ml/min/1 73sq m   Albumin    Collection Time: 11/17/18  5:58 AM   Result Value Ref Range    Albumin 2 9 (L) 3 5 - 5 0 g/dL   CBC (With Platelets)    Collection Time: 11/17/18  5:58 AM   Result Value Ref Range    WBC 9 09 4 31 - 10 16 Thousand/uL    RBC 3 73 (L) 3 88 - 5 62 Million/uL    Hemoglobin 9 8 (L) 12 0 - 17 0 g/dL    Hematocrit 32 2 (L) 36 5 - 49 3 %    MCV 86 82 - 98 fL    MCH 26 3 (L) 26 8 - 34 3 pg    MCHC 30 4 (L) 31 4 - 37 4 g/dL    RDW 17 7 (H) 11 6 - 15 1 %    Platelets 065 507 - 959 Thousands/uL    MPV 10 7 8 9 - 12 7 fL          Cardiac testing:   Results for orders placed during the hospital encounter of 18   Echo complete with contrast if indicated    [address]  [address] [facility]  [address]  [phone]    Transthoracic Echocardiogram  2D, M-mode, Doppler, and Color Doppler    Study date:  2018    Patient: Gama Trivedi  MR number: [id number]  Account number: [de-identified]  : 24-Oct-1930  Age: 80 years  Gender: Male  Status: Inpatient  Location: Bedside  Height: 70 in  Weight: 220 4 lb  BP: 142/ 86 mmHg    Indications: Heart Failure    Diagnoses: I50 9 - Heart failure, unspecified    Sonographer:  CAMILLA Waldrop  Primary Physician:  Dwayne Montenegro DO  Referring Physician:  Dwayne Montenegro DO  Group:  [facility]  Interpreting Physician:  John Marrero DO    SUMMARY    LEFT VENTRICLE:  Systolic function was severely reduced by visual assessment  Ejection fraction was estimated to be 35 %  There was severe diffuse hypokinesis with no identifiable regional variations  There was moderate concentric hypertrophy  Doppler parameters were consistent with abnormal left ventricular relaxation (grade 1 diastolic dysfunction)  LEFT ATRIUM:  The atrium was moderately dilated  RIGHT ATRIUM:  The atrium was mildly dilated  MITRAL VALVE:  There was mild regurgitation  AORTIC VALVE:  A bioprosthesis was present  It exhibited normal function  There was mild stenosis  Valve peak gradient was 30 mmHg  TRICUSPID VALVE:  There was mild regurgitation  Pulmonary artery systolic pressure was moderately increased  COMPARISONS:  There has been no significant interval change  Comparison was made with the previous study of 2018      HISTORY: PRIOR HISTORY: HTN, DM, DVT, MRSA, NSTEMI, AVR, CAD, BPH, CKD, COPD    PROCEDURE: The procedure was performed at the bedside  This was a stat study  The transthoracic approach was used  The study included complete 2D imaging, M-mode, complete spectral Doppler, and color Doppler  The heart rate was 70 bpm, at  the start of the study  Image quality was adequate  LEFT VENTRICLE: Size was normal  Systolic function was severely reduced by visual assessment  Ejection fraction was estimated to be 35 %  There was severe diffuse hypokinesis with no identifiable regional variations  There was moderate  concentric hypertrophy  DOPPLER: Doppler parameters were consistent with abnormal left ventricular relaxation (grade 1 diastolic dysfunction)  RIGHT VENTRICLE: The size was normal  Systolic function was normal     LEFT ATRIUM: The atrium was moderately dilated  RIGHT ATRIUM: The atrium was mildly dilated  MITRAL VALVE: There was annular calcification  Valve structure was normal  There was normal leaflet separation  No echocardiographic evidence for prolapse  DOPPLER: The transmitral velocity was within the normal range  There was no  evidence for stenosis  There was mild regurgitation  AORTIC VALVE: A bioprosthesis was present  It exhibited normal function  DOPPLER: There was mild stenosis  There was no regurgitation  TRICUSPID VALVE: The valve structure was normal  There was normal leaflet separation  DOPPLER: The transtricuspid velocity was within the normal range  There was mild regurgitation  Pulmonary artery systolic pressure was moderately  increased  Estimated peak PA pressure was 60 mmHg  PULMONIC VALVE: Leaflets exhibited normal thickness, no calcification, and normal cuspal separation  DOPPLER: The transpulmonic velocity was within the normal range  There was no regurgitation  PERICARDIUM: There was no thickening  There was no pericardial effusion  AORTA: The root exhibited normal size      PULMONARY ARTERY: The size was normal  The morphology appeared normal     MEASUREMENT TABLES    DOPPLER MEASUREMENTS  Aortic valve   (Reference normals)  Peak gradient   30 mmHg   (--)    SYSTEM MEASUREMENT TABLES    2D mode  AoR Diam 2D: 3 2 cm  LA Diam (2D): 4 5 cm  LA/Ao (2D): 1 41  FS (2D Teich): 17 4 %  IVSd (2D): 1 55 cm  LVDEV: 159 cm³  LVESV: 102 cm³  LVIDd(2D): 5 69 cm  LVISd (2D): 4 7 cm  LVOT Area 2D: 3 8 cm squared  LVPWd (2D): 1 48 cm  SV (Teich): 57 cm³    Apical four chamber  LVEF A4C: 35 %    Unspecified Scan Mode  FATMATA Cont Eq (Peak Pasquale): 1 51 cm squared  FATMATA Cont Eq (VTI): 1 46 cm squared  LVOT (VTI): 22 5 cm  LVOT Diam : 2 2 cm  LVOT Vmax: 1090 mm/s  LVOT Vmax; Mean: 1090 mm/s  Peak Grad ; Mean: 5 mm[Hg]  SV (LVOT): 86 cm³  VTI;Mean: 2 mm[Hg]  MV Peak A Pasquale: 1330 mm/s  MV Peak E Pasquale  Mean: 602 mm/s  MVA (PHT): 3 24 cm squared  PHT: 68 ms  Max P mm[Hg]  V Max: 3630 mm/s  Vmax: 3620 mm/s  RA Area: 22 4 cm squared  RA Volume: 67 5 cm³  TAPSE: 1 7 cm    [facility]    Prepared and electronically signed by    Brandon Silva DO  Signed 2018 16:23:03       No results found for this or any previous visit  Results for orders placed during the hospital encounter of 16   Cardiac catheterization    Narrative Cardiac Catheterization Operative Report    Primary care doctor: Dr Sarah Gould     Cardiologist is Dr Car Salcedo    Date of procedure:  3/10/2016    Brief history: -20-year-old male with history of chronic renal   insufficiency, CAD, status post angioplasty of LAD, known severe COPD who   was admitted with hypoxic respiratory failure and has a troponin of 10  He   has a known history of severe aortic stenosis along with moderate   pulmonary hypertension   Hence he was scheduled for complete heart cath    Procedure Details  The risks, benefits, complications, including risk of stroke, heart   attack, bleeding and even death but not limited to at along with treatment   options, and expected outcomes were discussed with the patient  The   patient and/or family concurred with the proposed plan, giving informed   consent  Patient was brought to the cath lab after IV hydration was begun   and oral premedication was given  He was further sedated with midazolam   and fentanyl  He was prepped and draped in the usual manner  Using the   modified Seldinger access technique, a 6 Polish sheath was placed in the   right common femoral artery without any complications    A left heart   catheterization was done  Angiograms were also done  End of the procedure   patient was transferred to [facility] area without any complications  He   tolerated the procedure well  After the procedure was completed, sedation was stopped and the sheaths   and catheters were all removed  Hemostasis was achieved with fem stop  Access was obtained in the right femoral artery as well as right femoral   vein    Equipment used:   1  JR4 for right coronary angiography  2  JL 4 0 for left coronary angiography   3  LV gram with AL1    Findings:  1  Dominance: Right dominant coronary system    2  Left main:  Is a normal-size vessel it has mild calcification noted  It   bifurcates into large LAD and a circumflex system    3  LAD: LAD is a large-size vessel and it has proximal calcification  Just   after septal it has focal about 60-65% stenosis  This stent in the LAD is   patent after the stenosis  No other focal stenosis seen  4  Circumflex is a nondominant medium size vessel  It has proximally   35-40% stenosis seen  5  RCA: RCA is large vessel and has mild luminal irregularities causing   35% stenosis proximally and mid area  RPL branch has around 35% stenosis   in the mid area  5  LV gram: LV gram was not done as patient has renal insufficiency  However there was 25 mm gradient across aortic valve  Right heart catheter meters: Aortic pressure 128/74  LV pressure 152/10  Card to call output   About 5 L  PA pressure 55 mmHg  RV pressure 55/10  RA mean 8-10  Wedge pressure was not very accurate as patient has lots of respiratory   motions  Itis to be around 16 minutes of mercury  Estimated Blood Loss: Minimal  Complications:  None; patient tolerated the procedure well  Recommendation: Continue medical therapy  Continue risk factor   modification and patient will have a functional stress test as an   outpatient to see if that LAD needs to be done  It seemed to be stable   lesion and less likely to be the cause of patient's non-ST elevation MI  We'll continue other Rx before  Check BMP and CBC  See orders     Disposition: Hemodynamically stable and PACU - hemodynamically stable  Condition: stable         EKG/TELE: Personally reviewed  No events    Imaging: I have personally reviewed pertinent films in PACS

## 2018-11-17 NOTE — ASSESSMENT & PLAN NOTE
· Creatinine on admission 3 14, baseline 1 8-2  · Patient with rising creatinine on diuretics indicating intravascular volume depletion, however patient appears clinically to be volume overloaded with edema and elevated BNP  · UA bland  · Renal US was negative for hydronephrosis  · Cont to hold diuretic for now  · Nephrology and cardio consulted to follow  · Started on gentle hydration overnight with [email or web address]  · Creatinine decreased today, cont IVF for another 1L

## 2018-11-18 ENCOUNTER — APPOINTMENT (INPATIENT)
Dept: RADIOLOGY | Facility: HOSPITAL | Age: 83
DRG: 682 | End: 2018-11-18
Payer: COMMERCIAL

## 2018-11-18 LAB
ANION GAP SERPL CALCULATED.3IONS-SCNC: 10 MMOL/L (ref 4–13)
BUN SERPL-MCNC: 42 MG/DL (ref 5–25)
CALCIUM SERPL-MCNC: 8.9 MG/DL (ref 8.3–10.1)
CHLORIDE SERPL-SCNC: 103 MMOL/L (ref 100–108)
CO2 SERPL-SCNC: 29 MMOL/L (ref 21–32)
CREAT SERPL-MCNC: 2.45 MG/DL (ref 0.6–1.3)
ERYTHROCYTE [DISTWIDTH] IN BLOOD BY AUTOMATED COUNT: 17.7 % (ref 11.6–15.1)
GFR SERPL CREATININE-BSD FRML MDRD: 23 ML/MIN/1.73SQ M
GLUCOSE SERPL-MCNC: 99 MG/DL (ref 65–140)
HCT VFR BLD AUTO: 32.3 % (ref 36.5–49.3)
HGB BLD-MCNC: 9.9 G/DL (ref 12–17)
MCH RBC QN AUTO: 26.7 PG (ref 26.8–34.3)
MCHC RBC AUTO-ENTMCNC: 30.7 G/DL (ref 31.4–37.4)
MCV RBC AUTO: 87 FL (ref 82–98)
MRSA NOSE QL CULT: ABNORMAL
MRSA NOSE QL CULT: ABNORMAL
PLATELET # BLD AUTO: 347 THOUSANDS/UL (ref 149–390)
PMV BLD AUTO: 10.7 FL (ref 8.9–12.7)
POTASSIUM SERPL-SCNC: 4.2 MMOL/L (ref 3.5–5.3)
RBC # BLD AUTO: 3.71 MILLION/UL (ref 3.88–5.62)
SODIUM SERPL-SCNC: 142 MMOL/L (ref 136–145)
WBC # BLD AUTO: 8.88 THOUSAND/UL (ref 4.31–10.16)

## 2018-11-18 PROCEDURE — 97167 OT EVAL HIGH COMPLEX 60 MIN: CPT

## 2018-11-18 PROCEDURE — 94660 CPAP INITIATION&MGMT: CPT

## 2018-11-18 PROCEDURE — 80048 BASIC METABOLIC PNL TOTAL CA: CPT | Performed by: STUDENT IN AN ORGANIZED HEALTH CARE EDUCATION/TRAINING PROGRAM

## 2018-11-18 PROCEDURE — 71045 X-RAY EXAM CHEST 1 VIEW: CPT

## 2018-11-18 PROCEDURE — 99232 SBSQ HOSP IP/OBS MODERATE 35: CPT | Performed by: INTERNAL MEDICINE

## 2018-11-18 PROCEDURE — 94760 N-INVAS EAR/PLS OXIMETRY 1: CPT

## 2018-11-18 PROCEDURE — 85027 COMPLETE CBC AUTOMATED: CPT | Performed by: STUDENT IN AN ORGANIZED HEALTH CARE EDUCATION/TRAINING PROGRAM

## 2018-11-18 PROCEDURE — G8987 SELF CARE CURRENT STATUS: HCPCS

## 2018-11-18 PROCEDURE — 99232 SBSQ HOSP IP/OBS MODERATE 35: CPT | Performed by: STUDENT IN AN ORGANIZED HEALTH CARE EDUCATION/TRAINING PROGRAM

## 2018-11-18 PROCEDURE — 94640 AIRWAY INHALATION TREATMENT: CPT

## 2018-11-18 PROCEDURE — G8988 SELF CARE GOAL STATUS: HCPCS

## 2018-11-18 RX ADMIN — METOPROLOL TARTRATE 25 MG: 25 TABLET, FILM COATED ORAL at 09:08

## 2018-11-18 RX ADMIN — HEPARIN SODIUM 5000 UNITS: 5000 INJECTION, SOLUTION INTRAVENOUS; SUBCUTANEOUS at 13:41

## 2018-11-18 RX ADMIN — IPRATROPIUM BROMIDE AND ALBUTEROL SULFATE 3 ML: .5; 3 SOLUTION RESPIRATORY (INHALATION) at 03:54

## 2018-11-18 RX ADMIN — FENOFIBRATE 145 MG: 145 TABLET ORAL at 09:07

## 2018-11-18 RX ADMIN — ISOSORBIDE MONONITRATE 30 MG: 30 TABLET, EXTENDED RELEASE ORAL at 09:07

## 2018-11-18 RX ADMIN — ASPIRIN 81 MG 81 MG: 81 TABLET ORAL at 09:07

## 2018-11-18 RX ADMIN — HYDRALAZINE HYDROCHLORIDE 25 MG: 25 TABLET ORAL at 13:42

## 2018-11-18 RX ADMIN — DULOXETINE HYDROCHLORIDE 30 MG: 30 CAPSULE, DELAYED RELEASE ORAL at 09:08

## 2018-11-18 RX ADMIN — AMLODIPINE BESYLATE 5 MG: 5 TABLET ORAL at 09:08

## 2018-11-18 RX ADMIN — OXYBUTYNIN CHLORIDE 5 MG: 5 TABLET ORAL at 09:08

## 2018-11-18 RX ADMIN — IPRATROPIUM BROMIDE AND ALBUTEROL SULFATE 3 ML: .5; 3 SOLUTION RESPIRATORY (INHALATION) at 20:42

## 2018-11-18 RX ADMIN — IPRATROPIUM BROMIDE AND ALBUTEROL SULFATE 3 ML: .5; 3 SOLUTION RESPIRATORY (INHALATION) at 12:47

## 2018-11-18 RX ADMIN — ACETAMINOPHEN 650 MG: 325 TABLET, FILM COATED ORAL at 21:08

## 2018-11-18 RX ADMIN — AMIODARONE HYDROCHLORIDE 100 MG: 200 TABLET ORAL at 09:07

## 2018-11-18 RX ADMIN — HEPARIN SODIUM 5000 UNITS: 5000 INJECTION, SOLUTION INTRAVENOUS; SUBCUTANEOUS at 21:04

## 2018-11-18 RX ADMIN — LEVOTHYROXINE SODIUM 50 MCG: 50 TABLET ORAL at 05:50

## 2018-11-18 RX ADMIN — HYDRALAZINE HYDROCHLORIDE 25 MG: 25 TABLET ORAL at 21:04

## 2018-11-18 RX ADMIN — HEPARIN SODIUM 5000 UNITS: 5000 INJECTION, SOLUTION INTRAVENOUS; SUBCUTANEOUS at 05:50

## 2018-11-18 RX ADMIN — IPRATROPIUM BROMIDE AND ALBUTEROL SULFATE 3 ML: .5; 3 SOLUTION RESPIRATORY (INHALATION) at 15:16

## 2018-11-18 RX ADMIN — VITAM B12 100 MCG: 100 TAB at 09:09

## 2018-11-18 RX ADMIN — IPRATROPIUM BROMIDE AND ALBUTEROL SULFATE 3 ML: .5; 3 SOLUTION RESPIRATORY (INHALATION) at 07:46

## 2018-11-18 RX ADMIN — PANTOPRAZOLE SODIUM 40 MG: 40 TABLET, DELAYED RELEASE ORAL at 05:50

## 2018-11-18 RX ADMIN — FLUTICASONE FUROATE AND VILANTEROL TRIFENATATE 1 PUFF: 100; 25 POWDER RESPIRATORY (INHALATION) at 09:07

## 2018-11-18 RX ADMIN — DOCUSATE SODIUM 100 MG: 100 CAPSULE, LIQUID FILLED ORAL at 21:04

## 2018-11-18 RX ADMIN — METOPROLOL TARTRATE 25 MG: 25 TABLET, FILM COATED ORAL at 20:37

## 2018-11-18 RX ADMIN — HYDRALAZINE HYDROCHLORIDE 25 MG: 25 TABLET ORAL at 05:50

## 2018-11-18 NOTE — ASSESSMENT & PLAN NOTE
· Creatinine on admission 3 14, baseline 1 8-2  Etiology uncertain  On admission appeared volume overloaded however he was getting higher doses of diuretics as an outpatient with continually rising creatinine  · UA bland  · Renal US was negative for hydronephrosis  · Nephrology and cardio consulted to follow  · Started on gentle hydration [email or web address] to improve pre load  Creatinine improved as did urine output    · Fluids were stopped and he was transitioned back to diuretic  · Use discharged with follow up with newly established nephrology service and repeat labs to be done in 1 week

## 2018-11-18 NOTE — PROGRESS NOTES
Progress Note - Nia Ano 10/24/1930, 80 y o  male MRN: [id number]    Unit/Bed#: [address] [place] [address] Encounter: [id number]    Primary Care Provider: Solitario Cormier DO   Date and time admitted to hospital: 11/16/2018 10:38 AM        * Acute kidney injury superimposed on chronic kidney disease ([facility] [address] )   Assessment & Plan    · Creatinine on admission 3 14, baseline 1 8-2  · UA bland  · Renal US was negative for hydronephrosis  · Nephrology and cardio consulted to follow  · Started on gentle hydration [email or web address] and creatinine started to improve  · He then started to have some SOB so fluids stopped and he was given a dose of lasix yesterday  · Creatinine continues to downtrend today     Chronic combined systolic and diastolic CHF, 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)   Assessment & Plan    · BNP elevated at 21,000 (baseline 2-3000), and patient with worsening LE edema however has been having rising creatine with increase in diuretic as outpt, may be intravascularly depleted but fluid overloaded in extravascular space  · Dry weight may be 207lbs but pt unsure, admission weight is 221lbs  · Last echo 6/2018 with EF 35% with grade 1 diastolic dysfunction  · Repeat echo unchanged  · CXR on admission showed no pulmonary edema  · Cardio consulted, recs appreciated  · Monitor I/O, net negative 3 1L after lasix dose yesterday  · Repeat labs in AM and consider more IV diuresis depending on results       Pulmonary hypertension ([facility] [address] )   Assessment & Plan    · Moderate to severe on echo, likely secondary to underlying COPD     Chronic respiratory failure with hypoxia (HCC)   Assessment & Plan    · Pt on 6L NC at home, with up to 10L with exertion   Appears at baseline  · CXR with no acute findings  · Cont with home meds, added PRN nebs if needed     PATITO (obstructive sleep apnea)   Assessment & Plan    · Moderate PATITO  · Cont CPAP qHS     Ambulatory dysfunction   Assessment & Plan    · PT/OT rec home vs STR     Hypertension Assessment & Plan    Cont home meds     Type 2 diabetes mellitus, without long-term current use of insulin ([facility])   Assessment & Plan    Lab Results   Component Value Date    HGBA1C 5 3 2018       No results for input(s): POCGLU in the last 72 hours  Blood Sugar Average: Last 72 hrs:       CAD in native artery   Assessment & Plan    Cont home meds     Paroxysmal atrial fibrillation ([facility])   Assessment & Plan    · Cont beta blockers  · Currently in NSR     Moderate COPD (chronic obstructive pulmonary disease) ([facility])   Assessment & Plan    · Last spirometry showed his FEV1 to be 1 85 liters or 76 percent predicted consistent moderate airflow obstruction  · Cont home meds           VTE Pharmacologic Prophylaxis:   Pharmacologic: Heparin  Mechanical VTE Prophylaxis in Place: Yes    Patient Centered Rounds: I have performed bedside rounds with nursing staff today  Discussions with Specialists or Other Care Team Provider: Yes  Education and Discussions with Family / Patient:Yes  Time Spent for Care: 30 minutes  More than 50% of total time spent on counseling and coordination of care as described above  Current Length of Stay: 2 day(s)  Current Patient Status: Inpatient     Discharge Plan: pending    Code Status: Level 1 - Full Code      Subjective:   Denies complaints  No chest pain or SOB  Yesterday had some SOB and fluids were stopped and he was given a dose of diuretic  Has been urinating a lot since  Objective:     Vitals:   Temp (24hrs), Av 6 °F (37 °C), Min:98 3 °F (36 8 °C), Max:98 9 °F (37 2 °C)    Temp:  [98 3 °F (36 8 °C)-98 9 °F (37 2 °C)] 98 5 °F (36 9 °C)  HR:  [66-76] 67  Resp:  [18] 18  BP: (138-153)/(63-74) 149/67  SpO2:  [92 %-99 %] 97 %  Body mass index is 31 14 kg/m²  Input and Output Summary (last 24 hours):      Intake/Output Summary (Last 24 hours) at 18 1451  Last data filed at 18 1201   Gross per 24 hour   Intake              250 ml   Output 3175 ml   Net            -2925 ml        Physical Exam:     Physical Exam   Constitutional: He is oriented to person, place, and time  He appears well-developed  No distress  HENT:   Head: Normocephalic and atraumatic  Cardiovascular: Normal rate, regular rhythm and normal heart sounds  No murmur heard  Pulmonary/Chest: Effort normal and breath sounds normal  No respiratory distress  He has no wheezes  He has no rales  Clear but decreased lung sounds   Abdominal: Soft  Bowel sounds are normal  He exhibits no distension  There is no tenderness  There is no rebound and no guarding  Musculoskeletal: He exhibits edema (improved from yesterday)  He exhibits no tenderness or deformity  Neurological: He is alert and oriented to person, place, and time  Skin: Skin is warm and dry  Psychiatric: He has a normal mood and affect  His behavior is normal    Nursing note and vitals reviewed  Additional Data:     Labs:      Results from last 7 days  Lab Units 11/18/18  0633 11/17/18  0558 11/16/18  1328   WBC Thousand/uL 8 88 9 09 8 37   HEMOGLOBIN g/dL 9 9* 9 8* 9 8*   HEMATOCRIT % 32 3* 32 2* 32 3*   PLATELETS Thousands/uL 347 328 341  334       Results from last 7 days  Lab Units 11/18/18  0633 11/17/18  0558 11/16/18  1328   SODIUM mmol/L 142 141 141   POTASSIUM mmol/L 4 2 4 2 4 5   CHLORIDE mmol/L 103 103 104   CO2 mmol/L 29 27 32   BUN mg/dL 42* 47* 55*   CREATININE mg/dL 2 45* 2 84* 3 17*   CALCIUM mg/dL 8 9 9 0 9 0   TOTAL BILIRUBIN mg/dL  --   --  0 60   ALK PHOS U/L  --   --  49   ALT U/L  --   --  25   AST U/L  --   --  38             Lab Results   Component Value Date/Time    HGBA1C 5 3 06/12/2018 06:14 AM               * I Have Reviewed All Lab Data Listed Above  * Additional Pertinent Lab Tests Reviewed:  All Labs Within Last 24 Hours Reviewed    Imaging:     XR chest portable   Final Result by Carin Santos DO (11/16 6806)      Stable chest with bibasilar scarring and right-sided pleural effusion extending along the chest wall  Workstation performed: [id number]         US kidney and bladder   Final Result by Ryann Quiroz MD (43/01 [id number])      Suboptimal visualization of the left kidney  No evidence of hydronephrosis  Workstation performed: [id number]         XR chest portable    (Results Pending)     Imaging Reports Reviewed by myself    Cultures:   Blood Culture:   Lab Results   Component Value Date    BLOODCX No Growth After 5 Days  01/06/2017    BLOODCX No Growth After 5 Days  01/06/2017    BLOODCX No Growth After 5 Days  03/06/2016    BLOODCX No Growth After 5 Days  03/06/2016    BLOODCX No Growth After 5 Days  03/04/2016    BLOODCX No Growth After 5 Days   03/04/2016     Urine Culture:   Lab Results   Component Value Date    URINECX No Growth <1000 cfu/mL 03/04/2016     Sputum Culture: No components found for: SPUTUMCX  Wound Culture: No results found for: WOUNDCULT    Last 24 Hours Medication List:     Current Facility-Administered Medications:  acetaminophen 650 mg Oral Q6H PRN ROC Stanley   amiodarone 100 mg Oral Daily Jacob Eduardo MD   amLODIPine 5 mg Oral Daily Jacob Eduardo MD   aspirin 81 mg Oral Daily Jacob Eduardo MD   cyanocobalamin 100 mcg Oral Daily Jacob Eduardo MD   docusate sodium 100 mg Oral HS Jacob Eduardo MD   DULoxetine 30 mg Oral Daily Jacob Eduardo MD   fenofibrate 145 mg Oral Daily Jacob Eduardo MD   fluticasone-vilanterol 1 puff Inhalation Daily Jacob Eduardo MD   heparin (porcine) 5,000 Units Subcutaneous Q8H 3630 Eduardo Todd MD   hydrALAZINE 25 mg Oral Q8H 3630 Eduardo Todd MD   ipratropium-albuterol 3 mL Nebulization Q4H Jacob Eduardo MD   And       ipratropium-albuterol 3 mL Nebulization Q2H PRN Jacob Eduardo MD   isosorbide mononitrate 30 mg Oral Daily Jacob Eduardo MD   levothyroxine 50 mcg Oral Early Morning Jacob Eduardo MD   metoprolol tartrate 25 mg Oral Q12H 3630 Eduardo Todd MD   oxybutynin 5 mg Oral Daily Jacob Eduardo MD   pantoprazole 40 mg Oral Early Morning Erin Segura MD   traMADol 50 mg Oral Q12H PRN Erin Segura MD        Today, Patient Was Seen By: Erin Segura MD    ** Please Note: Dragon 360 Dictation voice to text software may have been used in the creation of this document   **

## 2018-11-18 NOTE — OCCUPATIONAL THERAPY NOTE
OT EVALUATION     11/18/18 1125   Note Type   Note type Eval only   Restrictions/Precautions   Other Precautions Chair Alarm; Bed Alarm; Fall Risk;O2;Pain   Pain Assessment   Pain Assessment 0-10   Pain Score 5   Pain Type Chronic pain   Pain Location Shoulder   Pain Orientation Bilateral   Home Living   Type of [address] [place] [address] One level;Elevator   Bathroom Shower/Tub Walk-in shower   Bathroom Equipment Grab bars in shower  (pt reports standing to shower)   Home Equipment Walker;Cane;Wheelchair-manual  (O2)   Additional Comments uses RW for mobility    Prior Function   Level of [place] Needs assistance with IADLs; Needs assistance with ADLs and functional mobility  (independent with walker )   Lives With Significant other   Receives Help From Home health  (HHA 7 days a week for 4 hours)   ADL Assistance Needs assistance  (pt reports assist at times for ADLS)   IADLs Needs assistance   Comments HHA assists with meal prep, meds and household tasks, assists with ADLS as needed, pt cooks at times    [facility] pt likes to read   ADL   Eating Assistance 5  Supervision/Setup   Grooming Assistance 5  Supervision/Setup   [address] 4  Minimal Assistance   [address] 3  Moderate Assistance   [address] 4  Minimal Assistance    [address] 3  Moderate Assistance   [address]  4  Minimal Assistance   Bed Mobility   Supine to Sit 4  Minimal assistance   Sit to Supine 4  Minimal assistance   Transfers   Sit to Stand 4  Minimal assistance   Stand to Sit 4  Minimal assistance   Stand pivot 4  Minimal assistance   Functional Mobility   Functional Mobility 4  Minimal assistance   Additional Comments few steps to head of bed with RW   Balance   Static Sitting Fair   Dynamic Sitting [address] [facility] -   Activity Tolerance   Activity Tolerance Patient limited by fatigue  (SOB)   RUE Assessment   RUE Assessment WFL  (3+/5, shoulder pain flexion to 90 chronic)   LUE Assessment   LUE Assessment (shoulder flexion to 50 degrees 3-/5, elbow/ 3+)   Cognition   Overall Cognitive Status WFL   Arousal/Participation Cooperative   Attention Within functional limits   Orientation Level Oriented X4   Following Commands Follows all commands and directions without difficulty   Assessment   Limitation Decreased ADL status; Decreased UE strength;Decreased Safe judgement during ADL;Decreased endurance;Decreased self-care trans;Decreased high-level ADLs  (decreased balance and mobility )   Prognosis Good   Assessment Patient evaluated by Occupational Therapy  Patient admitted with Acute kidney injury superimposed on chronic kidney disease ([facility] [address] )  The patients occupational profile, medical and therapy history includes a extensive additional review of physical, cognitive, or psychosocial history related to current functional performance  Comorbidities affecting functional mobility and ADLS include: afib, CHF, CKD, COPD, diabetes, DVT, hypertension and PVD  Prior to admission, patient was independent with functional mobility with walker, requiring assist for ADLS and requiring assist for IADLS  The evaluation identifies the following performance deficits: weakness, decreased ROM, impaired balance, decreased endurance, increased fall risk, new onset of impairment of functional mobility, decreased ADLS, decreased IADLS, pain, decreased activity tolerance, decreased safety awareness, SOB upon exertion and decreased strength, that result in activity limitations and/or participation restrictions  This evaluation requires clinical decision making of high complexity, because the patient presents with comorbidites that affect occupational performance and required significant modification of tasks or assistance with consideration of multiple treatment options    The Barthel Index was used as a functional outcome tool presenting with a score of 45, indicating marked limitations of functional mobility and ADLS  Patient will benefit from skilled Occupational Therapy services to address above deficits and facilitate a safe return to prior level of function  Goals   Patient Goals breathe better, go home   STG Time Frame (1-7 days)   Short Term Goal  Goals established to promote patient goal of breathing better and going home:  Patient will increase standing tolerance to 3 minutes during ADL task to decrease assistance level and decrease fall risk; Patient will increase bed mobility to supervision in preparation for ADLS and transfers; Patient will increase functional mobility to and from bathroom with rolling walker with supervision to increase performance with ADLS and to use a toilet; Patient will tolerate 10 minutes of UE ROM/strengthening to increase general activity tolerance and performance in ADLS/IADLS; Patient will improve functional activity tolerance to 10 minutes of sustained functional tasks to increase participation in basic self-care and decrease assistance level;  Patient will be able to to verbalize understanding and perform energy conservation/proper body mechanics during ADLS and functional mobility at least 75% of the time with minimal cueing to decrease signs of fatigue and increase stamina to return to prior level of function; Patient will increase dynamic sitting balance to fair+ to improve the ability to sit at edge of bed or on a chair for ADLS;  Patient will increase dynamic standing balance to fair to improve postural stability and decrease fall risk during standing ADLS and transfers       LTG Time Frame (8-14 days)   Long Term Goal Goals established to promote patient goal of breathing better and going home:  Patient will increase standing tolerance to 6 minutes during ADL task to decrease assistance level and decrease fall risk; Patient will increase bed mobility to independent in preparation for ADLS and transfers; Patient will increase functional mobility to and from bathroom with rolling walker independently to increase performance with ADLS and to use a toilet; Patient will tolerate 20 minutes of UE ROM/strengthening to increase general activity tolerance and performance in ADLS/IADLS; Patient will improve functional activity tolerance to 20 minutes of sustained functional tasks to increase participation in basic self-care and decrease assistance level;  Patient will be able to to verbalize understanding and perform energy conservation/proper body mechanics during ADLS and functional mobility at least 90% of the time without cueing to decrease signs of fatigue and increase stamina to return to prior level of function; Patient will increase dynamic sitting balance to good to improve the ability to sit at edge of bed or on a chair for ADLS;  Patient will increase dynamic standing balance to fair+ to improve postural stability and decrease fall risk during standing ADLS and transfers  Functional Transfer Goals   Pt Will Perform All Functional Transfers (STG supervision LTG Independent )   ADL Goals   Pt Will Perform Grooming (STG independent )   Pt Will Perform Bathing (STG min assist LTG supervision )   Pt Will Perform UE Dressing (STG supervision LTG Independent )   Pt Will Perform LE Dressing (STG min assist LTG supervision )   Pt Will Perform Toileting (STG supervision LTG Independent)   Plan   Treatment Interventions ADL retraining;Functional transfer training;UE strengthening/ROM; Endurance training;Patient/family training;Equipment evaluation/education; Activityengagement; Compensatory technique education; Energy conservation   Goal Expiration Date 12/02/18   Treatment Day 0   OT Frequency 3-5x/wk   Recommendation   OT Discharge Recommendation (STR vs home with services pending progress)   Barthel Index   Feeding 10   Bathing 0   Grooming Score 0   Dressing Score 5   Bladder Score 5   Bowels Score 10   Toilet Use Score 5   Transfers (Bed/Chair) Score 10   Mobility (Level Surface) Score 0   Stairs Score 0   Barthel Index Score 45   Licensure   [place] License Number  Ata Garces [address] OTR/L [id number]

## 2018-11-18 NOTE — PROGRESS NOTES
NEPHROLOGY PROGRESS NOTE    Leopold Pilsner 80 y o  male MRN: [id number]  Unit/Bed#: [address] [place] [address] Encounter: [id number]  Reason for Consult:  Acute on chronic kidney disease    Patient is awake says he feels well today  Slept well and no new complaints  Denied any shortness of breath  ASSESSMENT/PLAN:  1  Renal  The patient has acute on chronic kidney disease with previous baseline creatinine of around 1 8 or slightly higher than that  He was admitted to the hospital after finding outpatient labs showing an increasing creatinine and with adjustment in diuretic with reduced dose creatinine continue to increase  He was admitted diuretics were held he was given some IV fluids creatinine has now declined  At this point the patient is lying flat seems well compensated but he does have congestive heart failure so will need chronic diuretic  Given that he does not really require it today we can hold diuretic but I would continue to hold IV fluids  Holding diuretic today  If discharge would sent on torsemide 10 mg once a day  Will monitor to resume maintenance diuretic dose  2  Cardiac  Patient has ejection fraction 35%  Cardiology following  Monitor to resume maintenance diuretic  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     SUBJECTIVE:  Review of Systems   Constitution: Negative for chills and fever  HENT: Negative  Eyes: Negative  Cardiovascular: Negative for chest pain and orthopnea  Respiratory: Negative  Negative for cough and shortness of breath  Gastrointestinal: Negative  Genitourinary: Negative  Neurological: Negative  OBJECTIVE:  Current Weight: Weight - Scale: 98 4 kg (217 lb)  Vitals:Temp (24hrs), Av 4 °F (36 9 °C), Min:98 °F (36 7 °C), Max:98 9 °F (37 2 °C)  Current: Temperature: 98 9 °F (37 2 °C)   Blood pressure 153/74, pulse 75, temperature 98 9 °F (37 2 °C), temperature source Oral, resp   rate 18, height 5' 10" (1 778 m), weight 98 4 kg (217 lb), SpO2 96 %  , Body mass index is 31 14 kg/m²  Intake/Output Summary (Last 24 hours) at 11/18/18 0948  Last data filed at 11/18/18 5815   Gross per 24 hour   Intake                0 ml   Output             2775 ml   Net            -2775 ml       Physical Exam: /74 (BP Location: Left arm)   Pulse 75   Temp 98 9 °F (37 2 °C) (Oral)   Resp 18   Ht 5' 10" (1 778 m)   Wt 98 4 kg (217 lb)   SpO2 96%   BMI 31 14 kg/m²   Physical Exam   Constitutional: He is oriented to person, place, and time  No distress  HENT:   Mouth/Throat: No oropharyngeal exudate  Eyes: No scleral icterus  Neck: Neck supple  No JVD present  Cardiovascular: Normal rate and regular rhythm  Exam reveals no friction rub  Minimal pretibial edema  Pulmonary/Chest: Effort normal and breath sounds normal  No respiratory distress  He has no wheezes  He has no rales  Abdominal: Soft  Bowel sounds are normal  He exhibits no distension  There is no tenderness  There is no rebound  Neurological: He is alert and oriented to person, place, and time         Medications:    Current Facility-Administered Medications:     acetaminophen (TYLENOL) tablet 650 mg, 650 mg, Oral, Q6H PRN, ROC Nielsen, 650 mg at 11/17/18 2142    amiodarone tablet 100 mg, 100 mg, Oral, Daily, Elmer Juan MD, 100 mg at 11/18/18 0907    amLODIPine (NORVASC) tablet 5 mg, 5 mg, Oral, Daily, Elmer Juan MD, 5 mg at 11/18/18 0908    aspirin chewable tablet 81 mg, 81 mg, Oral, Daily, Elmer Juan MD, 81 mg at 11/18/18 0907    cyanocobalamin (VITAMIN B-12) tablet 100 mcg, 100 mcg, Oral, Daily, Elmer Juan MD, 100 mcg at 11/18/18 0909    docusate sodium (COLACE) capsule 100 mg, 100 mg, Oral, HS, Elmer Juan MD, 100 mg at 11/17/18 2142    DULoxetine (CYMBALTA) delayed release capsule 30 mg, 30 mg, Oral, Daily, Elmer Juan MD, 30 mg at 11/18/18 0908    fenofibrate (TRICOR) tablet 145 mg, 145 mg, Oral, Daily, Elmer Juan MD, 145 mg at 11/18/18 0973   fluticasone-vilanterol (BREO ELLIPTA) 100-25 mcg/inh inhaler 1 puff, 1 puff, Inhalation, Daily, Shanna Mata MD, 1 puff at 11/18/18 0907    heparin (porcine) subcutaneous injection 5,000 Units, 5,000 Units, Subcutaneous, Q8H [address], 5,000 Units at 11/18/18 0550 **AND** Platelet count, , , Once, Shanna Mata MD    hydrALAZINE (APRESOLINE) tablet 25 mg, 25 mg, Oral, Q8H [address], Shanna Mata MD, 25 mg at 11/18/18 0550    ipratropium-albuterol (DUO-NEB) 0 5-2 5 mg/3 mL inhalation solution 3 mL, 3 mL, Nebulization, Q4H, 3 mL at 11/18/18 0746 **AND** ipratropium-albuterol (DUO-NEB) 0 5-2 5 mg/3 mL inhalation solution 3 mL, 3 mL, Nebulization, Q2H PRN, Shanna Mata MD, 3 mL at 11/17/18 1434    isosorbide mononitrate (IMDUR) 24 hr tablet 30 mg, 30 mg, Oral, Daily, Shanna Mata MD, 30 mg at 11/18/18 0907    levothyroxine tablet 50 mcg, 50 mcg, Oral, Early Morning, Shanna Mata MD, 50 mcg at 11/18/18 0550    metoprolol tartrate (LOPRESSOR) tablet 25 mg, 25 mg, Oral, Q12H [address], Shanna Mata MD, 25 mg at 11/18/18 0908    oxybutynin (DITROPAN) tablet 5 mg, 5 mg, Oral, Daily, Shanna Mata MD, 5 mg at 11/18/18 0908    pantoprazole (PROTONIX) EC tablet 40 mg, 40 mg, Oral, Early Morning, Shanna Mata MD, 40 mg at 11/18/18 0550    traMADol (ULTRAM) tablet 50 mg, 50 mg, Oral, Q12H PRN, Shanna Mata MD    Laboratory Results:  Lab Results   Component Value Date    WBC 8 88 11/18/2018    HGB 9 9 (L) 11/18/2018    HCT 32 3 (L) 11/18/2018    MCV 87 11/18/2018     11/18/2018     Lab Results   Component Value Date    GLUCOSE 109 (H) 10/16/2017    CALCIUM 8 9 11/18/2018     10/16/2017    K 4 2 11/18/2018    CO2 29 11/18/2018     11/18/2018    BUN 42 (H) 11/18/2018    CREATININE 2 45 (H) 11/18/2018     Lab Results   Component Value Date    CALCIUM 8 9 11/18/2018    PHOS 3 9 07/20/2018     No results found for: LABPROT

## 2018-11-18 NOTE — ASSESSMENT & PLAN NOTE
· BNP elevated at 21,000 (baseline 2-3000), and patient with worsening LE edema however has been having rising creatine with increase in diuretic as outpt  · Dry weight may be 207lbs but pt unsure, admission weight is 221lbs  · Last echo 6/2018 with EF 35% with grade 1 diastolic dysfunction  · Repeat echo unchanged  · CXR on admission showed no pulmonary edema  · Cardio consulted, recs appreciated  · Initially was treated with IV fluid increased pre load and then was diuresed with IV Lasix    · net negative 4 5L   · He was discharged home back in his prior dose of diuretic with follow-up with his cardiologist

## 2018-11-18 NOTE — PLAN OF CARE
DISCHARGE PLANNING     Discharge to home or other facility with appropriate resources Progressing        DISCHARGE PLANNING - CARE MANAGEMENT     Discharge to post-acute care or home with appropriate resources Progressing        INFECTION - ADULT     Absence or prevention of progression during hospitalization Progressing        Knowledge Deficit     Patient/family/caregiver demonstrates understanding of disease process, treatment plan, medications, and discharge instructions Progressing        PAIN - ADULT     Verbalizes/displays adequate comfort level or baseline comfort level Progressing        Potential for Falls     Patient will remain free of falls Progressing        Prexisting or High Potential for Compromised Skin Integrity     Skin integrity is maintained or improved Progressing        RESPIRATORY - ADULT     Achieves optimal ventilation and oxygenation Progressing        SAFETY ADULT     Patient will remain free of falls Progressing     Maintain or return to baseline ADL function Progressing     Maintain or return mobility status to optimal level Progressing

## 2018-11-19 VITALS
RESPIRATION RATE: 20 BRPM | TEMPERATURE: 98.4 F | OXYGEN SATURATION: 95 % | WEIGHT: 217 LBS | HEART RATE: 77 BPM | SYSTOLIC BLOOD PRESSURE: 159 MMHG | HEIGHT: 70 IN | DIASTOLIC BLOOD PRESSURE: 74 MMHG | BODY MASS INDEX: 31.07 KG/M2

## 2018-11-19 PROBLEM — N18.30 STAGE 3 CHRONIC KIDNEY DISEASE (HCC): Status: ACTIVE | Noted: 2018-06-11

## 2018-11-19 PROBLEM — R79.89 ELEVATED D-DIMER: Status: RESOLVED | Noted: 2018-06-11 | Resolved: 2018-11-19

## 2018-11-19 LAB
ANION GAP SERPL CALCULATED.3IONS-SCNC: 8 MMOL/L (ref 4–13)
BUN SERPL-MCNC: 32 MG/DL (ref 5–25)
CALCIUM SERPL-MCNC: 9 MG/DL (ref 8.3–10.1)
CHLORIDE SERPL-SCNC: 103 MMOL/L (ref 100–108)
CO2 SERPL-SCNC: 29 MMOL/L (ref 21–32)
CREAT SERPL-MCNC: 2.24 MG/DL (ref 0.6–1.3)
ERYTHROCYTE [DISTWIDTH] IN BLOOD BY AUTOMATED COUNT: 17.6 % (ref 11.6–15.1)
GFR SERPL CREATININE-BSD FRML MDRD: 25 ML/MIN/1.73SQ M
GLUCOSE SERPL-MCNC: 101 MG/DL (ref 65–140)
HCT VFR BLD AUTO: 33.4 % (ref 36.5–49.3)
HGB BLD-MCNC: 10.1 G/DL (ref 12–17)
MCH RBC QN AUTO: 26.2 PG (ref 26.8–34.3)
MCHC RBC AUTO-ENTMCNC: 30.2 G/DL (ref 31.4–37.4)
MCV RBC AUTO: 87 FL (ref 82–98)
PLATELET # BLD AUTO: 328 THOUSANDS/UL (ref 149–390)
PMV BLD AUTO: 10.5 FL (ref 8.9–12.7)
POTASSIUM SERPL-SCNC: 4.2 MMOL/L (ref 3.5–5.3)
RBC # BLD AUTO: 3.85 MILLION/UL (ref 3.88–5.62)
SODIUM SERPL-SCNC: 140 MMOL/L (ref 136–145)
WBC # BLD AUTO: 8.17 THOUSAND/UL (ref 4.31–10.16)

## 2018-11-19 PROCEDURE — 99232 SBSQ HOSP IP/OBS MODERATE 35: CPT | Performed by: INTERNAL MEDICINE

## 2018-11-19 PROCEDURE — 94640 AIRWAY INHALATION TREATMENT: CPT

## 2018-11-19 PROCEDURE — 94760 N-INVAS EAR/PLS OXIMETRY 1: CPT

## 2018-11-19 PROCEDURE — 80048 BASIC METABOLIC PNL TOTAL CA: CPT | Performed by: STUDENT IN AN ORGANIZED HEALTH CARE EDUCATION/TRAINING PROGRAM

## 2018-11-19 PROCEDURE — 99239 HOSP IP/OBS DSCHRG MGMT >30: CPT | Performed by: STUDENT IN AN ORGANIZED HEALTH CARE EDUCATION/TRAINING PROGRAM

## 2018-11-19 PROCEDURE — 85027 COMPLETE CBC AUTOMATED: CPT | Performed by: STUDENT IN AN ORGANIZED HEALTH CARE EDUCATION/TRAINING PROGRAM

## 2018-11-19 PROCEDURE — 97535 SELF CARE MNGMENT TRAINING: CPT

## 2018-11-19 RX ORDER — TORSEMIDE 10 MG/1
10 TABLET ORAL DAILY
Status: DISCONTINUED | OUTPATIENT
Start: 2018-11-19 | End: 2018-11-19 | Stop reason: HOSPADM

## 2018-11-19 RX ORDER — IPRATROPIUM BROMIDE AND ALBUTEROL SULFATE 2.5; .5 MG/3ML; MG/3ML
3 SOLUTION RESPIRATORY (INHALATION) EVERY 4 HOURS PRN
Status: DISCONTINUED | OUTPATIENT
Start: 2018-11-19 | End: 2018-11-19 | Stop reason: HOSPADM

## 2018-11-19 RX ORDER — IPRATROPIUM BROMIDE AND ALBUTEROL SULFATE 2.5; .5 MG/3ML; MG/3ML
3 SOLUTION RESPIRATORY (INHALATION)
Status: DISCONTINUED | OUTPATIENT
Start: 2018-11-19 | End: 2018-11-19 | Stop reason: HOSPADM

## 2018-11-19 RX ADMIN — HEPARIN SODIUM 5000 UNITS: 5000 INJECTION, SOLUTION INTRAVENOUS; SUBCUTANEOUS at 05:54

## 2018-11-19 RX ADMIN — HYDRALAZINE HYDROCHLORIDE 25 MG: 25 TABLET ORAL at 05:54

## 2018-11-19 RX ADMIN — METOPROLOL TARTRATE 25 MG: 25 TABLET, FILM COATED ORAL at 08:56

## 2018-11-19 RX ADMIN — ISOSORBIDE MONONITRATE 30 MG: 30 TABLET, EXTENDED RELEASE ORAL at 08:56

## 2018-11-19 RX ADMIN — MUPIROCIN 1 APPLICATION: 20 OINTMENT TOPICAL at 14:03

## 2018-11-19 RX ADMIN — OXYBUTYNIN CHLORIDE 5 MG: 5 TABLET ORAL at 08:56

## 2018-11-19 RX ADMIN — DULOXETINE HYDROCHLORIDE 30 MG: 30 CAPSULE, DELAYED RELEASE ORAL at 08:56

## 2018-11-19 RX ADMIN — IPRATROPIUM BROMIDE AND ALBUTEROL SULFATE 3 ML: .5; 3 SOLUTION RESPIRATORY (INHALATION) at 13:08

## 2018-11-19 RX ADMIN — IPRATROPIUM BROMIDE AND ALBUTEROL SULFATE 3 ML: .5; 3 SOLUTION RESPIRATORY (INHALATION) at 03:47

## 2018-11-19 RX ADMIN — HYDRALAZINE HYDROCHLORIDE 25 MG: 25 TABLET ORAL at 14:02

## 2018-11-19 RX ADMIN — PANTOPRAZOLE SODIUM 40 MG: 40 TABLET, DELAYED RELEASE ORAL at 05:54

## 2018-11-19 RX ADMIN — IPRATROPIUM BROMIDE AND ALBUTEROL SULFATE 3 ML: .5; 3 SOLUTION RESPIRATORY (INHALATION) at 07:15

## 2018-11-19 RX ADMIN — AMLODIPINE BESYLATE 5 MG: 5 TABLET ORAL at 08:56

## 2018-11-19 RX ADMIN — FLUTICASONE FUROATE AND VILANTEROL TRIFENATATE 1 PUFF: 100; 25 POWDER RESPIRATORY (INHALATION) at 08:53

## 2018-11-19 RX ADMIN — TORSEMIDE 10 MG: 10 TABLET ORAL at 08:53

## 2018-11-19 RX ADMIN — FENOFIBRATE 145 MG: 145 TABLET ORAL at 08:56

## 2018-11-19 RX ADMIN — HEPARIN SODIUM 5000 UNITS: 5000 INJECTION, SOLUTION INTRAVENOUS; SUBCUTANEOUS at 14:02

## 2018-11-19 RX ADMIN — LEVOTHYROXINE SODIUM 50 MCG: 50 TABLET ORAL at 05:54

## 2018-11-19 RX ADMIN — VITAM B12 100 MCG: 100 TAB at 08:53

## 2018-11-19 RX ADMIN — ASPIRIN 81 MG 81 MG: 81 TABLET ORAL at 08:56

## 2018-11-19 RX ADMIN — IPRATROPIUM BROMIDE AND ALBUTEROL SULFATE 3 ML: .5; 3 SOLUTION RESPIRATORY (INHALATION) at 00:05

## 2018-11-19 RX ADMIN — AMIODARONE HYDROCHLORIDE 100 MG: 200 TABLET ORAL at 08:56

## 2018-11-19 NOTE — OCCUPATIONAL THERAPY NOTE
OT TREATMENT       11/19/18 1333   Restrictions/Precautions   Other Precautions Chair Alarm; Bed Alarm;O2;Fall Risk   Pain Assessment   Pain Assessment No/denies pain   Pain Score No Pain   ADL   Toileting Assistance  4  Minimal Assistance   Toileting Deficit Steadying;Supervison/safety; Increased time to complete; Bedside commode   Bed Mobility   Supine to Sit 4  Minimal assistance   Sit to Supine 4  Minimal assistance   Transfers   Sit to Stand 4  Minimal assistance   Stand to Sit 4  Minimal assistance   Stand pivot 4  Minimal assistance   Cognition   Overall Cognitive Status [facility]   Arousal/Participation Alert; Responsive; Cooperative   Attention Within functional limits   Orientation Level Oriented X4   Memory Within functional limits   Following Commands Follows all commands and directions without difficulty   Activity Tolerance   Activity Tolerance Patient limited by fatigue   Assessment   Assessment Pt received in bed, wants to get up and try to have a BM  Pt completes bed mobility and transfer with min A, manages all aspects of toileting at commode with min A  Plan   Treatment Interventions ADL retraining;Functional transfer training;UE strengthening/ROM; Endurance training;Patient/family training;Equipment evaluation/education; Compensatory technique education   Goal Expiration Date 12/02/18   Treatment Day 1   OT Frequency 3-5x/wk   Recommendation   OT Discharge Recommendation (home with svcs vs STR)   Licensure   [place] License Number  Beth Humphries OTR/L [id number]

## 2018-11-19 NOTE — PROGRESS NOTES
[facility] PROGRESS NOTE   Tod Washington 80 y o  male MRN: [id number]  Unit/Bed#: 3 [place] 315-02 Encounter: [id number]  Reason for Consult: KJ on CKD    ASSESSMENT and PLAN:    81 yo male with PMHx of CKD, TAVR in 2017, HTN, CAD, CHF, mod/severe pulm HTN, PATITO, COPD, afib, prior DVT who presents on 11/16 with edema/KJ  Initially, diuretics were held and pt was started on low dose IVF  1) KJ on CKD - prior baseline 1 8 to 2 2 mg/dL  - UA - trace protein, 0-1 WBC, 10-20 hyaline casts  - renal u/s -right kidney 10 2 cm, left kidney 9 8 cm calcified cyst in the right kidney  No hydronephrosis  - I/O; avoid nephrotoxic agents   - restart torsemide but at 10 mg daily  - BMP in AM  - final diuretic dosing per Cardiology team  - please call back if further issues/questions shall arise  Thank you  2) volume - R sided HF (LE edema, pulm HTN), but also history of EF 35%     - monitor with changes as above    3) HTN - isosorbide, hydralazine, metoprolol, amlodipine    4) electrolytes - stable Na, K    5) acid/base - bicarb stable    6) anemia - Hb stable  SUBJECTIVE / INTERVAL HISTORY:    Pt denies complaints  UOP 1 6 L  -149      OBJECTIVE:  Current Weight: Weight - Scale: 98 4 kg (217 lb)  Vitals:    11/19/18 0007 11/19/18 0347 11/19/18 0349 11/19/18 0554   BP:    139/77   BP Location:       Pulse:       Resp:       Temp:       TempSrc:       SpO2: 95% 95% 94%    Weight:       Height:           Intake/Output Summary (Last 24 hours) at 11/19/18 0739  Last data filed at 11/19/18 0604   Gross per 24 hour   Intake              250 ml   Output             1625 ml   Net            -1375 ml     General: NAD  Skin: no rash  Eyes: anicteric sclera  ENT: moist mucous membrane  Neck: supple  Chest: CTA b/l  CVS: s1s2  Abdomen: soft, nontender  Extremities: 1+ LE edema b/l  : s1s2  Neuro: soft, nontender  Psych: normal affect    Medications:    Current Facility-Administered Medications:    acetaminophen (TYLENOL) tablet 650 mg, 650 mg, Oral, Q6H PRN, Jonel Orozco CRNP, 650 mg at 11/18/18 2108    amiodarone tablet 100 mg, 100 mg, Oral, Daily, Jose Doty MD, 100 mg at 11/18/18 0907    amLODIPine (NORVASC) tablet 5 mg, 5 mg, Oral, Daily, Jose Doty MD, 5 mg at 11/18/18 0908    aspirin chewable tablet 81 mg, 81 mg, Oral, Daily, Jose Doty MD, 81 mg at 11/18/18 0907    cyanocobalamin (VITAMIN B-12) tablet 100 mcg, 100 mcg, Oral, Daily, Jose Doty MD, 100 mcg at 11/18/18 0909    docusate sodium (COLACE) capsule 100 mg, 100 mg, Oral, HS, Jose Doty MD, 100 mg at 11/18/18 2104    DULoxetine (CYMBALTA) delayed release capsule 30 mg, 30 mg, Oral, Daily, Jose Doty MD, 30 mg at 11/18/18 0908    fenofibrate (TRICOR) tablet 145 mg, 145 mg, Oral, Daily, Jose Doty MD, 145 mg at 11/18/18 0907    fluticasone-vilanterol (BREO ELLIPTA) 100-25 mcg/inh inhaler 1 puff, 1 puff, Inhalation, Daily, Jose Doty MD, 1 puff at 11/18/18 0907    heparin (porcine) subcutaneous injection 5,000 Units, 5,000 Units, Subcutaneous, Q8H [address], 5,000 Units at 11/19/18 0554 **AND** Platelet count, , , Once, Jose Doty MD    hydrALAZINE (APRESOLINE) tablet 25 mg, 25 mg, Oral, Q8H [address], Jose Doty MD, 25 mg at 11/19/18 0554    ipratropium-albuterol (DUO-NEB) 0 5-2 5 mg/3 mL inhalation solution 3 mL, 3 mL, Nebulization, Q6H, Jose Doty MD    ipratropium-albuterol (DUO-NEB) 0 5-2 5 mg/3 mL inhalation solution 3 mL, 3 mL, Nebulization, Q4H PRN, Jose Doty MD    isosorbide mononitrate (IMDUR) 24 hr tablet 30 mg, 30 mg, Oral, Daily, Jose Doty MD, 30 mg at 11/18/18 0907    levothyroxine tablet 50 mcg, 50 mcg, Oral, Early Morning, Jose Doty MD, 50 mcg at 11/19/18 0554    metoprolol tartrate (LOPRESSOR) tablet 25 mg, 25 mg, Oral, Q12H [address], Jose Doty MD, 25 mg at 11/18/18 2037    oxybutynin (DITROPAN) tablet 5 mg, 5 mg, Oral, Daily, Jose Doty MD, 5 mg at 11/18/18 0908    pantoprazole (PROTONIX) EC tablet 40 mg, 40 mg, Oral, Early Morning, Maximus Alfaro MD, 40 mg at 11/19/18 0554    traMADol (ULTRAM) tablet 50 mg, 50 mg, Oral, Q12H PRN, Maximus Alfaro MD    Laboratory Results:    Results from last 7 days  Lab Units 11/19/18  0602 11/18/18  0633 11/17/18  0558 11/16/18  1328 11/14/18  1005   WBC Thousand/uL 8 17 8 88 9 09 8 37  --    HEMOGLOBIN g/dL 10 1* 9 9* 9 8* 9 8*  --    HEMATOCRIT % 33 4* 32 3* 32 2* 32 3*  --    PLATELETS Thousands/uL 328 347 328 341  334  --    POTASSIUM mmol/L 4 2 4 2 4 2 4 5 5 0   CHLORIDE mmol/L 103 103 103 104 98   CO2 mmol/L 29 29 27 32 24   BUN mg/dL 32* 42* 47* 55* 51*   CREATININE mg/dL 2 24* 2 45* 2 84* 3 17*  --    CALCIUM mg/dL 9 0 8 9 9 0 9 0  --    MAGNESIUM mg/dL  --   --   --  2 2  --

## 2018-11-19 NOTE — SOCIAL WORK
Pt written for discharge  CM discussed discharge with pt  Pt requested CM call his SO Mason Garsia [phone] to see if she will transport him  Corwin Riddle stated she can not transport pt today and requested CM arrange transport  CM arranged transport with [facility] for 3:30pm,Pt is on 8L NC  Nurse, Pt and Corwin Riddle made aware of same

## 2018-11-19 NOTE — UTILIZATION REVIEW
Continued Stay Review  Date: 18  Vitals:   Temp (24hrs), Av 6 °F (37 °C), Min:98 3 °F (36 8 °C), Max:98 9 °F (37 2 °C)  Temp:  [98 3 °F (36 8 °C)-98 9 °F (37 2 °C)] 98 5 °F (36 9 °C)  HR:  [66-76] 67  Resp:  [18] 18  BP: (138-153)/(63-74) 149/67  SpO2:  [92 %-99 %] 97 %  Body mass index is 31 14 kg/m²  Medications:   Scheduled Meds: Current Facility-Administered Medications:  acetaminophen 650 mg Oral Q6H PRN ROC Hunter   amiodarone 100 mg Oral Daily Nathaniel Stallworth MD   amLODIPine 5 mg Oral Daily Nathaniel Stallworth MD   aspirin 81 mg Oral Daily Nathaniel Stallworth MD   cyanocobalamin 100 mcg Oral Daily Nathaniel Stallworth MD   docusate sodium 100 mg Oral HS Nathaniel Stallworth MD   DULoxetine 30 mg Oral Daily Nathaniel Stallworth MD   fenofibrate 145 mg Oral Daily Nathaniel Stallworth MD   fluticasone-vilanterol 1 puff Inhalation Daily Nathaniel Stallworth MD   heparin (porcine) 5,000 Units Subcutaneous Q8H 3630 Eduardo Todd MD   hydrALAZINE 25 mg Oral Q8H 3630 Eduardo Todd MD   ipratropium-albuterol 3 mL Nebulization Q4H Nathaniel Stallworth MD   And           ipratropium-albuterol 3 mL Nebulization Q2H PRN Nathaniel Stallworth MD   isosorbide mononitrate 30 mg Oral Daily Nathaniel Stallworth MD   levothyroxine 50 mcg Oral Early Morning Nathaniel Stallworth MD   metoprolol tartrate 25 mg Oral Q12H 3630 Eduardo Todd MD   oxybutynin 5 mg Oral Daily Nathaniel Stallworth MD   pantoprazole 40 mg Oral Early Morning Nathaniel Stallworth MD   traMADol 50 mg Oral Q12H PRN Nathaniel Stallworth MD   Continuous Infusions:    PRN Meds:   acetaminophen    ipratropium-albuterol    traMADol  Abnormal Labs/Diagnostic Results:   BUN 47 CR 2 84 GFR 19 ALBUMIN 2 9 HGB 9 8   CXR=No interval change from 2018  Age/Sex: 80 y o  male   Assessment/Plan:   GENTLE HYDRATION FOR KJ, HOWEVER, PATIENT STARTED WITH SOB 2ND CHF, THEREFORE FLUIDS WERE STOPPED & IV LASIX WAS GIVEN  LUNGS WITH DECREASED BREATH SOUNDS, PERSISTENT BLE EDEMA  USING NEBS ATC & FREQUENT DOSES OF PRN NEBS WITH O2 @ 8LTR, BIPAP QHS TO MAINTAIN SATS   SCHEDULED FOR FOLLOW UP LABS  Discharge Plan: TBD  [address] Utilization Review Department  Phone: [phone]; Fax [phone]  [email or web address]  ATTENTION: Please call with any questions or concerns to [phone]  and carefully listen to the prompts so that you are directed to the right person  Send all requests for admission clinical reviews, approved or denied determinations and any other requests to fax [phone]   All voicemails are confidential

## 2018-11-19 NOTE — PROGRESS NOTES
Progress Note - Cardiology   [facility]     Carla Dorado 80 y o  male MRN: [id number]  : 10/24/1930  Unit/Bed#: [address] [place] [address] Encounter: [id number]    Assessment and Plan:   1  Acute on chronic combined systolic and diastolic heart failure:  Patient diuresing very well with IV diuretics  Will transition to oral Demadex and continue to monitor  Continue his beta-blocker  2  Paroxysmal atrial fibrillation:  Patient maintaining sinus rhythm with amiodarone  3  Hypertension:  Stable on Norvasc, Imdur and hydralazine  4  Dyslipidemia:  Continue statin and fenofibrate  5  Acute kidney injury:  Slowly improving continue to monitor function  6  Aortic stenosis with history of TAVR:  Echo stable  Subjective / Objective:   Patient without any cardiac  His creatinine slowly return to baseline  Transition to oral Demadex 10 mg once a day to start today  Will continue monitor his urinary function and output  Vitals: Blood pressure 139/77, pulse 63, temperature 98 3 °F (36 8 °C), temperature source Oral, resp  rate 20, height 5' 10" (1 778 m), weight 98 4 kg (217 lb), SpO2 99 %  Vitals:    18 1100 18 0600   Weight: 101 kg (221 lb 9 oz) 98 4 kg (217 lb)     Body mass index is 31 14 kg/m²  BP Readings from Last 3 Encounters:   18 139/77   18 90/60   10/16/18 124/70     Orthostatic Blood Pressures      Most Recent Value   Blood Pressure  139/77 filed at 2018 0554   Patient Position - Orthostatic VS  Lying filed at 2018        I/O        0701 -  0700  0701 -  0700  07 -  0700    P  O  240 240     I V  (mL/kg)  10 (0 1)     Total Intake(mL/kg) 240 (2 4) 250 (2 5)     Urine (mL/kg/hr) 2900 (1 2) 1625 (0 7)     Total Output 2900 1625      Net -2660 -1375                 Invasive Devices     Peripheral Intravenous Line            Peripheral IV 18 Left Forearm 2 days                  Intake/Output Summary (Last 24 hours) at 11/19/18 0758  Last data filed at 11/19/18 0604   Gross per 24 hour   Intake                0 ml   Output             1275 ml   Net            -1275 ml         Physical Exam:   Physical Exam   Constitutional: He is oriented to person, place, and time  He appears well-developed and well-nourished  No distress  HENT:   Head: Normocephalic  Eyes: Pupils are equal, round, and reactive to light  Right eye exhibits no discharge  Left eye exhibits no discharge  Neck: Normal range of motion  Neck supple  No JVD present  No thyromegaly present  Cardiovascular: Normal rate and regular rhythm  Murmur heard  Systolic murmur is present with a grade of 2/6   Pulmonary/Chest: Effort normal and breath sounds normal  No respiratory distress  Abdominal: Soft  Bowel sounds are normal    Musculoskeletal:   Trace edema   Neurological: He is alert and oriented to person, place, and time  Skin: Skin is warm and dry  He is not diaphoretic  Psychiatric: He has a normal mood and affect  Nursing note and vitals reviewed              Medications/ Allergies:     Current Facility-Administered Medications:  acetaminophen 650 mg Oral Q6H PRN ROC Stanley   amiodarone 100 mg Oral Daily Jacob Eduardo MD   amLODIPine 5 mg Oral Daily Jacob Eduardo MD   aspirin 81 mg Oral Daily Jacob Eduardo MD   cyanocobalamin 100 mcg Oral Daily Jacob Eduardo MD   docusate sodium 100 mg Oral HS Jacob Eduardo MD   DULoxetine 30 mg Oral Daily Jacob Eduardo MD   fenofibrate 145 mg Oral Daily Jacob Eduardo MD   fluticasone-vilanterol 1 puff Inhalation Daily Jacob Eduardo MD   heparin (porcine) 5,000 Units Subcutaneous Q8H 3630 Eduardo Todd MD   hydrALAZINE 25 mg Oral Q8H 3630 Eduardo Todd MD   ipratropium-albuterol 3 mL Nebulization Q6H Jacob Eduardo MD   ipratropium-albuterol 3 mL Nebulization Q4H PRN Jacob Eduardo MD   isosorbide mononitrate 30 mg Oral Daily Jacob Eduardo MD   levothyroxine 50 mcg Oral Early Morning Jacob Eduardo MD   metoprolol tartrate 25 mg Oral Q12H 3630 Eduardo Todd MD   oxybutynin 5 mg Oral Daily Faraz Barney MD   pantoprazole 40 mg Oral Early Morning Faraz Barney MD   torsemide 10 mg Oral Daily Cindi Somers MD   traMADol 50 mg Oral Q12H PRN Faraz Barney MD       acetaminophen 650 mg Q6H PRN   ipratropium-albuterol 3 mL Q4H PRN   traMADol 50 mg Q12H PRN     No Known Allergies    VTE Pharmacologic Prophylaxis:   Sequential compression device (Venodyne)     Labs:     CBC with diff:  Results from last 7 days  Lab Units 11/19/18  0602 11/18/18  0633 11/17/18  0558 11/16/18  1328   WBC Thousand/uL 8 17 8 88 9 09 8 37   HEMOGLOBIN g/dL 10 1* 9 9* 9 8* 9 8*   HEMATOCRIT % 33 4* 32 3* 32 2* 32 3*   MCV fL 87 87 86 88   PLATELETS Thousands/uL 328 347 328 341  334   MCH pg 26 2* 26 7* 26 3* 26 6*   MCHC g/dL 30 2* 30 7* 30 4* 30 3*   RDW % 17 6* 17 7* 17 7* 18 0*   MPV fL 10 5 10 7 10 7 10 3  10 1       CMP:  Results from last 7 days  Lab Units 11/19/18  0602 11/18/18  0633 11/17/18  0558 11/16/18  1328 11/14/18  1005   SODIUM mmol/L 140 142 141 141 140   POTASSIUM mmol/L 4 2 4 2 4 2 4 5 5 0   CHLORIDE mmol/L 103 103 103 104 98   CO2 mmol/L 29 29 27 32 24   BUN mg/dL 32* 42* 47* 55* 51*   CREATININE mg/dL 2 24* 2 45* 2 84* 3 17*  --    CALCIUM mg/dL 9 0 8 9 9 0 9 0  --    AST U/L  --   --   --  38  --    ALT U/L  --   --   --  25  --    ALK PHOS U/L  --   --   --  49  --    EGFR ml/min/1 73sq m 25 23 19 17  --      Magnesium:  Results from last 7 days  Lab Units 11/16/18  1328   MAGNESIUM mg/dL 2 2     NT-proBNP:   Recent Labs      11/16/18   1328   NTBNP  21,190*        Imaging & Testing   I have personally reviewed pertinent reports  Xr Chest Portable    Result Date: 11/16/2018  Narrative: CHEST INDICATION:   chf  Shortness of breath COMPARISON:  November 9, 2018 EXAM PERFORMED/VIEWS:  XR CHEST PORTABLE FINDINGS:  Prosthesis noted overlying the aortic root  Heart shadow is enlarged but unchanged from prior exam  Bibasilar scarring    Right pleural effusion  Osseous structures appear within normal limits for patient age  Impression: Stable chest with bibasilar scarring and right-sided pleural effusion extending along the chest wall  Workstation performed: [id number]     Xr Chest Pa & Lateral    Result Date: 11/9/2018  Narrative: CHEST INDICATION:   Cough  J44 9: Chronic obstructive pulmonary disease, unspecified N18 3: Chronic kidney disease, stage 3 (moderate) I50 9: Heart failure, unspecified  I50 1: Left ventricular failure, unspecified  COMPARISON:  7/18/2018, 10/16/2017 and CT chest 6/11/2018 EXAM PERFORMED/VIEWS:  XR CHEST PA & LATERAL  The frontal view was performed utilizing dual energy radiographic technique  FINDINGS: Cardiomediastinal silhouette appears stable  Transcatheter aortic valve replacement noted  Central vascular prominence again noted without overt pulmonary edema  Peripheral and apical right pleural thickening versus loculated effusion, latter favored based on previous CT  Coarse bronchovascular markings in the lung bases, left greater than right lung base, which may be accentuated by fibrotic and cystic changes  No new infiltrates  No pneumothorax  Degenerative changes of the spine and shoulders  Chronic right AC joint separation noted  Impression: Overall stable appearance of the chest  Question element of chronic pulmonary venous hypertension with chronic basilar changes and loculated pleural effusion peripheral right hemithorax  No new infiltrate  Workstation performed: [id number]     Us Kidney And Bladder    Result Date: 11/16/2018  Narrative: RENAL ULTRASOUND INDICATION:   RENAL FAILURE, ACUTE ARF  COMPARISON: CT scan 7/18/2016  TECHNIQUE: Portable ultrasound examination of the retroperitoneum was performed with a curvilinear transducer utilizing volumetric sweeps and still imaging techniques  FINDINGS: Evaluation of the left kidney is limited due to overlying bowel gas  KIDNEYS: Symmetric and normal size  Right kidney:  10 2 cm  Left kidney:  9 8 cm  Right kidney Normal echogenicity and contour  Ill-defined echogenic structure at the midpole, which presumably corresponds with the previously seen rim calcified cyst  No hydronephrosis  No shadowing calculi  No perinephric fluid collections  Left kidney Normal echogenicity and contour  No obvious masses detected  No overt hydronephrosis  No shadowing calculi  No perinephric fluid collections  URETERS: Nonvisualized  BLADDER: The bladder is nondistended, limiting evaluation  Impression: Suboptimal visualization of the left kidney  No evidence of hydronephrosis  Workstation performed: [id number]        EKG / Monitor: Personally reviewed  Sinus rhythm    Cardiac testing:   Results for orders placed during the hospital encounter of 18   Echo complete with contrast if indicated    Cara Coelho [address]  [address] [facility] 6 [phone]    Transthoracic Echocardiogram  2D, M-mode, Doppler, and Color Doppler    Study date:  2018    Patient: Nakia Somers  MR number: [id number]  Account number: [de-identified]  : 24-Oct-1930  Age: 80 years  Gender: Male  Status: Inpatient  Location: Bedside  Height: 70 in  Weight: 220 4 lb  BP: 142/ 86 mmHg    Indications: Heart Failure    Diagnoses: I50 9 - Heart failure, unspecified    Sonographer:  CAMILLA Wilson  Primary Physician:  Hubert Victoria DO  Referring Physician:  Hubert Victoria DO  Group:  [facility]  Interpreting Physician:  Kashif Hdez DO    SUMMARY    LEFT VENTRICLE:  Systolic function was severely reduced by visual assessment  Ejection fraction was estimated to be 35 %  There was severe diffuse hypokinesis with no identifiable regional variations  There was moderate concentric hypertrophy  Doppler parameters were consistent with abnormal left ventricular relaxation (grade 1 diastolic dysfunction)      LEFT ATRIUM:  The atrium was moderately dilated  RIGHT ATRIUM:  The atrium was mildly dilated  MITRAL VALVE:  There was mild regurgitation  AORTIC VALVE:  A bioprosthesis was present  It exhibited normal function  There was mild stenosis  Valve peak gradient was 30 mmHg  TRICUSPID VALVE:  There was mild regurgitation  Pulmonary artery systolic pressure was moderately increased  COMPARISONS:  There has been no significant interval change  Comparison was made with the previous study of 13-Jun-2018  HISTORY: PRIOR HISTORY: HTN, DM, DVT, MRSA, NSTEMI, AVR, CAD, BPH, CKD, COPD    PROCEDURE: The procedure was performed at the bedside  This was a stat study  The transthoracic approach was used  The study included complete 2D imaging, M-mode, complete spectral Doppler, and color Doppler  The heart rate was 70 bpm, at  the start of the study  Image quality was adequate  LEFT VENTRICLE: Size was normal  Systolic function was severely reduced by visual assessment  Ejection fraction was estimated to be 35 %  There was severe diffuse hypokinesis with no identifiable regional variations  There was moderate  concentric hypertrophy  DOPPLER: Doppler parameters were consistent with abnormal left ventricular relaxation (grade 1 diastolic dysfunction)  RIGHT VENTRICLE: The size was normal  Systolic function was normal     LEFT ATRIUM: The atrium was moderately dilated  RIGHT ATRIUM: The atrium was mildly dilated  MITRAL VALVE: There was annular calcification  Valve structure was normal  There was normal leaflet separation  No echocardiographic evidence for prolapse  DOPPLER: The transmitral velocity was within the normal range  There was no  evidence for stenosis  There was mild regurgitation  AORTIC VALVE: A bioprosthesis was present  It exhibited normal function  DOPPLER: There was mild stenosis  There was no regurgitation  TRICUSPID VALVE: The valve structure was normal  There was normal leaflet separation   DOPPLER: The transtricuspid velocity was within the normal range  There was mild regurgitation  Pulmonary artery systolic pressure was moderately  increased  Estimated peak PA pressure was 60 mmHg  PULMONIC VALVE: Leaflets exhibited normal thickness, no calcification, and normal cuspal separation  DOPPLER: The transpulmonic velocity was within the normal range  There was no regurgitation  PERICARDIUM: There was no thickening  There was no pericardial effusion  AORTA: The root exhibited normal size  PULMONARY ARTERY: The size was normal  The morphology appeared normal     MEASUREMENT TABLES    DOPPLER MEASUREMENTS  Aortic valve   (Reference normals)  Peak gradient   30 mmHg   (--)    SYSTEM MEASUREMENT TABLES    2D mode  AoR Diam 2D: 3 2 cm  LA Diam (2D): 4 5 cm  LA/Ao (2D): 1 41  FS (2D Teich): 17 4 %  IVSd (2D): 1 55 cm  LVDEV: 159 cm³  LVESV: 102 cm³  LVIDd(2D): 5 69 cm  LVISd (2D): 4 7 cm  LVOT Area 2D: 3 8 cm squared  LVPWd (2D): 1 48 cm  SV (Teich): 57 cm³    Apical four chamber  LVEF A4C: 35 %    Unspecified Scan Mode  FATMATA Cont Eq (Peak Pasquale): 1 51 cm squared  FATMATA Cont Eq (VTI): 1 46 cm squared  LVOT (VTI): 22 5 cm  LVOT Diam : 2 2 cm  LVOT Vmax: 1090 mm/s  LVOT Vmax; Mean: 1090 mm/s  Peak Grad ; Mean: 5 mm[Hg]  SV (LVOT): 86 cm³  VTI;Mean: 2 mm[Hg]  MV Peak A Pasquale: 1330 mm/s  MV Peak E Pasquale  Mean: 602 mm/s  MVA (PHT): 3 24 cm squared  PHT: 68 ms  Max P mm[Hg]  V Max: 3630 mm/s  Vmax: 3620 mm/s  RA Area: 22 4 cm squared  RA Volume: 67 5 cm³  TAPSE: 1 7 cm    [employer] Accredited Echocardiography Laboratory    Prepared and electronically signed by    Kashif Hdez DO  Signed 2018 16:23:03       Counseling / Coordination of Care  Total time spent today 15 minutes  Greater than 50% of total time was spent with the patient and / or family counseling and / or coordination of care  ROC Damon      "This note has been constructed using a voice recognition system  Therefore there may be syntax, spelling, and/or grammatical errors   Please call if you have any questions  "

## 2018-11-19 NOTE — PROGRESS NOTES
Progress Note - Cardiology   Terrial Perfect 80 y o  male MRN: [id number]  Unit/Bed#: 3 [address] Encounter: [id number]  11/18/18  8:42 PM        Assessment:  1  Acute on chronic combined systolic and diastolic congestive heart failure with primarily right sided failure with pulmonary hypertension, lower extremity edema  Received IV lasix yesterday after shortness of breath developed with fluids  Edema improved today  - Will repeat BMP in AM and give additional Lasix afterwards  2  Paroxysmal atrial fibrillation - patient remains in sinus rhythm with amiodarone  3  Hypertension - continue amlodipine  4  Dyslipidemia - continue statin and fenofibrate  5  Acute kidney injury - given improvement to fluid, was likely prerenal in etiology  Repeat BMP in AM  6  Aortic stenosis with previous TAVR - valve gradients unchanged      Plan:  As above      Subjective: Martin Medina denies any chest pain still with edema  Feels fatigued      Meds/Allergies   current meds:   Current Facility-Administered Medications   Medication Dose Route Frequency    acetaminophen (TYLENOL) tablet 650 mg  650 mg Oral Q6H PRN    amiodarone tablet 100 mg  100 mg Oral Daily    amLODIPine (NORVASC) tablet 5 mg  5 mg Oral Daily    aspirin chewable tablet 81 mg  81 mg Oral Daily    cyanocobalamin (VITAMIN B-12) tablet 100 mcg  100 mcg Oral Daily    docusate sodium (COLACE) capsule 100 mg  100 mg Oral HS    DULoxetine (CYMBALTA) delayed release capsule 30 mg  30 mg Oral Daily    fenofibrate (TRICOR) tablet 145 mg  145 mg Oral Daily    fluticasone-vilanterol (BREO ELLIPTA) 100-25 mcg/inh inhaler 1 puff  1 puff Inhalation Daily    heparin (porcine) subcutaneous injection 5,000 Units  5,000 Units Subcutaneous Q8H [address]    hydrALAZINE (APRESOLINE) tablet 25 mg  25 mg Oral Q8H [address]    ipratropium-albuterol (DUO-NEB) 0 5-2 5 mg/3 mL inhalation solution 3 mL  3 mL Nebulization Q4H    And    ipratropium-albuterol (DUO-NEB) 0 5-2 5 mg/3 mL inhalation solution 3 mL  3 mL Nebulization Q2H PRN    isosorbide mononitrate (IMDUR) 24 hr tablet 30 mg  30 mg Oral Daily    levothyroxine tablet 50 mcg  50 mcg Oral Early Morning    metoprolol tartrate (LOPRESSOR) tablet 25 mg  25 mg Oral Q12H [address]    oxybutynin (DITROPAN) tablet 5 mg  5 mg Oral Daily    pantoprazole (PROTONIX) EC tablet 40 mg  40 mg Oral Early Morning    traMADol (ULTRAM) tablet 50 mg  50 mg Oral Q12H PRN     No Known Allergies    Objective   Vitals: Blood pressure 145/67, pulse 76, temperature 98 3 °F (36 8 °C), temperature source Oral, resp  rate 18, height 5' 10" (1 778 m), weight 98 4 kg (217 lb), SpO2 95 %  , Body mass index is 31 14 kg/m²  Intake/Output Summary (Last 24 hours) at 11/18/18 2042  Last data filed at 11/18/18 1701   Gross per 24 hour   Intake              250 ml   Output             1800 ml   Net            -1550 ml       Physical Exam   Constitutional: He appears healthy  No distress  HENT:   Nose: Nose normal    Mouth/Throat: Oropharynx is clear  Eyes: Pupils are equal, round, and reactive to light  Conjunctivae are normal    Neck: Neck supple  No JVD present  Cardiovascular: Normal rate and regular rhythm  Exam reveals no distant heart sounds and no friction rub  Murmur heard  Pulmonary/Chest: Effort normal and breath sounds normal  He has no wheezes  He has no rales  He exhibits no tenderness  Abdominal: Soft  He exhibits no distension  There is no tenderness  Musculoskeletal: He exhibits edema (trace)  Neurological: He is alert and oriented to person, place, and time  Skin: Skin is warm and dry  No rash noted         Lab Results:     Troponins:       Recent Results (from the past 24 hour(s))   CBC    Collection Time: 11/18/18  6:33 AM   Result Value Ref Range    WBC 8 88 4 31 - 10 16 Thousand/uL    RBC 3 71 (L) 3 88 - 5 62 Million/uL    Hemoglobin 9 9 (L) 12 0 - 17 0 g/dL    Hematocrit 32 3 (L) 36 5 - 49 3 %    MCV 87 82 - 98 fL    MCH 26 7 (L) 26 8 - 34 3 pg    MCHC 30 7 (L) 31 4 - 37 4 g/dL    RDW 17 7 (H) 11 6 - 15 1 %    Platelets 854 520 - 802 Thousands/uL    MPV 10 7 8 9 - 12 7 fL   Basic metabolic panel    Collection Time: 18  6:33 AM   Result Value Ref Range    Sodium 142 136 - 145 mmol/L    Potassium 4 2 3 5 - 5 3 mmol/L    Chloride 103 100 - 108 mmol/L    CO2 29 21 - 32 mmol/L    ANION GAP 10 4 - 13 mmol/L    BUN 42 (H) 5 - 25 mg/dL    Creatinine 2 45 (H) 0 60 - 1 30 mg/dL    Glucose 99 65 - 140 mg/dL    Calcium 8 9 8 3 - 10 1 mg/dL    eGFR 23 ml/min/1 73sq m          Cardiac testing:   Results for orders placed during the hospital encounter of 18   Echo complete with contrast if indicated    Narrative [address]  [address] [facility]  [phone]    Transthoracic Echocardiogram  2D, M-mode, Doppler, and Color Doppler    Study date:  2018    Patient: Connie Del Valle  MR number: [id number]  Account number: [de-identified]  : 24-Oct-1930  Age: 80 years  Gender: Male  Status: Inpatient  Location: Bedside  Height: 70 in  Weight: 220 4 lb  BP: 142/ 86 mmHg    Indications: Heart Failure    Diagnoses: I50 9 - Heart failure, unspecified    Sonographer:  CAMILLA Chou  Primary Physician:  Mary Moeller DO  Referring Physician:  Mary Moeller DO  Group:  [facility]  Interpreting Physician:  Tere Clay DO    SUMMARY    LEFT VENTRICLE:  Systolic function was severely reduced by visual assessment  Ejection fraction was estimated to be 35 %  There was severe diffuse hypokinesis with no identifiable regional variations  There was moderate concentric hypertrophy  Doppler parameters were consistent with abnormal left ventricular relaxation (grade 1 diastolic dysfunction)  LEFT ATRIUM:  The atrium was moderately dilated  RIGHT ATRIUM:  The atrium was mildly dilated  MITRAL VALVE:  There was mild regurgitation  AORTIC VALVE:  A bioprosthesis was present  It exhibited normal function  There was mild stenosis  Valve peak gradient was 30 mmHg  TRICUSPID VALVE:  There was mild regurgitation  Pulmonary artery systolic pressure was moderately increased  COMPARISONS:  There has been no significant interval change  Comparison was made with the previous study of 13-Jun-2018  HISTORY: PRIOR HISTORY: HTN, DM, DVT, MRSA, NSTEMI, AVR, CAD, BPH, CKD, COPD    PROCEDURE: The procedure was performed at the bedside  This was a stat study  The transthoracic approach was used  The study included complete 2D imaging, M-mode, complete spectral Doppler, and color Doppler  The heart rate was 70 bpm, at  the start of the study  Image quality was adequate  LEFT VENTRICLE: Size was normal  Systolic function was severely reduced by visual assessment  Ejection fraction was estimated to be 35 %  There was severe diffuse hypokinesis with no identifiable regional variations  There was moderate  concentric hypertrophy  DOPPLER: Doppler parameters were consistent with abnormal left ventricular relaxation (grade 1 diastolic dysfunction)  RIGHT VENTRICLE: The size was normal  Systolic function was normal     LEFT ATRIUM: The atrium was moderately dilated  RIGHT ATRIUM: The atrium was mildly dilated  MITRAL VALVE: There was annular calcification  Valve structure was normal  There was normal leaflet separation  No echocardiographic evidence for prolapse  DOPPLER: The transmitral velocity was within the normal range  There was no  evidence for stenosis  There was mild regurgitation  AORTIC VALVE: A bioprosthesis was present  It exhibited normal function  DOPPLER: There was mild stenosis  There was no regurgitation  TRICUSPID VALVE: The valve structure was normal  There was normal leaflet separation  DOPPLER: The transtricuspid velocity was within the normal range  There was mild regurgitation  Pulmonary artery systolic pressure was moderately  increased   Estimated peak PA pressure was 60 mmHg  PULMONIC VALVE: Leaflets exhibited normal thickness, no calcification, and normal cuspal separation  DOPPLER: The transpulmonic velocity was within the normal range  There was no regurgitation  PERICARDIUM: There was no thickening  There was no pericardial effusion  AORTA: The root exhibited normal size  PULMONARY ARTERY: The size was normal  The morphology appeared normal     MEASUREMENT TABLES    DOPPLER MEASUREMENTS  Aortic valve   (Reference normals)  Peak gradient   30 mmHg   (--)    SYSTEM MEASUREMENT TABLES    2D mode  AoR Diam 2D: 3 2 cm  LA Diam (2D): 4 5 cm  LA/Ao (2D): 1 41  FS (2D Teich): 17 4 %  IVSd (2D): 1 55 cm  LVDEV: 159 cm³  LVESV: 102 cm³  LVIDd(2D): 5 69 cm  LVISd (2D): 4 7 cm  LVOT Area 2D: 3 8 cm squared  LVPWd (2D): 1 48 cm  SV (Teich): 57 cm³    Apical four chamber  LVEF A4C: 35 %    Unspecified Scan Mode  FATMATA Cont Eq (Peak Pasquale): 1 51 cm squared  FATMATA Cont Eq (VTI): 1 46 cm squared  LVOT (VTI): 22 5 cm  LVOT Diam : 2 2 cm  LVOT Vmax: 1090 mm/s  LVOT Vmax; Mean: 1090 mm/s  Peak Grad ; Mean: 5 mm[Hg]  SV (LVOT): 86 cm³  VTI;Mean: 2 mm[Hg]  MV Peak A Pasquale: 1330 mm/s  MV Peak E Pasquale  Mean: 602 mm/s  MVA (PHT): 3 24 cm squared  PHT: 68 ms  Max P mm[Hg]  V Max: 3630 mm/s  Vmax: 3620 mm/s  RA Area: 22 4 cm squared  RA Volume: 67 5 cm³  TAPSE: 1 7 cm    [employer] Accredited Echocardi[facility]    Prepared and electronically signed by    Carlton Arias DO  Signed 2018 16:23:03       No results found for this or any previous visit    Results for orders placed during the hospital encounter of 16   Cardiac catheterization    Narrative Cardiac Catheterization Operative Report    Primary care doctor: Dr Mega Brown     Cardiologist is Dr Jesus Lee    Date of procedure:  3/10/2016    Brief history: -40-year-old male with history of chronic renal   insufficiency, CAD, status post angioplasty of LAD, known severe COPD who   was admitted with hypoxic respiratory failure and has a troponin of 10  He   has a known history of severe aortic stenosis along with moderate   pulmonary hypertension  Hence he was scheduled for complete heart cath    Procedure Details  The risks, benefits, complications, including risk of stroke, heart   attack, bleeding and even death but not limited to at along with treatment   options, and expected outcomes were discussed with the patient  The   patient and/or family concurred with the proposed plan, giving informed   consent  Patient was brought to the cath lab after IV hydration was begun   and oral premedication was given  He was further sedated with midazolam   and fentanyl  He was prepped and draped in the usual manner  Using the   modified Seldinger access technique, a 6 [place] sheath was placed in the   right common femoral artery without any complications    A left heart   catheterization was done  Angiograms were also done  End of the procedure   patient was transferred to holding area without any complications  He   tolerated the procedure well  After the procedure was completed, sedation was stopped and the sheaths   and catheters were all removed  Hemostasis was achieved with fem stop  Access was obtained in the right femoral artery as well as right femoral   vein    Equipment used:   1  JR4 for right coronary angiography  2  JL 4 0 for left coronary angiography   3  LV gram with AL1    Findings:  1  Dominance: Right dominant coronary system    2  Left main:  Is a normal-size vessel it has mild calcification noted  It   bifurcates into large LAD and a circumflex system    3  LAD: LAD is a large-size vessel and it has proximal calcification  Just   after septal it has focal about 60-65% stenosis  This stent in the LAD is   patent after the stenosis  No other focal stenosis seen  4  Circumflex is a nondominant medium size vessel  It has proximally   35-40% stenosis seen  5   RCA: RCA is large vessel and has mild luminal irregularities causing   35% stenosis proximally and mid area  RPL branch has around 35% stenosis   in the mid area  5  LV gram: LV gram was not done as patient has renal insufficiency  However there was 25 mm gradient across aortic valve  Right heart catheter meters: Aortic pressure 128/74  LV pressure 152/10  Card to call output  About 5 L  PA pressure 55 mmHg  RV pressure 55/10  RA mean 8-10  Wedge pressure was not very accurate as patient has lots of respiratory   motions  Itis to be around 16 minutes of mercury  Estimated Blood Loss: Minimal  Complications:  None; patient tolerated the procedure well  Recommendation: Continue medical therapy  Continue risk factor   modification and patient will have a functional stress test as an   outpatient to see if that LAD needs to be done  It seemed to be stable   lesion and less likely to be the cause of patient's non-ST elevation MI  We'll continue other Rx before  Check BMP and CBC  See orders     Disposition: Hemodynamically stable and PACU - hemodynamically stable  Condition: stable         EKG/TELE: Personally reviewed  No events    Imaging: I have personally reviewed pertinent films in PACS

## 2018-11-19 NOTE — PLAN OF CARE
Problem: RESPIRATORY - ADULT  Goal: Achieves optimal ventilation and oxygenation  INTERVENTIONS:  - Assess for changes in respiratory status  - Assess for changes in mentation and behavior  - Position to facilitate oxygenation and minimize respiratory effort  - Oxygen administration by appropriate delivery method based on oxygen saturation (per order) or ABGs  - Encourage broncho-pulmonary hygiene including cough, deep breathe, Incentive Spirometry  - Assess and instruct to report SOB or any respiratory difficulty  - Respiratory Therapy support as indicated   BIPAP for HS  Outcome: Progressing

## 2018-11-20 ENCOUNTER — TRANSITIONAL CARE MANAGEMENT (OUTPATIENT)
Dept: FAMILY MEDICINE CLINIC | Facility: CLINIC | Age: 83
End: 2018-11-20

## 2018-11-20 ENCOUNTER — TELEPHONE (OUTPATIENT)
Dept: CARDIOLOGY CLINIC | Facility: CLINIC | Age: 83
End: 2018-11-20

## 2018-11-20 DIAGNOSIS — I10 HYPERTENSION: Chronic | ICD-10-CM

## 2018-11-20 RX ORDER — ASPIRIN 81 MG/1
81 TABLET ORAL DAILY
COMMUNITY

## 2018-11-20 RX ORDER — HYDRALAZINE HYDROCHLORIDE 25 MG/1
TABLET, FILM COATED ORAL
Qty: 90 TABLET | Refills: 0 | Status: SHIPPED | OUTPATIENT
Start: 2018-11-20 | End: 2019-01-03 | Stop reason: SDUPTHER

## 2018-11-20 NOTE — DISCHARGE SUMMARY
Discharge- Shelby Simms 10/24/1930, 80 y o  male MRN: [id number]    Unit/Bed#: [address] [place] [address] Encounter: [id number]    Primary Care Provider: Hector Berger DO   Date and time admitted to hospital: 11/16/2018 10:38 AM        * Acute kidney injury superimposed on chronic kidney disease ([facility] [address] )   Assessment & Plan    · Creatinine on admission 3 14, baseline 1 8-2  Etiology uncertain  On admission appeared volume overloaded however he was getting higher doses of diuretics as an outpatient with continually rising creatinine  · UA bland  · Renal US was negative for hydronephrosis  · Nephrology and cardio consulted to follow  · Started on gentle hydration [email or web address] to improve pre load  Creatinine improved as did urine output  · Fluids were stopped and he was transitioned back to diuretic  · Use discharged with follow up with newly established nephrology service and repeat labs to be done in 1 week     Acute on Chronic combined systolic and diastolic CHF, 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)   Assessment & Plan    · BNP elevated at 21,000 (baseline 2-3000), and patient with worsening LE edema however has been having rising creatine with increase in diuretic as outpt  · Dry weight may be 207lbs but pt unsure, admission weight is 221lbs  · Last echo 6/2018 with EF 35% with grade 1 diastolic dysfunction  · Repeat echo unchanged  · CXR on admission showed no pulmonary edema  · Cardio consulted, recs appreciated  · Initially was treated with IV fluid increased pre load and then was diuresed with IV Lasix  · net negative 4 5L   · He was discharged home back in his prior dose of diuretic with follow-up with his cardiologist       Pulmonary hypertension ([facility] [address] )   Assessment & Plan    · Moderate to severe on echo, likely secondary to underlying COPD     Chronic respiratory failure with hypoxia ([facility] [address] )   Assessment & Plan    · Pt on 6L NC at home, with up to 10L with exertion   Appears at baseline  · CXR with no acute findings  · Cont with home meds, added PRN nebs if needed     PATITO (obstructive sleep apnea)   Assessment & Plan    · Moderate PATITO  · Cont CPAP qHS     Ambulatory dysfunction   Assessment & Plan    · PT/OT rec home vs STR but patient wanted to go home     Hypertension   Assessment & Plan    Cont home meds     Type 2 diabetes mellitus, without long-term current use of insulin ([facility])   Assessment & Plan    Lab Results   Component Value Date    HGBA1C 5 3 06/12/2018       No results for input(s): POCGLU in the last 72 hours  Blood Sugar Average: Last 72 hrs:       CAD in native artery   Assessment & Plan    Cont home meds     Paroxysmal atrial fibrillation (HCC)   Assessment & Plan    · Cont beta blockers  · Currently in NSR     Moderate COPD (chronic obstructive pulmonary disease) ([facility])   Assessment & Plan    · Last spirometry showed his FEV1 to be 1 85 liters or 76 percent predicted consistent moderate airflow obstruction  · Cont home meds           Discharging Physician / Practitioner: Anderson Gasca MD  PCP: Kira Dejesus DO  Admission Date: 11/16/2018  Discharge Date: 11/19/18    Reason for Admission: No chief complaint on file          Resolved Problems  Date Reviewed: 10/10/2018    None          Consultations During Hospital Stay:  Mariah Ritter TO NEPHROLOGY  IP CONSULT TO CARDIOLOGY    Procedures Performed:     ·     Significant Findings / Test Results:     ·     Results from last 7 days  Lab Units 11/19/18  0602 11/18/18  0633 11/17/18  0558   WBC Thousand/uL 8 17 8 88 9 09   HEMOGLOBIN g/dL 10 1* 9 9* 9 8*   PLATELETS Thousands/uL 328 347 328       Results from last 7 days  Lab Units 11/19/18  0602 11/18/18  0633 11/17/18  0558 11/16/18  1328   SODIUM mmol/L 140 142 141 141   POTASSIUM mmol/L 4 2 4 2 4 2 4 5   CHLORIDE mmol/L 103 103 103 104   CO2 mmol/L 29 29 27 32   BUN mg/dL 32* 42* 47* 55*   CREATININE mg/dL 2 24* 2 45* 2 84* 3 17*   CALCIUM mg/dL 9 0 8 9 9 0 9 0   TOTAL BILIRUBIN mg/dL  --   --   --  0 60 ALK PHOS U/L  --   --   --  49   ALT U/L  --   --   --  25   AST U/L  --   --   --  38             Lab Results   Component Value Date/Time    HGBA1C 5 3 06/12/2018 06:14 AM             Blood Culture:   Lab Results   Component Value Date    BLOODCX No Growth After 5 Days  01/06/2017    BLOODCX No Growth After 5 Days  01/06/2017    BLOODCX No Growth After 5 Days  03/06/2016    BLOODCX No Growth After 5 Days  03/06/2016    BLOODCX No Growth After 5 Days  03/04/2016    BLOODCX No Growth After 5 Days  03/04/2016     Urine Culture:   Lab Results   Component Value Date    URINECX No Growth <1000 cfu/mL 03/04/2016     Sputum Culture: No components found for: SPUTUMCX  Wound Culture: No results found for: WOUNDCULT     XR chest portable   Final Result by Curtis Hoffman MD (11/19 2945)      No interval change from 11/16/2018            Workstation performed: [id number]         XR chest portable   Final Result by Mauricio Lui DO (11/16 0725)      Stable chest with bibasilar scarring and right-sided pleural effusion extending along the chest wall  Workstation performed: [id number]         US kidney and bladder   Final Result by René Hernandez MD (71/80 [id number])      Suboptimal visualization of the left kidney  No evidence of hydronephrosis  Workstation performed: [id number]                Incidental Findings:   · none     Test Results Pending at Discharge (will require follow up):   none     Outpatient Tests Requested:  · BMP in 1 week    Complications:  none    Reason for Admission: abnormal lab    Hospital Course:     Nellie Mcgarry is a 80 y o  male patient with a PMH of COPD with chronic hypoxic resp failure on 6L O2, PATITO, T2DM, CKD3, CHF, Afib not on anticoagulation, hx DVT, PAD, AS s/p bioprosthetic aortic valve replacement who originally presented to the hospital on 11/16/2018 due to elevated creatinine on labs   He states that he has been having worsening of his CHF, with LE edema and his doctor has been increasing his diuretics to help, but in the process his creatinine has been rising  He had blood work done 2 days ago and his creatinine has conitnued to rise despite treatment so his PCP advised him to be get admitted to the hospital for workup  He admits to chronic SOB and wheezing from his COPD, which is at baseline, and LE edema  He denies orthopnea  He thinks his dry weight is around 207lbs  Follows with Dr Jg Hand for cardio  He has CKD but does not have a nephrologist    Please see above list of diagnoses and related plan for additional information  Condition at Discharge: good       Discharge Day Visit / Exam:     Subjective:  Feels good  No SOB more than his baseline  Able to lie flat  Legs look less swollen to him  hes ready to go home  Vitals: Blood Pressure: 159/74 (11/19/18 1402)  Pulse: 77 (11/19/18 0851)  Temperature: 98 4 °F (36 9 °C) (11/19/18 0851)  Temp Source: Oral (11/19/18 0851)  Respirations: 20 (11/19/18 0851)  Height: 5' 10" (177 8 cm) (11/16/18 1100)  Weight - Scale: 98 4 kg (217 lb) (11/18/18 0600)  SpO2: 95 % (95) (11/19/18 1308)  Exam:   Physical Exam   Constitutional: He is oriented to person, place, and time  He appears well-developed  No distress  HENT:   Head: Normocephalic and atraumatic  Cardiovascular: Normal rate, regular rhythm and normal heart sounds  No murmur heard  Pulmonary/Chest: Effort normal and breath sounds normal  No respiratory distress  He has no wheezes  He has no rales  Abdominal: Soft  Bowel sounds are normal  He exhibits no distension  There is no tenderness  There is no rebound and no guarding  Musculoskeletal: He exhibits edema (Improved from arrival)  He exhibits no tenderness or deformity  Neurological: He is alert and oriented to person, place, and time  Skin: Skin is warm and dry  Psychiatric: He has a normal mood and affect  His behavior is normal    Nursing note and vitals reviewed        Discharge instructions/Information to patient and family:   See after visit summary for information provided to patient and family  Provisions for Follow-Up Care:  See after visit summary for information related to follow-up care and any pertinent home health orders  Disposition:     Home with [facility] Services (Reminder: Complete face to face encounter)    Planned Readmission: no     Discharge Statement:  I spent >30 minutes discharging the patient  This time was spent on the day of discharge  I had direct contact with the patient on the day of discharge  Greater than 50% of the total time was spent examining patient, answering all patient questions, arranging and discussing plan of care with patient as well as directly providing post-discharge instructions  Additional time then spent on discharge activities  Discharge Medications:  See after visit summary for reconciled discharge medications provided to patient and family        ** Please Note: This note has been constructed using a voice recognition system **

## 2018-11-20 NOTE — TELEPHONE ENCOUNTER
Spoke to his son  All his questions answered to his satisfaction  Advised him to call me if there is any other problem

## 2018-11-20 NOTE — TELEPHONE ENCOUNTER
MERCEDES CALLED: WANTED AN UPDATE ON GONZALEZ'S CONDITION  PATIENT WAS SEEN IN THE HOSPITAL AND DISCHARGED  HE IS NOT LOOKING WELL AND STILL HAVING DIFFICULTY BREATHING  PLEASE CALL MERCEDES WITH FURTHER INSTRUCTIONS  MAY BE REACHED AT EITHER NUMBER TODAY: [phone]  OR [phone]   [facility] 11-20

## 2018-11-20 NOTE — UTILIZATION REVIEW
Auth:   [id number]    Notification of Discharge  This is a Notification of Discharge from our facility [address]  Please be advised that this patient has been discharge from our facility  Below you will find the admission and discharge date and time including the patients disposition  PRESENTATION DATE: 11/16/2018 10:38 AM  IP ADMISSION DATE: 11/16/18 1038  DISCHARGE DATE: 11/19/2018  3:33 PM  DISPOSITION: Home with [address] Utilization Review Department  Phone: [phone]; Fax [phone]  ATTENTION: Please call with any questions or concerns to [phone]  and carefully listen to the prompts so that you are directed to the right person  Send all requests for admission clinical reviews, approved or denied determinations and any other requests to fax [phone]   All voicemails are confidential

## 2018-11-21 ENCOUNTER — TELEPHONE (OUTPATIENT)
Dept: OTHER | Facility: HOSPITAL | Age: 83
End: 2018-11-21

## 2018-11-21 NOTE — TELEPHONE ENCOUNTER
Received call from patient's son as I had last seen patient in hospital from renal perspective  Son wanted a summary of what happened to the patient during the hospitalization  I explained what happened to the patient and the course of action that were taken  I reviewed the kidney function levels on discharge  Patient states understanding that the patient does have kidney disease and was happy to hear that the kidney disease improved with slight volume expansion  I did explain to the son that the patient should come to the hospital if there is any doubts regarding urine output, change in urination, worsening swelling, worsening clinical condition overall  Son stated understanding  Son states that his father is reluctant to come to the hospital at times     -I spoke to our office will call the patient also reiterate the above  -we will also attempt to get the patient in to see us in the office next week  -we do not follow the patient chronically and I was only filling for the patient's son in on the course of hospitalization during the hospitalization from what I could gather from the chart   -all questions were answered

## 2018-11-23 ENCOUNTER — OFFICE VISIT (OUTPATIENT)
Dept: NEPHROLOGY | Facility: CLINIC | Age: 83
End: 2018-11-23
Payer: COMMERCIAL

## 2018-11-23 VITALS
HEIGHT: 70 IN | DIASTOLIC BLOOD PRESSURE: 50 MMHG | HEART RATE: 80 BPM | BODY MASS INDEX: 31.14 KG/M2 | SYSTOLIC BLOOD PRESSURE: 130 MMHG

## 2018-11-23 DIAGNOSIS — I12.9 BENIGN HYPERTENSION WITH CKD (CHRONIC KIDNEY DISEASE) STAGE IV (HCC): ICD-10-CM

## 2018-11-23 DIAGNOSIS — I50.32 CHRONIC DIASTOLIC (CONGESTIVE) HEART FAILURE (HCC): ICD-10-CM

## 2018-11-23 DIAGNOSIS — N18.4 BENIGN HYPERTENSION WITH CKD (CHRONIC KIDNEY DISEASE) STAGE IV (HCC): ICD-10-CM

## 2018-11-23 DIAGNOSIS — I50.42 CHRONIC COMBINED SYSTOLIC AND DIASTOLIC CHF, NYHA CLASS 2 AND ACA/AHA STAGE C: ICD-10-CM

## 2018-11-23 DIAGNOSIS — N18.4 STAGE 4 CHRONIC KIDNEY DISEASE (HCC): Primary | ICD-10-CM

## 2018-11-23 PROCEDURE — 99214 OFFICE O/P EST MOD 30 MIN: CPT | Performed by: PHYSICIAN ASSISTANT

## 2018-11-23 RX ORDER — TORSEMIDE 20 MG/1
TABLET ORAL
Qty: 90 TABLET | Refills: 0
Start: 2018-11-23 | End: 2018-12-09 | Stop reason: HOSPADM

## 2018-11-23 NOTE — PROGRESS NOTES
Assessment and Plan:    Diagnoses and all orders for this visit:    Stage 4 chronic kidney disease ([facility] )  -     Basic metabolic panel; Future  -     Magnesium; Future  -     Phosphorus; Future  -     Urinalysis with microscopic; Future  -     PTH, intact; Future  -     Vitamin D 25 hydroxy; Future  -     CBC; Future    Benign hypertension with CKD (chronic kidney disease) stage IV (HCC)    Chronic combined systolic and diastolic CHF, 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)       Chronic Kidney Disease stage IV- Baseline creatinine presumed to be 1 8-2 2  May need to tolerate a higher baseline for euvolemia  Renal ultrasound benign  Please attend a kidney smart class  He would like to wait until Spring to schedule this  Avoid mobic  Blood work next week  Hypertension- Antihypertensive regimen includes amlodipine 5mg daily, hydralazine 25mg every 8 hours, torsemide 10mg alternating with 20mg daily, imdur 30mg daily, metoprolol 25mg twice a day  Avoid salt in your diet  Stay active  Avoid nonsteroidals/NSAIDs such as aleve, advil, ibuprofen, motrin, indomethacin, mobic, etc   Blood pressure acceptable today  Call if you become lightheaded or dizzy  Volume Status / CHF- He currently takes torsemide 10mg alternating with 20mg daily  Increase this to 20mg alternating with 30mg every day  Weigh yourself every day  If you gain 2 pounds in a day or 5 pounds in a week please call  EF = 35%  Consider tap if able for pleural effusion  Anemia- WIll recheck HGB before his next appointment  Follow up with Dr Cristine Chaudhari in 4-6 weeks  Please call the office with any questions or concerns  Reason for Visit:  HOSP F/U    HPI: Renetta Ko is a 80 y o  male who is here for follow up after hospitalization at [address] [place] [address] in November  He was just discharged a week ago    He presented with edema and shortness of breath and was diagnosed with acute on chronic congestive heart failure as well as acute kidney injury  Peak creatinine on admission was 3 5  Since discharge, the patient has gained 5 pounds and is more short of breath  He is not requiring any higher oxygen at home  He is short of breath with exertion and talking a lot but otherwise feels okay  His significant other and her son accompany him to his appointment today  We discussed the stages of CKD and the need for close monitoring  We discussed dialysis modalities briefly  He weighs himself every day  He exhibits LE edema  ROS: A complete review of systems was performed and was negative unless otherwise noted in the history of present illness  Allergies:   Patient has no known allergies  Medications:     Current Outpatient Prescriptions:     albuterol (PROAIR HFA) 90 mcg/act inhaler, Inhale 2 puffs every 4 (four) hours as needed for wheezing or shortness of breath, Disp: 18 g, Rfl: 5    amiodarone 100 mg tablet, Take 1 tablet (100 mg total) by mouth daily, Disp: 90 tablet, Rfl: 3    amLODIPine (NORVASC) 5 mg tablet, Take 1 tablet (5 mg total) by mouth daily, Disp: 90 tablet, Rfl: 0    aspirin (ECOTRIN LOW STRENGTH) 81 mg EC tablet, Take 81 mg by mouth daily, Disp: , Rfl:     aspirin 81 mg chewable tablet, Chew daily, Disp: , Rfl:     atorvastatin (LIPITOR) 10 mg tablet, Take 2 tablets (20 mg total) by mouth daily, Disp: 90 tablet, Rfl: 3    B Complex Vitamins (VITAMIN B COMPLEX PO), Take by mouth, Disp: , Rfl:     BREO ELLIPTA, USE 1 INHALATION DAILY, Disp: 1 each, Rfl: 5    cyanocobalamin (VITAMIN B-12) 100 mcg tablet, Take by mouth, Disp: , Rfl:     diclofenac sodium (VOLTAREN) 1 %, Apply 4 g topically 4 (four) times a day Both shoulders and knees, Disp: 5 Tube, Rfl: 5    docusate sodium (COLACE) 100 mg capsule, Take 100 mg by mouth daily at bedtime  , Disp: , Rfl:     DULoxetine (CYMBALTA) 30 mg delayed release capsule, take 1 capsule by mouth once daily, Disp: 90 capsule, Rfl: 1    fenofibrate (TRICOR) 145 mg tablet, Take 1 tablet (145 mg total) by mouth daily, Disp: 90 tablet, Rfl: 3    hydrALAZINE (APRESOLINE) 25 mg tablet, TAKE 1 TABLET BY MOUTH EVERY 8 HOURS, Disp: 90 tablet, Rfl: 0    IFEREX 150 150 MG capsule, take 1 capsule by mouth once daily, Disp: 30 capsule, Rfl: 2    isosorbide mononitrate (IMDUR) 30 mg 24 hr tablet, take 1 tablet by mouth once daily, Disp: 30 tablet, Rfl: 0    levothyroxine 50 mcg tablet, take 1 tablet by mouth once daily, Disp: 30 tablet, Rfl: 5    meloxicam (MOBIC) 15 mg tablet, Take 1 tablet (15 mg total) by mouth daily for 90 days As needed, Disp: 90 tablet, Rfl: 0    metoprolol tartrate (LOPRESSOR) 25 mg tablet, Take 1 tablet (25 mg total) by mouth every 12 (twelve) hours  , Disp: 60 tablet, Rfl: 2    mupirocin (BACTROBAN) 2 % nasal ointment, into each nostril every 12 (twelve) hours for 10 doses, Disp: 10 g, Rfl: 0    pantoprazole (PROTONIX) 40 mg tablet, take 1 tablet by mouth once daily, Disp: 90 tablet, Rfl: 3    tolterodine (DETROL) 2 mg tablet, Take 1 tablet (2 mg total) by mouth daily, Disp: 90 tablet, Rfl: 1    torsemide (DEMADEX) 20 mg tablet, Take 20mg/d alternating with 10mg/d, Disp: 90 tablet, Rfl: 1    traMADol-acetaminophen (ULTRACET) 37 5-325 mg per tablet, Take 1 tablet by mouth 2 (two) times a day as needed for moderate pain, Disp: 30 tablet, Rfl: 0    Past Medical History:   Diagnosis Date    Allergic rhinitis 06/11/2010    Aortic stenosis, severe     Bacterial pneumonia 06/08/2007    Bladder neck obstruction 12/23/2010    BPH (benign prostatic hyperplasia)     Bronchitis, chronic obstructive, with exacerbation ([facility] ) 01/30/2012    CAD (coronary artery disease)     stent 2014    Carcinoma ([facility] )     resolved [id number]    Cardiomegaly 02/13/2007    Cataract of both eyes     CHF (congestive heart failure) ([facility] )     Chronic allergic conjunctivitis     last assessed [id number]    Chronic kidney disease (CKD)     [facility]  CKD (chronic kidney disease), stage III ([facility] )     COPD (chronic obstructive pulmonary disease) ([facility] )     Decubitus ulcer     resolved [id number]    Dermatomycosis     last assessed [id number]    Diabetes mellitus type 2, noninsulin dependent ([facility] )     Dyspnea on exertion     last assessed [id number]    Eczema     last assessed [id number]    Edema     last assessed [id number]    Epistaxis 2004    Esophagitis, erosive     Exposure to scabies     resolved [id number]    Fracture of rib     last assessed [id number]    H/O aortic valve replacement     resolved [id number]    H/O blood clots     History of DVT (deep vein thrombosis)     left posterior tibial/peroneal veins    History of non-ST elevation myocardial infarction (NSTEMI)     History of pneumonia     Hyperlipidemia     Hypertension     Internal hemorrhoids 2006    LBBB (left bundle branch block)     MRSA (methicillin resistant staph aureus) culture positive     NSTEMI (non-ST elevated myocardial infarction) ([place] [address] )     resolved [id number]    Obstructive sleep apnea on CPAP     severe PATITO with AHI of 106 and uses CPAP at 10 cm H2O with 2 l/m oxygen    Paroxysmal atrial fibrillation ([facility] )     Phimosis 2010    severe pt had circumcision 2010    Pulmonary fibrosis ([facility] )     PVD (peripheral vascular disease) ([facility] )     Rotator cuff tendonitis     lsat assessed [id number]    Skin abscess 2004    Hip    Skin neoplasm     last assessed [id number]    Tachycardia 2004    Trigger finger     last assessed [id number]    Type 2 diabetes mellitus ([facility] ) 2004    Venous thrombosis 2007    Venous thrombosis 2007     Past Surgical History:   Procedure Laterality Date    CARDIAC CATHETERIZATION  03/10/2016    Showed 60-70% mid LAD lesion next to stent    CATARACT EXTRACTION      CIRCUMCISION, NON-      ESOPHAGOGASTRODUODENOSCOPY N/A 3/14/2016    Procedure: ESOPHAGOGASTRODUODENOSCOPY (EGD);   Surgeon: Sagrario Sandoval MD;  Location: [facility] GI LAB; Service:     EYE SURGERY      FRACTURE SURGERY      JOINT REPLACEMENT      both hips replaced    OTHER SURGICAL HISTORY      H/O thoracentesis (diagnostic)    [place] REPLACE AORTIC VALVE OPENFEMORAL ARTERY APPROACH N/A 8/23/2016    Procedure: TRANSFEMORAL TAVR WITH 26MM CALIX MAKAYLA S3 TISSUE VALVE; PINA ;  Surgeon: Cas Mata DO;  [facility]: [facility];  Service: Cardiac Surgery    REPLACEMENT TOTAL KNEE Bilateral 01/01/1991 1991 and 2001    TISSUE AORTIC VALVE REPLACEMENT      transfemoral, transcatheter    TONSILLECTOMY      TONSILLECTOMY AND ADENOIDECTOMY      TOTAL HIP ARTHROPLASTY      Bilateral     Family History   Problem Relation Age of Onset    Coronary artery disease Mother     Arthritis Mother     Hypertension Mother     Rheum arthritis Father     Prostate cancer Father       reports that he quit smoking about 31 years ago  His smoking use included Cigarettes  He has a 35 00 pack-year smoking history  He has never used smokeless tobacco  He reports that he drinks alcohol  He reports that he does not use drugs  Physical Exam:   Vitals:    11/23/18 1320 11/23/18 1337   BP:  130/50   BP Location:  Right arm   Patient Position:  Sitting   Cuff Size:  Standard   Pulse:  80   Height: 5' 10" (1 778 m)      Body mass index is 31 14 kg/m²  General: NAD  Neuro: AAO  Neck: supple  Skin: no rash  Heart: RRR  Lungs: CTAB  Abdomen: soft nt nd  Extremities: ++ edema bilaterally    Procedure:  No results found for this or any previous visit  Labs reviewed      Lab Results   Component Value Date    GLUCOSE 109 (H) 10/16/2017    CALCIUM 9 0 11/19/2018     10/16/2017    K 4 2 11/19/2018    CO2 29 11/19/2018     11/19/2018    BUN 32 (H) 11/19/2018    CREATININE 2 24 (H) 11/19/2018         Results from last 7 days  Lab Units 11/19/18  0602 11/18/18  0633 11/17/18  0558   WBC Thousand/uL 8 17 8 88 9 09   HEMOGLOBIN g/dL 10 1* 9 9* 9 8* HEMATOCRIT % 33 4* 32 3* 32 2*   PLATELETS Thousands/uL 328 347 328   POTASSIUM mmol/L 4 2 4 2 4 2   CHLORIDE mmol/L 103 103 103   CO2 mmol/L 29 29 27   BUN mg/dL 32* 42* 47*   CREATININE mg/dL 2 24* 2 45* 2 84*   CALCIUM mg/dL 9 0 8 9 9 0

## 2018-11-23 NOTE — LETTER
November 23, 2018     Portia Soler DO  [address] [facility] [address]    Patient: Sosa Pink   YOB: 1930   Date of Visit: 11/23/2018       Dear Dr Maikel Mcgowan: Thank you for referring Sosa Pink to me for evaluation  Below are my notes for this consultation  If you have questions, please do not hesitate to call me  I look forward to following your patient along with you  Sincerely,        Sylvia Arellano PA-C        CC: DO Janis Gabriel PA-C  11/23/2018  2:02 PM  Sign at close encounter  Assessment and Plan:    Diagnoses and all orders for this visit:    Stage 4 chronic kidney disease ([facility] [address] )  -     Basic metabolic panel; Future  -     Magnesium; Future  -     Phosphorus; Future  -     Urinalysis with microscopic; Future  -     PTH, intact; Future  -     Vitamin D 25 hydroxy; Future  -     CBC; Future    Benign hypertension with CKD (chronic kidney disease) stage IV (HCC)    Chronic combined systolic and diastolic CHF, 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)       Chronic Kidney Disease stage IV- Baseline creatinine presumed to be 1 8-2 2  May need to tolerate a higher baseline for euvolemia  Renal ultrasound benign  Please attend a kidney smart class  He would like to wait until Spring to schedule this  Avoid mobic  Blood work next week  Hypertension- Antihypertensive regimen includes amlodipine 5mg daily, hydralazine 25mg every 8 hours, torsemide 10mg alternating with 20mg daily, imdur 30mg daily, metoprolol 25mg twice a day  Avoid salt in your diet  Stay active  Avoid nonsteroidals/NSAIDs such as aleve, advil, ibuprofen, motrin, indomethacin, mobic, etc   Blood pressure acceptable today  Call if you become lightheaded or dizzy  Volume Status / CHF- He currently takes torsemide 10mg alternating with 20mg daily  Increase this to 20mg alternating with 30mg every day  Weigh yourself every day    If you gain 2 pounds in a day or 5 pounds in a week please call  EF = 35%  Consider tap if able for pleural effusion  Anemia- WIll recheck HGB before his next appointment  Follow up with Dr Pawan Gordon in 4-6 weeks  Please call the office with any questions or concerns  Reason for Visit:  HOSP F/U    HPI: Julia Card is a 80 y o  male who is here for follow up after hospitalization at [address] [place] [address] in November  He was just discharged a week ago  He presented with edema and shortness of breath and was diagnosed with acute on chronic congestive heart failure as well as acute kidney injury  Peak creatinine on admission was 3 5  Since discharge, the patient has gained 5 pounds and is more short of breath  He is not requiring any higher oxygen at home  He is short of breath with exertion and talking a lot but otherwise feels okay  His significant other and her son accompany him to his appointment today  We discussed the stages of CKD and the need for close monitoring  We discussed dialysis modalities briefly  He weighs himself every day  He exhibits LE edema  ROS: A complete review of systems was performed and was negative unless otherwise noted in the history of present illness  Allergies:   Patient has no known allergies      Medications:     Current Outpatient Prescriptions:     albuterol (PROAIR HFA) 90 mcg/act inhaler, Inhale 2 puffs every 4 (four) hours as needed for wheezing or shortness of breath, Disp: 18 g, Rfl: 5    amiodarone 100 mg tablet, Take 1 tablet (100 mg total) by mouth daily, Disp: 90 tablet, Rfl: 3    amLODIPine (NORVASC) 5 mg tablet, Take 1 tablet (5 mg total) by mouth daily, Disp: 90 tablet, Rfl: 0    aspirin (ECOTRIN LOW STRENGTH) 81 mg EC tablet, Take 81 mg by mouth daily, Disp: , Rfl:     aspirin 81 mg chewable tablet, Chew daily, Disp: , Rfl:     atorvastatin (LIPITOR) 10 mg tablet, Take 2 tablets (20 mg total) by mouth daily, Disp: 90 tablet, Rfl: 3    B Complex Vitamins (VITAMIN B COMPLEX PO), Take by mouth, Disp: , Rfl:     BREO ELLIPTA, USE 1 INHALATION DAILY, Disp: 1 each, Rfl: 5    cyanocobalamin (VITAMIN B-12) 100 mcg tablet, Take by mouth, Disp: , Rfl:     diclofenac sodium (VOLTAREN) 1 %, Apply 4 g topically 4 (four) times a day Both shoulders and knees, Disp: 5 Tube, Rfl: 5    docusate sodium (COLACE) 100 mg capsule, Take 100 mg by mouth daily at bedtime  , Disp: , Rfl:     DULoxetine (CYMBALTA) 30 mg delayed release capsule, take 1 capsule by mouth once daily, Disp: 90 capsule, Rfl: 1    fenofibrate (TRICOR) 145 mg tablet, Take 1 tablet (145 mg total) by mouth daily, Disp: 90 tablet, Rfl: 3    hydrALAZINE (APRESOLINE) 25 mg tablet, TAKE 1 TABLET BY MOUTH EVERY 8 HOURS, Disp: 90 tablet, Rfl: 0    IFEREX 150 150 MG capsule, take 1 capsule by mouth once daily, Disp: 30 capsule, Rfl: 2    isosorbide mononitrate (IMDUR) 30 mg 24 hr tablet, take 1 tablet by mouth once daily, Disp: 30 tablet, Rfl: 0    levothyroxine 50 mcg tablet, take 1 tablet by mouth once daily, Disp: 30 tablet, Rfl: 5    meloxicam (MOBIC) 15 mg tablet, Take 1 tablet (15 mg total) by mouth daily for 90 days As needed, Disp: 90 tablet, Rfl: 0    metoprolol tartrate (LOPRESSOR) 25 mg tablet, Take 1 tablet (25 mg total) by mouth every 12 (twelve) hours  , Disp: 60 tablet, Rfl: 2    mupirocin (BACTROBAN) 2 % nasal ointment, into each nostril every 12 (twelve) hours for 10 doses, Disp: 10 g, Rfl: 0    pantoprazole (PROTONIX) 40 mg tablet, take 1 tablet by mouth once daily, Disp: 90 tablet, Rfl: 3    tolterodine (DETROL) 2 mg tablet, Take 1 tablet (2 mg total) by mouth daily, Disp: 90 tablet, Rfl: 1    torsemide (DEMADEX) 20 mg tablet, Take 20mg/d alternating with 10mg/d, Disp: 90 tablet, Rfl: 1    traMADol-acetaminophen (ULTRACET) 37 5-325 mg per tablet, Take 1 tablet by mouth 2 (two) times a day as needed for moderate pain, Disp: 30 tablet, Rfl: 0    Past Medical History:   Diagnosis Date    Allergic rhinitis 06/11/2010    Aortic stenosis, severe     Bacterial pneumonia 06/08/2007    Bladder neck obstruction 12/23/2010    BPH (benign prostatic hyperplasia)     Bronchitis, chronic obstructive, with exacerbation ([facility] ) 01/30/2012    CAD (coronary artery disease)     stent 2014    [facility]     resolved [id number]    Cardiomegaly 02/13/2007    Cataract of both eyes     [facility]     Chronic allergic conjunctivitis     last assessed [id number]    Chronic kidney disease (CKD)     CKD (chronic kidney disease), stage III (HCC)     CKD (chronic kidney disease), stage III (HCC)     COPD (chronic obstructive pulmonary disease) ([facility] )     Decubitus ulcer     resolved [id number]    Dermatomycosis     last assessed [id number]    Diabetes mellitus type 2, noninsulin dependent ([facility]     Dyspnea on exertion     last assessed 98Hiy3544    Eczema     last assessed [id number]    Edema     last assessed [id number]    Epistaxis 12/01/2004    Esophagitis, erosive     Exposure to scabies     resolved [id number]    Fracture of rib     last assessed [id number]    H/O aortic valve replacement     resolved [id number]    H/O blood clots     History of DVT (deep vein thrombosis)     left posterior tibial/peroneal veins    History of non-ST elevation myocardial infarction (NSTEMI)     History of pneumonia     Hyperlipidemia     Hypertension     Internal hemorrhoids 09/13/2006    LBBB (left bundle branch block)     MRSA (methicillin resistant staph aureus) culture positive     NSTEMI (non-ST elevated myocardial infarction) ([facility] )     resolved [id number]    Obstructive sleep apnea on CPAP     severe PATITO with AHI of 106 and uses CPAP at 10 cm H2O with 2 l/m oxygen    Paroxysmal atrial fibrillation ([facility] )     Phimosis 09/01/2010    severe pt had circumcision 9/2010    Pulmonary fibrosis ([facility] )     PVD (peripheral vascular disease) ([facility] )     Rotator cuff tendonitis     lsat assessed [id number]    Skin abscess 2004    Hip    Skin neoplasm     last assessed 86Kle6614    Tachycardia 2004    Trigger finger     last assessed [id number]    Type 2 diabetes mellitus ([facility] ) 2004    Venous thrombosis 2007    Venous thrombosis 2007     Past Surgical History:   Procedure Laterality Date    CARDIAC CATHETERIZATION  03/10/2016    Showed 60-70% mid LAD lesion next to stent    CATARACT EXTRACTION      CIRCUMCISION, NON-      ESOPHAGOGASTRODUODENOSCOPY N/A 3/14/2016    Procedure: ESOPHAGOGASTRODUODENOSCOPY (EGD); Surgeon: Ngozi Reddy MD;  Location: [facility] GI LAB; Service:     EYE SURGERY      FRACTURE SURGERY      JOINT REPLACEMENT      both hips replaced    OTHER SURGICAL HISTORY      H/O thoracentesis (diagnostic)    [place] REPLACE AORTIC VALVE OPENFEMORAL ARTERY APPROACH N/A 2016    Procedure: TRANSFEMORAL TAVR WITH 26MM CALIX MAKAYLA S3 TISSUE VALVE; PINA ;  Surgeon: Homer Mcintyre DO;  Location: [facility];  Service: Cardiac Surgery    REPLACEMENT TOTAL KNEE Bilateral 1991 and     TISSUE AORTIC VALVE REPLACEMENT      transfemoral, transcatheter    TONSILLECTOMY      TONSILLECTOMY AND ADENOIDECTOMY      TOTAL HIP ARTHROPLASTY      Bilateral     Family History   Problem Relation Age of Onset    Coronary artery disease Mother     Arthritis Mother     Hypertension Mother     Rheum arthritis Father     Prostate cancer Father       reports that he quit smoking about 31 years ago  His smoking use included Cigarettes  He has a 35 00 pack-year smoking history  He has never used smokeless tobacco  He reports that he drinks alcohol  He reports that he does not use drugs  Physical Exam:   Vitals:    18 1320 18 1337   BP:  130/50   BP Location:  Right arm   Patient Position:  Sitting   Cuff Size:  Standard   Pulse:  80   Height: 5' 10" (1 778 m)      Body mass index is 31 14 kg/m²      General: NAD  Neuro: AAO  Neck: supple  Skin: no rash  Heart: RRR  Lungs: CTAB  Abdomen: soft nt nd  Extremities: ++ edema bilaterally    Procedure:  No results found for this or any previous visit  Labs reviewed      Lab Results   Component Value Date    GLUCOSE 109 (H) 10/16/2017    CALCIUM 9 0 11/19/2018     10/16/2017    K 4 2 11/19/2018    CO2 29 11/19/2018     11/19/2018    BUN 32 (H) 11/19/2018    CREATININE 2 24 (H) 11/19/2018         Results from last 7 days  Lab Units 11/19/18  0602 11/18/18  0633 11/17/18  0558   WBC Thousand/uL 8 17 8 88 9 09   HEMOGLOBIN g/dL 10 1* 9 9* 9 8*   HEMATOCRIT % 33 4* 32 3* 32 2*   PLATELETS Thousands/uL 328 347 328   POTASSIUM mmol/L 4 2 4 2 4 2   CHLORIDE mmol/L 103 103 103   CO2 mmol/L 29 29 27   BUN mg/dL 32* 42* 47*   CREATININE mg/dL 2 24* 2 45* 2 84*   CALCIUM mg/dL 9 0 8 9 9 0

## 2018-11-23 NOTE — PATIENT INSTRUCTIONS
Chronic Kidney Disease stage IV- Baseline creatinine presumed to be 1 8-2 2  Renal ultrasound benign  Please attend a kidney smart class  Avoid mobic  Blood work next week  Hypertension- Antihypertensive regimen includes amlodipine 5mg daily, hydralazine 25mg every 8 hours, torsemide 10mg alternating with 20mg daily, imdur 30mg daily, metoprolol 25mg twice a day  Avoid salt in your diet  Stay active  Avoid nonsteroidals/NSAIDs such as aleve, advil, ibuprofen, motrin, indomethacin, mobic, etc   Blood pressure acceptable today  Call if you become lightheaded or dizzy  Volume Status / CHF- He currently takes torsemide 10mg alternating with 20mg daily  Increase this to 20mg alternating with 30mg every day  Weigh yourself every day  If you gain 2 pounds in a day or 5 pounds in a week please call  EF = 35%  Consider tap if able for pleural effusion  Anemia- WIll recheck HGB before his next appointment  Follow up with Dr Boris Kwon in 4-6 weeks  Please call the office with any questions or concerns

## 2018-11-29 ENCOUNTER — HOSPITAL ENCOUNTER (INPATIENT)
Facility: HOSPITAL | Age: 83
LOS: 10 days | Discharge: HOME HEALTH | DRG: 682 | End: 2018-12-09
Attending: EMERGENCY MEDICINE | Admitting: INTERNAL MEDICINE
Payer: COMMERCIAL

## 2018-11-29 ENCOUNTER — TELEPHONE (OUTPATIENT)
Dept: FAMILY MEDICINE CLINIC | Facility: CLINIC | Age: 83
End: 2018-11-29

## 2018-11-29 ENCOUNTER — APPOINTMENT (EMERGENCY)
Dept: RADIOLOGY | Facility: HOSPITAL | Age: 83
DRG: 682 | End: 2018-11-29
Payer: COMMERCIAL

## 2018-11-29 DIAGNOSIS — N17.9 AKI (ACUTE KIDNEY INJURY) (HCC): ICD-10-CM

## 2018-11-29 DIAGNOSIS — J44.9 MODERATE COPD (CHRONIC OBSTRUCTIVE PULMONARY DISEASE) (HCC): ICD-10-CM

## 2018-11-29 DIAGNOSIS — M25.512 CHRONIC PAIN OF BOTH SHOULDERS: ICD-10-CM

## 2018-11-29 DIAGNOSIS — I50.42 CHRONIC COMBINED SYSTOLIC AND DIASTOLIC HEART FAILURE (HCC): ICD-10-CM

## 2018-11-29 DIAGNOSIS — N18.4 STAGE 4 CHRONIC KIDNEY DISEASE (HCC): ICD-10-CM

## 2018-11-29 DIAGNOSIS — I50.9 CHF (CONGESTIVE HEART FAILURE) (HCC): ICD-10-CM

## 2018-11-29 DIAGNOSIS — J96.11 CHRONIC RESPIRATORY FAILURE WITH HYPOXIA (HCC): ICD-10-CM

## 2018-11-29 DIAGNOSIS — G89.29 CHRONIC PAIN OF BOTH SHOULDERS: ICD-10-CM

## 2018-11-29 DIAGNOSIS — M25.511 CHRONIC PAIN OF BOTH SHOULDERS: ICD-10-CM

## 2018-11-29 DIAGNOSIS — I48.0 PAROXYSMAL ATRIAL FIBRILLATION (HCC): Primary | Chronic | ICD-10-CM

## 2018-11-29 DIAGNOSIS — I48.91 ATRIAL FIBRILLATION WITH RVR (HCC): ICD-10-CM

## 2018-11-29 LAB
ALBUMIN SERPL BCP-MCNC: 3.2 G/DL (ref 3.5–5)
ALP SERPL-CCNC: 55 U/L (ref 46–116)
ALT SERPL W P-5'-P-CCNC: 26 U/L (ref 12–78)
ANION GAP SERPL CALCULATED.3IONS-SCNC: 9 MMOL/L (ref 4–13)
APTT PPP: 19 SECONDS (ref 24–33)
AST SERPL W P-5'-P-CCNC: 47 U/L (ref 5–45)
BACTERIA UR QL AUTO: ABNORMAL /HPF
BASOPHILS # BLD AUTO: 0.03 THOUSANDS/ΜL (ref 0–0.1)
BASOPHILS NFR BLD AUTO: 0 % (ref 0–1)
BILIRUB SERPL-MCNC: 0.6 MG/DL (ref 0.2–1)
BILIRUB UR QL STRIP: NEGATIVE
BUN SERPL-MCNC: 57 MG/DL (ref 5–25)
CALCIUM SERPL-MCNC: 9.2 MG/DL (ref 8.3–10.1)
CHLORIDE SERPL-SCNC: 104 MMOL/L (ref 100–108)
CLARITY UR: CLEAR
CO2 SERPL-SCNC: 27 MMOL/L (ref 21–32)
COLOR UR: YELLOW
CREAT SERPL-MCNC: 3.01 MG/DL (ref 0.6–1.3)
EOSINOPHIL # BLD AUTO: 0.25 THOUSAND/ΜL (ref 0–0.61)
EOSINOPHIL NFR BLD AUTO: 3 % (ref 0–6)
ERYTHROCYTE [DISTWIDTH] IN BLOOD BY AUTOMATED COUNT: 17.6 % (ref 11.6–15.1)
GFR SERPL CREATININE-BSD FRML MDRD: 18 ML/MIN/1.73SQ M
GLUCOSE SERPL-MCNC: 127 MG/DL (ref 65–140)
GLUCOSE UR STRIP-MCNC: NEGATIVE MG/DL
HCT VFR BLD AUTO: 34.8 % (ref 36.5–49.3)
HGB BLD-MCNC: 10.2 G/DL (ref 12–17)
HGB UR QL STRIP.AUTO: NEGATIVE
HYALINE CASTS #/AREA URNS LPF: ABNORMAL /LPF
IMM GRANULOCYTES # BLD AUTO: 0.03 THOUSAND/UL (ref 0–0.2)
IMM GRANULOCYTES NFR BLD AUTO: 0 % (ref 0–2)
INR PPP: 1.34 (ref 0.86–1.16)
KETONES UR STRIP-MCNC: NEGATIVE MG/DL
LACTATE SERPL-SCNC: 1 MMOL/L (ref 0.5–2)
LACTATE SERPL-SCNC: 2.2 MMOL/L (ref 0.5–2)
LEUKOCYTE ESTERASE UR QL STRIP: NEGATIVE
LYMPHOCYTES # BLD AUTO: 1.4 THOUSANDS/ΜL (ref 0.6–4.47)
LYMPHOCYTES NFR BLD AUTO: 16 % (ref 14–44)
MCH RBC QN AUTO: 26.1 PG (ref 26.8–34.3)
MCHC RBC AUTO-ENTMCNC: 29.3 G/DL (ref 31.4–37.4)
MCV RBC AUTO: 89 FL (ref 82–98)
MONOCYTES # BLD AUTO: 0.68 THOUSAND/ΜL (ref 0.17–1.22)
MONOCYTES NFR BLD AUTO: 8 % (ref 4–12)
MUCOUS THREADS UR QL AUTO: ABNORMAL
NEUTROPHILS # BLD AUTO: 6.22 THOUSANDS/ΜL (ref 1.85–7.62)
NEUTS SEG NFR BLD AUTO: 73 % (ref 43–75)
NITRITE UR QL STRIP: NEGATIVE
NON-SQ EPI CELLS URNS QL MICRO: ABNORMAL /HPF
NRBC BLD AUTO-RTO: 0 /100 WBCS
NT-PROBNP SERPL-MCNC: ABNORMAL PG/ML
PH UR STRIP.AUTO: 5.5 [PH] (ref 5–9)
PLATELET # BLD AUTO: 279 THOUSANDS/UL (ref 149–390)
PMV BLD AUTO: 11.7 FL (ref 8.9–12.7)
POTASSIUM SERPL-SCNC: 5.3 MMOL/L (ref 3.5–5.3)
PROT SERPL-MCNC: 7.3 G/DL (ref 6.4–8.2)
PROT UR STRIP-MCNC: ABNORMAL MG/DL
PROTHROMBIN TIME: 13.8 SECONDS (ref 9.4–11.7)
RBC # BLD AUTO: 3.91 MILLION/UL (ref 3.88–5.62)
RBC #/AREA URNS AUTO: ABNORMAL /HPF
SODIUM SERPL-SCNC: 140 MMOL/L (ref 136–145)
SP GR UR STRIP.AUTO: 1.01 (ref 1–1.03)
TROPONIN I SERPL-MCNC: 0.02 NG/ML
UROBILINOGEN UR QL STRIP.AUTO: 0.2 E.U./DL
WBC # BLD AUTO: 8.61 THOUSAND/UL (ref 4.31–10.16)
WBC #/AREA URNS AUTO: ABNORMAL /HPF

## 2018-11-29 PROCEDURE — 84484 ASSAY OF TROPONIN QUANT: CPT | Performed by: EMERGENCY MEDICINE

## 2018-11-29 PROCEDURE — 94664 DEMO&/EVAL PT USE INHALER: CPT

## 2018-11-29 PROCEDURE — 99223 1ST HOSP IP/OBS HIGH 75: CPT | Performed by: STUDENT IN AN ORGANIZED HEALTH CARE EDUCATION/TRAINING PROGRAM

## 2018-11-29 PROCEDURE — 87081 CULTURE SCREEN ONLY: CPT | Performed by: STUDENT IN AN ORGANIZED HEALTH CARE EDUCATION/TRAINING PROGRAM

## 2018-11-29 PROCEDURE — 82570 ASSAY OF URINE CREATININE: CPT | Performed by: INTERNAL MEDICINE

## 2018-11-29 PROCEDURE — 96374 THER/PROPH/DIAG INJ IV PUSH: CPT

## 2018-11-29 PROCEDURE — 87147 CULTURE TYPE IMMUNOLOGIC: CPT | Performed by: STUDENT IN AN ORGANIZED HEALTH CARE EDUCATION/TRAINING PROGRAM

## 2018-11-29 PROCEDURE — 83605 ASSAY OF LACTIC ACID: CPT | Performed by: EMERGENCY MEDICINE

## 2018-11-29 PROCEDURE — 81001 URINALYSIS AUTO W/SCOPE: CPT | Performed by: EMERGENCY MEDICINE

## 2018-11-29 PROCEDURE — 83880 ASSAY OF NATRIURETIC PEPTIDE: CPT | Performed by: EMERGENCY MEDICINE

## 2018-11-29 PROCEDURE — 99285 EMERGENCY DEPT VISIT HI MDM: CPT

## 2018-11-29 PROCEDURE — 94760 N-INVAS EAR/PLS OXIMETRY 1: CPT

## 2018-11-29 PROCEDURE — 71045 X-RAY EXAM CHEST 1 VIEW: CPT

## 2018-11-29 PROCEDURE — 36415 COLL VENOUS BLD VENIPUNCTURE: CPT | Performed by: EMERGENCY MEDICINE

## 2018-11-29 PROCEDURE — 85730 THROMBOPLASTIN TIME PARTIAL: CPT | Performed by: EMERGENCY MEDICINE

## 2018-11-29 PROCEDURE — 85025 COMPLETE CBC W/AUTO DIFF WBC: CPT | Performed by: EMERGENCY MEDICINE

## 2018-11-29 PROCEDURE — 87040 BLOOD CULTURE FOR BACTERIA: CPT | Performed by: EMERGENCY MEDICINE

## 2018-11-29 PROCEDURE — 85610 PROTHROMBIN TIME: CPT | Performed by: EMERGENCY MEDICINE

## 2018-11-29 PROCEDURE — 93005 ELECTROCARDIOGRAM TRACING: CPT

## 2018-11-29 PROCEDURE — 84300 ASSAY OF URINE SODIUM: CPT | Performed by: INTERNAL MEDICINE

## 2018-11-29 PROCEDURE — 94660 CPAP INITIATION&MGMT: CPT

## 2018-11-29 PROCEDURE — 84540 ASSAY OF URINE/UREA-N: CPT | Performed by: INTERNAL MEDICINE

## 2018-11-29 PROCEDURE — 80053 COMPREHEN METABOLIC PANEL: CPT | Performed by: EMERGENCY MEDICINE

## 2018-11-29 RX ORDER — AMLODIPINE BESYLATE 5 MG/1
5 TABLET ORAL DAILY
Status: DISCONTINUED | OUTPATIENT
Start: 2018-11-30 | End: 2018-12-09 | Stop reason: HOSPADM

## 2018-11-29 RX ORDER — FENOFIBRATE 145 MG/1
145 TABLET, COATED ORAL DAILY
Status: DISCONTINUED | OUTPATIENT
Start: 2018-11-30 | End: 2018-11-29

## 2018-11-29 RX ORDER — AMIODARONE HYDROCHLORIDE 200 MG/1
100 TABLET ORAL DAILY
Status: DISCONTINUED | OUTPATIENT
Start: 2018-11-30 | End: 2018-12-09 | Stop reason: HOSPADM

## 2018-11-29 RX ORDER — UBIDECARENONE 75 MG
100 CAPSULE ORAL DAILY
Status: DISCONTINUED | OUTPATIENT
Start: 2018-11-30 | End: 2018-12-09 | Stop reason: HOSPADM

## 2018-11-29 RX ORDER — PANTOPRAZOLE SODIUM 40 MG/1
40 TABLET, DELAYED RELEASE ORAL DAILY
Status: DISCONTINUED | OUTPATIENT
Start: 2018-11-30 | End: 2018-12-09 | Stop reason: HOSPADM

## 2018-11-29 RX ORDER — FLUTICASONE FUROATE AND VILANTEROL 100; 25 UG/1; UG/1
1 POWDER RESPIRATORY (INHALATION) DAILY
Status: DISCONTINUED | OUTPATIENT
Start: 2018-11-30 | End: 2018-12-09 | Stop reason: HOSPADM

## 2018-11-29 RX ORDER — HYDRALAZINE HYDROCHLORIDE 25 MG/1
25 TABLET, FILM COATED ORAL EVERY 8 HOURS
Status: DISCONTINUED | OUTPATIENT
Start: 2018-11-29 | End: 2018-12-09 | Stop reason: HOSPADM

## 2018-11-29 RX ORDER — FUROSEMIDE 10 MG/ML
40 INJECTION INTRAMUSCULAR; INTRAVENOUS ONCE
Status: COMPLETED | OUTPATIENT
Start: 2018-11-29 | End: 2018-11-29

## 2018-11-29 RX ORDER — ASPIRIN 81 MG/1
81 TABLET ORAL DAILY
Status: DISCONTINUED | OUTPATIENT
Start: 2018-11-30 | End: 2018-12-09 | Stop reason: HOSPADM

## 2018-11-29 RX ORDER — DULOXETIN HYDROCHLORIDE 30 MG/1
30 CAPSULE, DELAYED RELEASE ORAL DAILY
Status: DISCONTINUED | OUTPATIENT
Start: 2018-11-30 | End: 2018-11-29

## 2018-11-29 RX ORDER — IRON POLYSACCHARIDE COMPLEX 150 MG
150 CAPSULE ORAL DAILY
Status: DISCONTINUED | OUTPATIENT
Start: 2018-11-30 | End: 2018-12-09 | Stop reason: HOSPADM

## 2018-11-29 RX ORDER — HEPARIN SODIUM 5000 [USP'U]/ML
5000 INJECTION, SOLUTION INTRAVENOUS; SUBCUTANEOUS EVERY 8 HOURS SCHEDULED
Status: DISCONTINUED | OUTPATIENT
Start: 2018-11-29 | End: 2018-12-09 | Stop reason: HOSPADM

## 2018-11-29 RX ORDER — ONDANSETRON 2 MG/ML
4 INJECTION INTRAMUSCULAR; INTRAVENOUS EVERY 6 HOURS PRN
Status: DISCONTINUED | OUTPATIENT
Start: 2018-11-29 | End: 2018-12-09 | Stop reason: HOSPADM

## 2018-11-29 RX ORDER — OXYBUTYNIN CHLORIDE 5 MG/1
5 TABLET ORAL DAILY
Status: DISCONTINUED | OUTPATIENT
Start: 2018-11-30 | End: 2018-12-09 | Stop reason: HOSPADM

## 2018-11-29 RX ORDER — LEVOTHYROXINE SODIUM 0.05 MG/1
50 TABLET ORAL DAILY
Status: DISCONTINUED | OUTPATIENT
Start: 2018-11-30 | End: 2018-12-09 | Stop reason: HOSPADM

## 2018-11-29 RX ORDER — TRAMADOL HYDROCHLORIDE 50 MG/1
50 TABLET ORAL EVERY 12 HOURS PRN
Status: DISCONTINUED | OUTPATIENT
Start: 2018-11-29 | End: 2018-12-09 | Stop reason: HOSPADM

## 2018-11-29 RX ORDER — ACETAMINOPHEN 325 MG/1
325 TABLET ORAL EVERY 12 HOURS PRN
Status: DISCONTINUED | OUTPATIENT
Start: 2018-11-29 | End: 2018-12-09 | Stop reason: HOSPADM

## 2018-11-29 RX ORDER — ISOSORBIDE MONONITRATE 30 MG/1
30 TABLET, EXTENDED RELEASE ORAL DAILY
Status: DISCONTINUED | OUTPATIENT
Start: 2018-11-30 | End: 2018-12-09 | Stop reason: HOSPADM

## 2018-11-29 RX ORDER — DOCUSATE SODIUM 100 MG/1
100 CAPSULE, LIQUID FILLED ORAL
Status: DISCONTINUED | OUTPATIENT
Start: 2018-11-29 | End: 2018-12-09 | Stop reason: HOSPADM

## 2018-11-29 RX ADMIN — HEPARIN SODIUM 5000 UNITS: 5000 INJECTION, SOLUTION INTRAVENOUS; SUBCUTANEOUS at 21:01

## 2018-11-29 RX ADMIN — FUROSEMIDE 40 MG: 10 INJECTION, SOLUTION INTRAMUSCULAR; INTRAVENOUS at 14:22

## 2018-11-29 RX ADMIN — DOCUSATE SODIUM 100 MG: 100 CAPSULE, LIQUID FILLED ORAL at 21:01

## 2018-11-29 RX ADMIN — METOPROLOL TARTRATE 25 MG: 25 TABLET, FILM COATED ORAL at 21:00

## 2018-11-29 RX ADMIN — HYDRALAZINE HYDROCHLORIDE 25 MG: 25 TABLET ORAL at 18:49

## 2018-11-29 RX ADMIN — DICLOFENAC 4 G: 10 GEL TOPICAL at 21:02

## 2018-11-29 NOTE — ED PROVIDER NOTES
History  Chief Complaint   Patient presents with    Leg Swelling     pt presents to the ed with increased bi lateral leg swelling x 2 days  pt states he has increased sob as well      51-year-old male presents to the ED with increasing weight over the last few days wife states 2 lb overnight as well as increasing pedal edema and edema in his upper extremities  Patient states he was released from the hospital 13 days ago with congestive heart failure and was told that he has stage 4 kidney disease  No other complaints no fevers no chills patient presented to the ED today with O2 saturation 85% on room air  History provided by:  Patient, spouse and relative   used: No        Prior to Admission Medications   Prescriptions Last Dose Informant Patient Reported? Taking?    B Complex Vitamins (VITAMIN B COMPLEX PO) 11/29/2018 at Unknown time Self Yes Yes   Sig: Take by mouth   BREO ELLIPTA 11/29/2018 at Unknown time Self No Yes   Sig: USE 1 INHALATION DAILY   DULoxetine (CYMBALTA) 30 mg delayed release capsule 11/29/2018 at Unknown time  No Yes   Sig: take 1 capsule by mouth once daily   IFEREX 150 150 MG capsule 11/29/2018 at Unknown time Self No Yes   Sig: take 1 capsule by mouth once daily   albuterol (PROAIR HFA) 90 mcg/act inhaler 11/29/2018 at Unknown time Self No Yes   Sig: Inhale 2 puffs every 4 (four) hours as needed for wheezing or shortness of breath   amLODIPine (NORVASC) 5 mg tablet 11/29/2018 at Unknown time  No Yes   Sig: Take 1 tablet (5 mg total) by mouth daily   amiodarone 100 mg tablet 11/29/2018 at Unknown time Self No Yes   Sig: Take 1 tablet (100 mg total) by mouth daily   aspirin (ECOTRIN LOW STRENGTH) 81 mg EC tablet 11/29/2018 at Unknown time Spouse/Significant Other Yes Yes   Sig: Take 81 mg by mouth daily   aspirin 81 mg chewable tablet  Self Yes No   Sig: Chew daily   atorvastatin (LIPITOR) 10 mg tablet 11/29/2018 at Unknown time Self No Yes   Sig: Take 2 tablets (20 mg total) by mouth daily   cyanocobalamin (VITAMIN B-12) 100 mcg tablet 11/29/2018 at Unknown time Self Yes Yes   Sig: Take by mouth   diclofenac sodium (VOLTAREN) 1 %   No No   Sig: Apply 4 g topically 4 (four) times a day Both shoulders and knees   docusate sodium (COLACE) 100 mg capsule 11/28/2018 at Unknown time Self Yes Yes   Sig: Take 100 mg by mouth daily at bedtime     fenofibrate (TRICOR) 145 mg tablet 11/29/2018 at Unknown time  No Yes   Sig: Take 1 tablet (145 mg total) by mouth daily   hydrALAZINE (APRESOLINE) 25 mg tablet 11/29/2018 at Unknown time  No Yes   Sig: TAKE 1 TABLET BY MOUTH EVERY 8 HOURS   isosorbide mononitrate (IMDUR) 30 mg 24 hr tablet 11/29/2018 at Unknown time Self No Yes   Sig: take 1 tablet by mouth once daily   levothyroxine 50 mcg tablet 11/29/2018 at Unknown time  No Yes   Sig: take 1 tablet by mouth once daily   metoprolol tartrate (LOPRESSOR) 25 mg tablet   No No   Sig: Take 1 tablet (25 mg total) by mouth every 12 (twelve) hours   pantoprazole (PROTONIX) 40 mg tablet 11/29/2018 at Unknown time Self No Yes   Sig: take 1 tablet by mouth once daily   tolterodine (DETROL) 2 mg tablet 11/29/2018 at Unknown time  No Yes   Sig: Take 1 tablet (2 mg total) by mouth daily   torsemide (DEMADEX) 20 mg tablet 11/29/2018 at Unknown time  No Yes   Sig: Take 20mg/d alternating with 30mg/d   traMADol-acetaminophen (ULTRACET) 37 5-325 mg per tablet 11/29/2018 at Unknown time  No Yes   Sig: Take 1 tablet by mouth 2 (two) times a day as needed for moderate pain      Facility-Administered Medications: None       Past Medical History:   Diagnosis Date    Allergic rhinitis 06/11/2010    Aortic stenosis, severe     Bacterial pneumonia 06/08/2007    Bladder neck obstruction 12/23/2010    BPH (benign prostatic hyperplasia)     Bron[facility], [facility] obstructive[facility] 01/30/2012    CAD (coronary artery disease)     stent 2014    Carcinoma ([facility] )     resolved [id number]    Cardiomegaly 02/13/2007    Cataract of both eyes     CHF (congestive heart failure) (HCC)     Chronic allergic conjunctivitis     last assessed [id number]    Chronic kidney disease (CKD)     CKD (chronic kidney disease), stage III (HCC)     CKD (chronic kidney disease), stage III (HCC)     COPD (chronic obstructive [facility] disease) [facility]     Decubitus ulcer     resolved [id number]    Dermatomycosis     last assessed [id number]    Diabetes mellitus type 2, noninsulin dependent ([facility] )     Dyspnea on exertion     last assessed [id number]    Eczema     last assessed [id number]    Edema     last assessed [id number]    Epistaxis 12/01/2004    Esophagitis, erosive     Exposure to scabies     resolved [id number]    Fracture of rib     last assessed [id number]    H/O aortic valve replacement     resolved [id number]    H/O blood clots     History of DVT (deep vein thrombosis)     left posterior tibial/peroneal veins    History of non-ST elevation myocardial infarction (NSTEMI)     History of pneumonia     Hyperlipidemia     Hypertension     Internal hemorrhoids 09/13/2006    LBBB (left bundle branch block)     MRSA (methicillin resistant staph aureus) culture positive     NSTEMI (non-ST elevated myocardial infarction) ([facility] )     resolved [id number]    Obstructive sleep apnea on CPAP     severe PATITO with AHI of 106 and uses CPAP at 10 cm H2O with 2 l/m oxygen    Paroxysmal atrial fibrillation ([facility] )     Phimosis 09/01/2010    severe pt had circumcision 9/2010    Pulmonary fibrosis ([facility] )     PVD (peripheral vascular disease) ([facility] )     Rotator cuff tendonitis     lsat assessed [id number]    Skin abscess 12/21/2004    Hip    Skin neoplasm     last assessed [id number]    Tachycardia 12/01/2004    Trigger finger     last assessed [id number]    Type 2 diabetes mellitus ([facility] ) 12/01/2004    Venous thrombosis 05/16/2007    Venous thrombosis 05/16/2007       Past Surgical History:   Procedure Laterality Date  CARDIAC CATHETERIZATION  03/10/2016    Showed 60-70% mid LAD lesion next to stent    CATARACT EXTRACTION      CIRCUMCISION, NON-      ESOPHAGOGASTRODUODENOSCOPY N/A 3/14/2016    Procedure: ESOPHAGOGASTRODUODENOSCOPY (EGD); Surgeon: Sagrario Sandoval MD;  Location: [facility]; Service:     EYE SURGERY      FRACTURE SURGERY      JOINT REPLACEMENT      both hips replaced    OTHER SURGICAL HISTORY      H/O thoracentesis (diagnostic)    VT REPLACE AORTIC VALVE OPENFEMORAL ARTERY APPROACH N/A 2016    Procedure: TRANSFEMORAL TAVR WITH 26MM CALIX MAKAYLA S3 TISSUE VALVE; PINA ;  Surgeon: Cas Mata DO;  Location: [facility];  Service: Cardiac Surgery    REPLACEMENT TOTAL KNEE Bilateral 1991 and     TISSUE AORTIC VALVE REPLACEMENT      transfemoral, transcatheter    TONSILLECTOMY      TONSILLECTOMY AND ADENOIDECTOMY      TOTAL HIP ARTHROPLASTY      Bilateral       Family History   Problem Relation Age of Onset    Coronary artery disease Mother     Arthritis Mother     Hypertension Mother     Rheum arthritis Father     Prostate cancer Father      I have reviewed and agree with the history as documented  Social History   Substance Use Topics    Smoking status: Former Smoker     Packs/day: 1 00     Years: 35 00     Types: Cigarettes     Quit date: 1987    Smokeless tobacco: Never Used      Comment: quit 50 years ago, per [employer]    Alcohol use Yes      Comment: socially        Review of Systems   Constitutional: Negative for activity change, chills, diaphoresis and fever  HENT: Negative for congestion, ear pain, nosebleeds, sore throat, trouble swallowing and voice change  Eyes: Negative for pain, discharge and redness  Respiratory: Positive for shortness of breath  Negative for apnea, cough, choking, wheezing and stridor  Cardiovascular: Negative for chest pain and palpitations     Gastrointestinal: Negative for abdominal distention, abdominal pain, constipation, diarrhea, nausea and vomiting  Endocrine: Negative for polydipsia  Genitourinary: Negative for difficulty urinating, dysuria, flank pain, frequency, hematuria and urgency  Musculoskeletal: Negative for back pain, gait problem, joint swelling, myalgias, neck pain and neck stiffness  Skin: Negative for pallor and rash  Neurological: Negative for dizziness, tremors, syncope, speech difficulty, weakness, numbness and headaches  Hematological: Negative for adenopathy  Psychiatric/Behavioral: Negative for confusion, hallucinations, self-injury and suicidal ideas  The patient is not nervous/anxious  Physical Exam  Physical Exam   Constitutional: He is oriented to person, place, and time  Vital signs are normal  He appears well-developed and well-nourished  HENT:   Head: Normocephalic and atraumatic  Right Ear: External ear normal    Left Ear: External ear normal    Nose: Nose normal    Mouth/Throat: Oropharynx is clear and moist    Eyes: Pupils are equal, round, and reactive to light  Conjunctivae and EOM are normal    Neck: Normal range of motion  Neck supple  Cardiovascular: Normal heart sounds  Pulmonary/Chest: He is in respiratory distress  He has rales  Abdominal: Soft  Bowel sounds are normal    Musculoskeletal: Normal range of motion  He exhibits edema  Neurological: He is alert and oriented to person, place, and time  Skin: Skin is warm  Psychiatric: He has a normal mood and affect  Nursing note and vitals reviewed        Vital Signs  ED Triage Vitals   Temperature Pulse Respirations Blood Pressure SpO2   11/29/18 1311 11/29/18 1311 11/29/18 1311 11/29/18 1311 11/29/18 1311   97 8 °F (36 6 °C) (!) 109 20 114/59 (!) 85 %      Temp Source Heart Rate Source Patient Position - Orthostatic VS BP Location FiO2 (%)   11/29/18 1311 11/29/18 1415 11/29/18 1415 11/29/18 1415 --   Tympanic Monitor Lying Right arm       Pain Score       11/29/18 1723       No Pain Vitals:    12/03/18 0545 12/03/18 0900 12/03/18 1602 12/03/18 2000   BP: (!) 180/74 156/72 146/67 133/63   Pulse: 67 69 71 73   Patient Position - Orthostatic VS: Lying Lying Sitting Sitting       Visual Acuity      ED Medications  Medications   amiodarone tablet 100 mg (100 mg Oral Given 12/3/18 0954)   amLODIPine (NORVASC) tablet 5 mg (5 mg Oral Given 12/3/18 0954)   aspirin (ECOTRIN LOW STRENGTH) EC tablet 81 mg (81 mg Oral Given 12/3/18 0954)   fluticasone-vilanterol (BREO ELLIPTA) 100-25 mcg/inh inhaler 1 puff (1 puff Inhalation Given 12/3/18 0957)   cyanocobalamin (VITAMIN B-12) tablet 100 mcg (100 mcg Oral Given 12/3/18 0957)   docusate sodium (COLACE) capsule 100 mg (100 mg Oral Given 12/2/18 2122)   hydrALAZINE (APRESOLINE) tablet 25 mg (25 mg Oral Given 12/3/18 1454)   iron polysaccharides (NIFEREX) capsule 150 mg (150 mg Oral Given 12/3/18 0954)   isosorbide mononitrate (IMDUR) 24 hr tablet 30 mg (30 mg Oral Given 12/3/18 0954)   levothyroxine tablet 50 mcg (50 mcg Oral Given 12/3/18 0545)   metoprolol tartrate (LOPRESSOR) tablet 25 mg (25 mg Oral Given 12/3/18 0957)   pantoprazole (PROTONIX) EC tablet 40 mg (40 mg Oral Given 12/3/18 0956)   oxybutynin (DITROPAN) tablet 5 mg (5 mg Oral Given 12/3/18 0954)   ondansetron (ZOFRAN) injection 4 mg (not administered)   heparin (porcine) subcutaneous injection 5,000 Units (5,000 Units Subcutaneous Given 12/3/18 1455)   acetaminophen (TYLENOL) tablet 325 mg (325 mg Oral Given 12/3/18 0857)     And   traMADol (ULTRAM) tablet 50 mg (50 mg Oral Given 12/3/18 0857)   furosemide (LASIX) injection 60 mg (60 mg Intravenous Given 12/3/18 1633)   diclofenac sodium (VOLTAREN) 1 % topical gel 2 g (2 g Topical Given 12/3/18 0768)   ipratropium-albuterol (DUO-NEB) 0 5-2 5 mg/3 mL inhalation solution 3 mL (not administered)   ipratropium-albuterol (DUO-NEB) 0 5-2 5 mg/3 mL inhalation solution 3 mL (3 mL Nebulization Given 12/3/18 1947)   mupirocin (BACTROBAN) 2 % nasal ointment (1 application Nasal Given 12/3/18 1802)   furosemide (LASIX) injection 40 mg (40 mg Intravenous Given 11/29/18 1422)       Diagnostic Studies  Results Reviewed     Procedure Component Value Units Date/Time    Blood culture #1 [[id number]] Collected:  11/29/18 1332    Lab Status:  Preliminary result Specimen:  Blood from Arm, Left Updated:  12/03/18 1601     Blood Culture No Growth After 4 Days  Blood culture #2 [[id number]] Collected:  11/29/18 1341    Lab Status:  Preliminary result Specimen:  Blood from Arm, Right Updated:  12/03/18 1601     Blood Culture No Growth After 4 Days  Urea nitrogen, urine [[id number]] Collected:  11/29/18 1431    Lab Status:  Final result Specimen:  Urine Updated:  11/30/18 1311     Urea Nitrogen, Ur 561 mg/dL     Sodium, urine, random [[id number]] Collected:  11/29/18 1431    Lab Status:  Final result Specimen:  Urine Updated:  11/30/18 1311     Sodium, Ur 38    Creatinine, urine, random [[id number]] Collected:  11/29/18 1431    Lab Status:  Final result Specimen:  Urine Updated:  11/30/18 1311     Creatinine, Ur 95 1 mg/dL     Lactic acid, plasma [[id number]]  (Normal) Collected:  11/29/18 1548    Lab Status:  Final result Specimen:  Blood from Arm, Left Updated:  11/29/18 1612     LACTIC ACID 1 0 mmol/L     Narrative:         Result may be elevated if tourniquet was used during collection      Urine Microscopic [[id number]]  (Abnormal) Collected:  11/29/18 1431    Lab Status:  Final result Specimen:  Urine from Urine, Clean Catch Updated:  11/29/18 1502     RBC, UA 0-1 (A) /hpf      WBC, UA 0-1 (A) /hpf      Epithelial Cells None Seen /hpf      Bacteria, UA Occasional /hpf      Hyaline Casts, UA 10-20 (A) /lpf      MUCUS THREADS Occasional (A)    B-type natriuretic peptide [[id number]]  (Abnormal) Collected:  11/29/18 1421    Lab Status:  Final result Specimen:  Blood from Arm, Left Updated:  11/29/18 1501     NT-proBNP 16,996 (H) pg/mL     Comprehensive metabolic panel [[id number]]  (Abnormal) Collected:  11/29/18 1421    Lab Status:  Final result Specimen:  Blood from Arm, Left Updated:  11/29/18 1455     Sodium 140 mmol/L      Potassium 5 3 mmol/L      Chloride 104 mmol/L      CO2 27 mmol/L      ANION GAP 9 mmol/L      BUN 57 (H) mg/dL      Creatinine 3 01 (H) mg/dL      Glucose 127 mg/dL      Calcium 9 2 mg/dL      AST 47 (H) U/L      ALT 26 U/L      Alkaline Phosphatase 55 U/L      Total Protein 7 3 g/dL      Albumin 3 2 (L) g/dL      Total Bilirubin 0 60 mg/dL      eGFR 18 ml/min/1 73sq m     Narrative:         National Kidney Disease Education Program recommendations are as follows:  GFR calculation is accurate only with a steady state creatinine  Chronic Kidney disease less than 60 ml/min/1 73 sq  meters  Kidney failure less than 15 ml/min/1 73 sq  meters  UA w Reflex to Microscopic [[id number]]  (Abnormal) Collected:  11/29/18 1431    Lab Status:  Final result Specimen:  Urine from Urine, Clean Catch Updated:  11/29/18 1442     Color, UA Yellow     Clarity, UA Clear     Specific Gravity, UA 1 015     pH, UA 5 5     Leukocytes, UA Negative     Nitrite, UA Negative     Protein, UA Trace (A) mg/dl      Glucose, UA Negative mg/dl      Ketones, UA Negative mg/dl      Urobilinogen, UA 0 2 E U /dl      Bilirubin, UA Negative     Blood, UA Negative    Lactic acid, plasma [[id number]]  (Abnormal) Collected:  11/29/18 1332    Lab Status:  Final result Specimen:  Blood from Arm, Left Updated:  11/29/18 1416     LACTIC ACID 2 2 (HH) mmol/L     Narrative:         Result may be elevated if tourniquet was used during collection      Troponin I [[id number]]  (Normal) Collected:  11/29/18 1332    Lab Status:  Final result Specimen:  Blood from Arm, Left Updated:  11/29/18 1408     Troponin I 0 02 ng/mL     APTT [[id number]]  (Abnormal) Collected:  11/29/18 1332    Lab Status:  Final result Specimen:  Blood from Arm, Left Updated:  11/29/18 1403     PTT 19 (L) seconds     Protime-INR [[id number]]  (Abnormal) Collected:  11/29/18 1332    Lab Status:  Final result Specimen:  Blood from Arm, Left Updated:  11/29/18 1403     Protime 13 8 (H) seconds      INR 1 34 (H)    CBC and differential [[id number]]  (Abnormal) Collected:  11/29/18 1332    Lab Status:  Final result Specimen:  Blood from Arm, Left Updated:  11/29/18 1346     WBC 8 61 Thousand/uL      RBC 3 91 Million/uL      Hemoglobin 10 2 (L) g/dL      Hematocrit 34 8 (L) %      MCV 89 fL      MCH 26 1 (L) pg      MCHC 29 3 (L) g/dL      RDW 17 6 (H) %      MPV 11 7 fL      Platelets 662 Thousands/uL      nRBC 0 /100 WBCs      Neutrophils Relative 73 %      Immat GRANS % 0 %      Lymphocytes Relative 16 %      Monocytes Relative 8 %      Eosinophils Relative 3 %      Basophils Relative 0 %      Neutrophils Absolute 6 22 Thousands/µL      Immature Grans Absolute 0 03 Thousand/uL      Lymphocytes Absolute 1 40 Thousands/µL      Monocytes Absolute 0 68 Thousand/µL      Eosinophils Absolute 0 25 Thousand/µL      Basophils Absolute 0 03 Thousands/µL                  XR chest 1 view portable   Final Result by Jessica Akins MD (11/29 1401)         1  Cardiomegaly  2   Chronic right pleural thickening  3   Mild CHF        Workstation performed: [id number]                    Procedures  Procedures       Phone Contacts  ED Phone Contact    ED Course  ED Course as of Dec 03 2134   Thu Nov 29, 2018   1340 Cardiology has been consulted to see the patient                                MDM  CritCare Time    Disposition  Final diagnoses:   Paroxysmal atrial fibrillation ([facility] )   KJ (acute kidney injury) ([facility] )   CHF (congestive heart failure) ([facility] )     Time reflects when diagnosis was documented in both MDM as applicable and the Disposition within this note     Time User Action Codes Description Comment    11/29/2018  1:40 PM Vivian Zamudio Add [I48 0] Paroxysmal atrial fibrillation ([facility] )     11/29/2018  1:40 PM Vivian Zamudio Modify [I48 0] Paroxysmal atrial fibrillation ([place] [address] )     11/29/2018  1:40 PM Karmen Michael Modify [I48 0] Paroxysmal atrial fibrillation ([place] [address] )     11/29/2018  1:40 PM Karmen Michael Modify [I48 0] Paroxysmal atrial fibrillation ([place] [address] )     11/29/2018  3:20 PM Nisreen Eth E Add [N17 9] KJ (acute kidney injury) ([place] [address] )     11/29/2018  3:21 PM Nisreen Eth E Add [I50 9] CHF (congestive heart failure) ([place] [address] )     11/30/2018  7:17 PM Dara Cortes Add [J44 9] Moderate COPD (chronic obstructive pulmonary disease) [facility])       ED Disposition     ED Disposition Condition Comment    Admit  Case was discussed with Dr Gela Stringer and the patient's admission status was agreed to be Admission Status: inpatient status to the service of Dr Lizz Kate           Follow-up Information    None         Current Discharge Medication List      CONTINUE these medications which have CHANGED    Details   metoprolol tartrate (LOPRESSOR) 25 mg tablet Take 1 tablet (25 mg total) by mouth every 12 (twelve) hours  Qty: 180 tablet, Refills: 3    Associated Diagnoses: Paroxysmal atrial fibrillation (HCC)         CONTINUE these medications which have NOT CHANGED    Details   albuterol (PROAIR HFA) 90 mcg/act inhaler Inhale 2 puffs every 4 (four) hours as needed for wheezing or shortness of breath  Qty: 18 g, Refills: 5    Associated Diagnoses: Centrilobular emphysema (HCC)      amiodarone 100 mg tablet Take 1 tablet (100 mg total) by mouth daily  Qty: 90 tablet, Refills: 3    Associated Diagnoses: PAF (paroxysmal atrial fibrillation) (HCC)      amLODIPine (NORVASC) 5 mg tablet Take 1 tablet (5 mg total) by mouth daily  Qty: 90 tablet, Refills: 0    Associated Diagnoses: Essential hypertension      aspirin (ECOTRIN LOW STRENGTH) 81 mg EC tablet Take 81 mg by mouth daily      atorvastatin (LIPITOR) 10 mg tablet Take 2 tablets (20 mg total) by mouth daily  Qty: 90 tablet, Refills: 3    Comments: Replaces the simvastatin  Associated Diagnoses: Hyperlipidemia, unspecified hyperlipidemia type      B Complex Vitamins (VITAMIN B COMPLEX PO) Take by mouth      BREO ELLIPTA USE 1 INHALATION DAILY  Qty: 1 each, Refills: 5    Associated Diagnoses: 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)      cyanocobalamin (VITAMIN B-12) 100 mcg tablet Take by mouth      docusate sodium (COLACE) 100 mg capsule Take 100 mg by mouth daily at bedtime        DULoxetine (CYMBALTA) 30 mg delayed release capsule take 1 capsule by mouth once daily  Qty: 90 capsule, Refills: 1    Associated Diagnoses: Chronic pain of both shoulders      fenofibrate (TRICOR) 145 mg tablet Take 1 tablet (145 mg total) by mouth daily  Qty: 90 tablet, Refills: 3    Associated Diagnoses: Dyslipidemia      hydrALAZINE (APRESOLINE) 25 mg tablet TAKE 1 TABLET BY MOUTH EVERY 8 HOURS  Qty: 90 tablet, Refills: 0    Associated Diagnoses: Hypertension      IFEREX 150 150 MG capsule take 1 capsule by mouth once daily  Qty: 30 capsule, Refills: 2    Associated Diagnoses: Iron deficiency anemia, unspecified iron deficiency anemia type      isosorbide mononitrate (IMDUR) 30 mg 24 hr tablet take 1 tablet by mouth once daily  Qty: 30 tablet, Refills: 0    Associated Diagnoses: Hypertension      levothyroxine 50 mcg tablet take 1 tablet by mouth once daily  Qty: 30 tablet, Refills: 5    Associated Diagnoses: Other specified hypothyroidism      pantoprazole (PROTONIX) 40 mg tablet take 1 tablet by mouth once daily  Qty: 90 tablet, Refills: 3    Associated Diagnoses: Gastroesophageal reflux disease with esophagitis      tolterodine (DETROL) 2 mg tablet Take 1 tablet (2 mg total) by mouth daily  Qty: 90 tablet, Refills: 1    Associated Diagnoses: OAB (overactive bladder)      torsemide (DEMADEX) 20 mg tablet Take 20mg/d alternating with 30mg/d  Qty: 90 tablet, Refills: 0    Associated Diagnoses: Chronic diastolic (congestive) heart failure (HCC)      traMADol-acetaminophen (ULTRACET) 37 5-325 mg per tablet Take 1 tablet by mouth 2 (two) times a day as needed for moderate pain  Qty: 30 tablet, Refills: 0    Associated Diagnoses: Chronic pain of both shoulders      aspirin 81 mg chewable tablet Chew daily      diclofenac sodium (VOLTAREN) 1 % Apply 4 g topically 4 (four) times a day Both shoulders and knees  Qty: 5 Tube, Refills: 5    Associated Diagnoses: Chronic pain of both shoulders           No discharge procedures on file      ED Provider  Electronically Signed by           Tracie Olivas DO  12/03/18 0331

## 2018-11-29 NOTE — ASSESSMENT & PLAN NOTE
· Last spirometry showed his FEV1 to be 1 85 liters or 76 percent predicted consistent moderate airflow obstruction  · Does not appear in exacerbation, dyspnea if likely from heart failure  · Cont home inhalers

## 2018-11-29 NOTE — LETTER
Pt DC to home today,    Please contact pt to resume services       Thanks    [address] [facility]   [phone]

## 2018-11-29 NOTE — TELEPHONE ENCOUNTER
Spoke with patients daughter Pako Clay  She states that the patients legs are starting to swell  She also states that they had a therapist come today and he can "hear it in the patient's lungs and belly"  Patient has an appointment with Dr Maikel Mcgowan tomorrow  I spoke to Dr Maikel Mcgowan what he thinks the daughter should do  Dr LLOYD advised daughter to either call the patient's cardiologist to get na opinion from him, or take patient to ER   Daughter understands Izzy Velázquez

## 2018-11-29 NOTE — ASSESSMENT & PLAN NOTE
· LV EF 35-40%  · Appears in acute heart failure with volume overload  · Start IV lasix, 40mg given in ED  · Monitor I/Os and daily weights  · Cardio consulted

## 2018-11-29 NOTE — ASSESSMENT & PLAN NOTE
· Baseline creatinine 1 8-2, on admission 3 01  · Has had issues recently with rising creatinine with increased diuretic  · Recent renal US negative for hydro     · Admitted with the same recently  · Patient obviously fluid overloaded, will attempt IV diuresis and monitor renal function  · Consult nephrology to follow

## 2018-11-29 NOTE — PLAN OF CARE
CARDIOVASCULAR - ADULT     Maintains optimal cardiac output and hemodynamic stability Progressing     Absence of cardiac dysrhythmias or at baseline rhythm Progressing        DISCHARGE PLANNING     Discharge to home or other facility with appropriate resources Progressing        INFECTION - ADULT     Absence or prevention of progression during hospitalization Progressing     Absence of fever/infection during neutropenic period Progressing        Knowledge Deficit     Patient/family/caregiver demonstrates understanding of disease process, treatment plan, medications, and discharge instructions Progressing        METABOLIC, FLUID AND ELECTROLYTES - ADULT     Electrolytes maintained within normal limits Progressing     Fluid balance maintained Progressing        PAIN - ADULT     Verbalizes/displays adequate comfort level or baseline comfort level Progressing        Potential for Falls     Patient will remain free of falls Progressing        Prexisting or High Potential for Compromised Skin Integrity     Skin integrity is maintained or improved Progressing        RESPIRATORY - ADULT     Achieves optimal ventilation and oxygenation Progressing        SAFETY ADULT     Maintain or return to baseline ADL function Progressing     Maintain or return mobility status to optimal level Progressing        SKIN/TISSUE INTEGRITY - ADULT     Skin integrity remains intact Progressing     Incision(s), wounds(s) or drain site(s) healing without S/S of infection Progressing     Oral mucous membranes remain intact Progressing

## 2018-11-29 NOTE — ASSESSMENT & PLAN NOTE
· Normally on 6L NC at home, currently requiring 8-10L and still have desats into the 80s, likely from fluid overload  · CXR showed CHF  · Treat heart failure as noted  · O2, wean back to baseline as tolerated

## 2018-11-29 NOTE — H&P
H&P- Brittny Spring 10/24/1930, 80 y o  male MRN: [id number]    Unit/Bed#: [address] [place] [address] Encounter: [id number]    Primary Care Provider: Christina Stokes DO   Date and time admitted to hospital: 11/29/2018  1:14 PM        Acute renal failure superimposed on stage 4 chronic kidney disease (HCC)   Assessment & Plan    · Baseline creatinine 1 8-2, on admission 3 01  · Has had issues recently with rising creatinine with increased diuretic  · Recent renal US negative for hydro  · Admitted with the same recently  · Patient obviously fluid overloaded, will attempt IV diuresis and monitor renal function  · Consult nephrology to follow     Acute on chronic combined systolic and diastolic heart failure ([facility])   Assessment & Plan    · LV EF 35-40%  · Appears in acute heart failure with volume overload  · Start IV lasix, 40mg given in ED  · Monitor I/Os and daily weights  · Cardio consulted  Acute on chronic respiratory failure with hypoxia ([facility])   Assessment & Plan    · Normally on 6L NC at home, currently requiring 8-10L and still have desats into the 80s, likely from fluid overload  · CXR showed CHF  · Treat heart failure as noted  · O2, wean back to baseline as tolerated  PATITO (obstructive sleep apnea)   Assessment & Plan    Cont CPAP qHS at 10 with 6L O2     Type 2 diabetes mellitus, without long-term current use of insulin ([facility])   Assessment & Plan    Lab Results   Component Value Date    HGBA1C 5 3 06/12/2018       No results for input(s): POCGLU in the last 72 hours      Blood Sugar Average: Last 72 hrs:        Paroxysmal atrial fibrillation ([facility])   Assessment & Plan    · Cont beta blocker     Moderate COPD (chronic obstructive pulmonary disease) ([facility])   Assessment & Plan    · Last spirometry showed his FEV1 to be 1 85 liters or 76 percent predicted consistent moderate airflow obstruction  · Does not appear in exacerbation, dyspnea if likely from heart failure  · Cont home inhalers       VTE Prophylaxis: Heparin  / sequential compression device   Code Status: Level 3 - DNAR and DNI    Anticipated Length of Stay:  Patient will be admitted on an Inpatient basis with an anticipated length of stay of  > 2 midnights  Justification for Hospital Stay: IV diuresis    Total Time for Visit, including Counseling / Coordination of Care: 45 minutes  Greater than 50% of this total time spent on direct patient counseling and coordination of care  Chief Complaint:   Leg Swelling (pt presents to the ed with increased bi lateral leg swelling x 2 days  pt states he has increased sob as well )      History of Present Illness:    Nellie Mcgarry is a 80 y o  male with a PMH of COPD with chronic hypoxic resp failure on 6L O2, PATITO, T2DM, CKD3, CHF, Afib not on anticoagulation, hx DVT, PAD, AS s/p bioprosthetic aortic valve replacement  who presents with dyspnea and LE swelling  He was recently admited from his PCPs office for abnormal lab work showing acute renal failure  He has been having issues keeping his heart failure stable and has been getting higher doses of diuretic at home but creatinine was rising  He was admitted in obvious heart failure  He received IVF to improve preload and then was diuresed with IV lasix  He was discharged home on PO diuretic and since then has started to have recurrence of LE swelling and SOB  He cant lie flat  He has occasional wheezing  He denies cough,fevers, chills  Review of Systems:    Review of Systems   Constitutional: Negative for chills, fatigue and fever  HENT: Negative  Eyes: Negative  Respiratory: Positive for shortness of breath and wheezing  Negative for chest tightness  Cardiovascular: Positive for leg swelling  Negative for chest pain and palpitations  Gastrointestinal: Negative  Genitourinary: Negative  Negative for decreased urine volume  Musculoskeletal: Positive for back pain  All other systems reviewed and are negative        Past Medical and Surgical History:     Past Medical History:   Diagnosis Date    Allergic rhinitis 06/11/2010    Aortic stenosis, severe     Bacterial pneumonia 06/08/2007    Bladder neck obstruction 12/23/2010    BPH (benign prostatic hyperplasia)     Bronchitis, chronic obstructive, with exacerbation ([place] [address] ) 01/30/2012    CAD (coronary artery disease)     stent 2014    Carcinoma ([place] [address] )     resolved [id number]    Cardiomegaly 02/13/2007    Cataract of both eyes     CH[facility]congestive [facility] failure) [facility]     Chronic allergic conjunctivitis     last assessed [id number]    Chronic kidney disease (CKD)     CKD (chronic kidney disease), stage III (HCC)     CKD (chronic kidney disease), stage III ([facility]     COPD (chronic obstructive pulmonary disease) ([place] [address] )     Decubitus ulcer     resolved [id number]    Dermatomycosis     last assessed [id number]    Diabetes mellitus type 2, noninsulin dependent ([place] [address] )     Dyspnea on exertion     last assessed [id number]    Eczema     last assessed [id number]    Edema     last assessed [id number]    Epistaxis 12/01/2004    Esophagitis, erosive     Exposure to scabies     resolved [id number]    Fracture of rib     last assessed [id number]    H/O aortic valve replacement     resolved [id number]    H/O blood clots     History of DVT (deep vein thrombosis)     left posterior tibial/peroneal veins    History of non-ST elevation myocardial infarction (NSTEMI)     History of pneumonia     Hyperlipidemia     Hypertension     Internal hemorrhoids 09/13/2006    LBBB (left bundle branch block)     MRSA (methicillin resistant staph aureus) culture positive     NSTEMI (non-ST elevated myocardial infarction) ([place] [address] )     resolved [id number]    Obstructive sleep apnea on CPAP     severe PATITO with AHI of 106 and uses CPAP at 10 cm H2O with 2 l/m oxygen    [facility]ox[facility]mal atrial fibrillation ([facility] )     Phimosis 09/01/2010    severe pt had circumcision 9/2010    Pulmonary fibrosis ([facility] )     PVD (peripheral vascular disease) ([facility] )     Rotator cuff tendonitis     lsat assessed [id number]    Skin abscess 2004    Hip    Skin neoplasm     last assessed [id number]    Tachycardia 2004    Trigger finger     last assessed [id number]    Type 2 diabetes mellitus ([facility] ) 2004    Venous thrombosis 2007    Venous thrombosis 2007       Past Surgical History:   Procedure Laterality Date    CARDIAC CATHETERIZATION  03/10/2016    Showed 60-70% mid LAD lesion next to stent    CATARACT EXTRACTION      CIRCUMCISION, NON-      ESOPHAGOGASTRODUODENOSCOPY N/A 3/14/2016    Procedure: ESOPHAGOGASTRODUODENOSCOPY (EGD); Surgeon: Macy Medina MD;  Location: [facility]; Service:     EYE SURGERY      FRACTURE SURGERY      JOINT REPLACEMENT      both hips replaced    OTHER SURGICAL HISTORY      H/O thoracentesis (diagnostic)    TN REPLACE AORTIC VALVE OPENFEMORAL ARTERY APPROACH N/A 2016    Procedure: TRANSFEMORAL TAVR WITH 26MM CALIX MAKAYLA S3 TISSUE VALVE; PINA ;  Surgeon: Disha Rajput DO;  Location: [facility];  Service: Cardiac Surgery    REPLACEMENT TOTAL KNEE Bilateral 1991 and     TISSUE AORTIC VALVE REPLACEMENT      transfemoral, transcatheter    TONSILLECTOMY      TONSILLECTOMY AND ADENOIDECTOMY      TOTAL HIP ARTHROPLASTY      Bilateral       Meds/Allergies:    Prior to Admission medications    Medication Sig Start Date End Date Taking?  Authorizing Provider   albuterol (PROAIR HFA) 90 mcg/act inhaler Inhale 2 puffs every 4 (four) hours as needed for wheezing or shortness of breath 18  Yes Rhys Langford DO   amiodarone 100 mg tablet Take 1 tablet (100 mg total) by mouth daily 3/6/18  Yes Leopoldo Boas, MD   amLODIPine (NORVASC) 5 mg tablet Take 1 tablet (5 mg total) by mouth daily 18  Yes Kayden Perkins DO   aspirin (ECOTRIN LOW STRENGTH) 81 mg EC tablet Take 81 mg by mouth daily   Yes Historical Provider, MD   atorvastatin (LIPITOR) 10 mg tablet Take 2 tablets (20 mg total) by mouth daily 6/22/18  Yes Jose Roberto Fischer DO   B Complex Vitamins (VITAMIN B COMPLEX PO) Take by mouth   Yes Historical Provider, MD Manav Ellis USE 1 INHALATION DAILY 4/16/18  Yes ROC John   cyanocobalamin (VITAMIN B-12) 100 mcg tablet Take by mouth   Yes Historical Provider, MD   docusate sodium (COLACE) 100 mg capsule Take 100 mg by mouth daily at bedtime  Yes Historical Provider, MD   DULoxetine (CYMBALTA) 30 mg delayed release capsule take 1 capsule by mouth once daily 11/10/18  Yes Jose Roberto Fischer DO   fenofibrate (TRICOR) 145 mg tablet Take 1 tablet (145 mg total) by mouth daily 10/24/18  Yes Uri Jay MD   hydrALAZINE (APRESOLINE) 25 mg tablet TAKE 1 TABLET BY MOUTH EVERY 8 HOURS 11/20/18  Yes Uri Jay MD   IFEREX 150 150 MG capsule take 1 capsule by mouth once daily 8/31/18  Yes Mary Lou Isbell,    isosorbide mononitrate (IMDUR) 30 mg 24 hr tablet take 1 tablet by mouth once daily 9/7/18  Yes Uri Jay MD   levothyroxine 50 mcg tablet take 1 tablet by mouth once daily 11/4/18  Yes Jose Roberto Fischer DO   pantoprazole (PROTONIX) 40 mg tablet take 1 tablet by mouth once daily 5/24/18  Yes Jose Roberto Fischer DO   tolterodine (DETROL) 2 mg tablet Take 1 tablet (2 mg total) by mouth daily 10/16/18  Yes Jose Roberto Fischer DO   torsemide (DEMADEX) 20 mg tablet Take 20mg/d alternating with 30mg/d 11/23/18  Yes [facility] ANN Brunner   traMADol-acetaminophen (ULTRACET) 37 5-325 mg per tablet Take 1 tablet by mouth 2 (two) times a day as needed for moderate pain 9/24/18  Yes ROC John   aspirin 81 mg chewable tablet Chew daily    Historical Provider, MD   diclofenac sodium (VOLTAREN) 1 % Apply 4 g topically 4 (four) times a day Both shoulders and knees 9/19/18   Jose Roberto Fischer DO   metoprolol tartrate (LOPRESSOR) 25 mg tablet Take 1 tablet (25 mg total) by mouth every 12 (twelve) hours    Patient not taking: Reported on 11/29/2018 8/27/16   Yvonne Cavazos PA-C       Allergies: No Known Allergies    Social History:     Marital Status:    Substance Use History:   History   Alcohol Use    Yes     Comment: socially     History   Smoking Status    Former Smoker    Packs/day: 1 00    Years: 35 00    Types: Cigarettes    Quit date: 1/1/1987   Smokeless Tobacco    Never Used     Comment: quit 50 years ago, per [facility]     History   Drug Use No       Family History:    non-contributory    Physical Exam:     Vitals:   Blood Pressure: 163/75 (11/29/18 1717)  Pulse: 69 (11/29/18 1717)  Temperature: 98 °F (36 7 °C) (11/29/18 1717)  Temp Source: Tympanic (11/29/18 1311)  Respirations: 20 (11/29/18 1717)  Height: 5' 10" (177 8 cm) (11/29/18 1717)  Weight - Scale: 103 kg (226 lb 10 1 oz) (11/29/18 1717)  SpO2: 93 % (11/29/18 1840)    Physical Exam   Constitutional: He is oriented to person, place, and time  He appears well-developed  No distress  HENT:   Head: Normocephalic and atraumatic  Neck: JVD present  Cardiovascular: Normal rate and regular rhythm  Murmur heard  Pulmonary/Chest: Effort normal and breath sounds normal  No respiratory distress  He has no rales  + crackles, increased work of breathing  No conversational dyspnea   Abdominal: Soft  Bowel sounds are normal  He exhibits no distension  There is no tenderness  There is no rebound and no guarding  Musculoskeletal: He exhibits edema (3+ pitting)  Neurological: He is alert and oriented to person, place, and time  Skin: Skin is warm and dry  Psychiatric: He has a normal mood and affect  His behavior is normal    Nursing note and vitals reviewed  Additional Data:     Lab Results: I have personally reviewed pertinent reports          Results from last 7 days  Lab Units 11/29/18  1332   WBC Thousand/uL 8 61   HEMOGLOBIN g/dL 10 2*   HEMATOCRIT % 34 8*   PLATELETS Thousands/uL 279   NEUTROS PCT % 73       Results from last 7 days  Lab Units 11/29/18  1421   SODIUM mmol/L 140   POTASSIUM mmol/L 5 3   CHLORIDE mmol/L 104   CO2 mmol/L 27   BUN mg/dL 57*   CREATININE mg/dL 3 01*   CALCIUM mg/dL 9 2   TOTAL BILIRUBIN mg/dL 0 60   ALK PHOS U/L 55   ALT U/L 26   AST U/L 47*       Results from last 7 days  Lab Units 11/29/18  1332   INR  1 34*       Results from last 7 days  Lab Units 11/29/18  1332   TROPONIN I ng/mL 0 02           Results from last 7 days  Lab Units 11/29/18  1548 11/29/18  1332   LACTIC ACID mmol/L 1 0 2 2*       Imaging: I have personally reviewed pertinent reports  XR chest 1 view portable   Final Result by Jamison Pollard MD (11/29 1401)         1  Cardiomegaly  2   Chronic right pleural thickening  3   Mild CHF  Workstation performed: [id number]             XR chest 1 view portable   Final Result         1  Cardiomegaly  2   Chronic right pleural thickening  3   Mild CHF  Workstation performed: [id number]             EKG, Pathology, and Other Studies Reviewed on Admission:   · EKG:     Allscripts / Epic Records Reviewed: Yes     ** Please Note: This note has been constructed using a voice recognition system   **

## 2018-11-29 NOTE — RESPIRATORY THERAPY NOTE
copd booklet given with education at bedside for s/s of copd exacerbation, copd zone tool, energy conservation, copd medications, mdi, proper inhaler technique

## 2018-11-30 ENCOUNTER — TELEPHONE (OUTPATIENT)
Dept: NEPHROLOGY | Facility: CLINIC | Age: 83
End: 2018-11-30

## 2018-11-30 DIAGNOSIS — I48.0 PAROXYSMAL ATRIAL FIBRILLATION (HCC): Primary | ICD-10-CM

## 2018-11-30 LAB
ANION GAP SERPL CALCULATED.3IONS-SCNC: 9 MMOL/L (ref 4–13)
ATRIAL RATE: 108 BPM
BUN SERPL-MCNC: 60 MG/DL (ref 5–25)
CALCIUM SERPL-MCNC: 8.4 MG/DL (ref 8.3–10.1)
CHLORIDE SERPL-SCNC: 104 MMOL/L (ref 100–108)
CO2 SERPL-SCNC: 28 MMOL/L (ref 21–32)
CREAT SERPL-MCNC: 3.05 MG/DL (ref 0.6–1.3)
CREAT UR-MCNC: 95.1 MG/DL
ERYTHROCYTE [DISTWIDTH] IN BLOOD BY AUTOMATED COUNT: 16.9 % (ref 11.6–15.1)
GFR SERPL CREATININE-BSD FRML MDRD: 17 ML/MIN/1.73SQ M
GLUCOSE SERPL-MCNC: 118 MG/DL (ref 65–140)
HCT VFR BLD AUTO: 31 % (ref 36.5–49.3)
HGB BLD-MCNC: 9.4 G/DL (ref 12–17)
MCH RBC QN AUTO: 26.2 PG (ref 26.8–34.3)
MCHC RBC AUTO-ENTMCNC: 30.3 G/DL (ref 31.4–37.4)
MCV RBC AUTO: 86 FL (ref 82–98)
PLATELET # BLD AUTO: 234 THOUSANDS/UL (ref 149–390)
PMV BLD AUTO: 11 FL (ref 8.9–12.7)
POTASSIUM SERPL-SCNC: 4.1 MMOL/L (ref 3.5–5.3)
QRS AXIS: -64 DEGREES
QRSD INTERVAL: 154 MS
QT INTERVAL: 380 MS
QTC INTERVAL: 509 MS
RBC # BLD AUTO: 3.59 MILLION/UL (ref 3.88–5.62)
SODIUM 24H UR-SCNC: 38 MOL/L
SODIUM SERPL-SCNC: 141 MMOL/L (ref 136–145)
T WAVE AXIS: 113 DEGREES
UUN 24H UR-MCNC: 561 MG/DL
VENTRICULAR RATE: 108 BPM
WBC # BLD AUTO: 7.17 THOUSAND/UL (ref 4.31–10.16)

## 2018-11-30 PROCEDURE — G8978 MOBILITY CURRENT STATUS: HCPCS

## 2018-11-30 PROCEDURE — 94660 CPAP INITIATION&MGMT: CPT

## 2018-11-30 PROCEDURE — 94760 N-INVAS EAR/PLS OXIMETRY 1: CPT

## 2018-11-30 PROCEDURE — 97535 SELF CARE MNGMENT TRAINING: CPT

## 2018-11-30 PROCEDURE — G8987 SELF CARE CURRENT STATUS: HCPCS

## 2018-11-30 PROCEDURE — 97163 PT EVAL HIGH COMPLEX 45 MIN: CPT

## 2018-11-30 PROCEDURE — G8988 SELF CARE GOAL STATUS: HCPCS

## 2018-11-30 PROCEDURE — 99221 1ST HOSP IP/OBS SF/LOW 40: CPT | Performed by: INTERNAL MEDICINE

## 2018-11-30 PROCEDURE — 85027 COMPLETE CBC AUTOMATED: CPT | Performed by: STUDENT IN AN ORGANIZED HEALTH CARE EDUCATION/TRAINING PROGRAM

## 2018-11-30 PROCEDURE — 94640 AIRWAY INHALATION TREATMENT: CPT

## 2018-11-30 PROCEDURE — 80048 BASIC METABOLIC PNL TOTAL CA: CPT | Performed by: STUDENT IN AN ORGANIZED HEALTH CARE EDUCATION/TRAINING PROGRAM

## 2018-11-30 PROCEDURE — 97167 OT EVAL HIGH COMPLEX 60 MIN: CPT

## 2018-11-30 PROCEDURE — 93010 ELECTROCARDIOGRAM REPORT: CPT | Performed by: INTERNAL MEDICINE

## 2018-11-30 PROCEDURE — G8979 MOBILITY GOAL STATUS: HCPCS

## 2018-11-30 PROCEDURE — 99222 1ST HOSP IP/OBS MODERATE 55: CPT | Performed by: INTERNAL MEDICINE

## 2018-11-30 PROCEDURE — 99232 SBSQ HOSP IP/OBS MODERATE 35: CPT | Performed by: STUDENT IN AN ORGANIZED HEALTH CARE EDUCATION/TRAINING PROGRAM

## 2018-11-30 RX ORDER — FUROSEMIDE 10 MG/ML
60 INJECTION INTRAMUSCULAR; INTRAVENOUS
Status: COMPLETED | OUTPATIENT
Start: 2018-11-30 | End: 2018-12-06

## 2018-11-30 RX ORDER — IPRATROPIUM BROMIDE AND ALBUTEROL SULFATE 2.5; .5 MG/3ML; MG/3ML
3 SOLUTION RESPIRATORY (INHALATION)
Status: DISCONTINUED | OUTPATIENT
Start: 2018-11-30 | End: 2018-12-09 | Stop reason: HOSPADM

## 2018-11-30 RX ORDER — IPRATROPIUM BROMIDE AND ALBUTEROL SULFATE 2.5; .5 MG/3ML; MG/3ML
3 SOLUTION RESPIRATORY (INHALATION) EVERY 4 HOURS PRN
Status: DISCONTINUED | OUTPATIENT
Start: 2018-11-30 | End: 2018-12-09 | Stop reason: HOSPADM

## 2018-11-30 RX ADMIN — ISOSORBIDE MONONITRATE 30 MG: 30 TABLET, EXTENDED RELEASE ORAL at 09:49

## 2018-11-30 RX ADMIN — DICLOFENAC 2 G: 10 GEL TOPICAL at 21:46

## 2018-11-30 RX ADMIN — METOPROLOL TARTRATE 25 MG: 25 TABLET, FILM COATED ORAL at 21:46

## 2018-11-30 RX ADMIN — HYDRALAZINE HYDROCHLORIDE 25 MG: 25 TABLET ORAL at 05:17

## 2018-11-30 RX ADMIN — METOPROLOL TARTRATE 25 MG: 25 TABLET, FILM COATED ORAL at 09:56

## 2018-11-30 RX ADMIN — OXYBUTYNIN CHLORIDE 5 MG: 5 TABLET ORAL at 09:56

## 2018-11-30 RX ADMIN — ASPIRIN 81 MG: 81 TABLET, COATED ORAL at 09:56

## 2018-11-30 RX ADMIN — DOCUSATE SODIUM 100 MG: 100 CAPSULE, LIQUID FILLED ORAL at 21:46

## 2018-11-30 RX ADMIN — FUROSEMIDE 60 MG: 10 INJECTION, SOLUTION INTRAMUSCULAR; INTRAVENOUS at 09:57

## 2018-11-30 RX ADMIN — IPRATROPIUM BROMIDE AND ALBUTEROL SULFATE 3 ML: 2.5; .5 SOLUTION RESPIRATORY (INHALATION) at 19:39

## 2018-11-30 RX ADMIN — DICLOFENAC 2 G: 10 GEL TOPICAL at 11:10

## 2018-11-30 RX ADMIN — AMLODIPINE BESYLATE 5 MG: 5 TABLET ORAL at 09:49

## 2018-11-30 RX ADMIN — VITAM B12 100 MCG: 100 TAB at 09:57

## 2018-11-30 RX ADMIN — FUROSEMIDE 60 MG: 10 INJECTION, SOLUTION INTRAMUSCULAR; INTRAVENOUS at 16:42

## 2018-11-30 RX ADMIN — HEPARIN SODIUM 5000 UNITS: 5000 INJECTION, SOLUTION INTRAVENOUS; SUBCUTANEOUS at 21:46

## 2018-11-30 RX ADMIN — LEVOTHYROXINE SODIUM 50 MCG: 50 TABLET ORAL at 05:17

## 2018-11-30 RX ADMIN — PANTOPRAZOLE SODIUM 40 MG: 40 TABLET, DELAYED RELEASE ORAL at 09:49

## 2018-11-30 RX ADMIN — HEPARIN SODIUM 5000 UNITS: 5000 INJECTION, SOLUTION INTRAVENOUS; SUBCUTANEOUS at 05:17

## 2018-11-30 RX ADMIN — HYDRALAZINE HYDROCHLORIDE 25 MG: 25 TABLET ORAL at 11:02

## 2018-11-30 RX ADMIN — FLUTICASONE FUROATE AND VILANTEROL TRIFENATATE 1 PUFF: 100; 25 POWDER RESPIRATORY (INHALATION) at 09:49

## 2018-11-30 RX ADMIN — HEPARIN SODIUM 5000 UNITS: 5000 INJECTION, SOLUTION INTRAVENOUS; SUBCUTANEOUS at 14:30

## 2018-11-30 RX ADMIN — HYDRALAZINE HYDROCHLORIDE 25 MG: 25 TABLET ORAL at 19:48

## 2018-11-30 RX ADMIN — Medication 150 MG: at 09:56

## 2018-11-30 RX ADMIN — AMIODARONE HYDROCHLORIDE 100 MG: 200 TABLET ORAL at 09:55

## 2018-11-30 NOTE — ASSESSMENT & PLAN NOTE
· Baseline creatinine 1 8-2, on admission 3 01  · Has had issues recently with rising creatinine with increased diuretic  · Recent renal US negative for hydro     · Consult nephrology to follow

## 2018-11-30 NOTE — TELEPHONE ENCOUNTER
Patient is in the hospital at [address] [place] [address] he is due to have blood work on 12/23 but Sanam Warner feels that the blood work that has been taken in the hospital might be enough for his [place] appointment

## 2018-11-30 NOTE — UTILIZATION REVIEW
Continued Stay Review    Date: 11/30/18    Vital Signs: /69 (BP Location: Left arm)   Pulse 65   Temp 97 9 °F (36 6 °C) (Oral)   Resp 20   Ht 5' 10" (1 778 m)   Wt 106 kg (233 lb 0 4 oz)   SpO2 99%   BMI 33 44 kg/m²     TELE   DAILY WEIGHT I/O  BIPAP  HIGH FLOW OXYGEN NC 8L  Medications:   Scheduled Meds:   Current Facility-Administered Medications:  acetaminophen 325 mg Oral Q12H PRN Shanna Mata MD   And       traMADol 50 mg Oral Q12H PRN Shanna Mata MD   amiodarone 100 mg Oral Daily Shanna Mata MD   amLODIPine 5 mg Oral Daily Shanna Mata MD   aspirin 81 mg Oral Daily Shanna Mata MD   cyanocobalamin 100 mcg Oral Daily Shanna Mata MD   diclofenac sodium 2 g Topical Q12H [address] ROC Childs   docusate sodium 100 mg Oral HS Shanna Mata MD   fluticasone-vilanterol 1 puff Inhalation Daily Shanna Mata MD   furosemide 60 mg Intravenous BID (diuretic) Ashlyn Calzada MD   heparin (porcine) 5,000 Units Subcutaneous Q8H 3630 Eduardo Todd MD   hydrALAZINE 25 mg Oral Q8H Shanna Mata MD   iron polysaccharides 150 mg Oral Daily Shanna Mata MD   isosorbide mononitrate 30 mg Oral Daily Shanna Mata MD   levothyroxine 50 mcg Oral Daily Shanna Mata MD   metoprolol tartrate 25 mg Oral Q12H 3630 Eduardo Todd MD   ondansetron 4 mg Intravenous Q6H PRN Shanna Mata MD   oxybutynin 5 mg Oral Daily Shanna Mata MD   pantoprazole 40 mg Oral Daily Shanna Mata MD     Continuous Infusions:    PRN Meds:   acetaminophen **AND** traMADol    ondansetron    Abnormal Labs/Diagnostic Results:     Age/Sex: 80 y o  male     NEPHROLOGY CONSULT  1 ) Acute kidney injury  -POA  -admission creatinine 3 01, creatinine stable today at 3 05  -I suspect that this is in the setting of volume overload and possible cardiorenal syndrome  -would continue diuresis but I will increase the dose to 60 mg IV b i d   -may need to accept a higher baseline creatinine about volume overload   -urinalysis trace protein no blood    I do not suspect an underlying glomerular process  -check urine studies  -nonoliguric  -monitor input and output  -no clinical indication for renal ultrasound at this time  -would recommend reducing the dose to the Voltaren gel if possible, as there is some systemic absorption which could lead to nephrotoxicity  2 ) Acute on chronic combined heart failure  -ejection fraction 35-40%  -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  -examines volume overloaded, with edema and weight gain over 10 lb, chest x-ray shows congestion  -cardiology has been consulted  -proBNP level 16,996  -increase IV Lasix to 60 mg twice a day  -monitor input and output  -low 2 g sodium diet  -would recommend reducing the dose of the Voltaren gel  3 ) Chronic kidney disease stage IV  -outpatient nephrologist Dr Dorothy Lucio  -baseline creatinine 1 8-2 2 mg/dL  -benign renal ultrasound  4 ) Hypertension  -blood pressure currently acceptable  -continue current management  5 ) Anemia  -likely secondary to chronic kidney disease  -continue iron supplement  SUMMARY OF RECOMMENDATIONS:  · Increase furosemide to 60 mg twice a day  · Monitor input and output  · Monitor daily weights  · Aim for net negative fluid balance  · Low 2 g sodium diet  · Cardiology evaluation  · Would recommend reducing the dose the Voltaren gel, I have reduced to 2 g, but the frequency could be reduce that would also be helpful    CARDIOLOGY CONSULT  Assessment:  1  Acute renal failure on chronic kidney disease stage 4  2  Acute on chronic combined systolic and diastolic heart failure   3  Diabetes    4  Acute on chronic respiratory failure with hypoxia   5  Pulmonary hypertension with interstitial lung disease  6  Obstructive sleep apnea with CPAP at home  7  Morbid obesity   8  Paroxysmal atrial fibrillation:  Not on anticoagulation secondary to high risk meds, frequent falls and risk of bleeding  Plan:  Patient has been admitted to the hospitalist service  1   Continue IV diuretics as per Nephrology, and will continue to monitor his renal function and I&O  2  Continue his home medications  And monitor telemetry  3  We will decrease Voltaren gel dosage to b i d  And monitor   4  Continue his CPAP as he takes at home   Long discussion with patient regarding his ongoing multiple health conditions  Patient has elected DNR and DNI code status   Patient at this time is not ready for palliative or hospice care

## 2018-11-30 NOTE — CONSULTS
Consultation - Cardiology   Alina Slade 80 y o  male MRN: [id number]  Unit/Bed#: [address] [facility] 421-01 Encounter: [id number]    Assessment/Plan     Assessment:  1  Acute renal failure on chronic kidney disease stage 4  2  Acute on chronic combined systolic and diastolic heart failure   3  Diabetes    4  Acute on chronic respiratory failure with hypoxia   5  Pulmonary hypertension with interstitial lung disease  6  Obstructive sleep apnea with CPAP at home  7  Morbid obesity   8  Paroxysmal atrial fibrillation:  Not on anticoagulation secondary to high risk meds, frequent falls and risk of bleeding    Plan:  Patient has been admitted to the hospitalist service  1  Continue IV diuretics as per Nephrology, and will continue to monitor his renal function and I&O  2  Continue his home medications  And monitor telemetry  3  We will decrease Voltaren gel dosage to b i d  And monitor   4  Continue his CPAP as he takes at home    History of Present Illness   Physician Requesting Consult: Chaka Etienne MD  Reason for Consult / Principal Problem:  Congestive heart failure and cardiorenal syndrome,  HPI: Alina Slade is a 80y o  year old male with a longstanding history of acute on chronic combined systolic and diastolic heart failure with ejection fraction 35%, acute kidney injury on chronic kidney disease, interstitial fibrosis, history of aortic stenosis with TAVR and 2017 and pulmonary hypertension  He presented to the emergency room with worsening lower extremity edema, shortness of breath and elevation of his creatinine on outpatient labs  Patient's baseline creatinine in the past had been 2 2, on admission he was 3 01 and today he is 2 05  Nephrology is following patient  On recent echocardiogram, of 11/16/2018, patient was noted to have an ejection fraction of 35% with severe diffuse hypokinesis of the left ventricle  He also had grade 1 diastolic dysfunction, biatrial enlargement    Mitral valve regurgitation mild aortic stenosis in his bioprosthetic valve and it was noted that his pulmonary artery pressures were moderately increased  Long discussion with patient regarding his ongoing multiple health conditions  Patient has elected DNR and DNI code status as he wishes quality of life and not quantity  Discussed with patient his goals to be able to breathe comfortably and have minimal pain  Patient states that hemodialysis is not an option  Patient at this time is not ready for palliative or hospice care  Consult to cardiology  Consult performed by: Robert Bowens ordered by: Mai Prasad          Review of Systems   Constitutional: Positive for activity change and fatigue  Negative for chills and diaphoresis  HENT: Negative for congestion, ear discharge, nosebleeds and sore throat  Eyes: Negative for photophobia, redness and visual disturbance  Respiratory: Positive for cough and shortness of breath  Negative for choking, chest tightness and wheezing  Cardiovascular: Positive for leg swelling  Negative for chest pain and palpitations  Gastrointestinal: Negative for abdominal distention, abdominal pain, blood in stool, constipation, diarrhea and vomiting  Endocrine: Negative for polydipsia, polyphagia and polyuria  Genitourinary: Positive for frequency  Negative for penile swelling and scrotal swelling  Musculoskeletal: Positive for arthralgias, back pain and gait problem  Skin: Negative for color change and pallor  Neurological: Negative for dizziness, seizures, syncope, weakness, light-headedness and headaches  Hematological: Negative  Psychiatric/Behavioral: Negative          Historical Information   Past Medical History:   Diagnosis Date    Allergic rhinitis 06/11/2010    Aortic stenosis, severe     Bacterial pneumonia 06/08/2007    Bladder neck obstruction 12/23/2010    BPH (benign prostatic hyperplasia)     [facility], [facility] 01/30/2012    CAD (coronary artery disease)     stent 2014    Carcinoma ([facility] )     resolved [id number]    Cardiomegaly 02/13/2007    Cataract of both eyes     CHF (congestive heart failure) (formerly [facility])     Chronic allergic conjunctivitis     last assessed [id number]    Chronic kidney disease (CKD)     CKD (chronic kidney disease), stage III (HCC)     CKD (chronic kidney disease), stage III (HCC)     COPD (chronic obstructive pulmonary disease) ([facility] )     Decubitus ulcer     resolved [id number]    Dermatomycosis     last assessed [id number]    Diabetes mellitus type 2, noninsulin dependent ([facility] )     Dyspnea on exertion     last assessed [id number]    Eczema     last assessed [id number]    Edema     last assessed [id number]    Epistaxis 12/01/2004    Esophagitis, erosive     Exposure to scabies     resolved [id number]    Fracture of rib     last assessed [id number]    H/O aortic valve replacement     resolved [id number]    H/O blood clots     History of DVT (deep vein thrombosis)     left posterior tibial/peroneal veins    History of non-ST elevation myocardial infarction (NSTEMI)     History of pneumonia     Hyperlipidemia     Hypertension     Internal hemorrhoids 09/13/2006    LBBB (left bundle branch block)     MRSA (methicillin resistant staph aureus) culture positive     NSTEMI (non-ST elevated myocardial infarction) ([facility] )     resolved [id number]    Obstructive sleep apnea on CPAP     severe PATITO with AHI of 106 and uses CPAP at 10 cm H2O with 2 l/m oxygen    [facility]ox[facility] fibrillation [facility]     Phimosis 09/01/2010    severe pt had circumcision 9/2010    Pulmonary fibrosis ([facility] )     PVD (peripheral vascular disease) ([facility] )     Rotator cuff tendonitis     lsat assessed [id number]    Skin abscess 12/21/2004    Hip    Skin neoplasm     last assessed [id number]    Tachycardia 12/01/2004    Trigger finger     last assessed [id number]    Type 2 diabetes mellitus ([facility] ) 12/01/2004    Venous thrombosis 2007    Venous thrombosis 2007     Past Surgical History:   Procedure Laterality Date    CARDIAC CATHETERIZATION  03/10/2016    Showed 60-70% mid LAD lesion next to stent    CATARACT EXTRACTION      CIRCUMCISION, NON-      ESOPHAGOGASTRODUODENOSCOPY N/A 3/14/2016    Procedure: ESOPHAGOGASTRODUODENOSCOPY (EGD); Surgeon: Elijah Candelario MD;  Location: [facility];   Service:     EYE SURGERY      FRACTURE SURGERY      JOINT REPLACEMENT      both hips replaced    OTHER SURGICAL HISTORY      H/O thoracentesis (diagnostic)    AR REPLACE AORTIC VALVE OPENFEMORAL ARTERY APPROACH N/A 2016    Procedure: TRANSFEMORAL TAVR WITH 26MM CALIX MAKAYLA S3 TISSUE VALVE; PINA ;  Surgeon: Lizz Olsen DO;  Location:  MAIN OR;  Service: Cardiac Surgery    REPLACEMENT TOTAL KNEE Bilateral 1991 and     TISSUE AORTIC VALVE REPLACEMENT      transfemoral, transcatheter    TONSILLECTOMY      TONSILLECTOMY AND ADENOIDECTOMY      TOTAL HIP ARTHROPLASTY      Bilateral     History   Alcohol Use    Yes     Comment: socially     History   Drug Use No     History   Smoking Status    Former Smoker    Packs/day: 1 00    Years: 35 00    Types: Cigarettes    Quit date: 1987   Smokeless Tobacco    Never Used     Comment: quit 50 years ago, per [employer]     Family History:   Family History   Problem Relation Age of Onset    Coronary artery disease Mother     Arthritis Mother     Hypertension Mother     Rheum arthritis Father     Prostate cancer Father        Meds/Allergies   all current active meds have been reviewed, current meds:   Current Facility-Administered Medications   Medication Dose Route Frequency    acetaminophen (TYLENOL) tablet 325 mg  325 mg Oral Q12H PRN    And    traMADol (ULTRAM) tablet 50 mg  50 mg Oral Q12H PRN    amiodarone tablet 100 mg  100 mg Oral Daily    amLODIPine (NORVASC) tablet 5 mg  5 mg Oral Daily    aspirin (ECOTRIN LOW STRENGTH) EC tablet 81 mg  81 mg Oral Daily    cyanocobalamin (VITAMIN B-12) tablet 100 mcg  100 mcg Oral Daily    diclofenac sodium (VOLTAREN) 1 % topical gel 2 g  2 g Topical Q12H [facility]    docusate sodium (COLACE) capsule 100 mg  100 mg Oral HS    fluticasone-vilanterol (BREO ELLIPTA) 100-25 mcg/inh inhaler 1 puff  1 puff Inhalation Daily    furosemide (LASIX) injection 60 mg  60 mg Intravenous BID (diuretic)    heparin (porcine) subcutaneous injection 5,000 Units  5,000 Units Subcutaneous Q8H [facility]    hydrALAZINE (APRESOLINE) tablet 25 mg  25 mg Oral Q8H    iron polysaccharides (NIFEREX) capsule 150 mg  150 mg Oral Daily    isosorbide mononitrate (IMDUR) 24 hr tablet 30 mg  30 mg Oral Daily    levothyroxine tablet 50 mcg  50 mcg Oral Daily    metoprolol tartrate (LOPRESSOR) tablet 25 mg  25 mg Oral Q12H [facility]    ondansetron (ZOFRAN) injection 4 mg  4 mg Intravenous Q6H PRN    oxybutynin (DITROPAN) tablet 5 mg  5 mg Oral Daily    pantoprazole (PROTONIX) EC tablet 40 mg  40 mg Oral Daily    and PTA meds:   Prior to Admission Medications   Prescriptions Last Dose Informant Patient Reported? Taking?    B Complex Vitamins (VITAMIN B COMPLEX PO) 11/29/2018 at Unknown time Self Yes Yes   Sig: Take by mouth   BREO ELLIPTA 11/29/2018 at Unknown time Self No Yes   Sig: USE 1 INHALATION DAILY   DULoxetine (CYMBALTA) 30 mg delayed release capsule 11/29/2018 at Unknown time  No Yes   Sig: take 1 capsule by mouth once daily   IFEREX 150 150 MG capsule 11/29/2018 at Unknown time Self No Yes   Sig: take 1 capsule by mouth once daily   albuterol (PROAIR HFA) 90 mcg/act inhaler 11/29/2018 at Unknown time Self No Yes   Sig: Inhale 2 puffs every 4 (four) hours as needed for wheezing or shortness of breath   amLODIPine (NORVASC) 5 mg tablet 11/29/2018 at Unknown time  No Yes   Sig: Take 1 tablet (5 mg total) by mouth daily   amiodarone 100 mg tablet 11/29/2018 at Unknown time Self No Yes   Sig: Take 1 tablet (100 mg total) by mouth daily   aspirin (ECOTRIN LOW STRENGTH) 81 mg EC tablet 11/29/2018 at Unknown time Spouse/Significant Other Yes Yes   Sig: Take 81 mg by mouth daily   aspirin 81 mg chewable tablet  Self Yes No   Sig: Chew daily   atorvastatin (LIPITOR) 10 mg tablet 11/29/2018 at Unknown time Self No Yes   Sig: Take 2 tablets (20 mg total) by mouth daily   cyanocobalamin (VITAMIN B-12) 100 mcg tablet 11/29/2018 at Unknown time Self Yes Yes   Sig: Take by mouth   diclofenac sodium (VOLTAREN) 1 %   No No   Sig: Apply 4 g topically 4 (four) times a day Both shoulders and knees   docusate sodium (COLACE) 100 mg capsule 11/28/2018 at Unknown time Self Yes Yes   Sig: Take 100 mg by mouth daily at bedtime  fenofibrate (TRICOR) 145 mg tablet 11/29/2018 at Unknown time  No Yes   Sig: Take 1 tablet (145 mg total) by mouth daily   hydrALAZINE (APRESOLINE) 25 mg tablet 11/29/2018 at Unknown time  No Yes   Sig: TAKE 1 TABLET BY MOUTH EVERY 8 HOURS   isosorbide mononitrate (IMDUR) 30 mg 24 hr tablet 11/29/2018 at Unknown time Self No Yes   Sig: take 1 tablet by mouth once daily   levothyroxine 50 mcg tablet 11/29/2018 at Unknown time  No Yes   Sig: take 1 tablet by mouth once daily   metoprolol tartrate (LOPRESSOR) 25 mg tablet Not Taking at Unknown time Self No No   Sig: Take 1 tablet (25 mg total) by mouth every 12 (twelve) hours     Patient not taking: Reported on 11/29/2018    pantoprazole (PROTONIX) 40 mg tablet 11/29/2018 at Unknown time Self No Yes   Sig: take 1 tablet by mouth once daily   tolterodine (DETROL) 2 mg tablet 11/29/2018 at Unknown time  No Yes   Sig: Take 1 tablet (2 mg total) by mouth daily   torsemide (DEMADEX) 20 mg tablet 11/29/2018 at Unknown time  No Yes   Sig: Take 20mg/d alternating with 30mg/d   traMADol-acetaminophen (ULTRACET) 37 5-325 mg per tablet 11/29/2018 at Unknown time  No Yes   Sig: Take 1 tablet by mouth 2 (two) times a day as needed for moderate pain      Facility-Administered Medications: None No Known Allergies    Objective   Vitals: Blood pressure 127/69, pulse 65, temperature 97 9 °F (36 6 °C), temperature source Oral, resp  rate 20, height 5' 10" (1 778 m), weight 106 kg (233 lb 0 4 oz), SpO2 99 %  Orthostatic Blood Pressures      Most Recent Value   Blood Pressure  127/69 filed at 11/30/2018 0840   Patient Position - Orthostatic VS  Lying filed at 11/30/2018 0840            Intake/Output Summary (Last 24 hours) at 11/30/18 1101  Last data filed at 11/30/18 0901   Gross per 24 hour   Intake              120 ml   Output             1740 ml   Net            -1620 ml       Invasive Devices     Peripheral Intravenous Line            Peripheral IV 11/29/18 Left Hand less than 1 day                Physical Exam   Constitutional: He is oriented to person, place, and time  He appears well-developed and well-nourished  No distress  HENT:   Head: Normocephalic and atraumatic  Eyes: Pupils are equal, round, and reactive to light  Right eye exhibits no discharge  Left eye exhibits no discharge  Neck: Normal range of motion  Neck supple  JVD present  No thyromegaly present  Cardiovascular: Normal pulses  An irregular rhythm present  No extrasystoles are present  Murmur heard  Systolic murmur is present with a grade of 3/6   Slightly irregular secondary to PACs   Pulmonary/Chest: Effort normal  He has no wheezes  Fine and coarse scattered crackles  No wheezing appreciated   Abdominal: Soft  Bowel sounds are normal  He exhibits no distension  Musculoskeletal: He exhibits edema  Patient with +3 bilateral lower extremity edema to mid thigh   Neurological: He is alert and oriented to person, place, and time  Skin: Skin is warm and dry  He is not diaphoretic  Psychiatric: He has a normal mood and affect  Nursing note and vitals reviewed  Lab Results:   I have personally reviewed pertinent lab results      CBC with diff:   Results from last 7 days  Lab Units 11/30/18  0543   WBC Thousand/uL 7 17   RBC Million/uL 3 59*   HEMOGLOBIN g/dL 9 4*   HEMATOCRIT % 31 0*   MCV fL 86   MCH pg 26 2*   MCHC g/dL 30 3*   RDW % 16 9*   MPV fL 11 0   PLATELETS Thousands/uL 234     CMP:   Results from last 7 days  Lab Units 11/30/18  0136 11/29/18  1421   SODIUM mmol/L 141 140   POTASSIUM mmol/L 4 1 5 3   CHLORIDE mmol/L 104 104   CO2 mmol/L 28 27   BUN mg/dL 60* 57*   CREATININE mg/dL 3 05* 3 01*   CALCIUM mg/dL 8 4 9 2   AST U/L  --  47*   ALT U/L  --  26   ALK PHOS U/L  --  55   EGFR ml/min/1 73sq m 17 18     Troponin:   0  Lab Value Date/Time   TROPONINI 0 02 11/29/2018 1332   TROPONINI <0 02 07/18/2018 0831   TROPONINI <0 02 06/12/2018 0206   TROPONINI <0 02 06/11/2018 2020   TROPONINI 0 02 06/11/2018 1415   TROPONINI <0 02 03/28/2018 2034   TROPONINI 0 05 (H) 01/07/2017 0531   TROPONINI 0 07 (H) 01/06/2017 2348   TROPONINI 0 07 (H) 01/06/2017 1800   TROPONINI 0 02 11/25/2016 0600   TROPONINI 0 02 11/24/2016 2329   TROPONINI <0 02 11/24/2016 1938   TROPONINI 0 04 (H) 05/26/2016 1703   TROPONINI 0 03 05/26/2016 1312   TROPONINI 0 02 05/26/2016 0950   TROPONINI <0 02 05/16/2016 1154   TROPONINI 8 73 (H) 03/05/2016 1247   TROPONINI 10 10 (H) 03/05/2016 0534   TROPONINI 6 59 (H) 03/04/2016 2341   TROPONINI 1 93 (H) 03/04/2016 1936   TROPONINI 0 66 (H) 03/04/2016 1335     BNP:   Results from last 7 days  Lab Units 11/30/18  0136   POTASSIUM mmol/L 4 1   CHLORIDE mmol/L 104   CO2 mmol/L 28   BUN mg/dL 60*   CREATININE mg/dL 3 05*   CALCIUM mg/dL 8 4   EGFR ml/min/1 73sq m 17     Coags:   Results from last 7 days  Lab Units 11/29/18  1332   PTT seconds 19*   INR  1 34*     TSH:     Magnesium:     Lipid Profile:     Imaging: I have personally reviewed pertinent reports      EKG:  Sinus rhythm with left axis deviation and IVCD  VTE Prophylaxis: Sequential compression device Melvena Barley)     Code Status: Level 3 - DNAR and DNI  Advance Directive and Living Will: Yes    Power of :    POLST:      Counseling / Coordination of Care  Total floor / unit time spent today 60 minutes  Greater than 50% of total time was spent with the patient and / or family counseling and / or coordination of care  A description of the counseling / coordination of care:  Acute on chronic systolic heart failure with volume overload and acute renal failure

## 2018-11-30 NOTE — PROGRESS NOTES
Progress Note - Wilbur Phoenix 10/24/1930, 80 y o  male MRN: [id number]    Unit/Bed#: [address] [place] [address] Encounter: [id number]    Primary Care Provider: Myke Zeng DO   Date and time admitted to hospital: 2018  1:14 PM        Acute renal failure superimposed on stage 4 chronic kidney disease (HCC)   Assessment & Plan    · Baseline creatinine 1 8-2, on admission 3 01  · Has had issues recently with rising creatinine with increased diuretic  · Recent renal US negative for hydro  · Consult nephrology to follow     * Acute on chronic combined systolic and diastolic heart failure (HCC)   Assessment & Plan    · LV EF 35-40%  · Appears in acute heart failure with volume overload  · nephrology and cardio consulted  · Increased IV lasix dose  · Monitor I/Os and daily weights     Paroxysmal atrial fibrillation (HCC)   Assessment & Plan    · Cont beta blocker           VTE Pharmacologic Prophylaxis:   Pharmacologic: Heparin  Mechanical VTE Prophylaxis in Place: Yes    Patient Centered Rounds: I have performed bedside rounds with nursing staff today  Discussions with Specialists or Other Care Team Provider: Yes  Education and Discussions with Family / Patient:Yes  Time Spent for Care: 30 minutes  More than 50% of total time spent on counseling and coordination of care as described above  Current Length of Stay: 1 day(s)  Current Patient Status: Inpatient     Discharge Plan: pending    Code Status: Level 3 - DNAR and DNI      Subjective:   Feels ok, still has SOB when he lays flat and still feels very swollen in his legs  No other complaints  Objective:     Vitals:   Temp (24hrs), Av 8 °F (36 6 °C), Min:97 1 °F (36 2 °C), Max:98 °F (36 7 °C)    Temp:  [97 1 °F (36 2 °C)-98 °F (36 7 °C)] 98 °F (36 7 °C)  HR:  [62-65] 65  Resp:  [18-20] 18  BP: (127-168)/(67-76) 146/67  SpO2:  [93 %-99 %] 96 %  Body mass index is 33 44 kg/m²  Input and Output Summary (last 24 hours):      Intake/Output Summary (Last 24 hours) at 11/30/18 1821  Last data filed at 11/30/18 1659   Gross per 24 hour   Intake              360 ml   Output             2130 ml   Net            -1770 ml        Physical Exam:     Physical Exam   Constitutional: He is oriented to person, place, and time  He appears well-developed  No distress  HENT:   Head: Normocephalic and atraumatic  Neck: JVD present  Cardiovascular: Normal rate and regular rhythm  Murmur heard  irregular   Pulmonary/Chest: Effort normal and breath sounds normal  No respiratory distress  He has no wheezes  He has no rales  Abdominal: Soft  Bowel sounds are normal  He exhibits no distension  There is no tenderness  There is no rebound  Musculoskeletal: He exhibits edema (3+ pitting)  Neurological: He is alert and oriented to person, place, and time  Skin: Skin is warm and dry  Psychiatric: He has a normal mood and affect  His behavior is normal    Nursing note and vitals reviewed  Additional Data:     Labs:      Results from last 7 days  Lab Units 11/30/18  0543 11/29/18  1332   WBC Thousand/uL 7 17 8 61   HEMOGLOBIN g/dL 9 4* 10 2*   HEMATOCRIT % 31 0* 34 8*   PLATELETS Thousands/uL 234 279   NEUTROS PCT %  --  73       Results from last 7 days  Lab Units 11/30/18  0136 11/29/18  1421   SODIUM mmol/L 141 140   POTASSIUM mmol/L 4 1 5 3   CHLORIDE mmol/L 104 104   CO2 mmol/L 28 27   BUN mg/dL 60* 57*   CREATININE mg/dL 3 05* 3 01*   CALCIUM mg/dL 8 4 9 2   TOTAL BILIRUBIN mg/dL  --  0 60   ALK PHOS U/L  --  55   ALT U/L  --  26   AST U/L  --  47*       Results from last 7 days  Lab Units 11/29/18  1332   INR  1 34*       Results from last 7 days  Lab Units 11/29/18  1332   TROPONIN I ng/mL 0 02     Lab Results   Component Value Date/Time    HGBA1C 5 3 06/12/2018 06:14 AM           Results from last 7 days  Lab Units 11/29/18  1548 11/29/18  1332   LACTIC ACID mmol/L 1 0 2 2*       * I Have Reviewed All Lab Data Listed Above    * Additional Pertinent Lab Tests Reviewed: All Labs Within Last 24 Hours Reviewed    Imaging:     XR chest 1 view portable   Final Result by Margaux Vila MD (11/29 5541)         1  Cardiomegaly  2   Chronic right pleural thickening  3   Mild CHF  Workstation performed: [id number]           Imaging Reports Reviewed by myself    Cultures:   Blood Culture:   Lab Results   Component Value Date    BLOODCX No Growth at 24 hrs  11/29/2018    BLOODCX No Growth at 24 hrs  11/29/2018    BLOODCX No Growth After 5 Days  01/06/2017    BLOODCX No Growth After 5 Days  01/06/2017    BLOODCX No Growth After 5 Days  03/06/2016    BLOODCX No Growth After 5 Days   03/06/2016     Urine Culture:   Lab Results   Component Value Date    URINECX No Growth <1000 cfu/mL 03/04/2016     Sputum Culture: No components found for: SPUTUMCX  Wound Culture: No results found for: WOUNDCULT    Last 24 Hours Medication List:     Current Facility-Administered Medications:  acetaminophen 325 mg Oral Q12H PRN Sandip Sofia MD   And       traMADol 50 mg Oral Q12H PRN Sandip Sofia MD   amiodarone 100 mg Oral Daily Sandip Sofia MD   amLODIPine 5 mg Oral Daily Sandip Sofia MD   aspirin 81 mg Oral Daily Sandip Sofia MD   cyanocobalamin 100 mcg Oral Daily Sandip Sofia MD   diclofenac sodium 2 g Topical Q12H [facility]   docusate sodium 100 mg Oral HS Sandip Sofia MD   fluticasone-vilanterol 1 puff Inhalation Daily Sandip Sofia MD   furosemide 60 mg Intravenous BID (diuretic) Graciela Roque MD   heparin (porcine) 5,000 Units Subcutaneous Q8H 3630 Eduardo Todd MD   hydrALAZINE 25 mg Oral Q8H Sandip Sofia MD   ipratropium-albuterol 3 mL Nebulization Q4H PRN Sandip Sofia MD   ipratropium-albuterol 3 mL Nebulization 4x Daily Sandip Sofia MD   iron polysaccharides 150 mg Oral Daily Sandip Sofia MD   isosorbide mononitrate 30 mg Oral Daily Sandip Sofia MD   levothyroxine 50 mcg Oral Daily Sandip Sofia MD   metoprolol tartrate 25 mg Oral Q12H 3630 Eduardo Todd MD   ondansetron 4 mg Intravenous Q6H PRN Damon Way MD   oxybutynin 5 mg Oral Daily Damon Way MD   pantoprazole 40 mg Oral Daily Damon Way MD        Today, Patient Was Seen By: Damon Way MD    ** Please Note: Dragon 360 Dictation voice to text software may have been used in the creation of this document   **

## 2018-11-30 NOTE — PHYSICAL THERAPY NOTE
PT EVALUATION     11/30/18 1624   Note Type   Note type Eval only   Pain Assessment   Pain Assessment 0-10   Pain Score 3   Pain Type Chronic pain   Pain Location Shoulder;Knee   Pain Orientation Bilateral   Home Living   Type of [address] [place] [address] One level;Elevator   Bathroom Shower/Tub Walk-in shower   Bathroom Equipment Grab bars in shower; [address]; Wheelchair-manual  (RW;O2;CPap)   Prior Function   Level of [place] Needs assistance with ADLs and functional mobility; Needs assistance with IADLs  (amb with RW PTA inside;uses w/c outside for long distances)   Lives With Significant other   Receives Help From Home health  (7x/wk x 4 hrs)   ADL Assistance Needs assistance   IADLs Needs assistance   Restrictions/Precautions   Other Precautions Contact/isolation;O2;Fall Risk;Bed Alarm; Chair Alarm;Pain   General   Additional Pertinent History Pt adm with dyspnea and LE swelling  Family/Caregiver Present No   Cognition   Overall Cognitive Status WFL   Arousal/Participation Cooperative   Attention Within functional limits   Orientation Level Oriented X4   Following Commands Follows all commands and directions without difficulty   RLE Assessment   RLE Assessment WFL  (3+ to 4-/5)   LLE Assessment   LLE Assessment WFL  (3+ to 4-/5)   Bed Mobility   Sit to Supine 2  Maximal assistance   Additional items Assist x 1;LE management;Verbal cues   Transfers   Sit to Stand 3  Moderate assistance   Additional items Assist x 2;Verbal cues  (from low B/S chair)   Stand to Sit 4  Minimal assistance   Additional items Assist x 1;Verbal cues   Ambulation/Elevation   Gait Assistance 4  Minimal assist   Additional items Assist x 1;Verbal cues; Tactile cues   Assistive Device Rolling walker   Distance 10 feet with change in direction in pt's room  Pt then requests back to bed     Balance   Static Sitting Fair   Static Standing Fair   Dynamic Standing Fair -   Ambulatory Fair -   Activity Tolerance Activity Tolerance Patient limited by fatigue;Patient limited by pain  (weakness and SOB)   Assessment   Prognosis Good   Problem List Decreased strength;Decreased range of motion;Decreased endurance; Impaired balance;Decreased mobility; Decreased coordination;Decreased safety awareness;Pain   Assessment Patient seen for Physical Therapy evaluation  Patient admitted with Acute on chronic combined systolic and diastolic heart failure ([facility] [address] )  Comorbidities affecting patient's physical performance include: COPD, PAF, CAD, BPH, DM2, HLD, HTN, CKD4, amb dysfxn, PATITO, pleural effusion, obesity, OA knee, PVD, polyneuropathy, centrilobular emphysema, cervical radiculopathy, pulmonary HTN  Personal factors affecting patient at time of initial evaluation include: lives in one story house, ambulating with assistive device, inability to ambulate household distances, inability to navigate community distances, inability to navigate level surfaces without external assistance and inability to perform dynamic tasks in community  Prior to admission, patient was independent with functional mobility with RW, requiring assist for ADLS, requiring assist for IADLS, living with significant other in a one level home with elevator to enter and ambulating household distance  Please find objective findings from Physical Therapy assessment regarding body systems outlined above with impairments and limitations including weakness, decreased ROM, impaired balance, decreased endurance, impaired coordination, gait deviations, pain, decreased activity tolerance, decreased functional mobility tolerance, decreased safety awareness, fall risk and SOB upon exertion  The Barthel Index was used as a functional outcome tool presenting with a score of 40 today indicating marked limitations of functional mobility and ADLS    Patient's clinical presentation is currently unstable/unpredictable as seen in patient's presentation of vital sign response, changing level of pain, increased fall risk, new onset of impairment of functional mobility, decreased endurance and new onset of weakness  Pt would benefit from continued Physical Therapy treatment to address deficits as defined above and maximize level of functional mobility  As demonstrated by objective findings, the assigned level of complexity for this evaluation is high  Goals   Patient Goals get better and go home   STG Expiration Date 12/07/18   Short Term Goal #1 bed mob - mod/min A; trans - mod/min A   Short Term Goal #2 pt will amb with RW x 25 feet with min A so pt can navigate distances in his home; balance with RW - F/F+ for mobility/ADLs   LTG Expiration Date 12/14/18   Long Term Goal #1 bed mob - min A/S; trans - min A/S   Long Term Goal #2 pt will amb with RW with S/I x 75 feet so that pt can access all areas of his home; balance with RW - F+/G for mobility/ADLs; strength LEs - 4- to 4/5   Treatment Day 0   Plan   Treatment/Interventions ADL retraining;Functional transfer training;LE strengthening/ROM; Therapeutic exercise; Endurance training;Patient/family training;Equipment eval/education; Bed mobility;Gait training   PT Frequency 5x/wk   Recommendation   Recommendation Home with family support  (cont with HHA services as previous;home PT)   Equipment Recommended (pt has all DME needs)   Barthel Index   Feeding 10   Bathing 0   Grooming Score 0   Dressing Score 5   Bladder Score 5   Bowels Score 10   Toilet Use Score 5   Transfers (Bed/Chair) Score 5   Mobility (Level Surface) Score 0   Stairs Score 0   Barthel Index Score 36   Licensure   [place] License Number  [place], [place] [id number]

## 2018-11-30 NOTE — CONSULTS
Inpatient Consultation - Nephrology   Oral Medico 80 y o  male MRN: [id number]  Unit/Bed#: [address] [place] [address] [id number] Encounter: [id number]    ASSESSMENT and PLAN:    1 ) Acute kidney injury  -POA  -admission creatinine 3 01, creatinine stable today at 3 05  -I suspect that this is in the setting of volume overload and possible cardiorenal syndrome  -would continue diuresis but I will increase the dose to 60 mg IV b i d   -may need to accept a higher baseline creatinine about volume overload   -urinalysis trace protein no blood    I do not suspect an underlying glomerular process  -check urine studies  -nonoliguric  -monitor input and output  -no clinical indication for renal ultrasound at this time  -would recommend reducing the dose to the Voltaren gel if possible, as there is some systemic absorption which could lead to nephrotoxicity    2 ) Acute on chronic combined heart failure  -ejection fraction 35-40%  -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  -examines volume overloaded, with edema and weight gain over 10 lb, chest x-ray shows congestion  -cardiology has been consulted  -proBNP level 16,996  -increase IV Lasix to 60 mg twice a day  -monitor input and output  -low 2 g sodium diet  -would recommend reducing the dose of the Voltaren gel    3 ) Chronic kidney disease stage IV  -outpatient nephrologist Dr Vikas Espinoza  -baseline creatinine 1 8-2 2 mg/dL  -benign renal ultrasound    4 ) Hypertension  -blood pressure currently acceptable  -continue current management    5 ) Anemia  -likely secondary to chronic kidney disease  -continue iron supplement    SUMMARY OF RECOMMENDATIONS:    · Increase furosemide to 60 mg twice a day  · Monitor input and output  · Monitor daily weights  · Aim for net negative fluid balance  · Low 2 g sodium diet  · Cardiology evaluation  · Would recommend reducing the dose the Voltaren gel, I have reduced to 2 g, but the frequency could be reduce that would also be helpful    HISTORY OF PRESENT ILLNESS:  Requesting Physician: Meño Zhang MD  Reason for Consult:  Acute kidney injury with underlying chronic kidney disease    Nia oGnzalez is a 80 y o  male who was admitted to [facility] on November 29th after presenting with worsening lower extremity edema and shortness of breath and abnormal labs showing an elevated creatinine  A renal consultation is requested today for assistance in the management of acute kidney injury  Patient was sent to [facility] by his primary care physician for concern of worsening creatinine  But the patient's main issues have been increased swelling of his lower extremities and weight gain  He has about 10 lb over his estimated dry weight of around 217 lb  He also reports some dyspnea on exertion  His chest x-ray showed vascular congestion consistent with volume overload and CHF  He has extensive cardiac history including combined congestive heart failure with an ejection fraction approximately 35%  He was given 1 dose of IV Lasix 40 mg  Today feels no improvement  His baseline creatinine is around 2 2 based on his volume status  His creatinine on admission was 3 01 and today it is 3 05      PAST MEDICAL HISTORY:  Past Medical History:   Diagnosis Date    Allergic rhinitis 06/11/2010    Aortic stenosis, severe     Bacterial pneumonia 06/08/2007    Bladder neck obstruction 12/23/2010    BPH (benign prostatic hyperplasia)     Bronchitis, chronic obstructive, with exacerbation ([facility] [address] ) 01/30/2012    CAD (coronary artery disease)     stent 2014    Carcinoma ([facility] [address] )     resolved [id number]    Cardiomegaly 02/13/2007    Cataract of both eyes     CHF (congestive heart failure) (HCC)     Chronic allergic conjunctivitis     last assessed [id number]    Chronic kidney disease (CKD)     CKD (chronic kidney disease), stage III (HCC)     CKD (chronic kidney disease), stage III (HCC)     COPD [facility]) [facility]     Decubitus ulcer     resolved 2016    Dermatomycosis     last assessed [id number]    Diabetes mellitus type 2, noninsulin dependent ([facility] [address] )     Dyspnea on exertion     last assessed [id number]    Eczema     last assessed [id number]    Edema     last assessed [id number]    Epistaxis 2004    Esophagitis, erosive     Exposure to scabies     resolved [id number]    Fracture of rib     last assessed [id number]    H/O aortic valve replacement     resolved [id number]    H/O blood clots     History of DVT (deep vein thrombosis)     left posterior tibial/peroneal veins    History of non-ST elevation myocardial infarction (NSTEMI)     History of pneumonia     Hyperlipidemia     Hypertension     Internal hemorrhoids 2006    LBBB (left bundle branch block)     MRSA (methicillin resistant staph aureus) culture positive     NSTEMI (non-ST elevated myocardial infarction) ([facility] [address] )     resolved 2016    Obstructive sleep apnea on CPAP     severe PATITO with AHI of 106 and uses CPAP at 10 cm H2O with 2 l/m oxygen    Paroxysmal atrial fibrillation ([facility] [address] )     Phimosis 2010    severe pt had circumcision 2010    Pulmonary fibrosis ([facility] [address] )     PVD (peripheral vascular disease) ([facility] [address] )     Rotator cuff tendonitis     lsat assessed [id number]    Skin abscess 2004    Hip    Skin neoplasm     last assessed [id number]    Tachycardia 2004    Trigger finger     last assessed [id number]    Type 2 diabetes mellitus ([facility] [address] ) 2004    Venous thrombosis 2007    Venous thrombosis 2007       PAST SURGICAL HISTORY:  Past Surgical History:   Procedure Laterality Date    CARDIAC CATHETERIZATION  03/10/2016    Showed 60-70% mid LAD lesion next to stent    CATARACT EXTRACTION      CIRCUMCISION, NON-      ESOPHAGOGASTRODUODENOSCOPY N/A 3/14/2016    Procedure: ESOPHAGOGASTRODUODENOSCOPY (EGD); Surgeon: Zenaida Garcia MD;  Location: [facility];   Service:    Kajal Gee EYE SURGERY      FRACTURE SURGERY  JOINT REPLACEMENT      both hips replaced    OTHER SURGICAL HISTORY      H/O thoracentesis (diagnostic)    RI REPLACE AORTIC VALVE OPENFEMORAL ARTERY APPROACH N/A 8/23/2016    Procedure: TRANSFEMORAL TAVR WITH 26MM CALIX MAKAYLA S3 TISSUE VALVE; PINA ;  Surgeon: Vidhi Toscano DO;  Location: [facility];  Service: Cardiac Surgery    REPLACEMENT TOTAL KNEE Bilateral 01/01/1991 1991 and 2001    TISSUE AORTIC VALVE REPLACEMENT      transfemoral, transcatheter    TONSILLECTOMY      TONSILLECTOMY AND ADENOIDECTOMY      TOTAL HIP ARTHROPLASTY      Bilateral       ALLERGIES:  No Known Allergies    SOCIAL HISTORY:  History   Alcohol Use    Yes     Comment: socially     History   Drug Use No     History   Smoking Status    Former Smoker    Packs/day: 1 00    Years: 35 00    Types: Cigarettes    Quit date: 1/1/1987   Smokeless Tobacco    Never Used     Comment: quit 50 years ago, per [employer]       FAMILY HISTORY:  Family History   Problem Relation Age of Onset    Coronary artery disease Mother     Arthritis Mother     Hypertension Mother    [facility] Rheum arthritis Father     Prostate cancer Father        MEDICATIONS:    Current Facility-Administered Medications:     acetaminophen (TYLENOL) tablet 325 mg, 325 mg, Oral, Q12H PRN **AND** traMADol (ULTRAM) tablet 50 mg, 50 mg, Oral, Q12H PRN, Elke Alberts MD    amiodarone tablet 100 mg, 100 mg, Oral, Daily, Elke Alberts MD    amLODIPine (NORVASC) tablet 5 mg, 5 mg, Oral, Daily, Elke Alberts MD    aspirin (ECOTRIN LOW STRENGTH) EC tablet 81 mg, 81 mg, Oral, Daily, Elke Alberts MD    cyanocobalamin (VITAMIN B-12) tablet 100 mcg, 100 mcg, Oral, Daily, Elke Alberts MD    diclofenac sodium (VOLTAREN) 1 % topical gel 4 g, 4 g, Topical, 4x Daily, Elke Alberts MD, 4 g at 11/29/18 2102    docusate sodium (COLACE) capsule 100 mg, 100 mg, Oral, HS, Elke Alberts MD, 100 mg at 11/29/18 2101    fluticasone-vilanterol (BREO ELLIPTA) 100-25 mcg/inh inhaler 1 puff, 1 puff, Inhalation, Daily, Nathaniel Stallworth MD    furosemide (LASIX) injection 60 mg, 60 mg, Intravenous, BID (diuretic), Mook Saha MD    heparin (porcine) subcutaneous injection 5,000 Units, 5,000 Units, Subcutaneous, Q8H [address], 5,000 Units at 11/30/18 0517 **AND** Platelet count, , , Once, Nathaniel Stallworth MD    hydrALAZINE (APRESOLINE) tablet 25 mg, 25 mg, Oral, Q8H, Nathaniel Stallworth MD, 25 mg at 11/30/18 1245    iron polysaccharides (NIFEREX) capsule 150 mg, 150 mg, Oral, Daily, Nathaniel Stallworth MD    isosorbide mononitrate (IMDUR) 24 hr tablet 30 mg, 30 mg, Oral, Daily, Nathaniel Stallworth MD    levothyroxine tablet 50 mcg, 50 mcg, Oral, Daily, Nathaniel Stallworth MD, 50 mcg at 11/30/18 0517    metoprolol tartrate (LOPRESSOR) tablet 25 mg, 25 mg, Oral, Q12H [address], Nathaniel Stallworth MD, 25 mg at 11/29/18 2100    ondansetron (ZOFRAN) injection 4 mg, 4 mg, Intravenous, Q6H PRN, Nathaniel Stallworth MD    oxybutynin (DITROPAN) tablet 5 mg, 5 mg, Oral, Daily, Nathaniel Stallworth MD    pantoprazole (PROTONIX) EC tablet 40 mg, 40 mg, Oral, Daily, Nathaniel Stallworth MD    REVIEW OF SYSTEMS:  Constitutional: Negative for fatigue, anorexia, fever, chills, diaphoresis  HENT: Negative for postnasal drip  Eyes: Negative for visual disturbance  Respiratory:  Shortness of breath on exertion  Cardiovascular:  Positive for swelling  Gastrointestinal: Negative for abdominal pain, constipation, diarrhea, nausea and vomiting  Genitourinary: No dysuria, hematuria  Endocrine: Negative for polyuria  Musculoskeletal: Negative for arthralgias, back pain and joint swelling  Skin: Negative for rash  Neurological: Negative for focal weakness, headaches, dizziness  Hematological: Negative for easy bruising or bleeding  Psychiatric/Behavioral: Negative for confusion and sleep disturbance  All the systems were reviewed and were negative except as documented on the HPI      PHYSICAL EXAM:  Current Weight: Weight - Scale: 106 kg (233 lb 0 4 oz)  First Weight: Weight - Scale: 103 kg (227 lb 1 2 oz)  Vitals:    11/30/18 0130 11/30/18 0417 11/30/18 0600 11/30/18 0820   BP: 147/70 168/76     BP Location: Left arm Left arm     Pulse: 64 62     Resp: 20 20     Temp: 98 °F (36 7 °C) (!) 97 1 °F (36 2 °C)     TempSrc: Temporal Temporal     SpO2: 98% 99%  97%   Weight:  106 kg (233 lb 0 4 oz) 106 kg (233 lb 0 4 oz)    Height:           Intake/Output Summary (Last 24 hours) at 11/30/18 3439  Last data filed at 11/30/18 0735   Gross per 24 hour   Intake              120 ml   Output             1515 ml   Net            -1395 ml     Physical Exam   Constitutional: He is oriented to person, place, and time  He appears well-developed and well-nourished  No distress  HENT:   Head: Normocephalic and atraumatic  Eyes: Pupils are equal, round, and reactive to light  No scleral icterus  Neck: Normal range of motion  Neck supple  Cardiovascular: Normal rate, regular rhythm and normal heart sounds  Exam reveals no gallop and no friction rub  No murmur heard  Pulmonary/Chest: Effort normal  No respiratory distress  He has no wheezes  He has rales  He exhibits no tenderness  Abdominal: Soft  Bowel sounds are normal  He exhibits no distension  There is no tenderness  There is no rebound  Musculoskeletal: Normal range of motion  He exhibits edema  Neurological: He is alert and oriented to person, place, and time  Skin: No rash noted  He is not diaphoretic  Psychiatric: He has a normal mood and affect  Nursing note and vitals reviewed          Invasive Devices:      Lab Results:     Results from last 7 days  Lab Units 11/30/18  0543 11/30/18  0136 11/29/18  1421 11/29/18  1332   WBC Thousand/uL 7 17  --   --  8 61   HEMOGLOBIN g/dL 9 4*  --   --  10 2*   HEMATOCRIT % 31 0*  --   --  34 8*   PLATELETS Thousands/uL 234  --   --  279   POTASSIUM mmol/L  --  4 1 5 3  --    CHLORIDE mmol/L  --  104 104  --    CO2 mmol/L  --  28 27  --    BUN mg/dL  --  60* 57*  --    CREATININE mg/dL  --  3 05* 3 01*  -- CALCIUM mg/dL  --  8 4 9 2  --    ALK PHOS U/L  --   --  55  --    ALT U/L  --   --  26  --    AST U/L  --   --  47*  --

## 2018-11-30 NOTE — UTILIZATION REVIEW
Initial Clinical Review    Admission: Date/Time/Statement: 11/29/18 @ 1522     Orders Placed This Encounter   Procedures    Inpatient Admission (expected length of stay for this patient is greater than two midnights)     Standing Status:   Standing     Number of Occurrences:   1     Order Specific Question:   Admitting Physician     Answer:   Stephanie Lennon     Order Specific Question:   Level of Care     Answer:   Med Surg [16]     Order Specific Question:   Estimated length of stay     Answer:   More than 2 Midnights     Order Specific Question:   Certification     Answer:   I certify that inpatient services are medically necessary for this patient for a duration of greater than two midnights  See H&P and MD Progress Notes for additional information about the patient's course of treatment  ED: Date/Time/Mode of Arrival:   ED Arrival Information     Expected Arrival Acuity Means of Arrival Escorted By Service Admission Type    - 11/29/2018 13:02 Urgent Walk-In Family Member Hospitalist Urgent    Arrival Complaint    leg swelling          Chief Complaint:   Chief Complaint   Patient presents with    Leg Swelling     pt presents to the ed with increased bi lateral leg swelling x 2 days  pt states he has increased sob as well        History of Illness: Nia Gonzalez is a 80 y o  male with a PMH of COPD with chronic hypoxic resp failure on 6L O2, PATITO, T2DM, CKD3, CHF, Afib not on anticoagulation, hx DVT, PAD, AS s/p bioprosthetic aortic valve replacement  who presents with dyspnea and LE swelling  He was recently admited from his PCPs office for abnormal lab work showing acute renal failure  He has been having issues keeping his heart failure stable and has been getting higher doses of diuretic at home but creatinine was rising  He was admitted in obvious heart failure  He received IVF to improve preload and then was diuresed with IV lasix   He was discharged home on PO diuretic and since then has started to have recurrence of LE swelling and SOB  He cant lie flat  He has occasional wheezing  Neck: JVD present  Murmur heard  + crackles, increased work of breathing  He exhibits edema (3+ pitting)  ED Vital Signs:   ED Triage Vitals   Temperature Pulse Respirations Blood Pressure SpO2   11/29/18 1311 11/29/18 1311 11/29/18 1311 11/29/18 1311 11/29/18 1311   97 8 °F (36 6 °C) (!) 109 20 114/59 (!) 85 %      Temp Source Heart Rate Source Patient Position - Orthostatic VS BP Location FiO2 (%)   11/29/18 1311 11/29/18 1415 11/29/18 1415 11/29/18 1415 --   Tympanic Monitor Lying Right arm       Pain Score       11/29/18 1723       No Pain        Wt Readings from Last 1 Encounters:   11/30/18 106 kg (233 lb 0 4 oz)     Abnormal Labs/Diagnostic Test Results: BUN/CR 57/3 01 AST 47 BNP 16,996 ,LACTIC ACID 2 2 H/H 10 2/34,PT/INR 13 8/1 34 PTT 19   CXR   Cardiomegaly  2   Chronic right pleural thickening  3   CHF    ED Treatment:   Medication Administration from 11/29/2018 1302 to 11/29/2018 1708       Date/Time Order Dose Route Action Action by Comments     11/29/2018 1422 furosemide (LASIX) injection 40 mg 40 mg Intravenous Given Celi Albarran RN         Past Medical History:   Diagnosis Date    Allergic rhinitis 06/11/2010    Aortic stenosis, severe     Bacterial pneumonia 06/08/2007    Bladder neck obstruction 12/23/2010    BPH (benign prostatic hyperplasia)     Bronchitis, chronic obstructive, with exacerbation ([facility] ) 01/30/2012    CAD (coronary artery disease)     Carcinoma ([facility] )     Cardiomegaly 02/13/2007    Cataract of both eyes     CHF (congestive heart failure) (HCC)     Chronic allergic conjunctivitis     Chronic kidney disease (CKD)     CKD (chronic kidney disease), stage III (HCC)     CKD (chronic kidney disease), stage III (HCC)     COPD (chronic obstructive pulmonary disease) ([facility] )     Decubitus ulcer     Dermatomycosis     Diabetes mellitus type 2, noninsulin dependent ([facility] )     Dyspnea on exertion     Eczema     Edema     Epistaxis 12/01/2004    Esophagitis, erosive     Exposure to scabies     Fracture of rib     H/O aortic valve replacement     H/O blood clots     History of DVT (deep vein thrombosis)     History of non-ST elevation myocardial infarction (NSTEMI)     History of pneumonia     Hyperlipidemia     Hypertension     Internal hemorrhoids 09/13/2006    LBBB (left bundle branch block)     MRSA (methicillin resistant staph aureus) culture positive     NSTEMI (non-ST elevated myocardial infarction) ([facility])     Obstructive sleep apnea on CPAP     Paroxysmal atrial fibrillation ([facility])     Phimosis 09/01/2010    Pulmonary fibrosis ([facility])     PVD (peripheral vascular disease) ([facility])     Rotator cuff tendonitis     Skin abscess 12/21/2004    Skin neoplasm     Tachycardia 12/01/2004    Trigger finger     Type 2 diabetes mellitus ([facility] ) 12/01/2004    Venous thrombosis 05/16/2007    Venous thrombosis 05/16/2007       Admitting Diagnosis: CHF (congestive heart failure) ([facility]) [I50 9]  Paroxysmal atrial fibrillation ([facility]) [I48 0]  Leg swelling [M79 89]  KJ (acute kidney injury) ([facility] ) [N17 9]    Age/Sex: 80 y o  male    Assessment/Plan:   PT ADMITTED WITH ACUTE RENAL FAILURE WORSENING CHF FAILED AT HOME TREATMENT WITH ORAL DIURETIC'S  PT WITH WORSENING CREATININE 3 01 PT OBVIOUSLY FLUID OVERLOADED WITH 3+ EDEMA AND 10LB WEIGHT GAIN  STARTED ON IV DIURESIS AND MONITOR RENAL FUNCTION  CONSULT NEPHROLOGY  CONSULT CARDIO AND MONITOR DAILY WEIGHT I/O  ACUTE RESPIRATORY FAILURE WITH HYPOXIC SATING IN 85'S NORMALLY ON 6LNC CURRENTLY REQUIRING 10L OXYGEN STILL HAVE DESATS INTO 80'S LIKELY FLUID OVERLOAD  CXR SHOWING CHF     Admission Orders:  TELE MON  DAILY WEIGHT I/O  BIPAP HS   OXYGEN 10L NC  RESPIRATORY PROTOCOL  CONSULT NEPHROLOGY  CONSULT CARDIOLOGY  Scheduled Meds:   Current Facility-Administered Medications:  acetaminophen 325 mg Oral Q12H PRN Elke Alberts MD   And traMADol 50 mg Oral Q12H PRN Júnior Valodvinos MD   amiodarone 100 mg Oral Daily Júnior Valdovinos MD   amLODIPine 5 mg Oral Daily Júnior Valdovinos MD   aspirin 81 mg Oral Daily Júnior Valdovinos MD   cyanocobalamin 100 mcg Oral Daily Júnior Valdovinos MD   diclofenac sodium 2 g Topical Q12H [facility] CHICHI JoseNP   docusate sodium 100 mg Oral HS Júnior Valdovinos MD   fluticasone-vilanterol 1 puff Inhalation Daily Júnior Valdovinos MD   furosemide 60 mg Intravenous BID (diuretic) Amado White MD   heparin (porcine) 5,000 Units Subcutaneous Q8H 3630 Eduardo Todd MD   hydrALAZINE 25 mg Oral Q8H Júnior Valdovinos MD   iron polysaccharides 150 mg Oral Daily Júnior Valdovinos MD   isosorbide mononitrate 30 mg Oral Daily Júnior Valdovinos MD   levothyroxine 50 mcg Oral Daily Júnior Valdovinos MD   metoprolol tartrate 25 mg Oral Q12H 3630 Eduardo Todd MD   ondansetron 4 mg Intravenous Q6H PRN Júnior Valdovinos MD   oxybutynin 5 mg Oral Daily Júnior Valdovinos MD   pantoprazole 40 mg Oral Daily Júnior Valdovinos MD     Continuous Infusions:    PRN Meds:   acetaminophen **AND** traMADol    ondansetron

## 2018-11-30 NOTE — SOCIAL WORK
Met with pt  Resides with his lady friend Jodi Egan who is supportive  Uses RW, has a cane, rollator, and shower chair  Has w/c that he uses for longer distances  [place]  Has home oxygen and nebulizer provided by [employer]  +CPAP  Has portable devices  He is open to [facility], will make referral  Is involved with Jessica from his insurance company  Gets at least a monthly visit for a nurse  Main contact is lady friend Jodi Egan  Has been to [facility] and [facility] in the past, but does not want to have inpatient rehab again  Pt is a readmission within 30 days  Pt stated he was discharged too soon last time  Explained to pt that with all his diagnosis, he is a high risk for readmission  Pt stated he knows this  Pt stated he does not mind if he does not wake up one morning or he does  He feels e lived his life  Did mention continued treatment vs hospice if he felt like staying home  At this point, pt continues to opt for treatment  Goal for pt is to keep at home, but is at high risk for readmissions  Explained role of cm, will follow  CM reviewed d/c planning process including the following: identifying help at home, patient preference for d/c planning needs, and availability of the treatment team to discuss questions or concerns patient and/or family may have regarding understanding of medications and recognizing signs and symptoms once discharged  CM also encouraged patient to follow up with all recommended appointments after discharge    Patient advised of importance for patient and family to participate in managing patient's medical well being no fever and no chills.

## 2018-11-30 NOTE — OCCUPATIONAL THERAPY NOTE
Occupational Therapy Evaluation/Treatment     11/30/18 1400   Note Type   Note type Eval/Treat   Restrictions/Precautions   Other Precautions Contact/isolation; Chair Alarm; Bed Alarm; Fall Risk;O2;Pain   Pain Assessment   Pain Assessment 0-10   Pain Score 3   Pain Type Chronic pain   Pain Location Shoulder;Knee   Pain Orientation Bilateral   Hospital Pain Intervention(s) Medication (See MAR); Ambulation/increased activity;Repositioned   Response to Interventions some relief   Home Living   Type of [address] [place], [place] [address] One level;Elevator   Bathroom Shower/Tub Walk-in shower   Bathroom Equipment Grab bars in shower; Shower chair   Bathroom Accessibility Accessible via walker   Home Equipment Walker;Cane;Wheelchair-manual  (O2, CPAP)   Prior Function   Level of [place] Needs assistance with ADLs and functional mobility; Needs assistance with IADLs   Lives With Significant other   Receives Help From Home health  (HHA 7 days a week for 4 hours)   ADL Assistance Independent  (needs minimal assist for bathing/dressing infrequently)   IADLs Needs assistance   Falls in the last 6 months 0   Comments HHA assists with meal prep, meds and household tasks, assists with ADLS as needed, pt cooks at times    Psychosocial   Psychosocial (WDL) X   Patient Behaviors/Mood Cooperative   Needs Expressed Physical   Ability to Express Feelings Able to express   Ability to Express Needs Able to express   Ability to Express Thoughts Able to express   Ability to Understand Others Understands   Subjective   Subjective "I'm not going to rehab again "   ADL   Where Assessed Chair   Eating Assistance 5  Supervision/Setup   Grooming Assistance 4  Minimal Assistance   UB Bathing Assistance 3  Moderate Assistance   LB Bathing Assistance 3  Moderate Assistance   UB Dressing Assistance 3  Moderate Assistance   LB Dressing Assistance 3  Moderate [address]  4  Minimal Assistance   Additional Comments increased assistance due to SOB   Bed Mobility   Supine to Sit 2  Maximal assistance   Additional items Assist x 1; Increased time required;Verbal cues;LE management   Transfers   Sit to Stand 4  Minimal assistance   Additional items Assist x 1  (with bed elevated; MaxA if bed is down)   Stand to Sit 3  Moderate assistance   Additional items Assist x 1;Verbal cues   Stand pivot 3  Moderate assistance   Additional items Assist x 1;Verbal cues  (with RW)   Balance   Static Sitting Fair -   Dynamic Sitting Poor   Static Standing Poor +   Dynamic Standing Poor   Ambulatory Fair -  (with RW)   Activity Tolerance   Activity Tolerance Patient limited by fatigue;Patient limited by pain  (Limited by SOB)   Nurse Made Aware MEGHA Sorto   RUE Assessment   RUE Assessment WFL  (3+/5, shoulder pain, flexion to 90 chronic)   LUE Assessment   LUE Assessment WFL  (3+/5, shoulder pain, flexion to 90 chronic)   Hand Function   Gross Motor Coordination Functional   Fine Motor Coordination Functional   Vision-Basic Assessment   Current Vision Wears glasses all the time   Cognition   Overall Cognitive Status [facility]   Arousal/Participation Alert; Cooperative   Attention Attends with cues to redirect   Orientation Level Oriented X4   Memory Within functional limits   Following Commands Follows multistep commands with increased time or repetition   Assessment   Limitation Decreased ADL status; Decreased UE ROM; Decreased UE strength;Decreased Safe judgement during ADL;Decreased endurance;Decreased self-care trans;Decreased high-level ADLs  (decreased balance)   Prognosis Good   Assessment Patient evaluated by Occupational Therapy  Patient admitted with Acute on chronic combined systolic and diastolic heart failure ([facility] )  The patients occupational profile, medical and therapy history includes a extensive additional review of physical, cognitive, or psychosocial history related to current functional performance    Comorbidities affecting functional mobility and ADLS include: recent admit 2 weeks ago for   Patient admitted with Acute kidney injury superimposed on chronic kidney disease, afib, CHF, CKD, COPD, diabetes, DVT, hypertension and PVD  Prior to admission, patient was independent with functional mobility with walker, requiring assist for ADLS and requiring assist for IADLS  The evaluation identifies the following performance deficits: weakness, decreased ROM, impaired balance, decreased endurance, increased fall risk, new onset of impairment of functional mobility, decreased ADLS, decreased IADLS, pain, decreased activity tolerance, decreased safety awareness, SOB upon exertion and decreased strength, that result in activity limitations and/or participation restrictions  This evaluation requires clinical decision making of high complexity, because the patient presents with comorbidites that affect occupational performance and required significant modification of tasks or assistance with consideration of multiple treatment options  The Barthel Index was used as a functional outcome tool presenting with a score of 45, indicating marked limitations of functional mobility and ADLS  Patient will benefit from skilled Occupational Therapy services to address above deficits and facilitate a safe return to prior level of function  Goals   Patient Goals "to breathe better"   STG Time Frame (1-7)   Short Term Goal #1 Patient will increase standing tolerance to 3 minutes during ADL task to decrease assistance level and decrease fall risk; Patient will increase bed mobility to supervision in preparation for ADLS and transfers;  Patient will increase functional mobility to and from bathroom with rolling walker with supervision to increase performance with ADLS and to use a toilet; Patient will tolerate 10 minutes of UE ROM/strengthening to increase general activity tolerance and performance in ADLS/IADLS; Patient will improve functional activity tolerance to 10 minutes of sustained functional tasks to increase participation in basic self-care and decrease assistance level;  Patient will be able to to verbalize understanding and perform energy conservation/proper body mechanics during ADLS and functional mobility at least 75% of the time with minimal cueing to decrease signs of fatigue and increase stamina to return to prior level of function; Patient will increase dynamic sitting balance to fair+ to improve the ability to sit at edge of bed or on a chair for ADLS;  Patient will increase dynamic standing balance to fair to improve postural stability and decrease fall risk during standing ADLS and transfers  LTG Time Frame (8-14)   Long Term Goal #1 Goals established to promote patient goal of breathing better and going home:  Patient will increase standing tolerance to 6 minutes during ADL task to decrease assistance level and decrease fall risk; Patient will increase bed mobility to independent in preparation for ADLS and transfers;  Patient will increase functional mobility to and from bathroom with rolling walker independently to increase performance with ADLS and to use a toilet; Patient will tolerate 20 minutes of UE ROM/strengthening to increase general activity tolerance and performance in ADLS/IADLS; Patient will improve functional activity tolerance to 20 minutes of sustained functional tasks to increase participation in basic self-care and decrease assistance level;  Patient will be able to to verbalize understanding and perform energy conservation/proper body mechanics during ADLS and functional mobility at least 90% of the time without cueing to decrease signs of fatigue and increase stamina to return to prior level of function; Patient will increase dynamic sitting balance to good to improve the ability to sit at edge of bed or on a chair for ADLS;  Patient will increase dynamic standing balance to fair+ to improve postural stability and decrease fall risk during standing ADLS and transfers  Functional Transfer Goals   Pt Will Perform All Functional Transfers With min assist;With assistive devices; With good judgment/safety  (LTG- Independent with AD)   ADL Goals   Pt Will Perform All ADL's In chair; With min assist;With adaptive equipment; With good judgment/safety  (LTG- Independent)   Plan   Treatment Interventions ADL retraining;Functional transfer training;UE strengthening/ROM; Endurance training;Patient/family training;Equipment evaluation/education; Compensatory technique education; Energy conservation   Goal Expiration Date 12/14/18   Treatment Day 1   OT Frequency 3-5x/wk   Additional Treatment Session   Start Time 1345   End Time 1400   Treatment Assessment Supine to sit MaxA  Unsupported sitting balance at edge of bed with Burt and cues to facilitate weight shifts and righting reactions  STS from raised bed height with Burt and RW  Pt able to stand for 2 minutes to urinate in urinal with Burt and cues  SOB with exertion despite continuous 6L O2 via NC, but able to perform deep breathing exercises with min cueing only  Patient left OOB in recliner chair with all needs within reach, tab alarm in place  Recommendation   OT Discharge Recommendation home with services; continue HHA   Barthel Index   Feeding 10   Bathing 0   Grooming Score 0   Dressing Score 5   Bladder Score 5   Bowels Score 10   Toilet Use Score 5   Transfers (Bed/Chair) Score 10   Mobility (Level Surface) Score 0   Stairs Score 0   Barthel Index Score 45   Licensure   [place] License Number  Kimberly Galvan Maryan Garces [address], OTR/L, [place] Lic# [id number]

## 2018-11-30 NOTE — ASSESSMENT & PLAN NOTE
· LV EF 35-40%  · Appears in acute heart failure with volume overload     · nephrology and cardio consulted  · Increased IV lasix dose  · Monitor I/Os and daily weights

## 2018-12-01 PROBLEM — Z22.322 MRSA COLONIZATION: Status: ACTIVE | Noted: 2018-12-01

## 2018-12-01 PROBLEM — J96.11 CHRONIC HYPOXEMIC RESPIRATORY FAILURE (HCC): Status: ACTIVE | Noted: 2018-12-01

## 2018-12-01 LAB
ANION GAP SERPL CALCULATED.3IONS-SCNC: 9 MMOL/L (ref 4–13)
BUN SERPL-MCNC: 53 MG/DL (ref 5–25)
CALCIUM SERPL-MCNC: 8.7 MG/DL (ref 8.3–10.1)
CHLORIDE SERPL-SCNC: 102 MMOL/L (ref 100–108)
CO2 SERPL-SCNC: 30 MMOL/L (ref 21–32)
CREAT SERPL-MCNC: 2.88 MG/DL (ref 0.6–1.3)
ERYTHROCYTE [DISTWIDTH] IN BLOOD BY AUTOMATED COUNT: 16.8 % (ref 11.6–15.1)
GFR SERPL CREATININE-BSD FRML MDRD: 19 ML/MIN/1.73SQ M
GLUCOSE SERPL-MCNC: 91 MG/DL (ref 65–140)
HCT VFR BLD AUTO: 29 % (ref 36.5–49.3)
HGB BLD-MCNC: 8.9 G/DL (ref 12–17)
MCH RBC QN AUTO: 25.9 PG (ref 26.8–34.3)
MCHC RBC AUTO-ENTMCNC: 30.7 G/DL (ref 31.4–37.4)
MCV RBC AUTO: 85 FL (ref 82–98)
MRSA NOSE QL CULT: ABNORMAL
MRSA NOSE QL CULT: ABNORMAL
PLATELET # BLD AUTO: 239 THOUSANDS/UL (ref 149–390)
PMV BLD AUTO: 11.2 FL (ref 8.9–12.7)
POTASSIUM SERPL-SCNC: 3.9 MMOL/L (ref 3.5–5.3)
RBC # BLD AUTO: 3.43 MILLION/UL (ref 3.88–5.62)
SODIUM SERPL-SCNC: 141 MMOL/L (ref 136–145)
WBC # BLD AUTO: 6.81 THOUSAND/UL (ref 4.31–10.16)

## 2018-12-01 PROCEDURE — 99221 1ST HOSP IP/OBS SF/LOW 40: CPT | Performed by: INTERNAL MEDICINE

## 2018-12-01 PROCEDURE — 94640 AIRWAY INHALATION TREATMENT: CPT

## 2018-12-01 PROCEDURE — 94660 CPAP INITIATION&MGMT: CPT

## 2018-12-01 PROCEDURE — 94760 N-INVAS EAR/PLS OXIMETRY 1: CPT

## 2018-12-01 PROCEDURE — 80048 BASIC METABOLIC PNL TOTAL CA: CPT | Performed by: STUDENT IN AN ORGANIZED HEALTH CARE EDUCATION/TRAINING PROGRAM

## 2018-12-01 PROCEDURE — 99232 SBSQ HOSP IP/OBS MODERATE 35: CPT | Performed by: STUDENT IN AN ORGANIZED HEALTH CARE EDUCATION/TRAINING PROGRAM

## 2018-12-01 PROCEDURE — 99233 SBSQ HOSP IP/OBS HIGH 50: CPT | Performed by: INTERNAL MEDICINE

## 2018-12-01 PROCEDURE — 85027 COMPLETE CBC AUTOMATED: CPT | Performed by: STUDENT IN AN ORGANIZED HEALTH CARE EDUCATION/TRAINING PROGRAM

## 2018-12-01 PROCEDURE — 99232 SBSQ HOSP IP/OBS MODERATE 35: CPT | Performed by: INTERNAL MEDICINE

## 2018-12-01 RX ADMIN — ISOSORBIDE MONONITRATE 30 MG: 30 TABLET, EXTENDED RELEASE ORAL at 09:39

## 2018-12-01 RX ADMIN — MUPIROCIN 1 APPLICATION: 20 OINTMENT TOPICAL at 21:21

## 2018-12-01 RX ADMIN — METOPROLOL TARTRATE 25 MG: 25 TABLET, FILM COATED ORAL at 09:36

## 2018-12-01 RX ADMIN — METOPROLOL TARTRATE 25 MG: 25 TABLET, FILM COATED ORAL at 21:21

## 2018-12-01 RX ADMIN — HEPARIN SODIUM 5000 UNITS: 5000 INJECTION, SOLUTION INTRAVENOUS; SUBCUTANEOUS at 06:08

## 2018-12-01 RX ADMIN — DICLOFENAC 2 G: 10 GEL TOPICAL at 09:40

## 2018-12-01 RX ADMIN — HEPARIN SODIUM 5000 UNITS: 5000 INJECTION, SOLUTION INTRAVENOUS; SUBCUTANEOUS at 21:21

## 2018-12-01 RX ADMIN — IPRATROPIUM BROMIDE AND ALBUTEROL SULFATE 3 ML: 2.5; .5 SOLUTION RESPIRATORY (INHALATION) at 19:53

## 2018-12-01 RX ADMIN — IPRATROPIUM BROMIDE AND ALBUTEROL SULFATE 3 ML: 2.5; .5 SOLUTION RESPIRATORY (INHALATION) at 07:56

## 2018-12-01 RX ADMIN — IPRATROPIUM BROMIDE AND ALBUTEROL SULFATE 3 ML: 2.5; .5 SOLUTION RESPIRATORY (INHALATION) at 11:40

## 2018-12-01 RX ADMIN — PANTOPRAZOLE SODIUM 40 MG: 40 TABLET, DELAYED RELEASE ORAL at 09:36

## 2018-12-01 RX ADMIN — HYDRALAZINE HYDROCHLORIDE 25 MG: 25 TABLET ORAL at 18:54

## 2018-12-01 RX ADMIN — OXYBUTYNIN CHLORIDE 5 MG: 5 TABLET ORAL at 09:36

## 2018-12-01 RX ADMIN — DICLOFENAC 2 G: 10 GEL TOPICAL at 21:20

## 2018-12-01 RX ADMIN — DOCUSATE SODIUM 100 MG: 100 CAPSULE, LIQUID FILLED ORAL at 21:21

## 2018-12-01 RX ADMIN — MUPIROCIN 1 APPLICATION: 20 OINTMENT TOPICAL at 09:41

## 2018-12-01 RX ADMIN — FUROSEMIDE 60 MG: 10 INJECTION, SOLUTION INTRAMUSCULAR; INTRAVENOUS at 09:39

## 2018-12-01 RX ADMIN — AMLODIPINE BESYLATE 5 MG: 5 TABLET ORAL at 09:39

## 2018-12-01 RX ADMIN — HYDRALAZINE HYDROCHLORIDE 25 MG: 25 TABLET ORAL at 04:00

## 2018-12-01 RX ADMIN — AMIODARONE HYDROCHLORIDE 100 MG: 200 TABLET ORAL at 09:36

## 2018-12-01 RX ADMIN — HEPARIN SODIUM 5000 UNITS: 5000 INJECTION, SOLUTION INTRAVENOUS; SUBCUTANEOUS at 13:49

## 2018-12-01 RX ADMIN — HYDRALAZINE HYDROCHLORIDE 25 MG: 25 TABLET ORAL at 12:12

## 2018-12-01 RX ADMIN — VITAM B12 100 MCG: 100 TAB at 09:40

## 2018-12-01 RX ADMIN — LEVOTHYROXINE SODIUM 50 MCG: 50 TABLET ORAL at 06:08

## 2018-12-01 RX ADMIN — IPRATROPIUM BROMIDE AND ALBUTEROL SULFATE 3 ML: 2.5; .5 SOLUTION RESPIRATORY (INHALATION) at 16:03

## 2018-12-01 RX ADMIN — FUROSEMIDE 60 MG: 10 INJECTION, SOLUTION INTRAMUSCULAR; INTRAVENOUS at 16:16

## 2018-12-01 RX ADMIN — FLUTICASONE FUROATE AND VILANTEROL TRIFENATATE 1 PUFF: 100; 25 POWDER RESPIRATORY (INHALATION) at 09:35

## 2018-12-01 RX ADMIN — ASPIRIN 81 MG: 81 TABLET, COATED ORAL at 09:36

## 2018-12-01 RX ADMIN — Medication 150 MG: at 09:39

## 2018-12-01 NOTE — ASSESSMENT & PLAN NOTE
Patient had episode of AFib with RVR  Converted back to sinus rhythm after receiving Lopressor 5 mg IV x1   · Continue beta blocker, amiodarone  · Hesitant to increase Lopressor as patient is in sinus rhythm with heart rates in the 60s  Continue current dose  · Not on anticoagulation given recurrent falls     · Cardiology following since admission

## 2018-12-01 NOTE — CONSULTS
Consultation - Pulmonary Medicine   Reynolds Hatchet 80 y o  male MRN: [id number]  Unit/Bed#: [address] [place] [address] [id number] Encounter: [id number]      Assessment:  Moderate obstructive sleep apnea  Chronic hypoxemic respiratory failure  Patient uses anywhere from 8-10 of oxygen home  This is secondary to severe pulmonary hypertension and his COPD  Acute on chronic combined systolic and diastolic congestive heart failure  Iron deficiency anemia  Chronic kidney disease    Plan:   Patient normally is on CPAP 10 cm water at home  Will change BiPAP settings to this and continue oxygen  Patient usually uses 6 liters/minute of oxygen with his CPAP at night  He is on IV furosemide for his acute chronic combined systolic and diastolic congestive heart failure  Continue neb treatments with DuoNeb and continue his Breo 100 mcg 1 puff daily          History of Present Illness   Physician Requesting Consult: Michael Levy MD  Reason for Consult / Principal Problem:  Shortness of breath, chronic hypoxemic respiratory failure  Hx and PE limited by: none    HPI: Reynolds Hatchet is a 80y o  year old male who presents with complaints of increased weight and also having increased shortness of breath  He was having worsening bilateral lobe swelling  Denies any fever, chills or chest pain  He keeps his oxygen on 8 liters/minute continuous and at night uses CPAP 10 with 6 L of oxygen for his moderate obstructive sleep apnea  Does have moderate COPD with FEV1 of 1 85 L or 76% predicted also has history of iron deficiency anemia  He does have pulmonary artery hypertension secondary to his chronic diastolic heart failure and COPD  He quit smoking 31 years ago and smoked for 35 pack years  He gets short of breath minimal activity  He does have chronic kidney disease  Portable chest x-ray shows some mild cardiomegaly prominent pulmonary vasculature  S initial serum creatinine was 3 01 this is improved to 2 8 today    Patient is anemic and hemoglobin is 8 9 today  He is being treated with IV furosemide 60 mg twice a day for acute on chronic combined systolic and diastolic congestive heart failure  Will Perera He also has history coronary disease with prior angioplasty of LAD  He does have history of severe aortic stenosis with tab are procedure done in the past   His left ventricular ejection fraction of 35%      Review of Systems   Constitutional: Negative for chills, fatigue and fever  HENT: Negative for congestion and postnasal drip  Eyes: Negative for redness  Respiratory: Positive for shortness of breath  Negative for cough and wheezing  Cardiovascular: Positive for leg swelling  Negative for chest pain  Gastrointestinal: Negative for nausea  Endocrine: Negative for polydipsia and polyphagia  Neurological: Negative for dizziness         Historical Information   Past Medical History:   Diagnosis Date    Allergic rhinitis 06/11/2010    Aortic stenosis, severe     Bacterial pneumonia 06/08/2007    Bladder neck obstruction 12/23/2010    BPH (benign prostatic hyperplasia)     Bron[facility], chronic obstructive, [facility]ation [facility] ) 01/30/2012    CAD (coronary artery disease)     stent 2014    Carcinoma ([facility] )     resolved [id number]    Cardiomegaly 02/13/2007    Cataract of both eyes     CHF (congestive heart failure) (HCC)     Chronic allergic conjunctivitis     last assessed [id number]    Chronic kidney disease (CKD)     CKD (chronic kidney disease), stage III (HCC)     CKD (chronic kidney disease), stage III (HCC)     COPD (chronic obstructive pulmonary disease) ([facility] )     Decubitus ulcer     resolved [id number]    Dermatomycosis     last assessed [id number]    Diabetes mellitus type 2, noninsulin dependent ([facility] )     Dyspnea on exertion     last assessed [id number]    Eczema     last assessed [id number]    Edema     last assessed [id number]    Epistaxis 12/01/2004    Esophagitis, erosive     Exposure to scabies     resolved [id number]  Fracture of rib     last assessed [id number]    H/O aortic valve replacement     resolved [id number]    H/O blood clots     History of DVT (deep vein thrombosis)     left posterior tibial/peroneal veins    History of non-ST elevation myocardial infarction (NSTEMI)     History of pneumonia     Hyperlipidemia     Hypertension     Internal hemorrhoids 2006    LBBB (left bundle branch block)     MRSA (methicillin resistant staph aureus) culture positive     NSTEMI (non-ST elevated myocardial infarction) ([facility] )     resolved [id number]    Obstructive sleep apnea on CPAP     severe PATITO with AHI of 106 and uses CPAP at 10 cm H2O with 2 l/m oxygen    Paroxysmal atrial fibrillation ([facility] )     Phimosis 2010    severe pt had circumcision 2010    Pulmonary fibrosis ([facility] )     PVD (peripheral vascular disease) ([facility] )     Rotator cuff tendonitis     lsat assessed [id number]    Skin abscess 2004    Hip    Skin neoplasm     last assessed [id number]    Tachycardia 2004    Trigger finger     last assessed [id number]    Type 2 diabetes mellitus ([facility] ) 2004    Venous thrombosis 2007    Venous thrombosis 2007     Past Surgical History:   Procedure Laterality Date    CARDIAC CATHETERIZATION  03/10/2016    Showed 60-70% mid LAD lesion next to stent    CATARACT EXTRACTION      CIRCUMCISION, NON-      ESOPHAGOGASTRODUODENOSCOPY N/A 3/14/2016    Procedure: ESOPHAGOGASTRODUODENOSCOPY (EGD); Surgeon: Rashel Lobo MD;  Location: [facility];   Service:     EYE SURGERY      FRACTURE SURGERY      JOINT REPLACEMENT      both hips replaced    OTHER SURGICAL HISTORY      H/O thoracentesis (diagnostic)    OH REPLACE AORTIC VALVE OPENFEMORAL ARTERY APPROACH N/A 2016    Procedure: TRANSFEMORAL TAVR WITH 26MM CALIX MAKAYLA S3 TISSUE VALVE; PINA ;  Surgeon: Natali Sy DO;  Location: [facility];  Service: Cardiac Surgery    REPLACEMENT TOTAL KNEE Bilateral 01/01/1991 1991 and 2001    TISSUE AORTIC VALVE REPLACEMENT      transfemoral, transcatheter    TONSILLECTOMY      TONSILLECTOMY AND ADENOIDECTOMY      TOTAL HIP ARTHROPLASTY      Bilateral     Social History   History   Alcohol Use    Yes     Comment: socially     History   Drug Use No     History   Smoking Status    Former Smoker    Packs/day: 1 00    Years: 35 00    Types: Cigarettes    Quit date: 1/1/1987   Smokeless Tobacco    Never Used     Comment: quit 50 years ago, per [facility]         Family History:   Family History   Problem Relation Age of Onset    Coronary artery disease Mother     Arthritis Mother     Hypertension Mother     Rheum arthritis Father     Prostate cancer Father        Meds/Allergies     Current Facility-Administered Medications:     acetaminophen (TYLENOL) tablet 325 mg, 325 mg, Oral, Q12H PRN **AND** traMADol (ULTRAM) tablet 50 mg, 50 mg, Oral, Q12H PRN, Rupesh Mckay MD    amiodarone tablet 100 mg, 100 mg, Oral, Daily, Rupesh Mckay MD, 100 mg at 12/01/18 0936    amLODIPine (NORVASC) tablet 5 mg, 5 mg, Oral, Daily, Rupesh Mckay MD, 5 mg at 12/01/18 0939    aspirin (ECOTRIN LOW STRENGTH) EC tablet 81 mg, 81 mg, Oral, Daily, Rupesh Mckay MD, 81 mg at 12/01/18 0936    cyanocobalamin (VITAMIN B-12) tablet 100 mcg, 100 mcg, Oral, Daily, Rupesh Mckay MD, 100 mcg at 12/01/18 0940    diclofenac sodium (VOLTAREN) 1 % topical gel 2 g, 2 g, Topical, Q12H [address], ROC Anderson, 2 g at 12/01/18 0940    docusate sodium (COLACE) capsule 100 mg, 100 mg, Oral, HS, Rupesh Mckay MD, 100 mg at 11/30/18 2146    fluticasone-vilanterol (BREO ELLIPTA) 100-25 mcg/inh inhaler 1 puff, 1 puff, Inhalation, Daily, Rupesh Mckay MD, 1 puff at 12/01/18 0935    furosemide (LASIX) injection 60 mg, 60 mg, Intravenous, BID (diuretic), Kaye Kc MD, 60 mg at 12/01/18 1616    heparin (porcine) subcutaneous injection 5,000 Units, 5,000 Units, Subcutaneous, Q8H [address], 5,000 Units at 12/01/18 [phone] **AND** Platelet count, , , Once, Shanna Mata MD    hydrALAZINE (APRESOLINE) tablet 25 mg, 25 mg, Oral, Q8H, Shanna Mata MD, 25 mg at 12/01/18 1212    ipratropium-albuterol (DUO-NEB) 0 5-2 5 mg/3 mL inhalation solution 3 mL, 3 mL, Nebulization, Q4H PRN, Shanna Mata MD    ipratropium-albuterol (DUO-NEB) 0 5-2 5 mg/3 mL inhalation solution 3 mL, 3 mL, Nebulization, 4x Daily, Shanna Mata MD, 3 mL at 12/01/18 1603    iron polysaccharides (NIFEREX) capsule 150 mg, 150 mg, Oral, Daily, Shanna Mata MD, 150 mg at 12/01/18 0939    isosorbide mononitrate (IMDUR) 24 hr tablet 30 mg, 30 mg, Oral, Daily, Shanna Mata MD, 30 mg at 12/01/18 9300    levothyroxine tablet 50 mcg, 50 mcg, Oral, Daily, Shanna Mata MD, 50 mcg at 12/01/18 0608    metoprolol tartrate (LOPRESSOR) tablet 25 mg, 25 mg, Oral, Q12H [facility] [place], Shanna Mata MD, 25 mg at 12/01/18 0936    mupirocin (BACTROBAN) 2 % nasal ointment, , Nasal, Q12H [facility] [place], Shanna Mata MD, 1 application at 02/88/76 0941    ondansetron (ZOFRAN) injection 4 mg, 4 mg, Intravenous, Q6H PRN, Shanna Mata MD    oxybutynin (DITROPAN) tablet 5 mg, 5 mg, Oral, Daily, Shanna Mata MD, 5 mg at 12/01/18 0936    pantoprazole (PROTONIX) EC tablet 40 mg, 40 mg, Oral, Daily, Shanna Mata MD, 40 mg at 12/01/18 4950    Prior to Admission medications    Medication Sig Start Date End Date Taking?  Authorizing Provider   albuterol (PROAIR HFA) 90 mcg/act inhaler Inhale 2 puffs every 4 (four) hours as needed for wheezing or shortness of breath 6/5/18  Yes Deandre Phlegm, DO   amiodarone 100 mg tablet Take 1 tablet (100 mg total) by mouth daily 3/6/18  Yes Maycol Velasquez MD   amLODIPine (NORVASC) 5 mg tablet Take 1 tablet (5 mg total) by mouth daily 11/9/18  Yes Michelet Gutiérrez DO   aspirin (ECOTRIN LOW STRENGTH) 81 mg EC tablet Take 81 mg by mouth daily   Yes Historical Provider, MD   atorvastatin (LIPITOR) 10 mg tablet Take 2 tablets (20 mg total) by mouth daily 6/22/18  Yes Michelet Gutiérrez DO   B Complex Vitamins (VITAMIN B COMPLEX PO) Take by mouth   Yes Historical Provider, MD [place] USE 1 INHALATION DAILY 4/16/18  Yes ROC Dorantes   cyanocobalamin (VITAMIN B-12) 100 mcg tablet Take by mouth   Yes Historical Provider, MD   docusate sodium (COLACE) 100 mg capsule Take 100 mg by mouth daily at bedtime     Yes Historical Provider, MD   DULoxetine (CYMBALTA) 30 mg delayed release capsule take 1 capsule by mouth once daily 11/10/18  Yes Lizbeth Dempsey, DO   fenofibrate (TRICOR) 145 mg tablet Take 1 tablet (145 mg total) by mouth daily 10/24/18  Yes Prakash Wong MD   hydrALAZINE (APRESOLINE) 25 mg tablet TAKE 1 TABLET BY MOUTH EVERY 8 HOURS 11/20/18  Yes Prakash Wong MD   IFEREX 150 150 MG capsule take 1 capsule by mouth once daily 8/31/18  Yes Marii Masker,    isosorbide mononitrate (IMDUR) 30 mg 24 hr tablet take 1 tablet by mouth once daily 9/7/18  Yes Prakash Wong MD   levothyroxine 50 mcg tablet take 1 tablet by mouth once daily 11/4/18  Yes Lizbeth Valdesfret, DO   pantoprazole (PROTONIX) 40 mg tablet take 1 tablet by mouth once daily 5/24/18  Yes Lizbeth Castleberry, DO   tolterodine (DETROL) 2 mg tablet Take 1 tablet (2 mg total) by mouth daily 10/16/18  Yes Lizbeth Castleberry, DO   torsemide (DEMADEX) 20 mg tablet Take 20mg/d alternating with 30mg/d 11/23/18  Yes Taylor Louie PA-C   traMADol-acetaminophen (ULTRACET) 37 5-325 mg per tablet Take 1 tablet by mouth 2 (two) times a day as needed for moderate pain 9/24/18  Yes ROC Dorantes   aspirin 81 mg chewable tablet Chew daily    Historical Provider, MD   diclofenac sodium (VOLTAREN) 1 % Apply 4 g topically 4 (four) times a day Both shoulders and knees 9/19/18   Lizbeth Castleberry, DO   metoprolol tartrate (LOPRESSOR) 25 mg tablet Take 1 tablet (25 mg total) by mouth every 12 (twelve) hours 11/30/18   Prakash Wong MD       No Known Allergies    Objective   Vitals: Blood pressure 163/73, pulse 64, temperature 97 7 °F (36 5 °C), temperature source Temporal, resp  rate 18, height 5' 10" (1 778 m), weight 105 kg (232 lb 2 3 oz), SpO2 100 %  ,Body mass index is 33 31 kg/m²  Intake/Output Summary (Last 24 hours) at 12/01/18 1712  Last data filed at 12/01/18 1601   Gross per 24 hour   Intake              940 ml   Output             3525 ml   Net            -2585 ml     Invasive Devices     Peripheral Intravenous Line            Peripheral IV 11/29/18 Left Hand 2 days                Physical Exam   Constitutional: He is oriented to person, place, and time  He appears well-developed and well-nourished  No distress  On 8 L O2 saturation is 98%   HENT:   Head: Normocephalic  Nose: Nose normal    Mouth/Throat: Oropharynx is clear and moist  No oropharyngeal exudate  Eyes: Pupils are equal, round, and reactive to light  Conjunctivae are normal    Neck: Neck supple  No JVD present  No tracheal deviation present  Cardiovascular: Normal rate, regular rhythm and normal heart sounds  Pulmonary/Chest: Effort normal    Abdominal: Soft  He exhibits no distension  There is no tenderness  There is no guarding  Musculoskeletal:   Has some moderate edema of lower extremities   Lymphadenopathy:     He has no cervical adenopathy  Neurological: He is alert and oriented to person, place, and time  Skin: Skin is warm and dry  No rash noted  Psychiatric: He has a normal mood and affect   His behavior is normal  Thought content normal        Lab Results: ABG: No results found for: PHART, WFQ8DNI, PO2ART, XJP7BPL, H6BZTGTF, BEART, SOURCE, BNP: No results found for: BNP, CBC: Lab Results   Component Value Date    WBC 6 81 12/01/2018    HGB 8 9 (L) 12/01/2018    HCT 29 0 (L) 12/01/2018    MCV 85 12/01/2018     12/01/2018    ADJUSTEDWBC 6 70 10/04/2016    MCH 25 9 (L) 12/01/2018    MCHC 30 7 (L) 12/01/2018    RDW 16 8 (H) 12/01/2018    MPV 11 2 12/01/2018    NRBC 0 11/29/2018   , CMP: Lab Results   Component Value Date     10/16/2017    K 3 9 12/01/2018    K 5 0 11/14/2018     12/01/2018    CL 98 11/14/2018    CO2 30 12/01/2018    CO2 24 11/14/2018    ANIONGAP 13 9 10/08/2015    BUN 53 (H) 12/01/2018    BUN 51 (H) 11/14/2018    CREATININE 2 88 (H) 12/01/2018    CREATININE 2 06 (H) 10/16/2017    GLUCOSE 109 (H) 10/16/2017    CALCIUM 8 7 12/01/2018    CALCIUM 9 7 10/16/2017    AST 47 (H) 11/29/2018    AST 39 10/16/2017    ALT 26 11/29/2018    ALT 15 10/16/2017    ALKPHOS 55 11/29/2018    ALKPHOS 61 10/16/2017    PROT 7 9 10/16/2017    BILITOT 0 4 10/16/2017    EGFR 19 12/01/2018   , PT/INR:   Lab Results   Component Value Date    INR 1 34 (H) 11/29/2018   , Troponin: No results found for: TROPONIN    Imaging Studies: I have personally reviewed pertinent reports  and I have personally reviewed pertinent films in PACS    EKG, Pathology, and Other Studies: I have personally reviewed pertinent reports  VTE Prophylaxis: Sequential compression device Tristin Freed     Code Status: Level 3 - DNAR and DNI    Counseling/Coordination of Care: Total floor / unit time spent today 30 minutes  Greater than 50% of total time was spent with the patient and / or family counseling and / or coordination of care   A description of the counseling / coordination of care: I reviewed patient's chart and discussed diagnosis and treatment with him

## 2018-12-01 NOTE — ASSESSMENT & PLAN NOTE
· Baseline creatinine 1 8-2, on admission 3 01  Secondary to cardiorenal syndrome  · Creatinine stable around 2 5-2 8  · Recent renal US negative for hydro  · Good UOP, on IV Lasix  · Nephrology following, appreciate input    Per Nephrology, will need to tolerate a higher baseline creatinine to keep out of CHF  · Decreased Voltaren gel dose  · Check Creatinine in am

## 2018-12-01 NOTE — ASSESSMENT & PLAN NOTE
Elvisstephie Remy has moderate COPD and will continue Breo 100 mcg 1 puff daily and nebulized treatment DuoNeb 4 times a day as needed

## 2018-12-01 NOTE — ASSESSMENT & PLAN NOTE
· Last spirometry showed his FEV1 to be 1 85 liters or 76 percent predicted consistent moderate airflow obstruction  · Does not appear in exacerbation, dyspnea was likely from heart failure, improving  · Cont home inhalers

## 2018-12-01 NOTE — ASSESSMENT & PLAN NOTE
· Normally on 6-10L NC at home 2/2 severe COPD and pulmonary HTN,   · Improving with diuresis  · Monitor

## 2018-12-01 NOTE — ASSESSMENT & PLAN NOTE
· LV EF 35-40%, 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  · Presented with acute heart failure with volume overload  · CXR shows vascular congestion, pro BNP 63316  · nephrology and cardio following  · Improved with IV Lasix 60 mg twice a day  · Now close to EDW  · Transitioned to PO torsemide 60 mg in AM daily  Plan to discharge on this dosage    · Patient to check daily weights and increase torsemide to 60 mg BID if weight is above 215 lbs   · Monitor I/Os and daily weights  · Continue other cardiac meds

## 2018-12-01 NOTE — PROGRESS NOTES
NEPHROLOGY PROGRESS NOTE   Cindy Brown 80 y o  male MRN: [id number]  Unit/Bed#: [address] [place] [address] Encounter: [id number]  Reason for Consult: KJ    ASSESSMENT and PLAN:    1 ) Acute kidney injury  -POA  -admission creatinine 3 01, creatinine trended down to 2 8 today  -I suspect that this is in the setting of volume overload and possible cardiorenal syndrome  -excellent urine output diuresing well  -continue Lasix 60 mg twice a day  -may need to accept a higher baseline creatinine about volume overload   -urinalysis trace protein no blood    I do not suspect an underlying glomerular process  -nonoliguric  -monitor input and output  -no clinical indication for renal ultrasound at this time  -would recommend reducing the dose to the Voltaren gel if possible, as there is some systemic absorption which could lead to nephrotoxicity     2 ) Acute on chronic combined heart failure  -ejection fraction 35-40%  -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  -examines volume overloaded, with edema and weight gain over 10 lb, chest x-ray shows congestion  -cardiology has been consulted  -proBNP level 16,996  -continue IV Lasix to 60 mg twice a day  -monitor input and output  -low 2 g sodium diet  -would recommend reducing the dose of the Voltaren gel     3 ) Chronic kidney disease stage IV  -outpatient nephrologist Dr Sharif Villafana  -baseline creatinine 1 8-2 2 mg/dL  -benign renal ultrasound     4 ) Hypertension  -blood pressure currently acceptable  -continue current management     5 ) Anemia  -likely secondary to chronic kidney disease  -continue iron supplement      SUBJECTIVE / INTERVAL HISTORY:    No overnight events    OBJECTIVE:  Current Weight: Weight - Scale: 105 kg (232 lb 2 3 oz)  Vitals:    12/01/18 0405 12/01/18 0536 12/01/18 0756 12/01/18 0804   BP: 151/69   137/87   BP Location: Left arm   Left arm   Pulse: 61   65   Resp: 18   18   Temp: 97 6 °F (36 4 °C)   (!) 97 1 °F (36 2 °C)   TempSrc:    Temporal   SpO2: 99%  98% 98%   Weight: 105 kg (232 lb 9 4 oz) 105 kg (232 lb 2 3 oz)     Height:           Intake/Output Summary (Last 24 hours) at 12/01/18 9972  Last data filed at 12/01/18 0401   Gross per 24 hour   Intake             1180 ml   Output             3650 ml   Net            -2470 ml       Physical Exam   Constitutional: He is oriented to person, place, and time  He appears well-developed and well-nourished  No distress  HENT:   Head: Normocephalic and atraumatic  Eyes: Pupils are equal, round, and reactive to light  No scleral icterus  Neck: Normal range of motion  Neck supple  Cardiovascular: Normal rate, regular rhythm and normal heart sounds  Exam reveals no gallop and no friction rub  No murmur heard  Pulmonary/Chest: Effort normal and breath sounds normal  No respiratory distress  He has no wheezes  He has no rales  He exhibits no tenderness  Abdominal: Soft  Bowel sounds are normal  He exhibits no distension  There is no tenderness  There is no rebound  Musculoskeletal: Normal range of motion  He exhibits edema  Neurological: He is alert and oriented to person, place, and time  Skin: No rash noted  He is not diaphoretic  Psychiatric: He has a normal mood and affect  Nursing note and vitals reviewed        Medications:    Current Facility-Administered Medications:     acetaminophen (TYLENOL) tablet 325 mg, 325 mg, Oral, Q12H PRN **AND** traMADol (ULTRAM) tablet 50 mg, 50 mg, Oral, Q12H PRN, Anderson Gasca MD    amiodarone tablet 100 mg, 100 mg, Oral, Daily, Anderson Gasca MD, 100 mg at 12/01/18 0936    amLODIPine (NORVASC) tablet 5 mg, 5 mg, Oral, Daily, Anderson Gasca MD, 5 mg at 12/01/18 5278    aspirin (ECOTRIN LOW STRENGTH) EC tablet 81 mg, 81 mg, Oral, Daily, Anderson Gasca MD, 81 mg at 12/01/18 0936    cyanocobalamin (VITAMIN B-12) tablet 100 mcg, 100 mcg, Oral, Daily, Anderson Gasca MD, 100 mcg at 12/01/18 0940    diclofenac sodium (VOLTAREN) 1 % topical gel 2 g, 2 g, Topical, Q12H [address], Ghassan Mark, CRNP, 2 g at 12/01/18 0940    docusate sodium (COLACE) capsule 100 mg, 100 mg, Oral, HS, Torri Renner MD, 100 mg at 11/30/18 2146    fluticasone-vilanterol (BREO ELLIPTA) 100-25 mcg/inh inhaler 1 puff, 1 puff, Inhalation, Daily, Torri Renner MD, 1 puff at 12/01/18 0935    furosemide (LASIX) injection 60 mg, 60 mg, Intravenous, BID (diuretic), Emily Hanson MD, 60 mg at 12/01/18 0939    heparin (porcine) subcutaneous injection 5,000 Units, 5,000 Units, Subcutaneous, Q8H [facility], 5,000 Units at 12/01/18 0608 **AND** Platelet count, , , Once, Torri Renner MD    hydrALAZINE (APRESOLINE) tablet 25 mg, 25 mg, Oral, Q8H, Torri Renner MD, 25 mg at 12/01/18 0400    ipratropium-albuterol (DUO-NEB) 0 5-2 5 mg/3 mL inhalation solution 3 mL, 3 mL, Nebulization, Q4H PRN, Torri Renner MD    ipratropium-albuterol (DUO-NEB) 0 5-2 5 mg/3 mL inhalation solution 3 mL, 3 mL, Nebulization, 4x Daily, Torri Renner MD, 3 mL at 12/01/18 0756    iron polysaccharides (NIFEREX) capsule 150 mg, 150 mg, Oral, Daily, Torri Renner MD, 150 mg at 12/01/18 0939    isosorbide mononitrate (IMDUR) 24 hr tablet 30 mg, 30 mg, Oral, Daily, Torri Renner MD, 30 mg at 12/01/18 9210    levothyroxine tablet 50 mcg, 50 mcg, Oral, Daily, Torri Renner MD, 50 mcg at 12/01/18 0608    metoprolol tartrate (LOPRESSOR) tablet 25 mg, 25 mg, Oral, Q12H [facility], Torri Renner MD, 25 mg at 12/01/18 0936    mupirocin (BACTROBAN) 2 % nasal ointment, , Nasal, Q12H [facility], Torri Renner MD, 1 application at 81/82/30 0941    ondansetron (ZOFRAN) injection 4 mg, 4 mg, Intravenous, Q6H PRN, Torri Renner MD    oxybutynin (DITROPAN) tablet 5 mg, 5 mg, Oral, Daily, Torri Renner MD, 5 mg at 12/01/18 0936    pantoprazole (PROTONIX) EC tablet 40 mg, 40 mg, Oral, Daily, Torri Renner MD, 40 mg at 12/01/18 5609    Laboratory Results:    Results from last 7 days  Lab Units 12/01/18  0543 11/30/18  0543 11/30/18  0136 11/29/18  1421 11/29/18  1332   WBC Thousand/uL 6 81 7 17  --   --  8 61   HEMOGLOBIN g/dL 8 9* 9 4*  --   --  10 2*   HEMATOCRIT % 29 0* 31 0*  --   --  34 8*   PLATELETS Thousands/uL 239 234  --   --  279   POTASSIUM mmol/L 3 9  --  4 1 5 3  --    CHLORIDE mmol/L 102  --  104 104  --    CO2 mmol/L 30  --  28 27  --    BUN mg/dL 53*  --  60* 57*  --    CREATININE mg/dL 2 88*  --  3 05* 3 01*  --    CALCIUM mg/dL 8 7  --  8 4 9 2  --

## 2018-12-01 NOTE — PROGRESS NOTES
Progress Note - Jordy Ends 10/24/1930, 80 y o  male MRN: [id number]    Unit/Bed#: [address] [place] [address] Encounter: [id number]    Primary Care Provider: Charly Leo DO   Date and time admitted to hospital: 11/29/2018  1:14 PM        Acute renal failure superimposed on stage 4 chronic kidney disease ([facility] [address] )   Assessment & Plan    · Baseline creatinine 1 8-2, on admission 3 01  · Sees Dr Lula Elizabeth as outpt  · Has had issues recently with rising creatinine with increased diuretic dose, suspect cardiorenal syndrome  · Recent renal US negative for hydro  · Good UOP  · Consult nephrology to follow, accepting a high creatinine for diuresis  · Decreased Voltaren gel dose, given some systemic absorption     * Acute on chronic combined systolic and diastolic heart failure (HCC)   Assessment & Plan    · LV EF 35-40%, 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  · Appears in acute heart failure with volume overload  · CXR shows vascular congestion, pro BNP 02588  · nephrology and cardio consulted  · Cont IV lasix, increased dose to 60mg BID  · Monitor I/Os and daily weights     Acute on chronic respiratory failure with hypoxia ([facility] )   Assessment & Plan    · Normally on 6L NC at home, was requiring 8-10L and still have desats into the 80s, likely from fluid overload  · CXR showed CHF  · Treat heart failure as noted  · O2, weaned back to baseline as tolerated        PATITO (obstructive sleep apnea)   Assessment & Plan    Cont CPAP qHS at 10 with 6L O2     Paroxysmal atrial fibrillation ([facility])   Assessment & Plan    · Cont beta blocker     Moderate COPD (chronic obstructive pulmonary disease) ([facility])   Assessment & Plan    · Last spirometry showed his FEV1 to be 1 85 liters or 76 percent predicted consistent moderate airflow obstruction  · Does not appear in exacerbation, dyspnea if likely from heart failure, resolved now  · Cont home inhalers           VTE Pharmacologic Prophylaxis:   Pharmacologic: Heparin  Mechanical VTE Prophylaxis in Place: Yes    Patient Centered Rounds: I have performed bedside rounds with nursing staff today  Discussions with Specialists or Other Care Team Provider: Yes  Education and Discussions with Family / Patient:Yes  Time Spent for Care: 30 minutes  More than 50% of total time spent on counseling and coordination of care as described above  Current Length of Stay: 2 day(s)  Current Patient Status: Inpatient     Discharge Plan: pending    Code Status: Level 3 - DNAR and DNI      Subjective:   Feels ok, still has SOB when he lays flat and still feels very swollen in his legs  No other complaints  Objective:     Vitals:   Temp (24hrs), Av 7 °F (36 5 °C), Min:97 1 °F (36 2 °C), Max:98 °F (36 7 °C)    Temp:  [97 1 °F (36 2 °C)-98 °F (36 7 °C)] 97 1 °F (36 2 °C)  HR:  [61-65] 65  Resp:  [18] 18  BP: (137-154)/(63-87) 137/87  SpO2:  [95 %-99 %] 98 %  Body mass index is 33 31 kg/m²  Input and Output Summary (last 24 hours): Intake/Output Summary (Last 24 hours) at 18 1100  Last data filed at 18 1001   Gross per 24 hour   Intake             1180 ml   Output             4100 ml   Net            -2920 ml        Physical Exam:     Physical Exam   Constitutional: He is oriented to person, place, and time  He appears well-developed  No distress  HENT:   Head: Normocephalic and atraumatic  Neck: JVD present  Cardiovascular: Normal rate and regular rhythm  Murmur heard  irregular   Pulmonary/Chest: Effort normal and breath sounds normal  No respiratory distress  He has no wheezes  He has no rales  Abdominal: Soft  Bowel sounds are normal  He exhibits no distension  There is no tenderness  There is no rebound  Musculoskeletal: He exhibits edema (3+ pitting)  Neurological: He is alert and oriented to person, place, and time  Skin: Skin is warm and dry  Psychiatric: He has a normal mood and affect  His behavior is normal    Nursing note and vitals reviewed        Additional Data: Labs:      Results from last 7 days  Lab Units 12/01/18  0543 11/30/18  0543 11/29/18  1332   WBC Thousand/uL 6 81 7 17 8 61   HEMOGLOBIN g/dL 8 9* 9 4* 10 2*   HEMATOCRIT % 29 0* 31 0* 34 8*   PLATELETS Thousands/uL 239 234 279   NEUTROS PCT %  --   --  73       Results from last 7 days  Lab Units 12/01/18  0543 11/30/18  0136 11/29/18  1421   SODIUM mmol/L 141 141 140   POTASSIUM mmol/L 3 9 4 1 5 3   CHLORIDE mmol/L 102 104 104   CO2 mmol/L 30 28 27   BUN mg/dL 53* 60* 57*   CREATININE mg/dL 2 88* 3 05* 3 01*   CALCIUM mg/dL 8 7 8 4 9 2   TOTAL BILIRUBIN mg/dL  --   --  0 60   ALK PHOS U/L  --   --  55   ALT U/L  --   --  26   AST U/L  --   --  47*       Results from last 7 days  Lab Units 11/29/18  1332   INR  1 34*       Results from last 7 days  Lab Units 11/29/18  1332   TROPONIN I ng/mL 0 02     Lab Results   Component Value Date/Time    HGBA1C 5 3 06/12/2018 06:14 AM           Results from last 7 days  Lab Units 11/29/18  1548 11/29/18  1332   LACTIC ACID mmol/L 1 0 2 2*       * I Have Reviewed All Lab Data Listed Above  * Additional Pertinent Lab Tests Reviewed: All Labs Within Last 24 Hours Reviewed    Imaging:     XR chest 1 view portable   Final Result by Staci Gagnon MD (11/29 1401)         1  Cardiomegaly  2   Chronic right pleural thickening  3   Mild CHF  Workstation performed: [id number]           Imaging Reports Reviewed by myself    Cultures:   Blood Culture:   Lab Results   Component Value Date    BLOODCX No Growth at 24 hrs  11/29/2018    BLOODCX No Growth at 24 hrs  11/29/2018    BLOODCX No Growth After 5 Days  01/06/2017    BLOODCX No Growth After 5 Days  01/06/2017    BLOODCX No Growth After 5 Days  03/06/2016    BLOODCX No Growth After 5 Days   03/06/2016     Urine Culture:   Lab Results   Component Value Date    URINECX No Growth <1000 cfu/mL 03/04/2016     Sputum Culture: No components found for: SPUTUMCX  Wound Culture: No results found for: WOUNDCULT    Last 24 Hours Medication List:     Current Facility-Administered Medications:  acetaminophen 325 mg Oral Q12H PRN Keyla Anderson MD   And       traMADol 50 mg Oral Q12H PRN Keyla Anderson MD   amiodarone 100 mg Oral Daily Keyla Anderson MD   amLODIPine 5 mg Oral Daily Keyla Anderson, MD   aspirin 81 mg Oral Daily Keyla Anderson MD   cyanocobalamin 100 mcg Oral Daily Keyla Anderson MD   diclofenac sodium 2 g Topical Q12H Dayfort, CRDAYLIN   docusate sodium 100 mg Oral HS Keyla Anderson MD   fluticasone-vilanterol 1 puff Inhalation Daily Keyla Anderson MD   furosemide 60 mg Intravenous BID (diuretic) Arabella Pearce MD   heparin (porcine) 5,000 Units Subcutaneous Q8H [facility] [facility]   hydrALAZINE 25 mg Oral Q8H Keyla Anderson MD   ipratropium-albuterol 3 mL Nebulization Q4H PRN Keyla Anderson MD   ipratropium-albuterol 3 mL Nebulization 4x Daily Keyla Anderson MD   iron polysaccharides 150 mg Oral Daily Keyla Anderson MD   isosorbide mononitrate 30 mg Oral Daily Keyla Anderson MD   levothyroxine 50 mcg Oral Daily Keyla Anderson MD   metoprolol tartrate 25 mg Oral Q12H [address] [facility], MD   mupirocin  Nasal Q12H [address] [facility], MD   ondansetron 4 mg Intravenous Q6H PRN Keyla Anderson MD   oxybutynin 5 mg Oral Daily Keyla Anderson, MD   pantoprazole 40 mg Oral Daily Keyla Anderson MD        Today, Patient Was Seen By: Keyla Anderson MD    ** Please Note: Dragon 360 Dictation voice to text software may have been used in the creation of this document   **

## 2018-12-01 NOTE — PROGRESS NOTES
Progress Note - Cardiology   [facility]     Early y o  male MRN: [id number]  : 10/24/1930  Unit/Bed#: [place] [address] Encounter: [id number]    Assessment and Plan:   1  Acute on chronic combined systolic and diastolic heart failure with New York heart Association class 3   2  Acute on chronic respiratory failure due to underlying severe COPD and above  3  Acute kidney injury with baseline creatinine around 2 now the new baseline may be higher with CKD stage 4  4  Known severe pulmonary hypertension with interstitial lung disease  5  Paroxysmal atrial fibrillation currently in sinus rhythm suppressed with low-dose amiodarone not on antithrombotic therapy  6  Coronary artery disease with history of angioplasty of LAD and history of residual 50-60% proximal LAD stenosis on medical Rx  7  History of severe aortic stenosis status post TAVR and valve is functioning adequately  8  Cardiomyopathy  with EF around 35  % on recent echo but patient refuses to have biventricular pacemaker placed    Plan  Continue current IV Lasix dose  Monitor electrolytes and input output  Continue GI and DVT prophylaxis  Discussed with patient and wife      Discussed with medical team        Subjective / Objective:   Patient seen and evaluated  Patient was readmitted in about 12 days  He is doing relatively better, but looks chronically sick  Denies any new complaints  Breathing is much better today  Leg edema has improved  Denies any chest pain  He has previous history of severe COPD, atrial fibrillation, coronary artery disease, cardiomyopathy, LBBB, CKD stage 4 he does not want aggressive measures other than medical Rx  Vitals: Blood pressure 129/85, pulse 62, temperature 97 6 °F (36 4 °C), temperature source Temporal, resp  rate 18, height 5' 10" (1 778 m), weight 105 kg (232 lb 2 3 oz), SpO2 96 %    Vitals:    18 0405 18 0536   Weight: 105 kg (232 lb 9 4 oz) 105 kg (232 lb 2 3 oz) Body mass index is 33 31 kg/m²  BP Readings from Last 3 Encounters:   12/01/18 129/85   11/23/18 130/50   11/19/18 159/74     Orthostatic Blood Pressures      Most Recent Value   Blood Pressure  129/85 filed at 12/01/2018 1100   Patient Position - Orthostatic VS  Sitting filed at 12/01/2018 1100        I/O       11/29 0701 - 11/30 0700 11/30 0701 - 12/01 0700 12/01 0701 - 12/02 0700    P  O  120 1180     Total Intake(mL/kg) 120 (1 1) 1180 (11 2)     Urine (mL/kg/hr) 1315 4075 (1 6) 450 (0 6)    Total Output 1315 4075 450    Net -1195 -2895 -450               Invasive Devices     Peripheral Intravenous Line            Peripheral IV 11/29/18 Left Hand 2 days                  Intake/Output Summary (Last 24 hours) at 12/01/18 1439  Last data filed at 12/01/18 1001   Gross per 24 hour   Intake              940 ml   Output             3425 ml   Net            -2485 ml         Physical Exam:   Physical Exam    Neurologic:  Alert & oriented x 3,  no focal deficits noted   Constitutional:  Well developed, well nourished,  With no acute distress  Eyes:  PERRL, conjunctiva normal   HENT:  Atraumatic, external ears normal, nose normal,   NECK: Normal range of motion, no tenderness, neck is supple , No JVP  Respiratory:  Bilateral air entry with bilateral rhonchi and chronic wheezing  Cardiovascular: S1-S2 regular 3/6 ejection systolic murmur S4 is present  GI:  Soft, nondistended, normal bowel sounds, nontender, no hepatosplenomegaly appreciated  Musculoskeletal:  No tenderness, no deformities      Extremities:  1+ edema  Psychiatric:  Speech and behavior appropriate             Medications/ Allergies:     Current Facility-Administered Medications:  acetaminophen 325 mg Oral Q12H PRN Radha Sánchez MD   And       traMADol 50 mg Oral Q12H PRN Radha Sánchez MD   amiodarone 100 mg Oral Daily Radha Sánchez MD   amLODIPine 5 mg Oral Daily Radha Sánchez MD   aspirin 81 mg Oral Daily Radha Sánchez MD   cyanocobalamin 100 mcg Oral Daily Dara Madeline Carbajal MD   diclofenac sodium 2 g Topical Q12H Dayfort, CRDAYLIN   docusate sodium 100 mg Oral HS Sandip Sofia MD   fluticasone-vilanterol 1 puff Inhalation Daily Sandip Sofia MD   furosemide 60 mg Intravenous BID (diuretic) Graciela Roque MD   heparin (porcine) 5,000 Units Subcutaneous Q8H Catie Arredondo MD   hydrALAZINE 25 mg Oral Fady Shah MD   ipratropium-albuterol 3 mL Nebulization Q4H PRN Sandip Sofia MD   ipratropium-albuterol 3 mL Nebulization 4x Daily Sandip Sofia MD   iron polysaccharides 150 mg Oral Daily Sandip Sofia MD   isosorbide mononitrate 30 mg Oral Daily Sandip Sofia MD   levothyroxine 50 mcg Oral Daily Sandip Sofia MD   metoprolol tartrate 25 mg Oral Q12H Catie Arredondo MD   mupirocin  Nasal Q12H Catie Arredondo MD   ondansetron 4 mg Intravenous Q6H PRN Sandip Sofia MD   oxybutynin 5 mg Oral Daily Sandip Sofia MD   pantoprazole 40 mg Oral Daily Sandip Sofia MD       acetaminophen 325 mg Q12H PRN   And     traMADol 50 mg Q12H PRN   ipratropium-albuterol 3 mL Q4H PRN   ondansetron 4 mg Q6H PRN     No Known Allergies    VTE Pharmacologic Prophylaxis:   Heparin    Labs:   Troponins:  Results from last 7 days  Lab Units 11/29/18  1332   TROPONIN I ng/mL 0 02       CBC with diff:  Results from last 7 days  Lab Units 12/01/18  0543 11/30/18  0543 11/29/18  1332   WBC Thousand/uL 6 81 7 17 8 61   HEMOGLOBIN g/dL 8 9* 9 4* 10 2*   HEMATOCRIT % 29 0* 31 0* 34 8*   MCV fL 85 86 89   PLATELETS Thousands/uL 239 234 279   MCH pg 25 9* 26 2* 26 1*   MCHC g/dL 30 7* 30 3* 29 3*   RDW % 16 8* 16 9* 17 6*   MPV fL 11 2 11 0 11 7   NRBC AUTO /100 WBCs  --   --  0       CMP:  Results from last 7 days  Lab Units 12/01/18  0543 11/30/18  0136 11/29/18  1421   SODIUM mmol/L 141 141 140   POTASSIUM mmol/L 3 9 4 1 5 3   CHLORIDE mmol/L 102 104 104   CO2 mmol/L 30 28 27   ANION GAP mmol/L 9 9 9   BUN mg/dL 53* 60* 57*   CREATININE mg/dL 2 88* 3 05* 3 01*   CALCIUM mg/dL 8 7 8 4 9 2   AST U/L  --   --  47*   ALT U/L  -- --  26   ALK PHOS U/L  --   --  55   TOTAL PROTEIN g/dL  --   --  7 3   ALBUMIN g/dL  --   --  3 2*   TOTAL BILIRUBIN mg/dL  --   --  0 60   EGFR ml/min/1 73sq m 19 17 18       Coags:  Results from last 7 days  Lab Units 11/29/18  1332   PTT seconds 19*   INR  1 34*       NT-proBNP:   Recent Labs      11/29/18   1421   NTBNP  16,996*        Imaging & Testing   I have personally reviewed pertinent reports  Xr Chest 1 View Portable    Result Date: 11/29/2018  Narrative: CHEST INDICATION:   sob  COMPARISON:  11/18/2018 EXAM PERFORMED/VIEWS:  XR CHEST PORTABLE One image FINDINGS: Cardiomediastinal silhouette appears enlarged  Mild heart failure  Chronic right pleural thickening  Bibasilar scarring  Osseous structures appear within normal limits for patient age  Impression: 1  Cardiomegaly  2   Chronic right pleural thickening  3   Mild CHF  Workstation performed: [id number]     Xr Chest Portable    Result Date: 11/19/2018  Narrative: CHEST INDICATION:   hypoxia  Shortness of breath COMPARISON:  11/16/2018 EXAM PERFORMED/VIEWS:  XR CHEST PORTABLE FINDINGS:  Lines and tubes are unchanged from prior exam   Patient is status post TAVR Heart shadow is enlarged but unchanged from prior exam  Chronic right effusion or pleural thickening noted similar to prior study  The lungs are similar in appearance to recent prior studies  There is no pneumothorax  There are degenerative changes of the shoulders     Impression: No interval change from 11/16/2018 Workstation performed: [id number]     Xr Chest Portable    Result Date: 11/16/2018  Narrative: CHEST INDICATION:   chf  Shortness of breath COMPARISON:  November 9, 2018 EXAM PERFORMED/VIEWS:  XR CHEST PORTABLE FINDINGS:  Prosthesis noted overlying the aortic root  Heart shadow is enlarged but unchanged from prior exam  Bibasilar scarring  Right pleural effusion  Osseous structures appear within normal limits for patient age       Impression: Stable chest with bibasilar scarring and right-sided pleural effusion extending along the chest wall  Workstation performed: [id number]     Xr Chest Pa & Lateral    Result Date: 11/9/2018  Narrative: CHEST INDICATION:   Cough  J44 9: Chronic obstructive pulmonary disease, unspecified N18 3: Chronic kidney disease, stage 3 (moderate) I50 9: Heart failure, unspecified  I50 1: Left ventricular failure, unspecified  COMPARISON:  7/18/2018, 10/16/2017 and CT chest 6/11/2018 EXAM PERFORMED/VIEWS:  XR CHEST PA & LATERAL  The frontal view was performed utilizing dual energy radiographic technique  FINDINGS: Cardiomediastinal silhouette appears stable  Transcatheter aortic valve replacement noted  Central vascular prominence again noted without overt pulmonary edema  Peripheral and apical right pleural thickening versus loculated effusion, latter favored based on previous CT  Coarse bronchovascular markings in the lung bases, left greater than right lung base, which may be accentuated by fibrotic and cystic changes  No new infiltrates  No pneumothorax  Degenerative changes of the spine and shoulders  Chronic right AC joint separation noted  Impression: Overall stable appearance of the chest  Question element of chronic pulmonary venous hypertension with chronic basilar changes and loculated pleural effusion peripheral right hemithorax  No new infiltrate  Workstation performed: [id number]     Us Kidney And Bladder    Result Date: 11/16/2018  Narrative: RENAL ULTRASOUND INDICATION:   RENAL FAILURE, ACUTE ARF  COMPARISON: CT scan 7/18/2016  TECHNIQUE: Portable ultrasound examination of the retroperitoneum was performed with a curvilinear transducer utilizing volumetric sweeps and still imaging techniques  FINDINGS: Evaluation of the left kidney is limited due to overlying bowel gas  KIDNEYS: Symmetric and normal size  Right kidney:  10 2 cm  Left kidney:  9 8 cm  Right kidney Normal echogenicity and contour   Ill-defined echogenic structure at the midpole, which presumably corresponds with the previously seen rim calcified cyst  No hydronephrosis  No shadowing calculi  No perinephric fluid collections  Left kidney Normal echogenicity and contour  No obvious masses detected  No overt hydronephrosis  No shadowing calculi  No perinephric fluid collections  URETERS: Nonvisualized  BLADDER: The bladder is nondistended, limiting evaluation  Impression: Suboptimal visualization of the left kidney  No evidence of hydronephrosis  Workstation performed: [id number]        EKG / Monitor: Personally reviewed  Sinus rhythm with underlying LBBB    Cardiac testing:   Results for orders placed during the hospital encounter of 18   Echo complete with contrast if indicated    Narrative [address]  [address] [facility] 6 [phone]    Transthoracic Echocardiogram  2D, M-mode, Doppler, and Color Doppler    Study date:  2018    Patient: Sierra Bob  MR number: [id number]  Account number: [de-identified]  : 24-Oct-1930  Age: 80 years  Gender: Male  Status: Inpatient  Location: Bedside  Height: 70 in  Weight: 220 4 lb  BP: 142/ 86 mmHg    Indications: Heart Failure    Diagnoses: I50 9 - Heart failure, unspecified    Sonographer:  CAMILLA Vega  Primary Physician:  María Elena Chris DO  Referring Physician:  María Elena Chris DO  Group:  [facility]  Interpreting Physician:  Warden Christen DO    SUMMARY    LEFT VENTRICLE:  Systolic function was severely reduced by visual assessment  Ejection fraction was estimated to be 35 %  There was severe diffuse hypokinesis with no identifiable regional variations  There was moderate concentric hypertrophy  Doppler parameters were consistent with abnormal left ventricular relaxation (grade 1 diastolic dysfunction)  LEFT ATRIUM:  The atrium was moderately dilated  RIGHT ATRIUM:  The atrium was mildly dilated      MITRAL VALVE:  There was mild regurgitation  AORTIC VALVE:  A bioprosthesis was present  It exhibited normal function  There was mild stenosis  Valve peak gradient was 30 mmHg  TRICUSPID VALVE:  There was mild regurgitation  Pulmonary artery systolic pressure was moderately increased  COMPARISONS:  There has been no significant interval change  Comparison was made with the previous study of 13-Jun-2018  HISTORY: PRIOR HISTORY: HTN, DM, DVT, MRSA, NSTEMI, AVR, CAD, BPH, CKD, COPD    PROCEDURE: The procedure was performed at the bedside  This was a stat study  The transthoracic approach was used  The study included complete 2D imaging, M-mode, complete spectral Doppler, and color Doppler  The heart rate was 70 bpm, at  the start of the study  Image quality was adequate  LEFT VENTRICLE: Size was normal  Systolic function was severely reduced by visual assessment  Ejection fraction was estimated to be 35 %  There was severe diffuse hypokinesis with no identifiable regional variations  There was moderate  concentric hypertrophy  DOPPLER: Doppler parameters were consistent with abnormal left ventricular relaxation (grade 1 diastolic dysfunction)  RIGHT VENTRICLE: The size was normal  Systolic function was normal     LEFT ATRIUM: The atrium was moderately dilated  RIGHT ATRIUM: The atrium was mildly dilated  MITRAL VALVE: There was annular calcification  Valve structure was normal  There was normal leaflet separation  No echocardiographic evidence for prolapse  DOPPLER: The transmitral velocity was within the normal range  There was no  evidence for stenosis  There was mild regurgitation  AORTIC VALVE: A bioprosthesis was present  It exhibited normal function  DOPPLER: There was mild stenosis  There was no regurgitation  TRICUSPID VALVE: The valve structure was normal  There was normal leaflet separation  DOPPLER: The transtricuspid velocity was within the normal range  There was mild regurgitation   Pulmonary artery systolic pressure was moderately  increased  Estimated peak PA pressure was 60 mmHg  PULMONIC VALVE: Leaflets exhibited normal thickness, no calcification, and normal cuspal separation  DOPPLER: The transpulmonic velocity was within the normal range  There was no regurgitation  PERICARDIUM: There was no thickening  There was no pericardial effusion  AORTA: The root exhibited normal size  PULMONARY ARTERY: The size was normal  The morphology appeared normal     MEASUREMENT TABLES    DOPPLER MEASUREMENTS  Aortic valve   (Reference normals)  Peak gradient   30 mmHg   (--)    SYSTEM MEASUREMENT TABLES    2D mode  AoR Diam 2D: 3 2 cm  LA Diam (2D): 4 5 cm  LA/Ao (2D): 1 41  FS (2D Teich): 17 4 %  IVSd (2D): 1 55 cm  LVDEV: 159 cm³  LVESV: 102 cm³  LVIDd(2D): 5 69 cm  LVISd (2D): 4 7 cm  LVOT Area 2D: 3 8 cm squared  LVPWd (2D): 1 48 cm  SV (Teich): 57 cm³    Apical four chamber  LVEF A4C: 35 %    Unspecified Scan Mode  FATMATA Cont Eq (Peak Pasquale): 1 51 cm squared  FATMATA Cont Eq (VTI): 1 46 cm squared  LVOT (VTI): 22 5 cm  LVOT Diam : 2 2 cm  LVOT Vmax: 1090 mm/s  LVOT Vmax; Mean: 1090 mm/s  Peak Grad ; Mean: 5 mm[Hg]  SV (LVOT): 86 cm³  VTI;Mean: 2 mm[Hg]  MV Peak A Pasquale: 1330 mm/s  MV Peak E Pasquale  Mean: 602 mm/s  MVA (PHT): 3 24 cm squared  PHT: 68 ms  Max P mm[Hg]  V Max: 3630 mm/s  Vmax: 3620 mm/s  RA Area: 22 4 cm squared  RA Volume: 67 5 cm³  TAPSE: 1 7 cm    [employer] Accredited Echocardiography Laboratory    Prepared and electronically signed by    Halima Echevarria DO  Signed 2018 16:23:03         Dr Bean Palmer MD [facility]      "This note has been constructed using a voice recognition system  Therefore there may be syntax, spelling, and/or grammatical errors   Please call if you have any questions  "

## 2018-12-02 LAB
ANION GAP SERPL CALCULATED.3IONS-SCNC: 7 MMOL/L (ref 4–13)
BUN SERPL-MCNC: 52 MG/DL (ref 5–25)
CALCIUM SERPL-MCNC: 8.8 MG/DL (ref 8.3–10.1)
CHLORIDE SERPL-SCNC: 100 MMOL/L (ref 100–108)
CO2 SERPL-SCNC: 33 MMOL/L (ref 21–32)
CREAT SERPL-MCNC: 2.82 MG/DL (ref 0.6–1.3)
ERYTHROCYTE [DISTWIDTH] IN BLOOD BY AUTOMATED COUNT: 16.6 % (ref 11.6–15.1)
GFR SERPL CREATININE-BSD FRML MDRD: 19 ML/MIN/1.73SQ M
GLUCOSE SERPL-MCNC: 107 MG/DL (ref 65–140)
HCT VFR BLD AUTO: 31.1 % (ref 36.5–49.3)
HGB BLD-MCNC: 9.6 G/DL (ref 12–17)
MCH RBC QN AUTO: 26.2 PG (ref 26.8–34.3)
MCHC RBC AUTO-ENTMCNC: 30.9 G/DL (ref 31.4–37.4)
MCV RBC AUTO: 85 FL (ref 82–98)
PLATELET # BLD AUTO: 264 THOUSANDS/UL (ref 149–390)
PMV BLD AUTO: 10.9 FL (ref 8.9–12.7)
POTASSIUM SERPL-SCNC: 3.7 MMOL/L (ref 3.5–5.3)
RBC # BLD AUTO: 3.67 MILLION/UL (ref 3.88–5.62)
SODIUM SERPL-SCNC: 140 MMOL/L (ref 136–145)
WBC # BLD AUTO: 7.84 THOUSAND/UL (ref 4.31–10.16)

## 2018-12-02 PROCEDURE — 99232 SBSQ HOSP IP/OBS MODERATE 35: CPT | Performed by: STUDENT IN AN ORGANIZED HEALTH CARE EDUCATION/TRAINING PROGRAM

## 2018-12-02 PROCEDURE — 94640 AIRWAY INHALATION TREATMENT: CPT

## 2018-12-02 PROCEDURE — 99233 SBSQ HOSP IP/OBS HIGH 50: CPT | Performed by: INTERNAL MEDICINE

## 2018-12-02 PROCEDURE — 85027 COMPLETE CBC AUTOMATED: CPT | Performed by: STUDENT IN AN ORGANIZED HEALTH CARE EDUCATION/TRAINING PROGRAM

## 2018-12-02 PROCEDURE — 94760 N-INVAS EAR/PLS OXIMETRY 1: CPT

## 2018-12-02 PROCEDURE — 80048 BASIC METABOLIC PNL TOTAL CA: CPT | Performed by: STUDENT IN AN ORGANIZED HEALTH CARE EDUCATION/TRAINING PROGRAM

## 2018-12-02 PROCEDURE — 99232 SBSQ HOSP IP/OBS MODERATE 35: CPT | Performed by: INTERNAL MEDICINE

## 2018-12-02 PROCEDURE — 94660 CPAP INITIATION&MGMT: CPT

## 2018-12-02 RX ADMIN — FLUTICASONE FUROATE AND VILANTEROL TRIFENATATE 1 PUFF: 100; 25 POWDER RESPIRATORY (INHALATION) at 08:09

## 2018-12-02 RX ADMIN — HEPARIN SODIUM 5000 UNITS: 5000 INJECTION, SOLUTION INTRAVENOUS; SUBCUTANEOUS at 05:54

## 2018-12-02 RX ADMIN — HEPARIN SODIUM 5000 UNITS: 5000 INJECTION, SOLUTION INTRAVENOUS; SUBCUTANEOUS at 21:23

## 2018-12-02 RX ADMIN — HEPARIN SODIUM 5000 UNITS: 5000 INJECTION, SOLUTION INTRAVENOUS; SUBCUTANEOUS at 14:38

## 2018-12-02 RX ADMIN — HYDRALAZINE HYDROCHLORIDE 25 MG: 25 TABLET ORAL at 14:38

## 2018-12-02 RX ADMIN — DICLOFENAC 2 G: 10 GEL TOPICAL at 21:22

## 2018-12-02 RX ADMIN — METOPROLOL TARTRATE 25 MG: 25 TABLET, FILM COATED ORAL at 21:22

## 2018-12-02 RX ADMIN — Medication 150 MG: at 08:12

## 2018-12-02 RX ADMIN — HYDRALAZINE HYDROCHLORIDE 25 MG: 25 TABLET ORAL at 21:22

## 2018-12-02 RX ADMIN — IPRATROPIUM BROMIDE AND ALBUTEROL SULFATE 3 ML: 2.5; .5 SOLUTION RESPIRATORY (INHALATION) at 15:41

## 2018-12-02 RX ADMIN — AMLODIPINE BESYLATE 5 MG: 5 TABLET ORAL at 08:12

## 2018-12-02 RX ADMIN — FUROSEMIDE 60 MG: 10 INJECTION, SOLUTION INTRAMUSCULAR; INTRAVENOUS at 08:19

## 2018-12-02 RX ADMIN — AMIODARONE HYDROCHLORIDE 100 MG: 200 TABLET ORAL at 08:11

## 2018-12-02 RX ADMIN — DICLOFENAC 2 G: 10 GEL TOPICAL at 08:10

## 2018-12-02 RX ADMIN — FUROSEMIDE 60 MG: 10 INJECTION, SOLUTION INTRAMUSCULAR; INTRAVENOUS at 16:56

## 2018-12-02 RX ADMIN — ISOSORBIDE MONONITRATE 30 MG: 30 TABLET, EXTENDED RELEASE ORAL at 08:10

## 2018-12-02 RX ADMIN — VITAM B12 100 MCG: 100 TAB at 08:38

## 2018-12-02 RX ADMIN — HYDRALAZINE HYDROCHLORIDE 25 MG: 25 TABLET ORAL at 05:54

## 2018-12-02 RX ADMIN — ACETAMINOPHEN 325 MG: 325 TABLET, FILM COATED ORAL at 21:22

## 2018-12-02 RX ADMIN — MUPIROCIN 1 APPLICATION: 20 OINTMENT TOPICAL at 21:21

## 2018-12-02 RX ADMIN — PANTOPRAZOLE SODIUM 40 MG: 40 TABLET, DELAYED RELEASE ORAL at 08:13

## 2018-12-02 RX ADMIN — IPRATROPIUM BROMIDE AND ALBUTEROL SULFATE 3 ML: 2.5; .5 SOLUTION RESPIRATORY (INHALATION) at 20:18

## 2018-12-02 RX ADMIN — IPRATROPIUM BROMIDE AND ALBUTEROL SULFATE 3 ML: 2.5; .5 SOLUTION RESPIRATORY (INHALATION) at 07:24

## 2018-12-02 RX ADMIN — MUPIROCIN 1 APPLICATION: 20 OINTMENT TOPICAL at 08:09

## 2018-12-02 RX ADMIN — IPRATROPIUM BROMIDE AND ALBUTEROL SULFATE 3 ML: 2.5; .5 SOLUTION RESPIRATORY (INHALATION) at 11:41

## 2018-12-02 RX ADMIN — OXYBUTYNIN CHLORIDE 5 MG: 5 TABLET ORAL at 08:11

## 2018-12-02 RX ADMIN — METOPROLOL TARTRATE 25 MG: 25 TABLET, FILM COATED ORAL at 08:12

## 2018-12-02 RX ADMIN — ASPIRIN 81 MG: 81 TABLET, COATED ORAL at 08:12

## 2018-12-02 RX ADMIN — LEVOTHYROXINE SODIUM 50 MCG: 50 TABLET ORAL at 05:54

## 2018-12-02 RX ADMIN — DOCUSATE SODIUM 100 MG: 100 CAPSULE, LIQUID FILLED ORAL at 21:22

## 2018-12-02 NOTE — PROGRESS NOTES
NEPHROLOGY PROGRESS NOTE   Fredrick Marquez 80 y o  male MRN: [id number]  Unit/Bed#: [address] [place] [address]- Encounter: [id number]  Reason for Consult: KJ    ASSESSMENT and PLAN:    1 ) Acute kidney injury  -POA  -admission creatinine 3 01, creatinine stable today at 2 8  -I suspect that this is in the setting of volume overload and possible cardiorenal syndrome  -excellent urine output diuresing well  -continue Lasix 60 mg twice a day  -may need to accept a higher baseline creatinine about volume overload   -urinalysis trace protein no blood   I do not suspect an underlying glomerular process  -nonoliguric  -monitor input and output  -no clinical indication for renal ultrasound at this time  -Voltaren gel was reduced to 2 g Q 12     2  ) Acute on chronic combined heart failure  -ejection fraction 35-40%  -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  -examines volume overloaded, with edema and weight gain over 10 lb, chest x-ray shows congestion  -appreciate cardiology's recommendations  -proBNP level 16,996  -admission weight 103 kg, today's weight is 102 kg  -continue IV Lasix to 60 mg twice a day  -monitor input and output  -low 2 g sodium diet  -net -5 0 7 L since admission and 1 6 L in the last 24 hours  -no objections to increasing the Lasix to t i d  If needed, the will defer to Cardiology on this     3  ) Chronic kidney disease stage IV  -outpatient nephrologist Dr Dorothy Lucio  -baseline creatinine 1 8-2 2 mg/dL  -benign renal ultrasound     4  ) Hypertension  -blood pressure currently acceptable  -continue current management     5  Roscoe Solitario  -likely secondary to chronic kidney disease  -continue iron supplement         SUBJECTIVE / INTERVAL HISTORY:    No overnight events    OBJECTIVE:  Current Weight: Weight - Scale: 102 kg (225 lb 12 oz)  Vitals:    12/02/18 0516 12/02/18 0600 12/02/18 0724 12/02/18 0746   BP: 137/70   140/63   BP Location: Right arm   Right arm   Pulse: 66   69   Resp: 20   18   Temp: 97 5 °F (36 4 °C)   97 8 °F (36 6 °C)   TempSrc: Temporal   Temporal   SpO2: 99%  98% 98%   Weight:  102 kg (225 lb 12 oz)     Height:           Intake/Output Summary (Last 24 hours) at 12/02/18 0756  Last data filed at 12/02/18 9408   Gross per 24 hour   Intake              400 ml   Output             2070 ml   Net            -1670 ml       Physical Exam   Constitutional: He is oriented to person, place, and time  He appears well-developed and well-nourished  No distress  HENT:   Head: Normocephalic and atraumatic  Eyes: Pupils are equal, round, and reactive to light  No scleral icterus  Neck: Normal range of motion  Neck supple  Cardiovascular: Normal rate, regular rhythm and normal heart sounds  Exam reveals no gallop and no friction rub  No murmur heard  Pulmonary/Chest: Effort normal and breath sounds normal  No respiratory distress  He has no wheezes  He has no rales  He exhibits no tenderness  Abdominal: Soft  Bowel sounds are normal  He exhibits no distension  There is no tenderness  There is no rebound  Musculoskeletal: Normal range of motion  He exhibits edema  Neurological: He is alert and oriented to person, place, and time  Skin: No rash noted  He is not diaphoretic  Psychiatric: He has a normal mood and affect  Nursing note and vitals reviewed        Medications:    Current Facility-Administered Medications:     acetaminophen (TYLENOL) tablet 325 mg, 325 mg, Oral, Q12H PRN **AND** traMADol (ULTRAM) tablet 50 mg, 50 mg, Oral, Q12H PRN, Casie Khan MD    amiodarone tablet 100 mg, 100 mg, Oral, Daily, Casie Khan MD, 100 mg at 12/01/18 0936    amLODIPine (NORVASC) tablet 5 mg, 5 mg, Oral, Daily, Casie Khan MD, 5 mg at 12/01/18 0939    aspirin (ECOTRIN LOW STRENGTH) EC tablet 81 mg, 81 mg, Oral, Daily, Casie Khan MD, 81 mg at 12/01/18 0936    cyanocobalamin (VITAMIN B-12) tablet 100 mcg, 100 mcg, Oral, Daily, Casie Khan MD, 100 mcg at 12/01/18 0940    diclofenac sodium (VOLTAREN) 1 % topical gel 2 g, 2 g, Topical, Q12H [facility], [place], CRNP, 2 g at 12/01/18 2120    docusate sodium (COLACE) capsule 100 mg, 100 mg, Oral, HS, Casie Khan MD, 100 mg at 12/01/18 2121    fluticasone-vilanterol (BREO ELLIPTA) 100-25 mcg/inh inhaler 1 puff, 1 puff, Inhalation, Daily, Casie Khan MD, 1 puff at 12/01/18 0935    furosemide (LASIX) injection 60 mg, 60 mg, Intravenous, BID (diuretic), Karen Mulligan MD, 60 mg at 12/01/18 1616    heparin (porcine) subcutaneous injection 5,000 Units, 5,000 Units, Subcutaneous, Q8H [facility], 5,000 Units at 12/02/18 0554 **AND** Platelet count, , , Once, Casie Khan MD    hydrALAZINE (APRESOLINE) tablet 25 mg, 25 mg, Oral, Q8H, Casie Khan MD, 25 mg at 12/02/18 0554    ipratropium-albuterol (DUO-NEB) 0 5-2 5 mg/3 mL inhalation solution 3 mL, 3 mL, Nebulization, Q4H PRN, Casie Khan MD    ipratropium-albuterol (DUO-NEB) 0 5-2 5 mg/3 mL inhalation solution 3 mL, 3 mL, Nebulization, 4x Daily, Casie Khan MD, 3 mL at 12/02/18 0724    iron polysaccharides (NIFEREX) capsule 150 mg, 150 mg, Oral, Daily, Casie Khan MD, 150 mg at 12/01/18 0939    isosorbide mononitrate (IMDUR) 24 hr tablet 30 mg, 30 mg, Oral, Daily, Casie Khan MD, 30 mg at 12/01/18 6355    levothyroxine tablet 50 mcg, 50 mcg, Oral, Daily, Casie Khan MD, 50 mcg at 12/02/18 0554    metoprolol tartrate (LOPRESSOR) tablet 25 mg, 25 mg, Oral, Q12H [facility], Casie Khan MD, 25 mg at 12/01/18 2121    mupirocin (BACTROBAN) 2 % nasal ointment, , Nasal, Q12H [facility], Casie Khan MD, 1 application at 29/10/57 2121    ondansetron (ZOFRAN) injection 4 mg, 4 mg, Intravenous, Q6H PRN, Casie Khan MD    oxybutynin (DITROPAN) tablet 5 mg, 5 mg, Oral, Daily, Casie Khan MD, 5 mg at 12/01/18 0936    pantoprazole (PROTONIX) EC tablet 40 mg, 40 mg, Oral, Daily, Casie Khan MD, 40 mg at 12/01/18 7401    Laboratory Results:    Results from last 7 days  Lab Units 12/02/18  0526 12/01/18  0543 11/30/18  0543 11/30/18  0136 11/29/18  1421 11/29/18  1332   WBC Thousand/uL 7 84 6 81 7 17  --   --  8 61   HEMOGLOBIN g/dL 9 6* 8 9* 9 4*  --   --  10 2*   HEMATOCRIT % 31 1* 29 0* 31 0*  --   --  34 8*   PLATELETS Thousands/uL 264 239 234  --   --  279   POTASSIUM mmol/L 3 7 3 9  --  4 1 5 3  --    CHLORIDE mmol/L 100 102  --  104 104  --    CO2 mmol/L 33* 30  --  28 27  --    BUN mg/dL 52* 53*  --  60* 57*  --    CREATININE mg/dL 2 82* 2 88*  --  3 05* 3 01*  --    CALCIUM mg/dL 8 8 8 7  --  8 4 9 2  --

## 2018-12-02 NOTE — PROGRESS NOTES
Progress Note - Giselle Gay 10/24/1930, 80 y o  male MRN: [id number]    Unit/Bed#: [address] [place] [address] Encounter: [id number]    Primary Care Provider: Feli Combs DO   Date and time admitted to hospital: 11/29/2018  1:14 PM        Acute renal failure superimposed on stage 4 chronic kidney disease ([facility] [address] )   Assessment & Plan    · Baseline creatinine 1 8-2, on admission 3 01  · Sees Dr Chance Browne as outpt  · Has had issues recently with rising creatinine with increased diuretic dose, suspect cardiorenal syndrome  · Recent renal US negative for hydro  · Good UOP  · Consult nephrology to follow, accepting a high creatinine for diuresis  · Decreased Voltaren gel dose     * Acute on chronic combined systolic and diastolic heart failure (HCC)   Assessment & Plan    · LV EF 35-40%, 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  · Appears in acute heart failure with volume overload  · CXR shows vascular congestion, pro BNP 23399  · nephrology and cardio consulted  · Cont IV lasix, diuresing well, net -5L  · Monitor I/Os and daily weights     MRSA colonization   Assessment & Plan    Nasal bactroban for 5 days     Acute on chronic respiratory failure with hypoxia ([facility])   Assessment & Plan    · Normally on 6L NC at home, was requiring 8-10L and still have desats into the 80s, likely from fluid overload  · CXR showed CHF  · Treat heart failure as noted  · O2, weaned back to baseline as tolerated  PATITO (obstructive sleep apnea)   Assessment & Plan    Cont CPAP qHS at 10 with 6L O2     Paroxysmal atrial fibrillation ([facility])   Assessment & Plan    · Cont beta blocker  · Not on anticoagulation given recurrent falls        Moderate COPD (chronic obstructive pulmonary disease) ([facility])   Assessment & Plan    · Last spirometry showed his FEV1 to be 1 85 liters or 76 percent predicted consistent moderate airflow obstruction  · Does not appear in exacerbation, dyspnea was likely from heart failure, resolved now  · Cont home inhalers VTE Pharmacologic Prophylaxis:   Pharmacologic: Heparin  Mechanical VTE Prophylaxis in Place: Yes    Patient Centered Rounds: I have performed bedside rounds with nursing staff today  Discussions with Specialists or Other Care Team Provider: Yes  Education and Discussions with Family / Patient:Yes  Time Spent for Care: 30 minutes  More than 50% of total time spent on counseling and coordination of care as described above  Current Length of Stay: 3 day(s)  Current Patient Status: Inpatient     Discharge Plan: pending    Code Status: Level 3 - DNAR and DNI      Subjective:   Feels good  Still has LE edema  No SOB or cough  Objective:     Vitals:   Temp (24hrs), Av 8 °F (36 6 °C), Min:97 5 °F (36 4 °C), Max:98 1 °F (36 7 °C)    Temp:  [97 5 °F (36 4 °C)-98 1 °F (36 7 °C)] 98 1 °F (36 7 °C)  HR:  [61-80] 61  Resp:  [18-20] 18  BP: (123-163)/(55-90) 158/76  SpO2:  [95 %-100 %] 98 %  Body mass index is 32 39 kg/m²  Input and Output Summary (last 24 hours): Intake/Output Summary (Last 24 hours) at 18 1556  Last data filed at 18 1300   Gross per 24 hour   Intake              400 ml   Output             1945 ml   Net            -1545 ml        Physical Exam:     Physical Exam   Constitutional: He is oriented to person, place, and time  He appears well-developed  No distress  HENT:   Head: Normocephalic and atraumatic  Neck: JVD present  Cardiovascular: Normal rate and regular rhythm  Murmur heard  irregular   Pulmonary/Chest: Effort normal and breath sounds normal  No respiratory distress  He has no wheezes  He has no rales  Abdominal: Soft  Bowel sounds are normal  He exhibits no distension  There is no tenderness  There is no rebound  Musculoskeletal: He exhibits edema (3+ pitting)  Neurological: He is alert and oriented to person, place, and time  Skin: Skin is warm and dry  Psychiatric: He has a normal mood and affect   His behavior is normal    Nursing note and vitals reviewed  Additional Data:     Labs:      Results from last 7 days  Lab Units 12/02/18  0526 12/01/18  0543 11/30/18  0543 11/29/18  1332   WBC Thousand/uL 7 84 6 81 7 17 8 61   HEMOGLOBIN g/dL 9 6* 8 9* 9 4* 10 2*   HEMATOCRIT % 31 1* 29 0* 31 0* 34 8*   PLATELETS Thousands/uL 264 239 234 279   NEUTROS PCT %  --   --   --  73       Results from last 7 days  Lab Units 12/02/18  0526 12/01/18  0543 11/30/18  0136 11/29/18  1421   SODIUM mmol/L 140 141 141 140   POTASSIUM mmol/L 3 7 3 9 4 1 5 3   CHLORIDE mmol/L 100 102 104 104   CO2 mmol/L 33* 30 28 27   BUN mg/dL 52* 53* 60* 57*   CREATININE mg/dL 2 82* 2 88* 3 05* 3 01*   CALCIUM mg/dL 8 8 8 7 8 4 9 2   TOTAL BILIRUBIN mg/dL  --   --   --  0 60   ALK PHOS U/L  --   --   --  55   ALT U/L  --   --   --  26   AST U/L  --   --   --  47*       Results from last 7 days  Lab Units 11/29/18  1332   INR  1 34*       Results from last 7 days  Lab Units 11/29/18  1332   TROPONIN I ng/mL 0 02     Lab Results   Component Value Date/Time    HGBA1C 5 3 06/12/2018 06:14 AM           Results from last 7 days  Lab Units 11/29/18  1548 11/29/18  1332   LACTIC ACID mmol/L 1 0 2 2*       * I Have Reviewed All Lab Data Listed Above  * Additional Pertinent Lab Tests Reviewed: All Labs Within Last 24 Hours Reviewed    Imaging:     XR chest 1 view portable   Final Result by Barry Jones MD (11/29 1401)         1  Cardiomegaly  2   Chronic right pleural thickening  3   Mild CHF  Workstation performed: [id number]           Imaging Reports Reviewed by myself    Cultures:   Blood Culture:   Lab Results   Component Value Date    BLOODCX No Growth at 48 hrs  11/29/2018    BLOODCX No Growth at 48 hrs  11/29/2018    BLOODCX No Growth After 5 Days  01/06/2017    BLOODCX No Growth After 5 Days  01/06/2017    BLOODCX No Growth After 5 Days  03/06/2016    BLOODCX No Growth After 5 Days   03/06/2016     Urine Culture:   Lab Results   Component Value Date    URINECX No Growth <1000 cfu/mL 03/04/2016     Sputum Culture: No components found for: SPUTUMCX  Wound Culture: No results found for: WOUNDCULT    Last 24 Hours Medication List:     Current Facility-Administered Medications:  acetaminophen 325 mg Oral Q12H PRN Will Guerrero MD   And       traMADol 50 mg Oral Q12H PRN Will Guerrero MD   amiodarone 100 mg Oral Daily Will Guerrero MD   amLODIPine 5 mg Oral Daily Will Guerrero MD   aspirin 81 mg Oral Daily Will Guerrero MD   cyanocobalamin 100 mcg Oral Daily Will Guerrero MD   diclofenac sodium 2 g Topical Q12H [employer], CRNP   docusate sodium 100 mg Oral HS Will Guerrero MD   fluticasone-vilanterol 1 puff Inhalation Daily Will Guerrero MD   furosemide 60 mg Intravenous BID (diuretic) Mark Malin MD   heparin (porcine) 5,000 Units Subcutaneous Q8H [address] Eduardo Todd, MD   hydrALAZINE 25 mg Oral Q8H Will Guerrero MD   ipratropium-albuterol 3 mL Nebulization Q4H PRN Will Guerrero MD   ipratropium-albuterol 3 mL Nebulization 4x Daily Will Guerrero MD   iron polysaccharides 150 mg Oral Daily Will Guerrero MD   isosorbide mononitrate 30 mg Oral Daily Will Guerrero MD   levothyroxine 50 mcg Oral Daily Will Guerrero MD   metoprolol tartrate 25 mg Oral Q12H [facility], MD   mupirocin  Nasal Q12H [facility] [facility], MD   ondansetron 4 mg Intravenous Q6H PRN Will Guerrero MD   oxybutynin 5 mg Oral Daily Will Guerrero MD   pantoprazole 40 mg Oral Daily Will Guerrero MD        Today, Patient Was Seen By: Will Guerrero MD    ** Please Note: Dragon 360 Dictation voice to text software may have been used in the creation of this document   **

## 2018-12-02 NOTE — UTILIZATION REVIEW
Continued Stay Review    Date: 12/1/18    Vital Signs: /70 (BP Location: Right arm)   Pulse 66   Temp 97 5 °F (36 4 °C) (Temporal)   Resp 20   Ht 5' 10" (1 778 m)   Wt 102 kg (225 lb 12 oz)   SpO2 99%   BMI 32 39 kg/m²       CPAP HS  OXYGEN 8L HIGH FLOW NC  CBC BMP  BACTROBAN NASAL OINTMENT +MRSA  Medications:   Scheduled Meds:   Current Facility-Administered Medications:  acetaminophen 325 mg Oral Q12H PRN Chad Briggs MD   And       traMADol 50 mg Oral Q12H PRN Chad Briggs MD   amiodarone 100 mg Oral Daily Chad Briggs MD   amLODIPine 5 mg Oral Daily Chad Briggs MD   aspirin 81 mg Oral Daily Chad Briggs MD   cyanocobalamin 100 mcg Oral Daily Chad Briggs MD   diclofenac sodium 2 g Topical Q12H [address] [place]   docusate sodium 100 mg Oral HS Chad Briggs MD   fluticasone-vilanterol 1 puff Inhalation Daily Chad Briggs MD   furosemide 60 mg Intravenous BID (diuretic) Lesly Hernandez MD   heparin (porcine) 5,000 Units Subcutaneous Q8H 3630 Eduardo Todd MD   hydrALAZINE 25 mg Oral Q8H Chad Briggs MD   ipratropium-albuterol 3 mL Nebulization Q4H PRN Chad Briggs MD   ipratropium-albuterol 3 mL Nebulization 4x Daily Chad Briggs MD   iron polysaccharides 150 mg Oral Daily Chad Briggs MD   isosorbide mononitrate 30 mg Oral Daily Chad Briggs MD   levothyroxine 50 mcg Oral Daily Chad Briggs MD   metoprolol tartrate 25 mg Oral Q12H 3630 Eduardo Todd MD   mupirocin  Nasal Q12H 3630 Eduardo Todd MD   ondansetron 4 mg Intravenous Q6H PRN Chad Briggs MD   oxybutynin 5 mg Oral Daily Chad Briggs MD   pantoprazole 40 mg Oral Daily Chad Briggs MD     Continuous Infusions:    PRN Meds:   acetaminophen **AND** traMADol    ipratropium-albuterol    ondansetron    Abnormal Labs/Diagnostic Results: H/H 8 9/29 , BUN/CR 53/2 88     Age/Sex: 80 y o  male     Assessment/Plan:   Acute renal failure superimposed on stage 4 chronic kidney disease (HCC)   Assessment & Plan     · Baseline creatinine 1 8-2, on admission 3 01  · Sees  Mike Del as outpt  · Has had issues recently with rising creatinine with increased diuretic dose, suspect cardiorenal syndrome  · Recent renal US negative for hydro  · Good UOP  · Consult nephrology to follow, accepting a high creatinine for diuresis  · Decreased Voltaren gel dose, given some systemic absorption   * Acute on chronic combined systolic and diastolic heart failure ([facility])   Assessment & Plan     · LV EF 35-40%, 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  · Appears in acute heart failure with volume overload  · CXR shows vascular congestion, pro BNP 52061  · nephrology and cardio consulted  · Cont IV lasix, increased dose to 60mg BID  · Monitor I/Os and daily weights   Acute on chronic respiratory failure with hypoxia ([facility])   Assessment & Plan     · Normally on 6L NC at home, was requiring 8-10L and still have desats into the 80s, likely from fluid overload  · CXR showed CHF  · Treat heart failure as noted  PATITO (obstructive sleep apnea)   Assessment & Plan     Cont CPAP qHS at 10 with 6L O2   Paroxysmal atrial fibrillation ([facility])   Assessment & Plan     · Cont beta blocker   Moderate COPD (chronic obstructive pulmonary disease) ([facility])   Assessment & Plan     · Last spirometry showed his FEV1 to be 1 85 liters or 76 percent predicted consistent moderate airflow obstruction  · Does not appear in exacerbation, dyspnea if likely from heart failure, resolved now  · Cont home inhalers     PULMONARY CONSULT  Chronic hypoxemic respiratory failure  Patient uses  6L of oxygen home  This is secondary to severe pulmonary hypertension and his COPD  Acute on chronic combined systolic and diastolic congestive heart failure  Iron deficiency anemia  Chronic kidney disease  Plan:   Patient normally is on CPAP 10 cm water at home  Will change BiPAP settings to this and continue oxygen    Patient usually uses 6 liters/minute of oxygen with his CPAP at night  He is on IV furosemide for his acute chronic combined systolic and diastolic congestive heart failure  Continue neb treatments with DuoNeb and continue his Breo 100 mcg 1 puff daily     Discharge Plan:

## 2018-12-02 NOTE — PROGRESS NOTES
Progress Note - Pulmonary   Brittny Spring 80 y o  male MRN: [id number]  Unit/Bed#: [address] [place] [address] Encounter: [id number]    Assessment:  Moderate obstructive sleep apnea  Patient is using his CPAP 10 cm of water here and right now is using 45% oxygen  At home patient uses 6 L of oxygen with his CPAP  Chronic hypoxemic respiratory failure  Patient uses anywhere from 6-10 L of oxygen at home depending on his activity level  This is secondary to his severe pulmonary hypertension and COPD  Acute on chronic combined systolic and diastolic heart failure  Chronic kidney disease - serum creatinine is 2 82 today  Iron deficiency anemia    Plan:  Continue neb treatments with DuoNeb and Breo 100 mcg 1 puff daily  CPAP at 10 cm water with 45% of oxygen  Patient is receiving 40 mg IV furosemide b i d  For his acute on chronic systolic and diastolic congestive heart failure    Subjective:   Patient feels his breathing is better  Not having any shortness of breath at rest   His leg swelling is decreased    Objective:     Vitals: Blood pressure 150/66, pulse 77, temperature 98 8 °F (37 1 °C), temperature source Temporal, resp  rate 22, height 5' 10" (1 778 m), weight 102 kg (225 lb 12 oz), SpO2 91 %  ,Body mass index is 32 39 kg/m²  Intake/Output Summary (Last 24 hours) at 12/02/18 1712  Last data filed at 12/02/18 1300   Gross per 24 hour   Intake              400 ml   Output             1745 ml   Net            -1345 ml       Physical Exam: Physical Exam   Constitutional: He is oriented to person, place, and time  He appears well-developed and well-nourished  No distress  On 6 L O2 saturation is 93%  Patient is sitting in a chair  HENT:   Head: Normocephalic  Nose: Nose normal    Mouth/Throat: Oropharynx is clear and moist  No oropharyngeal exudate  Eyes: Pupils are equal, round, and reactive to light  Conjunctivae are normal    Neck: Neck supple  No JVD present  No tracheal deviation present     Cardiovascular: Normal rate, regular rhythm and normal heart sounds  Pulmonary/Chest: Effort normal    Lung sounds are decreased but clear   Abdominal: Soft  He exhibits no distension  There is no tenderness  There is no guarding  Musculoskeletal:   Has some edema lower extremities   Lymphadenopathy:     He has no cervical adenopathy  Neurological: He is alert and oriented to person, place, and time  Skin: Skin is warm and dry  No rash noted  Psychiatric: He has a normal mood and affect  His behavior is normal  Thought content normal         Labs: I have personally reviewed pertinent lab results  , ABG: No results found for: PHART, CJR5EHG, PO2ART, NLD7AEH, C1UZZSPM, BEART, SOURCE, BNP: No results found for: BNP, CBC: Lab Results   Component Value Date    WBC 7 84 12/02/2018    HGB 9 6 (L) 12/02/2018    HCT 31 1 (L) 12/02/2018    MCV 85 12/02/2018     12/02/2018    ADJUSTEDWBC 6 70 10/04/2016    MCH 26 2 (L) 12/02/2018    MCHC 30 9 (L) 12/02/2018    RDW 16 6 (H) 12/02/2018    MPV 10 9 12/02/2018    NRBC 0 11/29/2018   , CMP: Lab Results   Component Value Date     10/16/2017    K 3 7 12/02/2018    K 5 0 11/14/2018     12/02/2018    CL 98 11/14/2018    CO2 33 (H) 12/02/2018    CO2 24 11/14/2018    ANIONGAP 13 9 10/08/2015    BUN 52 (H) 12/02/2018    BUN 51 (H) 11/14/2018    CREATININE 2 82 (H) 12/02/2018    CREATININE 2 06 (H) 10/16/2017    GLUCOSE 109 (H) 10/16/2017    CALCIUM 8 8 12/02/2018    CALCIUM 9 7 10/16/2017    AST 47 (H) 11/29/2018    AST 39 10/16/2017    ALT 26 11/29/2018    ALT 15 10/16/2017    ALKPHOS 55 11/29/2018    ALKPHOS 61 10/16/2017    PROT 7 9 10/16/2017    BILITOT 0 4 10/16/2017    EGFR 19 12/02/2018   , PT/INR:   Lab Results   Component Value Date    INR 1 34 (H) 11/29/2018   , Troponin: No results found for: TROPONIN    Imaging and other studies: I have personally reviewed pertinent reports     and I have personally reviewed pertinent films in PACS

## 2018-12-02 NOTE — PROGRESS NOTES
Progress Note - Cardiology   [facility]     Leopold Pilsner 80 y o  male MRN: [id number]  : 10/24/1930  Unit/Bed#: [address] [place] [address] Encounter: [id number]    Assessment and Plan:   1  Acute on chronic combined systolic and diastolic heart failure with New [place] heart Association class 3  Patient still has some evidence of fluid overload  Continue IV Lasix  Responding well to diuretic therapy  Follow-up electrolytes  2  Acute on chronic respiratory failure due to underlying severe COPD and above  He is on nebulizer treatment  Has severe COPD    3  Acute kidney injury with baseline creatinine around 2 now the new baseline may be higher with CKD stage 4  Serum creatinine has been stable    4  Known severe pulmonary hypertension with interstitial lung disease    5  Paroxysmal atrial fibrillation currently in sinus rhythm suppressed with low-dose amiodarone not on antithrombotic therapy due to frequent falls and has no reoccurrence of atrial fibrillation is noted for prolonged period of time    6  Coronary artery disease with history of angioplasty of LAD and history of residual 50-60% proximal LAD stenosis on medical Rx  Patient does not want any other procedures at this time    7  History of severe aortic stenosis status post TAVR and valve is functioning adequately  Patient's echo reviewed    8  Cardiomyopathy  with EF around 35  % on recent echo but patient refuses to have biventricular pacemaker placed    Plan  Continue IV Lasix  Monitor electrolytes  Concur with other Rx provided  Subjective / Objective:   Patient seen and evaluated  Doing better than yesterday  Still has some leg edema  Air entry is better  No other significant complaint today  Vitals: Blood pressure 158/76, pulse 61, temperature 98 1 °F (36 7 °C), temperature source Temporal, resp  rate 18, height 5' 10" (1 778 m), weight 102 kg (225 lb 12 oz), SpO2 97 %    Vitals:    18 0536 18 0600   Weight: 105 kg (232 lb 2 3 oz) 102 kg (225 lb 12 oz)     Body mass index is 32 39 kg/m²  BP Readings from Last 3 Encounters:   12/02/18 158/76   11/23/18 130/50   11/19/18 159/74     Orthostatic Blood Pressures      Most Recent Value   Blood Pressure  158/76 filed at 12/02/2018 1100   Patient Position - Orthostatic VS  Sitting filed at 12/02/2018 1100        I/O       11/30 0701 - 12/01 0700 12/01 0701 - 12/02 0700 12/02 0701 - 12/03 0700    P  O  1180 400     Total Intake(mL/kg) 1180 (11 2) 400 (3 9)     Urine (mL/kg/hr) 4075 (1 6) 2070 (0 8) 700 (1 1)    Total Output 4075 2070 700    Net -2895 -1670 -700               Invasive Devices     Peripheral Intravenous Line            Peripheral IV 11/29/18 Left Hand 2 days                  Intake/Output Summary (Last 24 hours) at 12/02/18 1301  Last data filed at 12/02/18 0856   Gross per 24 hour   Intake              400 ml   Output             2070 ml   Net            -1670 ml         Physical Exam:   Physical Exam    Neurologic:  Alert & oriented x 3,  no focal deficits noted   Constitutional:  Well developed, well nourished,  With no acute distress  Eyes:  PERRL, conjunctiva normal   HENT:  Atraumatic, external ears normal, nose normal,   NECK: Normal range of motion, no tenderness, neck is supple , No JVP  Respiratory:  Bilateral air with bilateral rhonchi  Cardiovascular: S1-S2 regular 3/6 ejection systolic murmur and paradoxical S2 split  GI:  Soft, nondistended, normal bowel sounds, nontender, no hepatosplenomegaly appreciated  Musculoskeletal:  No tenderness, no deformities      Extremities:  Mild edema   Psychiatric:  Speech and behavior appropriate             Medications/ Allergies:     Current Facility-Administered Medications:  acetaminophen 325 mg Oral Q12H PRN Chad Briggs MD   And       traMADol 50 mg Oral Q12H PRN Chad Briggs MD   amiodarone 100 mg Oral Daily Chad Briggs MD   amLODIPine 5 mg Oral Daily Chad Briggs MD   aspirin 81 mg Oral Daily Chad Briggs MD cyanocobalamin 100 mcg Oral Daily Vj Swan MD   diclofenac sodium 2 g Topical Q12H [place], CRNP   docusate sodium 100 mg Oral HS Vj Swan MD   fluticasone-vilanterol 1 puff Inhalation Daily Vj Swan MD   furosemide 60 mg Intravenous BID (diuretic) Jessica Joseph MD   heparin (porcine) 5,000 Units Subcutaneous [facility] Vj Swan MD   hydrALAZINE 25 mg Oral Catherine Mcmahon MD   ipratropium-albuterol 3 mL Nebulization Q4H PRN Vj Swan MD   ipratropium-albuterol 3 mL Nebulization 4x Daily Vj Swan MD   iron polysaccharides 150 mg Oral Daily Vj Swan MD   isosorbide mononitrate 30 mg Oral Daily Vj Swan MD   levothyroxine 50 mcg Oral Daily Vj Swan MD   metoprolol tartrate 25 mg Oral Q12H 3630 Eduardo Todd MD   mupirocin  Nasal Q12H 3630 Eduardo Todd MD   ondansetron 4 mg Intravenous Q6H PRN Vj Sawn MD   oxybutynin 5 mg Oral Daily Vj Swan MD   pantoprazole 40 mg Oral Daily Vj Swan MD       acetaminophen 325 mg Q12H PRN   And     traMADol 50 mg Q12H PRN   ipratropium-albuterol 3 mL Q4H PRN   ondansetron 4 mg Q6H PRN     No Known Allergies    VTE Pharmacologic Prophylaxis:    Heparin    Labs:   Troponins:  Results from last 7 days  Lab Units 11/29/18  1332   TROPONIN I ng/mL 0 02       CBC with diff:  Results from last 7 days  Lab Units 12/02/18  0526 12/01/18  0543 11/30/18  0543 11/29/18  1332   WBC Thousand/uL 7 84 6 81 7 17 8 61   HEMOGLOBIN g/dL 9 6* 8 9* 9 4* 10 2*   HEMATOCRIT % 31 1* 29 0* 31 0* 34 8*   MCV fL 85 85 86 89   PLATELETS Thousands/uL 264 239 234 279   MCH pg 26 2* 25 9* 26 2* 26 1*   MCHC g/dL 30 9* 30 7* 30 3* 29 3*   RDW % 16 6* 16 8* 16 9* 17 6*   MPV fL 10 9 11 2 11 0 11 7   NRBC AUTO /100 WBCs  --   --   --  0       CMP:  Results from last 7 days  Lab Units 12/02/18  0526 12/01/18  0543 11/30/18  0136 11/29/18  1421   SODIUM mmol/L 140 141 141 140   POTASSIUM mmol/L 3 7 3 9 4 1 5 3   CHLORIDE mmol/L 100 102 104 104   CO2 mmol/L 33* 30 28 27   ANION GAP mmol/L 7 9 9 9   BUN mg/dL 52* 53* 60* 57*   CREATININE mg/dL 2 82* 2 88* 3 05* 3 01*   CALCIUM mg/dL 8 8 8 7 8 4 9 2   AST U/L  --   --   --  47*   ALT U/L  --   --   --  26   ALK PHOS U/L  --   --   --  55   TOTAL PROTEIN g/dL  --   --   --  7 3   ALBUMIN g/dL  --   --   --  3 2*   TOTAL BILIRUBIN mg/dL  --   --   --  0 60   EGFR ml/min/1 73sq m 19 19 17 18       Coags:  Results from last 7 days  Lab Units 11/29/18  1332   PTT seconds 19*   INR  1 34*     NT-proBNP:   Recent Labs      11/29/18   1421   NTBNP  16,996*        Imaging & Testing   I have personally reviewed pertinent reports  Xr Chest 1 View Portable    Result Date: 11/29/2018  Narrative: CHEST INDICATION:   sob  COMPARISON:  11/18/2018 EXAM PERFORMED/VIEWS:  XR CHEST PORTABLE One image FINDINGS: Cardiomediastinal silhouette appears enlarged  Mild heart failure  Chronic right pleural thickening  Bibasilar scarring  Osseous structures appear within normal limits for patient age  Impression: 1  Cardiomegaly  2   Chronic right pleural thickening  3   Mild CHF  Workstation performed: [id number]     Xr Chest Portable    Result Date: 11/19/2018  Narrative: CHEST INDICATION:   hypoxia  Shortness of breath COMPARISON:  11/16/2018 EXAM PERFORMED/VIEWS:  XR CHEST PORTABLE FINDINGS:  Lines and tubes are unchanged from prior exam   Patient is status post TAVR Heart shadow is enlarged but unchanged from prior exam  Chronic right effusion or pleural thickening noted similar to prior study  The lungs are similar in appearance to recent prior studies  There is no pneumothorax  There are degenerative changes of the shoulders     Impression: No interval change from 11/16/2018 Workstation performed: [id number]     Xr Chest Portable    Result Date: 11/16/2018  Narrative: CHEST INDICATION:   chf  Shortness of breath COMPARISON:  November 9, 2018 EXAM PERFORMED/VIEWS:  XR CHEST PORTABLE FINDINGS:  Prosthesis noted overlying the aortic root    Heart shadow is enlarged but unchanged from prior exam  Bibasilar scarring  Right pleural effusion  Osseous structures appear within normal limits for patient age  Impression: Stable chest with bibasilar scarring and right-sided pleural effusion extending along the chest wall  Workstation performed: [id number]     Xr Chest Pa & Lateral    Result Date: 11/9/2018  Narrative: CHEST INDICATION:   Cough  J44 9: Chronic obstructive pulmonary disease, unspecified N18 3: Chronic kidney disease, stage 3 (moderate) I50 9: Heart failure, unspecified  I50 1: Left ventricular failure, unspecified  COMPARISON:  7/18/2018, 10/16/2017 and CT chest 6/11/2018 EXAM PERFORMED/VIEWS:  XR CHEST PA & LATERAL  The frontal view was performed utilizing dual energy radiographic technique  FINDINGS: Cardiomediastinal silhouette appears stable  Transcatheter aortic valve replacement noted  Central vascular prominence again noted without overt pulmonary edema  Peripheral and apical right pleural thickening versus loculated effusion, latter favored based on previous CT  Coarse bronchovascular markings in the lung bases, left greater than right lung base, which may be accentuated by fibrotic and cystic changes  No new infiltrates  No pneumothorax  Degenerative changes of the spine and shoulders  Chronic right AC joint separation noted  Impression: Overall stable appearance of the chest  Question element of chronic pulmonary venous hypertension with chronic basilar changes and loculated pleural effusion peripheral right hemithorax  No new infiltrate  Workstation performed: [id number]     Us Kidney And Bladder    Result Date: 11/16/2018  Narrative: RENAL ULTRASOUND INDICATION:   RENAL FAILURE, ACUTE ARF  COMPARISON: CT scan 7/18/2016  TECHNIQUE: Portable ultrasound examination of the retroperitoneum was performed with a curvilinear transducer utilizing volumetric sweeps and still imaging techniques   FINDINGS: Evaluation of the left kidney is limited due to overlying bowel gas  KIDNEYS: Symmetric and normal size  Right kidney:  10 2 cm  Left kidney:  9 8 cm  Right kidney Normal echogenicity and contour  Ill-defined echogenic structure at the midpole, which presumably corresponds with the previously seen rim calcified cyst  No hydronephrosis  No shadowing calculi  No perinephric fluid collections  Left kidney Normal echogenicity and contour  No obvious masses detected  No overt hydronephrosis  No shadowing calculi  No perinephric fluid collections  URETERS: Nonvisualized  BLADDER: The bladder is nondistended, limiting evaluation  Impression: Suboptimal visualization of the left kidney  No evidence of hydronephrosis  Workstation performed: [id number]        EKG / Monitor: Personally reviewed  Normal sinus rhythm LBBB heart rate around 76 beats per minute  Cardiac testing:   Results for orders placed during the hospital encounter of 18   Echo complete with contrast if indicated    Cara Coelho [address]  [address] [facility] [address]  [phone]    Transthoracic Echocardiogram  2D, M-mode, Doppler, and Color Doppler    Study date:  2018    Patient: Gama Trivedi  MR number: [id number]  Account number: [de-identified]  : 24-Oct-1930  Age: 80 years  Gender: Male  Status: Inpatient  Location: Bedside  Height: 70 in  Weight: 220 4 lb  BP: 142/ 86 mmHg    Indications: Heart Failure    Diagnoses: I50 9 - Heart failure, unspecified    Sonographer:  CAMILLA Waldrop  Primary Physician:  Dwayne Montenegro DO  Referring Physician:  Dwayne Montenegro DO  Group:  [facility]  Interpreting Physician:  John Marrero DO    SUMMARY    LEFT VENTRICLE:  Systolic function was severely reduced by visual assessment  Ejection fraction was estimated to be 35 %  There was severe diffuse hypokinesis with no identifiable regional variations  There was moderate concentric hypertrophy    Doppler parameters were consistent with abnormal left ventricular relaxation (grade 1 diastolic dysfunction)  LEFT ATRIUM:  The atrium was moderately dilated  RIGHT ATRIUM:  The atrium was mildly dilated  MITRAL VALVE:  There was mild regurgitation  AORTIC VALVE:  A bioprosthesis was present  It exhibited normal function  There was mild stenosis  Valve peak gradient was 30 mmHg  TRICUSPID VALVE:  There was mild regurgitation  Pulmonary artery systolic pressure was moderately increased  COMPARISONS:  There has been no significant interval change  Comparison was made with the previous study of 13-Jun-2018  HISTORY: PRIOR HISTORY: HTN, DM, DVT, MRSA, NSTEMI, AVR, CAD, BPH, CKD, COPD    PROCEDURE: The procedure was performed at the bedside  This was a stat study  The transthoracic approach was used  The study included complete 2D imaging, M-mode, complete spectral Doppler, and color Doppler  The heart rate was 70 bpm, at  the start of the study  Image quality was adequate  LEFT VENTRICLE: Size was normal  Systolic function was severely reduced by visual assessment  Ejection fraction was estimated to be 35 %  There was severe diffuse hypokinesis with no identifiable regional variations  There was moderate  concentric hypertrophy  DOPPLER: Doppler parameters were consistent with abnormal left ventricular relaxation (grade 1 diastolic dysfunction)  RIGHT VENTRICLE: The size was normal  Systolic function was normal     LEFT ATRIUM: The atrium was moderately dilated  RIGHT ATRIUM: The atrium was mildly dilated  MITRAL VALVE: There was annular calcification  Valve structure was normal  There was normal leaflet separation  No echocardiographic evidence for prolapse  DOPPLER: The transmitral velocity was within the normal range  There was no  evidence for stenosis  There was mild regurgitation  AORTIC VALVE: A bioprosthesis was present  It exhibited normal function  DOPPLER: There was mild stenosis   There was no regurgitation  TRICUSPID VALVE: The valve structure was normal  There was normal leaflet separation  DOPPLER: The transtricuspid velocity was within the normal range  There was mild regurgitation  Pulmonary artery systolic pressure was moderately  increased  Estimated peak PA pressure was 60 mmHg  PULMONIC VALVE: Leaflets exhibited normal thickness, no calcification, and normal cuspal separation  DOPPLER: The transpulmonic velocity was within the normal range  There was no regurgitation  PERICARDIUM: There was no thickening  There was no pericardial effusion  AORTA: The root exhibited normal size  PULMONARY ARTERY: The size was normal  The morphology appeared normal     MEASUREMENT TABLES    DOPPLER MEASUREMENTS  Aortic valve   (Reference normals)  Peak gradient   30 mmHg   (--)    SYSTEM MEASUREMENT TABLES    2D mode  AoR Diam 2D: 3 2 cm  LA Diam (2D): 4 5 cm  LA/Ao (2D): 1 41  FS (2D Teich): 17 4 %  IVSd (2D): 1 55 cm  LVDEV: 159 cm³  LVESV: 102 cm³  LVIDd(2D): 5 69 cm  LVISd (2D): 4 7 cm  LVOT Area 2D: 3 8 cm squared  LVPWd (2D): 1 48 cm  SV (Teich): 57 cm³    Apical four chamber  LVEF A4C: 35 %    Unspecified Scan Mode  FATMATA Cont Eq (Peak Pasquale): 1 51 cm squared  FATMATA Cont Eq (VTI): 1 46 cm squared  LVOT (VTI): 22 5 cm  LVOT Diam : 2 2 cm  LVOT Vmax: 1090 mm/s  LVOT Vmax; Mean: 1090 mm/s  Peak Grad ; Mean: 5 mm[Hg]  SV (LVOT): 86 cm³  VTI;Mean: 2 mm[Hg]  MV Peak A Pasquale: 1330 mm/s  MV Peak E Pasquale  Mean: 602 mm/s  MVA (PHT): 3 24 cm squared  PHT: 68 ms  Max P mm[Hg]  V Max: 3630 mm/s  Vmax: 3620 mm/s  RA Area: 22 4 cm squared  RA Volume: 67 5 cm³  TAPSE: 1 7 cm    [employer] Accredited Echocardiography Laboratory    Prepared and electronically signed by    Rob Aquino DO  Signed 2018 16:23:03           Dr Lennox Barajas MD [facility]      "This note has been constructed using a voice recognition system  Therefore there may be syntax, spelling, and/or grammatical errors   Please call if you have any questions  "

## 2018-12-03 LAB
ANION GAP SERPL CALCULATED.3IONS-SCNC: 8 MMOL/L (ref 4–13)
BUN SERPL-MCNC: 51 MG/DL (ref 5–25)
CALCIUM SERPL-MCNC: 8.8 MG/DL (ref 8.3–10.1)
CHLORIDE SERPL-SCNC: 99 MMOL/L (ref 100–108)
CO2 SERPL-SCNC: 34 MMOL/L (ref 21–32)
CREAT SERPL-MCNC: 2.63 MG/DL (ref 0.6–1.3)
ERYTHROCYTE [DISTWIDTH] IN BLOOD BY AUTOMATED COUNT: 16.6 % (ref 11.6–15.1)
GFR SERPL CREATININE-BSD FRML MDRD: 21 ML/MIN/1.73SQ M
GLUCOSE SERPL-MCNC: 104 MG/DL (ref 65–140)
HCT VFR BLD AUTO: 31.1 % (ref 36.5–49.3)
HGB BLD-MCNC: 9.6 G/DL (ref 12–17)
MAGNESIUM SERPL-MCNC: 2 MG/DL (ref 1.6–2.6)
MCH RBC QN AUTO: 25.9 PG (ref 26.8–34.3)
MCHC RBC AUTO-ENTMCNC: 30.9 G/DL (ref 31.4–37.4)
MCV RBC AUTO: 84 FL (ref 82–98)
PLATELET # BLD AUTO: 257 THOUSANDS/UL (ref 149–390)
PMV BLD AUTO: 10.6 FL (ref 8.9–12.7)
POTASSIUM SERPL-SCNC: 3.9 MMOL/L (ref 3.5–5.3)
RBC # BLD AUTO: 3.7 MILLION/UL (ref 3.88–5.62)
SODIUM SERPL-SCNC: 141 MMOL/L (ref 136–145)
WBC # BLD AUTO: 8.88 THOUSAND/UL (ref 4.31–10.16)

## 2018-12-03 PROCEDURE — 99232 SBSQ HOSP IP/OBS MODERATE 35: CPT | Performed by: INTERNAL MEDICINE

## 2018-12-03 PROCEDURE — 85027 COMPLETE CBC AUTOMATED: CPT | Performed by: STUDENT IN AN ORGANIZED HEALTH CARE EDUCATION/TRAINING PROGRAM

## 2018-12-03 PROCEDURE — 94660 CPAP INITIATION&MGMT: CPT

## 2018-12-03 PROCEDURE — 94760 N-INVAS EAR/PLS OXIMETRY 1: CPT

## 2018-12-03 PROCEDURE — 94640 AIRWAY INHALATION TREATMENT: CPT

## 2018-12-03 PROCEDURE — 99232 SBSQ HOSP IP/OBS MODERATE 35: CPT | Performed by: STUDENT IN AN ORGANIZED HEALTH CARE EDUCATION/TRAINING PROGRAM

## 2018-12-03 PROCEDURE — 97116 GAIT TRAINING THERAPY: CPT

## 2018-12-03 PROCEDURE — 80048 BASIC METABOLIC PNL TOTAL CA: CPT | Performed by: STUDENT IN AN ORGANIZED HEALTH CARE EDUCATION/TRAINING PROGRAM

## 2018-12-03 PROCEDURE — 97110 THERAPEUTIC EXERCISES: CPT

## 2018-12-03 PROCEDURE — 83735 ASSAY OF MAGNESIUM: CPT | Performed by: STUDENT IN AN ORGANIZED HEALTH CARE EDUCATION/TRAINING PROGRAM

## 2018-12-03 RX ADMIN — HYDRALAZINE HYDROCHLORIDE 25 MG: 25 TABLET ORAL at 05:45

## 2018-12-03 RX ADMIN — MUPIROCIN 1 APPLICATION: 20 OINTMENT TOPICAL at 09:57

## 2018-12-03 RX ADMIN — METOPROLOL TARTRATE 25 MG: 25 TABLET, FILM COATED ORAL at 21:38

## 2018-12-03 RX ADMIN — FUROSEMIDE 60 MG: 10 INJECTION, SOLUTION INTRAMUSCULAR; INTRAVENOUS at 09:50

## 2018-12-03 RX ADMIN — DICLOFENAC 2 G: 10 GEL TOPICAL at 09:53

## 2018-12-03 RX ADMIN — PANTOPRAZOLE SODIUM 40 MG: 40 TABLET, DELAYED RELEASE ORAL at 09:56

## 2018-12-03 RX ADMIN — AMLODIPINE BESYLATE 5 MG: 5 TABLET ORAL at 09:54

## 2018-12-03 RX ADMIN — HEPARIN SODIUM 5000 UNITS: 5000 INJECTION, SOLUTION INTRAVENOUS; SUBCUTANEOUS at 05:45

## 2018-12-03 RX ADMIN — IPRATROPIUM BROMIDE AND ALBUTEROL SULFATE 3 ML: 2.5; .5 SOLUTION RESPIRATORY (INHALATION) at 15:36

## 2018-12-03 RX ADMIN — FUROSEMIDE 60 MG: 10 INJECTION, SOLUTION INTRAMUSCULAR; INTRAVENOUS at 16:33

## 2018-12-03 RX ADMIN — IPRATROPIUM BROMIDE AND ALBUTEROL SULFATE 3 ML: 2.5; .5 SOLUTION RESPIRATORY (INHALATION) at 19:47

## 2018-12-03 RX ADMIN — LEVOTHYROXINE SODIUM 50 MCG: 50 TABLET ORAL at 05:45

## 2018-12-03 RX ADMIN — HYDRALAZINE HYDROCHLORIDE 25 MG: 25 TABLET ORAL at 14:54

## 2018-12-03 RX ADMIN — ACETAMINOPHEN 325 MG: 325 TABLET, FILM COATED ORAL at 08:57

## 2018-12-03 RX ADMIN — ASPIRIN 81 MG: 81 TABLET, COATED ORAL at 09:54

## 2018-12-03 RX ADMIN — AMIODARONE HYDROCHLORIDE 100 MG: 200 TABLET ORAL at 09:54

## 2018-12-03 RX ADMIN — VITAM B12 100 MCG: 100 TAB at 09:57

## 2018-12-03 RX ADMIN — Medication 150 MG: at 09:54

## 2018-12-03 RX ADMIN — TRAMADOL HYDROCHLORIDE 50 MG: 50 TABLET, COATED ORAL at 21:38

## 2018-12-03 RX ADMIN — HEPARIN SODIUM 5000 UNITS: 5000 INJECTION, SOLUTION INTRAVENOUS; SUBCUTANEOUS at 14:55

## 2018-12-03 RX ADMIN — HEPARIN SODIUM 5000 UNITS: 5000 INJECTION, SOLUTION INTRAVENOUS; SUBCUTANEOUS at 21:46

## 2018-12-03 RX ADMIN — IPRATROPIUM BROMIDE AND ALBUTEROL SULFATE 3 ML: 2.5; .5 SOLUTION RESPIRATORY (INHALATION) at 08:54

## 2018-12-03 RX ADMIN — OXYBUTYNIN CHLORIDE 5 MG: 5 TABLET ORAL at 09:54

## 2018-12-03 RX ADMIN — ISOSORBIDE MONONITRATE 30 MG: 30 TABLET, EXTENDED RELEASE ORAL at 09:54

## 2018-12-03 RX ADMIN — DICLOFENAC 2 G: 10 GEL TOPICAL at 21:39

## 2018-12-03 RX ADMIN — MUPIROCIN 1 APPLICATION: 20 OINTMENT TOPICAL at 21:39

## 2018-12-03 RX ADMIN — DOCUSATE SODIUM 100 MG: 100 CAPSULE, LIQUID FILLED ORAL at 21:38

## 2018-12-03 RX ADMIN — TRAMADOL HYDROCHLORIDE 50 MG: 50 TABLET, COATED ORAL at 08:57

## 2018-12-03 RX ADMIN — METOPROLOL TARTRATE 25 MG: 25 TABLET, FILM COATED ORAL at 09:57

## 2018-12-03 RX ADMIN — IPRATROPIUM BROMIDE AND ALBUTEROL SULFATE 3 ML: 2.5; .5 SOLUTION RESPIRATORY (INHALATION) at 11:57

## 2018-12-03 RX ADMIN — HYDRALAZINE HYDROCHLORIDE 25 MG: 25 TABLET ORAL at 21:38

## 2018-12-03 RX ADMIN — FLUTICASONE FUROATE AND VILANTEROL TRIFENATATE 1 PUFF: 100; 25 POWDER RESPIRATORY (INHALATION) at 09:57

## 2018-12-03 RX ADMIN — ACETAMINOPHEN 325 MG: 325 TABLET, FILM COATED ORAL at 21:37

## 2018-12-03 NOTE — UTILIZATION REVIEW
Continued Stay Review  Date: 12/3/18  Vital Signs: /72 (BP Location: Left arm)   Pulse 69   Temp 97 8 °F (36 6 °C) (Oral)   Resp 20   Ht 5' 10" (1 778 m)   Wt 99 6 kg (219 lb 9 3 oz)   SpO2 96%   BMI 31 51 kg/m²   Medications:   Scheduled Meds:   Current Facility-Administered Medications:  acetaminophen 325 mg Oral Q12H PRN Rupesh Mckay MD   And       traMADol 50 mg Oral Q12H PRN Rupesh Mckay MD   amiodarone 100 mg Oral Daily Rupesh Mckay MD   amLODIPine 5 mg Oral Daily Rupesh Mckay MD   aspirin 81 mg Oral Daily Rupesh Mckay MD   cyanocobalamin 100 mcg Oral Daily Rupesh Mckay MD   diclofenac sodium 2 g Topical Q12H [address] [place]   docusate sodium 100 mg Oral HS Rupesh Mckay MD   fluticasone-vilanterol 1 puff Inhalation Daily Rupesh Mckay MD   furosemide 60 mg Intravenous BID (diuretic) Kaye Kc MD   heparin (porcine) 5,000 Units Subcutaneous Q8H 3630 Eduardo Todd MD   hydrALAZINE 25 mg Oral Q8H Rupesh Mckay MD   ipratropium-albuterol 3 mL Nebulization Q4H PRN Rupesh Mckay MD   ipratropium-albuterol 3 mL Nebulization 4x Daily Rupesh Mckay MD   iron polysaccharides 150 mg Oral Daily Rupesh Mckay MD   isosorbide mononitrate 30 mg Oral Daily Rupesh Mckay MD   levothyroxine 50 mcg Oral Daily Rupesh Mckay MD   metoprolol tartrate 25 mg Oral Q12H 3630 Eduardo Todd MD   mupirocin  Nasal Q12H 3630 Eduardo Todd MD   ondansetron 4 mg Intravenous Q6H PRN Rupesh Mckay MD   oxybutynin 5 mg Oral Daily Rupesh Mckay MD   pantoprazole 40 mg Oral Daily Rupesh Mckay MD   Continuous Infusions:    PRN Meds:   acetaminophen **AND** traMADol    ipratropium-albuterol    ondansetron  Abnormal Labs/Diagnostic Results:   HGB 9 6 CL 99 CO2 34 BUN 51 CR 2 63 GFR 21   Age/Sex: 80 y o  male   Assessment/Plan:   IV LASIX CONTINUES BID FOR ACUTE ON CHRONIC COMBINED CHF  SLOWLY DIURESING & RESPONDING WELL PER CARDIO  LUNGS WITH COARSE BREATH SOUNDS, 1+PEDAL & ANKLE EDEMA TO POPLITEAL AREA NOTED BILATERALLY   O2 REQUIREMENTS @ 6LTR NC WITH CPAP QHS   Discharge Plan: TBD  [address]  Utilization Review Department  Phone: [phone]; Fax [phone]  [email or web address]  ATTENTION: Please call with any questions or concerns to [phone]  and carefully listen to the prompts so that you are directed to the right person  Send all requests for admission clinical reviews, approved or denied determinations and any other requests to fax [phone]   All voicemails are confidential

## 2018-12-03 NOTE — PROGRESS NOTES
Progress Note - Cardiology   [facility]     Cindy Brown 80 y o  male MRN: [id number]  : 10/24/1930  Unit/Bed#: [address] [place] [address] Encounter: [id number]    Assessment and Plan:   1  Acute on chronic combined systolic and diastolic heart failure:  Patient slowly diuresing and responding well to Lasix 60 mg IV b i d  2  Acute kidney injury on chronic kidney disease:  Creatinine is slowly improving with IV diuretics  Patient being followed by Nephrology    3  Acute on chronic respiratory failure with severe COPD:  Continue his breathing treatments as per primary team    4  Paroxysmal atrial fibrillation:  Patient maintaining sinus rhythm with low-dose amiodarone  He is not on long-term anticoagulants secondary to gait dysfunction, frequent falls  5  Coronary artery disease with history of angioplasty:  Patient does not wish any aggressive therapy at this time  6  History of TAVR    7  Cardiomyopathy with EF 35%:  Patient has refused biventricular device in the past    Subjective / Objective:   Patient seen examined  He is diuresing without difficulty and his creatinine is slowly improving  He denies any chest pain, pressure or palpitations  States he did not sleep well last evening due to arthritic discomfort in shoulders  Vitals: Blood pressure 156/72, pulse 69, temperature 97 8 °F (36 6 °C), temperature source Oral, resp  rate 20, height 5' 10" (1 778 m), weight 99 6 kg (219 lb 9 3 oz), SpO2 97 %  Vitals:    18 0600 18 0600   Weight: 102 kg (225 lb 12 oz) 99 6 kg (219 lb 9 3 oz)     Body mass index is 31 51 kg/m²    BP Readings from Last 3 Encounters:   18 156/72   18 130/50   18 159/74     Orthostatic Blood Pressures      Most Recent Value   Blood Pressure  156/72 filed at 2018 0900   Patient Position - Orthostatic VS  Lying filed at 2018 0900        I/O       701 -  07 -  07 -  07 P O  400 120     Total Intake(mL/kg) 400 (3 9) 120 (1 2)     Urine (mL/kg/hr) 2070 (0 8) 1825 (0 8) 625 (1 2)    Total Output 2070 1825 625    Net -1675 -1700 -712               Invasive Devices     Peripheral Intravenous Line            Peripheral IV 12/03/18 Right Forearm less than 1 day                  Intake/Output Summary (Last 24 hours) at 12/03/18 1205  Last data filed at 12/03/18 1146   Gross per 24 hour   Intake              120 ml   Output             1750 ml   Net            -1630 ml         Physical Exam:   Physical Exam   Constitutional: He is oriented to person, place, and time  He appears well-developed and well-nourished  No distress  HENT:   Head: Normocephalic and atraumatic  Right Ear: External ear normal    Left Ear: External ear normal    Eyes: Pupils are equal, round, and reactive to light  Right eye exhibits no discharge  Left eye exhibits no discharge  Neck: Normal range of motion  Neck supple  No JVD present  No thyromegaly present  Cardiovascular: Normal rate and regular rhythm  Murmur heard  Systolic murmur is present with a grade of 3/6   Pulmonary/Chest: Effort normal and breath sounds normal  No respiratory distress  He has no wheezes  Scattered coarse breath sounds   Abdominal: Soft  Bowel sounds are normal  He exhibits no distension  Musculoskeletal: He exhibits edema  Trace to +1 pedal and ankle edema to popliteal area bilaterally   Neurological: He is alert and oriented to person, place, and time  Skin: Skin is warm and dry  He is not diaphoretic  Psychiatric: He has a normal mood and affect  Nursing note and vitals reviewed              Medications/ Allergies:     Current Facility-Administered Medications:  acetaminophen 325 mg Oral Q12H PRN Keyla Anderson MD   And       traMADol 50 mg Oral Q12H PRN Keyla Anderson MD   amiodarone 100 mg Oral Daily Keyla Anderson MD   amLODIPine 5 mg Oral Daily Keyla Anderson MD   aspirin 81 mg Oral Daily Keyla Anderson MD cyanocobalamin 100 mcg Oral Daily Sergey Marks MD   diclofenac sodium 2 g Topical Q12H [place], CRDAYLIN   docusate sodium 100 mg Oral HS Sergey Marks MD   fluticasone-vilanterol 1 puff Inhalation Daily Sergey Marks MD   furosemide 60 mg Intravenous BID (diuretic) Patricia Ferreira MD   heparin (porcine) 5,000 Units Subcutaneous [facility] Sergey Marks MD   hydrALAZINE 25 mg Oral Abi August MD   ipratropium-albuterol 3 mL Nebulization Q4H PRN Sergey Marks MD   ipratropium-albuterol 3 mL Nebulization 4x Daily Sergey Marks MD   iron polysaccharides 150 mg Oral Daily Sergey Marks MD   isosorbide mononitrate 30 mg Oral Daily Sergey Marks MD   levothyroxine 50 mcg Oral Daily Sergey Marks MD   metoprolol tartrate 25 mg Oral Q12H Cady Hilliard MD   mupirocin  Nasal Q12H Cady Hilliard MD   ondansetron 4 mg Intravenous Q6H PRN Sergey Marks MD   oxybutynin 5 mg Oral Daily Sergey Marks MD   pantoprazole 40 mg Oral Daily Sergey Marks MD       acetaminophen 325 mg Q12H PRN   And     traMADol 50 mg Q12H PRN   ipratropium-albuterol 3 mL Q4H PRN   ondansetron 4 mg Q6H PRN     No Known Allergies    VTE Pharmacologic Prophylaxis:   Sequential compression device (Venodyne)     Labs:   Troponins:  Results from last 7 days  Lab Units 11/29/18  1332   TROPONIN I ng/mL 0 02     CBC with diff:  Results from last 7 days  Lab Units 12/03/18  0556 12/02/18  0526 12/01/18  0543 11/30/18  0543 11/29/18  1332   WBC Thousand/uL 8 88 7 84 6 81 7 17 8 61   HEMOGLOBIN g/dL 9 6* 9 6* 8 9* 9 4* 10 2*   HEMATOCRIT % 31 1* 31 1* 29 0* 31 0* 34 8*   MCV fL 84 85 85 86 89   PLATELETS Thousands/uL 257 264 239 234 279   MCH pg 25 9* 26 2* 25 9* 26 2* 26 1*   MCHC g/dL 30 9* 30 9* 30 7* 30 3* 29 3*   RDW % 16 6* 16 6* 16 8* 16 9* 17 6*   MPV fL 10 6 10 9 11 2 11 0 11 7   NRBC AUTO /100 WBCs  --   --   --   --  0     CMP:  Results from last 7 days  Lab Units 12/03/18  0556 12/02/18  0526 12/01/18  0543 11/30/18  0136 11/29/18  1421   SODIUM mmol/L 141 140 141 141 140   POTASSIUM mmol/L 3 9 3 7 3 9 4 1 5 3   CHLORIDE mmol/L 99* 100 102 104 104   CO2 mmol/L 34* 33* 30 28 27   ANION GAP mmol/L 8 7 9 9 9   BUN mg/dL 51* 52* 53* 60* 57*   CREATININE mg/dL 2 63* 2 82* 2 88* 3 05* 3 01*   CALCIUM mg/dL 8 8 8 8 8 7 8 4 9 2   AST U/L  --   --   --   --  47*   ALT U/L  --   --   --   --  26   ALK PHOS U/L  --   --   --   --  55   TOTAL PROTEIN g/dL  --   --   --   --  7 3   ALBUMIN g/dL  --   --   --   --  3 2*   TOTAL BILIRUBIN mg/dL  --   --   --   --  0 60   EGFR ml/min/1 73sq m 21 19 19 17 18     Magnesium:  Results from last 7 days  Lab Units 12/03/18  0556   MAGNESIUM mg/dL 2 0     Coags:  Results from last 7 days  Lab Units 11/29/18  1332   PTT seconds 19*   INR  1 34*        Imaging & Testing   I have personally reviewed pertinent reports  Xr Chest 1 View Portable    Result Date: 11/29/2018  Narrative: CHEST INDICATION:   sob  COMPARISON:  11/18/2018 EXAM PERFORMED/VIEWS:  XR CHEST PORTABLE One image FINDINGS: Cardiomediastinal silhouette appears enlarged  Mild heart failure  Chronic right pleural thickening  Bibasilar scarring  Osseous structures appear within normal limits for patient age  Impression: 1  Cardiomegaly  2   Chronic right pleural thickening  3   Mild CHF  Workstation performed: [id number]     Xr Chest Portable    Result Date: 11/19/2018  Narrative: CHEST INDICATION:   hypoxia  Shortness of breath COMPARISON:  11/16/2018 EXAM PERFORMED/VIEWS:  XR CHEST PORTABLE FINDINGS:  Lines and tubes are unchanged from prior exam   Patient is status post TAVR Heart shadow is enlarged but unchanged from prior exam  Chronic right effusion or pleural thickening noted similar to prior study  The lungs are similar in appearance to recent prior studies  There is no pneumothorax   There are degenerative changes of the shoulders     Impression: No interval change from 11/16/2018 Workstation performed: [id number]     Xr Chest Portable    Result Date: 11/16/2018  Narrative: CHEST INDICATION:   chf  Shortness of breath COMPARISON:  November 9, 2018 EXAM PERFORMED/VIEWS:  XR CHEST PORTABLE FINDINGS:  Prosthesis noted overlying the aortic root  Heart shadow is enlarged but unchanged from prior exam  Bibasilar scarring  Right pleural effusion  Osseous structures appear within normal limits for patient age  Impression: Stable chest with bibasilar scarring and right-sided pleural effusion extending along the chest wall  Workstation performed: [id number]     Xr Chest Pa & Lateral    Result Date: 11/9/2018  Narrative: CHEST INDICATION:   Cough  J44 9: Chronic obstructive pulmonary disease, unspecified N18 3: Chronic kidney disease, stage 3 (moderate) I50 9: Heart failure, unspecified  I50 1: Left ventricular failure, unspecified  COMPARISON:  7/18/2018, 10/16/2017 and CT chest 6/11/2018 EXAM PERFORMED/VIEWS:  XR CHEST PA & LATERAL  The frontal view was performed utilizing dual energy radiographic technique  FINDINGS: Cardiomediastinal silhouette appears stable  Transcatheter aortic valve replacement noted  Central vascular prominence again noted without overt pulmonary edema  Peripheral and apical right pleural thickening versus loculated effusion, latter favored based on previous CT  Coarse bronchovascular markings in the lung bases, left greater than right lung base, which may be accentuated by fibrotic and cystic changes  No new infiltrates  No pneumothorax  Degenerative changes of the spine and shoulders  Chronic right AC joint separation noted  Impression: Overall stable appearance of the chest  Question element of chronic pulmonary venous hypertension with chronic basilar changes and loculated pleural effusion peripheral right hemithorax  No new infiltrate  Workstation performed: [id number]     Us Kidney And Bladder    Result Date: 11/16/2018  Narrative: RENAL ULTRASOUND INDICATION:   RENAL FAILURE, ACUTE ARF  COMPARISON: CT scan 7/18/2016  TECHNIQUE: Portable ultrasound examination of the retroperitoneum was performed with a curvilinear transducer utilizing volumetric sweeps and still imaging techniques  FINDINGS: Evaluation of the left kidney is limited due to overlying bowel gas  KIDNEYS: Symmetric and normal size  Right kidney:  10 2 cm  Left kidney:  9 8 cm  Right kidney Normal echogenicity and contour  Ill-defined echogenic structure at the midpole, which presumably corresponds with the previously seen rim calcified cyst  No hydronephrosis  No shadowing calculi  No perinephric fluid collections  Left kidney Normal echogenicity and contour  No obvious masses detected  No overt hydronephrosis  No shadowing calculi  No perinephric fluid collections  URETERS: Nonvisualized  BLADDER: The bladder is nondistended, limiting evaluation  Impression: Suboptimal visualization of the left kidney  No evidence of hydronephrosis  Workstation performed: [id number]        Cardiac testing:   Results for orders placed during the hospital encounter of 18   Echo complete with contrast if indicated    Cara Coelho [address]  [address] [address]  [phone]    Transthoracic Echocardiogram  2D, M-mode, Doppler, and Color Doppler    Study date:  2018    Patient: Chinyere Marsh  MR number: [id number]  Account number: [de-identified]  : 24-Oct-1930  Age: 80 years  Gender: Male  Status: Inpatient  Location: Bedside  Height: 70 in  Weight: 220 4 lb  BP: 142/ 86 mmHg    Indications: Heart Failure    Diagnoses: I50 9 - Heart failure, unspecified    Sonographer:  CAMILLA Alston  Primary Physician:  Jazzy Hernandez DO  Referring Physician:  Jazzy Hernandez DO  Group:  [facility]  Interpreting Physician:  Bernabe Barraza DO    SUMMARY    LEFT VENTRICLE:  Systolic function was severely reduced by visual assessment  Ejection fraction was estimated to be 35 %    There was severe diffuse hypokinesis with no identifiable regional variations  There was moderate concentric hypertrophy  Doppler parameters were consistent with abnormal left ventricular relaxation (grade 1 diastolic dysfunction)  LEFT ATRIUM:  The atrium was moderately dilated  RIGHT ATRIUM:  The atrium was mildly dilated  MITRAL VALVE:  There was mild regurgitation  AORTIC VALVE:  A bioprosthesis was present  It exhibited normal function  There was mild stenosis  Valve peak gradient was 30 mmHg  TRICUSPID VALVE:  There was mild regurgitation  Pulmonary artery systolic pressure was moderately increased  COMPARISONS:  There has been no significant interval change  Comparison was made with the previous study of 13-Jun-2018  HISTORY: PRIOR HISTORY: HTN, DM, DVT, MRSA, NSTEMI, AVR, CAD, BPH, CKD, COPD    PROCEDURE: The procedure was performed at the bedside  This was a stat study  The transthoracic approach was used  The study included complete 2D imaging, M-mode, complete spectral Doppler, and color Doppler  The heart rate was 70 bpm, at  the start of the study  Image quality was adequate  LEFT VENTRICLE: Size was normal  Systolic function was severely reduced by visual assessment  Ejection fraction was estimated to be 35 %  There was severe diffuse hypokinesis with no identifiable regional variations  There was moderate  concentric hypertrophy  DOPPLER: Doppler parameters were consistent with abnormal left ventricular relaxation (grade 1 diastolic dysfunction)  RIGHT VENTRICLE: The size was normal  Systolic function was normal     LEFT ATRIUM: The atrium was moderately dilated  RIGHT ATRIUM: The atrium was mildly dilated  MITRAL VALVE: There was annular calcification  Valve structure was normal  There was normal leaflet separation  No echocardiographic evidence for prolapse  DOPPLER: The transmitral velocity was within the normal range  There was no  evidence for stenosis  There was mild regurgitation      AORTIC VALVE: A bioprosthesis was present  It exhibited normal function  DOPPLER: There was mild stenosis  There was no regurgitation  TRICUSPID VALVE: The valve structure was normal  There was normal leaflet separation  DOPPLER: The transtricuspid velocity was within the normal range  There was mild regurgitation  Pulmonary artery systolic pressure was moderately  increased  Estimated peak PA pressure was 60 mmHg  PULMONIC VALVE: Leaflets exhibited normal thickness, no calcification, and normal cuspal separation  DOPPLER: The transpulmonic velocity was within the normal range  There was no regurgitation  PERICARDIUM: There was no thickening  There was no pericardial effusion  AORTA: The root exhibited normal size  PULMONARY ARTERY: The size was normal  The morphology appeared normal     MEASUREMENT TABLES    DOPPLER MEASUREMENTS  Aortic valve   (Reference normals)  Peak gradient   30 mmHg   (--)    SYSTEM MEASUREMENT TABLES    2D mode  AoR Diam 2D: 3 2 cm  LA Diam (2D): 4 5 cm  LA/Ao (2D): 1 41  FS (2D Teich): 17 4 %  IVSd (2D): 1 55 cm  LVDEV: 159 cm³  LVESV: 102 cm³  LVIDd(2D): 5 69 cm  LVISd (2D): 4 7 cm  LVOT Area 2D: 3 8 cm squared  LVPWd (2D): 1 48 cm  SV (Teich): 57 cm³    Apical four chamber  LVEF A4C: 35 %    Unspecified Scan Mode  FATMATA Cont Eq (Peak Pasquale): 1 51 cm squared  FATMATA Cont Eq (VTI): 1 46 cm squared  LVOT (VTI): 22 5 cm  LVOT Diam : 2 2 cm  LVOT Vmax: 1090 mm/s  LVOT Vmax; Mean: 1090 mm/s  Peak Grad ; Mean: 5 mm[Hg]  SV (LVOT): 86 cm³  VTI;Mean: 2 mm[Hg]  MV Peak A Pasquale: 1330 mm/s  MV Peak E Pasquale   Mean: 602 mm/s  MVA (PHT): 3 24 cm squared  PHT: 68 ms  Max P mm[Hg]  V Max: 3630 mm/s  Vmax: 3620 mm/s  RA Area: 22 4 cm squared  RA Volume: 67 5 cm³  TAPSE: 1 7 cm    [facility] Accredited Echocardiography Laboratory    Prepared and electronically signed by    Antonieta Herrera DO  Signed 2018 16:23:03             Collin Izabel        "This note has been constructed using a voice recognition system  Therefore there may be syntax, spelling, and/or grammatical errors   Please call if you have any questions  "

## 2018-12-03 NOTE — UTILIZATION REVIEW
Continued Stay Review  Date: 18  Vitals:   Temp (24hrs), Av 8 °F (36 6 °C), Min:97 5 °F (36 4 °C), Max:98 1 °F (36 7 °C)  Temp:  [97 5 °F (36 4 °C)-98 1 °F (36 7 °C)] 98 1 °F (36 7 °C)  HR:  [61-80] 61  Resp:  [18-20] 18  BP: (123-163)/(55-90) 158/76  SpO2:  [95 %-100 %] 98 %  Body mass index is 32 39 kg/m²     Medications:   Scheduled Meds:   Current Facility-Administered Medications:  acetaminophen 325 mg Oral Q12H PRN Landen Redding MD   And       traMADol 50 mg Oral Q12H PRN Landen Redding MD   amiodarone 100 mg Oral Daily Landen Redding MD   amLODIPine 5 mg Oral Daily Landen Redding MD   aspirin 81 mg Oral Daily Landen Redding MD   cyanocobalamin 100 mcg Oral Daily Landen Redding MD   diclofenac sodium 2 g Topical Q12H [facility]   docusate sodium 100 mg Oral HS Landen Redding MD   fluticasone-vilanterol 1 puff Inhalation Daily Landen Redding MD   furosemide 60 mg Intravenous BID (diuretic) Kirill Oglesby MD   heparin (porcine) 5,000 Units Subcutaneous Q8H 3630 Eduardo Todd MD   hydrALAZINE 25 mg Oral Q8H Landen Redding MD   ipratropium-albuterol 3 mL Nebulization Q4H PRN Landen Redding MD   ipratropium-albuterol 3 mL Nebulization 4x Daily Landen Redding MD   iron polysaccharides 150 mg Oral Daily Landen Redding MD   isosorbide mononitrate 30 mg Oral Daily Landen Redding MD   levothyroxine 50 mcg Oral Daily Landen Redding MD   metoprolol tartrate 25 mg Oral Q12H 3630 Eduardo Todd MD   mupirocin  Nasal Q12H 3630 Eduardo Todd MD   ondansetron 4 mg Intravenous Q6H PRN Landen Redding MD   oxybutynin 5 mg Oral Daily Landen Redding MD   pantoprazole 40 mg Oral Daily Landen Redding MD   Continuous Infusions:    PRN Meds:   acetaminophen **AND** traMADol    ipratropium-albuterol    ondansetron  Abnormal Labs/Diagnostic Results:   HGB 9 6 CO2 33 BUN 52 CR 2 82 GFR 19   Age/Sex: 80 y o  male   Assessment/Plan:   CHF WITH EVIDENCE OF CONTINUED FLUID OVERLOAD  REMAINS ON IV LASIX BID  PERSISTENT LEG EDEMA & JVD    LUNGS WITH DECREASED BREATH SOUNDS & CRACKLES NOTED, USING O2 @ 6LTR NC WITH CPAP QHS FOR PATITO  Discharge Plan: TBD  [address]  Utilization Review Department  Phone: [phone]; Fax [phone]  [email or web address]  ATTENTION: Please call with any questions or concerns to [phone]  and carefully listen to the prompts so that you are directed to the right person  Send all requests for admission clinical reviews, approved or denied determinations and any other requests to fax [phone]   All voicemails are confidential

## 2018-12-03 NOTE — PHYSICAL THERAPY NOTE
PT TREATMENT     12/03/18 1015   Pain Assessment   Pain Assessment No/denies pain   Restrictions/Precautions   Other Precautions Contact/isolation;O2;Fall Risk   General   Chart Reviewed Yes   Family/Caregiver Present No   Cognition   Overall Cognitive Status WFL   Arousal/Participation Cooperative   Following Commands Follows all commands and directions without difficulty   Subjective   Subjective "We need someone on the other side to help me up"   Bed Mobility   Sit to Supine 4  Minimal assistance   Additional items LE management   Transfers   Sit to Stand 3  Moderate assistance   Additional items Assist x 2;Verbal cues;Armrests   Stand to Sit 5  Supervision   Additional items Verbal cues   Ambulation/Elevation   Gait pattern Forward Flexion   Gait Assistance 5  Supervision   Additional items Assist x 1;Verbal cues  (O2 at 5 L)   Assistive Device Rolling walker   Distance 30 feet with changes in direction   Activity Tolerance   Activity Tolerance Patient tolerated treatment well;Patient limited by fatigue   Nurse Made Aware yes: Lorraine   Exercises   Hip Flexion Sitting;15 reps;Bilateral   Knee AROM Long Arc Quad Sitting;15 reps;Bilateral   Ankle Pumps Sitting;15 reps;Bilateral  (15 heel raises and 15 toe raises)   Balance training  change of direction multiple times around room and out into hallway; sidestepping along bed all with supervision   Assessment   Prognosis Good   Problem List Decreased endurance; Impaired balance;Decreased mobility; Decreased strength   Assessment Pt presents in chair on 5L of O2; agreeable to PT   Needed assist of 2 for sit > stand from chair due to lower surface and IV in right dorsal wrist   Pt has good endurance for exercises and performs at a slow pace with good form  Will benefit from cont  PT   Goals   Patient Goals to get stronger    Treatment Day 1   Plan   Treatment/Interventions Functional transfer training; Therapeutic exercise; Endurance training;Patient/family training;Equipment eval/education; Bed mobility;Gait training;Spoke to nursing   Progress Progressing toward goals   Recommendation   Recommendation Home with family support;Home PT  ([facility] as prior to admit)   Licensure   [place] License Number  [employer]  [id number]   pt back in bed at end of session with all needs in reach  RN present giving meds

## 2018-12-03 NOTE — PROGRESS NOTES
NEPHROLOGY PROGRESS NOTE   Luis Miguel Barrios 80 y o  male MRN: [id number]  Unit/Bed#: [address] [place] [address] Encounter: [id number]  Reason for Consult: KJ    ASSESSMENT/PLAN:  1  Acute Kidney Injury, POA- secondary to volume overload +/- cardiorenal syndrome  - creatinine improving  - on lasix 60mg IV BID  - weight improving  - UA: trace protein, no blood  2  Acute on Chronic HF- EF 35-40%  - continues to be volume overloaded  - on lasix 60mg IV BID, cardiology managing diuretics  - can increase diuretics if cardiology feels this is necessary  - low sodium diet  3  Chronic Kidney Disease stage IV- Baseline creatinine 1 8-2 2  Follows with Dr Miriam Gonzalez  Renal ultrasound benign  4  Hypertension- BP acceptable  5  Anemia- hgb stable    SUBJECTIVE:  Patient feels he is improving but very slowly, feels he isn't putting out as much urine as he needs to be      OBJECTIVE:  Current Weight: Weight - Scale: 99 6 kg (219 lb 9 3 oz)  Vitals:    12/03/18 0855 12/03/18 0857 12/03/18 0900 12/03/18 1157   BP:   156/72    BP Location:   Left arm    Pulse:   69    Resp:   20    Temp:   97 8 °F (36 6 °C)    TempSrc:   Oral    SpO2: 97% 96%  97%   Weight:       Height:           Intake/Output Summary (Last 24 hours) at 12/03/18 1247  Last data filed at 12/03/18 1146   Gross per 24 hour   Intake              120 ml   Output             1750 ml   Net            -1630 ml     General: NAD  Skin: no rash  HEENT: normocepahlic  Neck: supple  Chest: + wheeze  Heart: RRR  Abdomen: soft nt nd  Extremities: + edema into thighs  Neuro: alert awake  Psych: mood and affect appropriate    Medications:    Current Facility-Administered Medications:     acetaminophen (TYLENOL) tablet 325 mg, 325 mg, Oral, Q12H PRN, 325 mg at 12/03/18 0857 **AND** traMADol (ULTRAM) tablet 50 mg, 50 mg, Oral, Q12H PRN, Shiloh Sharp MD, 50 mg at 12/03/18 0857    amiodarone tablet 100 mg, 100 mg, Oral, Daily, Shiloh Sharp MD, 100 mg at 12/03/18 0954    amLODIPine (NORVASC) tablet 5 mg, 5 mg, Oral, Daily, Landen Redding MD, 5 mg at 12/03/18 0954    aspirin (ECOTRIN LOW STRENGTH) EC tablet 81 mg, 81 mg, Oral, Daily, Landen Redding MD, 81 mg at 12/03/18 0954    cyanocobalamin (VITAMIN B-12) tablet 100 mcg, 100 mcg, Oral, Daily, Landen Redding MD, 100 mcg at 12/03/18 0957    diclofenac sodium (VOLTAREN) 1 % topical gel 2 g, 2 g, Topical, Q12H [address], [facility], CRNP, 2 g at 12/03/18 0953    docusate sodium (COLACE) capsule 100 mg, 100 mg, Oral, HS, Landen Redding MD, 100 mg at 12/02/18 2122    fluticasone-vilanterol (BREO ELLIPTA) 100-25 mcg/inh inhaler 1 puff, 1 puff, Inhalation, Daily, Landen Redding MD, 1 puff at 12/03/18 0957    furosemide (LASIX) injection 60 mg, 60 mg, Intravenous, BID (diuretic), Kirill Oglesby MD, 60 mg at 12/03/18 0950    heparin (porcine) subcutaneous injection 5,000 Units, 5,000 Units, Subcutaneous, Q8H [address], 5,000 Units at 12/03/18 0545 **AND** Platelet count, , , Once, Landen Redding MD    hydrALAZINE (APRESOLINE) tablet 25 mg, 25 mg, Oral, Q8H, Landen Redding MD, 25 mg at 12/03/18 0545    ipratropium-albuterol (DUO-NEB) 0 5-2 5 mg/3 mL inhalation solution 3 mL, 3 mL, Nebulization, Q4H PRN, Landen Redding MD    ipratropium-albuterol (DUO-NEB) 0 5-2 5 mg/3 mL inhalation solution 3 mL, 3 mL, Nebulization, 4x Daily, Landen Redding MD, 3 mL at 12/03/18 1157    iron polysaccharides (NIFEREX) capsule 150 mg, 150 mg, Oral, Daily, Landen Redding MD, 150 mg at 12/03/18 0954    isosorbide mononitrate (IMDUR) 24 hr tablet 30 mg, 30 mg, Oral, Daily, Landen Redding MD, 30 mg at 12/03/18 0954    levothyroxine tablet 50 mcg, 50 mcg, Oral, Daily, Landen Redding MD, 50 mcg at 12/03/18 0545    metoprolol tartrate (LOPRESSOR) tablet 25 mg, 25 mg, Oral, Q12H [address], Landen Redding MD, 25 mg at 12/03/18 0957    mupirocin (BACTROBAN) 2 % nasal ointment, , Nasal, Q12H [address], Landen Redding MD, 1 application at 28/59/75 0957    ondansetron (ZOFRAN) injection 4 mg, 4 mg, Intravenous, Q6H PRN, Landen Redding MD    oxybutynin (DITROPAN) tablet 5 mg, 5 mg, Oral, Daily, Juvencio Ackerman MD, 5 mg at 12/03/18 0954    pantoprazole (PROTONIX) EC tablet 40 mg, 40 mg, Oral, Daily, Juvencio Ackerman MD, 40 mg at 12/03/18 5147    Laboratory Results:    Results from last 7 days  Lab Units 12/03/18  0556 12/02/18  0526 12/01/18  0543 11/30/18  0543 11/30/18  0136 11/29/18  1421 11/29/18  1332   WBC Thousand/uL 8 88 7 84 6 81 7 17  --   --  8 61   HEMOGLOBIN g/dL 9 6* 9 6* 8 9* 9 4*  --   --  10 2*   HEMATOCRIT % 31 1* 31 1* 29 0* 31 0*  --   --  34 8*   PLATELETS Thousands/uL 257 264 239 234  --   --  279   POTASSIUM mmol/L 3 9 3 7 3 9  --  4 1 5 3  --    CHLORIDE mmol/L 99* 100 102  --  104 104  --    CO2 mmol/L 34* 33* 30  --  28 27  --    BUN mg/dL 51* 52* 53*  --  60* 57*  --    CREATININE mg/dL 2 63* 2 82* 2 88*  --  3 05* 3 01*  --    CALCIUM mg/dL 8 8 8 8 8 7  --  8 4 9 2  --    MAGNESIUM mg/dL 2 0  --   --   --   --   --   --

## 2018-12-04 LAB
ANION GAP SERPL CALCULATED.3IONS-SCNC: 7 MMOL/L (ref 4–13)
BACTERIA BLD CULT: NORMAL
BACTERIA BLD CULT: NORMAL
BUN SERPL-MCNC: 51 MG/DL (ref 5–25)
CALCIUM SERPL-MCNC: 8.9 MG/DL (ref 8.3–10.1)
CHLORIDE SERPL-SCNC: 99 MMOL/L (ref 100–108)
CO2 SERPL-SCNC: 34 MMOL/L (ref 21–32)
CREAT SERPL-MCNC: 2.68 MG/DL (ref 0.6–1.3)
ERYTHROCYTE [DISTWIDTH] IN BLOOD BY AUTOMATED COUNT: 16.4 % (ref 11.6–15.1)
GFR SERPL CREATININE-BSD FRML MDRD: 20 ML/MIN/1.73SQ M
GLUCOSE SERPL-MCNC: 103 MG/DL (ref 65–140)
HCT VFR BLD AUTO: 30.4 % (ref 36.5–49.3)
HGB BLD-MCNC: 9.3 G/DL (ref 12–17)
MCH RBC QN AUTO: 25.9 PG (ref 26.8–34.3)
MCHC RBC AUTO-ENTMCNC: 30.6 G/DL (ref 31.4–37.4)
MCV RBC AUTO: 85 FL (ref 82–98)
PLATELET # BLD AUTO: 255 THOUSANDS/UL (ref 149–390)
PMV BLD AUTO: 10.9 FL (ref 8.9–12.7)
POTASSIUM SERPL-SCNC: 3.8 MMOL/L (ref 3.5–5.3)
RBC # BLD AUTO: 3.59 MILLION/UL (ref 3.88–5.62)
SODIUM SERPL-SCNC: 140 MMOL/L (ref 136–145)
WBC # BLD AUTO: 7.62 THOUSAND/UL (ref 4.31–10.16)

## 2018-12-04 PROCEDURE — 99232 SBSQ HOSP IP/OBS MODERATE 35: CPT | Performed by: INTERNAL MEDICINE

## 2018-12-04 PROCEDURE — 97110 THERAPEUTIC EXERCISES: CPT

## 2018-12-04 PROCEDURE — 94640 AIRWAY INHALATION TREATMENT: CPT

## 2018-12-04 PROCEDURE — 94660 CPAP INITIATION&MGMT: CPT

## 2018-12-04 PROCEDURE — 97535 SELF CARE MNGMENT TRAINING: CPT

## 2018-12-04 PROCEDURE — 94760 N-INVAS EAR/PLS OXIMETRY 1: CPT

## 2018-12-04 PROCEDURE — 85027 COMPLETE CBC AUTOMATED: CPT | Performed by: STUDENT IN AN ORGANIZED HEALTH CARE EDUCATION/TRAINING PROGRAM

## 2018-12-04 PROCEDURE — 80048 BASIC METABOLIC PNL TOTAL CA: CPT | Performed by: STUDENT IN AN ORGANIZED HEALTH CARE EDUCATION/TRAINING PROGRAM

## 2018-12-04 RX ADMIN — FUROSEMIDE 60 MG: 10 INJECTION, SOLUTION INTRAMUSCULAR; INTRAVENOUS at 09:29

## 2018-12-04 RX ADMIN — IPRATROPIUM BROMIDE AND ALBUTEROL SULFATE 3 ML: 2.5; .5 SOLUTION RESPIRATORY (INHALATION) at 15:47

## 2018-12-04 RX ADMIN — IPRATROPIUM BROMIDE AND ALBUTEROL SULFATE 3 ML: 2.5; .5 SOLUTION RESPIRATORY (INHALATION) at 19:35

## 2018-12-04 RX ADMIN — MUPIROCIN 1 APPLICATION: 20 OINTMENT TOPICAL at 09:30

## 2018-12-04 RX ADMIN — PANTOPRAZOLE SODIUM 40 MG: 40 TABLET, DELAYED RELEASE ORAL at 09:29

## 2018-12-04 RX ADMIN — METOPROLOL TARTRATE 25 MG: 25 TABLET, FILM COATED ORAL at 09:27

## 2018-12-04 RX ADMIN — ACETAMINOPHEN 325 MG: 325 TABLET, FILM COATED ORAL at 21:15

## 2018-12-04 RX ADMIN — HEPARIN SODIUM 5000 UNITS: 5000 INJECTION, SOLUTION INTRAVENOUS; SUBCUTANEOUS at 21:04

## 2018-12-04 RX ADMIN — IPRATROPIUM BROMIDE AND ALBUTEROL SULFATE 3 ML: 2.5; .5 SOLUTION RESPIRATORY (INHALATION) at 07:37

## 2018-12-04 RX ADMIN — LEVOTHYROXINE SODIUM 50 MCG: 50 TABLET ORAL at 05:58

## 2018-12-04 RX ADMIN — HYDRALAZINE HYDROCHLORIDE 25 MG: 25 TABLET ORAL at 05:58

## 2018-12-04 RX ADMIN — AMLODIPINE BESYLATE 5 MG: 5 TABLET ORAL at 09:27

## 2018-12-04 RX ADMIN — ISOSORBIDE MONONITRATE 30 MG: 30 TABLET, EXTENDED RELEASE ORAL at 09:27

## 2018-12-04 RX ADMIN — VITAM B12 100 MCG: 100 TAB at 09:30

## 2018-12-04 RX ADMIN — ASPIRIN 81 MG: 81 TABLET, COATED ORAL at 09:27

## 2018-12-04 RX ADMIN — DICLOFENAC 2 G: 10 GEL TOPICAL at 21:05

## 2018-12-04 RX ADMIN — FUROSEMIDE 60 MG: 10 INJECTION, SOLUTION INTRAMUSCULAR; INTRAVENOUS at 16:08

## 2018-12-04 RX ADMIN — OXYBUTYNIN CHLORIDE 5 MG: 5 TABLET ORAL at 09:27

## 2018-12-04 RX ADMIN — TRAMADOL HYDROCHLORIDE 50 MG: 50 TABLET, COATED ORAL at 21:15

## 2018-12-04 RX ADMIN — FLUTICASONE FUROATE AND VILANTEROL TRIFENATATE 1 PUFF: 100; 25 POWDER RESPIRATORY (INHALATION) at 09:30

## 2018-12-04 RX ADMIN — HYDRALAZINE HYDROCHLORIDE 25 MG: 25 TABLET ORAL at 21:05

## 2018-12-04 RX ADMIN — IPRATROPIUM BROMIDE AND ALBUTEROL SULFATE 3 ML: 2.5; .5 SOLUTION RESPIRATORY (INHALATION) at 12:27

## 2018-12-04 RX ADMIN — DICLOFENAC 2 G: 10 GEL TOPICAL at 09:30

## 2018-12-04 RX ADMIN — METOPROLOL TARTRATE 25 MG: 25 TABLET, FILM COATED ORAL at 21:05

## 2018-12-04 RX ADMIN — Medication 150 MG: at 09:29

## 2018-12-04 RX ADMIN — HYDRALAZINE HYDROCHLORIDE 25 MG: 25 TABLET ORAL at 13:56

## 2018-12-04 RX ADMIN — HEPARIN SODIUM 5000 UNITS: 5000 INJECTION, SOLUTION INTRAVENOUS; SUBCUTANEOUS at 13:57

## 2018-12-04 RX ADMIN — MUPIROCIN 1 APPLICATION: 20 OINTMENT TOPICAL at 21:05

## 2018-12-04 RX ADMIN — HEPARIN SODIUM 5000 UNITS: 5000 INJECTION, SOLUTION INTRAVENOUS; SUBCUTANEOUS at 05:58

## 2018-12-04 RX ADMIN — DOCUSATE SODIUM 100 MG: 100 CAPSULE, LIQUID FILLED ORAL at 21:04

## 2018-12-04 RX ADMIN — AMIODARONE HYDROCHLORIDE 100 MG: 200 TABLET ORAL at 09:27

## 2018-12-04 NOTE — PROGRESS NOTES
Progress Note - Pulmonary   Trinna Oven 80 y o  male MRN: [id number]  Unit/Bed#: [address] [place] [address] Encounter: [id number]      Assessment/Plan:   -moderate COPD:  -continue Breo 100 mcg 1 puff daily, continue DuoNeb treatment  -obstructive sleep apnea  -continue CPAP 10 cm water at night  -chronic mixed respiratory failure with oxygen dependence  -continue oxygen 6 liters/minute  -chronic right-sided pleural thickening:  -will follow up on subsequent imaging  -acute on chronic mixed heart failure  -patient is status actively diuresed and his overall -10 0 6 L for his hospital stay  --3 0 2 L for last 24 hours    -pulmonology can likely sign off at this point  -contact with any further concerns or questions  -discharge needs / can follow up in the office  -was scheduled for appointment yesterday  -follow-up within 1-2 weeks as an outpatient    80year-old male with a past medical history of moderate sleep apnea on CPAP 10 cm of water, chronic oxygen dependence on 8 L, moderate COPD ratio 50%/FEV1 66% and reduced diffusing capacity is 19% in July 2016 predicted with a 35 pack-year smoking history quit 31 years ago , history of iron deficiency anemia, chronic diastolic heart failure, and group 2 use pulmonary artery HTN secondary to heart failure, CKD  admitted for dyspnea secondary to acute on chronic mixed heart failure  His initial BNP was 23627    Patient has been diuresed in the hospital in his -10 L  -3200 mL the last 24 hr     ______________________________________________________________________    Subjective: Pt seen and examined at bedside  No complaints  Patient feels his breathing is better      Tele Events:     Vitals:   Temp:  [97 6 °F (36 4 °C)-98 9 °F (37 2 °C)] 97 6 °F (36 4 °C)  HR:  [64-75] 64  Resp:  [18-20] 18  BP: (133-167)/(61-74) 167/74  Weight (last 2 days)     Date/Time   Weight    12/04/18 0600  99 2 (218 7)    12/03/18 0600  99 6 (219 58)    12/02/18 0600  102 (225 75)            Oxygen Therapy  SpO2: 98 %  O2 Flow Rate (L/min): 6 L/min    IV Infusions:       Nutrition:        Diet Orders            Start     Ordered    11/29/18 1751  Diet Regular; Regular House  Diet effective now     Question Answer Comment   Diet Type Regular    Regular Regular House    RD to adjust diet per protocol? Yes        11/29/18 1753    11/29/18 1710  Room Service  Once     Question:  Type of Service  Answer:  Room Service - Appropriate with Assistance    11/29/18 1709          Ins/Outs:   I/O       12/02 0701 - 12/03 0700 12/03 0701 - 12/04 0700 12/04 0701 - 12/05 0700    P  O  120      Total Intake(mL/kg) 120 (1 2)      Urine (mL/kg/hr) 1825 (0 8) 3200 (1 3)     Total Output 1825 3200      Net -1705 -3200                   Lines/Drains:  Invasive Devices     Peripheral Intravenous Line            Peripheral IV 12/03/18 Right Forearm 1 day                 Active medications:  Scheduled Meds:  Current Facility-Administered Medications:  acetaminophen 325 mg Oral Q12H PRN Keyla Anderson MD   And       traMADol 50 mg Oral Q12H PRN Keyla Anderson MD   amiodarone 100 mg Oral Daily Keyla Anderson MD   amLODIPine 5 mg Oral Daily Keyla Anderson MD   aspirin 81 mg Oral Daily Keyla Anderson MD   cyanocobalamin 100 mcg Oral Daily Keyla Anderson MD   diclofenac sodium 2 g Topical Q12H [address] [facility]   docusate sodium 100 mg Oral HS Keyla Anderson MD   fluticasone-vilanterol 1 puff Inhalation Daily Keyla Anderson MD   furosemide 60 mg Intravenous BID (diuretic) Arabella Pearce MD   heparin (porcine) 5,000 Units Subcutaneous Q8H [address], MD   hydrALAZINE 25 mg Oral Q8H Keyla Anderson MD   ipratropium-albuterol 3 mL Nebulization Q4H PRN Keyla Anderson MD   ipratropium-albuterol 3 mL Nebulization 4x Daily Keyla Anderson MD   iron polysaccharides 150 mg Oral Daily Keyla Anderson MD   isosorbide mononitrate 30 mg Oral Daily Keyla Anderson MD   levothyroxine 50 mcg Oral Daily Keyla Anderson MD   metoprolol tartrate 25 mg Oral Q12H [address] Edu[address] Todd MD   mupirocin Nasal Q12H [address] [facility] [address] [facility]   ondansetron 4 mg Intravenous Q6H PRN Bacilio Galvan MD   oxybutynin 5 mg Oral Daily Bacilio Galvan MD   pantoprazole 40 mg Oral Daily Bacilio Galvan MD     PRN Meds:  acetaminophen 325 mg Q12H PRN   And     traMADol 50 mg Q12H PRN   ipratropium-albuterol 3 mL Q4H PRN   ondansetron 4 mg Q6H PRN     ____________________________________________________________________      Physical Exam   Constitutional: He is oriented to person, place, and time  He appears well-developed  Elderly, frail   HENT:   Head: Normocephalic and atraumatic  Eyes: Pupils are equal, round, and reactive to light  Conjunctivae are normal    Neck: Normal range of motion  Neck supple  Cardiovascular: Normal rate, regular rhythm and normal heart sounds  Exam reveals no gallop and no friction rub  No murmur heard  Pulmonary/Chest: Effort normal and breath sounds normal  No respiratory distress  He has no wheezes  He has no rales  He exhibits no tenderness  Abdominal: Soft  Bowel sounds are normal    Musculoskeletal: Normal range of motion  He exhibits edema  Trace to +1 lower extremity edema   Neurological: He is alert and oriented to person, place, and time  Skin: Skin is warm and dry     Psychiatric: He has a normal mood and affect            ____________________________________________________________________    Labs:   CBC:   Results from last 7 days  Lab Units 12/04/18  0511 12/03/18  0556 12/02/18  0526   WBC Thousand/uL 7 62 8 88 7 84   HEMOGLOBIN g/dL 9 3* 9 6* 9 6*   HEMATOCRIT % 30 4* 31 1* 31 1*   MCV fL 85 84 85   PLATELETS Thousands/uL 255 257 264     CMP:   Results from last 7 days  Lab Units 12/04/18  0511 12/03/18  0556 12/02/18  0526  11/29/18  1421   POTASSIUM mmol/L 3 8 3 9 3 7  < > 5 3   CHLORIDE mmol/L 99* 99* 100  < > 104   CO2 mmol/L 34* 34* 33*  < > 27   BUN mg/dL 51* 51* 52*  < > 57*   CREATININE mg/dL 2 68* 2 63* 2 82*  < > 3 01*   CALCIUM mg/dL 8 9 8 8 8 8  < > 9 2   AST U/L  --   -- --   --  47*   ALT U/L  --   --   --   --  26   ALK PHOS U/L  --   --   --   --  55   EGFR ml/min/1 73sq m 20 21 19  < > 18   < > = values in this interval not displayed  Magnesium:   Results from last 7 days  Lab Units 12/03/18  0556   MAGNESIUM mg/dL 2 0     Phosphorous:     Troponin:   Results from last 7 days  Lab Units 11/29/18  1332   TROPONIN I ng/mL 0 02     PT/INR:   Results from last 7 days  Lab Units 11/29/18  1332   PTT seconds 19*   INR  1 34*     Lactic Acid:   Results from last 7 days  Lab Units 11/29/18  1548 11/29/18  1332   LACTIC ACID mmol/L 1 0 2 2*     BNP:   Results from last 7 days  Lab Units 11/29/18  1421   NT-PRO BNP pg/mL 16,996*     TSH:     Procalcitonin: Invalid input(s): PROCALCITONIN      Imaging:   XR chest 1 view portable   Final Result by Mario Ang MD (11/29 9455)         1  Cardiomegaly  2   Chronic right pleural thickening  3   Mild CHF  Workstation performed: [id number]                 Micro: Lab Results   Component Value Date    BLOODCX No Growth After 4 Days  11/29/2018    BLOODCX No Growth After 4 Days  11/29/2018    BLOODCX No Growth After 5 Days  01/06/2017    BLOODCX No Growth After 5 Days  01/06/2017    URINECX No Growth <1000 cfu/mL 03/04/2016    SPUTUMCULTUR 3+ Growth of  07/20/2018    SPUTUMCULTUR 4+ Growth of  03/29/2018    SPUTUMCULTUR 2+ Growth of Candida sp  presumptively albicans 03/09/2016    SPUTUMCULTUR 2+ Growth of Candida sp  presumptively glabrata 03/09/2016    SPUTUMCULTUR 2+ Growth of Mixed Respiratory [place] 03/09/2016    MRSACULTURE (A) 11/29/2018     Methicillin Resistant Staphylococcus aureus isolated    MRSACULTURE  11/29/2018     Please note: This patient requires contact precautions              Invalid input(s): LEGIONELLAURINARYANTIGEN        Code Status: Level 3 - DNAR and DNI

## 2018-12-04 NOTE — PROGRESS NOTES
Progress Note - Brian Kilaueabharathi 10/24/1930, 80 y o  male MRN: [id number]    Unit/Bed#: [address] [place] [address] Encounter: [id number]    Primary Care Provider: Kimberly Koroma DO   Date and time admitted to hospital: 11/29/2018  1:14 PM        Acute renal failure superimposed on stage 4 chronic kidney disease ([address] )   Assessment & Plan    · Baseline creatinine 1 8-2, on admission 3 01  Slowly improving  · Sees Dr Wilder Montiel as outpt  · Has had issues recently with rising creatinine with increased diuretic dose, suspect cardiorenal syndrome  · Recent renal US negative for hydro  · Good UOP  · Consulted nephrology to follow, accepting a high creatinine for diuresis  · Decreased Voltaren gel dose     * Acute on chronic combined systolic and diastolic heart failure (HCC)   Assessment & Plan    · LV EF 35-40%, 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  · Appears in acute heart failure with volume overload  · CXR shows vascular congestion, pro BNP 05770  · nephrology and cardio consulted  · Cont IV lasix, diuresing slowly, net -7 4L  · Monitor I/Os and daily weights     MRSA colonization   Assessment & Plan    Nasal bactroban for 5 days     Acute on chronic respiratory failure with hypoxia ([facility])   Assessment & Plan    · Normally on 6-10L NC at home 2/2 severe COPD and pulmonary HTN, was requiring 8-10L and still have desats into the 80s, likely from fluid overload on admission  · CXR showed CHF  · Treat heart failure as noted  · O2, weaned back to baseline as tolerated  PATITO (obstructive sleep apnea)   Assessment & Plan    Cont CPAP qHS at 10 with 6L O2     Paroxysmal atrial fibrillation ([facility])   Assessment & Plan    · Cont beta blocker  · Not on anticoagulation given recurrent falls        Moderate COPD (chronic obstructive pulmonary disease) ([facility])   Assessment & Plan    · Last spirometry showed his FEV1 to be 1 85 liters or 76 percent predicted consistent moderate airflow obstruction  · Does not appear in exacerbation, dyspnea was likely from heart failure, resolved now  · Cont home inhalers           VTE Pharmacologic Prophylaxis:   Pharmacologic: Heparin  Mechanical VTE Prophylaxis in Place: Yes    Patient Centered Rounds: I have performed bedside rounds with nursing staff today  Discussions with Specialists or Other Care Team Provider: Yes  Education and Discussions with Family / Patient:Yes  Time Spent for Care: 30 minutes  More than 50% of total time spent on counseling and coordination of care as described above  Current Length of Stay: 4 day(s)  Current Patient Status: Inpatient     Discharge Plan: pending    Code Status: Level 3 - DNAR and DNI      Subjective:   Feeling good today,except for some pain in his shoulder  LE swelling better  No new complaints      Objective:     Vitals:   Temp (24hrs), Av 6 °F (37 °C), Min:97 8 °F (36 6 °C), Max:99 7 °F (37 6 °C)    Temp:  [97 8 °F (36 6 °C)-99 7 °F (37 6 °C)] 98 8 °F (37 1 °C)  HR:  [67-80] 71  Resp:  [20-22] 20  BP: (126-180)/(58-74) 146/67  SpO2:  [93 %-100 %] 97 %  Body mass index is 31 51 kg/m²  Input and Output Summary (last 24 hours): Intake/Output Summary (Last 24 hours) at 18 194  Last data filed at 18 1801   Gross per 24 hour   Intake              120 ml   Output             2450 ml   Net            -2330 ml        Physical Exam:     Physical Exam   Constitutional: He is oriented to person, place, and time  He appears well-developed  No distress  HENT:   Head: Normocephalic and atraumatic  Cardiovascular: Normal rate and regular rhythm  Murmur heard  irregular   Pulmonary/Chest: Effort normal and breath sounds normal  No respiratory distress  He has no wheezes  He has no rales  Abdominal: Soft  Bowel sounds are normal  He exhibits no distension  There is no tenderness  There is no rebound  Musculoskeletal: He exhibits edema (improved)  Neurological: He is alert and oriented to person, place, and time  Skin: Skin is warm and dry  Psychiatric: He has a normal mood and affect  His behavior is normal    Nursing note and vitals reviewed  Additional Data:     Labs:      Results from last 7 days  Lab Units 12/03/18  0556 12/02/18  0526 12/01/18  0543  11/29/18  1332   WBC Thousand/uL 8 88 7 84 6 81  < > 8 61   HEMOGLOBIN g/dL 9 6* 9 6* 8 9*  < > 10 2*   HEMATOCRIT % 31 1* 31 1* 29 0*  < > 34 8*   PLATELETS Thousands/uL 257 264 239  < > 279   NEUTROS PCT %  --   --   --   --  73   < > = values in this interval not displayed  Results from last 7 days  Lab Units 12/03/18  0556 12/02/18  0526 12/01/18  0543  11/29/18  1421   SODIUM mmol/L 141 140 141  < > 140   POTASSIUM mmol/L 3 9 3 7 3 9  < > 5 3   CHLORIDE mmol/L 99* 100 102  < > 104   CO2 mmol/L 34* 33* 30  < > 27   BUN mg/dL 51* 52* 53*  < > 57*   CREATININE mg/dL 2 63* 2 82* 2 88*  < > 3 01*   CALCIUM mg/dL 8 8 8 8 8 7  < > 9 2   TOTAL BILIRUBIN mg/dL  --   --   --   --  0 60   ALK PHOS U/L  --   --   --   --  55   ALT U/L  --   --   --   --  26   AST U/L  --   --   --   --  47*   < > = values in this interval not displayed  Results from last 7 days  Lab Units 11/29/18  1332   INR  1 34*       Results from last 7 days  Lab Units 11/29/18  1332   TROPONIN I ng/mL 0 02     Lab Results   Component Value Date/Time    HGBA1C 5 3 06/12/2018 06:14 AM           Results from last 7 days  Lab Units 11/29/18  1548 11/29/18  1332   LACTIC ACID mmol/L 1 0 2 2*       * I Have Reviewed All Lab Data Listed Above  * Additional Pertinent Lab Tests Reviewed: All Labs Within Last 24 Hours Reviewed    Imaging:     XR chest 1 view portable   Final Result by Tacos Carey MD (11/29 1401)         1  Cardiomegaly  2   Chronic right pleural thickening  3   Mild CHF  Workstation performed: [id number]           Imaging Reports Reviewed by myself    Cultures:   Blood Culture:   Lab Results   Component Value Date    BLOODX No Growth After 4 Days  11/29/2018    BLOODCX No Growth After 4 Days  11/29/2018    BLOODCX No Growth After 5 Days  01/06/2017    BLOODCX No Growth After 5 Days  01/06/2017    BLOODCX No Growth After 5 Days  03/06/2016    BLOODCX No Growth After 5 Days  03/06/2016     Urine Culture:   Lab Results   Component Value Date    URINECX No Growth <1000 cfu/mL 03/04/2016     Sputum Culture: No components found for: SPUTUMCX  Wound Culture: No results found for: WOUNDCULT    Last 24 Hours Medication List:     Current Facility-Administered Medications:  acetaminophen 325 mg Oral Q12H PRN Rajinder Quintanilla MD   And       traMADol 50 mg Oral Q12H PRN Rajinder Quintanilla MD   amiodarone 100 mg Oral Daily Rajinder Quintanilla MD   amLODIPine 5 mg Oral Daily Rajinder Quintanilla MD   aspirin 81 mg Oral Daily Rajinder Quintanilla MD   cyanocobalamin 100 mcg Oral Daily Rajinder Quintanilla MD   diclofenac sodium 2 g Topical Q12H [facility]   docusate sodium 100 mg Oral HS Rajinder Quintanilla MD   fluticasone-vilanterol 1 puff Inhalation Daily Rajinder Quintanilla MD   furosemide 60 mg Intravenous BID (diuretic) Peter Clemente MD   heparin (porcine) 5,000 Units Subcutaneous Q8H [address] Edu[address] Todd MD   hydrALAZINE 25 mg Oral Q8H Rajinder Quintanilla MD   ipratropium-albuterol 3 mL Nebulization Q4H PRN Rajinder Quintanilla MD   ipratropium-albuterol 3 mL Nebulization 4x Daily Rajinder Quintanilla MD   iron polysaccharides 150 mg Oral Daily Rajinder Quintanilla MD   isosorbide mononitrate 30 mg Oral Daily Rajinder Quintanilla MD   levothyroxine 50 mcg Oral Daily Rajinder Quintanilla MD   metoprolol tartrate 25 mg Oral Q12H [address] Todd MD   mupirocin  Nasal Q12H [address] Eduardo Todd MD   ondansetron 4 mg Intravenous Q6H PRN Rajinder Quintanilla MD   oxybutynin 5 mg Oral Daily Rajinder Quintanilla MD   pantoprazole 40 mg Oral Daily Rajinder Quintanilla MD        Today, Patient Was Seen By: Rajinder Quintanilla MD    ** Please Note: Dragon 360 Dictation voice to text software may have been used in the creation of this document   **

## 2018-12-04 NOTE — PROGRESS NOTES
Progress Note - Nephrology   Issa Duque 80 y o  male MRN: [id number]  Unit/Bed#: [address] [place] [address] Encounter: [id number]    Assessment:  1  Acute kidney injury present on admission:  Likely secondary to fluid overload plus or minus cardiorenal syndrome  Urinalysis showed trace protein and no blood  Patient is on twice daily Lasix and has a good urine output  Continue with current dose at 60 IV b i d  As it seems to be an effective dose as the patient was -3 2 L yesterday  Patient is hemodynamically stable last blood pressure 167/74  His creatinine has slightly improved creatinine was 3 05 on admission currently 2 6 watch kidney function hemodynamics with diuresis    2  Heart failure with reduced ejection fraction:  Continue with intravenous diuretics as above  Would continue intravenous diuretics for least another 24 hours    3  Stage 4 chronic kidney disease:  Baseline creatinine is 1 8-2 2 follows with Dr Krysten Vidal  Patient's electrolytes are stable potassium is 3 8 CO2 is 34 it was 28 on admission continue to follow    4  Anemia secondary to chronic kidney disease:  Hemoglobin is stable 9 3    Plan:  Continue with Lasix 60 IV b i d  Is seems to be an effective dose of diuretic  Electrolytes are stable watch bicarbonate level has it has increased from 28-34  Mental status is stable  Patient is symptomatically improved        Subjective:   Patient seen in follow-up for acute renal failure on chronic kidney disease  Patient states his breathing is easier  His weight is decreased approximately 7 lb since December 2nd  He is net -3 2 L over the past 24 hours  Objective:     Vitals: Blood pressure 167/74, pulse 64, temperature 97 6 °F (36 4 °C), temperature source Temporal, resp  rate 18, height 5' 10" (1 778 m), weight 99 2 kg (218 lb 11 1 oz), SpO2 98 %  ,Body mass index is 31 38 kg/m²      Weight (last 2 days)     Date/Time   Weight    12/04/18 0600  99 2 (218 7)    12/03/18 0600  99 6 (219 58)    12/02/18 0600  102 (225 75)                Intake/Output Summary (Last 24 hours) at 12/04/18 0845  Last data filed at 12/04/18 0507   Gross per 24 hour   Intake                0 ml   Output             3200 ml   Net            -3200 ml            Physical Exam: General: patient is in NAD  Skin:  No new rash  Eyes:  No scleral icterus  Neck:  Supple no adenopathy  Chest:  Coarse breath sounds  CVS:  S1-S2  Abdomen:  Positive bowel sounds  Extremities:  1+ leg edema  Neuro:  Nonfocal                Prescriptions Prior to Admission   Medication    albuterol (PROAIR HFA) 90 mcg/act inhaler    amiodarone 100 mg tablet    amLODIPine (NORVASC) 5 mg tablet    aspirin (ECOTRIN LOW STRENGTH) 81 mg EC tablet    atorvastatin (LIPITOR) 10 mg tablet    B Complex Vitamins (VITAMIN B COMPLEX PO)    BREO ELLIPTA    cyanocobalamin (VITAMIN B-12) 100 mcg tablet    docusate sodium (COLACE) 100 mg capsule    DULoxetine (CYMBALTA) 30 mg delayed release capsule    fenofibrate (TRICOR) 145 mg tablet    hydrALAZINE (APRESOLINE) 25 mg tablet    IFEREX 150 150 MG capsule    isosorbide mononitrate (IMDUR) 30 mg 24 hr tablet    levothyroxine 50 mcg tablet    pantoprazole (PROTONIX) 40 mg tablet    tolterodine (DETROL) 2 mg tablet    torsemide (DEMADEX) 20 mg tablet    traMADol-acetaminophen (ULTRACET) 37 5-325 mg per tablet    aspirin 81 mg chewable tablet    diclofenac sodium (VOLTAREN) 1 %       Current Facility-Administered Medications   Medication Dose Route Frequency    acetaminophen (TYLENOL) tablet 325 mg  325 mg Oral Q12H PRN    And    traMADol (ULTRAM) tablet 50 mg  50 mg Oral Q12H PRN    amiodarone tablet 100 mg  100 mg Oral Daily    amLODIPine (NORVASC) tablet 5 mg  5 mg Oral Daily    aspirin (ECOTRIN LOW STRENGTH) EC tablet 81 mg  81 mg Oral Daily    cyanocobalamin (VITAMIN B-12) tablet 100 mcg  100 mcg Oral Daily    diclofenac sodium (VOLTAREN) 1 % topical gel 2 g  2 g Topical Q12H [address]    docusate sodium (COLACE) capsule 100 mg  100 mg Oral HS    fluticasone-vilanterol (BREO ELLIPTA) 100-25 mcg/inh inhaler 1 puff  1 puff Inhalation Daily    furosemide (LASIX) injection 60 mg  60 mg Intravenous BID (diuretic)    heparin (porcine) subcutaneous injection 5,000 Units  5,000 Units Subcutaneous Q8H [facility]    hydrALAZINE (APRESOLINE) tablet 25 mg  25 mg Oral Q8H    ipratropium-albuterol (DUO-NEB) 0 5-2 5 mg/3 mL inhalation solution 3 mL  3 mL Nebulization Q4H PRN    ipratropium-albuterol (DUO-NEB) 0 5-2 5 mg/3 mL inhalation solution 3 mL  3 mL Nebulization 4x Daily    iron polysaccharides (NIFEREX) capsule 150 mg  150 mg Oral Daily    isosorbide mononitrate (IMDUR) 24 hr tablet 30 mg  30 mg Oral Daily    levothyroxine tablet 50 mcg  50 mcg Oral Daily    metoprolol tartrate (LOPRESSOR) tablet 25 mg  25 mg Oral Q12H [facility]    mupirocin (BACTROBAN) 2 % nasal ointment   Nasal Q12H [facility]    ondansetron (ZOFRAN) injection 4 mg  4 mg Intravenous Q6H PRN    oxybutynin (DITROPAN) tablet 5 mg  5 mg Oral Daily    pantoprazole (PROTONIX) EC tablet 40 mg  40 mg Oral Daily        Lab, Imaging and other studies: I have personally reviewed pertinent labs    CBC: Lab Results   Component Value Date    WBC 7 62 12/04/2018    WBC 8 4 09/18/2017    RBC 3 59 (L) 12/04/2018    RBC 4 27 (L) 10/08/2015     CMP: Lab Results   Component Value Date     10/16/2017    CL 99 (L) 12/04/2018    CL 98 11/14/2018    CO2 34 (H) 12/04/2018    CO2 24 11/14/2018    ANIONGAP 13 9 10/08/2015    BUN 51 (H) 12/04/2018    BUN 51 (H) 11/14/2018    CREATININE 2 68 (H) 12/04/2018    CREATININE 2 06 (H) 10/16/2017    GLUCOSE 109 (H) 10/16/2017    CALCIUM 8 9 12/04/2018    CALCIUM 9 7 10/16/2017    AST 47 (H) 11/29/2018    AST 39 10/16/2017    ALT 26 11/29/2018    ALT 15 10/16/2017    ALKPHOS 55 11/29/2018    ALKPHOS 61 10/16/2017    PROT 7 9 10/16/2017    BILITOT 0 4 10/16/2017    EGFR 20 12/04/2018     Phosphorus:   Lab Results   Component Value Date    PHOS 3 9 07/20/2018     Magnesium:   Lab Results   Component Value Date    MG 2 0 12/03/2018     Urinalysis: Lab Results   Component Value Date    COLORU Yellow 11/29/2018    CLARITYU Clear 11/29/2018    SPECGRAV 1 015 11/29/2018    PHUR 5 5 11/29/2018    LEUKOCYTESUR Negative 11/29/2018    NITRITE Negative 11/29/2018    GLUCOSEU Negative 11/29/2018    KETONESU Negative 11/29/2018    BILIRUBINUR Negative 11/29/2018    BLOODU Negative 11/29/2018     BMP: Lab Results   Component Value Date    GLUCOSE 109 (H) 10/16/2017    SODIUM 140 12/04/2018    SODIUM 140 11/14/2018    CO2 34 (H) 12/04/2018    CO2 24 11/14/2018    BUN 51 (H) 12/04/2018    BUN 51 (H) 11/14/2018    CREATININE 2 68 (H) 12/04/2018    CREATININE 2 06 (H) 10/16/2017    CALCIUM 8 9 12/04/2018    CALCIUM 9 7 10/16/2017     ABGs:   Lab Results   Component Value Date    PH 7 414 08/23/2016

## 2018-12-04 NOTE — PLAN OF CARE
Problem: OCCUPATIONAL THERAPY ADULT  Goal: Performs self-care activities at highest level of function for planned discharge setting  See evaluation for individualized goals  Outcome: Progressing  Limitation: Decreased ADL status, Decreased UE ROM, Decreased UE strength, Decreased Safe judgement during ADL, Decreased endurance, Decreased self-care trans, Decreased high-level ADLs (decreased balance)  Prognosis: Good  Assessment: Pt demonstrating improved strength, balance and functional activity tolerance allowing for increased active participation with ADL and mobility tasks  Eager to take a shower tomorrow  Shower seat with back height raised to decrease fall risks and lessen caregiver assistance needed  Pt demonstrated good safety awareness with toilet and shower transfers  Good progress towards goals  Pt returned to bed with all needs within reach, SCD pumps and bed alarm in place  Cont OT per POC       OT Discharge Recommendation:  (home with services; continue HHA)

## 2018-12-04 NOTE — PROGRESS NOTES
[facility] Progress Note  Patient: Gonzalez Ren 80 y o  male   MRN: [id number]  PCP: Lizbeth Dempsey DO  Unit/Bed#: [address] [place] [address] Encounter: [id number]  Date Of Visit: 12/04/18    Problem List:    Principal Problem:    Acute on chronic combined systolic and diastolic heart failure ([facility] [address] )  Active Problems:    Acute on chronic respiratory failure with hypoxia ([facility] )    Acute renal failure superimposed on stage 4 chronic kidney disease (HCC)    Moderate COPD (chronic obstructive pulmonary disease) (HCC)    Paroxysmal atrial fibrillation ([facility])    PATITO (obstructive sleep apnea)    MRSA colonization      Assessment & Plan:    * Acute on chronic combined systolic and diastolic heart failure ([facility])   Assessment & Plan    · LV EF 35-40%, 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  · Presented with acute heart failure with volume overload  · CXR shows vascular congestion, pro BNP 91785  · nephrology and cardio consulted  · Continue IV Lasix 60 mg twice a day  · Monitor I/Os and daily weights  · Improving     Acute renal failure superimposed on stage 4 chronic kidney disease ([facility])   Assessment & Plan    · Baseline creatinine 1 8-2, on admission 3 01  Slowly improving  · Follow up with Nephrology as outpatient  · Has had issues recently with rising creatinine with increased diuretic dose, suspect cardiorenal syndrome  · Recent renal US negative for hydro  · Good UOP, on IV Lasix  · Consulted nephrology to follow, accepting a high creatinine for diuresis  · Decreased Voltaren gel dose     Acute on chronic respiratory failure with hypoxia ([facility])   Assessment & Plan    · Normally on 6-10L NC at home 2/2 severe COPD and pulmonary HTN,   · Improving with diuresis  · Monitor      Paroxysmal atrial fibrillation ([facility])   Assessment & Plan    · Cont beta blocker  · Not on anticoagulation given recurrent falls        Moderate COPD (chronic obstructive pulmonary disease) ([facility])   Assessment & Plan    · Last spirometry showed his FEV1 to be 1 85 liters or 76 percent predicted consistent moderate airflow obstruction  · Does not appear in exacerbation, dyspnea was likely from heart failure, resolved now  · Cont home inhalers     MRSA colonization   Assessment & Plan    Nasal bactroban for 5 days     PATITO (obstructive sleep apnea)   Assessment & Plan    Cont CPAP qHS at 10 with 6L O2           VTE Pharmacologic Prophylaxis:   Pharmacologic: Heparin  Mechanical VTE Prophylaxis in Place: Yes    Patient Centered Rounds: I have performed bedside rounds with nursing staff today  Discussions with Specialists or Other Care Team Provider: Yes, Cardiology    Education and Discussions with Family / Patient:Yes    Time Spent for Care: 45 minutes  More than 50% of total time spent on counseling and coordination of care as described above  Current Length of Stay: 4 day(s)    Current Patient Status: Inpatient     Discharge Plan:  Anticipate home with services    Code Status: Level 3 - DNAR and DNI    Certification Statement: The patient will continue to require additional inpatient hospital stay due to CHF      Subjective:   Reports that his breathing is improving    Objective:   Comfortably sitting in chair      Negative for Chest Pain, Palpitations, Shortness of Breath, Abdominal Pain, Nausea, Vomiting, Constipation, Diarrhea, Dizziness  All other 10 review of systems negative and without drastic interval changes from yesterday  Vitals:   Temp (24hrs), Av 5 °F (36 9 °C), Min:97 8 °F (36 6 °C), Max:98 9 °F (37 2 °C)    Temp:  [97 8 °F (36 6 °C)-98 9 °F (37 2 °C)] 98 9 °F (37 2 °C)  HR:  [67-75] 75  Resp:  [20-22] 20  BP: (133-180)/(61-74) 134/61  SpO2:  [93 %-100 %] 94 %  Body mass index is 31 51 kg/m²  Input and Output Summary (last 24 hours):        Intake/Output Summary (Last 24 hours) at 18 2200  Last data filed at 18 210   Gross per 24 hour   Intake              120 ml   Output             2850 ml   Net            -3559 ml       Physical Exam:     Physical Exam   Constitutional: No distress  HENT:   Head: Normocephalic and atraumatic  Eyes: Pupils are equal, round, and reactive to light  Conjunctivae are normal    Neck: Normal range of motion  Neck supple  Cardiovascular: Normal rate and regular rhythm  Murmur heard  Pulmonary/Chest: Effort normal  No respiratory distress  He has no wheezes  He has no rhonchi  He has no rales  He exhibits no tenderness  Abdominal: Soft  Bowel sounds are normal  He exhibits no distension  There is no tenderness  There is no rebound and no guarding  Musculoskeletal: He exhibits edema  Neurological: He is alert  No cranial nerve deficit  Skin: Skin is warm and dry  No rash noted  Additional Data:     Labs:      Results from last 7 days  Lab Units 12/03/18  0556  11/29/18  1332   WBC Thousand/uL 8 88  < > 8 61   HEMOGLOBIN g/dL 9 6*  < > 10 2*   HEMATOCRIT % 31 1*  < > 34 8*   PLATELETS Thousands/uL 257  < > 279   NEUTROS PCT %  --   --  73   LYMPHS PCT %  --   --  16   MONOS PCT %  --   --  8   EOS PCT %  --   --  3   < > = values in this interval not displayed  Results from last 7 days  Lab Units 12/03/18  0556  11/29/18  1421   POTASSIUM mmol/L 3 9  < > 5 3   CHLORIDE mmol/L 99*  < > 104   CO2 mmol/L 34*  < > 27   BUN mg/dL 51*  < > 57*   CREATININE mg/dL 2 63*  < > 3 01*   CALCIUM mg/dL 8 8  < > 9 2   ALK PHOS U/L  --   --  55   ALT U/L  --   --  26   AST U/L  --   --  47*   < > = values in this interval not displayed  Results from last 7 days  Lab Units 11/29/18  1332   INR  1 34*       * I Have Reviewed All Lab Data Listed Above  * Additional Pertinent Lab Tests Reviewed: All Labs For Current Hospital Admission Reviewed    Imaging:  Xr Chest 1 View Portable    Result Date: 11/29/2018  Narrative: CHEST INDICATION:   sob  COMPARISON:  11/18/2018 EXAM PERFORMED/VIEWS:  XR CHEST PORTABLE One image FINDINGS: Cardiomediastinal silhouette appears enlarged    Mild heart failure  Chronic right pleural thickening  Bibasilar scarring  Osseous structures appear within normal limits for patient age  Impression: 1  Cardiomegaly  2   Chronic right pleural thickening  3   Mild CHF  Workstation performed: [id number]     Xr Chest Portable    Result Date: 11/19/2018  Narrative: CHEST INDICATION:   hypoxia  Shortness of breath COMPARISON:  11/16/2018 EXAM PERFORMED/VIEWS:  XR CHEST PORTABLE FINDINGS:  Lines and tubes are unchanged from prior exam   Patient is status post TAVR Heart shadow is enlarged but unchanged from prior exam  Chronic right effusion or pleural thickening noted similar to prior study  The lungs are similar in appearance to recent prior studies  There is no pneumothorax  There are degenerative changes of the shoulders     Impression: No interval change from 11/16/2018 Workstation performed: [id number]     Xr Chest Portable    Result Date: 11/16/2018  Narrative: CHEST INDICATION:   chf  Shortness of breath COMPARISON:  November 9, 2018 EXAM PERFORMED/VIEWS:  XR CHEST PORTABLE FINDINGS:  Prosthesis noted overlying the aortic root  Heart shadow is enlarged but unchanged from prior exam  Bibasilar scarring  Right pleural effusion  Osseous structures appear within normal limits for patient age  Impression: Stable chest with bibasilar scarring and right-sided pleural effusion extending along the chest wall  Workstation performed: [id number]     Xr Chest Pa & Lateral    Result Date: 11/9/2018  Narrative: CHEST INDICATION:   Cough  J44 9: Chronic obstructive pulmonary disease, unspecified N18 3: Chronic kidney disease, stage 3 (moderate) I50 9: Heart failure, unspecified  I50 1: Left ventricular failure, unspecified  COMPARISON:  7/18/2018, 10/16/2017 and CT chest 6/11/2018 EXAM PERFORMED/VIEWS:  XR CHEST PA & LATERAL  The frontal view was performed utilizing dual energy radiographic technique  FINDINGS: Cardiomediastinal silhouette appears stable    Transcatheter aortic valve replacement noted  Central vascular prominence again noted without overt pulmonary edema  Peripheral and apical right pleural thickening versus loculated effusion, latter favored based on previous CT  Coarse bronchovascular markings in the lung bases, left greater than right lung base, which may be accentuated by fibrotic and cystic changes  No new infiltrates  No pneumothorax  Degenerative changes of the spine and shoulders  Chronic right AC joint separation noted  Impression: Overall stable appearance of the chest  Question element of chronic pulmonary venous hypertension with chronic basilar changes and loculated pleural effusion peripheral right hemithorax  No new infiltrate  Workstation performed: [id number]     Us Kidney And Bladder    Result Date: 11/16/2018  Narrative: RENAL ULTRASOUND INDICATION:   RENAL FAILURE, ACUTE ARF  COMPARISON: CT scan 7/18/2016  TECHNIQUE: Portable ultrasound examination of the retroperitoneum was performed with a curvilinear transducer utilizing volumetric sweeps and still imaging techniques  FINDINGS: Evaluation of the left kidney is limited due to overlying bowel gas  KIDNEYS: Symmetric and normal size  Right kidney:  10 2 cm  Left kidney:  9 8 cm  Right kidney Normal echogenicity and contour  Ill-defined echogenic structure at the midpole, which presumably corresponds with the previously seen rim calcified cyst  No hydronephrosis  No shadowing calculi  No perinephric fluid collections  Left kidney Normal echogenicity and contour  No obvious masses detected  No overt hydronephrosis  No shadowing calculi  No perinephric fluid collections  URETERS: Nonvisualized  BLADDER: The bladder is nondistended, limiting evaluation  Impression: Suboptimal visualization of the left kidney  No evidence of hydronephrosis   Workstation performed: [id number]     Imaging Reports Reviewed by myself    Cultures:   Blood Culture:   Lab Results   Component Value Date    [facility] No Growth After 4 Days  11/29/2018    BLOODCX No Growth After 4 Days  11/29/2018    BLOODCX No Growth After 5 Days  01/06/2017    BLOODCX No Growth After 5 Days  01/06/2017    BLOODCX No Growth After 5 Days  03/06/2016    BLOODCX No Growth After 5 Days  03/06/2016    BLOODCX No Growth After 5 Days  03/04/2016    BLOODCX No Growth After 5 Days   03/04/2016     Urine Culture:   Lab Results   Component Value Date    URINECX No Growth <1000 cfu/mL 03/04/2016     Sputum Culture: No components found for: SPUTUMCX  Wound Culture: No results found for: WOUNDCULT    Last 24 Hours Medication List:     Current Facility-Administered Medications:  acetaminophen 325 mg Oral Q12H PRN Will Guerrero MD   And       traMADol 50 mg Oral Q12H PRN Will Guerrero MD   amiodarone 100 mg Oral Daily Will Guerrero MD   amLODIPine 5 mg Oral Daily Will Guerrero MD   aspirin 81 mg Oral Daily Will Guerrero MD   cyanocobalamin 100 mcg Oral Daily Will Guerrero MD   diclofenac sodium 2 g Topical Q12H [facility]   docusate sodium 100 mg Oral HS Will Guerrero MD   fluticasone-vilanterol 1 puff Inhalation Daily Will Guerrero MD   furosemide 60 mg Intravenous BID (diuretic) Mark Malin MD   heparin (porcine) 5,000 Units Subcutaneous Q8H 3630 Eduardo Todd MD   hydrALAZINE 25 mg Oral Q8H Will Guerrero MD   ipratropium-albuterol 3 mL Nebulization Q4H PRN Will Guerrero MD   ipratropium-albuterol 3 mL Nebulization 4x Daily Will Guerrero MD   iron polysaccharides 150 mg Oral Daily Will Guerrero MD   isosorbide mononitrate 30 mg Oral Daily Will Guerrero MD   levothyroxine 50 mcg Oral Daily Will Guerrero MD   metoprolol tartrate 25 mg Oral Q12H 3630 Eduardo Todd MD   mupirocin  Nasal Q12H 3630 Eduardo Todd MD   ondansetron 4 mg Intravenous Q6H PRN Will Guerrero MD   oxybutynin 5 mg Oral Daily Will Guerrero MD   pantoprazole 40 mg Oral Daily Will Guerrero MD        Today, Patient Was Seen By: Debi Paz MD    ** Please Note: "This note has been constructed using a voice recognition system  Therefore there may be syntax, spelling, and/or grammatical errors   Please call if you have any questions  "**

## 2018-12-04 NOTE — PROGRESS NOTES
Progress Note - Cardiology   [facility]     Loren Schilder 80 y o  male MRN: [id number]  : 10/24/1930  Unit/Bed#: [address] [place] [address] Encounter: [id number]    Assessment and Plan:   1  Acute on chronic combined systolic and diastolic heart failure:  Diuresing well with Lasix 60 mg IV bid  Continue to monitor I&O  Continue BB, Nitrates and hydralazine  No ACE/ARB due to CRD      2  Acute kidney injury on chronic kidney disease:  Creatinine is slowly improving with IV diuretics  Patient being followed by Nephrology    3  Acute on chronic respiratory failure with severe COPD:  Continue his breathing treatments as per primary team     4  Paroxysmal atrial fibrillation:  Patient maintaining sinus rhythm with low-dose amiodarone  He is not on long-term anticoagulants secondary to gait dysfunction, frequent falls      5  Coronary artery disease with history of angioplasty:  Patient does not wish any aggressive therapy at this time        6  History of TAVR     7  Cardiomyopathy with EF 35%:  Patient has refused biventricular device in the past  Subjective / Objective:   Patient seen and examined  He denies chest pain or pressure  Edema and breathing have improved since admission  BP remains elevated  IV diuretics to continue per Nephrology  Vitals: Blood pressure 167/74, pulse 64, temperature 97 6 °F (36 4 °C), temperature source Temporal, resp  rate 18, height 5' 10" (1 778 m), weight 99 2 kg (218 lb 11 1 oz), SpO2 98 %  Vitals:    18 0600 18 0600   Weight: 99 6 kg (219 lb 9 3 oz) 99 2 kg (218 lb 11 1 oz)     Body mass index is 31 38 kg/m²    BP Readings from Last 3 Encounters:   18 167/74   18 130/50   18 159/74     Orthostatic Blood Pressures      Most Recent Value   Blood Pressure  167/74 filed at 2018 0806   Patient Position - Orthostatic VS  Lying filed at 2018 0806        I/O       701 -  -  07 - 12/05 0700    P  O  120      Total Intake(mL/kg) 120 (1 2)      Urine (mL/kg/hr) 1825 (0 8) 3200 (1 3) 300 (0 9)    Total Output 1825 3200 300    Net -1705 -3200 -300               Invasive Devices     Peripheral Intravenous Line            Peripheral IV 12/03/18 Right Forearm 1 day                  Intake/Output Summary (Last 24 hours) at 12/04/18 1030  Last data filed at 12/04/18 1001   Gross per 24 hour   Intake                0 ml   Output             3075 ml   Net            -3075 ml         Physical Exam:   Physical Exam   Constitutional: He is oriented to person, place, and time  He appears well-developed and well-nourished  No distress  HENT:   Head: Normocephalic and atraumatic  Eyes: Pupils are equal, round, and reactive to light  Right eye exhibits no discharge  Left eye exhibits no discharge  Neck: Normal range of motion  Neck supple  No JVD present  No thyromegaly present  Cardiovascular: Normal rate and normal pulses  An irregular rhythm present  Murmur heard  Systolic murmur is present with a grade of 3/6   Pulmonary/Chest: Effort normal and breath sounds normal  No respiratory distress  Abdominal: Soft  Bowel sounds are normal  He exhibits no distension  Musculoskeletal: He exhibits edema  trace pedal and ankle edema   Neurological: He is alert and oriented to person, place, and time  Skin: Skin is warm and dry  He is not diaphoretic  Psychiatric: He has a normal mood and affect  Nursing note and vitals reviewed              Medications/ Allergies:     Current Facility-Administered Medications:  acetaminophen 325 mg Oral Q12H PRN Shiloh Sharp MD   And       traMADol 50 mg Oral Q12H PRN Shiloh Sharp MD   amiodarone 100 mg Oral Daily Shiloh Sharp MD   amLODIPine 5 mg Oral Daily Shiloh Sharp MD   aspirin 81 mg Oral Daily Shiloh Sharp MD   cyanocobalamin 100 mcg Oral Daily Shiloh Sharp MD   diclofenac sodium 2 g Topical Q12H [address] [place]   docusate sodium 100 mg Oral HS Dara Romy Loredo MD   fluticasone-vilanterol 1 puff Inhalation Daily Chaka Etienne MD   furosemide 60 mg Intravenous BID (diuretic) Cat Cifuentes MD   heparin (porcine) 5,000 Units Subcutaneous [facility] Chaka Etienne MD   hydrALAZINE 25 mg Oral Beryl Habermann, MD   ipratropium-albuterol 3 mL Nebulization Q4H PRN Chaka Etienne MD   ipratropium-albuterol 3 mL Nebulization 4x Daily Chaka Etienne MD   iron polysaccharides 150 mg Oral Daily Chaka Etienne MD   isosorbide mononitrate 30 mg Oral Daily Chaka Etienne MD   levothyroxine 50 mcg Oral Daily Chaka Etienne MD   metoprolol tartrate 25 mg Oral Q12H Eura Cushing, MD   mupirocin  Nasal Q12H Eura Cushing, MD   ondansetron 4 mg Intravenous Q6H PRN Chaka Etienne MD   oxybutynin 5 mg Oral Daily Chaka Etienne MD   pantoprazole 40 mg Oral Daily Chaka Etienne MD       acetaminophen 325 mg Q12H PRN   And     traMADol 50 mg Q12H PRN   ipratropium-albuterol 3 mL Q4H PRN   ondansetron 4 mg Q6H PRN     No Known Allergies    VTE Pharmacologic Prophylaxis:   Sequential compression device (Venodyne)     Labs:   Troponins:  Results from last 7 days  Lab Units 11/29/18  1332   TROPONIN I ng/mL 0 02     CBC with diff:  Results from last 7 days  Lab Units 12/04/18  0511 12/03/18  0556 12/02/18  0526 12/01/18  0543 11/30/18  0543 11/29/18  1332   WBC Thousand/uL 7 62 8 88 7 84 6 81 7 17 8 61   HEMOGLOBIN g/dL 9 3* 9 6* 9 6* 8 9* 9 4* 10 2*   HEMATOCRIT % 30 4* 31 1* 31 1* 29 0* 31 0* 34 8*   MCV fL 85 84 85 85 86 89   PLATELETS Thousands/uL 255 257 264 239 234 279   MCH pg 25 9* 25 9* 26 2* 25 9* 26 2* 26 1*   MCHC g/dL 30 6* 30 9* 30 9* 30 7* 30 3* 29 3*   RDW % 16 4* 16 6* 16 6* 16 8* 16 9* 17 6*   MPV fL 10 9 10 6 10 9 11 2 11 0 11 7   NRBC AUTO /100 WBCs  --   --   --   --   --  0     CMP:  Results from last 7 days  Lab Units 12/04/18  0511 12/03/18  0556 12/02/18  0526 12/01/18  0543 11/30/18  0136 11/29/18  1421   SODIUM mmol/L 140 141 140 141 141 140   POTASSIUM mmol/L 3 8 3 9 3 7 3 9 4 1 5 3   CHLORIDE mmol/L 99* 99* 100 102 104 104   CO2 mmol/L 34* 34* 33* 30 28 27   ANION GAP mmol/L 7 8 7 9 9 9   BUN mg/dL 51* 51* 52* 53* 60* 57*   CREATININE mg/dL 2 68* 2 63* 2 82* 2 88* 3 05* 3 01*   CALCIUM mg/dL 8 9 8 8 8 8 8 7 8 4 9 2   AST U/L  --   --   --   --   --  47*   ALT U/L  --   --   --   --   --  26   ALK PHOS U/L  --   --   --   --   --  55   TOTAL PROTEIN g/dL  --   --   --   --   --  7 3   ALBUMIN g/dL  --   --   --   --   --  3 2*   TOTAL BILIRUBIN mg/dL  --   --   --   --   --  0 60   EGFR ml/min/1 73sq m 20 21 19 19 17 18     Magnesium:  Results from last 7 days  Lab Units 12/03/18  0556   MAGNESIUM mg/dL 2 0     Coags:  Results from last 7 days  Lab Units 11/29/18  1332   PTT seconds 19*   INR  1 34*       Imaging & Testing   I have personally reviewed pertinent reports  Xr Chest 1 View Portable    Result Date: 11/29/2018  Narrative: CHEST INDICATION:   sob  COMPARISON:  11/18/2018 EXAM PERFORMED/VIEWS:  XR CHEST PORTABLE One image FINDINGS: Cardiomediastinal silhouette appears enlarged  Mild heart failure  Chronic right pleural thickening  Bibasilar scarring  Osseous structures appear within normal limits for patient age  Impression: 1  Cardiomegaly  2   Chronic right pleural thickening  3   Mild CHF  Workstation performed: [id number]     Xr Chest Portable    Result Date: 11/19/2018  Narrative: CHEST INDICATION:   hypoxia  Shortness of breath COMPARISON:  11/16/2018 EXAM PERFORMED/VIEWS:  XR CHEST PORTABLE FINDINGS:  Lines and tubes are unchanged from prior exam   Patient is status post TAVR Heart shadow is enlarged but unchanged from prior exam  Chronic right effusion or pleural thickening noted similar to prior study  The lungs are similar in appearance to recent prior studies  There is no pneumothorax   There are degenerative changes of the shoulders     Impression: No interval change from 11/16/2018 Workstation performed: [id number]     Xr Chest Portable    Result Date: 11/16/2018  Narrative: CHEST INDICATION:   chf  Shortness of breath COMPARISON:  November 9, 2018 EXAM PERFORMED/VIEWS:  XR CHEST PORTABLE FINDINGS:  Prosthesis noted overlying the aortic root  Heart shadow is enlarged but unchanged from prior exam  Bibasilar scarring  Right pleural effusion  Osseous structures appear within normal limits for patient age  Impression: Stable chest with bibasilar scarring and right-sided pleural effusion extending along the chest wall  Workstation performed: [id number]     Xr Chest Pa & Lateral    Result Date: 11/9/2018  Narrative: CHEST INDICATION:   Cough  J44 9: Chronic obstructive pulmonary disease, unspecified N18 3: Chronic kidney disease, stage 3 (moderate) I50 9: Heart failure, unspecified  I50 1: Left ventricular failure, unspecified  COMPARISON:  7/18/2018, 10/16/2017 and CT chest 6/11/2018 EXAM PERFORMED/VIEWS:  XR CHEST PA & LATERAL  The frontal view was performed utilizing dual energy radiographic technique  FINDINGS: Cardiomediastinal silhouette appears stable  Transcatheter aortic valve replacement noted  Central vascular prominence again noted without overt pulmonary edema  Peripheral and apical right pleural thickening versus loculated effusion, latter favored based on previous CT  Coarse bronchovascular markings in the lung bases, left greater than right lung base, which may be accentuated by fibrotic and cystic changes  No new infiltrates  No pneumothorax  Degenerative changes of the spine and shoulders  Chronic right AC joint separation noted  Impression: Overall stable appearance of the chest  Question element of chronic pulmonary venous hypertension with chronic basilar changes and loculated pleural effusion peripheral right hemithorax  No new infiltrate  Workstation performed: [id number]     Us Kidney And Bladder    Result Date: 11/16/2018  Narrative: RENAL ULTRASOUND INDICATION:   RENAL FAILURE, ACUTE ARF  COMPARISON: CT scan 7/18/2016   TECHNIQUE: Portable ultrasound examination of the retroperitoneum was performed with a curvilinear transducer utilizing volumetric sweeps and still imaging techniques  FINDINGS: Evaluation of the left kidney is limited due to overlying bowel gas  KIDNEYS: Symmetric and normal size  Right kidney:  10 2 cm  Left kidney:  9 8 cm  Right kidney Normal echogenicity and contour  Ill-defined echogenic structure at the midpole, which presumably corresponds with the previously seen rim calcified cyst  No hydronephrosis  No shadowing calculi  No perinephric fluid collections  Left kidney Normal echogenicity and contour  No obvious masses detected  No overt hydronephrosis  No shadowing calculi  No perinephric fluid collections  URETERS: Nonvisualized  BLADDER: The bladder is nondistended, limiting evaluation  Impression: Suboptimal visualization of the left kidney  No evidence of hydronephrosis  Workstation performed: [id number]        Cardiac testing:   Results for orders placed during the hospital encounter of 18   Echo complete with contrast if indicated    Cara Coelho [address]  [address] [facility] 6 [phone]    Transthoracic Echocardiogram  2D, M-mode, Doppler, and Color Doppler    Study date:  2018    Patient: Pino Ko  MR number: [id number]  Account number: [de-identified]  : 24-Oct-1930  Age: 80 years  Gender: Male  Status: Inpatient  Location: Bedside  Height: 70 in  Weight: 220 4 lb  BP: 142/ 86 mmHg    Indications: Heart Failure    Diagnoses: I50 9 - Heart failure, unspecified    Sonographer:  CAMILLA Alberto  Primary Physician:  Dipika Palm DO  Referring Physician:  Dipika Palm DO  Group:  [facility]  Interpreting Physician:  Lopez Aarna DO    SUMMARY    LEFT VENTRICLE:  Systolic function was severely reduced by visual assessment  Ejection fraction was estimated to be 35 %    There was severe diffuse hypokinesis with no identifiable regional variations  There was moderate concentric hypertrophy  Doppler parameters were consistent with abnormal left ventricular relaxation (grade 1 diastolic dysfunction)  LEFT ATRIUM:  The atrium was moderately dilated  RIGHT ATRIUM:  The atrium was mildly dilated  MITRAL VALVE:  There was mild regurgitation  AORTIC VALVE:  A bioprosthesis was present  It exhibited normal function  There was mild stenosis  Valve peak gradient was 30 mmHg  TRICUSPID VALVE:  There was mild regurgitation  Pulmonary artery systolic pressure was moderately increased  COMPARISONS:  There has been no significant interval change  Comparison was made with the previous study of 13-Jun-2018  HISTORY: PRIOR HISTORY: HTN, DM, DVT, MRSA, NSTEMI, AVR, CAD, BPH, CKD, COPD    PROCEDURE: The procedure was performed at the bedside  This was a stat study  The transthoracic approach was used  The study included complete 2D imaging, M-mode, complete spectral Doppler, and color Doppler  The heart rate was 70 bpm, at  the start of the study  Image quality was adequate  LEFT VENTRICLE: Size was normal  Systolic function was severely reduced by visual assessment  Ejection fraction was estimated to be 35 %  There was severe diffuse hypokinesis with no identifiable regional variations  There was moderate  concentric hypertrophy  DOPPLER: Doppler parameters were consistent with abnormal left ventricular relaxation (grade 1 diastolic dysfunction)  RIGHT VENTRICLE: The size was normal  Systolic function was normal     LEFT ATRIUM: The atrium was moderately dilated  RIGHT ATRIUM: The atrium was mildly dilated  MITRAL VALVE: There was annular calcification  Valve structure was normal  There was normal leaflet separation  No echocardiographic evidence for prolapse  DOPPLER: The transmitral velocity was within the normal range  There was no  evidence for stenosis  There was mild regurgitation      AORTIC VALVE: A bioprosthesis was present  It exhibited normal function  DOPPLER: There was mild stenosis  There was no regurgitation  TRICUSPID VALVE: The valve structure was normal  There was normal leaflet separation  DOPPLER: The transtricuspid velocity was within the normal range  There was mild regurgitation  Pulmonary artery systolic pressure was moderately  increased  Estimated peak PA pressure was 60 mmHg  PULMONIC VALVE: Leaflets exhibited normal thickness, no calcification, and normal cuspal separation  DOPPLER: The transpulmonic velocity was within the normal range  There was no regurgitation  PERICARDIUM: There was no thickening  There was no pericardial effusion  AORTA: The root exhibited normal size  PULMONARY ARTERY: The size was normal  The morphology appeared normal     MEASUREMENT TABLES    DOPPLER MEASUREMENTS  Aortic valve   (Reference normals)  Peak gradient   30 mmHg   (--)    SYSTEM MEASUREMENT TABLES    2D mode  AoR Diam 2D: 3 2 cm  LA Diam (2D): 4 5 cm  LA/Ao (2D): 1 41  FS (2D Teich): 17 4 %  IVSd (2D): 1 55 cm  LVDEV: 159 cm³  LVESV: 102 cm³  LVIDd(2D): 5 69 cm  LVISd (2D): 4 7 cm  LVOT Area 2D: 3 8 cm squared  LVPWd (2D): 1 48 cm  SV (Teich): 57 cm³    Apical four chamber  LVEF A4C: 35 %    Unspecified Scan Mode  FATMATA Cont Eq (Peak Pasquale): 1 51 cm squared  FATMATA Cont Eq (VTI): 1 46 cm squared  LVOT (VTI): 22 5 cm  LVOT Diam : 2 2 cm  LVOT Vmax: 1090 mm/s  LVOT Vmax; Mean: 1090 mm/s  Peak Grad ; Mean: 5 mm[Hg]  SV (LVOT): 86 cm³  VTI;Mean: 2 mm[Hg]  MV Peak A Pasquale: 1330 mm/s  MV Peak E Pasquale   Mean: 602 mm/s  MVA (PHT): 3 24 cm squared  PHT: 68 ms  Max P mm[Hg]  V Max: 3630 mm/s  Vmax: 3620 mm/s  RA Area: 22 4 cm squared  RA Volume: 67 5 cm³  TAPSE: 1 7 cm    [employer] Accredited Echocardi[facility]    Prepared and electronically signed by    Kashif Hdez DO  Signed 2018 16:23:03           Mandy Neighbor        "This note has been constructed using a voice recognition system  Therefore there may be syntax, spelling, and/or grammatical errors   Please call if you have any questions  "

## 2018-12-04 NOTE — PROGRESS NOTES
Progress Note - Pulmonary   Shelby Simms 80 y o  male MRN: [id number]  Unit/Bed#: [address] [place] [address] Encounter: [id number]    Assessment:  Moderate obstructive sleep apnea  Patient has been compliant using his CPAP at 10 cm water with oxygen  At home he uses 6 L of oxygen with his CPAP  Chronic hypoxemic respiratory failure  Patient's O2 saturations at rest have been stable on 6 L  Of oxygen  At home he uses anywhere from 6-10 of oxygen depending on his activity level  His hypoxemia is related to COPD and severe pulmonary artery hypertension  Moderate COPD stable  Chronic kidney disease  Iron deficiency anemia - hemoglobin stable at 9 6    Plan:  He has continued to receive furosemide 60 mg IV twice a day which is helping his leg edema  Continue Breo 100 mcg 1 puff daily  Neb treatments with DuoNeb  Oxygen 6 liters/minute    Subjective:   Patient states he is breathing is somewhat better today and he does feel like he has less leg swelling    Objective:     Vitals: Blood pressure 133/63, pulse 73, temperature 98 9 °F (37 2 °C), temperature source Temporal, resp  rate 20, height 5' 10" (1 778 m), weight 99 6 kg (219 lb 9 3 oz), SpO2 94 %  ,Body mass index is 31 51 kg/m²  Intake/Output Summary (Last 24 hours) at 12/03/18 2118  Last data filed at 12/03/18 1801   Gross per 24 hour   Intake              120 ml   Output             2450 ml   Net            -2330 ml       Physical Exam: Physical Exam   Constitutional: He is oriented to person, place, and time  He appears well-developed and well-nourished  No distress  On 6 L O2 saturation is 93%   HENT:   Head: Normocephalic  Nose: Nose normal    Mouth/Throat: Oropharynx is clear and moist  No oropharyngeal exudate  Eyes: Pupils are equal, round, and reactive to light  Conjunctivae are normal    Neck: Neck supple  No JVD present  No tracheal deviation present  Cardiovascular: Normal rate, regular rhythm and normal heart sounds      Pulmonary/Chest: Effort normal    Lung sounds are decreased but clear   Abdominal: Soft  He exhibits no distension  There is no tenderness  There is no guarding  Musculoskeletal:   Has +1 - 2 lower extremity edema   Lymphadenopathy:     He has no cervical adenopathy  Neurological: He is alert and oriented to person, place, and time  Skin: Skin is warm and dry  No rash noted  Psychiatric: He has a normal mood and affect  His behavior is normal  Thought content normal         Labs: I have personally reviewed pertinent lab results  , ABG: No results found for: PHART, IOJ8TST, PO2ART, BCW6AUH, B7KAWYIE, BEART, SOURCE, BNP: No results found for: BNP, CBC: Lab Results   Component Value Date    WBC 8 88 12/03/2018    HGB 9 6 (L) 12/03/2018    HCT 31 1 (L) 12/03/2018    MCV 84 12/03/2018     12/03/2018    ADJUSTEDWBC 6 70 10/04/2016    MCH 25 9 (L) 12/03/2018    MCHC 30 9 (L) 12/03/2018    RDW 16 6 (H) 12/03/2018    MPV 10 6 12/03/2018    NRBC 0 11/29/2018   , CMP: Lab Results   Component Value Date     10/16/2017    K 3 9 12/03/2018    K 5 0 11/14/2018    CL 99 (L) 12/03/2018    CL 98 11/14/2018    CO2 34 (H) 12/03/2018    CO2 24 11/14/2018    ANIONGAP 13 9 10/08/2015    BUN 51 (H) 12/03/2018    BUN 51 (H) 11/14/2018    CREATININE 2 63 (H) 12/03/2018    CREATININE 2 06 (H) 10/16/2017    GLUCOSE 109 (H) 10/16/2017    CALCIUM 8 8 12/03/2018    CALCIUM 9 7 10/16/2017    AST 47 (H) 11/29/2018    AST 39 10/16/2017    ALT 26 11/29/2018    ALT 15 10/16/2017    ALKPHOS 55 11/29/2018    ALKPHOS 61 10/16/2017    PROT 7 9 10/16/2017    BILITOT 0 4 10/16/2017    EGFR 21 12/03/2018   , PT/INR:   Lab Results   Component Value Date    INR 1 34 (H) 11/29/2018   , Troponin: No results found for: TROPONIN    Imaging and other studies: I have personally reviewed pertinent reports     and I have personally reviewed pertinent films in PACS

## 2018-12-04 NOTE — PHYSICAL THERAPY NOTE
PT TREATMENT     12/04/18 1425   Pain Assessment   Pain Assessment 0-10   Pain Score 6  (B shoulder areas/chronic pain)   Restrictions/Precautions   Other Precautions Fall Risk;Pain;O2   General   Chart Reviewed Yes   Family/Caregiver Present No   Cognition   Arousal/Participation Cooperative   Subjective   Subjective patient states wanting to take a shower in the morning   Bed Mobility   Supine to Sit Unable to assess  (pt deferred, "just back to bed at lunch time")   Exercises   Heelslides Supine;10 reps;Bilateral   Glute Sets Supine;10 reps   Hip Abduction Supine;10 reps;Bilateral   Knee AROM Short Arc Quad Supine;10 reps;Bilateral   Ankle Pumps Supine;10 reps;Bilateral   Marching Supine;10 reps;Bilateral   Bridging Supine;5 reps   Assessment   Assessment patient cooperative and motivated to return home as medically ready  Patient fatigued at this time from OOB all morning, as per patient and agreeable to exercise and OOB again later for dinner  Patient will benefit from continued PT with progression as tolerated   Plan   Treatment/Interventions ADL retraining;Functional transfer training;LE strengthening/ROM; Therapeutic exercise; Endurance training;Patient/family training;Equipment eval/education; Bed mobility;Gait training; Compensatory technique education   PT Frequency 5x/wk   Recommendation   Recommendation (home with HHA and PT)   Licensure   [place] License Number  Matt Mcginnis PT [id number]

## 2018-12-04 NOTE — UTILIZATION REVIEW
Continued Stay Review    Date: 12/4/18    Vital Signs: /74 (BP Location: Right arm)   Pulse 64   Temp 97 6 °F (36 4 °C) (Temporal)   Resp 18   Ht 5' 10" (1 778 m)   Wt 99 2 kg (218 lb 11 1 oz)   SpO2 98%   BMI 31 38 kg/m²       RESPIRATORY PROTOCOL  OXYGEN NC 6L   CBC DIFF BMP   Medications:   Scheduled Meds:   Current Facility-Administered Medications:  acetaminophen 325 mg Oral Q12H PRN Casie Khan MD   And       traMADol 50 mg Oral Q12H PRN Casie Khan MD   amiodarone 100 mg Oral Daily Casie Khan MD   amLODIPine 5 mg Oral Daily Casie Khan MD   aspirin 81 mg Oral Daily Casie Khan MD   cyanocobalamin 100 mcg Oral Daily Casie Khan MD   diclofenac sodium 2 g Topical Q12H [address] [place]   docusate sodium 100 mg Oral HS Casie Khan MD   fluticasone-vilanterol 1 puff Inhalation Daily Casie Khan MD   furosemide 60 mg Intravenous BID (diuretic) Karen Mulligan MD   heparin (porcine) 5,000 Units Subcutaneous Q8H 3630 Eduardo Todd MD   hydrALAZINE 25 mg Oral Q8H Casie Khan MD   ipratropium-albuterol 3 mL Nebulization Q4H PRN Casie Khan MD   ipratropium-albuterol 3 mL Nebulization 4x Daily Casie Khan MD   iron polysaccharides 150 mg Oral Daily Casie Khan MD   isosorbide mononitrate 30 mg Oral Daily Casie Khan MD   levothyroxine 50 mcg Oral Daily Casie Khan MD   metoprolol tartrate 25 mg Oral Q12H 3630 Eduardo Todd MD   mupirocin  Nasal Q12H 3630 Eduardo Todd MD   ondansetron 4 mg Intravenous Q6H PRN Casie Khan MD   oxybutynin 5 mg Oral Daily Casie Khan MD   pantoprazole 40 mg Oral Daily Casie Khan MD     Continuous Infusions:    PRN Meds:   acetaminophen **AND** traMADol    ipratropium-albuterol    ondansetron    Abnormal Labs/Diagnostic Results:     Age/Sex: 80 y o  male     CARDIOLOGY  1  Acute on chronic combined systolic and diastolic heart failure:  Diuresing well with Lasix 60 mg IV bid  Continue to monitor I&O  Continue BB, Nitrates and hydralazine  No ACE/ARB due to CRD    2  Acute kidney injury on chronic kidney disease:  Creatinine is slowly improving with IV diuretics   Patient being followed by Nephrology  3  Acute on chronic respiratory failure with severe COPD:  Continue his breathing treatments as per primary team  4  Paroxysmal atrial fibrillation:  Patient maintaining sinus rhythm with low-dose amiodarone   He is not on long-term anticoagulants secondary to gait dysfunction, frequent falls  5  Coronary artery disease with history of angioplasty:  Patient does not wish any aggressive therapy at this time     6  History of TAVR    NEPHROLOGY  Assessment:  1  Acute kidney injury present on admission:  Likely secondary to fluid overload plus or minus cardiorenal syndrome  Urinalysis showed trace protein and no blood  Patient is on twice daily Lasix and has a good urine output  Continue with current dose at 60 IV b i d  As it seems to be an effective dose as the patient was -3 2 L yesterday  Patient is hemodynamically stable last blood pressure 167/74  His creatinine has slightly improved creatinine was 3 05 on admission currently 2 6 watch kidney function hemodynamics with diuresis  2  Heart failure with reduced ejection fraction:  Continue with intravenous diuretics as above  Would continue intravenous diuretics for least another 24 hours  3  Stage 4 chronic kidney disease:  Baseline creatinine is 1 8-2 2 follows with Dr Taylor Severe  Patient's electrolytes are stable potassium is 3 8 CO2 is 34 it was 28 on admission continue to follow  4  Anemia secondary to chronic kidney disease:  Hemoglobin is stable 9 3  Plan:  Continue with Lasix 60 IV b i d  Is seems to be an effective dose of diuretic  Electrolytes are stable watch bicarbonate level has it has increased from 28-34  Mental status is stable    Patient is symptomatically improved

## 2018-12-04 NOTE — OCCUPATIONAL THERAPY NOTE
OT TREATMENT       12/04/18 1530   Restrictions/Precautions   Other Precautions Contact/isolation; Chair Alarm; Bed Alarm; Fall Risk;O2;Pain   Pain Assessment   Pain Assessment 0-10   Pain Score 5   Pain Type Chronic pain   Pain Location Shoulder   Pain Orientation Bilateral   ADL   Where Assessed Standing at sink   Grooming Assistance 5  Supervision/Setup   Toileting Assistance  5  Supervision/Setup   Bed Mobility   Supine to Sit 4  Minimal assistance   Additional items HOB elevated; Bedrails;Verbal cues  (leading with LLE)   Sit to Supine 5  Supervision   Additional items Verbal cues; Bedrails  (leading with RLE)   Transfers   Sit to Stand 4  Minimal assistance   Additional items Assist x 1;Verbal cues  (raised height)   Stand to Sit 4  Minimal assistance   Additional items Assist x 1;Verbal cues   Stand pivot 4  Minimal assistance   Additional items Assist x 1;Verbal cues   Functional Mobility   Functional Mobility 5  Supervision   Additional Comments 20 feet x 2   Additional items Rolling walker   Toilet Transfers   Toilet Transfer From Bed   Toilet Transfer Type To and from   Toilet Transfer to Raised toilet seat with rails   Toilet Transfer Technique Ambulating   Toilet Transfers Minimal assistance;Verbal cues   Toilet Transfers Comments with RW   Shower Transfers   Shower Transfer From [employer] Transfer Type To and From   Shower Transfer to [employer] seat with back   Shower Transfer Technique Ambulating   Shower Transfers Minimal assistance;Verbal cues   Shower Transfers Comments with RW and grab bars   Cognition   Overall Cognitive Status WFL   Activity Tolerance   Activity Tolerance Patient limited by fatigue   Assessment   Assessment Pt demonstrating improved strength, balance and functional activity tolerance allowing for increased active participation with ADL and mobility tasks  Eager to take a shower tomorrow   Shower seat with back height raised to decrease fall risks and lessen caregiver assistance needed  Pt demonstrated good safety awareness with toilet and shower transfers  Good progress towards goals  Pt returned to bed with all needs within reach, SCD pumps and bed alarm in place  Cont OT per POC  Plan   Treatment Interventions ADL retraining;Functional transfer training;UE strengthening/ROM; Endurance training;Patient/family training;Equipment evaluation/education; Compensatory technique education; Energy conservation   Treatment Day 2   OT Frequency 3-5x/wk   Recommendation   OT Discharge Recommendation (home with services; continue HHA)   Licensure   [place] License Number  Jeanette Garces [address], OTR/L, [place] Lic# [id number]

## 2018-12-05 LAB
ANION GAP SERPL CALCULATED.3IONS-SCNC: 5 MMOL/L (ref 4–13)
BASOPHILS # BLD AUTO: 0.03 THOUSANDS/ΜL (ref 0–0.1)
BASOPHILS NFR BLD AUTO: 0 % (ref 0–1)
BUN SERPL-MCNC: 55 MG/DL (ref 5–25)
CALCIUM SERPL-MCNC: 8.8 MG/DL (ref 8.3–10.1)
CHLORIDE SERPL-SCNC: 100 MMOL/L (ref 100–108)
CO2 SERPL-SCNC: 34 MMOL/L (ref 21–32)
CREAT SERPL-MCNC: 2.54 MG/DL (ref 0.6–1.3)
EOSINOPHIL # BLD AUTO: 0.74 THOUSAND/ΜL (ref 0–0.61)
EOSINOPHIL NFR BLD AUTO: 11 % (ref 0–6)
ERYTHROCYTE [DISTWIDTH] IN BLOOD BY AUTOMATED COUNT: 16.6 % (ref 11.6–15.1)
GFR SERPL CREATININE-BSD FRML MDRD: 22 ML/MIN/1.73SQ M
GLUCOSE SERPL-MCNC: 112 MG/DL (ref 65–140)
HCT VFR BLD AUTO: 31 % (ref 36.5–49.3)
HGB BLD-MCNC: 9.1 G/DL (ref 12–17)
IMM GRANULOCYTES # BLD AUTO: 0.06 THOUSAND/UL (ref 0–0.2)
IMM GRANULOCYTES NFR BLD AUTO: 1 % (ref 0–2)
LYMPHOCYTES # BLD AUTO: 1.39 THOUSANDS/ΜL (ref 0.6–4.47)
LYMPHOCYTES NFR BLD AUTO: 21 % (ref 14–44)
MCH RBC QN AUTO: 25.8 PG (ref 26.8–34.3)
MCHC RBC AUTO-ENTMCNC: 29.4 G/DL (ref 31.4–37.4)
MCV RBC AUTO: 88 FL (ref 82–98)
MONOCYTES # BLD AUTO: 0.74 THOUSAND/ΜL (ref 0.17–1.22)
MONOCYTES NFR BLD AUTO: 11 % (ref 4–12)
NEUTROPHILS # BLD AUTO: 3.83 THOUSANDS/ΜL (ref 1.85–7.62)
NEUTS SEG NFR BLD AUTO: 56 % (ref 43–75)
NRBC BLD AUTO-RTO: 0 /100 WBCS
PLATELET # BLD AUTO: 273 THOUSANDS/UL (ref 149–390)
PMV BLD AUTO: 11.4 FL (ref 8.9–12.7)
POTASSIUM SERPL-SCNC: 4 MMOL/L (ref 3.5–5.3)
RBC # BLD AUTO: 3.53 MILLION/UL (ref 3.88–5.62)
SODIUM SERPL-SCNC: 139 MMOL/L (ref 136–145)
WBC # BLD AUTO: 6.79 THOUSAND/UL (ref 4.31–10.16)

## 2018-12-05 PROCEDURE — 99232 SBSQ HOSP IP/OBS MODERATE 35: CPT | Performed by: INTERNAL MEDICINE

## 2018-12-05 PROCEDURE — 85025 COMPLETE CBC W/AUTO DIFF WBC: CPT | Performed by: INTERNAL MEDICINE

## 2018-12-05 PROCEDURE — 80048 BASIC METABOLIC PNL TOTAL CA: CPT | Performed by: STUDENT IN AN ORGANIZED HEALTH CARE EDUCATION/TRAINING PROGRAM

## 2018-12-05 PROCEDURE — 97110 THERAPEUTIC EXERCISES: CPT

## 2018-12-05 PROCEDURE — 94640 AIRWAY INHALATION TREATMENT: CPT

## 2018-12-05 PROCEDURE — 94660 CPAP INITIATION&MGMT: CPT

## 2018-12-05 PROCEDURE — 94760 N-INVAS EAR/PLS OXIMETRY 1: CPT

## 2018-12-05 RX ADMIN — METOPROLOL TARTRATE 25 MG: 25 TABLET, FILM COATED ORAL at 21:39

## 2018-12-05 RX ADMIN — OXYBUTYNIN CHLORIDE 5 MG: 5 TABLET ORAL at 09:18

## 2018-12-05 RX ADMIN — VITAM B12 100 MCG: 100 TAB at 09:20

## 2018-12-05 RX ADMIN — DICLOFENAC 2 G: 10 GEL TOPICAL at 09:21

## 2018-12-05 RX ADMIN — MUPIROCIN 1 APPLICATION: 20 OINTMENT TOPICAL at 09:22

## 2018-12-05 RX ADMIN — ASPIRIN 81 MG: 81 TABLET, COATED ORAL at 09:20

## 2018-12-05 RX ADMIN — HEPARIN SODIUM 5000 UNITS: 5000 INJECTION, SOLUTION INTRAVENOUS; SUBCUTANEOUS at 14:17

## 2018-12-05 RX ADMIN — FUROSEMIDE 60 MG: 10 INJECTION, SOLUTION INTRAMUSCULAR; INTRAVENOUS at 17:23

## 2018-12-05 RX ADMIN — ISOSORBIDE MONONITRATE 30 MG: 30 TABLET, EXTENDED RELEASE ORAL at 09:16

## 2018-12-05 RX ADMIN — DOCUSATE SODIUM 100 MG: 100 CAPSULE, LIQUID FILLED ORAL at 21:39

## 2018-12-05 RX ADMIN — HYDRALAZINE HYDROCHLORIDE 25 MG: 25 TABLET ORAL at 21:39

## 2018-12-05 RX ADMIN — HYDRALAZINE HYDROCHLORIDE 25 MG: 25 TABLET ORAL at 05:54

## 2018-12-05 RX ADMIN — DICLOFENAC 2 G: 10 GEL TOPICAL at 21:38

## 2018-12-05 RX ADMIN — AMLODIPINE BESYLATE 5 MG: 5 TABLET ORAL at 09:19

## 2018-12-05 RX ADMIN — IPRATROPIUM BROMIDE AND ALBUTEROL SULFATE 3 ML: 2.5; .5 SOLUTION RESPIRATORY (INHALATION) at 20:09

## 2018-12-05 RX ADMIN — MUPIROCIN 1 APPLICATION: 20 OINTMENT TOPICAL at 21:38

## 2018-12-05 RX ADMIN — PANTOPRAZOLE SODIUM 40 MG: 40 TABLET, DELAYED RELEASE ORAL at 09:18

## 2018-12-05 RX ADMIN — ACETAMINOPHEN 325 MG: 325 TABLET, FILM COATED ORAL at 21:43

## 2018-12-05 RX ADMIN — IPRATROPIUM BROMIDE AND ALBUTEROL SULFATE 3 ML: 2.5; .5 SOLUTION RESPIRATORY (INHALATION) at 08:41

## 2018-12-05 RX ADMIN — METOPROLOL TARTRATE 25 MG: 25 TABLET, FILM COATED ORAL at 09:16

## 2018-12-05 RX ADMIN — LEVOTHYROXINE SODIUM 50 MCG: 50 TABLET ORAL at 05:54

## 2018-12-05 RX ADMIN — FUROSEMIDE 60 MG: 10 INJECTION, SOLUTION INTRAMUSCULAR; INTRAVENOUS at 09:08

## 2018-12-05 RX ADMIN — AMIODARONE HYDROCHLORIDE 100 MG: 200 TABLET ORAL at 09:19

## 2018-12-05 RX ADMIN — HEPARIN SODIUM 5000 UNITS: 5000 INJECTION, SOLUTION INTRAVENOUS; SUBCUTANEOUS at 05:54

## 2018-12-05 RX ADMIN — HYDRALAZINE HYDROCHLORIDE 25 MG: 25 TABLET ORAL at 14:17

## 2018-12-05 RX ADMIN — Medication 150 MG: at 09:20

## 2018-12-05 RX ADMIN — IPRATROPIUM BROMIDE AND ALBUTEROL SULFATE 3 ML: 2.5; .5 SOLUTION RESPIRATORY (INHALATION) at 15:40

## 2018-12-05 RX ADMIN — IPRATROPIUM BROMIDE AND ALBUTEROL SULFATE 3 ML: 2.5; .5 SOLUTION RESPIRATORY (INHALATION) at 11:48

## 2018-12-05 RX ADMIN — FLUTICASONE FUROATE AND VILANTEROL TRIFENATATE 1 PUFF: 100; 25 POWDER RESPIRATORY (INHALATION) at 09:21

## 2018-12-05 RX ADMIN — HEPARIN SODIUM 5000 UNITS: 5000 INJECTION, SOLUTION INTRAVENOUS; SUBCUTANEOUS at 21:38

## 2018-12-05 NOTE — PLAN OF CARE
Problem: OCCUPATIONAL THERAPY ADULT  Goal: Performs self-care activities at highest level of function for planned discharge setting  See evaluation for individualized goals  Outcome: Progressing  Limitation: Decreased ADL status, Decreased UE ROM, Decreased UE strength, Decreased Safe judgement during ADL, Decreased endurance, Decreased self-care trans, Decreased high-level ADLs (decreased balance)  Prognosis: Good  Assessment: Pt reports generalized fatigue due to busy day (showering, BM, multiple interuptions)  Pt in bed and agreeable to UB ex in order to increase activityt olerance and strength for improved participation in self care, funcitonal mobility and occupations of choice  Pt tolerates well overall with limitations at shoulder due to chronic pain  Encouraged pt to participate as toelrated        OT Discharge Recommendation:  (home with services)

## 2018-12-05 NOTE — PROGRESS NOTES
Progress Note - Nephrology   Carla Dorado 80 y o  male MRN: [id number]  Unit/Bed#: [address] [place] [address] Encounter: [id number]    Assessment:  1  Acute kidney injury on admission:  Likely secondary to fluid overload plus or minus cardiorenal syndrome  Urinalysis showed trace protein and no blood  Patient is on twice daily Lasix and has a good urine output  Continue with current dose at 60 IV b i d  As it seems to be an effective dose as the patient was -3 2 L yesterday  Patient is hemodynamically stable last blood pressure 167/74  His creatinine has slightly improved creatinine was 3 05 on admission today on December 5th creatinine is decreased to 2 5  I spoke with hospitalist Dr Maria D Hudson, will maintain current dose of diuretic for right now    2  Heart failure with reduced ejection fraction:  Continue with intravenous diuretics as above    3  Stage 4 chronic kidney disease:  Baseline creatinine is 1 8-2 2 and follows with Dr Gaston Verduzco  Patient's electrolytes are stable  CO2 is stable at 34 potassium is 4 0    4  Anemia secondary to chronic kidney disease hemoglobin is stable 9 1    Plan:  Continue with intravenous Lasix the patient seems to be on an effective dose of diuretic  Electrolytes are stable  Patient seems to be symptomatically improved  All questions answered when I spoke with patient        Subjective:   Patient seen in follow-up for acute renal failure on chronic kidney disease  His weight is slowly improving  He is on twice daily diuretics and is -2 0 1 L over the past 24 hr   He has lost approximately 1 2 lb compared to yesterday    Objective:     Vitals: Blood pressure 123/58, pulse 67, temperature 97 5 °F (36 4 °C), temperature source Temporal, resp  rate 20, height 5' 10" (1 778 m), weight 98 6 kg (217 lb 6 oz), SpO2 97 %  ,Body mass index is 31 19 kg/m²      Weight (last 2 days)     Date/Time   Weight    12/05/18 0600  98 6 (217 37)    12/04/18 0600  99 2 (218 7)    12/03/18 0600  99 6 (219 58) Intake/Output Summary (Last 24 hours) at 12/05/18 5941  Last data filed at 12/05/18 0600   Gross per 24 hour   Intake                0 ml   Output             2150 ml   Net            -2150 ml            Physical Exam: General: patient is in NAD  Skin:  No new rash  Eyes:  No scleral icterus  Neck:  Supple no adenopathy  Chest:  Coarse breath sounds decreased  CVS:  Variable S1-S2  Abdomen:  Positive bowel sounds  Extremities:  Decreased leg edema still some proximal thigh edema  Neuro:  Nonfocal                Prescriptions Prior to Admission   Medication    albuterol (PROAIR HFA) 90 mcg/act inhaler    amiodarone 100 mg tablet    amLODIPine (NORVASC) 5 mg tablet    aspirin (ECOTRIN LOW STRENGTH) 81 mg EC tablet    atorvastatin (LIPITOR) 10 mg tablet    B Complex Vitamins (VITAMIN B COMPLEX PO)    BREO ELLIPTA    cyanocobalamin (VITAMIN B-12) 100 mcg tablet    docusate sodium (COLACE) 100 mg capsule    DULoxetine (CYMBALTA) 30 mg delayed release capsule    fenofibrate (TRICOR) 145 mg tablet    hydrALAZINE (APRESOLINE) 25 mg tablet    IFEREX 150 150 MG capsule    isosorbide mononitrate (IMDUR) 30 mg 24 hr tablet    levothyroxine 50 mcg tablet    pantoprazole (PROTONIX) 40 mg tablet    tolterodine (DETROL) 2 mg tablet    torsemide (DEMADEX) 20 mg tablet    traMADol-acetaminophen (ULTRACET) 37 5-325 mg per tablet    aspirin 81 mg chewable tablet    diclofenac sodium (VOLTAREN) 1 %       Current Facility-Administered Medications   Medication Dose Route Frequency    acetaminophen (TYLENOL) tablet 325 mg  325 mg Oral Q12H PRN    And    traMADol (ULTRAM) tablet 50 mg  50 mg Oral Q12H PRN    amiodarone tablet 100 mg  100 mg Oral Daily    amLODIPine (NORVASC) tablet 5 mg  5 mg Oral Daily    aspirin (ECOTRIN LOW STRENGTH) EC tablet 81 mg  81 mg Oral Daily    cyanocobalamin (VITAMIN B-12) tablet 100 mcg  100 mcg Oral Daily    diclofenac sodium (VOLTAREN) 1 % topical gel 2 g  2 g Topical Q12H [address]    docusate sodium (COLACE) capsule 100 mg  100 mg Oral HS    fluticasone-vilanterol (BREO ELLIPTA) 100-25 mcg/inh inhaler 1 puff  1 puff Inhalation Daily    furosemide (LASIX) injection 60 mg  60 mg Intravenous BID (diuretic)    heparin (porcine) subcutaneous injection 5,000 Units  5,000 Units Subcutaneous Q8H [address]    hydrALAZINE (APRESOLINE) tablet 25 mg  25 mg Oral Q8H    ipratropium-albuterol (DUO-NEB) 0 5-2 5 mg/3 mL inhalation solution 3 mL  3 mL Nebulization Q4H PRN    ipratropium-albuterol (DUO-NEB) 0 5-2 5 mg/3 mL inhalation solution 3 mL  3 mL Nebulization 4x Daily    iron polysaccharides (NIFEREX) capsule 150 mg  150 mg Oral Daily    isosorbide mononitrate (IMDUR) 24 hr tablet 30 mg  30 mg Oral Daily    levothyroxine tablet 50 mcg  50 mcg Oral Daily    metoprolol tartrate (LOPRESSOR) tablet 25 mg  25 mg Oral Q12H [address]    mupirocin (BACTROBAN) 2 % nasal ointment   Nasal Q12H [address]    ondansetron (ZOFRAN) injection 4 mg  4 mg Intravenous Q6H PRN    oxybutynin (DITROPAN) tablet 5 mg  5 mg Oral Daily    pantoprazole (PROTONIX) EC tablet 40 mg  40 mg Oral Daily        Lab, Imaging and other studies: I have personally reviewed pertinent labs    CBC: Lab Results   Component Value Date    WBC 6 79 12/05/2018    WBC 8 4 09/18/2017    RBC 3 53 (L) 12/05/2018    RBC 4 27 (L) 10/08/2015     CMP: Lab Results   Component Value Date     10/16/2017     12/05/2018    CL 98 11/14/2018    CO2 34 (H) 12/05/2018    CO2 24 11/14/2018    ANIONGAP 13 9 10/08/2015    BUN 55 (H) 12/05/2018    BUN 51 (H) 11/14/2018    CREATININE 2 54 (H) 12/05/2018    CREATININE 2 06 (H) 10/16/2017    GLUCOSE 109 (H) 10/16/2017    CALCIUM 8 8 12/05/2018    CALCIUM 9 7 10/16/2017    AST 47 (H) 11/29/2018    AST 39 10/16/2017    ALT 26 11/29/2018    ALT 15 10/16/2017    ALKPHOS 55 11/29/2018    ALKPHOS 61 10/16/2017    PROT 7 9 10/16/2017    BILITOT 0 4 10/16/2017    EGFR 22 12/05/2018     Phosphorus:   Lab Results Component Value Date    PHOS 3 9 07/20/2018     Magnesium:   Lab Results   Component Value Date    MG 2 0 12/03/2018     Urinalysis: Lab Results   Component Value Date    COLORU Yellow 11/29/2018    CLARITYU Clear 11/29/2018    SPECGRAV 1 015 11/29/2018    PHUR 5 5 11/29/2018    LEUKOCYTESUR Negative 11/29/2018    NITRITE Negative 11/29/2018    GLUCOSEU Negative 11/29/2018    KETONESU Negative 11/29/2018    BILIRUBINUR Negative 11/29/2018    BLOODU Negative 11/29/2018     BMP: Lab Results   Component Value Date    GLUCOSE 109 (H) 10/16/2017    SODIUM 139 12/05/2018    SODIUM 140 11/14/2018    CO2 34 (H) 12/05/2018    CO2 24 11/14/2018    BUN 55 (H) 12/05/2018    BUN 51 (H) 11/14/2018    CREATININE 2 54 (H) 12/05/2018    CREATININE 2 06 (H) 10/16/2017    CALCIUM 8 8 12/05/2018    CALCIUM 9 7 10/16/2017     ABGs:   Lab Results   Component Value Date    PH 7 414 08/23/2016

## 2018-12-05 NOTE — PROGRESS NOTES
Progress Note - Cardiology   [facility]     Cindy Brown 80 y o  male MRN: [id number]  : 10/24/1930  Unit/Bed#: [address] [place] [address] Encounter: [id number]    Assessment and Plan:   1  Acute on chronic combined systolic and diastolic heart failure:  Diuresing well on current dose of diuretics  Consider decreasing Lasix to once a day  Vital signs stable  Continue current medications  2  Acute kidney injury on chronic kidney disease:  Baseline creatinine in past had been approximately 2 2  Today he is 2 54, slowly improving  Followed by Nephrology  3  Paroxysmal atrial fibrillation:  Patient maintained sinus rhythm on amiodarone  No long-term anticoagulation secondary to falls  4  Chronic respiratory failure with severe COPD:  Charla Dubin improving with diuresis  Being followed by pulmonology  5  Coronary artery disease/history of PCI/history TAVR:  Stable    6  Cardiomyopathy with ejection fraction 35%:  Patient has refused biventricular device in the past   He wishes no aggressive therapy  Subjective / Objective:   Patient seen examined  He is in good spirits this morning  Edema almost completely resolved from both lower extremities  Feels that his breathing has improved also  Renal functions slowly returning to baseline  Vitals: Blood pressure 123/58, pulse 67, temperature 97 5 °F (36 4 °C), temperature source Temporal, resp  rate 20, height 5' 10" (1 778 m), weight 98 6 kg (217 lb 6 oz), SpO2 97 %  Vitals:    18 0600 18 0600   Weight: 99 2 kg (218 lb 11 1 oz) 98 6 kg (217 lb 6 oz)     Body mass index is 31 19 kg/m²    BP Readings from Last 3 Encounters:   18 123/58   18 130/50   18 159/74     Orthostatic Blood Pressures      Most Recent Value   Blood Pressure  123/58 filed at 2018 4492   Patient Position - Orthostatic VS  Lying filed at 2018 2050        I/O       701 -  07 -  07 -  0700    Urine (mL/kg/hr) 3200 (1 3) 2150 (0 9)     Total Output 3200 2150      Net -3200 -2150                 Invasive Devices     Peripheral Intravenous Line            Peripheral IV 12/03/18 Right Forearm 2 days                  Intake/Output Summary (Last 24 hours) at 12/05/18 9676  Last data filed at 12/05/18 0600   Gross per 24 hour   Intake                0 ml   Output             2150 ml   Net            -2150 ml         Physical Exam:   Physical Exam   Constitutional: He is oriented to person, place, and time  He appears well-developed and well-nourished  No distress  HENT:   Head: Normocephalic and atraumatic  Eyes: Pupils are equal, round, and reactive to light  Right eye exhibits no discharge  Left eye exhibits no discharge  Neck: Normal range of motion  Neck supple  No JVD present  No thyromegaly present  Cardiovascular: Regular rhythm and normal pulses  Murmur heard  Systolic murmur is present with a grade of 2/6   Pulmonary/Chest: Effort normal    Decreased all fields   Abdominal: Soft  Bowel sounds are normal  He exhibits no distension  Musculoskeletal: He exhibits edema  Trace edema, lower extremity edema greatly improved from admission  Neurological: He is alert and oriented to person, place, and time  Skin: Skin is warm and dry  He is not diaphoretic  Psychiatric: He has a normal mood and affect  Nursing note and vitals reviewed              Medications/ Allergies:     Current Facility-Administered Medications:  acetaminophen 325 mg Oral Q12H PRN Chaka Etienne MD   And       traMADol 50 mg Oral Q12H PRN Chaka Etienne MD   amiodarone 100 mg Oral Daily Chaka Etienne MD   amLODIPine 5 mg Oral Daily Chaka Etienne MD   aspirin 81 mg Oral Daily Chaka Etienne MD   cyanocobalamin 100 mcg Oral Daily Chaka Etienne MD   diclofenac sodium 2 g Topical Q12H [facility] ROC Aceves   docusate sodium 100 mg Oral HS Chaka Etienne MD   fluticasone-vilanterol 1 puff Inhalation Daily Chaka Etienne MD furosemide 60 mg Intravenous BID (diuretic) Amado White MD   heparin (porcine) 5,000 Units Subcutaneous Q8H 3630 Eduardo Todd MD   hydrALAZINE 25 mg Oral Tracie Doran MD   ipratropium-albuterol 3 mL Nebulization Q4H PRN Júnior Valdovinos MD   ipratropium-albuterol 3 mL Nebulization 4x Daily Júnior Valdovinos MD   iron polysaccharides 150 mg Oral Daily Júnior Valdovinos MD   isosorbide mononitrate 30 mg Oral Daily Júnior Valdovinos MD   levothyroxine 50 mcg Oral Daily Júnior Valdovinos MD   metoprolol tartrate 25 mg Oral Q12H 3630 Eduardo Todd MD   mupirocin  Nasal Q12H 3630 Eduardo Todd MD   ondansetron 4 mg Intravenous Q6H PRN Júnior Valdovinos MD   oxybutynin 5 mg Oral Daily Júnior Valdovinos MD   pantoprazole 40 mg Oral Daily Júnior Valdovinos MD       acetaminophen 325 mg Q12H PRN   And     traMADol 50 mg Q12H PRN   ipratropium-albuterol 3 mL Q4H PRN   ondansetron 4 mg Q6H PRN     No Known Allergies    VTE Pharmacologic Prophylaxis:   Sequential compression device (Venodyne)     Labs:   Troponins:  Results from last 7 days  Lab Units 11/29/18  1332   TROPONIN I ng/mL 0 02     CBC with diff:  Results from last 7 days  Lab Units 12/05/18  0449 12/04/18  0511 12/03/18  0556 12/02/18  0526 12/01/18  0543 11/30/18  0543 11/29/18  1332   WBC Thousand/uL 6 79 7 62 8 88 7 84 6 81 7 17 8 61   HEMOGLOBIN g/dL 9 1* 9 3* 9 6* 9 6* 8 9* 9 4* 10 2*   HEMATOCRIT % 31 0* 30 4* 31 1* 31 1* 29 0* 31 0* 34 8*   MCV fL 88 85 84 85 85 86 89   PLATELETS Thousands/uL 273 255 257 264 239 234 279   MCH pg 25 8* 25 9* 25 9* 26 2* 25 9* 26 2* 26 1*   MCHC g/dL 29 4* 30 6* 30 9* 30 9* 30 7* 30 3* 29 3*   RDW % 16 6* 16 4* 16 6* 16 6* 16 8* 16 9* 17 6*   MPV fL 11 4 10 9 10 6 10 9 11 2 11 0 11 7   NRBC AUTO /100 WBCs 0  --   --   --   --   --  0     CMP:  Results from last 7 days  Lab Units 12/05/18  0449 12/04/18  0511 12/03/18  0556 12/02/18  0526 12/01/18  0543 11/30/18  0136 11/29/18  1421   SODIUM mmol/L 139 140 141 140 141 141 140   POTASSIUM mmol/L 4 0 3 8 3 9 3 7 3 9 4 1 5 3 CHLORIDE mmol/L 100 99* 99* 100 102 104 104   CO2 mmol/L 34* 34* 34* 33* 30 28 27   ANION GAP mmol/L 5 7 8 7 9 9 9   BUN mg/dL 55* 51* 51* 52* 53* 60* 57*   CREATININE mg/dL 2 54* 2 68* 2 63* 2 82* 2 88* 3 05* 3 01*   CALCIUM mg/dL 8 8 8 9 8 8 8 8 8 7 8 4 9 2   AST U/L  --   --   --   --   --   --  47*   ALT U/L  --   --   --   --   --   --  26   ALK PHOS U/L  --   --   --   --   --   --  55   TOTAL PROTEIN g/dL  --   --   --   --   --   --  7 3   ALBUMIN g/dL  --   --   --   --   --   --  3 2*   TOTAL BILIRUBIN mg/dL  --   --   --   --   --   --  0 60   EGFR ml/min/1 73sq m 22 20 21 19 19 17 18     Magnesium:  Results from last 7 days  Lab Units 12/03/18  0556   MAGNESIUM mg/dL 2 0     Coags:  Results from last 7 days  Lab Units 11/29/18  1332   PTT seconds 19*   INR  1 34*       Imaging & Testing   I have personally reviewed pertinent reports  Xr Chest 1 View Portable    Result Date: 11/29/2018  Narrative: CHEST INDICATION:   sob  COMPARISON:  11/18/2018 EXAM PERFORMED/VIEWS:  XR CHEST PORTABLE One image FINDINGS: Cardiomediastinal silhouette appears enlarged  Mild heart failure  Chronic right pleural thickening  Bibasilar scarring  Osseous structures appear within normal limits for patient age  Impression: 1  Cardiomegaly  2   Chronic right pleural thickening  3   Mild CHF  Workstation performed: [id number]     Xr Chest Portable    Result Date: 11/19/2018  Narrative: CHEST INDICATION:   hypoxia  Shortness of breath COMPARISON:  11/16/2018 EXAM PERFORMED/VIEWS:  XR CHEST PORTABLE FINDINGS:  Lines and tubes are unchanged from prior exam   Patient is status post TAVR Heart shadow is enlarged but unchanged from prior exam  Chronic right effusion or pleural thickening noted similar to prior study  The lungs are similar in appearance to recent prior studies  There is no pneumothorax   There are degenerative changes of the shoulders     Impression: No interval change from 11/16/2018 Workstation performed: [id number]     Xr Chest Portable    Result Date: 11/16/2018  Narrative: CHEST INDICATION:   chf  Shortness of breath COMPARISON:  November 9, 2018 EXAM PERFORMED/VIEWS:  XR CHEST PORTABLE FINDINGS:  Prosthesis noted overlying the aortic root  Heart shadow is enlarged but unchanged from prior exam  Bibasilar scarring  Right pleural effusion  Osseous structures appear within normal limits for patient age  Impression: Stable chest with bibasilar scarring and right-sided pleural effusion extending along the chest wall  Workstation performed: [id number]     Xr Chest Pa & Lateral    Result Date: 11/9/2018  Narrative: CHEST INDICATION:   Cough  J44 9: Chronic obstructive pulmonary disease, unspecified N18 3: Chronic kidney disease, stage 3 (moderate) I50 9: Heart failure, unspecified  I50 1: Left ventricular failure, unspecified  COMPARISON:  7/18/2018, 10/16/2017 and CT chest 6/11/2018 EXAM PERFORMED/VIEWS:  XR CHEST PA & LATERAL  The frontal view was performed utilizing dual energy radiographic technique  FINDINGS: Cardiomediastinal silhouette appears stable  Transcatheter aortic valve replacement noted  Central vascular prominence again noted without overt pulmonary edema  Peripheral and apical right pleural thickening versus loculated effusion, latter favored based on previous CT  Coarse bronchovascular markings in the lung bases, left greater than right lung base, which may be accentuated by fibrotic and cystic changes  No new infiltrates  No pneumothorax  Degenerative changes of the spine and shoulders  Chronic right AC joint separation noted  Impression: Overall stable appearance of the chest  Question element of chronic pulmonary venous hypertension with chronic basilar changes and loculated pleural effusion peripheral right hemithorax  No new infiltrate   Workstation performed: [id number]     Us Kidney And Bladder    Result Date: 11/16/2018  Narrative: RENAL ULTRASOUND INDICATION:   RENAL FAILURE, ACUTE ARF  COMPARISON: CT scan 2016  TECHNIQUE: Portable ultrasound examination of the retroperitoneum was performed with a curvilinear transducer utilizing volumetric sweeps and still imaging techniques  FINDINGS: Evaluation of the left kidney is limited due to overlying bowel gas  KIDNEYS: Symmetric and normal size  Right kidney:  10 2 cm  Left kidney:  9 8 cm  Right kidney Normal echogenicity and contour  Ill-defined echogenic structure at the midpole, which presumably corresponds with the previously seen rim calcified cyst  No hydronephrosis  No shadowing calculi  No perinephric fluid collections  Left kidney Normal echogenicity and contour  No obvious masses detected  No overt hydronephrosis  No shadowing calculi  No perinephric fluid collections  URETERS: Nonvisualized  BLADDER: The bladder is nondistended, limiting evaluation  Impression: Suboptimal visualization of the left kidney  No evidence of hydronephrosis  Workstation performed: [id number]        Cardiac testing:   Results for orders placed during the hospital encounter of 18   Echo complete with contrast if indicated    [address]  [address] [address]  [phone]    Transthoracic Echocardiogram  2D, M-mode, Doppler, and Color Doppler    Study date:  2018    Patient: Vlad Root  MR number: [id number]  Account number: [de-identified]  : 24-Oct-1930  Age: 80 years  Gender: Male  Status: Inpatient  Location: Bedside  Height: 70 in  Weight: 220 4 lb  BP: 142/ 86 mmHg    Indications: Heart Failure    Diagnoses: I50 9 - Heart failure, unspecified    Sonographer:  CAMILLA Marcos  Primary Physician:  Juan Small DO  Referring Physician:  Juan Small DO  Group:  [facility]  Interpreting Physician:  Kelsey Chakraborty DO    SUMMARY    LEFT VENTRICLE:  Systolic function was severely reduced by visual assessment   Ejection fraction was estimated to be 35 %  There was severe diffuse hypokinesis with no identifiable regional variations  There was moderate concentric hypertrophy  Doppler parameters were consistent with abnormal left ventricular relaxation (grade 1 diastolic dysfunction)  LEFT ATRIUM:  The atrium was moderately dilated  RIGHT ATRIUM:  The atrium was mildly dilated  MITRAL VALVE:  There was mild regurgitation  AORTIC VALVE:  A bioprosthesis was present  It exhibited normal function  There was mild stenosis  Valve peak gradient was 30 mmHg  TRICUSPID VALVE:  There was mild regurgitation  Pulmonary artery systolic pressure was moderately increased  COMPARISONS:  There has been no significant interval change  Comparison was made with the previous study of 13-Jun-2018  HISTORY: PRIOR HISTORY: HTN, DM, DVT, MRSA, NSTEMI, AVR, CAD, BPH, CKD, COPD    PROCEDURE: The procedure was performed at the bedside  This was a stat study  The transthoracic approach was used  The study included complete 2D imaging, M-mode, complete spectral Doppler, and color Doppler  The heart rate was 70 bpm, at  the start of the study  Image quality was adequate  LEFT VENTRICLE: Size was normal  Systolic function was severely reduced by visual assessment  Ejection fraction was estimated to be 35 %  There was severe diffuse hypokinesis with no identifiable regional variations  There was moderate  concentric hypertrophy  DOPPLER: Doppler parameters were consistent with abnormal left ventricular relaxation (grade 1 diastolic dysfunction)  RIGHT VENTRICLE: The size was normal  Systolic function was normal     LEFT ATRIUM: The atrium was moderately dilated  RIGHT ATRIUM: The atrium was mildly dilated  MITRAL VALVE: There was annular calcification  Valve structure was normal  There was normal leaflet separation  No echocardiographic evidence for prolapse  DOPPLER: The transmitral velocity was within the normal range   There was no  evidence for stenosis  There was mild regurgitation  AORTIC VALVE: A bioprosthesis was present  It exhibited normal function  DOPPLER: There was mild stenosis  There was no regurgitation  TRICUSPID VALVE: The valve structure was normal  There was normal leaflet separation  DOPPLER: The transtricuspid velocity was within the normal range  There was mild regurgitation  Pulmonary artery systolic pressure was moderately  increased  Estimated peak PA pressure was 60 mmHg  PULMONIC VALVE: Leaflets exhibited normal thickness, no calcification, and normal cuspal separation  DOPPLER: The transpulmonic velocity was within the normal range  There was no regurgitation  PERICARDIUM: There was no thickening  There was no pericardial effusion  AORTA: The root exhibited normal size  PULMONARY ARTERY: The size was normal  The morphology appeared normal     MEASUREMENT TABLES    DOPPLER MEASUREMENTS  Aortic valve   (Reference normals)  Peak gradient   30 mmHg   (--)    SYSTEM MEASUREMENT TABLES    2D mode  AoR Diam 2D: 3 2 cm  LA Diam (2D): 4 5 cm  LA/Ao (2D): 1 41  FS (2D Teich): 17 4 %  IVSd (2D): 1 55 cm  LVDEV: 159 cm³  LVESV: 102 cm³  LVIDd(2D): 5 69 cm  LVISd (2D): 4 7 cm  LVOT Area 2D: 3 8 cm squared  LVPWd (2D): 1 48 cm  SV (Teich): 57 cm³    Apical four chamber  LVEF A4C: 35 %    Unspecified Scan Mode  FATMATA Cont Eq (Peak Pasquale): 1 51 cm squared  FATMATA Cont Eq (VTI): 1 46 cm squared  LVOT (VTI): 22 5 cm  LVOT Diam : 2 2 cm  LVOT Vmax: 1090 mm/s  LVOT Vmax; Mean: 1090 mm/s  Peak Grad ; Mean: 5 mm[Hg]  SV (LVOT): 86 cm³  VTI;Mean: 2 mm[Hg]  MV Peak A Pasquale: 1330 mm/s  MV Peak E Pasquale   Mean: 602 mm/s  MVA (PHT): 3 24 cm squared  PHT: 68 ms  Max P mm[Hg]  V Max: 3630 mm/s  Vmax: 3620 mm/s  RA Area: 22 4 cm squared  RA Volume: 67 5 cm³  TAPSE: 1 7 cm    [employer] Accredited Echocardi[facility]    Prepared and electronically signed by    Brandon Silva DO  Signed 2018 16:23:03         Margarette Alicia ROC        "This note has been constructed using a voice recognition system  Therefore there may be syntax, spelling, and/or grammatical errors   Please call if you have any questions  "

## 2018-12-05 NOTE — SOCIAL WORK
Pt continues to receive diuretics for chf  Anticipated d/c in 1-2 days  Per PT, pt can return to home with services  Referral already in  Will follow for any other needs

## 2018-12-05 NOTE — UTILIZATION REVIEW
Continued Stay Review    Date: 12/5/18    Vital Signs: /58   Pulse 67   Temp 97 5 °F (36 4 °C) (Temporal)   Resp 20   Ht 5' 10" (1 778 m)   Wt 98 6 kg (217 lb 6 oz)   SpO2 97%   BMI 31 19 kg/m²     Medications:   Scheduled Meds:   Current Facility-Administered Medications:  acetaminophen 325 mg Oral Q12H PRN Rajinder Quintanilla MD   And       traMADol 50 mg Oral Q12H PRN Rajinder Quintanilla MD   amiodarone 100 mg Oral Daily Rajinder Quintanilla MD   amLODIPine 5 mg Oral Daily Rajinder Quintanilla MD   aspirin 81 mg Oral Daily Rajinder Quintanilla MD   cyanocobalamin 100 mcg Oral Daily Rajinder Quintanilla MD   diclofenac sodium 2 g Topical Q12H [facility] ROC Patrick   docusate sodium 100 mg Oral HS Rajinder Quintanilla MD   fluticasone-vilanterol 1 puff Inhalation Daily Rajinder Quintanilla MD   furosemide 60 mg Intravenous BID (diuretic) Peter Clemente MD   heparin (porcine) 5,000 Units Subcutaneous Q8H 3630 Eduardo Todd MD   hydrALAZINE 25 mg Oral Q8H Rajinder Quintanilla MD   ipratropium-albuterol 3 mL Nebulization Q4H PRN Rajinder Quintanilla MD   ipratropium-albuterol 3 mL Nebulization 4x Daily Rajinder Quintanilla MD   iron polysaccharides 150 mg Oral Daily Rajinder Quintanilla MD   isosorbide mononitrate 30 mg Oral Daily Rajinder Quintanilla MD   levothyroxine 50 mcg Oral Daily Rajinder Quintanilla MD   metoprolol tartrate 25 mg Oral Q12H 3630 Eduardo Todd MD   mupirocin  Nasal Q12H 3630 Eduardo Todd MD   ondansetron 4 mg Intravenous Q6H PRN Rajinder Quintanilla MD   oxybutynin 5 mg Oral Daily Rajinder Quintanilla MD   pantoprazole 40 mg Oral Daily Rajinder Quintanilla MD     Continuous Infusions:    PRN Meds:   acetaminophen **AND** traMADol    ipratropium-albuterol    ondansetron    Abnormal Labs/Diagnostic Results: CO2 34 BUN/CR 55/2 54 H/H 9 1/31     Age/Sex: 80 y o  male     PATIENT WITH CHF DIASTOLIC DIURESING WELL CONTINUE IV LASIX , PER NEPHROLOGY FLUID OVERLOAD PLUS OR MINUS CARDIORENAL SYNDROME  CONTINUE CURRENT DOES OF  LASIX IV 60 BID  CREATININE TRENDING DOWN   MONITOR ELECTROLYTES   CONTINUE OXYGEN 6-8 L

## 2018-12-05 NOTE — OCCUPATIONAL THERAPY NOTE
OT TREATMENT       12/05/18 1415   Restrictions/Precautions   Other Precautions Contact/isolation; Chair Alarm; Bed Alarm;O2;Fall Risk   Pain Assessment   Pain Assessment 0-10   Pain Score Worst Possible Pain   Pain Type Chronic pain   Pain Location Shoulder   Pain Orientation Bilateral   Pain Frequency Other (Comment)  (during UB ex)   Therapeutic Exercise - ROM   UE-ROM Yes   ROM- Right Upper Extremities   R Shoulder AROM; Flexion;ABduction   R Elbow AROM;Elbow flexion;Elbow extension   R Wrist AROM; Wrist flexion;Wrist extension;Radial deviation;Ulnar deviation   R Hand AROM; Thumb; Index finger; Long finger;Ring finger;Little finger   R Position Supine   RUE ROM Comment 2x10 no added weight   ROM - Left Upper Extremities    L Shoulder AROM; Flexion;ABduction   L Elbow AROM;Elbow flexion;Elbow extension   L Wrist AROM; Wrist flexion;Wrist extension;Radial deviation;Ulnar deviation   L Hand AROM; Thumb; Index finger; Long finger;Ring finger;Little finger   L Position Supine   LUE ROM Comment 2x10 no added weight   Cognition   Overall Cognitive Status WFL   Arousal/Participation Alert; Responsive; Cooperative   Attention Within functional limits   Orientation Level Oriented X4   Memory Within functional limits   Following Commands Follows all commands and directions without difficulty   Activity Tolerance   Activity Tolerance Patient limited by fatigue   Assessment   Assessment Pt reports generalized fatigue due to busy day (showering, BM, multiple interuptions)  Pt in bed and agreeable to UB ex in order to increase activityt olerance and strength for improved participation in self care, funcitonal mobility and occupations of choice  Pt tolerates well overall with limitations at shoulder due to chronic pain  Encouraged pt to participate as toelrated  Plan   Treatment Interventions ADL retraining;Functional transfer training;UE strengthening/ROM; Endurance training;Patient/family training;Equipment evaluation/education; Compensatory technique education; Energy conservation   Goal Expiration Date 12/14/18   Treatment Day 3   OT Frequency 3-5x/wk   Recommendation   OT Discharge Recommendation (home with services)   Licensure   [place] License Number  Baldo Rodas OTR/L [id number]

## 2018-12-06 LAB
ANION GAP SERPL CALCULATED.3IONS-SCNC: 6 MMOL/L (ref 4–13)
BUN SERPL-MCNC: 61 MG/DL (ref 5–25)
CALCIUM SERPL-MCNC: 8.9 MG/DL (ref 8.3–10.1)
CHLORIDE SERPL-SCNC: 99 MMOL/L (ref 100–108)
CO2 SERPL-SCNC: 35 MMOL/L (ref 21–32)
CREAT SERPL-MCNC: 2.69 MG/DL (ref 0.6–1.3)
GFR SERPL CREATININE-BSD FRML MDRD: 20 ML/MIN/1.73SQ M
GLUCOSE SERPL-MCNC: 110 MG/DL (ref 65–140)
MAGNESIUM SERPL-MCNC: 2.2 MG/DL (ref 1.6–2.6)
POTASSIUM SERPL-SCNC: 4.1 MMOL/L (ref 3.5–5.3)
SODIUM SERPL-SCNC: 140 MMOL/L (ref 136–145)

## 2018-12-06 PROCEDURE — 94760 N-INVAS EAR/PLS OXIMETRY 1: CPT

## 2018-12-06 PROCEDURE — 93005 ELECTROCARDIOGRAM TRACING: CPT

## 2018-12-06 PROCEDURE — 83735 ASSAY OF MAGNESIUM: CPT | Performed by: INTERNAL MEDICINE

## 2018-12-06 PROCEDURE — 94660 CPAP INITIATION&MGMT: CPT

## 2018-12-06 PROCEDURE — 97110 THERAPEUTIC EXERCISES: CPT

## 2018-12-06 PROCEDURE — 99232 SBSQ HOSP IP/OBS MODERATE 35: CPT | Performed by: INTERNAL MEDICINE

## 2018-12-06 PROCEDURE — 80048 BASIC METABOLIC PNL TOTAL CA: CPT | Performed by: INTERNAL MEDICINE

## 2018-12-06 PROCEDURE — 94640 AIRWAY INHALATION TREATMENT: CPT

## 2018-12-06 RX ORDER — ACETAMINOPHEN 325 MG/1
325 TABLET ORAL ONCE
Status: COMPLETED | OUTPATIENT
Start: 2018-12-06 | End: 2018-12-06

## 2018-12-06 RX ORDER — TORSEMIDE 20 MG/1
60 TABLET ORAL DAILY
Status: DISCONTINUED | OUTPATIENT
Start: 2018-12-07 | End: 2018-12-08

## 2018-12-06 RX ORDER — METOPROLOL TARTRATE 5 MG/5ML
5 INJECTION INTRAVENOUS ONCE AS NEEDED
Status: COMPLETED | OUTPATIENT
Start: 2018-12-06 | End: 2018-12-06

## 2018-12-06 RX ADMIN — HYDRALAZINE HYDROCHLORIDE 25 MG: 25 TABLET ORAL at 06:26

## 2018-12-06 RX ADMIN — VITAM B12 100 MCG: 100 TAB at 08:46

## 2018-12-06 RX ADMIN — DICLOFENAC 2 G: 10 GEL TOPICAL at 21:00

## 2018-12-06 RX ADMIN — FUROSEMIDE 60 MG: 10 INJECTION, SOLUTION INTRAMUSCULAR; INTRAVENOUS at 16:28

## 2018-12-06 RX ADMIN — FUROSEMIDE 60 MG: 10 INJECTION, SOLUTION INTRAMUSCULAR; INTRAVENOUS at 08:47

## 2018-12-06 RX ADMIN — ACETAMINOPHEN 325 MG: 325 TABLET, FILM COATED ORAL at 21:28

## 2018-12-06 RX ADMIN — IPRATROPIUM BROMIDE AND ALBUTEROL SULFATE 3 ML: 2.5; .5 SOLUTION RESPIRATORY (INHALATION) at 11:31

## 2018-12-06 RX ADMIN — IPRATROPIUM BROMIDE AND ALBUTEROL SULFATE 3 ML: 2.5; .5 SOLUTION RESPIRATORY (INHALATION) at 08:55

## 2018-12-06 RX ADMIN — HEPARIN SODIUM 5000 UNITS: 5000 INJECTION, SOLUTION INTRAVENOUS; SUBCUTANEOUS at 06:28

## 2018-12-06 RX ADMIN — HYDRALAZINE HYDROCHLORIDE 25 MG: 25 TABLET ORAL at 13:45

## 2018-12-06 RX ADMIN — AMIODARONE HYDROCHLORIDE 100 MG: 200 TABLET ORAL at 08:45

## 2018-12-06 RX ADMIN — FLUTICASONE FUROATE AND VILANTEROL TRIFENATATE 1 PUFF: 100; 25 POWDER RESPIRATORY (INHALATION) at 08:47

## 2018-12-06 RX ADMIN — AMLODIPINE BESYLATE 5 MG: 5 TABLET ORAL at 08:45

## 2018-12-06 RX ADMIN — TRAMADOL HYDROCHLORIDE 50 MG: 50 TABLET, COATED ORAL at 21:28

## 2018-12-06 RX ADMIN — ISOSORBIDE MONONITRATE 30 MG: 30 TABLET, EXTENDED RELEASE ORAL at 08:45

## 2018-12-06 RX ADMIN — PANTOPRAZOLE SODIUM 40 MG: 40 TABLET, DELAYED RELEASE ORAL at 08:45

## 2018-12-06 RX ADMIN — METOPROLOL TARTRATE 25 MG: 25 TABLET, FILM COATED ORAL at 21:00

## 2018-12-06 RX ADMIN — IPRATROPIUM BROMIDE AND ALBUTEROL SULFATE 3 ML: 2.5; .5 SOLUTION RESPIRATORY (INHALATION) at 16:00

## 2018-12-06 RX ADMIN — HYDRALAZINE HYDROCHLORIDE 25 MG: 25 TABLET ORAL at 21:00

## 2018-12-06 RX ADMIN — Medication 150 MG: at 08:45

## 2018-12-06 RX ADMIN — HEPARIN SODIUM 5000 UNITS: 5000 INJECTION, SOLUTION INTRAVENOUS; SUBCUTANEOUS at 13:45

## 2018-12-06 RX ADMIN — DICLOFENAC 2 G: 10 GEL TOPICAL at 08:46

## 2018-12-06 RX ADMIN — METOPROLOL TARTRATE 5 MG: 1 INJECTION, SOLUTION INTRAVENOUS at 22:38

## 2018-12-06 RX ADMIN — HEPARIN SODIUM 5000 UNITS: 5000 INJECTION, SOLUTION INTRAVENOUS; SUBCUTANEOUS at 21:01

## 2018-12-06 RX ADMIN — METOPROLOL TARTRATE 25 MG: 25 TABLET, FILM COATED ORAL at 08:45

## 2018-12-06 RX ADMIN — IPRATROPIUM BROMIDE AND ALBUTEROL SULFATE 3 ML: 2.5; .5 SOLUTION RESPIRATORY (INHALATION) at 19:20

## 2018-12-06 RX ADMIN — OXYBUTYNIN CHLORIDE 5 MG: 5 TABLET ORAL at 08:45

## 2018-12-06 RX ADMIN — ASPIRIN 81 MG: 81 TABLET, COATED ORAL at 08:46

## 2018-12-06 RX ADMIN — DOCUSATE SODIUM 100 MG: 100 CAPSULE, LIQUID FILLED ORAL at 21:01

## 2018-12-06 RX ADMIN — LEVOTHYROXINE SODIUM 50 MCG: 50 TABLET ORAL at 06:26

## 2018-12-06 RX ADMIN — ACETAMINOPHEN 325 MG: 325 TABLET, FILM COATED ORAL at 09:58

## 2018-12-06 NOTE — PROGRESS NOTES
[address] Internal Medicine Progress Note  Patient: Loren Schilder 80 y o  male   MRN: [id number]  PCP: Carisa Sanders DO  Unit/Bed#: [address] [place] [address] Encounter: [id number]  Date Of Visit: 12/05/18    Problem List:    Principal Problem:    Acute on chronic combined systolic and diastolic heart failure ([facility] )  Active Problems:    Acute on chronic respiratory failure with hypoxia ([facility] )    Acute renal failure superimposed on stage 4 chronic kidney disease ([facility])    Moderate COPD (chronic obstructive pulmonary disease) ([facility])    Paroxysmal atrial fibrillation ([facility])    PATITO (obstructive sleep apnea)    MRSA colonization      Assessment & Plan:    * Acute on chronic combined systolic and diastolic heart failure ([facility])   Assessment & Plan    · LV EF 35-40%, 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  · Presented with acute heart failure with volume overload  · CXR shows vascular congestion, pro BNP 42386  · nephrology and cardio consulted  · Improving with IV Lasix 60 mg twice a day  · Monitor I/Os and daily weights     Acute renal failure superimposed on stage 4 chronic kidney disease ([facility] )   Assessment & Plan    · Baseline creatinine 1 8-2, on admission 3 01  Slowly improving with diuresis  · Secondary to cardiorenal syndrome  · Recent renal US negative for hydro  · Good UOP, on IV Lasix  · Consulted nephrology to follow, accepting a high creatinine for diuresis  · Decreased Voltaren gel dose     Acute on chronic respiratory failure with hypoxia ([facility])   Assessment & Plan    · Normally on 6-10L NC at home 2/2 severe COPD and pulmonary HTN,   · Improving with diuresis  · Monitor      Paroxysmal atrial fibrillation ([facility])   Assessment & Plan    · Cont beta blocker  · Not on anticoagulation given recurrent falls        Moderate COPD (chronic obstructive pulmonary disease) ([facility])   Assessment & Plan    · Last spirometry showed his FEV1 to be 1 85 liters or 76 percent predicted consistent moderate airflow obstruction  · Does not appear in exacerbation, dyspnea was likely from heart failure, improving  · Cont home inhalers     MRSA colonization   Assessment & Plan    Nasal bactroban for 5 days     PATITO (obstructive sleep apnea)   Assessment & Plan    Cont CPAP qHS at 10 with 6L O2           VTE Pharmacologic Prophylaxis:   Pharmacologic: Heparin  Mechanical VTE Prophylaxis in Place: Yes    Patient Centered Rounds: I have performed bedside rounds with nursing staff today  Discussions with Specialists or Other Care Team Provider: Yes, Cardiology, Dr Carlito Dickey    Education and Discussions with Family / Patient:Yes, discussed wife at bedside at length    Time Spent for Care: 45 minutes  More than 50% of total time spent on counseling and coordination of care as described above  Current Length of Stay: 6 day(s)    Current Patient Status: Inpatient     Discharge Plan:  Anticipate home with services    Code Status: Level 3 - DNAR and DNI    Certification Statement: The patient will continue to require additional inpatient hospital stay due to CHF      Subjective:   Reports that his breathing is improving  Still with leg swelling  Denies chest pain or dizziness  Reports good sleep at night    Objective:   Comfortably sitting      Negative for Chest Pain, Palpitations, Abdominal Pain, Nausea, Vomiting, Constipation, Diarrhea, Dizziness  All other 10 review of systems negative and without drastic interval changes from yesterday  Vitals:   Temp (24hrs), Av °F (36 7 °C), Min:97 5 °F (36 4 °C), Max:98 3 °F (36 8 °C)    Temp:  [97 5 °F (36 4 °C)-98 3 °F (36 8 °C)] 98 1 °F (36 7 °C)  HR:  [65-72] 72  Resp:  [18-20] 18  BP: (123-149)/(58-67) 135/64  SpO2:  [91 %-98 %] 97 %  Body mass index is 31 19 kg/m²  Input and Output Summary (last 24 hours):        Intake/Output Summary (Last 24 hours) at 18  Last data filed at 18 1600   Gross per 24 hour   Intake              560 ml   Output             2400 ml   Net            -1840 ml Physical Exam:     Physical Exam   Constitutional: He appears well-developed  No distress  HENT:   Head: Normocephalic and atraumatic  Eyes: Pupils are equal, round, and reactive to light  Conjunctivae are normal    Neck: Normal range of motion  Neck supple  Cardiovascular: Normal rate and regular rhythm  Murmur heard  Pulmonary/Chest: Effort normal  No respiratory distress  He has no wheezes  He has no rhonchi  He has no rales  He exhibits no tenderness  Abdominal: Soft  Bowel sounds are normal  He exhibits no distension  There is no tenderness  There is no rebound and no guarding  Musculoskeletal: He exhibits edema  Neurological: He is alert  No cranial nerve deficit  Skin: Skin is warm and dry  No rash noted  Additional Data:     Labs:      Results from last 7 days  Lab Units 12/05/18  0449   WBC Thousand/uL 6 79   HEMOGLOBIN g/dL 9 1*   HEMATOCRIT % 31 0*   PLATELETS Thousands/uL 273   NEUTROS PCT % 56   LYMPHS PCT % 21   MONOS PCT % 11   EOS PCT % 11*       Results from last 7 days  Lab Units 12/05/18  0449  11/29/18  1421   POTASSIUM mmol/L 4 0  < > 5 3   CHLORIDE mmol/L 100  < > 104   CO2 mmol/L 34*  < > 27   BUN mg/dL 55*  < > 57*   CREATININE mg/dL 2 54*  < > 3 01*   CALCIUM mg/dL 8 8  < > 9 2   ALK PHOS U/L  --   --  55   ALT U/L  --   --  26   AST U/L  --   --  47*   < > = values in this interval not displayed  Results from last 7 days  Lab Units 11/29/18  1332   INR  1 34*       * I Have Reviewed All Lab Data Listed Above  * Additional Pertinent Lab Tests Reviewed: All Labs For Current Hospital Admission Reviewed    Imaging:  Xr Chest 1 View Portable    Result Date: 11/29/2018  Narrative: CHEST INDICATION:   sob  COMPARISON:  11/18/2018 EXAM PERFORMED/VIEWS:  XR CHEST PORTABLE One image FINDINGS: Cardiomediastinal silhouette appears enlarged  Mild heart failure  Chronic right pleural thickening  Bibasilar scarring   Osseous structures appear within normal limits for patient age  Impression: 1  Cardiomegaly  2   Chronic right pleural thickening  3   Mild CHF  Workstation performed: [id number]     Xr Chest Portable    Result Date: 11/19/2018  Narrative: CHEST INDICATION:   hypoxia  Shortness of breath COMPARISON:  11/16/2018 EXAM PERFORMED/VIEWS:  XR CHEST PORTABLE FINDINGS:  Lines and tubes are unchanged from prior exam   Patient is status post TAVR Heart shadow is enlarged but unchanged from prior exam  Chronic right effusion or pleural thickening noted similar to prior study  The lungs are similar in appearance to recent prior studies  There is no pneumothorax  There are degenerative changes of the shoulders     Impression: No interval change from 11/16/2018 Workstation performed: [id number]     Xr Chest Portable    Result Date: 11/16/2018  Narrative: CHEST INDICATION:   chf  Shortness of breath COMPARISON:  November 9, 2018 EXAM PERFORMED/VIEWS:  XR CHEST PORTABLE FINDINGS:  Prosthesis noted overlying the aortic root  Heart shadow is enlarged but unchanged from prior exam  Bibasilar scarring  Right pleural effusion  Osseous structures appear within normal limits for patient age  Impression: Stable chest with bibasilar scarring and right-sided pleural effusion extending along the chest wall  Workstation performed: [id number]     Xr Chest Pa & Lateral    Result Date: 11/9/2018  Narrative: CHEST INDICATION:   Cough  J44 9: Chronic obstructive pulmonary disease, unspecified N18 3: Chronic kidney disease, stage 3 (moderate) I50 9: Heart failure, unspecified  I50 1: Left ventricular failure, unspecified  COMPARISON:  7/18/2018, 10/16/2017 and CT chest 6/11/2018 EXAM PERFORMED/VIEWS:  XR CHEST PA & LATERAL  The frontal view was performed utilizing dual energy radiographic technique  FINDINGS: Cardiomediastinal silhouette appears stable  Transcatheter aortic valve replacement noted  Central vascular prominence again noted without overt pulmonary edema    Peripheral and apical right pleural thickening versus loculated effusion, latter favored based on previous CT  Coarse bronchovascular markings in the lung bases, left greater than right lung base, which may be accentuated by fibrotic and cystic changes  No new infiltrates  No pneumothorax  Degenerative changes of the spine and shoulders  Chronic right AC joint separation noted  Impression: Overall stable appearance of the chest  Question element of chronic pulmonary venous hypertension with chronic basilar changes and loculated pleural effusion peripheral right hemithorax  No new infiltrate  Workstation performed: [id number]     Us Kidney And Bladder    Result Date: 11/16/2018  Narrative: RENAL ULTRASOUND INDICATION:   RENAL FAILURE, ACUTE ARF  COMPARISON: CT scan 7/18/2016  TECHNIQUE: Portable ultrasound examination of the retroperitoneum was performed with a curvilinear transducer utilizing volumetric sweeps and still imaging techniques  FINDINGS: Evaluation of the left kidney is limited due to overlying bowel gas  KIDNEYS: Symmetric and normal size  Right kidney:  10 2 cm  Left kidney:  9 8 cm  Right kidney Normal echogenicity and contour  Ill-defined echogenic structure at the midpole, which presumably corresponds with the previously seen rim calcified cyst  No hydronephrosis  No shadowing calculi  No perinephric fluid collections  Left kidney Normal echogenicity and contour  No obvious masses detected  No overt hydronephrosis  No shadowing calculi  No perinephric fluid collections  URETERS: Nonvisualized  BLADDER: The bladder is nondistended, limiting evaluation  Impression: Suboptimal visualization of the left kidney  No evidence of hydronephrosis  Workstation performed: [id number]     Imaging Reports Reviewed by myself    Cultures:   Blood Culture:   Lab Results   Component Value Date    BLOODCX No Growth After 5 Days  11/29/2018    BLOODCX No Growth After 5 Days   11/29/2018    BLOODCX No Growth After 5 Days  01/06/2017    BLOODCX No Growth After 5 Days  01/06/2017    BLOODCX No Growth After 5 Days  03/06/2016    BLOODCX No Growth After 5 Days  03/06/2016    BLOODCX No Growth After 5 Days  03/04/2016    BLOODCX No Growth After 5 Days  03/04/2016     Urine Culture:   Lab Results   Component Value Date    URINECX No Growth <1000 cfu/mL 03/04/2016     Sputum Culture: No components found for: SPUTUMCX  Wound Culture: No results found for: WOUNDCULT    Last 24 Hours Medication List:     Current Facility-Administered Medications:  acetaminophen 325 mg Oral Q12H PRN Rod Yao MD   And       traMADol 50 mg Oral Q12H PRN Rod Yao MD   amiodarone 100 mg Oral Daily Rod Yao MD   amLODIPine 5 mg Oral Daily Rod Yao MD   aspirin 81 mg Oral Daily Rod Yao MD   cyanocobalamin 100 mcg Oral Daily Rod Yao MD   diclofenac sodium 2 g Topical Q12H [address] Ned Pat, CRNP   docusate sodium 100 mg Oral HS Rod Yao MD   fluticasone-vilanterol 1 puff Inhalation Daily Rod Yao MD   furosemide 60 mg Intravenous BID (diuretic) London Whitaker MD   heparin (porcine) 5,000 Units Subcutaneous Q8H 3630 Eduardo Todd MD   hydrALAZINE 25 mg Oral Q8H Rod Yao MD   ipratropium-albuterol 3 mL Nebulization Q4H PRN Rod Yao MD   ipratropium-albuterol 3 mL Nebulization 4x Daily Rod Yao MD   iron polysaccharides 150 mg Oral Daily Rod Yao MD   isosorbide mononitrate 30 mg Oral Daily Rod Yao MD   levothyroxine 50 mcg Oral Daily Rod Yao MD   metoprolol tartrate 25 mg Oral Q12H 3630 Eduardo Todd MD   ondansetron 4 mg Intravenous Q6H PRN Rod Yao MD   oxybutynin 5 mg Oral Daily Rod Yao MD   pantoprazole 40 mg Oral Daily Rod Yao MD        Today, Patient Was Seen By: Ilya Coronado MD    ** Please Note: "This note has been constructed using a voice recognition system  Therefore there may be syntax, spelling, and/or grammatical errors   Please call if you have any questions  "**

## 2018-12-06 NOTE — PHYSICAL THERAPY NOTE
PT TREATMENT     12/06/18 1027   Pain Assessment   Pain Assessment 0-10   Pain Score 6   Pain Type Chronic pain   Pain Location Shoulder   Pain Orientation Bilateral   Restrictions/Precautions   Other Precautions O2;Contact/isolation; Fall Risk;Bed Alarm; Chair Alarm   General   Chart Reviewed Yes   Family/Caregiver Present No   Subjective   Subjective "ok"   Bed Mobility   Supine to Sit 5  Supervision   Transfers   Sit to Stand 5  Supervision   Additional items (bed raised)   Stand to Sit 5  Supervision   Ambulation/Elevation   Gait Assistance (min A/S)   Additional items Assist x 1;Verbal cues; Tactile cues   Assistive Device Rolling walker   Distance 30 feet with change in direction;6L O2; at rest 96% and at end of amb 75% and pt minimally SOB  Pt sat OOB in chair with all needs in reach, (+) chair alarm   Balance   Static Sitting Fair   Static Standing Fair   Dynamic Standing Fair -   Ambulatory Fair -   Activity Tolerance   Activity Tolerance Patient limited by fatigue;Patient limited by pain  (and SOB)   Exercises   Heelslides 15 reps;Bilateral;Supine   Hip Abduction 20 reps;Bilateral;Supine   Knee AROM Short Arc Quad 20 reps;Bilateral;Supine   Ankle Pumps 20 reps;Bilateral;Supine   Assessment   Assessment Pt is improving with bed mob, trans, amb, gait endurance and overall mobility  Goals   Treatment Day 3   Plan   Treatment/Interventions ADL retraining;Functional transfer training;LE strengthening/ROM; Therapeutic exercise; Endurance training;Patient/family training;Equipment eval/education; Bed mobility;Gait training   PT Frequency 5x/wk   Recommendation   Recommendation (Home with HHA and home PT;family support)   Licensure   [place] License Number  [id number] [place] [address] [id number]

## 2018-12-06 NOTE — PLAN OF CARE
Problem: PHYSICAL THERAPY ADULT  Goal: Performs mobility at highest level of function for planned discharge setting  See evaluation for individualized goals  Outcome: Progressing  Prognosis: Good  Problem List: Decreased endurance, Impaired balance, Decreased mobility, Decreased strength  Assessment: Pt is improving with bed mob, trans, amb, gait endurance and overall mobility  Recommendation:  (Home with HHA and home PT;family support)          See flowsheet documentation for full assessment

## 2018-12-06 NOTE — PROGRESS NOTES
NEPHROLOGY PROGRESS NOTE   Nirmala Moyer 80 y o  male MRN: [id number]  Unit/Bed#: [address] [place] [address] Encounter: [id number]  Reason for Consult: KJ/CKD    ASSESSMENT/PLAN:  1  Acute Kidney Injury, POA- secondary to volume overload +/- cardiorenal syndrome  - creatinine stable around 2 5-2 6 for the past few days  - on lasix 60mg IV BID  - weight improving, now at dry weight  - UA: trace protein, no blood  2  Acute on Chronic HF- EF 35-40%  - volume status much improved  - on lasix 60mg IV BID, will change to oral diuretics tomorrow, would recommend torsemide 60mg daily unless weight increases above 215 pounds, then increase to 60mg BID until weight decreases again  - low sodium diet  3  Chronic Kidney Disease stage IV- Baseline creatinine 1 8-2 2  Follows with Dr Seb Jimenez  Renal ultrasound benign  4  Hypertension- BP acceptable  5  Anemia- hgb stable    Disposition:  Change to oral diuretics tomorrow    SUBJECTIVE:  Patient feels he is back to his dry weight which he states is 217 pounds  He states his breathing is improving every day  He still feels he has some edema in his thighs but not in his lower legs      OBJECTIVE:  Current Weight: Weight - Scale: 96 5 kg (212 lb 11 9 oz)  Vitals:    12/06/18 0626 12/06/18 0749 12/06/18 0855 12/06/18 0900   BP: 156/72 139/64     BP Location:  Left arm     Pulse:  74     Resp:  20     Temp:  98 1 °F (36 7 °C)     TempSrc:  Temporal     SpO2:  96% 96% 96%   Weight:       Height:           Intake/Output Summary (Last 24 hours) at 12/06/18 1045  Last data filed at 12/06/18 0908   Gross per 24 hour   Intake              500 ml   Output             2350 ml   Net            -1850 ml     General: NAD  Skin: no rash  HEENT: normocephalic  Neck: supple  Chest: no crackles but wheeze appreciated  Heart: RRR  Abdomen: soft nt nd  Extremities: no edema  Neuro: alert awake  Psych: mood and affect appropriate    Medications:    Current Facility-Administered Medications:     acetaminophen (TYLENOL) tablet 325 mg, 325 mg, Oral, Q12H PRN, 325 mg at 12/06/18 0958 **AND** traMADol (ULTRAM) tablet 50 mg, 50 mg, Oral, Q12H PRN, Shiloh Sharp MD, 50 mg at 12/04/18 2115    amiodarone tablet 100 mg, 100 mg, Oral, Daily, Shiloh Sharp MD, 100 mg at 12/06/18 0845    amLODIPine (NORVASC) tablet 5 mg, 5 mg, Oral, Daily, Shiloh Sharp MD, 5 mg at 12/06/18 0845    aspirin (ECOTRIN LOW STRENGTH) EC tablet 81 mg, 81 mg, Oral, Daily, Shiloh Sharp MD, 81 mg at 12/06/18 0846    cyanocobalamin (VITAMIN B-12) tablet 100 mcg, 100 mcg, Oral, Daily, Shiloh Sharp MD, 100 mcg at 12/06/18 0846    diclofenac sodium (VOLTAREN) 1 % topical gel 2 g, 2 g, Topical, Q12H [address], Sunday Mildred, CRNP, 2 g at 12/06/18 0846    docusate sodium (COLACE) capsule 100 mg, 100 mg, Oral, HS, Shiloh Sharp MD, 100 mg at 12/05/18 2139    fluticasone-vilanterol (BREO ELLIPTA) 100-25 mcg/inh inhaler 1 puff, 1 puff, Inhalation, Daily, Shiloh Sharp MD, 1 puff at 12/06/18 0847    furosemide (LASIX) injection 60 mg, 60 mg, Intravenous, BID (diuretic), Ace Barcenas MD, 60 mg at 12/06/18 0847    heparin (porcine) subcutaneous injection 5,000 Units, 5,000 Units, Subcutaneous, Q8H [address], 5,000 Units at 12/06/18 1901 **AND** Platelet count, , , Once, Shiloh Sharp MD    hydrALAZINE (APRESOLINE) tablet 25 mg, 25 mg, Oral, Q8H, Shiloh Sharp MD, 25 mg at 12/06/18 0626    ipratropium-albuterol (DUO-NEB) 0 5-2 5 mg/3 mL inhalation solution 3 mL, 3 mL, Nebulization, Q4H PRN, Shiloh Sharp MD    ipratropium-albuterol (DUO-NEB) 0 5-2 5 mg/3 mL inhalation solution 3 mL, 3 mL, Nebulization, 4x Daily, Shiloh Sharp MD, 3 mL at 12/06/18 0855    iron polysaccharides (NIFEREX) capsule 150 mg, 150 mg, Oral, Daily, Shiloh Sharp MD, 150 mg at 12/06/18 0845    isosorbide mononitrate (IMDUR) 24 hr tablet 30 mg, 30 mg, Oral, Daily, Shiloh Sharp MD, 30 mg at 12/06/18 0845    levothyroxine tablet 50 mcg, 50 mcg, Oral, Daily, Shiloh Sharp MD, 50 mcg at 12/06/18 0626    metoprolol tartrate (LOPRESSOR) tablet 25 mg, 25 mg, Oral, Q12H [address], Carolina Lora MD, 25 mg at 12/06/18 0845    ondansetron (ZOFRAN) injection 4 mg, 4 mg, Intravenous, Q6H PRN, Carolina Lora MD    oxybutynin (DITROPAN) tablet 5 mg, 5 mg, Oral, Daily, Carolina Lora MD, 5 mg at 12/06/18 0845    pantoprazole (PROTONIX) EC tablet 40 mg, 40 mg, Oral, Daily, Carolina Lora MD, 40 mg at 12/06/18 0845    Laboratory Results:    Results from last 7 days  Lab Units 12/06/18  0504 12/05/18  0449 12/04/18  0511 12/03/18  0556 12/02/18  0526 12/01/18  0543 11/30/18  0543 11/30/18  0136  11/29/18  1332   WBC Thousand/uL  --  6 79 7 62 8 88 7 84 6 81 7 17  --   --  8 61   HEMOGLOBIN g/dL  --  9 1* 9 3* 9 6* 9 6* 8 9* 9 4*  --   --  10 2*   HEMATOCRIT %  --  31 0* 30 4* 31 1* 31 1* 29 0* 31 0*  --   --  34 8*   PLATELETS Thousands/uL  --  273 255 257 264 239 234  --   --  279   POTASSIUM mmol/L 4 1 4 0 3 8 3 9 3 7 3 9  --  4 1  < >  --    CHLORIDE mmol/L 99* 100 99* 99* 100 102  --  104  < >  --    CO2 mmol/L 35* 34* 34* 34* 33* 30  --  28  < >  --    BUN mg/dL 61* 55* 51* 51* 52* 53*  --  60*  < >  --    CREATININE mg/dL 2 69* 2 54* 2 68* 2 63* 2 82* 2 88*  --  3 05*  < >  --    CALCIUM mg/dL 8 9 8 8 8 9 8 8 8 8 8 7  --  8 4  < >  --    MAGNESIUM mg/dL 2 2  --   --  2 0  --   --   --   --   --   --    < > = values in this interval not displayed

## 2018-12-06 NOTE — PROGRESS NOTES
Progress Note - Cardiology   [facility]     Cindy Brown 80 y o  male MRN: [id number]  : 10/24/1930  Unit/Bed#: [address] [place] [address] Encounter: [id number]    Assessment and Plan:   1  Acute on chronic combined systolic and diastolic heart failure:  Patient appears to be a reaching euvolemia  Diuretics managed per Nephrology  Continue current medications  2  Paroxysmal atrial fibrillation:  Currently stable  Maintaining sinus rhythm on amiodarone  No LTAC due to gait dysfunction and falls  3  Acute kidney injury on chronic kidney disease stage 4:  Renal function improving  4  Coronary artery disease with history PCI and TAVR:  Stable    5  Chronic respiratory failure with severe COPD:  Symptoms improved with diuresis  Subjective / Objective:   Patient in good spirits  Offers no complaints  Diuresing well and states that his breathing is greatly improved since admission  Appetite good  Encouraged patient to increase activity in the room  Vitals: Blood pressure 139/64, pulse 74, temperature 98 1 °F (36 7 °C), temperature source Temporal, resp  rate 20, height 5' 10" (1 778 m), weight 96 5 kg (212 lb 11 9 oz), SpO2 96 %  Vitals:    18 0600 18 0600   Weight: 98 6 kg (217 lb 6 oz) 96 5 kg (212 lb 11 9 oz)     Body mass index is 30 53 kg/m²  BP Readings from Last 3 Encounters:   18 139/64   18 130/50   18 159/74     Orthostatic Blood Pressures      Most Recent Value   Blood Pressure  139/64 filed at 2018 0749   Patient Position - Orthostatic VS  Lying filed at 2018 0749        I/O       701 -  0700  07 -  07 07 -  0700    P  O   680     Total Intake(mL/kg)  680 (7)     Urine (mL/kg/hr) 2150 (0 9) 2350 (1) 250 (1 2)    Total Output 2150 2350 250    Net -2150 -8510 -250               Invasive Devices     Peripheral Intravenous Line            Peripheral IV 18 Right Forearm 3 days Intake/Output Summary (Last 24 hours) at 12/06/18 6382  Last data filed at 12/06/18 0908   Gross per 24 hour   Intake              500 ml   Output             2350 ml   Net            -1850 ml         Physical Exam:   Physical Exam   Constitutional: He is oriented to person, place, and time  He appears well-developed and well-nourished  No distress  HENT:   Head: Normocephalic and atraumatic  Right Ear: External ear normal    Left Ear: External ear normal    Eyes: Pupils are equal, round, and reactive to light  Right eye exhibits no discharge  Left eye exhibits no discharge  Neck: Normal range of motion  Neck supple  No JVD present  No thyromegaly present  Cardiovascular: Normal rate and regular rhythm  Murmur heard  Systolic murmur is present with a grade of 2/6   Pulmonary/Chest: Effort normal  No respiratory distress  Abdominal: Soft  Bowel sounds are normal  He exhibits no distension  Musculoskeletal:   Scant edema   Neurological: He is alert and oriented to person, place, and time  Skin: Skin is warm and dry  He is not diaphoretic  Psychiatric: He has a normal mood and affect  Nursing note and vitals reviewed              Medications/ Allergies:     Current Facility-Administered Medications:  acetaminophen 325 mg Oral Q12H PRN Elke Alberts MD   And       traMADol 50 mg Oral Q12H PRN Elke Alberts MD   amiodarone 100 mg Oral Daily Elke Alberts MD   amLODIPine 5 mg Oral Daily Elke Alberts MD   aspirin 81 mg Oral Daily Elke Alberts MD   cyanocobalamin 100 mcg Oral Daily Elke Alberts MD   diclofenac sodium 2 g Topical Q12H [place], CRNP   docusate sodium 100 mg Oral HS Elke Alberts MD   fluticasone-vilanterol 1 puff Inhalation Daily Elke Alberts MD   furosemide 60 mg Intravenous BID (diuretic) Manjeet Evans MD   heparin (porcine) 5,000 Units Subcutaneous Q8H 3630 Eduardo Todd MD   hydrALAZINE 25 mg Oral Q8H Elke Alberts MD   ipratropium-albuterol 3 mL Nebulization Q4H PRN Elke Alberts MD ipratropium-albuterol 3 mL Nebulization 4x Daily Chaka Etienne MD   iron polysaccharides 150 mg Oral Daily Chaka Etienne MD   isosorbide mononitrate 30 mg Oral Daily Chaka Etienne MD   levothyroxine 50 mcg Oral Daily Chaka Etienne MD   metoprolol tartrate 25 mg Oral Q12H Eura Cushing, MD   ondansetron 4 mg Intravenous Q6H PRN Chaka Etienne MD   oxybutynin 5 mg Oral Daily Chaka Etienne MD   pantoprazole 40 mg Oral Daily Chaka Etienne, MD       acetaminophen 325 mg Q12H PRN   And     traMADol 50 mg Q12H PRN   ipratropium-albuterol 3 mL Q4H PRN   ondansetron 4 mg Q6H PRN     No Known Allergies    VTE Pharmacologic Prophylaxis:   Sequential compression device (Venodyne)     Labs:   Troponins:  Results from last 7 days  Lab Units 11/29/18  1332   TROPONIN I ng/mL 0 02     CBC with diff:  Results from last 7 days  Lab Units 12/05/18  0449 12/04/18  0511 12/03/18  0556 12/02/18  0526 12/01/18  0543 11/30/18  0543 11/29/18  1332   WBC Thousand/uL 6 79 7 62 8 88 7 84 6 81 7 17 8 61   HEMOGLOBIN g/dL 9 1* 9 3* 9 6* 9 6* 8 9* 9 4* 10 2*   HEMATOCRIT % 31 0* 30 4* 31 1* 31 1* 29 0* 31 0* 34 8*   MCV fL 88 85 84 85 85 86 89   PLATELETS Thousands/uL 273 255 257 264 239 234 279   MCH pg 25 8* 25 9* 25 9* 26 2* 25 9* 26 2* 26 1*   MCHC g/dL 29 4* 30 6* 30 9* 30 9* 30 7* 30 3* 29 3*   RDW % 16 6* 16 4* 16 6* 16 6* 16 8* 16 9* 17 6*   MPV fL 11 4 10 9 10 6 10 9 11 2 11 0 11 7   NRBC AUTO /100 WBCs 0  --   --   --   --   --  0     CMP:  Results from last 7 days  Lab Units 12/06/18  0504 12/05/18  0449 12/04/18  0511 12/03/18  0556 12/02/18  0526 12/01/18  0543 11/30/18  0136 11/29/18  1421   SODIUM mmol/L 140 139 140 141 140 141 141 140   POTASSIUM mmol/L 4 1 4 0 3 8 3 9 3 7 3 9 4 1 5 3   CHLORIDE mmol/L 99* 100 99* 99* 100 102 104 104   CO2 mmol/L 35* 34* 34* 34* 33* 30 28 27   ANION GAP mmol/L 6 5 7 8 7 9 9 9   BUN mg/dL 61* 55* 51* 51* 52* 53* 60* 57*   CREATININE mg/dL 2 69* 2 54* 2 68* 2 63* 2 82* 2 88* 3 05* 3 01*   CALCIUM mg/dL 8 9 8 8 8 9 8 8 8 8 8 7 8 4 9 2   AST U/L  --   --   --   --   --   --   --  47*   ALT U/L  --   --   --   --   --   --   --  26   ALK PHOS U/L  --   --   --   --   --   --   --  55   TOTAL PROTEIN g/dL  --   --   --   --   --   --   --  7 3   ALBUMIN g/dL  --   --   --   --   --   --   --  3 2*   TOTAL BILIRUBIN mg/dL  --   --   --   --   --   --   --  0 60   EGFR ml/min/1 73sq m 20 22 20 21 19 19 17 18     Magnesium:  Results from last 7 days  Lab Units 12/06/18  0504 12/03/18  0556   MAGNESIUM mg/dL 2 2 2 0     Coags:  Results from last 7 days  Lab Units 11/29/18  1332   PTT seconds 19*   INR  1 34*       Imaging & Testing   I have personally reviewed pertinent reports  Xr Chest 1 View Portable    Result Date: 11/29/2018  Narrative: CHEST INDICATION:   sob  COMPARISON:  11/18/2018 EXAM PERFORMED/VIEWS:  XR CHEST PORTABLE One image FINDINGS: Cardiomediastinal silhouette appears enlarged  Mild heart failure  Chronic right pleural thickening  Bibasilar scarring  Osseous structures appear within normal limits for patient age  Impression: 1  Cardiomegaly  2   Chronic right pleural thickening  3   Mild CHF  Workstation performed: [id number]     Xr Chest Portable    Result Date: 11/19/2018  Narrative: CHEST INDICATION:   hypoxia  Shortness of breath COMPARISON:  11/16/2018 EXAM PERFORMED/VIEWS:  XR CHEST PORTABLE FINDINGS:  Lines and tubes are unchanged from prior exam   Patient is status post TAVR Heart shadow is enlarged but unchanged from prior exam  Chronic right effusion or pleural thickening noted similar to prior study  The lungs are similar in appearance to recent prior studies  There is no pneumothorax  There are degenerative changes of the shoulders     Impression: No interval change from 11/16/2018 Workstation performed: [id number]     Xr Chest Portable    Result Date: 11/16/2018  Narrative: CHEST INDICATION:   chf    Shortness of breath COMPARISON:  November 9, 2018 EXAM PERFORMED/VIEWS:  XR CHEST PORTABLE FINDINGS:  Prosthesis noted overlying the aortic root  Heart shadow is enlarged but unchanged from prior exam  Bibasilar scarring  Right pleural effusion  Osseous structures appear within normal limits for patient age  Impression: Stable chest with bibasilar scarring and right-sided pleural effusion extending along the chest wall  Workstation performed: [id number]     Xr Chest Pa & Lateral    Result Date: 11/9/2018  Narrative: CHEST INDICATION:   Cough  J44 9: Chronic obstructive pulmonary disease, unspecified N18 3: Chronic kidney disease, stage 3 (moderate) I50 9: Heart failure, unspecified  I50 1: Left ventricular failure, unspecified  COMPARISON:  7/18/2018, 10/16/2017 and CT chest 6/11/2018 EXAM PERFORMED/VIEWS:  XR CHEST PA & LATERAL  The frontal view was performed utilizing dual energy radiographic technique  FINDINGS: Cardiomediastinal silhouette appears stable  Transcatheter aortic valve replacement noted  Central vascular prominence again noted without overt pulmonary edema  Peripheral and apical right pleural thickening versus loculated effusion, latter favored based on previous CT  Coarse bronchovascular markings in the lung bases, left greater than right lung base, which may be accentuated by fibrotic and cystic changes  No new infiltrates  No pneumothorax  Degenerative changes of the spine and shoulders  Chronic right AC joint separation noted  Impression: Overall stable appearance of the chest  Question element of chronic pulmonary venous hypertension with chronic basilar changes and loculated pleural effusion peripheral right hemithorax  No new infiltrate  Workstation performed: [id number]     Us Kidney And Bladder    Result Date: 11/16/2018  Narrative: RENAL ULTRASOUND INDICATION:   RENAL FAILURE, ACUTE ARF  COMPARISON: CT scan 7/18/2016   TECHNIQUE: Portable ultrasound examination of the retroperitoneum was performed with a curvilinear transducer utilizing volumetric sweeps and still imaging techniques  FINDINGS: Evaluation of the left kidney is limited due to overlying bowel gas  KIDNEYS: Symmetric and normal size  Right kidney:  10 2 cm  Left kidney:  9 8 cm  Right kidney Normal echogenicity and contour  Ill-defined echogenic structure at the midpole, which presumably corresponds with the previously seen rim calcified cyst  No hydronephrosis  No shadowing calculi  No perinephric fluid collections  Left kidney Normal echogenicity and contour  No obvious masses detected  No overt hydronephrosis  No shadowing calculi  No perinephric fluid collections  URETERS: Nonvisualized  BLADDER: The bladder is nondistended, limiting evaluation  Impression: Suboptimal visualization of the left kidney  No evidence of hydronephrosis  Workstation performed: [id number]       Cardiac testing:   Results for orders placed during the hospital encounter of 18   Echo complete with contrast if indicated    Cara Coelho [address]  [address] [facility]  [phone]    Transthoracic Echocardiogram  2D, M-mode, Doppler, and Color Doppler    Study date:  2018    Patient: Vlad Root  MR number: [id number]  Account number: [de-identified]  : 24-Oct-1930  Age: 80 years  Gender: Male  Status: Inpatient  Location: Bedside  Height: 70 in  Weight: 220 4 lb  BP: 142/ 86 mmHg    Indications: Heart Failure    Diagnoses: I50 9 - Heart failure, unspecified    Sonographer:  CAMILLA Marcos  Primary Physician:  Juan Small DO  Referring Physician:  Juan Small DO  [facility]  [facility]  Interpreting Physician:  Kelsey Chakraborty DO    SUMMARY    LEFT VENTRICLE:  Systolic function was severely reduced by visual assessment  Ejection fraction was estimated to be 35 %  There was severe diffuse hypokinesis with no identifiable regional variations  There was moderate concentric hypertrophy    Doppler parameters were consistent with abnormal left ventricular relaxation (grade 1 diastolic dysfunction)  LEFT ATRIUM:  The atrium was moderately dilated  RIGHT ATRIUM:  The atrium was mildly dilated  MITRAL VALVE:  There was mild regurgitation  AORTIC VALVE:  A bioprosthesis was present  It exhibited normal function  There was mild stenosis  Valve peak gradient was 30 mmHg  TRICUSPID VALVE:  There was mild regurgitation  Pulmonary artery systolic pressure was moderately increased  COMPARISONS:  There has been no significant interval change  Comparison was made with the previous study of 13-Jun-2018  HISTORY: PRIOR HISTORY: HTN, DM, DVT, MRSA, NSTEMI, AVR, CAD, BPH, CKD, COPD    PROCEDURE: The procedure was performed at the bedside  This was a stat study  The transthoracic approach was used  The study included complete 2D imaging, M-mode, complete spectral Doppler, and color Doppler  The heart rate was 70 bpm, at  the start of the study  Image quality was adequate  LEFT VENTRICLE: Size was normal  Systolic function was severely reduced by visual assessment  Ejection fraction was estimated to be 35 %  There was severe diffuse hypokinesis with no identifiable regional variations  There was moderate  concentric hypertrophy  DOPPLER: Doppler parameters were consistent with abnormal left ventricular relaxation (grade 1 diastolic dysfunction)  RIGHT VENTRICLE: The size was normal  Systolic function was normal     LEFT ATRIUM: The atrium was moderately dilated  RIGHT ATRIUM: The atrium was mildly dilated  MITRAL VALVE: There was annular calcification  Valve structure was normal  There was normal leaflet separation  No echocardiographic evidence for prolapse  DOPPLER: The transmitral velocity was within the normal range  There was no  evidence for stenosis  There was mild regurgitation  AORTIC VALVE: A bioprosthesis was present  It exhibited normal function  DOPPLER: There was mild stenosis  There was no regurgitation      TRICUSPID VALVE: The valve structure was normal  There was normal leaflet separation  DOPPLER: The transtricuspid velocity was within the normal range  There was mild regurgitation  Pulmonary artery systolic pressure was moderately  increased  Estimated peak PA pressure was 60 mmHg  PULMONIC VALVE: Leaflets exhibited normal thickness, no calcification, and normal cuspal separation  DOPPLER: The transpulmonic velocity was within the normal range  There was no regurgitation  PERICARDIUM: There was no thickening  There was no pericardial effusion  AORTA: The root exhibited normal size  PULMONARY ARTERY: The size was normal  The morphology appeared normal     MEASUREMENT TABLES    DOPPLER MEASUREMENTS  Aortic valve   (Reference normals)  Peak gradient   30 mmHg   (--)    SYSTEM MEASUREMENT TABLES    2D mode  AoR Diam 2D: 3 2 cm  LA Diam (2D): 4 5 cm  LA/Ao (2D): 1 41  FS (2D Teich): 17 4 %  IVSd (2D): 1 55 cm  LVDEV: 159 cm³  LVESV: 102 cm³  LVIDd(2D): 5 69 cm  LVISd (2D): 4 7 cm  LVOT Area 2D: 3 8 cm squared  LVPWd (2D): 1 48 cm  SV (Teich): 57 cm³    Apical four chamber  LVEF A4C: 35 %    Unspecified Scan Mode  FATMATA Cont Eq (Peak Pasquale): 1 51 cm squared  FATMATA Cont Eq (VTI): 1 46 cm squared  LVOT (VTI): 22 5 cm  LVOT Diam : 2 2 cm  LVOT Vmax: 1090 mm/s  LVOT Vmax; Mean: 1090 mm/s  Peak Grad ; Mean: 5 mm[Hg]  SV (LVOT): 86 cm³  VTI;Mean: 2 mm[Hg]  MV Peak A Pasquale: 1330 mm/s  MV Peak E Pasquale  Mean: 602 mm/s  MVA (PHT): 3 24 cm squared  PHT: 68 ms  Max P mm[Hg]  V Max: 3630 mm/s  Vmax: 3620 mm/s  RA Area: 22 4 cm squared  RA Volume: 67 5 cm³  TAPSE: 1 7 cm    [facility]    Prepared and electronically signed by    Carlton Arias DO  Signed 2018 16:23:03         [facility] [place]        "This note has been constructed using a voice recognition system  Therefore there may be syntax, spelling, and/or grammatical errors   Please call if you have any questions  "

## 2018-12-06 NOTE — UTILIZATION REVIEW
Continued Stay Review    Date: 12-6-18       Vital Signs: /64 (BP Location: Left arm)   Pulse 74   Temp 98 1 °F (36 7 °C) (Temporal)   Resp 20   Ht 5' 10" (1 778 m)   Wt 96 5 kg (212 lb 11 9 oz)   SpO2 95%   BMI 30 53 kg/m²     Medications:     acetaminophen 325 mg Oral Q12H PRN   And      traMADol 50 mg Oral Q12H PRN   amiodarone 100 mg Oral Daily   amLODIPine 5 mg Oral Daily   aspirin 81 mg Oral Daily   cyanocobalamin 100 mcg Oral Daily   diclofenac sodium 2 g Topical Q12H CRISTOPHER   docusate sodium 100 mg Oral HS   fluticasone-vilanterol 1 puff Inhalation Daily   furosemide 60 mg Intravenous BID (diuretic)   heparin (porcine) 5,000 Units Subcutaneous Q8H [facility]   hydrALAZINE 25 mg Oral Q8H   ipratropium-albuterol 3 mL Nebulization Q4H PRN   ipratropium-albuterol 3 mL Nebulization 4x Daily   iron polysaccharides 150 mg Oral Daily   isosorbide mononitrate 30 mg Oral Daily   levothyroxine 50 mcg Oral Daily   metoprolol tartrate 25 mg Oral Q12H CRISTOPHER   ondansetron 4 mg Intravenous Q6H PRN   oxybutynin 5 mg Oral Daily   pantoprazole 40 mg Oral Daily   [START ON 12/7/2018] torsemide 60 mg Oral Daily       Abnormal Labs/Diagnostic Results:    Bun 61    Creatinine  2 69        Age/Sex: 80 y o  male   Continues to have  Edema of thighs      Assessment/Plan:    Acute kidney injury related to volume overload and cardiorenal syndrome  Current creatinine 2 69  Baseline 1 28 to 2 2    Change to  Po diuretics  12-7   Start torsemide 60 mg daily and monitor  Weight  - increase  60 mg  Bid  Until weight decreases again   Continues to require  6 liters  O2 nc  To maintain sats  97%       Discharge Plan:   To be determined

## 2018-12-06 NOTE — PROGRESS NOTES
[address] Internal Medicine Progress Note  Patient: Loren Schilder 80 y o  male   MRN: [id number]  PCP: Carisa Sanders DO  Unit/Bed#: [address] [place] [address] Encounter: [id number]  Date Of Visit: 12/06/18    Problem List:    Principal Problem:    Acute on chronic combined systolic and diastolic heart failure ([facility] [address] )  Active Problems:    Acute on chronic respiratory failure with hypoxia ([facility] )    Acute renal failure superimposed on stage 4 chronic kidney disease (HCC)    Moderate COPD (chronic obstructive pulmonary disease) (HCC)    Paroxysmal atrial fibrillation ([facility])    PATITO (obstructive sleep apnea)    MRSA colonization      Assessment & Plan:    * Acute on chronic combined systolic and diastolic heart failure ([facility])   Assessment & Plan    · LV EF 35-40%, 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  · Presented with acute heart failure with volume overload  · CXR shows vascular congestion, pro BNP [id number]  · nephrology and cardio following  · Improved with IV Lasix 60 mg twice a day  · Now close to EDW  · Transitioned to PO torsemide 60 mg in AM with plan to increase to 60 mg BID as needed as outpatient to maintain weight  around 210 to 215 lbs  · Monitor I/Os and daily weights  · Continue other cardiac meds     Acute renal failure superimposed on stage 4 chronic kidney disease (HCC)   Assessment & Plan    · Baseline creatinine 1 8-2, on admission 3 01  Slowly improving with diuresis  · Secondary to cardiorenal syndrome  · Recent renal US negative for hydro     · Good UOP, on IV Lasix  · Consulted nephrology to follow, accepting a high creatinine for diuresis, now stabilizing around 2 5   · Decreased Voltaren gel dose     Acute on chronic respiratory failure with hypoxia ([facility])   Assessment & Plan    · Normally on 6-10L NC at home 2/2 severe COPD and pulmonary HTN,   · Improving with diuresis  · Monitor      Paroxysmal atrial fibrillation (HCC)   Assessment & Plan    · Cont beta blocker, amiodarone  · Not on anticoagulation given recurrent falls  Moderate COPD (chronic obstructive pulmonary disease) ([facility])   Assessment & Plan    · Last spirometry showed his FEV1 to be 1 85 liters or 76 percent predicted consistent moderate airflow obstruction  · Does not appear in exacerbation, dyspnea was likely from heart failure, improving  · Cont home inhalers     MRSA colonization   Assessment & Plan    Nasal bactroban for 5 days     PATITO (obstructive sleep apnea)   Assessment & Plan    Cont CPAP qHS at 10 with 6L O2           VTE Pharmacologic Prophylaxis:   Pharmacologic: Heparin  Mechanical VTE Prophylaxis in Place: Yes    Patient Centered Rounds: I have performed bedside rounds with nursing staff today  Discussions with Specialists or Other Care Team Provider: Yes, Cardiology, Dr eRndon Comfort    Education and Discussions with Family / Patient:Yes, discussed wife at bedside at length    Time Spent for Care: 45 minutes  More than 50% of total time spent on counseling and coordination of care as described above  Current Length of Stay: 7 day(s)    Current Patient Status: Inpatient     Discharge Plan:  Anticipate home with services in next 24-48 hrs    Code Status: Level 3 - DNAR and DNI    Certification Statement: The patient will continue to require additional inpatient hospital stay due to CHF      Subjective:   Denies SOB  Edema better  Denies chest pain or dizziness  Reports good sleep at night    Wants regular diet, reemphasized salt restriction    Objective:   Comfortably sitting in bed      Negative for Chest Pain, Palpitations, Abdominal Pain, Nausea, Vomiting, Constipation, Diarrhea, Dizziness  All other 10 review of systems negative and without drastic interval changes from yesterday      Vitals:   Temp (24hrs), Av 2 °F (36 8 °C), Min:98 1 °F (36 7 °C), Max:98 3 °F (36 8 °C)    Temp:  [98 1 °F (36 7 °C)-98 3 °F (36 8 °C)] 98 1 °F (36 7 °C)  HR:  [65-74] 74  Resp:  [18-20] 20  BP: (127-161)/(58-73) 139/64  SpO2:  [91 %-99 %] 96 %  Body mass index is 30 53 kg/m²  Input and Output Summary (last 24 hours): Intake/Output Summary (Last 24 hours) at 12/06/18 1033  Last data filed at 12/06/18 0908   Gross per 24 hour   Intake              500 ml   Output             2350 ml   Net            -1850 ml       Physical Exam:     Physical Exam   Constitutional: He is oriented to person, place, and time  He appears well-developed  No distress  HENT:   Head: Normocephalic and atraumatic  Eyes: Pupils are equal, round, and reactive to light  Conjunctivae are normal    Neck: Normal range of motion  Neck supple  Cardiovascular: Normal rate and regular rhythm  Murmur heard  Pulmonary/Chest: Effort normal  No respiratory distress  He has no wheezes  He has no rhonchi  He has no rales  He exhibits no tenderness  Abdominal: Soft  Bowel sounds are normal  He exhibits no distension  There is no tenderness  There is no rebound and no guarding  Musculoskeletal: He exhibits edema  Neurological: He is alert and oriented to person, place, and time  No cranial nerve deficit  Skin: Skin is warm and dry  No rash noted  Additional Data:     Labs:      Results from last 7 days  Lab Units 12/05/18  0449   WBC Thousand/uL 6 79   HEMOGLOBIN g/dL 9 1*   HEMATOCRIT % 31 0*   PLATELETS Thousands/uL 273   NEUTROS PCT % 56   LYMPHS PCT % 21   MONOS PCT % 11   EOS PCT % 11*       Results from last 7 days  Lab Units 12/06/18  0504  11/29/18  1421   POTASSIUM mmol/L 4 1  < > 5 3   CHLORIDE mmol/L 99*  < > 104   CO2 mmol/L 35*  < > 27   BUN mg/dL 61*  < > 57*   CREATININE mg/dL 2 69*  < > 3 01*   CALCIUM mg/dL 8 9  < > 9 2   ALK PHOS U/L  --   --  55   ALT U/L  --   --  26   AST U/L  --   --  47*   < > = values in this interval not displayed  Results from last 7 days  Lab Units 11/29/18  1332   INR  1 34*       * I Have Reviewed All Lab Data Listed Above  * Additional Pertinent Lab Tests Reviewed:  [address] Admission Reviewed    Imaging:  Xr Chest 1 View Portable    Result Date: 11/29/2018  Narrative: CHEST INDICATION:   sob  COMPARISON:  11/18/2018 EXAM PERFORMED/VIEWS:  XR CHEST PORTABLE One image FINDINGS: Cardiomediastinal silhouette appears enlarged  Mild heart failure  Chronic right pleural thickening  Bibasilar scarring  Osseous structures appear within normal limits for patient age  Impression: 1  Cardiomegaly  2   Chronic right pleural thickening  3   Mild CHF  Workstation performed: [id number]     Xr Chest Portable    Result Date: 11/19/2018  Narrative: CHEST INDICATION:   hypoxia  Shortness of breath COMPARISON:  11/16/2018 EXAM PERFORMED/VIEWS:  XR CHEST PORTABLE FINDINGS:  Lines and tubes are unchanged from prior exam   Patient is status post TAVR Heart shadow is enlarged but unchanged from prior exam  Chronic right effusion or pleural thickening noted similar to prior study  The lungs are similar in appearance to recent prior studies  There is no pneumothorax  There are degenerative changes of the shoulders     Impression: No interval change from 11/16/2018 Workstation performed: [id number]     Xr Chest Portable    Result Date: 11/16/2018  Narrative: CHEST INDICATION:   chf  Shortness of breath COMPARISON:  November 9, 2018 EXAM PERFORMED/VIEWS:  XR CHEST PORTABLE FINDINGS:  Prosthesis noted overlying the aortic root  Heart shadow is enlarged but unchanged from prior exam  Bibasilar scarring  Right pleural effusion  Osseous structures appear within normal limits for patient age  Impression: Stable chest with bibasilar scarring and right-sided pleural effusion extending along the chest wall  Workstation performed: [id number]     Xr Chest Pa & Lateral    Result Date: 11/9/2018  Narrative: CHEST INDICATION:   Cough  J44 9: Chronic obstructive pulmonary disease, unspecified N18 3: Chronic kidney disease, stage 3 (moderate) I50 9: Heart failure, unspecified   I50 1: Left ventricular failure, unspecified  COMPARISON:  7/18/2018, 10/16/2017 and CT chest 6/11/2018 EXAM PERFORMED/VIEWS:  XR CHEST PA & LATERAL  The frontal view was performed utilizing dual energy radiographic technique  FINDINGS: Cardiomediastinal silhouette appears stable  Transcatheter aortic valve replacement noted  Central vascular prominence again noted without overt pulmonary edema  Peripheral and apical right pleural thickening versus loculated effusion, latter favored based on previous CT  Coarse bronchovascular markings in the lung bases, left greater than right lung base, which may be accentuated by fibrotic and cystic changes  No new infiltrates  No pneumothorax  Degenerative changes of the spine and shoulders  Chronic right AC joint separation noted  Impression: Overall stable appearance of the chest  Question element of chronic pulmonary venous hypertension with chronic basilar changes and loculated pleural effusion peripheral right hemithorax  No new infiltrate  Workstation performed: [id number]     Us Kidney And Bladder    Result Date: 11/16/2018  Narrative: RENAL ULTRASOUND INDICATION:   RENAL FAILURE, ACUTE ARF  COMPARISON: CT scan 7/18/2016  TECHNIQUE: Portable ultrasound examination of the retroperitoneum was performed with a curvilinear transducer utilizing volumetric sweeps and still imaging techniques  FINDINGS: Evaluation of the left kidney is limited due to overlying bowel gas  KIDNEYS: Symmetric and normal size  Right kidney:  10 2 cm  Left kidney:  9 8 cm  Right kidney Normal echogenicity and contour  Ill-defined echogenic structure at the midpole, which presumably corresponds with the previously seen rim calcified cyst  No hydronephrosis  No shadowing calculi  No perinephric fluid collections  Left kidney Normal echogenicity and contour  No obvious masses detected  No overt hydronephrosis  No shadowing calculi  No perinephric fluid collections  URETERS: Nonvisualized   BLADDER: The bladder is nondistended, limiting evaluation  Impression: Suboptimal visualization of the left kidney  No evidence of hydronephrosis  Workstation performed: [id number]     Imaging Reports Reviewed by myself    Cultures:   Blood Culture:   Lab Results   Component Value Date    BLOODCX No Growth After 5 Days  11/29/2018    BLOODCX No Growth After 5 Days  11/29/2018    BLOODCX No Growth After 5 Days  01/06/2017    BLOODCX No Growth After 5 Days  01/06/2017    BLOODCX No Growth After 5 Days  03/06/2016    BLOODCX No Growth After 5 Days  03/06/2016    BLOODCX No Growth After 5 Days  03/04/2016    BLOODCX No Growth After 5 Days   03/04/2016     Urine Culture:   Lab Results   Component Value Date    URINECX No Growth <1000 cfu/mL 03/04/2016     Sputum Culture: No components found for: SPUTUMCX  Wound Culture: No results found for: WOUNDCULT    Last 24 Hours Medication List:     Current Facility-Administered Medications:  acetaminophen 325 mg Oral Q12H PRN Casie Khan MD   And       traMADol 50 mg Oral Q12H PRN Casie Khan MD   amiodarone 100 mg Oral Daily Casie Khan MD   amLODIPine 5 mg Oral Daily Casie Khan MD   aspirin 81 mg Oral Daily Casie Khan MD   cyanocobalamin 100 mcg Oral Daily Casie Khan MD   diclofenac sodium 2 g Topical Q12H [address] [place], [place]   docusate sodium 100 mg Oral HS Casie Khan MD   fluticasone-vilanterol 1 puff Inhalation Daily Casie Khan MD   furosemide 60 mg Intravenous BID (diuretic) Karen Mulligan MD   heparin (porcine) 5,000 Units Subcutaneous Q8H 3630 Eduardo Todd MD   hydrALAZINE 25 mg Oral Q8H Casie Khan MD   ipratropium-albuterol 3 mL Nebulization Q4H PRN Casie Khan MD   ipratropium-albuterol 3 mL Nebulization 4x Daily Casie Khan MD   iron polysaccharides 150 mg Oral Daily Casie Khan MD   isosorbide mononitrate 30 mg Oral Daily Casie Khan MD   levothyroxine 50 mcg Oral Daily Casie Khan MD   metoprolol tartrate 25 mg Oral Q12H 3630 Eduardo Todd MD   ondansetron 4 mg Intravenous Q6H PRN Casie Khan MD oxybutynin 5 mg Oral Daily Vj Swan MD   pantoprazole 40 mg Oral Daily Vj Swan MD        Today, Patient Was Seen By: Forrest Bush MD    ** Please Note: "This note has been constructed using a voice recognition system  Therefore there may be syntax, spelling, and/or grammatical errors   Please call if you have any questions  "**

## 2018-12-06 NOTE — PROGRESS NOTES
Progress Note - Pulmonary   Terrial Perfect 80 y o  male MRN: [id number]  Unit/Bed#: [address] [place] [address] Encounter: [id number]    Assessment:  Acute on chronic hypoxemic respiratory failure has improved  Patient's oxygen saturation at rest has been hold more stable and is in the high 90% range  Moderate COPD stable  Moderate obstructive sleep apnea  Patient is using CPAP here at 10 cm water with 45% oxygen  At home uses 6 L of oxygen with the CPAP  Iron deficiency anemia  Hemoglobin stable at 9 1  Chronic kidney disease    Plan:  One patient goes home he will uses oxygen 6 L at rest and can increase to 9-10 with activity as he does desaturate when he ambulates  At home he will uses CPAP of 10 cm water 6 L oxygen at bedtime  Continue nebulized treatments with DuoNeb q i d  Will sign off service      Subjective:   Complains of some mild persistent thigh edema but overall feels better and has less shortness of breath with activity    Objective:     Vitals: Blood pressure 135/64, pulse 72, temperature 98 1 °F (36 7 °C), resp  rate 18, height 5' 10" (1 778 m), weight 98 6 kg (217 lb 6 oz), SpO2 97 %  ,Body mass index is 31 19 kg/m²  Intake/Output Summary (Last 24 hours) at 12/05/18 2118  Last data filed at 12/05/18 1600   Gross per 24 hour   Intake              560 ml   Output             2400 ml   Net            -1840 ml       Physical Exam: Physical Exam   Constitutional: He is oriented to person, place, and time  He appears well-developed and well-nourished  No distress  On 6 liters/minute at rest O2 saturation is 98%   HENT:   Head: Normocephalic  Nose: Nose normal    Mouth/Throat: Oropharynx is clear and moist  No oropharyngeal exudate  Eyes: Pupils are equal, round, and reactive to light  Conjunctivae are normal    Neck: Neck supple  No JVD present  No tracheal deviation present  Cardiovascular: Normal rate, regular rhythm and normal heart sounds      Pulmonary/Chest: Effort normal    Lung sounds are clear Abdominal: Soft  He exhibits no distension  There is no tenderness  There is no guarding  Musculoskeletal: He exhibits no edema  There is +1 thigh edema of both lower extremities   Lymphadenopathy:     He has no cervical adenopathy  Neurological: He is alert and oriented to person, place, and time  Skin: Skin is warm and dry  No rash noted  Psychiatric: He has a normal mood and affect  His behavior is normal  Thought content normal         Labs: I have personally reviewed pertinent lab results  , ABG: No results found for: PHART, PWV8OTH, PO2ART, CHP9WEY, O9SLDHUO, BEART, SOURCE, BNP: No results found for: BNP, CBC: Lab Results   Component Value Date    WBC 6 79 12/05/2018    HGB 9 1 (L) 12/05/2018    HCT 31 0 (L) 12/05/2018    MCV 88 12/05/2018     12/05/2018    ADJUSTEDWBC 6 70 10/04/2016    MCH 25 8 (L) 12/05/2018    MCHC 29 4 (L) 12/05/2018    RDW 16 6 (H) 12/05/2018    MPV 11 4 12/05/2018    NRBC 0 12/05/2018   , CMP: Lab Results   Component Value Date     10/16/2017    K 4 0 12/05/2018    K 5 0 11/14/2018     12/05/2018    CL 98 11/14/2018    CO2 34 (H) 12/05/2018    CO2 24 11/14/2018    ANIONGAP 13 9 10/08/2015    BUN 55 (H) 12/05/2018    BUN 51 (H) 11/14/2018    CREATININE 2 54 (H) 12/05/2018    CREATININE 2 06 (H) 10/16/2017    GLUCOSE 109 (H) 10/16/2017    CALCIUM 8 8 12/05/2018    CALCIUM 9 7 10/16/2017    AST 47 (H) 11/29/2018    AST 39 10/16/2017    ALT 26 11/29/2018    ALT 15 10/16/2017    ALKPHOS 55 11/29/2018    ALKPHOS 61 10/16/2017    PROT 7 9 10/16/2017    BILITOT 0 4 10/16/2017    EGFR 22 12/05/2018   , PT/INR:   Lab Results   Component Value Date    INR 1 34 (H) 11/29/2018   , Troponin: No results found for: TROPONIN    Imaging and other studies: I have personally reviewed pertinent reports     and I have personally reviewed pertinent films in PACS

## 2018-12-07 LAB
ANION GAP SERPL CALCULATED.3IONS-SCNC: 3 MMOL/L (ref 4–13)
BUN SERPL-MCNC: 68 MG/DL (ref 5–25)
CALCIUM SERPL-MCNC: 9 MG/DL (ref 8.3–10.1)
CHLORIDE SERPL-SCNC: 100 MMOL/L (ref 100–108)
CO2 SERPL-SCNC: 37 MMOL/L (ref 21–32)
CREAT SERPL-MCNC: 2.7 MG/DL (ref 0.6–1.3)
GFR SERPL CREATININE-BSD FRML MDRD: 20 ML/MIN/1.73SQ M
GLUCOSE SERPL-MCNC: 114 MG/DL (ref 65–140)
MAGNESIUM SERPL-MCNC: 2.3 MG/DL (ref 1.6–2.6)
POTASSIUM SERPL-SCNC: 4 MMOL/L (ref 3.5–5.3)
SODIUM SERPL-SCNC: 140 MMOL/L (ref 136–145)

## 2018-12-07 PROCEDURE — 99232 SBSQ HOSP IP/OBS MODERATE 35: CPT | Performed by: INTERNAL MEDICINE

## 2018-12-07 PROCEDURE — 83735 ASSAY OF MAGNESIUM: CPT | Performed by: INTERNAL MEDICINE

## 2018-12-07 PROCEDURE — 94660 CPAP INITIATION&MGMT: CPT

## 2018-12-07 PROCEDURE — 99232 SBSQ HOSP IP/OBS MODERATE 35: CPT | Performed by: NURSE PRACTITIONER

## 2018-12-07 PROCEDURE — 80048 BASIC METABOLIC PNL TOTAL CA: CPT | Performed by: INTERNAL MEDICINE

## 2018-12-07 PROCEDURE — 94760 N-INVAS EAR/PLS OXIMETRY 1: CPT

## 2018-12-07 PROCEDURE — 94640 AIRWAY INHALATION TREATMENT: CPT

## 2018-12-07 RX ORDER — TORSEMIDE 20 MG/1
60 TABLET ORAL DAILY
Status: DISCONTINUED | OUTPATIENT
Start: 2018-12-07 | End: 2018-12-07

## 2018-12-07 RX ADMIN — METOPROLOL TARTRATE 25 MG: 25 TABLET, FILM COATED ORAL at 21:23

## 2018-12-07 RX ADMIN — DOCUSATE SODIUM 100 MG: 100 CAPSULE, LIQUID FILLED ORAL at 21:23

## 2018-12-07 RX ADMIN — HYDRALAZINE HYDROCHLORIDE 25 MG: 25 TABLET ORAL at 13:23

## 2018-12-07 RX ADMIN — OXYBUTYNIN CHLORIDE 5 MG: 5 TABLET ORAL at 09:10

## 2018-12-07 RX ADMIN — PANTOPRAZOLE SODIUM 40 MG: 40 TABLET, DELAYED RELEASE ORAL at 09:10

## 2018-12-07 RX ADMIN — DICLOFENAC 2 G: 10 GEL TOPICAL at 09:10

## 2018-12-07 RX ADMIN — HEPARIN SODIUM 5000 UNITS: 5000 INJECTION, SOLUTION INTRAVENOUS; SUBCUTANEOUS at 21:23

## 2018-12-07 RX ADMIN — ASPIRIN 81 MG: 81 TABLET, COATED ORAL at 09:10

## 2018-12-07 RX ADMIN — IPRATROPIUM BROMIDE AND ALBUTEROL SULFATE 3 ML: 2.5; .5 SOLUTION RESPIRATORY (INHALATION) at 13:11

## 2018-12-07 RX ADMIN — TORSEMIDE 60 MG: 20 TABLET ORAL at 09:09

## 2018-12-07 RX ADMIN — METOPROLOL TARTRATE 25 MG: 25 TABLET, FILM COATED ORAL at 09:10

## 2018-12-07 RX ADMIN — HEPARIN SODIUM 5000 UNITS: 5000 INJECTION, SOLUTION INTRAVENOUS; SUBCUTANEOUS at 05:53

## 2018-12-07 RX ADMIN — HYDRALAZINE HYDROCHLORIDE 25 MG: 25 TABLET ORAL at 05:58

## 2018-12-07 RX ADMIN — AMIODARONE HYDROCHLORIDE 100 MG: 200 TABLET ORAL at 09:10

## 2018-12-07 RX ADMIN — FLUTICASONE FUROATE AND VILANTEROL TRIFENATATE 1 PUFF: 100; 25 POWDER RESPIRATORY (INHALATION) at 09:10

## 2018-12-07 RX ADMIN — LEVOTHYROXINE SODIUM 50 MCG: 50 TABLET ORAL at 05:53

## 2018-12-07 RX ADMIN — HEPARIN SODIUM 5000 UNITS: 5000 INJECTION, SOLUTION INTRAVENOUS; SUBCUTANEOUS at 13:23

## 2018-12-07 RX ADMIN — VITAM B12 100 MCG: 100 TAB at 09:10

## 2018-12-07 RX ADMIN — DICLOFENAC 2 G: 10 GEL TOPICAL at 21:25

## 2018-12-07 RX ADMIN — Medication 150 MG: at 09:10

## 2018-12-07 RX ADMIN — IPRATROPIUM BROMIDE AND ALBUTEROL SULFATE 3 ML: 2.5; .5 SOLUTION RESPIRATORY (INHALATION) at 20:33

## 2018-12-07 RX ADMIN — IPRATROPIUM BROMIDE AND ALBUTEROL SULFATE 3 ML: 2.5; .5 SOLUTION RESPIRATORY (INHALATION) at 15:53

## 2018-12-07 RX ADMIN — AMLODIPINE BESYLATE 5 MG: 5 TABLET ORAL at 09:10

## 2018-12-07 RX ADMIN — HYDRALAZINE HYDROCHLORIDE 25 MG: 25 TABLET ORAL at 21:23

## 2018-12-07 RX ADMIN — ACETAMINOPHEN 325 MG: 325 TABLET, FILM COATED ORAL at 21:27

## 2018-12-07 RX ADMIN — IPRATROPIUM BROMIDE AND ALBUTEROL SULFATE 3 ML: 2.5; .5 SOLUTION RESPIRATORY (INHALATION) at 08:40

## 2018-12-07 RX ADMIN — ISOSORBIDE MONONITRATE 30 MG: 30 TABLET, EXTENDED RELEASE ORAL at 09:10

## 2018-12-07 NOTE — PROGRESS NOTES
[facility] PROGRESS NOTE   Nirmala Moyer 80 y o  male MRN: [id number]  Unit/Bed#: [address] [place] [address] Encounter: [id number]  Reason for Consult: KJ    ASSESSMENT and PLAN:  1  KJ (POA) due to CRS:   · Creatinine stable around 2 5 to 2 8  · Will need to tolerate a higher baseline creatinine to keep out of CHF       2  CKD IV: Baseline 1 8 to 2 2       3  HFrEF, fluid ovrerload:   · Continue with Torsemide 60 mg daily on discharge  · Check daily weights and increase Torsemide to 60 mg BID if weight is above 215 lbs  · His EDW is probably around 210 to 215 lbs       4  HTN: BP controlled  5  Anemia: Hgb stable  6  Pulmonary HTN    DISPOSITION:  · Stable for discharge from renal standpoint  · Discharge on Torsemide 60 mg daily  · Follow up with us in the office  SUBJECTIVE / INTERVAL HISTORY:  No CP or SOB  Had rapid afib overnight  OBJECTIVE:  Current Weight: Weight - Scale: 96 5 kg (212 lb 11 9 oz)  Vitals:    12/07/18 0814 12/07/18 0840 12/07/18 1312 12/07/18 1321   BP: 146/61   117/58   BP Location: Left arm   Right arm   Pulse: 59      Resp: 18      Temp: (!) 97 4 °F (36 3 °C)      TempSrc: Temporal      SpO2: 99% 98% 99%    Weight:       Height:           Intake/Output Summary (Last 24 hours) at 12/07/18 1331  Last data filed at 12/07/18 1302   Gross per 24 hour   Intake              150 ml   Output             1375 ml   Net            -1225 ml     General: conscious, coherent, cooperative, no distress  Chest/Lungs: clear breath sounds  CVS: distinct heart sounds, normal rate  Abdomen: soft  Extremities: trace edema  : no persaud catheter  Neuro: awake, alert       Medications:    Current Facility-Administered Medications:     acetaminophen (TYLENOL) tablet 325 mg, 325 mg, Oral, Q12H PRN, 325 mg at 12/06/18 0958 **AND** traMADol (ULTRAM) tablet 50 mg, 50 mg, Oral, Q12H PRN, Vj Swan MD, 50 mg at 12/06/18 2128    amiodarone tablet 100 mg, 100 mg, Oral, Daily, Vj Swan MD, 100 mg at 12/07/18 0910    amLODIPine (NORVASC) tablet 5 mg, 5 mg, Oral, Daily, Torri Renner MD, 5 mg at 12/07/18 0910    aspirin (ECOTRIN LOW STRENGTH) EC tablet 81 mg, 81 mg, Oral, Daily, Torri Renner MD, 81 mg at 12/07/18 0910    cyanocobalamin (VITAMIN B-12) tablet 100 mcg, 100 mcg, Oral, Daily, Torri Renner MD, 100 mcg at 12/07/18 0910    diclofenac sodium (VOLTAREN) 1 % topical gel 2 g, 2 g, Topical, Q12H [facility], Gerilyn Pepek, CRNP, 2 g at 12/07/18 0910    docusate sodium (COLACE) capsule 100 mg, 100 mg, Oral, HS, Torri Renner MD, 100 mg at 12/06/18 2101    fluticasone-vilanterol (BREO ELLIPTA) 100-25 mcg/inh inhaler 1 puff, 1 puff, Inhalation, Daily, Torri Renner MD, 1 puff at 12/07/18 0910    heparin (porcine) subcutaneous injection 5,000 Units, 5,000 Units, Subcutaneous, Q8H [facility], 5,000 Units at 12/07/18 1323 **AND** Platelet count, , , Once, Torri Renner MD    hydrALAZINE (APRESOLINE) tablet 25 mg, 25 mg, Oral, Q8H, Torri Renner MD, 25 mg at 12/07/18 1323    ipratropium-albuterol (DUO-NEB) 0 5-2 5 mg/3 mL inhalation solution 3 mL, 3 mL, Nebulization, Q4H PRN, Torri Renner MD    ipratropium-albuterol (DUO-NEB) 0 5-2 5 mg/3 mL inhalation solution 3 mL, 3 mL, Nebulization, 4x Daily, Torri Renner MD, 3 mL at 12/07/18 1311    iron polysaccharides (NIFEREX) capsule 150 mg, 150 mg, Oral, Daily, Torri Renner MD, 150 mg at 12/07/18 0910    isosorbide mononitrate (IMDUR) 24 hr tablet 30 mg, 30 mg, Oral, Daily, Torri Renner MD, 30 mg at 12/07/18 0910    levothyroxine tablet 50 mcg, 50 mcg, Oral, Daily, Torri Renner MD, 50 mcg at 12/07/18 0553    metoprolol tartrate (LOPRESSOR) tablet 25 mg, 25 mg, Oral, Q12H [facility], Torri Renner MD, 25 mg at 12/07/18 0910    ondansetron (ZOFRAN) injection 4 mg, 4 mg, Intravenous, Q6H PRN, Torri Renner MD    oxybutynin (DITROPAN) tablet 5 mg, 5 mg, Oral, Daily, Torri Renner MD, 5 mg at 12/07/18 0910    pantoprazole (PROTONIX) EC tablet 40 mg, 40 mg, Oral, Daily, Torri Renner MD, 40 mg at 12/07/18 1584    torsemide (DEMADEX) tablet 60 mg, 60 mg, Oral, Daily, Janis DaleJO ANN diehl, 60 mg at 12/07/18 2985    Laboratory Results:    Results from last 7 days  Lab Units 12/07/18  0537 12/06/18  0504 12/05/18  0449 12/04/18  0511 12/03/18  0556 12/02/18  0526 12/01/18  0543   WBC Thousand/uL  --   --  6 79 7 62 8 88 7 84 6 81   HEMOGLOBIN g/dL  --   --  9 1* 9 3* 9 6* 9 6* 8 9*   HEMATOCRIT %  --   --  31 0* 30 4* 31 1* 31 1* 29 0*   PLATELETS Thousands/uL  --   --  273 255 257 264 239   POTASSIUM mmol/L 4 0 4 1 4 0 3 8 3 9 3 7 3 9   CHLORIDE mmol/L 100 99* 100 99* 99* 100 102   CO2 mmol/L 37* 35* 34* 34* 34* 33* 30   BUN mg/dL 68* 61* 55* 51* 51* 52* 53*   CREATININE mg/dL 2 70* 2 69* 2 54* 2 68* 2 63* 2 82* 2 88*   CALCIUM mg/dL 9 0 8 9 8 8 8 9 8 8 8 8 8 7   MAGNESIUM mg/dL 2 3 2 2  --   --  2 0  --   --      Work up:

## 2018-12-07 NOTE — PROGRESS NOTES
Progress Note - Nia Ano 10/24/1930, 80 y o  male MRN: [id number]    Unit/Bed#: [address] [place] [address] Encounter: [id number]    Primary Care Provider: Solitario Cormier DO   Date and time admitted to hospital: 11/29/2018  1:14 PM        Paroxysmal atrial fibrillation [facility])   Assessment & Plan    Patient had episode of AFib with RVR  Converted back to sinus rhythm after receiving Lopressor 5 mg IV x1   · Continue beta blocker, amiodarone  · Hesitant to increase Lopressor as patient is in sinus rhythm with heart rates in the 60s  Continue current dose  · Not on anticoagulation given recurrent falls  · Cardiology following since admission     Acute on chronic respiratory failure with hypoxia ([facility] [address] )   Assessment & Plan    · Normally on 6-10L NC at home 2/2 severe COPD and pulmonary HTN,   · Improving with diuresis  · Monitor      * Acute on chronic combined systolic and diastolic heart failure (HCC)   Assessment & Plan    · LV EF 35-40%, 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  · Presented with acute heart failure with volume overload  · CXR shows vascular congestion, pro BNP 98465  · nephrology and cardio following  · Improved with IV Lasix 60 mg twice a day  · Now close to EDW  · Transitioned to PO torsemide 60 mg in AM daily  Plan to discharge on this dosage  · Patient to check daily weights and increase torsemide to 60 mg BID if weight is above 215 lbs   · Monitor I/Os and daily weights  · Continue other cardiac meds     Acute renal failure superimposed on stage 4 chronic kidney disease (HCC)   Assessment & Plan    · Baseline creatinine 1 8-2, on admission 3 01  Secondary to cardiorenal syndrome  · Creatinine stable around 2 5-2 8  · Recent renal US negative for hydro  · Good UOP, on IV Lasix  · Nephrology following, appreciate input    Per Nephrology, will need to tolerate a higher baseline creatinine to keep out of CHF  · Decreased Voltaren gel dose  · Check Creatinine in am     Moderate COPD (chronic obstructive pulmonary disease) (HCC)   Assessment & Plan    · Last spirometry showed his FEV1 to be 1 85 liters or 76 percent predicted consistent moderate airflow obstruction  · Does not appear in exacerbation, dyspnea was likely from heart failure, improving  · Cont home inhalers     MRSA colonization   Assessment & Plan    Nasal bactroban for 5 days     PATITO (obstructive sleep apnea)   Assessment & Plan    Cont CPAP qHS at 10 with 6L O2       VTE Pharmacologic Prophylaxis:   Pharmacologic: Heparin  Mechanical VTE Prophylaxis in Place: Yes    Patient Centered Rounds: I have performed bedside rounds with nursing staff today  Discussions with Specialists or Other Care Team Provider: Nursing, CM, cardiology, nephrology note appreciated     Education and Discussions with Family / Patient: I have answered all questions to the best of my ability  Time Spent for Care: 20 minutes  More than 50% of total time spent on counseling and coordination of care as described above  Current Length of Stay: 8 day(s)    Current Patient Status: Inpatient   Certification Statement: The patient will continue to require additional inpatient hospital stay due to acute on chronic CHF - monitor response to oral diuretics     Discharge Plan: Patient is not medically stable for discharge today, likely tomorrow am     Code Status: Level 3 - DNAR and DNI      Subjective:   Patient observed resting comfortably in bed  Utilizing supplemental oxygen at 6 L nasal cannula  States his breathing is near baseline  He is tolerating diuretics  States he is making good urine  He tells me that he had atrial fibrillation last night for which the staff continue to go into his room and wake him up throughout the night  He is upset that he did not get a good night's sleep  He denies any headache, dizziness, lightheadedness, chest pain/tightness, abdominal pain, nausea, vomiting, diarrhea    Reports a good appetite and is eating 100% of all meals     Objective:     Vitals:   Temp (24hrs), Av 5 °F (36 4 °C), Min:97 4 °F (36 3 °C), Max:97 7 °F (36 5 °C)    Temp:  [97 4 °F (36 3 °C)-97 7 °F (36 5 °C)] 97 4 °F (36 3 °C)  HR:  [] 59  Resp:  [14-22] 18  BP: (104-146)/(55-85) 117/58  SpO2:  [95 %-100 %] 99 %  Body mass index is 30 53 kg/m²  Input and Output Summary (last 24 hours): Intake/Output Summary (Last 24 hours) at 18 1446  Last data filed at 18 1302   Gross per 24 hour   Intake              150 ml   Output             1375 ml   Net            -1225 ml       Physical Exam:     Physical Exam   Constitutional: He is oriented to person, place, and time  He appears well-developed  No distress  Pleasant gentleman resting comfortably in bed on 6 L nasal cannula   HENT:   Head: Normocephalic  Neck: Normal range of motion  Cardiovascular: Normal rate, regular rhythm and intact distal pulses  Murmur heard  Pulmonary/Chest: Effort normal  No accessory muscle usage  No tachypnea  No respiratory distress  He has decreased breath sounds in the right upper field, the right lower field, the left upper field and the left lower field  He has no wheezes  He has no rhonchi  He has no rales  Abdominal: Soft  Bowel sounds are normal  He exhibits no distension  There is no tenderness  Musculoskeletal: Normal range of motion  He exhibits edema (+1 BLE)  He exhibits no tenderness  Neurological: He is alert and oriented to person, place, and time  Skin: Skin is warm and dry  No rash noted  He is not diaphoretic  There is pallor  Psychiatric: He has a normal mood and affect  His behavior is normal    Nursing note and vitals reviewed        Additional Data:     Labs:      Results from last 7 days  Lab Units 18  0449   WBC Thousand/uL 6 79   HEMOGLOBIN g/dL 9 1*   HEMATOCRIT % 31 0*   PLATELETS Thousands/uL 273   NEUTROS PCT % 56   LYMPHS PCT % 21   MONOS PCT % 11   EOS PCT % 11*       Results from last 7 days  Lab Units 12/07/18  0537   POTASSIUM mmol/L 4 0   CHLORIDE mmol/L 100   CO2 mmol/L 37*   BUN mg/dL 68*   CREATININE mg/dL 2 70*   CALCIUM mg/dL 9 0           * I Have Reviewed All Lab Data Listed Above  * Additional Pertinent Lab Tests Reviewed: All Labs Within Last 24 Hours Reviewed    Imaging:    Imaging Reports Reviewed Today Include:  Chest x-ray  Imaging Personally Reviewed by Myself Includes:  None    Recent Cultures (last 7 days):           Last 24 Hours Medication List:     Current Facility-Administered Medications:  acetaminophen 325 mg Oral Q12H PRN Memory Canavan, MD   And       traMADol 50 mg Oral Q12H PRN Memory Canavan, MD   amiodarone 100 mg Oral Daily Memory Canavan, MD   amLODIPine 5 mg Oral Daily Memory Canavan, MD   aspirin 81 mg Oral Daily Memory Canavan, MD   cyanocobalamin 100 mcg Oral Daily Memory Canavan, MD   diclofenac sodium 2 g Topical Q12H DayROC zimmerman   docusate sodium 100 mg Oral HS Memory Canavan, MD   fluticasone-vilanterol 1 puff Inhalation Daily Memory Canavan, MD   heparin (porcine) 5,000 Units Subcutaneous Q8H 3630 Eduardo Todd MD   hydrALAZINE 25 mg Oral Q8H Memory Canavan, MD   ipratropium-albuterol 3 mL Nebulization Q4H PRN Memory Canavan, MD   ipratropium-albuterol 3 mL Nebulization 4x Daily Memory Canavan, MD   iron polysaccharides 150 mg Oral Daily Memory Canavan, MD   isosorbide mononitrate 30 mg Oral Daily Memory Canavan, MD   levothyroxine 50 mcg Oral Daily Memory Canavan, MD   metoprolol tartrate 25 mg Oral Q12H 3630 Eduardo Todd MD   ondansetron 4 mg Intravenous Q6H PRN Memory Canavan, MD   oxybutynin 5 mg Oral Daily Memory Canavan, MD   pantoprazole 40 mg Oral Daily Memory Canavan, MD   torsemide 60 mg Oral Daily Barbie Newman PA-C        Today, Patient Was Seen By: ROC Mane    ** Please Note: Dictation voice to text software may have been used in the creation of this document   **

## 2018-12-07 NOTE — UTILIZATION REVIEW
Continued Stay Review    Date: 12/7/18    Vital Signs: /61 (BP Location: Left arm)   Pulse 59   Temp (!) 97 4 °F (36 3 °C) (Temporal)   Resp 18   Ht 5' 10" (1 778 m)   Wt 96 5 kg (212 lb 11 9 oz)   SpO2 99%   BMI 30 53 kg/m²     IV LOPRESSOR 5MG  Medications:   Scheduled Meds:   Current Facility-Administered Medications:  acetaminophen 325 mg Oral Q12H PRN Bacilio Galvan MD   And       traMADol 50 mg Oral Q12H PRN Bacilio Galvan MD   amiodarone 100 mg Oral Daily Bacilio Galvan MD   amLODIPine 5 mg Oral Daily Bacilio Galvan MD   aspirin 81 mg Oral Daily Bacilio Galvan MD   cyanocobalamin 100 mcg Oral Daily Bacilio Galvan MD   diclofenac sodium 2 g Topical Q12H [address] [place], [place]   docusate sodium 100 mg Oral HS Bacilio Galvan MD   fluticasone-vilanterol 1 puff Inhalation Daily Bacilio Galvan MD   heparin (porcine) 5,000 Units Subcutaneous Q8H 3630 Eduardo Todd MD   hydrALAZINE 25 mg Oral Q8H Bacilio Galvan MD   ipratropium-albuterol 3 mL Nebulization Q4H PRN Bacilio Galvan MD   ipratropium-albuterol 3 mL Nebulization 4x Daily Bacilio Galvan MD   iron polysaccharides 150 mg Oral Daily Bacilio Galvna MD   isosorbide mononitrate 30 mg Oral Daily Bacilio Galvan MD   levothyroxine 50 mcg Oral Daily Bacilio Galvan MD   metoprolol tartrate 25 mg Oral Q12H 3630 Eduardo Todd MD   ondansetron 4 mg Intravenous Q6H PRN Bacilio Galvan MD   oxybutynin 5 mg Oral Daily Bacilio Galvan MD   pantoprazole 40 mg Oral Daily Bacilio Galvan MD   torsemide 60 mg Oral Daily Janis Gardner PA-C     Continuous Infusions:    PRN Meds:   acetaminophen **AND** traMADol    ipratropium-albuterol    ondansetron    Abnormal Labs/Diagnostic Results: CO2 37 ANION GAP 3 BUN/CR 68/2 70     Age/Sex: 80 y o  male     PATIENT WENT INTO AFIB RECEIVED IV METOPROLOL  PER CARDIOLOGY  Assessment and Plan:   1  Paroxysmal atrial fibrillation:  Patient had an episode of rapid AFib which converted back to sinus after receiving Lopressor 5 mg IV    Patient is currently on amiodarone and low-dose beta-blocker  Hesitant to increase Lopressor as would patient is in sinus rhythm his heart rates are in the 60s  Will continue to monitor  Patient not on long-term anticoagulation due to falls and gait dysfunction  2  Acute on chronic combined systolic and diastolic heart failure:  Patient transition to oral Demadex today per Nephrology  Will continue to monitor his I&O and response  Patient has refused implantation of an ICD in the past      ATTENDING  Paroxysmal atrial fibrillation [facility])   Assessment & Plan     Patient had episode of AFib with RVR  Converted back to sinus rhythm after receiving Lopressor 5 mg IV x1   · Continue beta blocker, amiodarone  ? Hesitant to increase Lopressor as patient is in sinus rhythm with heart rates in the 60s  Continue current dose  · Not on anticoagulation given recurrent falls  · Cardiology following since admission      Acute on chronic respiratory failure with hypoxia (HCC)   Assessment & Plan     · Normally on 6-10L NC at home 2/2 severe COPD and pulmonary HTN,   · Improving with diuresis  · Monitor    Acute on chronic combined systolic and diastolic heart failure   · Improved with IV Lasix 60 mg twice a day  · Now close to EDW  · Transitioned to PO torsemide 60 mg in AM daily  Plan to discharge on this dosage  · Patient to check daily weights and increase torsemide to 60 mg BID if weight is above 215 lbs   · Monitor I/Os and daily weights  · Continue other cardiac meds  · ARF  · Nephrology following, appreciate input    Per Nephrology, will need to tolerate a higher baseline creatinine to keep out of CHF  · Decreased Voltaren gel dose  · Check Creatinine in am

## 2018-12-07 NOTE — PROGRESS NOTES
Progress Note - Cardiology   [facility]     Nellie Mcgarry 80 y o  male MRN: [id number]  : 10/24/1930  Unit/Bed#: [address] [place] [address] Encounter: [id number]    Assessment and Plan:   1  Paroxysmal atrial fibrillation:  Patient had an episode of rapid AFib which converted back to sinus after receiving Lopressor 5 mg IV  Patient is currently on amiodarone and low-dose beta-blocker  Hesitant to increase Lopressor as would patient is in sinus rhythm his heart rates are in the 60s  Will continue to monitor  Patient not on long-term anticoagulation due to falls and gait dysfunction  2  Acute on chronic combined systolic and diastolic heart failure:  Patient transition to oral Demadex today per Nephrology  Will continue to monitor his I&O and response  Patient has refused implantation of an ICD in the past     3  Acute kidney injury on chronic kidney disease stage 4:  Appears patient's new creatinine level is 2 5-2 7 due to his age and ongoing disease  Patient will follow with Dr Con Desai in Nephrology as an outpatient  4  Chronic respiratory failure with severe COPD:  Followed by Pulmonary    5  Coronary artery disease with history of PCI and TAVR:  Stable          Subjective / Objective:   Patient seen examined  Events of last evening noted  Slightly after 8:00 p m  Patient went into rapid atrial fibrillation, he was given Lopressor IV 5 mg which was effective for converting him back to sinus rhythm  Patient is currently maintaining sinus rhythm rates in the 60s at this time  Patient states he was asymptomatic and did not feel any palpitations at the time, but was upset because, he felt the staff kept bothering him because his heart rate was high  Emotional support provided to the patient  Vitals: Blood pressure 146/61, pulse 59, temperature (!) 97 4 °F (36 3 °C), temperature source Temporal, resp  rate 18, height 5' 10" (1 778 m), weight 96 5 kg (212 lb 11 9 oz), SpO2 99 %    Vitals: 12/05/18 0600 12/06/18 0600   Weight: 98 6 kg (217 lb 6 oz) 96 5 kg (212 lb 11 9 oz)     Body mass index is 30 53 kg/m²  BP Readings from Last 3 Encounters:   12/07/18 146/61   11/23/18 130/50   11/19/18 159/74     Orthostatic Blood Pressures      Most Recent Value   Blood Pressure  146/61 filed at 12/07/2018 0814   Patient Position - Orthostatic VS  Lying filed at 12/07/2018 0814        I/O       12/05 0701 - 12/06 0700 12/06 0701 - 12/07 0700 12/07 0701 - 12/08 0700    P  O  680 150     Total Intake(mL/kg) 680 (7) 150 (1 6)     Urine (mL/kg/hr) 2350 (1) 1200 (0 5)     Total Output 2350 1200      Net -1670 -1050                 Invasive Devices     Peripheral Intravenous Line            Peripheral IV 12/03/18 Right Forearm 4 days                  Intake/Output Summary (Last 24 hours) at 12/07/18 0854  Last data filed at 12/07/18 0601   Gross per 24 hour   Intake              150 ml   Output             1200 ml   Net            -1050 ml         Physical Exam:   Physical Exam   Constitutional: He is oriented to person, place, and time  He appears well-developed and well-nourished  No distress  HENT:   Head: Normocephalic and atraumatic  Right Ear: External ear normal    Left Ear: External ear normal    Eyes: Pupils are equal, round, and reactive to light  Right eye exhibits no discharge  Left eye exhibits no discharge  Neck: Normal range of motion  Neck supple  No JVD present  No thyromegaly present  Cardiovascular: Normal rate, regular rhythm and normal pulses  Murmur heard  Systolic murmur is present with a grade of 2/6   Pulmonary/Chest: Effort normal    Coarse breath sounds over large airways, patient coughing and expectorating yellow secretions   Abdominal: Soft  Bowel sounds are normal  He exhibits no distension  Musculoskeletal: He exhibits edema  Mild   Neurological: He is alert and oriented to person, place, and time  Skin: Skin is warm and dry  He is not diaphoretic     Psychiatric: He has a normal mood and affect  Nursing note and vitals reviewed              Medications/ Allergies:     Current Facility-Administered Medications:  acetaminophen 325 mg Oral Q12H PRN Casie Khan MD   And       traMADol 50 mg Oral Q12H PRN Casie Khan MD   amiodarone 100 mg Oral Daily Casie Khan MD   amLODIPine 5 mg Oral Daily Casie Khan MD   aspirin 81 mg Oral Daily Casie Khan MD   cyanocobalamin 100 mcg Oral Daily Casie Khan MD   diclofenac sodium 2 g Topical Q12H ROC Arevalo   docusate sodium 100 mg Oral HS Casie Khan MD   fluticasone-vilanterol 1 puff Inhalation Daily Casie Khan MD   heparin (porcine) 5,000 Units Subcutaneous [facility] Casie Khan MD   hydrALAZINE 25 mg Oral Q8H Casie Khan MD   ipratropium-albuterol 3 mL Nebulization Q4H PRN Casie Khan MD   ipratropium-albuterol 3 mL Nebulization 4x Daily Casie Khan MD   iron polysaccharides 150 mg Oral Daily Casie Khan MD   isosorbide mononitrate 30 mg Oral Daily Casie Khan MD   levothyroxine 50 mcg Oral Daily Casie Khan MD   metoprolol tartrate 25 mg Oral Q12H 3630 Eduardo Todd MD   ondansetron 4 mg Intravenous Q6H PRN Casie Khan MD   oxybutynin 5 mg Oral Daily Casie Khan MD   pantoprazole 40 mg Oral Daily Casie Khan MD   torsemide 60 mg Oral Daily Janis Gardner PA-C       acetaminophen 325 mg Q12H PRN   And     traMADol 50 mg Q12H PRN   ipratropium-albuterol 3 mL Q4H PRN   ondansetron 4 mg Q6H PRN     No Known Allergies    VTE Pharmacologic Prophylaxis:   Sequential compression device (Venodyne)     Labs:     CBC with diff:  Results from last 7 days  Lab Units 12/05/18  0449 12/04/18  0511 12/03/18  0556 12/02/18  0526 12/01/18  0543   WBC Thousand/uL 6 79 7 62 8 88 7 84 6 81   HEMOGLOBIN g/dL 9 1* 9 3* 9 6* 9 6* 8 9*   HEMATOCRIT % 31 0* 30 4* 31 1* 31 1* 29 0*   MCV fL 88 85 84 85 85   PLATELETS Thousands/uL 273 255 257 264 239   MCH pg 25 8* 25 9* 25 9* 26 2* 25 9*   MCHC g/dL 29 4* 30 6* 30 9* 30 9* 30 7*   RDW % 16 6* 16 4* 16 6* 16 6* 16 8*   MPV fL 11 4 10 9 10 6 10 9 11 2   NRBC AUTO /100 WBCs 0  --   --   --   --      CMP:  Results from last 7 days  Lab Units 12/07/18  0537 12/06/18  0504 12/05/18  0449 12/04/18  0511 12/03/18  0556 12/02/18  0526 12/01/18  0543   SODIUM mmol/L 140 140 139 140 141 140 141   POTASSIUM mmol/L 4 0 4 1 4 0 3 8 3 9 3 7 3 9   CHLORIDE mmol/L 100 99* 100 99* 99* 100 102   CO2 mmol/L 37* 35* 34* 34* 34* 33* 30   ANION GAP mmol/L 3* 6 5 7 8 7 9   BUN mg/dL 68* 61* 55* 51* 51* 52* 53*   CREATININE mg/dL 2 70* 2 69* 2 54* 2 68* 2 63* 2 82* 2 88*   CALCIUM mg/dL 9 0 8 9 8 8 8 9 8 8 8 8 8 7   EGFR ml/min/1 73sq m 20 20 22 20 21 19 19     Magnesium:  Results from last 7 days  Lab Units 12/07/18  0537 12/06/18  0504 12/03/18  0556   MAGNESIUM mg/dL 2 3 2 2 2 0       Imaging & Testing   I have personally reviewed pertinent reports  Xr Chest 1 View Portable    Result Date: 11/29/2018  Narrative: CHEST INDICATION:   sob  COMPARISON:  11/18/2018 EXAM PERFORMED/VIEWS:  XR CHEST PORTABLE One image FINDINGS: Cardiomediastinal silhouette appears enlarged  Mild heart failure  Chronic right pleural thickening  Bibasilar scarring  Osseous structures appear within normal limits for patient age  Impression: 1  Cardiomegaly  2   Chronic right pleural thickening  3   Mild CHF  Workstation performed: [id number]     Xr Chest Portable    Result Date: 11/19/2018  Narrative: CHEST INDICATION:   hypoxia  Shortness of breath COMPARISON:  11/16/2018 EXAM PERFORMED/VIEWS:  XR CHEST PORTABLE FINDINGS:  Lines and tubes are unchanged from prior exam   Patient is status post TAVR Heart shadow is enlarged but unchanged from prior exam  Chronic right effusion or pleural thickening noted similar to prior study  The lungs are similar in appearance to recent prior studies  There is no pneumothorax   There are degenerative changes of the shoulders     Impression: No interval change from 11/16/2018 Workstation performed: [id number] Xr Chest Portable    Result Date: 11/16/2018  Narrative: CHEST INDICATION:   chf  Shortness of breath COMPARISON:  November 9, 2018 EXAM PERFORMED/VIEWS:  XR CHEST PORTABLE FINDINGS:  Prosthesis noted overlying the aortic root  Heart shadow is enlarged but unchanged from prior exam  Bibasilar scarring  Right pleural effusion  Osseous structures appear within normal limits for patient age  Impression: Stable chest with bibasilar scarring and right-sided pleural effusion extending along the chest wall  Workstation performed: [id number]     Xr Chest Pa & Lateral    Result Date: 11/9/2018  Narrative: CHEST INDICATION:   Cough  J44 9: Chronic obstructive pulmonary disease, unspecified N18 3: Chronic kidney disease, stage 3 (moderate) I50 9: Heart failure, unspecified  I50 1: Left ventricular failure, unspecified  COMPARISON:  7/18/2018, 10/16/2017 and CT chest 6/11/2018 EXAM PERFORMED/VIEWS:  XR CHEST PA & LATERAL  The frontal view was performed utilizing dual energy radiographic technique  FINDINGS: Cardiomediastinal silhouette appears stable  Transcatheter aortic valve replacement noted  Central vascular prominence again noted without overt pulmonary edema  Peripheral and apical right pleural thickening versus loculated effusion, latter favored based on previous CT  Coarse bronchovascular markings in the lung bases, left greater than right lung base, which may be accentuated by fibrotic and cystic changes  No new infiltrates  No pneumothorax  Degenerative changes of the spine and shoulders  Chronic right AC joint separation noted  Impression: Overall stable appearance of the chest  Question element of chronic pulmonary venous hypertension with chronic basilar changes and loculated pleural effusion peripheral right hemithorax  No new infiltrate  Workstation performed: [id number]     Us Kidney And Bladder    Result Date: 11/16/2018  Narrative: RENAL ULTRASOUND INDICATION:   RENAL FAILURE, ACUTE ARF  COMPARISON: CT scan 2016  TECHNIQUE: Portable ultrasound examination of the retroperitoneum was performed with a curvilinear transducer utilizing volumetric sweeps and still imaging techniques  FINDINGS: Evaluation of the left kidney is limited due to overlying bowel gas  KIDNEYS: Symmetric and normal size  Right kidney:  10 2 cm  Left kidney:  9 8 cm  Right kidney Normal echogenicity and contour  Ill-defined echogenic structure at the midpole, which presumably corresponds with the previously seen rim calcified cyst  No hydronephrosis  No shadowing calculi  No perinephric fluid collections  Left kidney Normal echogenicity and contour  No obvious masses detected  No overt hydronephrosis  No shadowing calculi  No perinephric fluid collections  URETERS: Nonvisualized  BLADDER: The bladder is nondistended, limiting evaluation  Impression: Suboptimal visualization of the left kidney  No evidence of hydronephrosis  Workstation performed: [id number]        EKG / Monitor: Personally reviewed  Patient currently sinus rhythm  Patient had a bout of rapid atrial fibrillation with rates in the mid 100s last evening  Patient was given Lopressor 5 mg IV and successfully converted him back to sinus rhythm  Patient was asymptomatic at the time      Cardiac testing:   Results for orders placed during the hospital encounter of 18   Echo complete with contrast if indicated    Narrative [address]  [address] [facility] [address] [phone]    Transthoracic Echocardiogram  2D, M-mode, Doppler, and Color Doppler    Study date:  2018    Patient: Marielena Lucas  MR number: [id number]  Account number: [de-identified]  : 24-Oct-1930  Age: 80 years  Gender: Male  Status: Inpatient  Location: Bedside  Height: 70 in  Weight: 220 4 lb  BP: 142/ 86 mmHg    Indications: Heart Failure    Diagnoses: I50 9 - Heart failure, unspecified    Sonographer:  CAMILLA eLung  Primary Physician: Nelida Tirpathi DO  Referring Physician:  Nelida Tripathi DO  Group:  [facility]  Interpreting Physician:  Oliva Rodriguez DO    SUMMARY    LEFT VENTRICLE:  Systolic function was severely reduced by visual assessment  Ejection fraction was estimated to be 35 %  There was severe diffuse hypokinesis with no identifiable regional variations  There was moderate concentric hypertrophy  Doppler parameters were consistent with abnormal left ventricular relaxation (grade 1 diastolic dysfunction)  LEFT ATRIUM:  The atrium was moderately dilated  RIGHT ATRIUM:  The atrium was mildly dilated  MITRAL VALVE:  There was mild regurgitation  AORTIC VALVE:  A bioprosthesis was present  It exhibited normal function  There was mild stenosis  Valve peak gradient was 30 mmHg  TRICUSPID VALVE:  There was mild regurgitation  Pulmonary artery systolic pressure was moderately increased  COMPARISONS:  There has been no significant interval change  Comparison was made with the previous study of 13-Jun-2018  HISTORY: PRIOR HISTORY: HTN, DM, DVT, MRSA, NSTEMI, AVR, CAD, BPH, CKD, COPD    PROCEDURE: The procedure was performed at the bedside  This was a stat study  The transthoracic approach was used  The study included complete 2D imaging, M-mode, complete spectral Doppler, and color Doppler  The heart rate was 70 bpm, at  the start of the study  Image quality was adequate  LEFT VENTRICLE: Size was normal  Systolic function was severely reduced by visual assessment  Ejection fraction was estimated to be 35 %  There was severe diffuse hypokinesis with no identifiable regional variations  There was moderate  concentric hypertrophy  DOPPLER: Doppler parameters were consistent with abnormal left ventricular relaxation (grade 1 diastolic dysfunction)  RIGHT VENTRICLE: The size was normal  Systolic function was normal     LEFT ATRIUM: The atrium was moderately dilated      RIGHT ATRIUM: The atrium was mildly dilated  MITRAL VALVE: There was annular calcification  Valve structure was normal  There was normal leaflet separation  No echocardiographic evidence for prolapse  DOPPLER: The transmitral velocity was within the normal range  There was no  evidence for stenosis  There was mild regurgitation  AORTIC VALVE: A bioprosthesis was present  It exhibited normal function  DOPPLER: There was mild stenosis  There was no regurgitation  TRICUSPID VALVE: The valve structure was normal  There was normal leaflet separation  DOPPLER: The transtricuspid velocity was within the normal range  There was mild regurgitation  Pulmonary artery systolic pressure was moderately  increased  Estimated peak PA pressure was 60 mmHg  PULMONIC VALVE: Leaflets exhibited normal thickness, no calcification, and normal cuspal separation  DOPPLER: The transpulmonic velocity was within the normal range  There was no regurgitation  PERICARDIUM: There was no thickening  There was no pericardial effusion  AORTA: The root exhibited normal size  PULMONARY ARTERY: The size was normal  The morphology appeared normal     MEASUREMENT TABLES    DOPPLER MEASUREMENTS  Aortic valve   (Reference normals)  Peak gradient   30 mmHg   (--)    SYSTEM MEASUREMENT TABLES    2D mode  AoR Diam 2D: 3 2 cm  LA Diam (2D): 4 5 cm  LA/Ao (2D): 1 41  FS (2D Teich): 17 4 %  IVSd (2D): 1 55 cm  LVDEV: 159 cm³  LVESV: 102 cm³  LVIDd(2D): 5 69 cm  LVISd (2D): 4 7 cm  LVOT Area 2D: 3 8 cm squared  LVPWd (2D): 1 48 cm  SV (Teich): 57 cm³    Apical four chamber  LVEF A4C: 35 %    Unspecified Scan Mode  FATMATA Cont Eq (Peak Pasquale): 1 51 cm squared  FATMATA Cont Eq (VTI): 1 46 cm squared  LVOT (VTI): 22 5 cm  LVOT Diam : 2 2 cm  LVOT Vmax: 1090 mm/s  LVOT Vmax; Mean: 1090 mm/s  Peak Grad ; Mean: 5 mm[Hg]  SV (LVOT): 86 cm³  VTI;Mean: 2 mm[Hg]  MV Peak A Pasquale: 1330 mm/s  MV Peak E Pasquale   Mean: 602 mm/s  MVA (PHT): 3 24 cm squared  PHT: 68 ms  Max P mm[Hg]  V Max: 3630 mm/s  Vmax: 3620 mm/s  RA Area: 22 4 cm squared  RA Volume: 67 5 cm³  TAPSE: 1 7 cm    [facility] Accredited Echocardiography Laboratory    Prepared and electronically signed by    Clarke Beckwith DO  Signed 16-Nov-2018 16:23:03         Thomas Rodriguez        "This note has been constructed using a voice recognition system  Therefore there may be syntax, spelling, and/or grammatical errors   Please call if you have any questions  "

## 2018-12-07 NOTE — PROGRESS NOTES
Pt went into Atrial Fibrillation with rVr  Will order a dose of IV Metoprolol and monitor on telemetry  Cardiology following since admission

## 2018-12-08 LAB
ANION GAP SERPL CALCULATED.3IONS-SCNC: 5 MMOL/L (ref 4–13)
ATRIAL RATE: 101 BPM
BUN SERPL-MCNC: 71 MG/DL (ref 5–25)
CALCIUM SERPL-MCNC: 8.9 MG/DL (ref 8.3–10.1)
CHLORIDE SERPL-SCNC: 99 MMOL/L (ref 100–108)
CO2 SERPL-SCNC: 36 MMOL/L (ref 21–32)
CREAT SERPL-MCNC: 2.94 MG/DL (ref 0.6–1.3)
GFR SERPL CREATININE-BSD FRML MDRD: 18 ML/MIN/1.73SQ M
GLUCOSE SERPL-MCNC: 111 MG/DL (ref 65–140)
MAGNESIUM SERPL-MCNC: 2.2 MG/DL (ref 1.6–2.6)
POTASSIUM SERPL-SCNC: 4.6 MMOL/L (ref 3.5–5.3)
QRS AXIS: -38 DEGREES
QRSD INTERVAL: 182 MS
QT INTERVAL: 408 MS
QTC INTERVAL: 571 MS
SODIUM SERPL-SCNC: 140 MMOL/L (ref 136–145)
T WAVE AXIS: -70 DEGREES
VENTRICULAR RATE: 118 BPM

## 2018-12-08 PROCEDURE — 83735 ASSAY OF MAGNESIUM: CPT | Performed by: INTERNAL MEDICINE

## 2018-12-08 PROCEDURE — 99232 SBSQ HOSP IP/OBS MODERATE 35: CPT | Performed by: INTERNAL MEDICINE

## 2018-12-08 PROCEDURE — 94660 CPAP INITIATION&MGMT: CPT

## 2018-12-08 PROCEDURE — 80048 BASIC METABOLIC PNL TOTAL CA: CPT | Performed by: INTERNAL MEDICINE

## 2018-12-08 PROCEDURE — 94760 N-INVAS EAR/PLS OXIMETRY 1: CPT

## 2018-12-08 PROCEDURE — 93010 ELECTROCARDIOGRAM REPORT: CPT | Performed by: INTERNAL MEDICINE

## 2018-12-08 PROCEDURE — 94640 AIRWAY INHALATION TREATMENT: CPT

## 2018-12-08 PROCEDURE — 97110 THERAPEUTIC EXERCISES: CPT

## 2018-12-08 PROCEDURE — 97535 SELF CARE MNGMENT TRAINING: CPT

## 2018-12-08 RX ADMIN — Medication 150 MG: at 10:19

## 2018-12-08 RX ADMIN — ISOSORBIDE MONONITRATE 30 MG: 30 TABLET, EXTENDED RELEASE ORAL at 10:17

## 2018-12-08 RX ADMIN — DICLOFENAC 2 G: 10 GEL TOPICAL at 21:11

## 2018-12-08 RX ADMIN — METOPROLOL TARTRATE 25 MG: 25 TABLET, FILM COATED ORAL at 21:09

## 2018-12-08 RX ADMIN — IPRATROPIUM BROMIDE AND ALBUTEROL SULFATE 3 ML: 2.5; .5 SOLUTION RESPIRATORY (INHALATION) at 07:48

## 2018-12-08 RX ADMIN — IPRATROPIUM BROMIDE AND ALBUTEROL SULFATE 3 ML: 2.5; .5 SOLUTION RESPIRATORY (INHALATION) at 20:08

## 2018-12-08 RX ADMIN — PANTOPRAZOLE SODIUM 40 MG: 40 TABLET, DELAYED RELEASE ORAL at 10:19

## 2018-12-08 RX ADMIN — IPRATROPIUM BROMIDE AND ALBUTEROL SULFATE 3 ML: 2.5; .5 SOLUTION RESPIRATORY (INHALATION) at 15:14

## 2018-12-08 RX ADMIN — LEVOTHYROXINE SODIUM 50 MCG: 50 TABLET ORAL at 05:35

## 2018-12-08 RX ADMIN — HEPARIN SODIUM 5000 UNITS: 5000 INJECTION, SOLUTION INTRAVENOUS; SUBCUTANEOUS at 22:08

## 2018-12-08 RX ADMIN — HYDRALAZINE HYDROCHLORIDE 25 MG: 25 TABLET ORAL at 22:07

## 2018-12-08 RX ADMIN — ASPIRIN 81 MG: 81 TABLET, COATED ORAL at 10:18

## 2018-12-08 RX ADMIN — FLUTICASONE FUROATE AND VILANTEROL TRIFENATATE 1 PUFF: 100; 25 POWDER RESPIRATORY (INHALATION) at 10:21

## 2018-12-08 RX ADMIN — VITAM B12 100 MCG: 100 TAB at 10:17

## 2018-12-08 RX ADMIN — METOPROLOL TARTRATE 25 MG: 25 TABLET, FILM COATED ORAL at 10:19

## 2018-12-08 RX ADMIN — DICLOFENAC 2 G: 10 GEL TOPICAL at 10:21

## 2018-12-08 RX ADMIN — IPRATROPIUM BROMIDE AND ALBUTEROL SULFATE 3 ML: 2.5; .5 SOLUTION RESPIRATORY (INHALATION) at 11:30

## 2018-12-08 RX ADMIN — AMIODARONE HYDROCHLORIDE 100 MG: 200 TABLET ORAL at 10:18

## 2018-12-08 RX ADMIN — AMLODIPINE BESYLATE 5 MG: 5 TABLET ORAL at 10:19

## 2018-12-08 RX ADMIN — HEPARIN SODIUM 5000 UNITS: 5000 INJECTION, SOLUTION INTRAVENOUS; SUBCUTANEOUS at 15:03

## 2018-12-08 RX ADMIN — HEPARIN SODIUM 5000 UNITS: 5000 INJECTION, SOLUTION INTRAVENOUS; SUBCUTANEOUS at 05:34

## 2018-12-08 RX ADMIN — OXYBUTYNIN CHLORIDE 5 MG: 5 TABLET ORAL at 10:17

## 2018-12-08 RX ADMIN — HYDRALAZINE HYDROCHLORIDE 25 MG: 25 TABLET ORAL at 15:02

## 2018-12-08 RX ADMIN — HYDRALAZINE HYDROCHLORIDE 25 MG: 25 TABLET ORAL at 05:34

## 2018-12-08 RX ADMIN — DOCUSATE SODIUM 100 MG: 100 CAPSULE, LIQUID FILLED ORAL at 22:08

## 2018-12-08 NOTE — UTILIZATION REVIEW
Continued Stay Review    Date: 12/8/18    Vital Signs: /73 (BP Location: Right arm)   Pulse 74   Temp 98 6 °F (37 °C) (Oral)   Resp 18   Ht 5' 10" (1 778 m)   Wt 96 5 kg (212 lb 11 9 oz)   SpO2 94%   BMI 30 53 kg/m²     Medications:   Scheduled Meds:   Current Facility-Administered Medications:  acetaminophen 325 mg Oral Q12H PRN Carolina Lora MD   And       traMADol 50 mg Oral Q12H PRN Carolina Lora MD   amiodarone 100 mg Oral Daily Carolina Lora MD   amLODIPine 5 mg Oral Daily Carolina Lora MD   aspirin 81 mg Oral Daily Carolina Lora MD   cyanocobalamin 100 mcg Oral Daily Carolina Lora MD   diclofenac sodium 2 g Topical Q12H [address] [place] ROC Montiel   docusate sodium 100 mg Oral HS Carolina Lora MD   fluticasone-vilanterol 1 puff Inhalation Daily Carolina Lora MD   heparin (porcine) 5,000 Units Subcutaneous Q8H 3630 Eduardo Todd MD   hydrALAZINE 25 mg Oral Q8H Carolina Lora MD   ipratropium-albuterol 3 mL Nebulization Q4H PRN Carolina Lora MD   ipratropium-albuterol 3 mL Nebulization 4x Daily Carolina Lora MD   iron polysaccharides 150 mg Oral Daily Carolina Lora MD   isosorbide mononitrate 30 mg Oral Daily Carolina Lora MD   levothyroxine 50 mcg Oral Daily Carolina Lora MD   metoprolol tartrate 25 mg Oral Q12H 3630 Eduardo Todd MD   ondansetron 4 mg Intravenous Q6H PRN Carolina Lora MD   oxybutynin 5 mg Oral Daily Carolina Lora MD   pantoprazole 40 mg Oral Daily Carolina Lora MD     Continuous Infusions:    PRN Meds:   acetaminophen **AND** traMADol    ipratropium-albuterol    ondansetron    Abnormal Labs/Diagnostic Results:     Age/Sex: 80 y o  male     Cardiology     Assessment and Plan:   1  Paroxysmal atrial fibrillation - currently in sinus rhythm  2  Acute on chronic combined systolic and diastolic CHF - now on oral diuretics  3  KJ  4  CAD with previous PCI  5   Aortic stenosis S/P AVR  - continue current medication regimen  - discussed diuretic regimen with nephrology    ATTENDING  Acute on chronic combined systolic and diastolic heart failure (HCC)   Assessment & Plan     · LV EF 35-40%, 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  · Presented with acute heart failure with volume overload  · CXR shows vascular congestion, pro BNP 09154  · nephrology and cardio following  · Improved with IV Lasix 60 mg twice a day  · Now close to EDW  · Hold torsemide to due to rising creatinine  · BMP in a m  · Plan to transition to PO torsemide 60 mg with 40 mg on alternate day  Plan to discharge on this dosage  · Patient to check daily weights and increase torsemide to 60 mg BID if weight is above 215 lbs   · Monitor I/Os and daily weights  · Continue other cardiac meds   Acute renal failure superimposed on stage 4 chronic kidney disease (HCC)   Assessment & Plan     · Baseline creatinine 1 8-2, on admission 3 01  Secondary to cardiorenal syndrome  · Creatinine stable around 2 5-2 8, up trended today  · Recent renal US negative for hydro  · Good UOP, on IV Lasix  · Nephrology following, appreciate input  Per Nephrology, will need to tolerate a higher baseline creatinine to keep out of CHF  · Decreased Voltaren gel dose  · Discussed with Nephrology, Hold torsemide today   Acute on chronic respiratory failure with hypoxia (HCC)   Assessment & Plan     · Normally on 6-10L NC at home 2/2 severe COPD and pulmonary HTN,   · Improving with diuresis  · Monitor    Paroxysmal atrial fibrillation [facility])   Assessment & Plan     Patient had episode of AFib with RVR  Converted back to sinus rhythm after receiving Lopressor 5 mg IV x1   · Continue beta blocker, amiodarone  ? Hesitant to increase Lopressor as patient is in sinus rhythm with heart rates in the 60s  Continue current dose  · Not on anticoagulation given recurrent falls     · Cardiology following since admission   Moderate COPD (chronic obstructive pulmonary disease) ([facility] [address] )   Assessment & Plan     · Last spirometry showed his FEV1 to be 1 85 liters or 76 percent predicted consistent moderate airflow obstruction  · Does not appear in exacerbation, dyspnea was likely from heart failure, improving  · Cont home inhalers   MRSA colonization   Assessment & Plan     Nasal bactroban for 5 days   PATITO (obstructive sleep apnea)   Assessment & Plan     Cont CPAP qHS at 10 with 6L O2

## 2018-12-08 NOTE — PROGRESS NOTES
Fede 50 PROGRESS NOTE   Nirmala Moyer 80 y o  male MRN: [id number]  Unit/Bed#: [address] [place] [address] Encounter: [id number]  Reason for Consult: KJ    ASSESSMENT and PLAN:  1  KJ (POA) due to CRS:   · Creatinine was stable around 2 5 to 2 8 the past 6 days but up to 2 94 today  · He looks euvolemic and will hold Torsemide today  · We will need to tolerate a higher baseline creatinine to keep out of CHF       2  CKD IV: Baseline 1 8 to 2 2       3  HFrEF, fluid overload:   · Will decrease Torsemide to 60 mg alternating with 40 mg daily given rise in the creatinine today on 60 mg daily  · His EDW is probably around 210 to 215 lbs       4  HTN: BP controlled  5  Anemia: Hgb stable  6  Pulmonary HTN    DISPOSITION:  · Stable for discharge from renal standpoint  · Hold Torsemide today  · Discharge on Torsemide 60 mg daily alternating with 40 mg daily  · Follow up with us in the office on 12/19/18 at 8:30 am      Discussed with Dr Merlin Mcguire  SUBJECTIVE / INTERVAL HISTORY:  Denies SOB or CP  Complaining of being a hard stick for labs  OBJECTIVE:  Current Weight: Weight - Scale: 96 5 kg (212 lb 11 9 oz)  Vitals:    12/08/18 0257 12/08/18 0534 12/08/18 0748 12/08/18 0806   BP:  133/64  146/73   BP Location:    Right arm   Pulse:    74   Resp: 13   18   Temp:    98 6 °F (37 °C)   TempSrc:    Oral   SpO2: 98%  95% 94%   Weight:       Height:           Intake/Output Summary (Last 24 hours) at 12/08/18 0949  Last data filed at 12/08/18 0500   Gross per 24 hour   Intake                0 ml   Output             1725 ml   Net            -1725 ml     General: conscious, coherent, cooperative, no distress  Chest/Lungs: clear breath sounds  CVS: distinct heart sounds, normal rate  Abdomen: soft  Extremities: no edema  : no persaud catheter  Neuro: awake, alert       Medications:    Current Facility-Administered Medications:     acetaminophen (TYLENOL) tablet 325 mg, 325 mg, Oral, Q12H PRN, 325 mg at 12/07/18 2127 **AND** traMADol (ULTRAM) tablet 50 mg, 50 mg, Oral, Q12H PRN, Rod Yao MD, 50 mg at 12/06/18 2128    amiodarone tablet 100 mg, 100 mg, Oral, Daily, Rod Yao MD, 100 mg at 12/07/18 0910    amLODIPine (NORVASC) tablet 5 mg, 5 mg, Oral, Daily, Rod Yao MD, 5 mg at 12/07/18 0910    aspirin (ECOTRIN LOW STRENGTH) EC tablet 81 mg, 81 mg, Oral, Daily, Rod Yao MD, 81 mg at 12/07/18 0910    cyanocobalamin (VITAMIN B-12) tablet 100 mcg, 100 mcg, Oral, Daily, Rod Yao MD, 100 mcg at 12/07/18 0910    diclofenac sodium (VOLTAREN) 1 % topical gel 2 g, 2 g, Topical, Q12H [facility], Ned Pat, CRNP, 2 g at 12/07/18 2125    docusate sodium (COLACE) capsule 100 mg, 100 mg, Oral, HS, Rod Yao MD, 100 mg at 12/07/18 2123    fluticasone-vilanterol (BREO ELLIPTA) 100-25 mcg/inh inhaler 1 puff, 1 puff, Inhalation, Daily, Rod Yao MD, 1 puff at 12/07/18 0910    heparin (porcine) subcutaneous injection 5,000 Units, 5,000 Units, Subcutaneous, Q8H [facility], 5,000 Units at 12/08/18 0534 **AND** Platelet count, , , Once, Rod Yao MD    hydrALAZINE (APRESOLINE) tablet 25 mg, 25 mg, Oral, Q8H, Rod Yao MD, 25 mg at 12/08/18 0534    ipratropium-albuterol (DUO-NEB) 0 5-2 5 mg/3 mL inhalation solution 3 mL, 3 mL, Nebulization, Q4H PRN, Rod Yao MD    ipratropium-albuterol (DUO-NEB) 0 5-2 5 mg/3 mL inhalation solution 3 mL, 3 mL, Nebulization, 4x Daily, Rod Yao MD, 3 mL at 12/08/18 0748    iron polysaccharides (NIFEREX) capsule 150 mg, 150 mg, Oral, Daily, Rod Yao MD, 150 mg at 12/07/18 0910    isosorbide mononitrate (IMDUR) 24 hr tablet 30 mg, 30 mg, Oral, Daily, Rod Yao MD, 30 mg at 12/07/18 0910    levothyroxine tablet 50 mcg, 50 mcg, Oral, Daily, Rod Yao MD, 50 mcg at 12/08/18 0535    metoprolol tartrate (LOPRESSOR) tablet 25 mg, 25 mg, Oral, Q12H [address], MD, 25 mg at 12/07/18 2123    ondansetron (ZOFRAN) injection 4 mg, 4 mg, Intravenous, Q6H PRN, MD Rossi Braden Pall oxybutynin (DITROPAN) tablet 5 mg, 5 mg, Oral, Daily, Juvencio Ackerman MD, 5 mg at 12/07/18 0910    pantoprazole (PROTONIX) EC tablet 40 mg, 40 mg, Oral, Daily, Juvencio Ackerman MD, 40 mg at 12/07/18 0910    Laboratory Results:    Results from last 7 days  Lab Units 12/08/18  0651 12/07/18  0537 12/06/18  0504 12/05/18  0449 12/04/18  0511 12/03/18  0556 12/02/18  0526   WBC Thousand/uL  --   --   --  6 79 7 62 8 88 7 84   HEMOGLOBIN g/dL  --   --   --  9 1* 9 3* 9 6* 9 6*   HEMATOCRIT %  --   --   --  31 0* 30 4* 31 1* 31 1*   PLATELETS Thousands/uL  --   --   --  273 255 257 264   POTASSIUM mmol/L 4 6 4 0 4 1 4 0 3 8 3 9 3 7   CHLORIDE mmol/L 99* 100 99* 100 99* 99* 100   CO2 mmol/L 36* 37* 35* 34* 34* 34* 33*   BUN mg/dL 71* 68* 61* 55* 51* 51* 52*   CREATININE mg/dL 2 94* 2 70* 2 69* 2 54* 2 68* 2 63* 2 82*   CALCIUM mg/dL 8 9 9 0 8 9 8 8 8 9 8 8 8 8   MAGNESIUM mg/dL 2 2 2 3 2 2  --   --  2 0  --      Work up:

## 2018-12-08 NOTE — OCCUPATIONAL THERAPY NOTE
OT TREATMENT       12/08/18 1434   Restrictions/Precautions   Other Precautions Contact/isolation;Cardiac/sternal;Chair Alarm; Bed Alarm;O2;Fall Risk;Pain   Pain Assessment   Pain Assessment 0-10   Pain Score 5   Pain Type Chronic pain   Pain Location Arm; Shoulder   ADL   Where Assessed Chair   Grooming Assistance 5  Supervision/Setup   LB Bathing Assistance 2  Maximal [address] 3  Moderate [address] 3  Moderate [address]  3  Moderate Assistance   Toileting Deficit Setup;Steadying;Verbal cueing;Supervison/safety; Increased time to complete;Use of bedpan/urinal setup; Clothing management up;Clothing management down;Perineal hygiene   Functional Standing Tolerance   Time 4 min   Activity ADLS in stance at bedside chair   Comments rolling walker with supervision   Transfers   Sit to Stand 4  Minimal assistance   Stand to Sit 4  Minimal assistance   Therapeutic Exercise - ROM   UE-ROM Yes   ROM- Right Upper Extremities   R Shoulder AROM; Flexion;ABduction;Horizontal ABduction   R Elbow AROM;Elbow flexion;Elbow extension   R Wrist AROM; Wrist flexion;Wrist extension   R Hand AROM   R Position Seated   Equipment Dowel   R Weight/Reps/Sets 10 reps 2 sets   ROM - Left Upper Extremities    L Shoulder AROM; Flexion;ABduction;Horizontal ABduction   L Elbow AROM;Elbow flexion;Elbow extension   L Wrist AROM; Wrist flexion;Wrist extension   L Hand AROM   L Position Seated   Equipment Dowel   L Weight/Reps/Sets 10 reps 2 sets   Cognition   Overall Cognitive Status WFL   Arousal/Participation Alert; Cooperative   Attention Within functional limits   Orientation Level Oriented X4   Memory Within functional limits   Following Commands Follows all commands and directions without difficulty   Activity Tolerance   Activity Tolerance Patient limited by fatigue;Patient limited by pain   Assessment   Assessment pt seen at bedside for skilled tx  pt completed B UE ROM/Therex seated in bedside chair with dowel, 10 reps, 2 sets in all planes and all joints  pt assisted with toileting hygiene in stance and dressing tasks  pt fatigues easily and benefits from frequent rest breaks  pt will benefit from continued tx will continue to follow, recommend D/C to home with family assist and services as needed  Thank you   Plan   Treatment Interventions ADL retraining;Functional transfer training;UE strengthening/ROM; Endurance training;Patient/family training;Equipment evaluation/education; Activityengagement; Energy conservation; Compensatory technique education   Goal Expiration Date 12/14/18   Treatment Day 4   OT Frequency 3-5x/wk   Recommendation   OT Discharge Recommendation Home with family support  (and services vs STR)   Licensure   [place] License Number  [id number] [place] Lic# [id number]

## 2018-12-08 NOTE — PROGRESS NOTES
Progress Note - Cardiology   Fredrick Marquez 80 y o  male MRN: [id number]  : 10/24/1930  Unit/Bed#: [address] [address] Encounter: [id number]    Assessment and Plan:   1  Paroxysmal atrial fibrillation - currently in sinus rhythm  2  Acute on chronic combined systolic and diastolic CHF - now on oral diuretics  3  KJ  4  CAD with previous PCI  5  Aortic stenosis S/P AVR    - continue current medication regimen  - discussed diuretic regimen with nephrology      Subjective / Objective:   Shortness of breath improved  LE edema improved  Vitals: Blood pressure 146/73, pulse 74, temperature 98 6 °F (37 °C), temperature source Oral, resp  rate 18, height 5' 10" (1 778 m), weight 96 5 kg (212 lb 11 9 oz), SpO2 94 %  Vitals:    18 0600 18 0600   Weight: 98 6 kg (217 lb 6 oz) 96 5 kg (212 lb 11 9 oz)     Body mass index is 30 53 kg/m²  BP Readings from Last 3 Encounters:   18 146/73   18 130/50   18 159/74     Orthostatic Blood Pressures      Most Recent Value   Blood Pressure  146/73 filed at 2018 0806   Patient Position - Orthostatic VS  Lying filed at 2018 0806        I/O        0701 -  0700  07 -  0700  07 -  0700    P  O  150      Total Intake(mL/kg) 150 (1 6)      Urine (mL/kg/hr) 1200 (0 5) 1925 (0 8)     Total Output 1200 1925      Net -1050 -1925                 Invasive Devices     Peripheral Intravenous Line            Peripheral IV 18 Right Forearm 5 days                  Intake/Output Summary (Last 24 hours) at 18 1106  Last data filed at 18 0500   Gross per 24 hour   Intake                0 ml   Output             1725 ml   Net            -1725 ml         Physical Exam:   Physical Exam   Constitutional: He is oriented to person, place, and time  He appears well-developed  No distress  HENT:   Head: Normocephalic and atraumatic  Eyes: Pupils are equal, round, and reactive to light   Conjunctivae and EOM are normal  Neck: Neck supple  No JVD present  No thyromegaly present  Cardiovascular: Normal rate and regular rhythm  Exam reveals no gallop and no friction rub  Murmur heard  Pulmonary/Chest: Effort normal and breath sounds normal    Abdominal: Soft  He exhibits no distension  There is no tenderness  Musculoskeletal: He exhibits no edema  Neurological: He is alert and oriented to person, place, and time  No cranial nerve deficit  Skin: Skin is warm and dry  No rash noted  He is not diaphoretic  No erythema  Psychiatric: He has a normal mood and affect   His behavior is normal  Judgment and thought content normal              Medications/ Allergies:     Current Facility-Administered Medications:  acetaminophen 325 mg Oral Q12H PRN Chad Briggs MD   And       traMADol 50 mg Oral Q12H PRN Chad Briggs MD   amiodarone 100 mg Oral Daily Chad Briggs MD   amLODIPine 5 mg Oral Daily Chad Briggs MD   aspirin 81 mg Oral Daily Chad Briggs MD   cyanocobalamin 100 mcg Oral Daily Chad Briggs MD   diclofenac sodium 2 g Topical Q12H [facility]   docusate sodium 100 mg Oral HS Chad Briggs MD   fluticasone-vilanterol 1 puff Inhalation Daily Chad Briggs MD   heparin (porcine) 5,000 Units Subcutaneous Q8H 3630 Eduardo Todd MD   hydrALAZINE 25 mg Oral Q8H Chad Briggs MD   ipratropium-albuterol 3 mL Nebulization Q4H PRN Chad Briggs MD   ipratropium-albuterol 3 mL Nebulization 4x Daily Chad Briggs MD   iron polysaccharides 150 mg Oral Daily Chad Briggs MD   isosorbide mononitrate 30 mg Oral Daily Chad Briggs MD   levothyroxine 50 mcg Oral Daily Chad Briggs MD   metoprolol tartrate 25 mg Oral Q12H 3630 Eduardo Todd MD   ondansetron 4 mg Intravenous Q6H PRN Chad Briggs MD   oxybutynin 5 mg Oral Daily Chad Briggs MD   pantoprazole 40 mg Oral Daily Chad Briggs MD       acetaminophen 325 mg Q12H PRN   And     traMADol 50 mg Q12H PRN   ipratropium-albuterol 3 mL Q4H PRN   ondansetron 4 mg Q6H PRN     No Known Allergies    Labs: Troponins:      CBC with diff:  Results from last 7 days  Lab Units 12/05/18  0449 12/04/18  0511 12/03/18  0556 12/02/18  0526   WBC Thousand/uL 6 79 7 62 8 88 7 84   HEMOGLOBIN g/dL 9 1* 9 3* 9 6* 9 6*   HEMATOCRIT % 31 0* 30 4* 31 1* 31 1*   MCV fL 88 85 84 85   PLATELETS Thousands/uL 273 255 257 264   MCH pg 25 8* 25 9* 25 9* 26 2*   MCHC g/dL 29 4* 30 6* 30 9* 30 9*   RDW % 16 6* 16 4* 16 6* 16 6*   MPV fL 11 4 10 9 10 6 10 9   NRBC AUTO /100 WBCs 0  --   --   --        CMP:  Results from last 7 days  Lab Units 12/08/18  0651 12/07/18  0537 12/06/18  0504 12/05/18  0449 12/04/18  0511 12/03/18  0556 12/02/18  0526   POTASSIUM mmol/L 4 6 4 0 4 1 4 0 3 8 3 9 3 7   CHLORIDE mmol/L 99* 100 99* 100 99* 99* 100   CO2 mmol/L 36* 37* 35* 34* 34* 34* 33*   BUN mg/dL 71* 68* 61* 55* 51* 51* 52*   CREATININE mg/dL 2 94* 2 70* 2 69* 2 54* 2 68* 2 63* 2 82*   CALCIUM mg/dL 8 9 9 0 8 9 8 8 8 9 8 8 8 8   EGFR ml/min/1 73sq m 18 20 20 22 20 21 19       Magnesium:  Results from last 7 days  Lab Units 12/08/18  0651 12/07/18  0537 12/06/18  0504 12/03/18  0556   MAGNESIUM mg/dL 2 2 2 3 2 2 2 0     Coags:    TSH:    Lipid Profile:    Hgb A1c:    NT-proBNP: No results for input(s): NTBNP in the last 72 hours  Imaging & Testing   I have personally reviewed pertinent reports  Xr Chest 1 View Portable    Result Date: 11/29/2018  Narrative: CHEST INDICATION:   sob  COMPARISON:  11/18/2018 EXAM PERFORMED/VIEWS:  XR CHEST PORTABLE One image FINDINGS: Cardiomediastinal silhouette appears enlarged  Mild heart failure  Chronic right pleural thickening  Bibasilar scarring  Osseous structures appear within normal limits for patient age  Impression: 1  Cardiomegaly  2   Chronic right pleural thickening  3   Mild CHF  Workstation performed: [id number]     Xr Chest Portable    Result Date: 11/19/2018  Narrative: CHEST INDICATION:   hypoxia    Shortness of breath COMPARISON:  11/16/2018 EXAM PERFORMED/VIEWS:  XR CHEST PORTABLE FINDINGS:  Lines and tubes are unchanged from prior exam   Patient is status post TAVR Heart shadow is enlarged but unchanged from prior exam  Chronic right effusion or pleural thickening noted similar to prior study  The lungs are similar in appearance to recent prior studies  There is no pneumothorax  There are degenerative changes of the shoulders     Impression: No interval change from 11/16/2018 Workstation performed: [id number]     Xr Chest Portable    Result Date: 11/16/2018  Narrative: CHEST INDICATION:   chf  Shortness of breath COMPARISON:  November 9, 2018 EXAM PERFORMED/VIEWS:  XR CHEST PORTABLE FINDINGS:  Prosthesis noted overlying the aortic root  Heart shadow is enlarged but unchanged from prior exam  Bibasilar scarring  Right pleural effusion  Osseous structures appear within normal limits for patient age  Impression: Stable chest with bibasilar scarring and right-sided pleural effusion extending along the chest wall  Workstation performed: [id number]     Xr Chest Pa & Lateral    Result Date: 11/9/2018  Narrative: CHEST INDICATION:   Cough  J44 9: Chronic obstructive pulmonary disease, unspecified N18 3: Chronic kidney disease, stage 3 (moderate) I50 9: Heart failure, unspecified  I50 1: Left ventricular failure, unspecified  COMPARISON:  7/18/2018, 10/16/2017 and CT chest 6/11/2018 EXAM PERFORMED/VIEWS:  XR CHEST PA & LATERAL  The frontal view was performed utilizing dual energy radiographic technique  FINDINGS: Cardiomediastinal silhouette appears stable  Transcatheter aortic valve replacement noted  Central vascular prominence again noted without overt pulmonary edema  Peripheral and apical right pleural thickening versus loculated effusion, latter favored based on previous CT  Coarse bronchovascular markings in the lung bases, left greater than right lung base, which may be accentuated by fibrotic and cystic changes  No new infiltrates  No pneumothorax   Degenerative changes of the spine and shoulders  Chronic right AC joint separation noted  Impression: Overall stable appearance of the chest  Question element of chronic pulmonary venous hypertension with chronic basilar changes and loculated pleural effusion peripheral right hemithorax  No new infiltrate  Workstation performed: [id number]     Us Kidney And Bladder    Result Date: 2018  Narrative: RENAL ULTRASOUND INDICATION:   RENAL FAILURE, ACUTE ARF  COMPARISON: CT scan 2016  TECHNIQUE: Portable ultrasound examination of the retroperitoneum was performed with a curvilinear transducer utilizing volumetric sweeps and still imaging techniques  FINDINGS: Evaluation of the left kidney is limited due to overlying bowel gas  KIDNEYS: Symmetric and normal size  Right kidney:  10 2 cm  Left kidney:  9 8 cm  Right kidney Normal echogenicity and contour  Ill-defined echogenic structure at the midpole, which presumably corresponds with the previously seen rim calcified cyst  No hydronephrosis  No shadowing calculi  No perinephric fluid collections  Left kidney Normal echogenicity and contour  No obvious masses detected  No overt hydronephrosis  No shadowing calculi  No perinephric fluid collections  URETERS: Nonvisualized  BLADDER: The bladder is nondistended, limiting evaluation  Impression: Suboptimal visualization of the left kidney  No evidence of hydronephrosis   Workstation performed: [id number]      Cardiac testing:   Results for orders placed during the hospital encounter of 18   Echo complete with contrast if indicated    Narrative [address]  [address] [facility] 6 [phone]    Transthoracic Echocardiogram  2D, M-mode, Doppler, and Color Doppler    Study date:  2018    Patient: Gama Trivedi  MR number: [id number]  Account number: [de-identified]  : 24-Oct-1930  Age: 80 years  Gender: Male  Status: Inpatient  Location: Bedside  Height: 70 in  Weight: 220 4 lb  BP: 142/ 86 mmHg    Indications: Heart Failure    Diagnoses: I50 9 - Heart failure, unspecified    Sonographer:  CAMILLA Waldrop  Primary Physician:  Dwayne Montenegro DO  Referring Physician:  Dwayne Montenegro DO  Group[facility]  [facility]  Interpreting Physician:  John Marrero DO    SUMMARY    LEFT VENTRICLE:  Systolic function was severely reduced by visual assessment  Ejection fraction was estimated to be 35 %  There was severe diffuse hypokinesis with no identifiable regional variations  There was moderate concentric hypertrophy  Doppler parameters were consistent with abnormal left ventricular relaxation (grade 1 diastolic dysfunction)  LEFT ATRIUM:  The atrium was moderately dilated  RIGHT ATRIUM:  The atrium was mildly dilated  MITRAL VALVE:  There was mild regurgitation  AORTIC VALVE:  A bioprosthesis was present  It exhibited normal function  There was mild stenosis  Valve peak gradient was 30 mmHg  TRICUSPID VALVE:  There was mild regurgitation  Pulmonary artery systolic pressure was moderately increased  COMPARISONS:  There has been no significant interval change  Comparison was made with the previous study of 13-Jun-2018  HISTORY: PRIOR HISTORY: HTN, DM, DVT, MRSA, NSTEMI, AVR, CAD, BPH, CKD, COPD    PROCEDURE: The procedure was performed at the bedside  This was a stat study  The transthoracic approach was used  The study included complete 2D imaging, M-mode, complete spectral Doppler, and color Doppler  The heart rate was 70 bpm, at  the start of the study  Image quality was adequate  LEFT VENTRICLE: Size was normal  Systolic function was severely reduced by visual assessment  Ejection fraction was estimated to be 35 %  There was severe diffuse hypokinesis with no identifiable regional variations  There was moderate  concentric hypertrophy   DOPPLER: Doppler parameters were consistent with abnormal left ventricular relaxation (grade 1 diastolic dysfunction)  RIGHT VENTRICLE: The size was normal  Systolic function was normal     LEFT ATRIUM: The atrium was moderately dilated  RIGHT ATRIUM: The atrium was mildly dilated  MITRAL VALVE: There was annular calcification  Valve structure was normal  There was normal leaflet separation  No echocardiographic evidence for prolapse  DOPPLER: The transmitral velocity was within the normal range  There was no  evidence for stenosis  There was mild regurgitation  AORTIC VALVE: A bioprosthesis was present  It exhibited normal function  DOPPLER: There was mild stenosis  There was no regurgitation  TRICUSPID VALVE: The valve structure was normal  There was normal leaflet separation  DOPPLER: The transtricuspid velocity was within the normal range  There was mild regurgitation  Pulmonary artery systolic pressure was moderately  increased  Estimated peak PA pressure was 60 mmHg  PULMONIC VALVE: Leaflets exhibited normal thickness, no calcification, and normal cuspal separation  DOPPLER: The transpulmonic velocity was within the normal range  There was no regurgitation  PERICARDIUM: There was no thickening  There was no pericardial effusion  AORTA: The root exhibited normal size      PULMONARY ARTERY: The size was normal  The morphology appeared normal     MEASUREMENT TABLES    DOPPLER MEASUREMENTS  Aortic valve   (Reference normals)  Peak gradient   30 mmHg   (--)    SYSTEM MEASUREMENT TABLES    2D mode  AoR Diam 2D: 3 2 cm  LA Diam (2D): 4 5 cm  LA/Ao (2D): 1 41  FS (2D Teich): 17 4 %  IVSd (2D): 1 55 cm  LVDEV: 159 cm³  LVESV: 102 cm³  LVIDd(2D): 5 69 cm  LVISd (2D): 4 7 cm  LVOT Area 2D: 3 8 cm squared  LVPWd (2D): 1 48 cm  SV (Teich): 57 cm³    Apical four chamber  LVEF A4C: 35 %    Unspecified Scan Mode  FATMATA Cont Eq (Peak Pasquale): 1 51 cm squared  FATMATA Cont Eq (VTI): 1 46 cm squared  LVOT (VTI): 22 5 cm  LVOT Diam : 2 2 cm  LVOT Vmax: 1090 mm/s  LVOT Vmax; Mean: 1090 mm/s  Peak Grad ; Mean: 5 mm[Hg]  SV (LVOT): 86 cm³  VTI;Mean: 2 mm[Hg]  MV Peak A Pasquale: 1330 mm/s  MV Peak E Pasquale  Mean: 602 mm/s  MVA (PHT): 3 24 cm squared  PHT: 68 ms  Max P mm[Hg]  V Max: 3630 mm/s  Vmax: 3620 mm/s  RA Area: 22 4 cm squared  RA Volume: 67 5 cm³  TAPSE: 1 7 cm    [facility]    Prepared and electronically signed by    Cony Mackey DO  Signed 2018 16:23:03         Claudia DO [place], [facility]      "This note has been constructed using a voice recognition system  Therefore there may be syntax, spelling, and/or grammatical errors   Please call if you have any questions  "

## 2018-12-09 VITALS
RESPIRATION RATE: 16 BRPM | SYSTOLIC BLOOD PRESSURE: 145 MMHG | OXYGEN SATURATION: 100 % | HEART RATE: 67 BPM | HEIGHT: 70 IN | WEIGHT: 203.04 LBS | BODY MASS INDEX: 29.07 KG/M2 | DIASTOLIC BLOOD PRESSURE: 65 MMHG | TEMPERATURE: 97.8 F

## 2018-12-09 PROBLEM — I50.43 ACUTE ON CHRONIC COMBINED SYSTOLIC AND DIASTOLIC HEART FAILURE (HCC): Status: RESOLVED | Noted: 2017-05-08 | Resolved: 2018-12-09

## 2018-12-09 PROBLEM — J96.21 ACUTE ON CHRONIC RESPIRATORY FAILURE WITH HYPOXIA (HCC): Status: RESOLVED | Noted: 2017-11-12 | Resolved: 2018-12-09

## 2018-12-09 PROBLEM — I50.42 CHRONIC COMBINED SYSTOLIC AND DIASTOLIC HEART FAILURE (HCC): Status: RESOLVED | Noted: 2017-05-08 | Resolved: 2018-12-09

## 2018-12-09 PROBLEM — J96.11 CHRONIC RESPIRATORY FAILURE WITH HYPOXIA (HCC): Status: RESOLVED | Noted: 2017-11-12 | Resolved: 2018-12-09

## 2018-12-09 LAB
ANION GAP SERPL CALCULATED.3IONS-SCNC: 4 MMOL/L (ref 4–13)
BUN SERPL-MCNC: 71 MG/DL (ref 5–25)
CALCIUM SERPL-MCNC: 9.2 MG/DL (ref 8.3–10.1)
CHLORIDE SERPL-SCNC: 102 MMOL/L (ref 100–108)
CO2 SERPL-SCNC: 35 MMOL/L (ref 21–32)
CREAT SERPL-MCNC: 2.75 MG/DL (ref 0.6–1.3)
GFR SERPL CREATININE-BSD FRML MDRD: 20 ML/MIN/1.73SQ M
GLUCOSE SERPL-MCNC: 109 MG/DL (ref 65–140)
PLATELET # BLD AUTO: 286 THOUSANDS/UL (ref 149–390)
PMV BLD AUTO: 11.1 FL (ref 8.9–12.7)
POTASSIUM SERPL-SCNC: 4.1 MMOL/L (ref 3.5–5.3)
SODIUM SERPL-SCNC: 141 MMOL/L (ref 136–145)

## 2018-12-09 PROCEDURE — 99239 HOSP IP/OBS DSCHRG MGMT >30: CPT | Performed by: INTERNAL MEDICINE

## 2018-12-09 PROCEDURE — 99232 SBSQ HOSP IP/OBS MODERATE 35: CPT | Performed by: INTERNAL MEDICINE

## 2018-12-09 PROCEDURE — 94640 AIRWAY INHALATION TREATMENT: CPT

## 2018-12-09 PROCEDURE — 80048 BASIC METABOLIC PNL TOTAL CA: CPT | Performed by: INTERNAL MEDICINE

## 2018-12-09 PROCEDURE — 94760 N-INVAS EAR/PLS OXIMETRY 1: CPT

## 2018-12-09 PROCEDURE — 85049 AUTOMATED PLATELET COUNT: CPT | Performed by: STUDENT IN AN ORGANIZED HEALTH CARE EDUCATION/TRAINING PROGRAM

## 2018-12-09 RX ORDER — TORSEMIDE 20 MG/1
40 TABLET ORAL EVERY OTHER DAY
Status: DISCONTINUED | OUTPATIENT
Start: 2018-12-09 | End: 2018-12-09 | Stop reason: HOSPADM

## 2018-12-09 RX ORDER — IPRATROPIUM BROMIDE AND ALBUTEROL SULFATE 2.5; .5 MG/3ML; MG/3ML
3 SOLUTION RESPIRATORY (INHALATION)
Refills: 0
Start: 2018-12-09 | End: 2018-12-20 | Stop reason: SDUPTHER

## 2018-12-09 RX ORDER — TORSEMIDE 20 MG/1
TABLET ORAL
Qty: 100 TABLET | Refills: 0 | Status: SHIPPED | OUTPATIENT
Start: 2018-12-10

## 2018-12-09 RX ORDER — TORSEMIDE 20 MG/1
60 TABLET ORAL EVERY OTHER DAY
Status: DISCONTINUED | OUTPATIENT
Start: 2018-12-10 | End: 2018-12-09 | Stop reason: HOSPADM

## 2018-12-09 RX ADMIN — PANTOPRAZOLE SODIUM 40 MG: 40 TABLET, DELAYED RELEASE ORAL at 08:03

## 2018-12-09 RX ADMIN — AMLODIPINE BESYLATE 5 MG: 5 TABLET ORAL at 08:03

## 2018-12-09 RX ADMIN — LEVOTHYROXINE SODIUM 50 MCG: 50 TABLET ORAL at 05:52

## 2018-12-09 RX ADMIN — AMIODARONE HYDROCHLORIDE 100 MG: 200 TABLET ORAL at 08:03

## 2018-12-09 RX ADMIN — VITAM B12 100 MCG: 100 TAB at 08:03

## 2018-12-09 RX ADMIN — IPRATROPIUM BROMIDE AND ALBUTEROL SULFATE 3 ML: 2.5; .5 SOLUTION RESPIRATORY (INHALATION) at 07:33

## 2018-12-09 RX ADMIN — Medication 150 MG: at 08:03

## 2018-12-09 RX ADMIN — TORSEMIDE 40 MG: 20 TABLET ORAL at 12:06

## 2018-12-09 RX ADMIN — DICLOFENAC 2 G: 10 GEL TOPICAL at 08:04

## 2018-12-09 RX ADMIN — ISOSORBIDE MONONITRATE 30 MG: 30 TABLET, EXTENDED RELEASE ORAL at 08:03

## 2018-12-09 RX ADMIN — FLUTICASONE FUROATE AND VILANTEROL TRIFENATATE 1 PUFF: 100; 25 POWDER RESPIRATORY (INHALATION) at 08:03

## 2018-12-09 RX ADMIN — METOPROLOL TARTRATE 25 MG: 25 TABLET, FILM COATED ORAL at 08:03

## 2018-12-09 RX ADMIN — OXYBUTYNIN CHLORIDE 5 MG: 5 TABLET ORAL at 08:03

## 2018-12-09 RX ADMIN — HEPARIN SODIUM 5000 UNITS: 5000 INJECTION, SOLUTION INTRAVENOUS; SUBCUTANEOUS at 05:53

## 2018-12-09 RX ADMIN — ASPIRIN 81 MG: 81 TABLET, COATED ORAL at 08:03

## 2018-12-09 RX ADMIN — IPRATROPIUM BROMIDE AND ALBUTEROL SULFATE 3 ML: 2.5; .5 SOLUTION RESPIRATORY (INHALATION) at 11:56

## 2018-12-09 RX ADMIN — HYDRALAZINE HYDROCHLORIDE 25 MG: 25 TABLET ORAL at 05:52

## 2018-12-09 NOTE — ASSESSMENT & PLAN NOTE
Patient had episode of AFib with RVR  Converted back to sinus rhythm after receiving Lopressor 5 mg IV x1   · Continue beta blocker, amiodarone  · Hesitant to increase Lopressor as patient is in sinus rhythm with heart rates in the 60s  Continue current dose  · Not on anticoagulation given recurrent falls

## 2018-12-09 NOTE — ASSESSMENT & PLAN NOTE
· Baseline creatinine 1 8-2, on admission 3 01  Secondary to cardiorenal syndrome  · Creatinine stable around 2 5-2 8, likely new baseline  · Recent renal US negative for hydro  · Good UOP, on IV Lasix  · Nephrology following, appreciate input    Per Nephrology, will need to tolerate a higher baseline creatinine to keep out of CHF  · Decreased Voltaren gel dose  · Follow-up BMP and Nephrology as scheduled after discharge

## 2018-12-09 NOTE — SOCIAL WORK
Per PCP, pt stable for DC to home today  Pt did not need a ride, but has [facility] following  EPIC communication sent with AVS and DC summary for follow up

## 2018-12-09 NOTE — ASSESSMENT & PLAN NOTE
· LV EF 35-40%, 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  · Presented with acute heart failure with volume overload  · CXR shows vascular congestion, pro BNP 52311  · nephrology and cardio following  · Improved with IV Lasix 60 mg twice a day  · Now close to EDW  · Plan to transition to PO torsemide 60 mg with 40 mg on alternate day  Plan to discharge on this dosage    · Patient to check daily weights and increase torsemide to 60 mg BID if weight is above 215 lbs   · Monitor I/Os and daily weights  · Continue other cardiac meds

## 2018-12-09 NOTE — PROGRESS NOTES
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    3  HFrEF, fluid overload: Discharge on Torsemide 60 mg daily alternating with 40 mg daily  4  HTN: BP controlled  5  Anemia: Hgb stable  6  Pulmonary HTN    DISPOSITION:  · Stable for discharge from renal standpoint  · Discharge on Torsemide 60 mg daily alternating with 40 mg daily  · Follow up with us in the office on 12/19/18 at 8:30 am      SUBJECTIVE / INTERVAL HISTORY:  No new complaints  OBJECTIVE:  Current Weight: Weight - Scale: 92 1 kg (203 lb 0 7 oz)  Vitals:    12/09/18 0552 12/09/18 0600 12/09/18 0733 12/09/18 0745   BP: 143/65   145/65   BP Location:    Right arm   Pulse:    67   Resp:    16   Temp:    97 8 °F (36 6 °C)   TempSrc:    Oral   SpO2:   98% 100%   Weight:  92 1 kg (203 lb 0 7 oz)     Height:           Intake/Output Summary (Last 24 hours) at 12/09/18 0944  Last data filed at 12/09/18 0700   Gross per 24 hour   Intake                0 ml   Output              850 ml   Net             -850 ml     General: conscious, coherent, cooperative, no distress  Chest/Lungs: clear breath sounds  CVS: distinct heart sounds, normal rate  Abdomen: soft  Extremities: no edema  : no persaud catheter  Neuro: awake, alert       Medications:    Current Facility-Administered Medications:     acetaminophen (TYLENOL) tablet 325 mg, 325 mg, Oral, Q12H PRN, 325 mg at 12/07/18 2127 **AND** traMADol (ULTRAM) tablet 50 mg, 50 mg, Oral, Q12H PRN, Jose Rico MD, 50 mg at 12/06/18 2128    amiodarone tablet 100 mg, 100 mg, Oral, Daily, Will Guerrero MD, 100 mg at 12/09/18 0803    amLODIPine (NORVASC) tablet 5 mg, 5 mg, Oral, Daily, Will Guerrero MD, 5 mg at 12/09/18 0803    aspirin (ECOTRIN LOW STRENGTH) EC tablet 81 mg, 81 mg, Oral, Daily, Will Guerrero MD, 81 mg at 12/09/18 0803    cyanocobalamin (VITAMIN B-12) tablet 100 mcg, 100 mcg, Oral, Daily, Will Guerrero MD, 100 mcg at 12/09/18 0803    diclofenac sodium (VOLTAREN) 1 % topical gel 2 g, 2 g, Topical, Q12H [facility], Ngoc Hayos, CRNP, 2 g at 12/09/18 0804    docusate sodium (COLACE) capsule 100 mg, 100 mg, Oral, HS, Will Guerrero MD, 100 mg at 12/08/18 2208    fluticasone-vilanterol (BREO ELLIPTA) 100-25 mcg/inh inhaler 1 puff, 1 puff, Inhalation, Daily, Will Guerrero MD, 1 puff at 12/09/18 0803    heparin (porcine) subcutaneous injection 5,000 Units, 5,000 Units, Subcutaneous, Q8H [facility], 5,000 Units at 12/09/18 0553 **AND** Platelet count, , , Once, Will Guerrero MD    hydrALAZINE (APRESOLINE) tablet 25 mg, 25 mg, Oral, Q8H, Will Guerrero MD, 25 mg at 12/09/18 0552    ipratropium-albuterol (DUO-NEB) 0 5-2 5 mg/3 mL inhalation solution 3 mL, 3 mL, Nebulization, Q4H PRN, Will Guerrero MD    ipratropium-albuterol (DUO-NEB) 0 5-2 5 mg/3 mL inhalation solution 3 mL, 3 mL, Nebulization, 4x Daily, Will Guerrero MD, 3 mL at 12/09/18 0733    iron polysaccharides (NIFEREX) capsule 150 mg, 150 mg, Oral, Daily, Will Guerrero MD, 150 mg at 12/09/18 0803    isosorbide mononitrate (IMDUR) 24 hr tablet 30 mg, 30 mg, Oral, Daily, Will Guerrero MD, 30 mg at 12/09/18 0803    levothyroxine tablet 50 mcg, 50 mcg, Oral, Daily, Will Guerrero MD, 50 mcg at 12/09/18 0552    metoprolol tartrate (LOPRESSOR) tablet 25 mg, 25 mg, Oral, Q12H [facility], Will Guerrero MD, 25 mg at 12/09/18 0803    ondansetron (ZOFRAN) injection 4 mg, 4 mg, Intravenous, Q6H PRN, Will Guerrero MD    oxybutynin (DITROPAN) tablet 5 mg, 5 mg, Oral, Daily, Will Guerrero MD, 5 mg at 12/09/18 0803    pantoprazole (PROTONIX) EC tablet 40 mg, 40 mg, Oral, Daily, Jacob Eduardo MD, 40 mg at 12/09/18 6879    Laboratory Results:    Results from last 7 days  Lab Units 12/09/18  0606 12/09/18  0600 12/08/18  0651 12/07/18  0537 12/06/18  0504 12/05/18  0449 12/04/18  0511 12/03/18  0556   WBC Thousand/uL  --   --   --   --   --  6 79 7 62 8 88   HEMOGLOBIN g/dL  --   --   --   --   --  9 1* 9 3* 9 6*   HEMATOCRIT %  --   --   --   --   --  31 0* 30 4* 31 1*   PLATELETS Thousands/uL 286  --   --   --   --  273 255 257   POTASSIUM mmol/L  --  4 1 4 6 4 0 4 1 4 0 3 8 3 9   CHLORIDE mmol/L  --  102 99* 100 99* 100 99* 99*   CO2 mmol/L  --  35* 36* 37* 35* 34* 34* 34*   BUN mg/dL  --  71* 71* 68* 61* 55* 51* 51*   CREATININE mg/dL  --  2 75* 2 94* 2 70* 2 69* 2 54* 2 68* 2 63*   CALCIUM mg/dL  --  9 2 8 9 9 0 8 9 8 8 8 9 8 8   MAGNESIUM mg/dL  --   --  2 2 2 3 2 2  --   --  2 0     Work up:

## 2018-12-09 NOTE — ASSESSMENT & PLAN NOTE
· Last spirometry showed his FEV1 to be 1 85 liters or 76 percent predicted consistent moderate airflow obstruction  · Does not appear in exacerbation  · Cont duo nebs, Breo

## 2018-12-09 NOTE — PROGRESS NOTES
[address] Internal Medicine Progress Note  Patient: Alina Slade 80 y o  male   MRN: [id number]  PCP: Barbie Grimaldo DO  Unit/Bed#: [address] [place] [address] Encounter: [id number]  Date Of Visit: 12/08/18    Problem List:    Principal Problem:    Acute on chronic combined systolic and diastolic heart failure ([facility] [address] )  Active Problems:    Acute on chronic respiratory failure with hypoxia ([facility] [address] )    Acute renal failure superimposed on stage 4 chronic kidney disease (HCC)    Moderate COPD (chronic obstructive pulmonary disease) (HCC)    Paroxysmal atrial fibrillation (HCC)    PATITO (obstructive sleep apnea)    MRSA colonization      Assessment & Plan:    * Acute on chronic combined systolic and diastolic heart failure (HCC)   Assessment & Plan    · LV EF 35-40%, 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  · Presented with acute heart failure with volume overload  · CXR shows vascular congestion, pro BNP 58058  · nephrology and cardio following  · Improved with IV Lasix 60 mg twice a day  · Now close to EDW  · Hold torsemide to due to rising creatinine  · BMP in a m  · Plan to transition to PO torsemide 60 mg with 40 mg on alternate day  Plan to discharge on this dosage  · Patient to check daily weights and increase torsemide to 60 mg BID if weight is above 215 lbs   · Monitor I/Os and daily weights  · Continue other cardiac meds     Acute renal failure superimposed on stage 4 chronic kidney disease (HCC)   Assessment & Plan    · Baseline creatinine 1 8-2, on admission 3 01  Secondary to cardiorenal syndrome  · Creatinine stable around 2 5-2 8, up trended today  · Recent renal US negative for hydro  · Good UOP, on IV Lasix  · Nephrology following, appreciate input    Per Nephrology, will need to tolerate a higher baseline creatinine to keep out of CHF  · Decreased Voltaren gel dose  · Discussed with Nephrology, Hold torsemide today     Acute on chronic respiratory failure with hypoxia ([facility] [address] )   Assessment & Plan    · Normally on 6-10L NC at home 2/2 severe COPD and pulmonary HTN,   · Improving with diuresis  · Monitor      Paroxysmal atrial fibrillation [facility])   Assessment & Plan    Patient had episode of AFib with RVR  Converted back to sinus rhythm after receiving Lopressor 5 mg IV x1   · Continue beta blocker, amiodarone  · Hesitant to increase Lopressor as patient is in sinus rhythm with heart rates in the 60s  Continue current dose  · Not on anticoagulation given recurrent falls  · Cardiology following since admission     Moderate COPD (chronic obstructive pulmonary disease) ([facility])   Assessment & Plan    · Last spirometry showed his FEV1 to be 1 85 liters or 76 percent predicted consistent moderate airflow obstruction  · Does not appear in exacerbation, dyspnea was likely from heart failure, improving  · Cont home inhalers     MRSA colonization   Assessment & Plan    Nasal bactroban for 5 days     PATITO (obstructive sleep apnea)   Assessment & Plan    Cont CPAP qHS at 10 with 6L O2           VTE Pharmacologic Prophylaxis:   Pharmacologic: Heparin  Mechanical VTE Prophylaxis in Place: Yes    Patient Centered Rounds: I have performed bedside rounds with nursing staff today  Discussions with Specialists or Other Care Team Provider: Yes, Cardiology, Dr Tiffany Penn    Education and Discussions with Family / Patient:Yes    Time Spent for Care: 45 minutes  More than 50% of total time spent on counseling and coordination of care as described above      Current Length of Stay: 9 day(s)    Current Patient Status: Inpatient     Discharge Plan:  Anticipate home with services in next 24-48 hrs    Code Status: Level 3 - DNAR and DNI    Certification Statement: The patient will continue to require additional inpatient hospital stay due to CHF      Subjective:   Reports improvement in shortness of breath  No further atrial fibrillation  Denies chest pain  Reports that he will not be able to go home today as there is no one at home    Objective:   Comfortably sitting in bed      Negative for Chest Pain, Palpitations, Abdominal Pain, Nausea, Vomiting, Constipation, Diarrhea, Dizziness  All other 10 review of systems negative and without drastic interval changes from yesterday  Vitals:   Temp (24hrs), Av 1 °F (36 7 °C), Min:97 5 °F (36 4 °C), Max:98 6 °F (37 °C)    Temp:  [97 5 °F (36 4 °C)-98 6 °F (37 °C)] 98 5 °F (36 9 °C)  HR:  [63-74] 71  Resp:  [13-24] 19  BP: (112-148)/(56-73) 112/56  SpO2:  [94 %-99 %] 94 %  Body mass index is 30 53 kg/m²  Input and Output Summary (last 24 hours): Intake/Output Summary (Last 24 hours) at 18  Last data filed at 18 0500   Gross per 24 hour   Intake                0 ml   Output              700 ml   Net             -700 ml       Physical Exam:     Physical Exam   Constitutional: He is oriented to person, place, and time  He appears well-developed  No distress  HENT:   Head: Normocephalic and atraumatic  Eyes: Pupils are equal, round, and reactive to light  Conjunctivae are normal    Neck: Normal range of motion  Neck supple  Cardiovascular: Normal rate and regular rhythm  Murmur heard  Pulmonary/Chest: Effort normal  No respiratory distress  He has no wheezes  He has no rhonchi  He has no rales  He exhibits no tenderness  Abdominal: Soft  Bowel sounds are normal  He exhibits no distension  There is no tenderness  There is no rebound and no guarding  Musculoskeletal: He exhibits edema (Significantly better)  Neurological: He is alert and oriented to person, place, and time  No cranial nerve deficit  Skin: Skin is warm and dry  No rash noted         Additional Data:     Labs:      Results from last 7 days  Lab Units 18  0449   WBC Thousand/uL 6 79   HEMOGLOBIN g/dL 9 1*   HEMATOCRIT % 31 0*   PLATELETS Thousands/uL 273   NEUTROS PCT % 56   LYMPHS PCT % 21   MONOS PCT % 11   EOS PCT % 11*       Results from last 7 days  Lab Units 18  0651   POTASSIUM mmol/L 4 6   CHLORIDE mmol/L 99*   CO2 mmol/L 36*   BUN mg/dL 71*   CREATININE mg/dL 2 94*   CALCIUM mg/dL 8 9           * I Have Reviewed All Lab Data Listed Above  * Additional Pertinent Lab Tests Reviewed: All Labs For Current Hospital Admission Reviewed    Imaging:  Xr Chest 1 View Portable    Result Date: 11/29/2018  Narrative: CHEST INDICATION:   sob  COMPARISON:  11/18/2018 EXAM PERFORMED/VIEWS:  XR CHEST PORTABLE One image FINDINGS: Cardiomediastinal silhouette appears enlarged  Mild heart failure  Chronic right pleural thickening  Bibasilar scarring  Osseous structures appear within normal limits for patient age  Impression: 1  Cardiomegaly  2   Chronic right pleural thickening  3   Mild CHF  Workstation performed: [id number]     Xr Chest Portable    Result Date: 11/19/2018  Narrative: CHEST INDICATION:   hypoxia  Shortness of breath COMPARISON:  11/16/2018 EXAM PERFORMED/VIEWS:  XR CHEST PORTABLE FINDINGS:  Lines and tubes are unchanged from prior exam   Patient is status post TAVR Heart shadow is enlarged but unchanged from prior exam  Chronic right effusion or pleural thickening noted similar to prior study  The lungs are similar in appearance to recent prior studies  There is no pneumothorax  There are degenerative changes of the shoulders     Impression: No interval change from 11/16/2018 Workstation performed: [id number]     Xr Chest Portable    Result Date: 11/16/2018  Narrative: CHEST INDICATION:   chf  Shortness of breath COMPARISON:  November 9, 2018 EXAM PERFORMED/VIEWS:  XR CHEST PORTABLE FINDINGS:  Prosthesis noted overlying the aortic root  Heart shadow is enlarged but unchanged from prior exam  Bibasilar scarring  Right pleural effusion  Osseous structures appear within normal limits for patient age  Impression: Stable chest with bibasilar scarring and right-sided pleural effusion extending along the chest wall   Workstation performed: [id number]     Xr Chest Pa & Lateral    Result Date: 11/9/2018  Narrative: CHEST INDICATION:   Cough  J44 9: Chronic obstructive pulmonary disease, unspecified N18 3: Chronic kidney disease, stage 3 (moderate) I50 9: Heart failure, unspecified  I50 1: Left ventricular failure, unspecified  COMPARISON:  7/18/2018, 10/16/2017 and CT chest 6/11/2018 EXAM PERFORMED/VIEWS:  XR CHEST PA & LATERAL  The frontal view was performed utilizing dual energy radiographic technique  FINDINGS: Cardiomediastinal silhouette appears stable  Transcatheter aortic valve replacement noted  Central vascular prominence again noted without overt pulmonary edema  Peripheral and apical right pleural thickening versus loculated effusion, latter favored based on previous CT  Coarse bronchovascular markings in the lung bases, left greater than right lung base, which may be accentuated by fibrotic and cystic changes  No new infiltrates  No pneumothorax  Degenerative changes of the spine and shoulders  Chronic right AC joint separation noted  Impression: Overall stable appearance of the chest  Question element of chronic pulmonary venous hypertension with chronic basilar changes and loculated pleural effusion peripheral right hemithorax  No new infiltrate  Workstation performed: [id number]     Us Kidney And Bladder    Result Date: 11/16/2018  Narrative: RENAL ULTRASOUND INDICATION:   RENAL FAILURE, ACUTE ARF  COMPARISON: CT scan 7/18/2016  TECHNIQUE: Portable ultrasound examination of the retroperitoneum was performed with a curvilinear transducer utilizing volumetric sweeps and still imaging techniques  FINDINGS: Evaluation of the left kidney is limited due to overlying bowel gas  KIDNEYS: Symmetric and normal size  Right kidney:  10 2 cm  Left kidney:  9 8 cm  Right kidney Normal echogenicity and contour  Ill-defined echogenic structure at the midpole, which presumably corresponds with the previously seen rim calcified cyst  No hydronephrosis  No shadowing calculi   No perinephric fluid collections  Left kidney Normal echogenicity and contour  No obvious masses detected  No overt hydronephrosis  No shadowing calculi  No perinephric fluid collections  URETERS: Nonvisualized  BLADDER: The bladder is nondistended, limiting evaluation  Impression: Suboptimal visualization of the left kidney  No evidence of hydronephrosis  Workstation performed: [id number]     Imaging Reports Reviewed by myself    Cultures:   Blood Culture:   Lab Results   Component Value Date    BLOODCX No Growth After 5 Days  11/29/2018    BLOODCX No Growth After 5 Days  11/29/2018    BLOODCX No Growth After 5 Days  01/06/2017    BLOODCX No Growth After 5 Days  01/06/2017    BLOODCX No Growth After 5 Days  03/06/2016    BLOODCX No Growth After 5 Days  03/06/2016    BLOODCX No Growth After 5 Days  03/04/2016    BLOODCX No Growth After 5 Days   03/04/2016     Urine Culture:   Lab Results   Component Value Date    URINECX No Growth <1000 cfu/mL 03/04/2016     Sputum Culture: No components found for: SPUTUMCX  Wound Culture: No results found for: WOUNDCULT    Last 24 Hours Medication List:     Current Facility-Administered Medications:  acetaminophen 325 mg Oral Q12H PRN Landen Redding MD   And       traMADol 50 mg Oral Q12H PRN Landen Redding MD   amiodarone 100 mg Oral Daily Landen Redding MD   amLODIPine 5 mg Oral Daily Landen Redding MD   aspirin 81 mg Oral Daily Landen Redding MD   cyanocobalamin 100 mcg Oral Daily Landen Redding MD   diclofenac sodium 2 g Topical Q12H [address] [place] [address]   docusate sodium 100 mg Oral HS Landen Redding MD   fluticasone-vilanterol 1 puff Inhalation Daily Landen Redding MD   heparin (porcine) 5,000 Units Subcutaneous Q8H [address] Edu[address] Todd MD   hydrALAZINE 25 mg Oral Q8H Landen Redding MD   ipratropium-albuterol 3 mL Nebulization Q4H PRN Landen Redding MD   ipratropium-albuterol 3 mL Nebulization 4x Daily Landen Redding MD   iron polysaccharides 150 mg Oral Daily Landen Redding MD   isosorbide mononitrate 30 mg Oral Daily Landen Redding MD   levothyroxine 50 mcg Oral Daily Shanna Mata MD   metoprolol tartrate 25 mg Oral Q12H [facility], MD   ondansetron 4 mg Intravenous Q6H PRN Shanna Mata MD   oxybutynin 5 mg Oral Daily Shanna Mata MD   pantoprazole 40 mg Oral Daily Shanna Mata MD        Today, Patient Was Seen By: Mejia Bell MD    ** Please Note: "This note has been constructed using a voice recognition system  Therefore there may be syntax, spelling, and/or grammatical errors   Please call if you have any questions  "**

## 2018-12-10 ENCOUNTER — TELEPHONE (OUTPATIENT)
Dept: FAMILY MEDICINE CLINIC | Facility: CLINIC | Age: 83
End: 2018-12-10

## 2018-12-10 ENCOUNTER — TRANSITIONAL CARE MANAGEMENT (OUTPATIENT)
Dept: FAMILY MEDICINE CLINIC | Facility: CLINIC | Age: 83
End: 2018-12-10

## 2018-12-11 NOTE — UTILIZATION REVIEW
Auth:   [id number]    Notification of Discharge  This is a Notification of Discharge from our facility [address]  Please be advised that this patient has been discharge from our facility  Below you will find the admission and discharge date and time including the patients disposition  PRESENTATION DATE: 11/29/2018  1:14 PM  IP ADMISSION DATE: 11/29/18 1522  DISCHARGE DATE: 12/9/2018  1:09 PM  DISPOSITION: Home with [address] Utilization Review Department  Phone: [phone]; Fax [phone]  ATTENTION: Please call with any questions or concerns to [phone]  and carefully listen to the prompts so that you are directed to the right person  Send all requests for admission clinical reviews, approved or denied determinations and any other requests to fax [phone]   All voicemails are confidential

## 2018-12-13 ENCOUNTER — PATIENT OUTREACH (OUTPATIENT)
Dept: FAMILY MEDICINE CLINIC | Facility: CLINIC | Age: 83
End: 2018-12-13

## 2018-12-13 NOTE — PROGRESS NOTES
NEPHROLOGY OFFICE VISIT   Yi Mckeon 80 y o  male MRN: [id number]  12/14/2018    Reason for Visit:  Hospital follow-up    Interval history and subjective    I had the pleasure of seeing Rudy Shirley today in the renal clinic for hospital follow-up  Rudy Shirley was recently hospitalized from 11/29 to 12/9/18  He was admitted with increasing leg edema and shortness of breath related to chronic, combined CHF  Nephrology was consulted for management of severe chronic kidney disease and volume overload  He was previously on escalating doses of torsemide  He was initially on alternating doses of 20/10 then 30/20  He was discharged on 60/40 alternating doses of torsemide  Actually Rudy Shirley looks great  He is wheelchair-bound and on continuous oxygen but his energy level has improved  His appetite is good  He has no bad taste in his mouth  He is making a good amount of urine  He presents with his health aide Chiquita Prather who is helping to supply elements of review of systems and current patient home function status  He currently has no complaints at this time and is feeling well  Patient denies any chest pain, shortness of breath and swelling  He actually has no edema at this time  Weight is stable  Discharge weight was 204  Weight at home this morning was 205  The last blood work was done at hospital discharge which we have reviewed together  ASSESSMENT and PLAN:  1  Recent acute kidney injury:  Felt to be due to cardiorenal syndrome  · Creatinine 3 0 on admission  · Creatinine down to 2 75 at discharge  May need to tolerate a higher creatinine for euvolemia  · Awaiting follow-up labs which will be obtained this afternoon  2  Chronic kidney disease, stage IV:    · Baseline creatinine 1 8-2 2  · Etiology likely hypertensive nephrosclerosis, advanced age, cardiorenal  3  Chronic, combined CHF:    · Volume overload on recent admission  Adequately diuresed      · Discharged on torsemide 60 mg daily alternating with 40 mg daily  · Patient appears euvolemic  4  Hypertension:    · Blood pressure adequately controlled   · Avoid hypotension  · Maintain systolic blood pressure readings between 120 and 140 considering advanced age  · Continue current antihypertensive regime  5  Anemia:    · Hemoglobin was stable during hospitalization  · Awaiting follow-up labs  6  Pulmonary hypertension, COPD, obstructive sleep apnea:    · Severe COPD  · Oxygen dependent  Usually on 6-10 L of oxygen at home  Now on 5 L  7  Paroxysmal atrial fibrillation:    · Episode of atrial fib with RVR during hospitalization  Back in sinus rhythm at discharge  · Heart rhythm regular during my exam today  8  CKD MBD:  Awaiting follow-up labs which will include PTH and vitamin-D  9  Severe arthritis:  Takes Tylenol for pain relief  Severe limitations to arm movement due to shoulder arthritis    PATIENT INSTRUCTIONS:    Patient Instructions   1  Blood work today  2  Follow up in 2-3 months with Dr Asmita Alas  3  Blood work before next visit  4  Call if you are experiencing increasing shortness of breath, weight gain greater than 2-3 lb in 1 day or 5 lb in 1 week  5  Call if you are experiencing increasing fatigue, poor appetite, nausea, vomiting  6  Our office number is [phone]  5  No change in medication today  Continue current medications  Orders Placed This Encounter   Procedures    Comprehensive metabolic panel     This is a patient instruction: Patient fasting for 8 hours or longer recommended       Standing Status:   Future     Standing Expiration Date:   6/14/2019    Urinalysis with reflex to microscopic     Standing Status:   Future     Standing Expiration Date:   6/14/2019    Phosphorus     Standing Status:   Future     Standing Expiration Date:   6/14/2019    Magnesium     Standing Status:   Future     Standing Expiration Date:   6/14/2019    CBC and Platelet     Standing Status:   Future     Standing Expiration Date:   6/14/2019 OBJECTIVE:  Current Weight:    Vitals:    12/14/18 0830   BP: 132/68   BP Location: Left arm   Patient Position: Sitting   Cuff Size: Standard   Pulse: 70    There is no height or weight on file to calculate BMI  REVIEW OF SYSTEMS:    Review of Systems   Constitutional: Negative  HENT: Negative  Respiratory: Negative  Cardiovascular: Negative for chest pain, palpitations and leg swelling  Gastrointestinal: Negative  Genitourinary: Negative  Musculoskeletal: Positive for arthralgias  Skin: Negative  Allergic/Immunologic: Negative for immunocompromised state  Neurological: Negative  Psychiatric/Behavioral: Negative  PHYSICAL EXAM:      Physical Exam   Constitutional: He is oriented to person, place, and time  He appears well-developed and well-nourished  No distress  Patient appears stated age  Wheelchair bound  On continuous oxygen  Comfortable, alert oriented   HENT:   Head: Normocephalic and atraumatic  Mouth/Throat: Oropharynx is clear and moist    Eyes: Conjunctivae are normal  Right eye exhibits no discharge  Left eye exhibits no discharge  No scleral icterus  Neck: Neck supple  No JVD present  No carotid bruit bilaterally   Cardiovascular: Normal rate and regular rhythm  Murmur (?  Soft systolic murmur) heard  Pulmonary/Chest: Effort normal  No respiratory distress  He has wheezes (Faint expiratory wheezes bilaterally)  He has no rales  Abdominal: Soft  Bowel sounds are normal  He exhibits no distension and no mass  There is no tenderness  There is no rebound and no guarding  Musculoskeletal: He exhibits no edema  Neurological: He is alert and oriented to person, place, and time  Skin: Skin is warm and dry  No rash noted  He is not diaphoretic  No erythema  No pallor  Psychiatric: He has a normal mood and affect   His behavior is normal  Judgment and thought content normal        Medications:    Current Outpatient Prescriptions:     albuterol (PROAIR HFA) 90 mcg/act inhaler, Inhale 2 puffs every 4 (four) hours as needed for wheezing or shortness of breath, Disp: 18 g, Rfl: 5    amiodarone 100 mg tablet, Take 1 tablet (100 mg total) by mouth daily, Disp: 90 tablet, Rfl: 3    amLODIPine (NORVASC) 5 mg tablet, Take 1 tablet (5 mg total) by mouth daily, Disp: 90 tablet, Rfl: 0    aspirin (ECOTRIN LOW STRENGTH) 81 mg EC tablet, Take 81 mg by mouth daily, Disp: , Rfl:     atorvastatin (LIPITOR) 10 mg tablet, Take 2 tablets (20 mg total) by mouth daily, Disp: 90 tablet, Rfl: 3    B Complex Vitamins (VITAMIN B COMPLEX PO), Take by mouth, Disp: , Rfl:     BREO ELLIPTA, USE 1 INHALATION DAILY, Disp: 1 each, Rfl: 5    cyanocobalamin (VITAMIN B-12) 100 mcg tablet, Take by mouth daily  , Disp: , Rfl:     diclofenac sodium (VOLTAREN) 1 %, Apply 4 g topically 2 (two) times a day as needed (pain) Both shoulders and knees, Disp: 5 Tube, Rfl: 0    docusate sodium (COLACE) 100 mg capsule, Take 100 mg by mouth daily at bedtime  , Disp: , Rfl:     fenofibrate (TRICOR) 145 mg tablet, Take 1 tablet (145 mg total) by mouth daily, Disp: 90 tablet, Rfl: 3    hydrALAZINE (APRESOLINE) 25 mg tablet, TAKE 1 TABLET BY MOUTH EVERY 8 HOURS, Disp: 90 tablet, Rfl: 0    IFEREX 150 150 MG capsule, take 1 capsule by mouth once daily, Disp: 30 capsule, Rfl: 2    ipratropium-albuterol (DUO-NEB) 0 5-2 5 mg/3 mL nebulizer solution, Take 1 vial (3 mL total) by nebulization 4 (four) times a day, Disp: , Rfl: 0    isosorbide mononitrate (IMDUR) 30 mg 24 hr tablet, take 1 tablet by mouth once daily, Disp: 30 tablet, Rfl: 0    levothyroxine 50 mcg tablet, take 1 tablet by mouth once daily, Disp: 30 tablet, Rfl: 5    metoprolol tartrate (LOPRESSOR) 25 mg tablet, Take 1 tablet (25 mg total) by mouth every 12 (twelve) hours, Disp: 180 tablet, Rfl: 3    pantoprazole (PROTONIX) 40 mg tablet, take 1 tablet by mouth once daily, Disp: 90 tablet, Rfl: 3    tolterodine (DETROL) 2 mg tablet, Take 1 tablet (2 mg total) by mouth daily, Disp: 90 tablet, Rfl: 1    torsemide (DEMADEX) 20 mg tablet, Take 40 mg alternatively with 60 mg every other day, Disp: 100 tablet, Rfl: 0    traMADol-acetaminophen (ULTRACET) 37 5-325 mg per tablet, Take 1 tablet by mouth 2 (two) times a day as needed for moderate pain (Patient not taking: Reported on 12/13/2018 ), Disp: 30 tablet, Rfl: 0    Laboratory Results:    Results from last 7 days  Lab Units 12/09/18  0606 12/09/18  0600 12/08/18  0651   PLATELETS Thousands/uL 286  --   --    POTASSIUM mmol/L  --  4 1 4 6   CHLORIDE mmol/L  --  102 99*   CO2 mmol/L  --  35* 36*   BUN mg/dL  --  71* 71*   CREATININE mg/dL  --  2 75* 2 94*   CALCIUM mg/dL  --  9 2 8 9   MAGNESIUM mg/dL  --   --  2 2       Results for orders placed or performed during the hospital encounter of 11/29/18   Blood culture #1   Result Value Ref Range    Blood Culture No Growth After 5 Days  Blood culture #2   Result Value Ref Range    Blood Culture No Growth After 5 Days  MRSA culture   Result Value Ref Range    MRSA Culture Only (A)      Methicillin Resistant Staphylococcus aureus isolated    MRSA Culture Only       Please note: This patient requires contact precautions     CBC and differential   Result Value Ref Range    WBC 8 61 4 31 - 10 16 Thousand/uL    RBC 3 91 3 88 - 5 62 Million/uL    Hemoglobin 10 2 (L) 12 0 - 17 0 g/dL    Hematocrit 34 8 (L) 36 5 - 49 3 %    MCV 89 82 - 98 fL    MCH 26 1 (L) 26 8 - 34 3 pg    MCHC 29 3 (L) 31 4 - 37 4 g/dL    RDW 17 6 (H) 11 6 - 15 1 %    MPV 11 7 8 9 - 12 7 fL    Platelets 064 592 - 464 Thousands/uL    nRBC 0 /100 WBCs    Neutrophils Relative 73 43 - 75 %    Immat GRANS % 0 0 - 2 %    Lymphocytes Relative 16 14 - 44 %    Monocytes Relative 8 4 - 12 %    Eosinophils Relative 3 0 - 6 %    Basophils Relative 0 0 - 1 %    Neutrophils Absolute 6 22 1 85 - 7 62 Thousands/µL    Immature Grans Absolute 0 03 0 00 - 0 20 Thousand/uL    Lymphocytes Absolute 1 40 0 60 - 4 47 Thousands/µL    Monocytes Absolute 0 68 0 17 - 1 22 Thousand/µL    Eosinophils Absolute 0 25 0 00 - 0 61 Thousand/µL    Basophils Absolute 0 03 0 00 - 0 10 Thousands/µL   Protime-INR   Result Value Ref Range    Protime 13 8 (H) 9 4 - 11 7 seconds    INR 1 34 (H) 0 86 - 1 16   APTT   Result Value Ref Range    PTT 19 (L) 24 - 33 seconds   Comprehensive metabolic panel   Result Value Ref Range    Sodium 140 136 - 145 mmol/L    Potassium 5 3 3 5 - 5 3 mmol/L    Chloride 104 100 - 108 mmol/L    CO2 27 21 - 32 mmol/L    ANION GAP 9 4 - 13 mmol/L    BUN 57 (H) 5 - 25 mg/dL    Creatinine 3 01 (H) 0 60 - 1 30 mg/dL    Glucose 127 65 - 140 mg/dL    Calcium 9 2 8 3 - 10 1 mg/dL    AST 47 (H) 5 - 45 U/L    ALT 26 12 - 78 U/L    Alkaline Phosphatase 55 46 - 116 U/L    Total Protein 7 3 6 4 - 8 2 g/dL    Albumin 3 2 (L) 3 5 - 5 0 g/dL    Total Bilirubin 0 60 0 20 - 1 00 mg/dL    eGFR 18 ml/min/1 73sq m   Lactic acid, plasma   Result Value Ref Range    LACTIC ACID 2 2 (HH) 0 5 - 2 0 mmol/L   Troponin I   Result Value Ref Range    Troponin I 0 02 <=0 04 ng/mL   B-type natriuretic peptide   Result Value Ref Range    NT-proBNP 16,996 (H) <450 pg/mL   UA w Reflex to Microscopic   Result Value Ref Range    Color, UA Yellow     Clarity, UA Clear     Specific Reform, UA 1 015 1 000 - 1 030    pH, UA 5 5 5 0 - 9 0    Leukocytes, UA Negative Negative    Nitrite, UA Negative Negative    Protein, UA Trace (A) Negative mg/dl    Glucose, UA Negative Negative mg/dl    Ketones, UA Negative Negative mg/dl    Urobilinogen, UA 0 2 0 2, 1 0 E U /dl E U /dl    Bilirubin, UA Negative Negative    Blood, UA Negative Negative   Urine Microscopic   Result Value Ref Range    RBC, UA 0-1 (A) None Seen, 0-5 /hpf    WBC, UA 0-1 (A) None Seen, 0-5, 5-55, 5-65 /hpf    Epithelial Cells None Seen None Seen, Occasional /hpf    Bacteria, UA Occasional None Seen, Occasional /hpf    Hyaline Casts, UA 10-20 (A) (none) /lpf    MUCUS THREADS Occasional (A) None Seen   Lactic acid, plasma   Result Value Ref Range    LACTIC ACID 1 0 0 5 - 2 0 mmol/L   Platelet count   Result Value Ref Range    Platelets 207 418 - 287 Thousands/uL    MPV 11 1 8 9 - 12 7 fL   Basic metabolic panel   Result Value Ref Range    Sodium 141 136 - 145 mmol/L    Potassium 4 1 3 5 - 5 3 mmol/L    Chloride 104 100 - 108 mmol/L    CO2 28 21 - 32 mmol/L    ANION GAP 9 4 - 13 mmol/L    BUN 60 (H) 5 - 25 mg/dL    Creatinine 3 05 (H) 0 60 - 1 30 mg/dL    Glucose 118 65 - 140 mg/dL    Calcium 8 4 8 3 - 10 1 mg/dL    eGFR 17 ml/min/1 73sq m   CBC (With Platelets)   Result Value Ref Range    WBC 7 17 4 31 - 10 16 Thousand/uL    RBC 3 59 (L) 3 88 - 5 62 Million/uL    Hemoglobin 9 4 (L) 12 0 - 17 0 g/dL    Hematocrit 31 0 (L) 36 5 - 49 3 %    MCV 86 82 - 98 fL    MCH 26 2 (L) 26 8 - 34 3 pg    MCHC 30 3 (L) 31 4 - 37 4 g/dL    RDW 16 9 (H) 11 6 - 15 1 %    Platelets 116 732 - 651 Thousands/uL    MPV 11 0 8 9 - 12 7 fL   Urea nitrogen, urine   Result Value Ref Range    Urea Nitrogen, Ur 561 mg/dL   Creatinine, urine, random   Result Value Ref Range    Creatinine, Ur 95 1 mg/dL   Sodium, urine, random   Result Value Ref Range    Sodium, Ur 38    CBC   Result Value Ref Range    WBC 6 81 4 31 - 10 16 Thousand/uL    RBC 3 43 (L) 3 88 - 5 62 Million/uL    Hemoglobin 8 9 (L) 12 0 - 17 0 g/dL    Hematocrit 29 0 (L) 36 5 - 49 3 %    MCV 85 82 - 98 fL    MCH 25 9 (L) 26 8 - 34 3 pg    MCHC 30 7 (L) 31 4 - 37 4 g/dL    RDW 16 8 (H) 11 6 - 15 1 %    Platelets 127 496 - 739 Thousands/uL    MPV 11 2 8 9 - 12 7 fL   Basic metabolic panel   Result Value Ref Range    Sodium 141 136 - 145 mmol/L    Potassium 3 9 3 5 - 5 3 mmol/L    Chloride 102 100 - 108 mmol/L    CO2 30 21 - 32 mmol/L    ANION GAP 9 4 - 13 mmol/L    BUN 53 (H) 5 - 25 mg/dL    Creatinine 2 88 (H) 0 60 - 1 30 mg/dL    Glucose 91 65 - 140 mg/dL    Calcium 8 7 8 3 - 10 1 mg/dL    eGFR 19 ml/min/1 73sq m   CBC   Result Value Ref Range    WBC 7 84 4 31 - 10 16 Thousand/uL    RBC 3 67 (L) 3 88 - 5 62 Million/uL    Hemoglobin 9 6 (L) 12 0 - 17 0 g/dL    Hematocrit 31 1 (L) 36 5 - 49 3 %    MCV 85 82 - 98 fL    MCH 26 2 (L) 26 8 - 34 3 pg    MCHC 30 9 (L) 31 4 - 37 4 g/dL    RDW 16 6 (H) 11 6 - 15 1 %    Platelets 510 397 - 248 Thousands/uL    MPV 10 9 8 9 - 12 7 fL   Basic metabolic panel   Result Value Ref Range    Sodium 140 136 - 145 mmol/L    Potassium 3 7 3 5 - 5 3 mmol/L    Chloride 100 100 - 108 mmol/L    CO2 33 (H) 21 - 32 mmol/L    ANION GAP 7 4 - 13 mmol/L    BUN 52 (H) 5 - 25 mg/dL    Creatinine 2 82 (H) 0 60 - 1 30 mg/dL    Glucose 107 65 - 140 mg/dL    Calcium 8 8 8 3 - 10 1 mg/dL    eGFR 19 ml/min/1 73sq m   CBC   Result Value Ref Range    WBC 8 88 4 31 - 10 16 Thousand/uL    RBC 3 70 (L) 3 88 - 5 62 Million/uL    Hemoglobin 9 6 (L) 12 0 - 17 0 g/dL    Hematocrit 31 1 (L) 36 5 - 49 3 %    MCV 84 82 - 98 fL    MCH 25 9 (L) 26 8 - 34 3 pg    MCHC 30 9 (L) 31 4 - 37 4 g/dL    RDW 16 6 (H) 11 6 - 15 1 %    Platelets 387 619 - 147 Thousands/uL    MPV 10 6 8 9 - 12 7 fL   Basic metabolic panel   Result Value Ref Range    Sodium 141 136 - 145 mmol/L    Potassium 3 9 3 5 - 5 3 mmol/L    Chloride 99 (L) 100 - 108 mmol/L    CO2 34 (H) 21 - 32 mmol/L    ANION GAP 8 4 - 13 mmol/L    BUN 51 (H) 5 - 25 mg/dL    Creatinine 2 63 (H) 0 60 - 1 30 mg/dL    Glucose 104 65 - 140 mg/dL    Calcium 8 8 8 3 - 10 1 mg/dL    eGFR 21 ml/min/1 73sq m   Magnesium   Result Value Ref Range    Magnesium 2 0 1 6 - 2 6 mg/dL   CBC   Result Value Ref Range    WBC 7 62 4 31 - 10 16 Thousand/uL    RBC 3 59 (L) 3 88 - 5 62 Million/uL    Hemoglobin 9 3 (L) 12 0 - 17 0 g/dL    Hematocrit 30 4 (L) 36 5 - 49 3 %    MCV 85 82 - 98 fL    MCH 25 9 (L) 26 8 - 34 3 pg    MCHC 30 6 (L) 31 4 - 37 4 g/dL    RDW 16 4 (H) 11 6 - 15 1 %    Platelets 928 433 - 748 Thousands/uL    MPV 10 9 8 9 - 12 7 fL   Basic metabolic panel   Result Value Ref Range    Sodium 140 136 - 145 mmol/L    Potassium 3 8 3 5 - 5 3 mmol/L Chloride 99 (L) 100 - 108 mmol/L    CO2 34 (H) 21 - 32 mmol/L    ANION GAP 7 4 - 13 mmol/L    BUN 51 (H) 5 - 25 mg/dL    Creatinine 2 68 (H) 0 60 - 1 30 mg/dL    Glucose 103 65 - 140 mg/dL    Calcium 8 9 8 3 - 10 1 mg/dL    eGFR 20 ml/min/1 73sq m   Basic metabolic panel   Result Value Ref Range    Sodium 139 136 - 145 mmol/L    Potassium 4 0 3 5 - 5 3 mmol/L    Chloride 100 100 - 108 mmol/L    CO2 34 (H) 21 - 32 mmol/L    ANION GAP 5 4 - 13 mmol/L    BUN 55 (H) 5 - 25 mg/dL    Creatinine 2 54 (H) 0 60 - 1 30 mg/dL    Glucose 112 65 - 140 mg/dL    Calcium 8 8 8 3 - 10 1 mg/dL    eGFR 22 ml/min/1 73sq m   CBC and differential   Result Value Ref Range    WBC 6 79 4 31 - 10 16 Thousand/uL    RBC 3 53 (L) 3 88 - 5 62 Million/uL    Hemoglobin 9 1 (L) 12 0 - 17 0 g/dL    Hematocrit 31 0 (L) 36 5 - 49 3 %    MCV 88 82 - 98 fL    MCH 25 8 (L) 26 8 - 34 3 pg    MCHC 29 4 (L) 31 4 - 37 4 g/dL    RDW 16 6 (H) 11 6 - 15 1 %    MPV 11 4 8 9 - 12 7 fL    Platelets 872 589 - 907 Thousands/uL    nRBC 0 /100 WBCs    Neutrophils Relative 56 43 - 75 %    Immat GRANS % 1 0 - 2 %    Lymphocytes Relative 21 14 - 44 %    Monocytes Relative 11 4 - 12 %    Eosinophils Relative 11 (H) 0 - 6 %    Basophils Relative 0 0 - 1 %    Neutrophils Absolute 3 83 1 85 - 7 62 Thousands/µL    Immature Grans Absolute 0 06 0 00 - 0 20 Thousand/uL    Lymphocytes Absolute 1 39 0 60 - 4 47 Thousands/µL    Monocytes Absolute 0 74 0 17 - 1 22 Thousand/µL    Eosinophils Absolute 0 74 (H) 0 00 - 0 61 Thousand/µL    Basophils Absolute 0 03 0 00 - 0 10 Thousands/µL   Basic metabolic panel   Result Value Ref Range    Sodium 140 136 - 145 mmol/L    Potassium 4 1 3 5 - 5 3 mmol/L    Chloride 99 (L) 100 - 108 mmol/L    CO2 35 (H) 21 - 32 mmol/L    ANION GAP 6 4 - 13 mmol/L    BUN 61 (H) 5 - 25 mg/dL    Creatinine 2 69 (H) 0 60 - 1 30 mg/dL    Glucose 110 65 - 140 mg/dL    Calcium 8 9 8 3 - 10 1 mg/dL    eGFR 20 ml/min/1 73sq m   Magnesium   Result Value Ref Range Magnesium 2 2 1 6 - 2 6 mg/dL   Basic metabolic panel   Result Value Ref Range    Sodium 140 136 - 145 mmol/L    Potassium 4 0 3 5 - 5 3 mmol/L    Chloride 100 100 - 108 mmol/L    CO2 37 (H) 21 - 32 mmol/L    ANION GAP 3 (L) 4 - 13 mmol/L    BUN 68 (H) 5 - 25 mg/dL    Creatinine 2 70 (H) 0 60 - 1 30 mg/dL    Glucose 114 65 - 140 mg/dL    Calcium 9 0 8 3 - 10 1 mg/dL    eGFR 20 ml/min/1 73sq m   Magnesium   Result Value Ref Range    Magnesium 2 3 1 6 - 2 6 mg/dL   Basic metabolic panel   Result Value Ref Range    Sodium 140 136 - 145 mmol/L    Potassium 4 6 3 5 - 5 3 mmol/L    Chloride 99 (L) 100 - 108 mmol/L    CO2 36 (H) 21 - 32 mmol/L    ANION GAP 5 4 - 13 mmol/L    BUN 71 (H) 5 - 25 mg/dL    Creatinine 2 94 (H) 0 60 - 1 30 mg/dL    Glucose 111 65 - 140 mg/dL    Calcium 8 9 8 3 - 10 1 mg/dL    eGFR 18 ml/min/1 73sq m   Magnesium   Result Value Ref Range    Magnesium 2 2 1 6 - 2 6 mg/dL   Basic metabolic panel   Result Value Ref Range    Sodium 141 136 - 145 mmol/L    Potassium 4 1 3 5 - 5 3 mmol/L    Chloride 102 100 - 108 mmol/L    CO2 35 (H) 21 - 32 mmol/L    ANION GAP 4 4 - 13 mmol/L    BUN 71 (H) 5 - 25 mg/dL    Creatinine 2 75 (H) 0 60 - 1 30 mg/dL    Glucose 109 65 - 140 mg/dL    Calcium 9 2 8 3 - 10 1 mg/dL    eGFR 20 ml/min/1 73sq m   ECG 12 lead   Result Value Ref Range    Ventricular Rate 108 BPM    Atrial Rate 108 BPM    NE Interval  ms    QRSD Interval 154 ms    QT Interval 380 ms    QTC Interval 509 ms    P Axis  degrees    QRS Axis -64 degrees    T Wave Axis 113 degrees   ECG 12 lead   Result Value Ref Range    Ventricular Rate 118 BPM    Atrial Rate 101 BPM    NE Interval  ms    QRSD Interval 182 ms    QT Interval 408 ms    QTC Interval 571 ms    P Axis  degrees    QRS Axis -38 degrees    T Wave Axis -70 degrees

## 2018-12-13 NOTE — PROGRESS NOTES
Jessica Castanon is significant other of Agustínvinh Cortesnasreen  Talat Harmon lives in her home, is cared for by [facility] and aid Chip Sow who arrives daily for several hours  Agustín Shelton is able to bathe and dress with limited assistance  He "shuffles" with walker, is too "out of breath to walk far "  He watches TV and takes naps  Jessica Castanon states all care management calls will come through her as he" will not be bothered " Agustín Shelton is weighed daily  Parameters to outreach providers are verbalized  Medications are reviewed without issue  Torsemide is the only change made in dosage  Jessica Castanon is aware of nephrotoxic drugs  Low sodium diet is followed, "I counted the crackers he had for snack " Jessica Castanon provides transportation, shopping  They live in raised ranch with elevator to navigate outside steps  Jessica Castanon denies need for further services in the home   She has my contact information and agrees to monthly calls unless she calls first

## 2018-12-14 ENCOUNTER — OFFICE VISIT (OUTPATIENT)
Dept: NEPHROLOGY | Facility: CLINIC | Age: 83
End: 2018-12-14
Payer: COMMERCIAL

## 2018-12-14 VITALS — SYSTOLIC BLOOD PRESSURE: 132 MMHG | DIASTOLIC BLOOD PRESSURE: 68 MMHG | HEART RATE: 70 BPM

## 2018-12-14 DIAGNOSIS — E78.5 HYPERLIPIDEMIA, UNSPECIFIED HYPERLIPIDEMIA TYPE: Chronic | ICD-10-CM

## 2018-12-14 DIAGNOSIS — I12.9 BENIGN HYPERTENSION WITH CKD (CHRONIC KIDNEY DISEASE) STAGE IV (HCC): Primary | ICD-10-CM

## 2018-12-14 DIAGNOSIS — N18.4 BENIGN HYPERTENSION WITH CKD (CHRONIC KIDNEY DISEASE) STAGE IV (HCC): Primary | ICD-10-CM

## 2018-12-14 DIAGNOSIS — N18.4 STAGE 4 CHRONIC KIDNEY DISEASE (HCC): ICD-10-CM

## 2018-12-14 PROCEDURE — 99214 OFFICE O/P EST MOD 30 MIN: CPT | Performed by: NURSE PRACTITIONER

## 2018-12-14 RX ORDER — ATORVASTATIN CALCIUM 10 MG/1
TABLET, FILM COATED ORAL
Qty: 90 TABLET | Refills: 3 | Status: SHIPPED | OUTPATIENT
Start: 2018-12-14

## 2018-12-14 NOTE — UTILIZATION REVIEW
Auth:   [id number]    Notification of Discharge  This is a Notification of Discharge from our facility [address]  Please be advised that this patient has been discharge from our facility  Below you will find the admission and discharge date and time including the patients disposition  PRESENTATION DATE: 11/29/2018  1:14 PM  IP ADMISSION DATE: 11/29/18 1522  DISCHARGE DATE: 12/9/2018  1:09 PM  DISPOSITION: Home with [address] Utilization Review Department  Phone: [phone]; Fax [phone]  ATTENTION: Please call with any questions or concerns to [phone]  and carefully listen to the prompts so that you are directed to the right person  Send all requests for admission clinical reviews, approved or denied determinations and any other requests to fax [phone]   All voicemails are confidential

## 2018-12-14 NOTE — PATIENT INSTRUCTIONS
1  Blood work today  2  Follow up in 2-3 months with Dr Julieta Mayo  3  Blood work before next visit  4  Call if you are experiencing increasing shortness of breath, weight gain greater than 2-3 lb in 1 day or 5 lb in 1 week  5  Call if you are experiencing increasing fatigue, poor appetite, nausea, vomiting  6  Our office number is [phone]  6  No change in medication today  Continue current medications

## 2018-12-17 ENCOUNTER — OFFICE VISIT (OUTPATIENT)
Dept: CARDIOLOGY CLINIC | Facility: CLINIC | Age: 83
End: 2018-12-17
Payer: COMMERCIAL

## 2018-12-17 VITALS
DIASTOLIC BLOOD PRESSURE: 66 MMHG | HEART RATE: 76 BPM | HEIGHT: 70 IN | WEIGHT: 205 LBS | BODY MASS INDEX: 29.35 KG/M2 | OXYGEN SATURATION: 93 % | SYSTOLIC BLOOD PRESSURE: 118 MMHG

## 2018-12-17 DIAGNOSIS — J44.9 MODERATE COPD (CHRONIC OBSTRUCTIVE PULMONARY DISEASE) (HCC): ICD-10-CM

## 2018-12-17 DIAGNOSIS — R26.2 AMBULATORY DYSFUNCTION: Chronic | ICD-10-CM

## 2018-12-17 DIAGNOSIS — J96.11 CHRONIC HYPOXEMIC RESPIRATORY FAILURE (HCC): ICD-10-CM

## 2018-12-17 DIAGNOSIS — G47.33 OSA (OBSTRUCTIVE SLEEP APNEA): Chronic | ICD-10-CM

## 2018-12-17 DIAGNOSIS — Z95.2 S/P TAVR (TRANSCATHETER AORTIC VALVE REPLACEMENT): Chronic | ICD-10-CM

## 2018-12-17 DIAGNOSIS — I48.0 PAROXYSMAL ATRIAL FIBRILLATION (HCC): Chronic | ICD-10-CM

## 2018-12-17 DIAGNOSIS — I27.20 PULMONARY HYPERTENSION (HCC): ICD-10-CM

## 2018-12-17 DIAGNOSIS — J43.2 CENTRILOBULAR EMPHYSEMA (HCC): ICD-10-CM

## 2018-12-17 DIAGNOSIS — I25.10 CAD IN NATIVE ARTERY: Chronic | ICD-10-CM

## 2018-12-17 DIAGNOSIS — N18.4 BENIGN HYPERTENSION WITH CKD (CHRONIC KIDNEY DISEASE) STAGE IV (HCC): ICD-10-CM

## 2018-12-17 DIAGNOSIS — I12.9 BENIGN HYPERTENSION WITH CKD (CHRONIC KIDNEY DISEASE) STAGE IV (HCC): ICD-10-CM

## 2018-12-17 DIAGNOSIS — N18.4 STAGE 4 CHRONIC KIDNEY DISEASE (HCC): ICD-10-CM

## 2018-12-17 PROCEDURE — 99214 OFFICE O/P EST MOD 30 MIN: CPT | Performed by: INTERNAL MEDICINE

## 2018-12-17 PROCEDURE — 93000 ELECTROCARDIOGRAM COMPLETE: CPT | Performed by: INTERNAL MEDICINE

## 2018-12-17 PROCEDURE — 4040F PNEUMOC VAC/ADMIN/RCVD: CPT | Performed by: INTERNAL MEDICINE

## 2018-12-18 ENCOUNTER — OFFICE VISIT (OUTPATIENT)
Dept: FAMILY MEDICINE CLINIC | Facility: CLINIC | Age: 83
End: 2018-12-18
Payer: COMMERCIAL

## 2018-12-18 VITALS
HEART RATE: 78 BPM | DIASTOLIC BLOOD PRESSURE: 52 MMHG | RESPIRATION RATE: 22 BRPM | TEMPERATURE: 99.1 F | SYSTOLIC BLOOD PRESSURE: 110 MMHG

## 2018-12-18 DIAGNOSIS — G89.29 CHRONIC PAIN OF BOTH SHOULDERS: ICD-10-CM

## 2018-12-18 DIAGNOSIS — I12.9 BENIGN HYPERTENSION WITH CKD (CHRONIC KIDNEY DISEASE) STAGE IV (HCC): ICD-10-CM

## 2018-12-18 DIAGNOSIS — I25.10 CAD IN NATIVE ARTERY: Chronic | ICD-10-CM

## 2018-12-18 DIAGNOSIS — N18.4 BENIGN HYPERTENSION WITH CKD (CHRONIC KIDNEY DISEASE) STAGE IV (HCC): ICD-10-CM

## 2018-12-18 DIAGNOSIS — N18.4 STAGE 4 CHRONIC KIDNEY DISEASE (HCC): Primary | ICD-10-CM

## 2018-12-18 DIAGNOSIS — M25.511 CHRONIC PAIN OF BOTH SHOULDERS: ICD-10-CM

## 2018-12-18 DIAGNOSIS — Z95.2 S/P TAVR (TRANSCATHETER AORTIC VALVE REPLACEMENT): Chronic | ICD-10-CM

## 2018-12-18 DIAGNOSIS — J44.9 MODERATE COPD (CHRONIC OBSTRUCTIVE PULMONARY DISEASE) (HCC): ICD-10-CM

## 2018-12-18 DIAGNOSIS — M25.512 CHRONIC PAIN OF BOTH SHOULDERS: ICD-10-CM

## 2018-12-18 PROBLEM — E11.42 DIABETIC POLYNEUROPATHY ASSOCIATED WITH TYPE 2 DIABETES MELLITUS (HCC): Chronic | Status: RESOLVED | Noted: 2018-02-23 | Resolved: 2018-12-18

## 2018-12-18 PROCEDURE — 99495 TRANSJ CARE MGMT MOD F2F 14D: CPT | Performed by: FAMILY MEDICINE

## 2018-12-18 PROCEDURE — 1160F RVW MEDS BY RX/DR IN RCRD: CPT | Performed by: FAMILY MEDICINE

## 2018-12-18 NOTE — PROGRESS NOTES
Assessment/Plan:    No problem-specific Assessment & Plan notes found for this encounter     htn stable  ckd4 stable for now  Copd unchanged  Arthropathy-voltaren gel rx     Diagnoses and all orders for this visit:    Stage 4 chronic kidney disease (HCC)    Chronic pain of both shoulders  -     diclofenac sodium (VOLTAREN) 1 %; Apply 4 g topically 4 (four) times a day Both shoulders and knees    CAD in native artery    S/P TAVR (transcatheter aortic valve replacement)    Moderate COPD (chronic obstructive pulmonary disease) (HCC)    Benign hypertension with CKD (chronic kidney disease) stage IV ([facility] [address] )              Return in about 3 months (around 3/18/2019) for Recheck  Subjective:      Patient ID: Cindy Brown is a 80 y o  male  Chief Complaint   Patient presents with    Transition of Care Management     [facility]       HPI  TCM Call (since 11/18/2018)     Date and time call was made  12/10/2018  2:10 PM    Hospital care reviewed  Records reviewed    Patient was hospitialized at  [address] [place] [address]    Date of Admission  11/29/18    Date of discharge  12/09/18    Diagnosis  Chronic Combined Systolic and Diastolic Heart Failure    Disposition  Home    Were the patients medications reviewed and updated  Yes    Current Symptoms  None      TCM Call (since 11/18/2018)     Post hospital issues  None    Should patient be enrolled in anticoag monitoring? No    Scheduled for follow up?   Yes    Patients specialists  Pulmonlolgist; Cardiologist; Nephrologist    Cardiologist name  Dr Samra Christy    Cardiologist contact #  Dr Morris Oliva name  Dr Parris Banda    Nephrologist name  Dr Sharif Villafana    Nephrologist contact #  Gian Reed [place]    Did you obtain your prescribed medications  Yes    Do you need help managing your prescriptions or medications  Yes    Why type of assitance do you need  His home health aid Torrie Nuñez manages for him    Is transportation to your appointment needed  Yes    Specify why  He does not drive    I have advised the patient to call PCP with any new or worsening symptoms  Zurdo HANSEN    Living Arrangements  Spouse or Significiant other    Support System  Home care staff; Family; Friends    The type of support provided  Physical; Emotional    Do you have social support  Yes, as much as I need    Are you recieving any outpatient services  No    Are you recieving home care services  Yes    Types of home care services  Home health aid    Have you fallen in the last 12 months  No    Interperter language line needed  No    Counseling  Patient; Caregiver    Counseling topics  Activities of daily living; Importance of RX compliance; patient and family education; Risk factor reduction; Home health agency benefits; instructions for management    Comments  I spoke with Serena Thompson and his home health aid Jazzy Ferguson who both state he is doing well  No c/o at this time  He is using his Oxygen continously at 6 L  They know to take him to the ER if any chest pain, dyspnea, weakness, dizziness, palpitations, fevers, etc Maureenezequiel IBANNOEMY        On torsemide 40mg alt 60mg/d  Wt seems stable at 204  Saw cardio yesterday  60mg/d if wt gain of 2#/d or 5#/w  Cardio q3w  Also nephrologist in [place]  Left 9d ago  Declines STR  voltaren gel helps  New baseline 2 7-2 9  nsaids renal risks aware    The following portions of the patient's history were reviewed and updated as appropriate: allergies, current medications, past family history, past medical history, past social history, past surgical history and problem list     Review of Systems   Constitutional: Negative for chills and fever  Cardiovascular: Negative for chest pain and leg swelling  Gastrointestinal: Negative for abdominal pain           Current Outpatient Prescriptions   Medication Sig Dispense Refill    albuterol (PROAIR HFA) 90 mcg/act inhaler Inhale 2 puffs every 4 (four) hours as needed for wheezing or shortness of breath 18 g 5    amiodarone 100 mg tablet Take 1 tablet (100 mg total) by mouth daily 90 tablet 3    amLODIPine (NORVASC) 5 mg tablet Take 1 tablet (5 mg total) by mouth daily 90 tablet 0    aspirin (ECOTRIN LOW STRENGTH) 81 mg EC tablet Take 81 mg by mouth daily      atorvastatin (LIPITOR) 10 mg tablet TAKE 2 TABLETS BY MOUTH DAILY 90 tablet 3    B Complex Vitamins (VITAMIN B COMPLEX PO) Take by mouth      BREO ELLIPTA USE 1 INHALATION DAILY 1 each 5    cyanocobalamin (VITAMIN B-12) 100 mcg tablet Take by mouth daily        diclofenac sodium (VOLTAREN) 1 % Apply 4 g topically 2 (two) times a day as needed (pain) Both shoulders and knees 5 Tube 0    docusate sodium (COLACE) 100 mg capsule Take 100 mg by mouth daily at bedtime   fenofibrate (TRICOR) 145 mg tablet Take 1 tablet (145 mg total) by mouth daily 90 tablet 3    hydrALAZINE (APRESOLINE) 25 mg tablet TAKE 1 TABLET BY MOUTH EVERY 8 HOURS 90 tablet 0    IFEREX 150 150 MG capsule take 1 capsule by mouth once daily 30 capsule 2    ipratropium-albuterol (DUO-NEB) 0 5-2 5 mg/3 mL nebulizer solution Take 1 vial (3 mL total) by nebulization 4 (four) times a day  0    isosorbide mononitrate (IMDUR) 30 mg 24 hr tablet take 1 tablet by mouth once daily 30 tablet 0    levothyroxine 50 mcg tablet take 1 tablet by mouth once daily 30 tablet 5    metoprolol tartrate (LOPRESSOR) 25 mg tablet Take 1 tablet (25 mg total) by mouth every 12 (twelve) hours 180 tablet 3    pantoprazole (PROTONIX) 40 mg tablet take 1 tablet by mouth once daily 90 tablet 3    tolterodine (DETROL) 2 mg tablet Take 1 tablet (2 mg total) by mouth daily 90 tablet 1    torsemide (DEMADEX) 20 mg tablet Take 40 mg alternatively with 60 mg every other day 100 tablet 0    traMADol-acetaminophen (ULTRACET) 37 5-325 mg per tablet Take 1 tablet by mouth 2 (two) times a day as needed for moderate pain (Patient not taking: Reported on 12/13/2018 ) 30 tablet 0     No current facility-administered medications for this visit  Objective:    /52   Pulse 78   Temp 99 1 °F (37 3 °C)   Resp 22        Physical Exam   Constitutional: He appears well-developed  No distress  HENT:   Head: Normocephalic  Mouth/Throat: No oropharyngeal exudate  Eyes: Conjunctivae are normal  No scleral icterus  Neck: Neck supple  Cardiovascular: Normal rate and intact distal pulses  Murmur heard  Pulmonary/Chest: Effort normal  No respiratory distress  He has no wheezes  Abdominal: Soft  There is no tenderness  There is no rebound  Musculoskeletal: He exhibits no edema or deformity  Neurological: He is alert  He exhibits normal muscle tone  Skin: Skin is warm and dry  No rash noted  No pallor  Psychiatric: His behavior is normal  Thought content normal    Nursing note and vitals reviewed               Pepito Rios DO

## 2018-12-20 DIAGNOSIS — J44.9 MODERATE COPD (CHRONIC OBSTRUCTIVE PULMONARY DISEASE) (HCC): ICD-10-CM

## 2018-12-20 DIAGNOSIS — J96.11 CHRONIC RESPIRATORY FAILURE WITH HYPOXIA (HCC): ICD-10-CM

## 2018-12-20 RX ORDER — IPRATROPIUM BROMIDE AND ALBUTEROL SULFATE 2.5; .5 MG/3ML; MG/3ML
3 SOLUTION RESPIRATORY (INHALATION)
Refills: 0
Start: 2018-12-20

## 2018-12-21 ENCOUNTER — TELEPHONE (OUTPATIENT)
Dept: FAMILY MEDICINE CLINIC | Facility: CLINIC | Age: 83
End: 2018-12-21

## 2019-01-03 DIAGNOSIS — I10 HYPERTENSION: Chronic | ICD-10-CM

## 2019-01-03 RX ORDER — HYDRALAZINE HYDROCHLORIDE 25 MG/1
TABLET, FILM COATED ORAL
Qty: 90 TABLET | Refills: 0 | Status: SHIPPED | OUTPATIENT
Start: 2019-01-03

## 2019-01-04 DIAGNOSIS — I10 HYPERTENSION: Chronic | ICD-10-CM

## 2019-01-04 RX ORDER — HYDRALAZINE HYDROCHLORIDE 25 MG/1
TABLET, FILM COATED ORAL
Qty: 90 TABLET | Refills: 0 | Status: SHIPPED | OUTPATIENT
Start: 2019-01-04 | End: 2019-01-09

## 2019-01-08 ENCOUNTER — TRANSCRIBE ORDERS (OUTPATIENT)
Dept: ADMINISTRATIVE | Facility: HOSPITAL | Age: 84
End: 2019-01-08

## 2019-01-08 ENCOUNTER — APPOINTMENT (OUTPATIENT)
Dept: LAB | Facility: HOSPITAL | Age: 84
End: 2019-01-08
Attending: INTERNAL MEDICINE
Payer: COMMERCIAL

## 2019-01-08 DIAGNOSIS — N18.4 STAGE 4 CHRONIC KIDNEY DISEASE (HCC): ICD-10-CM

## 2019-01-08 DIAGNOSIS — I12.9 BENIGN HYPERTENSION WITH CKD (CHRONIC KIDNEY DISEASE) STAGE IV (HCC): ICD-10-CM

## 2019-01-08 DIAGNOSIS — N18.4 BENIGN HYPERTENSION WITH CKD (CHRONIC KIDNEY DISEASE) STAGE IV (HCC): ICD-10-CM

## 2019-01-08 LAB
ANION GAP SERPL CALCULATED.3IONS-SCNC: 9 MMOL/L (ref 4–13)
BUN SERPL-MCNC: 50 MG/DL (ref 5–25)
CALCIUM SERPL-MCNC: 9.2 MG/DL (ref 8.3–10.1)
CHLORIDE SERPL-SCNC: 99 MMOL/L (ref 100–108)
CO2 SERPL-SCNC: 32 MMOL/L (ref 21–32)
CREAT SERPL-MCNC: 2.56 MG/DL (ref 0.6–1.3)
GFR SERPL CREATININE-BSD FRML MDRD: 21 ML/MIN/1.73SQ M
GLUCOSE SERPL-MCNC: 149 MG/DL (ref 65–140)
POTASSIUM SERPL-SCNC: 3.2 MMOL/L (ref 3.5–5.3)
SODIUM SERPL-SCNC: 140 MMOL/L (ref 136–145)

## 2019-01-08 PROCEDURE — 36415 COLL VENOUS BLD VENIPUNCTURE: CPT

## 2019-01-08 PROCEDURE — 80048 BASIC METABOLIC PNL TOTAL CA: CPT

## 2019-01-09 ENCOUNTER — OFFICE VISIT (OUTPATIENT)
Dept: CARDIOLOGY CLINIC | Facility: CLINIC | Age: 84
End: 2019-01-09
Payer: COMMERCIAL

## 2019-01-09 ENCOUNTER — TELEPHONE (OUTPATIENT)
Dept: CARDIOLOGY CLINIC | Facility: CLINIC | Age: 84
End: 2019-01-09

## 2019-01-09 VITALS
WEIGHT: 205 LBS | DIASTOLIC BLOOD PRESSURE: 72 MMHG | HEIGHT: 70 IN | BODY MASS INDEX: 29.35 KG/M2 | SYSTOLIC BLOOD PRESSURE: 120 MMHG | OXYGEN SATURATION: 88 % | HEART RATE: 120 BPM

## 2019-01-09 DIAGNOSIS — J96.11 CHRONIC HYPOXEMIC RESPIRATORY FAILURE (HCC): ICD-10-CM

## 2019-01-09 DIAGNOSIS — I48.0 PAF (PAROXYSMAL ATRIAL FIBRILLATION) (HCC): ICD-10-CM

## 2019-01-09 DIAGNOSIS — J44.9 MODERATE COPD (CHRONIC OBSTRUCTIVE PULMONARY DISEASE) (HCC): ICD-10-CM

## 2019-01-09 DIAGNOSIS — I48.0 PAROXYSMAL ATRIAL FIBRILLATION (HCC): Chronic | ICD-10-CM

## 2019-01-09 DIAGNOSIS — I27.20 PULMONARY HYPERTENSION (HCC): ICD-10-CM

## 2019-01-09 DIAGNOSIS — J43.2 CENTRILOBULAR EMPHYSEMA (HCC): ICD-10-CM

## 2019-01-09 DIAGNOSIS — Z95.2 S/P TAVR (TRANSCATHETER AORTIC VALVE REPLACEMENT): Chronic | ICD-10-CM

## 2019-01-09 DIAGNOSIS — E03.8 OTHER SPECIFIED HYPOTHYROIDISM: Chronic | ICD-10-CM

## 2019-01-09 DIAGNOSIS — E78.2 MIXED HYPERLIPIDEMIA: Chronic | ICD-10-CM

## 2019-01-09 DIAGNOSIS — I25.10 CAD IN NATIVE ARTERY: Chronic | ICD-10-CM

## 2019-01-09 DIAGNOSIS — N18.4 STAGE 4 CHRONIC KIDNEY DISEASE (HCC): ICD-10-CM

## 2019-01-09 PROCEDURE — 93000 ELECTROCARDIOGRAM COMPLETE: CPT | Performed by: INTERNAL MEDICINE

## 2019-01-09 PROCEDURE — 99214 OFFICE O/P EST MOD 30 MIN: CPT | Performed by: INTERNAL MEDICINE

## 2019-01-09 RX ORDER — POTASSIUM CHLORIDE 750 MG/1
10 TABLET, EXTENDED RELEASE ORAL 2 TIMES WEEKLY
Qty: 8 TABLET | Refills: 1 | Status: SHIPPED | OUTPATIENT
Start: 2019-01-10 | End: 2019-02-23 | Stop reason: SDUPTHER

## 2019-01-09 RX ORDER — AMIODARONE HYDROCHLORIDE 200 MG/1
200 TABLET ORAL DAILY
Qty: 30 TABLET | Refills: 3 | Status: SHIPPED | OUTPATIENT
Start: 2019-01-09

## 2019-01-09 NOTE — PROGRESS NOTES
Progress Note - Cardiology Office  Carla Dorado 80 y o  male MRN: [id number]  : 10/24/1930  Encounter: [id number]      Assessment:     1  Paroxysmal atrial fibrillation (HCC)    2  Moderate COPD (chronic obstructive pulmonary disease) ([facility] )    3  Centrilobular emphysema ([facility] )    4  Pulmonary hypertension ([facility] )    5  Chronic hypoxemic respiratory failure (HCC)    6  Stage 4 chronic kidney disease ([facility] )    7  S/P TAVR (transcatheter aortic valve replacement)    8  Mixed hyperlipidemia    9  Other specified hypothyroidism    10  CAD in native artery    11  PAF (paroxysmal atrial fibrillation) ([facility] )        Discussion Summary and Plan:  1  Chronic systolic and diastolic heart failure New [place] heart Association class 3  Patient was recently in the hospital   He is on Demadex 40 mg to alternate with 60 mg   Weight is stable around 205 lb  Advised him to check his weight daily  Electrolytes reviewed  Found to have mild hypokalemia potassium supplementation written  Spoke to caretaker at length  2   Paroxysmal atrial fibrillation  Patient has history of paroxysmal atrial fibrillation currently he is in AFib with rapid ventricular rate  Advised him to go to the hospital   Patient says he generally goes back to regular rhythm however he does not want to go to hospital as he has feels better  In the meantime I will start the patient on Eliquis 2 5 twice a day  Increase metoprolol to 50 mg twice a day and increase amiodarone to 200 mg daily  Spoke to caretaker if patient does not feel well by evening he should be taking to the hospital       3   History of severe aortic stenosis status post TAVR in 2017  Patient does have underlying ischemic cardiomyopathy EF around 35%  This could be related to LBB B  Patient could benefit from biventricular pacemaker but he refused  Continue conservative Rx  4   CAD status post angioplasty of LAD in   Patient has no symptoms of angina    He does have residual 50-60% left anterior descending artery stenosis he is on medical Rx  Continue aspirin and statins  5  Dyslipidemia on statin  Tolerating well  Patient is tolerating Lipitor 10 milligram without any issues    6  Known moderate COPD with chronic hypoxic respiratory failure  On oxygen therapy  Has chronic of wheeze which is not changed  He is scheduled to follow up with Pulmonary also  7   Hypothyroidism  On levothyroxine management as per PMD   Continue levothyroxine    8  Moderate obesity with recent loss of weight  9  Hypertension  Patient blood pressure is pretty well controlled  Currently with increase in metoprolol his blood pressure might go lower  He is also on hydralazine and Imdur or due to cardiomyopathy  Will DC amlodipine  10  Chronic kidney disease is stage4  Creatinine is now elevated  Serum creatinine has now improved  Creatinine is not 2 53  Potassium was also noted to be low  He will be started on K Dur 10 mg twice a week  Will repeat the labs again  Counseling :  A description of the counseling  above reviewed in detail  Spoke to caretaker  Written instructions given to take him to the hospital if he does not feel better  Patient may not tolerate tachycardia very well additional medications given  Samples of Eliquis also given  Patient's ability to self care: Yes  Medication side effect reviewed with patient in detail and all their questions answered to their satisfaction  HPI :     Leopold Pilsner is a 80y o  year old male who came for follow up   Patient has past medical history significant for moderate COPD, chronic combined systolic and diastolic heart failure, severe aortic stenosis status post TAVR in August of 2017, CAD status post angioplasty of LAD, chronic LBBB, CKD stage 3, type 2 diabetes mellitus, dyslipidemia, paroxysmal atrial fibrillation suppressed with amiodarone in sinus rhythm with no reoccurrence, CO2 dependent due to chronic hypoxia , with history of coronary artery disease status post angioplasty of LAD with a residual 60% mid stenosis, severe aortic stenosis status post TAVR in [facility], known moderate to severe COPD, paroxysmal atrial fibrillation currently suppressed with amiodarone, on home oxygen therapy, chronic LBBB, CRI, hypertension, history of diastolic heart failure who was recently admitted to [facility] with a COPD exacerbation and Astelin heart failure was discharged home but was not taking his Demadex as prescribed  For couple days he did not take it at all and now he has started back  He was feeling short of breath particularly when he exerts  Since he started back on it he has been feeling a lot better  He still get hypoxic when he walks short distances  He denies any chest pain, any fever, any wheezing, any chills, any nausea or any increased leg swelling or weight gain  12/17/2018    Above reviewed  Patient was recently discharged home  He was admitted with chronic systolic and diastolic heart failure  His weight now is around 205 lb  He had weight in the hospital was 203-204 lb  Weight has been stable  No significant leg edema  He is taking Demadex 40 mg to alternate with 60 mg  His breathing is better while resting but with exertion he gets symptomatic which is not changed  No nausea no vomiting no PND no orthopnea  He came with caretaker  01/09/2019  Above reviewed  Patient came for follow-up  His weight is stable around 205 lb, which is a good thing but however is found to be in AFib with heart rate around 120 beats per minute  This is new  Patient does have history of atrial fibrillation but mostly he get brief periods of atrial fibrillation and he goes back to sinus rhythm  He is on long-term amiodarone and metoprolol  He is taking his torsemide 40 mg to alternate with 60 mg  He had potassium was found to be slightly low    His BUN has improved from 71-50 and creatinine is 2 56 which is also better from 2 75  He is denying any symptoms and he is reluctant to go to the hospital   No fever no chills no nausea no vomiting he has some wheezing which is chronic not change  No other significant issues at this time  He came with caretaker  Review of Systems   Constitutional: Negative for activity change, chills, diaphoresis, fever and unexpected weight change  HENT: Negative for congestion  Eyes: Negative for discharge and redness  Respiratory: Positive for shortness of breath  Negative for cough, chest tightness and wheezing  Chronic and much better since his discharge and not changed since last visit   Cardiovascular: Positive for leg swelling  Negative for chest pain and palpitations  Minimal and not changed   Gastrointestinal: Negative for abdominal pain, diarrhea and nausea  Endocrine: Negative  Genitourinary: Negative for decreased urine volume and urgency  Musculoskeletal: Positive for arthralgias and gait problem  Negative for back pain  Bilateral shoulder pain, which is chronic   Skin: Negative for rash and wound  Allergic/Immunologic: Negative  Neurological: Negative for dizziness, seizures, syncope, weakness, light-headedness and headaches  Hematological: Negative  Psychiatric/Behavioral: Positive for sleep disturbance  Negative for agitation and confusion  The patient is nervous/anxious  All other systems reviewed and are negative        Historical Information   Past Medical History:   Diagnosis Date    Allergic rhinitis 06/11/2010    Aortic stenosis, severe     Bacterial pneumonia 06/08/2007    Bladder neck obstruction 12/23/2010    BPH (benign prostatic hyperplasia)     Bronchitis, chronic obstructive, with exacerbation ([facility] ) 01/30/2012    CAD (coronary artery disease)     stent 2014    Carcinoma ([facility] )     resolved [id number]    Cardiomegaly 02/13/2007    Cataract of both eyes     CHF (congestive heart failure) ([facility] )  Chronic allergic conjunctivitis     last assessed [id number]    Chronic kidney disease (CKD)     CKD (chronic kidney disease), stage III (HCC)     CKD (chronic kidney disease), stage III (HCC)     COPD (chronic obstructive pulmonary disease) [email or web address]     Decubitus ulcer     resolved [id number]    Dermatomycosis     last assessed [id number]    Diabetes mellitus type 2, noninsulin dependent ([email or web address]     Dyspnea on exertion     last assessed [id number]    Eczema     last assessed [id number]    Edema     last assessed [id number]    Epistaxis 12/01/2004    Esophagitis, erosive     Exposure to scabies     resolved [id number]    Fracture of rib     last assessed [id number]    H/O aortic valve replacement     resolved [id number]    H/O blood clots     History of DVT (deep vein thrombosis)     left posterior tibial/peroneal veins    History of non-ST elevation myocardial infarction (NSTEMI)     History of pneumonia     Hyperlipidemia     Hypertension     Internal hemorrhoids 09/13/2006    LBBB (left bundle branch block)     MRSA (methicillin resistant staph aureus) culture positive     NSTEMI (non-ST elevated myocardial infarction) ([facility] )     resolved [id number]    Obstructive sleep apnea on CPAP     severe PATITO with AHI of 106 and uses CPAP at 10 cm H2O with 2 l/m oxygen    Paroxysmal atrial fibrillation ([email or web address]     Phimosis 09/01/2010    severe pt had circumcision 9/2010    Pulmonary fibrosis ([email or web address]     PVD (peripheral vascular disease) ([email or web address]     Rotator cuff tendonitis     lsat assessed [id number]    Skin abscess 12/21/2004    Hip    Skin neoplasm     last assessed [id number]    Tachycardia 12/01/2004    Trigger finger     last assessed [id number]    Type 2 diabetes mellitus ([email or web address] 12/01/2004    Venous thrombosis 05/16/2007    Venous thrombosis 05/16/2007     Past Surgical History:   Procedure Laterality Date    CARDIAC CATHETERIZATION  03/10/2016    Showed 60-70% mid LAD lesion next to stent    CATARACT EXTRACTION      CIRCUMCISION, NON-      ESOPHAGOGASTRODUODENOSCOPY N/A 3/14/2016    Procedure: ESOPHAGOGASTRODUODENOSCOPY (EGD); Surgeon: Schuyler Rodriguez MD;  Location: [facility];   Service:     EYE SURGERY      FRACTURE SURGERY      JOINT REPLACEMENT      both hips replaced    OTHER SURGICAL HISTORY      H/O thoracentesis (diagnostic)    AR REPLACE AORTIC VALVE OPENFEMORAL ARTERY APPROACH N/A 2016    Procedure: TRANSFEMORAL TAVR WITH 26MM CALIX MAKAYLA S3 TISSUE VALVE; PINA ;  Surgeon: Pauline Gonzalez DO;  Location:  MAIN OR;  Service: Cardiac Surgery    REPLACEMENT TOTAL KNEE Bilateral 1991 and     TISSUE AORTIC VALVE REPLACEMENT      transfemoral, transcatheter    TONSILLECTOMY      TONSILLECTOMY AND ADENOIDECTOMY      TOTAL HIP ARTHROPLASTY      Bilateral     History   Alcohol Use    Yes     Comment: socially     History   Drug Use No     History   Smoking Status    Former Smoker    Packs/day: 1 00    Years: 35 00    Types: Cigarettes    Quit date: 1987   Smokeless Tobacco    Never Used     Comment: quit 50 years ago, per [employer]     Family History:   Family History   Problem Relation Age of Onset    Coronary artery disease Mother     Arthritis Mother     Hypertension Mother     Rheum arthritis Father     Prostate cancer Father        Meds/Allergies     No Known Allergies    Current Outpatient Prescriptions:     amiodarone 200 mg tablet, Take 1 tablet (200 mg total) by mouth daily, Disp: 30 tablet, Rfl: 3    aspirin (ECOTRIN LOW STRENGTH) 81 mg EC tablet, Take 81 mg by mouth daily, Disp: , Rfl:     atorvastatin (LIPITOR) 10 mg tablet, TAKE 2 TABLETS BY MOUTH DAILY, Disp: 90 tablet, Rfl: 3    B Complex Vitamins (VITAMIN B COMPLEX PO), Take by mouth, Disp: , Rfl:     BREO ELLIPTA, USE 1 INHALATION DAILY, Disp: 1 each, Rfl: 5    cyanocobalamin (VITAMIN B-12) 100 mcg tablet, Take by mouth daily  , Disp: , Rfl:     diclofenac sodium (VOLTAREN) 1 %, Apply 4 g topically 4 (four) times a day Both shoulders and knees, Disp: 5 Tube, Rfl: 5    docusate sodium (COLACE) 100 mg capsule, Take 100 mg by mouth daily at bedtime  , Disp: , Rfl:     fenofibrate (TRICOR) 145 mg tablet, Take 1 tablet (145 mg total) by mouth daily, Disp: 90 tablet, Rfl: 3    hydrALAZINE (APRESOLINE) 25 mg tablet, TAKE 1 TABLET BY MOUTH EVERY 8 HOURS, Disp: 90 tablet, Rfl: 0    IFEREX 150 150 MG capsule, take 1 capsule by mouth once daily, Disp: 30 capsule, Rfl: 2    ipratropium-albuterol (DUO-NEB) 0 5-2 5 mg/3 mL nebulizer solution, Take 1 vial (3 mL total) by nebulization 4 (four) times a day, Disp: , Rfl: 0    isosorbide mononitrate (IMDUR) 30 mg 24 hr tablet, take 1 tablet by mouth once daily, Disp: 30 tablet, Rfl: 0    levothyroxine 50 mcg tablet, take 1 tablet by mouth once daily, Disp: 30 tablet, Rfl: 5    metoprolol tartrate (LOPRESSOR) 25 mg tablet, Take 2 tablets (50 mg total) by mouth every 12 (twelve) hours, Disp: 180 tablet, Rfl: 1    pantoprazole (PROTONIX) 40 mg tablet, take 1 tablet by mouth once daily, Disp: 90 tablet, Rfl: 3    tolterodine (DETROL) 2 mg tablet, Take 1 tablet (2 mg total) by mouth daily, Disp: 90 tablet, Rfl: 1    torsemide (DEMADEX) 20 mg tablet, Take 40 mg alternatively with 60 mg every other day, Disp: 100 tablet, Rfl: 0    albuterol (PROAIR HFA) 90 mcg/act inhaler, Inhale 2 puffs every 4 (four) hours as needed for wheezing or shortness of breath, Disp: 18 g, Rfl: 5    apixaban (ELIQUIS) 2 5 mg, Take 1 tablet (2 5 mg total) by mouth 2 (two) times a day, Disp: 60 tablet, Rfl: 2    [START ON 1/10/2019] potassium chloride (K-DUR,KLOR-CON) 10 mEq tablet, Take 1 tablet (10 mEq total) by mouth 2 (two) times a week, Disp: 8 tablet, Rfl: 1    Vitals: Blood pressure 120/72, pulse (!) 120, height 5' 10" (1 778 m), weight 93 kg (205 lb), SpO2 (!) 88 %  Body mass index is 29 41 kg/m²    Vitals:    01/09/19 1005   Weight: 93 kg (205 lb) BP Readings from Last 3 Encounters:   01/09/19 120/72   12/18/18 110/52   12/17/18 118/66         Physical Exam   Constitutional: He is oriented to person, place, and time  He appears well-developed and well-nourished  No distress  HENT:   Head: Normocephalic and atraumatic  Eyes: Pupils are equal, round, and reactive to light  Neck: Neck supple  No JVD present  No thyromegaly present  Cardiovascular: S1 normal and S2 normal   An irregularly irregular rhythm present  Exam reveals no gallop, no S3, no S4, no distant heart sounds and no friction rub  Murmur heard  Systolic murmur is present   S1-S2 irregularly irregular with 3/6 ejection systolic murmur, tachycardic   Pulmonary/Chest: No respiratory distress  He has wheezes  He has no rales  He exhibits no tenderness  Bilateral air entry with chronic rhonchi and wheezing which is not changed air entry is acceptable   Abdominal: Soft  He exhibits no distension  There is no tenderness  Musculoskeletal: He exhibits edema  He exhibits no deformity  Minimal not worse   Lymphadenopathy:     He has no cervical adenopathy  Neurological: He is alert and oriented to person, place, and time  Skin: Skin is warm and dry  No rash noted  He is not diaphoretic  No pallor  Psychiatric: He has a normal mood and affect  Judgment normal          Diagnostic Studies Review Cardio:    Echocardiogram/PINA: Echo Doppler done of in September of 2017 shows EF 40%, paradoxical septal motion, mildly dilated LV, the lateral right atrium and left atrium, mild-to-moderate TR with PA pressure 65 mm of mercury, bioprosthetic valve at aortic position which is Villarreal Esdras TAVR valve with normal function and mean gradient of 9 mm of mercury with no paravalvular leak  Mild-to-moderate MR noted  As compared to old study patient's EF is now around 40% and aortic stenosis has improved  Catheterization: Cardiac catheterization done shows he has 50-60% LAD stenosis   There is a patent stent seen in the mid and distal LAD  Nonobstructive disease seen in the right coronary artery and circumflex  This was done in 2016  Other: Chest x-ray done few weeks ago shows evidence of interstitial fibrosis  No pneumonia no CHF  ECG Report: 2017 tablet EKG shows sinus rhythm with first-degree AV block, IVCD or incomplete LBBB  Not changed from old EKG  Patient had history of rate-related LBBB     Twelve lead EKG 2018 shows normal sinus rhythm heart rate 65 beats per minute  IVCD  No change from old EKG    Twelve lead EKG done on 2018 shows sinus rhythm with first-degree AV block underlying LBBB not change from old EKG  Heart rate is 78 beats per minute  Cardiac testing:   Results for orders placed during the hospital encounter of 18   Echo complete with contrast if indicated    Narrative [address]  [address] [facility] [address]  [phone]    Transthoracic Echocardiogram  2D, M-mode, Doppler, and Color Doppler    Study date:  30-Mar-2018    Patient: Judi Dunne  MR number: [id number]  Account number: [de-identified]  : 24-Oct-1930  Age: 80 years  Gender: Male  Status: Routine  Location: Bedside  Height: 70 in  Weight: 213 lb  BP: 146/ 65 mmHg    Indications: Coronary Artery Disease    Diagnoses: I50 9 - Heart failure, unspecified    Sonographer:  CAMILLA Salcedo, RVS  Primary Physician:  Jose Cain DO  Referring Physician:  Jesse Rice MD  Group:  [facility]  Interpreting Physician:  Lexie Whitt MD    SUMMARY    LEFT VENTRICLE:  Size was at the upper limits of normal   Systolic function was moderately reduced  Ejection fraction was estimated in the range of 35 % to 40 % to be 35 %  There was moderate diffuse hypokinesis  Wall thickness was mildly increased  There was mild concentric hypertrophy    Doppler parameters were consistent with abnormal left ventricular relaxation (grade 1 diastolic dysfunction)  VENTRICULAR SEPTUM:  There was moderate paradoxical motion  These changes are consistent with LBBB  RIGHT VENTRICLE:  The size was at the upper limits of normal     LEFT ATRIUM:  The atrium was moderately dilated  RIGHT ATRIUM:  The atrium was mildly dilated  MITRAL VALVE:  There was mild to moderate annular calcification  There was mild regurgitation  AORTIC VALVE:  There was trace regurgitation  TRICUSPID VALVE:  There was moderate regurgitation  Estimated peak PA pressure was 70 mmHg  The findings suggest severe pulmonary hypertension  PULMONIC VALVE:  There was mild regurgitation  HISTORY: PRIOR HISTORY: HTN, PVD, Atrial Fibrillation, LBBB, CHF, CAD, COPD, TAVR    PROCEDURE: The procedure was performed at the bedside  This was a routine study  The transthoracic approach was used  The study included complete 2D imaging, M-mode, complete spectral Doppler, and color Doppler  The heart rate was 75 bpm,  at the start of the study  Images were obtained from the parasternal, apical, subcostal, and suprasternal notch acoustic windows  Echocardiographic views were limited due to poor acoustic window availability  This was a technically  difficult study  LEFT VENTRICLE: Size was at the upper limits of normal  Systolic function was moderately reduced  Ejection fraction was estimated in the range of 35 % to 40 % to be 35 %  There was moderate diffuse hypokinesis  Wall thickness was mildly  increased  There was mild concentric hypertrophy  DOPPLER: Doppler parameters were consistent with abnormal left ventricular relaxation (grade 1 diastolic dysfunction)  VENTRICULAR SEPTUM: Thickness was mildly increased  There was moderate paradoxical motion  These changes are consistent with LBBB  RIGHT VENTRICLE: The size was at the upper limits of normal  Systolic function was low normal     LEFT ATRIUM: The atrium was moderately dilated      RIGHT ATRIUM: The atrium was mildly dilated  MITRAL VALVE: There was mild to moderate annular calcification  There was lower normal leaflet separation  DOPPLER: There was no evidence for stenosis  There was mild regurgitation  AORTIC VALVE: TAVR had been performed previously  Peak velocity 2 6 to 2 7 m/sec  Mean gradient 16-17 mm and peak gradient of 28-30 mm of hg and FATMATA 1 30 to 1 35 cm2 DOPPLER: There was trace regurgitation  TRICUSPID VALVE: DOPPLER: There was moderate regurgitation  Estimated peak PA pressure was 70 mmHg  The findings suggest severe pulmonary hypertension  PULMONIC VALVE: DOPPLER: There was mild regurgitation  PERICARDIUM: There was no thickening or calcification  There was no pericardial effusion  AORTA: The root exhibited normal size  SYSTEMIC VEINS: IVC: The inferior vena cava was upper normal in size  Respirophasic changes were normal     SYSTEM MEASUREMENT TABLES    2D mode  AoR Diam 2D: 2 6 cm  LA Diam (2D): 5 3 cm  LA/Ao (2D): 2 04  FS (2D Teich): 6 82 %  IVSd (2D): 1 58 cm  LVDEV: 152 cm³  LVESV: 129 cm³  LVIDd(2D): 5 57 cm  LVISd (2D): 5 19 cm  LVOT Area 2D: 3 8 cm squared  LVPWd (2D): 1 39 cm  SV (Teich): 23 cm³    Apical four chamber  LVEF A4C: 25 %    Unspecified Scan Mode  FATMATA Cont Eq (Peak Pasquale): 1 56 cm squared  FATMATA Cont Eq (VTI): 1 61 cm squared  LVOT (VTI): 22 1 cm  LVOT Diam : 2 2 cm  LVOT Vmax: 1070 mm/s  LVOT Vmax; Mean: 1040 mm/s  Peak Grad ; Mean: 4 mm[Hg]  SV (LVOT): 82 cm³  VTI;Mean: 3 mm[Hg]  MV Peak A Pasquale: 1150 mm/s  MV Peak E Pasquale   Mean: 718 mm/s  MVA (PHT): 1 95 cm squared  PHT: 110 ms  Max P mm[Hg]  V Max: 3760 mm/s  Vmax: 3550 mm/s  RA Area: 22 5 cm squared  RA Volume: 66 8 cm³    [employer] Accredited Echocardiography Laboratory    Prepared and electronically signed by    Shey Calloway MD  Signed 30-Mar-2018 16:35:19         Results for orders placed during the hospital encounter of 16   Cardiac catheterization    Narrative Cardiac Catheterization Operative Report    Primary care doctor: Dr Paulina Steven     Cardiologist is Dr Liya Miller    Date of procedure:  3/10/2016    Brief history: -49-year-old male with history of chronic renal   insufficiency, CAD, status post angioplasty of LAD, known severe COPD who   was admitted with hypoxic respiratory failure and has a troponin of 10  He   has a known history of severe aortic stenosis along with moderate   pulmonary hypertension  Hence he was scheduled for complete heart cath    Procedure Details  The risks, benefits, complications, including risk of stroke, heart   attack, bleeding and even death but not limited to at along with treatment   options, and expected outcomes were discussed with the patient  The   patient and/or family concurred with the proposed plan, giving informed   consent  Patient was brought to the cath lab after IV hydration was begun   and oral premedication was given  He was further sedated with midazolam   and fentanyl  He was prepped and draped in the usual manner  Using the   modified Seldinger access technique, a 6 [place]ic sheath was placed in the   right common femoral artery without any complications    A left heart   catheterization was done  Angiograms were also done  End of the procedure   patient was transferred to holding area without any complications  He   tolerated the procedure well  After the procedure was completed, sedation was stopped and the sheaths   and catheters were all removed  Hemostasis was achieved with fem stop  Access was obtained in the right femoral artery as well as right femoral   vein    Equipment used:   1  JR4 for right coronary angiography  2  JL 4 0 for left coronary angiography   3  LV gram with AL1    Findings:  1  Dominance: Right dominant coronary system    2  Left main:  Is a normal-size vessel it has mild calcification noted  It   bifurcates into large LAD and a circumflex system    3  LAD: LAD is a large-size vessel and it has proximal calcification  Just   after septal it has focal about 60-65% stenosis  This stent in the LAD is   patent after the stenosis  No other focal stenosis seen  4  Circumflex is a nondominant medium size vessel  It has proximally   35-40% stenosis seen  5  RCA: RCA is large vessel and has mild luminal irregularities causing   35% stenosis proximally and mid area  RPL branch has around 35% stenosis   in the mid area  5  LV gram: LV gram was not done as patient has renal insufficiency  However there was 25 mm gradient across aortic valve  Right heart catheter meters: Aortic pressure 128/74  LV pressure 152/10  Card to call output  About 5 L  PA pressure 55 mmHg  RV pressure 55/10  RA mean 8-10  Wedge pressure was not very accurate as patient has lots of respiratory   motions  Itis to be around 16 minutes of mercury  Estimated Blood Loss: Minimal  Complications:  None; patient tolerated the procedure well  Recommendation: Continue medical therapy  Continue risk factor   modification and patient will have a functional stress test as an   outpatient to see if that LAD needs to be done  It seemed to be stable   lesion and less likely to be the cause of patient's non-ST elevation MI  We'll continue other Rx before  Check BMP and CBC  See orders     Disposition: Hemodynamically stable and PACU - hemodynamically stable  Condition: stable       Imaging:  Chest X-Ray:   Xr Chest Pa & Lateral    Result Date: 10/16/2017  Impression Interstitial fibrosis and chronic pleural changes on the right  No acute infiltrate is seen Workstation performed: [id number]     Xr Chest Pa & Lateral    Result Date: 5/9/2017  Impression Loculated pleural effusion on the right appears smaller  Bibasilar atelectasis  Workstation performed: [id number]     CT-scan of the chest:     No CTA results available for this patient    Lab Review   Lab Results   Component Value Date    WBC 6 79 12/05/2018    HGB 9 1 (L) 12/05/2018    HCT 31 0 (L) 12/05/2018    MCV 88 12/05/2018    RDW 16 6 (H) 12/05/2018     12/09/2018     BMP:  Lab Results   Component Value Date     10/16/2017    K 3 2 (L) 01/08/2019    CL 99 (L) 01/08/2019    CO2 32 01/08/2019    ANIONGAP 13 9 10/08/2015    BUN 50 (H) 01/08/2019    CREATININE 2 56 (H) 01/08/2019    GLUCOSE 109 (H) 10/16/2017    CALCIUM 9 2 01/08/2019    EGFR 21 01/08/2019    MG 2 2 12/08/2018     LFT:  Lab Results   Component Value Date    AST 47 (H) 11/29/2018    ALT 26 11/29/2018    ALKPHOS 55 11/29/2018    PROT 7 9 10/16/2017    BILITOT 0 4 10/16/2017       Lab Results   Component Value Date    HGBA1C 5 3 06/12/2018     Lipid Profile:   Lab Results   Component Value Date    CHOL 97 (L) 10/16/2017    HDL 51 10/26/2018    LDLCALC 45 10/16/2017    TRIG 88 10/26/2018     Lab Results   Component Value Date    CHOL 97 (L) 10/16/2017    CHOL 110 06/08/2017     Lab Results   Component Value Date    CKTOTAL 111 01/06/2017    TROPONINI 0 02 11/29/2018     Dr Mariajose Pryor MD [facility]      "This note has been constructed using a voice recognition system  Therefore there may be syntax, spelling, and/or grammatical errors   Please call if you have any questions  "

## 2019-01-09 NOTE — TELEPHONE ENCOUNTER
----- Message from Amanda Said sent at 1/9/2019  8:19 AM EST -----      ----- Message -----  From: Kaiser Melgar MD  Sent: 1/8/2019   4:53 PM  To: Ivan Shafer    Patient has abnormal electrolytes  Potassium is 3 2  He need to be on kdur 10 mg maybe 3 times a week  And repeat BMP in 2-3 weeks

## 2019-01-11 ENCOUNTER — OFFICE VISIT (OUTPATIENT)
Dept: OBGYN CLINIC | Facility: CLINIC | Age: 84
End: 2019-01-11
Payer: COMMERCIAL

## 2019-01-11 VITALS — BODY MASS INDEX: 29.35 KG/M2 | HEIGHT: 70 IN | WEIGHT: 205 LBS

## 2019-01-11 DIAGNOSIS — M19.011 OSTEOARTHRITIS OF GLENOHUMERAL JOINT, RIGHT: ICD-10-CM

## 2019-01-11 DIAGNOSIS — M19.012 OSTEOARTHRITIS OF GLENOHUMERAL JOINT, LEFT: Primary | ICD-10-CM

## 2019-01-11 PROCEDURE — 20610 DRAIN/INJ JOINT/BURSA W/O US: CPT | Performed by: ORTHOPAEDIC SURGERY

## 2019-01-11 PROCEDURE — 99213 OFFICE O/P EST LOW 20 MIN: CPT | Performed by: ORTHOPAEDIC SURGERY

## 2019-01-11 RX ORDER — DEXAMETHASONE SODIUM PHOSPHATE 100 MG/10ML
40 INJECTION INTRAMUSCULAR; INTRAVENOUS
Status: COMPLETED | OUTPATIENT
Start: 2019-01-11 | End: 2019-01-11

## 2019-01-11 RX ORDER — BUPIVACAINE HYDROCHLORIDE 2.5 MG/ML
4 INJECTION, SOLUTION INFILTRATION; PERINEURAL
Status: COMPLETED | OUTPATIENT
Start: 2019-01-11 | End: 2019-01-11

## 2019-01-11 RX ADMIN — DEXAMETHASONE SODIUM PHOSPHATE 40 MG: 100 INJECTION INTRAMUSCULAR; INTRAVENOUS at 08:25

## 2019-01-11 RX ADMIN — DEXAMETHASONE SODIUM PHOSPHATE 40 MG: 100 INJECTION INTRAMUSCULAR; INTRAVENOUS at 08:26

## 2019-01-11 RX ADMIN — BUPIVACAINE HYDROCHLORIDE 4 ML: 2.5 INJECTION, SOLUTION INFILTRATION; PERINEURAL at 08:25

## 2019-01-11 RX ADMIN — BUPIVACAINE HYDROCHLORIDE 4 ML: 2.5 INJECTION, SOLUTION INFILTRATION; PERINEURAL at 08:26

## 2019-01-11 NOTE — PROGRESS NOTES
Assessment/Plan:  1  Osteoarthritis of glenohumeral joint, left     2  Osteoarthritis of glenohumeral joint, right         Scribe Attestation    I,:   Baldomero Vargas am acting as a scribe while in the presence of the attending physician :        I,:   Mable Gutierrez MD personally performed the services described in this documentation    as scribed in my presence :              Bryce Urbano upon examination continues to suffer from painful symptoms due to the severe osteoarthritis of both the left and right shoulders  He does demonstrate approximately 80° abduction in both left and right shoulders  He however is very weak with abduction  He does demonstrate normal strength with internal external rotation with the arm close to the body  Again it was reiterated the Bryce Urbano is not a surgical candidate as he does have too many comorbidities that disqualify him for shoulder replacement procedure  Bryce Urbano notes a did experience some relief with the cortisone injections given in October I did provide him with bilateral cortisone injections today  He did tolerate them well with no complications  Bryce Urbano may continue to follow up with me on 3 month intervals for repeat evaluation      Large joint arthrocentesis  Date/Time: 1/11/2019 8:25 AM  Consent given by: patient  Timeout: Immediately prior to procedure a time out was called to verify the correct patient, procedure, equipment, support staff and site/side marked as required   Supporting Documentation  Indications: pain   Procedure Details  Location: shoulder - L glenohumeral  Preparation: Patient was prepped and draped in the usual sterile fashion  Needle size: 22 G  Ultrasound guidance: no  Approach: anterior  Medications administered: 4 mL bupivacaine 0 25 %; 40 mg dexamethasone 100 mg/10 mL    Patient tolerance: patient tolerated the procedure well with no immediate complications  Dressing:  Sterile dressing applied  Large joint arthrocentesis  Date/Time: 1/11/2019 8:26 AM  Consent given by: patient  Timeout: Immediately prior to procedure a time out was called to verify the correct patient, procedure, equipment, support staff and site/side marked as required   Supporting Documentation  Indications: pain   Procedure Details  Location: shoulder - R glenohumeral  Preparation: Patient was prepped and draped in the usual sterile fashion  Needle size: 22 G  Ultrasound guidance: no  Approach: anterior  Medications administered: 4 mL bupivacaine 0 25 %; 40 mg dexamethasone 100 mg/10 mL    Patient tolerance: patient tolerated the procedure well with no immediate complications  Dressing:  Sterile dressing applied        Subjective:   Alcon Rivers is a 80 y o  male who presents for follow-up evaluation of his left and right shoulders  Upon last visit he was provided with cortisone injections to treat glenohumeral joint osteoarthritis  [facility] notes he still continues to experience intermittent and moderate to severe sharp pain about the posterior aspect of both the left and right shoulders  He notes the pain is exacerbated with overhead activities and is better at rest   He does note that he is able to function with his arms close to his body  However, any activities with the arm outstretched are very painful  He notes that the cortisone injections did provide him with some relief  However, he notes that the pain did gradually return as the cortisone injections wore off  Today denies any distal paresthesias  Review of Systems   Constitutional: Positive for chills  Negative for fever and unexpected weight change  HENT: Positive for hearing loss  Negative for nosebleeds and sore throat  Eyes: Negative for pain, redness and visual disturbance  Respiratory: Negative for cough, shortness of breath and wheezing  Cardiovascular: Positive for leg swelling  Negative for chest pain and palpitations  Gastrointestinal: Negative for abdominal pain, nausea and vomiting  Endocrine: Negative for polyphagia and polyuria  Frequent urination   Genitourinary: Negative for dysuria and hematuria  Musculoskeletal: Positive for arthralgias and myalgias  See HPI   Skin: Negative for rash and wound  Neurological: Positive for numbness  Negative for dizziness and headaches  Psychiatric/Behavioral: Negative for decreased concentration and suicidal ideas  The patient is not nervous/anxious            Past Medical History:   Diagnosis Date    Allergic rhinitis 06/11/2010    Aortic stenosis, severe     Bacterial pneumonia 06/08/2007    Bladder neck obstruction 12/23/2010    BPH (benign prostatic hyperplasia)     Bronchitis, chronic obstructive, with exacerbation ([facility] ) 01/30/2012    CAD (coronary artery disease)     stent 2014    Carcinoma ([facility] )     resolved [id number]    Cardiomegaly 02/13/2007    Cataract of both eyes     CH[facility]congestive heart failure) [facility]     Chronic allergic conjunctivitis     last assessed [id number]    Chronic kidney disease (CKD)     CKD (chronic kidney disease), stage III (HCC)     CKD (chronic kidney disease), stage III (HCC)     COPD (chronic obstructive pulmonary disease) ([facility] )     Decubitus ulcer     resolved [id number]    Dermatomycosis     last assessed [id number]    Diabetes mellitus type 2, noninsulin dependent ([facility] )     Dyspnea on exertion     last assessed [id number]    Eczema     last assessed [id number]    Edema     last assessed [id number]    Epistaxis 12/01/2004    Esophagitis, erosive     Exposure to scabies     resolved [id number]    Fracture of rib     last assessed [id number]    H/O aortic valve replacement     resolved [id number]    H/O blood clots     History of DVT (deep vein thrombosis)     left posterior tibial/peroneal veins    History of non-ST elevation myocardial infarction (NSTEMI)     History of pneumonia     Hyperlipidemia     Hypertension     Internal hemorrhoids 09/13/2006    LBBB (left bundle branch block)     MRSA (methicillin resistant staph aureus) culture positive     NSTEMI (non-ST elevated myocardial infarction) ([facility] )     resolved 2016    Obstructive sleep apnea on CPAP     severe PATITO with AHI of 106 and uses CPAP at 10 cm H2O with 2 l/m oxygen    [facility]ox[facility]     Phimosis 2010    severe pt had circumcision 2010    Pulmonary fibrosis ([facility] )     PVD (peripheral vascular disease) ([facility] )     Rotator cuff tendonitis     lsat assessed [id number]    Skin abscess 2004    Hip    Skin neoplasm     last assessed [id number]    Tachycardia 2004    Trigger finger     last assessed [id number]    Type 2 diabetes mellitus ([facility] ) 2004    Venous thrombosis 2007    Venous thrombosis 2007       Past Surgical History:   Procedure Laterality Date    CARDIAC CATHETERIZATION  03/10/2016    Showed 60-70% mid LAD lesion next to stent    CATARACT EXTRACTION      CIRCUMCISION, NON-      ESOPHAGOGASTRODUODENOSCOPY N/A 3/14/2016    Procedure: ESOPHAGOGASTRODUODENOSCOPY (EGD); Surgeon: Cherylene Needy, MD;  Location: [facility];   Service:     EYE SURGERY      FRACTURE SURGERY      JOINT REPLACEMENT      both hips replaced    OTHER SURGICAL HISTORY      H/O thoracentesis (diagnostic)    MI REPLACE AORTIC VALVE OPENFEMORAL ARTERY APPROACH N/A 2016    Procedure: TRANSFEMORAL TAVR WITH 26MM CALIX MAKAYLA S3 TISSUE VALVE; PINA ;  Surgeon: Gaurang Santiago DO;  Location: [facility];  Service: Cardiac Surgery    REPLACEMENT TOTAL KNEE Bilateral 1991 and     TISSUE AORTIC VALVE REPLACEMENT      transfemoral, transcatheter    TONSILLECTOMY      TONSILLECTOMY AND ADENOIDECTOMY      TOTAL HIP ARTHROPLASTY      Bilateral       Family History   Problem Relation Age of Onset    Coronary artery disease Mother     Arthritis Mother     Hypertension Mother     Rheum arthritis Father     Prostate cancer Father     No Known Problems Sister     No Known Problems Brother     No Known Problems Maternal Aunt     No Known Problems Maternal Uncle     No Known Problems Paternal Aunt     No Known Problems Paternal Uncle     No Known Problems Maternal Grandmother     No Known Problems Maternal Grandfather     No Known Problems Paternal Grandmother     No Known Problems Paternal Grandfather     ADD / ADHD Neg Hx     Anesthesia problems Neg Hx     Cancer Neg Hx     Clotting disorder Neg Hx     Collagen disease Neg Hx     Diabetes Neg Hx     Dislocations Neg Hx     Learning disabilities Neg Hx     Neurological problems Neg Hx     Osteoporosis Neg Hx     Rheumatologic disease Neg Hx     Scoliosis Neg Hx     Vascular Disease Neg Hx        Social History     Occupational History    Not on file       Social History Main Topics    Smoking status: Former Smoker     Packs/day: 1 00     Years: 35 00     Types: Cigarettes     Quit date: 1/1/1987    Smokeless tobacco: Never Used      Comment: quit 50 years ago, per [employer]    Alcohol use Yes      Comment: socially    Drug use: No    Sexual activity: No      Comment: , lives at home with wife, ambulates with cane at home         Current Outpatient Prescriptions:     albuterol (PROAIR HFA) 90 mcg/act inhaler, Inhale 2 puffs every 4 (four) hours as needed for wheezing or shortness of breath, Disp: 18 g, Rfl: 5    amiodarone 200 mg tablet, Take 1 tablet (200 mg total) by mouth daily, Disp: 30 tablet, Rfl: 3    apixaban (ELIQUIS) 2 5 mg, Take 1 tablet (2 5 mg total) by mouth 2 (two) times a day, Disp: 56 tablet, Rfl: 0    aspirin (ECOTRIN LOW STRENGTH) 81 mg EC tablet, Take 81 mg by mouth daily, Disp: , Rfl:     atorvastatin (LIPITOR) 10 mg tablet, TAKE 2 TABLETS BY MOUTH DAILY, Disp: 90 tablet, Rfl: 3    B Complex Vitamins (VITAMIN B COMPLEX PO), Take by mouth, Disp: , Rfl:     BREO ELLIPTA, USE 1 INHALATION DAILY, Disp: 1 each, Rfl: 5    cyanocobalamin (VITAMIN B-12) 100 mcg tablet, Take by mouth daily  , Disp: , Rfl:     diclofenac sodium (VOLTAREN) 1 %, Apply 4 g topically 4 (four) times a day Both shoulders and knees, Disp: 5 Tube, Rfl: 5    docusate sodium (COLACE) 100 mg capsule, Take 100 mg by mouth daily at bedtime  , Disp: , Rfl:     fenofibrate (TRICOR) 145 mg tablet, Take 1 tablet (145 mg total) by mouth daily, Disp: 90 tablet, Rfl: 3    hydrALAZINE (APRESOLINE) 25 mg tablet, TAKE 1 TABLET BY MOUTH EVERY 8 HOURS, Disp: 90 tablet, Rfl: 0    IFEREX 150 150 MG capsule, take 1 capsule by mouth once daily, Disp: 30 capsule, Rfl: 2    ipratropium-albuterol (DUO-NEB) 0 5-2 5 mg/3 mL nebulizer solution, Take 1 vial (3 mL total) by nebulization 4 (four) times a day, Disp: , Rfl: 0    isosorbide mononitrate (IMDUR) 30 mg 24 hr tablet, take 1 tablet by mouth once daily, Disp: 30 tablet, Rfl: 0    levothyroxine 50 mcg tablet, take 1 tablet by mouth once daily, Disp: 30 tablet, Rfl: 5    metoprolol tartrate (LOPRESSOR) 25 mg tablet, Take 2 tablets (50 mg total) by mouth every 12 (twelve) hours, Disp: 180 tablet, Rfl: 1    pantoprazole (PROTONIX) 40 mg tablet, take 1 tablet by mouth once daily, Disp: 90 tablet, Rfl: 3    potassium chloride (K-DUR,KLOR-CON) 10 mEq tablet, Take 1 tablet (10 mEq total) by mouth 2 (two) times a week, Disp: 8 tablet, Rfl: 1    tolterodine (DETROL) 2 mg tablet, Take 1 tablet (2 mg total) by mouth daily, Disp: 90 tablet, Rfl: 1    torsemide (DEMADEX) 20 mg tablet, Take 40 mg alternatively with 60 mg every other day, Disp: 100 tablet, Rfl: 0    No Known Allergies    Objective: There were no vitals filed for this visit  Right Shoulder Exam     Tenderness   Right shoulder tenderness location: Posterior aspect of the shoulder  Range of Motion   Active Abduction: 80 (Significant crepitus through range o)     Muscle Strength   Right shoulder normal muscle strength: Significant crepitus with strength testing    Abduction: 3/5 Internal Rotation: 5/5   External Rotation: 5/5     Tests   Drop Arm: positive    Other   Erythema: absent  Scars: absent  Sensation: normal  Pulse: present      Left Shoulder Exam     Tenderness   Left shoulder tenderness location: Upper trapezius and rhomboid region  Range of Motion   Active Abduction: 80 (Significant crepitus through range of motion)     Muscle Strength   Left shoulder normal muscle strength: Significant crepitus through strength test   Abduction: 3/5   Internal Rotation: 5/5   External Rotation: 5/5     Tests   Drop Arm: positive    Other   Erythema: absent  Scars: absent  Sensation: normal  Pulse: present             Physical Exam   Constitutional: He is oriented to person, place, and time  He appears well-developed and well-nourished  HENT:   Head: Normocephalic and atraumatic  Eyes: Conjunctivae are normal  Right eye exhibits no discharge  Left eye exhibits no discharge  Neck: Normal range of motion  Neck supple  Cardiovascular: Normal rate and intact distal pulses  Pulmonary/Chest:   He is on oxygen and does appear to have some difficulty with breathing which is chronic for him  Musculoskeletal:   As noted in HPI   Neurological: He is alert and oriented to person, place, and time  Skin: Skin is warm and dry  Psychiatric: He has a normal mood and affect  His behavior is normal  Judgment and thought content normal    Nursing note and vitals reviewed

## 2019-01-15 ENCOUNTER — TELEPHONE (OUTPATIENT)
Dept: FAMILY MEDICINE CLINIC | Facility: CLINIC | Age: 84
End: 2019-01-15

## 2019-01-15 ENCOUNTER — PATIENT OUTREACH (OUTPATIENT)
Dept: FAMILY MEDICINE CLINIC | Facility: CLINIC | Age: 84
End: 2019-01-15

## 2019-01-15 NOTE — PROGRESS NOTES
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

## 2019-01-15 NOTE — TELEPHONE ENCOUNTER
Forms received from [facility]   Placed in Dr Gilford Lana folder for signature  [facility]

## 2019-01-16 ENCOUNTER — TELEPHONE (OUTPATIENT)
Dept: CARDIOLOGY CLINIC | Facility: CLINIC | Age: 84
End: 2019-01-16

## 2019-01-16 NOTE — TELEPHONE ENCOUNTER
His son called  His father will not take his afib medication because of an ad he saw on tv  Please call Rudy Shirley to discuss this   [phone]

## 2019-01-22 ENCOUNTER — TELEPHONE (OUTPATIENT)
Dept: FAMILY MEDICINE CLINIC | Facility: CLINIC | Age: 84
End: 2019-01-22

## 2019-01-22 ENCOUNTER — PATIENT OUTREACH (OUTPATIENT)
Dept: FAMILY MEDICINE CLINIC | Facility: CLINIC | Age: 84
End: 2019-01-22

## 2019-01-22 NOTE — TELEPHONE ENCOUNTER
Forms received from [facility]   [place]

## 2019-01-23 NOTE — LETTER
October 1, 2018     Myke Zeng DO  [address] [address]    Patient: Wilbur Phoenix   YOB: 1930   Date of Visit: 10/1/2018       Dear Dr Kendra Inman: Thank you for referring Wilbur Phoenix to me for evaluation  Below are my notes for this consultation  If you have questions, please do not hesitate to call me  I look forward to following your patient along with you  Sincerely,        Rashel Cheney MD        CC: No Recipients  Rashel Cheney MD  10/1/2018  1:39 PM  Sign at close encounter  Assessment:  1  Chronic pain of both shoulders    2  Primary osteoarthritis of shoulders, bilateral        Plan:  Orders Placed This Encounter   Procedures    Ambulatory referral to Orthopedic Surgery     Standing Status:   Future     Standing Expiration Date:   4/1/2019     Referral Priority:   Routine     Referral Type:   Consult - AMB     Referral Reason:   Specialty Services Required     Referred to Provider:   Arnoldo Reynolds MD     Requested Specialty:   Orthopedic Surgery     Number of Visits Requested:   1     Expiration Date:   10/1/2019     My impressions and treatment recommendations were discussed in detail with the patient, who verbalized understanding and had no further questions  The patient reports that he has osteoarthritis of the bilateral shoulders  He reports excellent pain relief following a shoulder injection that Dr Shakila Mcclure had performed about 1 year ago  I asked him why he never followed up with Dr Shakila Mcclure and he stated that he is not sure  He states that he would be happy to return to Dr Shakila Mcclure to continue the shoulder corticosteroid injections  As such, I have provided him with a referral to see Dr Shakila Mcclure at today's visit  Follow-up is planned on an as-needed basis  Discharge instructions were provided  I personally saw and examined the patient and I agree with the above discussed plan of care      History of Present Illness: Nia Gonzalez is a 80 y o  male who presents to [facility] for initial evaluation of the above stated pain complaints  The patient has a past medical and chronic pain history as outlined in the assessment section  He was referred by Dr Solitario Cormier  The patient reports a several month history of pain in his bilateral shoulders  He describes his pain as severe and 10+ out of 10 on the verbal numerical pain rating scale  He states that his pain is constant in nature and worse in the morning, afternoon, evening  He describes his pain as shooting, sharp, dull/aching, and pins and needles    The patient reports weakness in his upper extremities  He states that lying down decreases pain while bending and exercise increases pain  He states that heat/ice treatment and chiropractic manipulation provided moderate pain relief  He is currently retired  The patient does report that a shoulder injection with corticosteroid in the past by Dr Richard Loyola gave him significant relief of symptoms  He is agreeable to see Dr Richard Loyola regarding undergoing further injections into his shoulders  Review of Systems:    Review of Systems   Constitutional: Negative for fever and unexpected weight change  HENT: Negative for trouble swallowing  Eyes: Negative for visual disturbance  Respiratory: Negative for shortness of breath and wheezing  Cardiovascular: Negative for chest pain and palpitations  Gastrointestinal: Negative for constipation, diarrhea, nausea and vomiting  Endocrine: Negative for cold intolerance, heat intolerance and polydipsia  Genitourinary: Negative for difficulty urinating and frequency  Musculoskeletal: Positive for gait problem (decreased range range of motion)  Negative for arthralgias, joint swelling and myalgias  Skin: Negative for rash  Neurological: Positive for weakness (muscle weakness)  Negative for dizziness, seizures, syncope and headaches  Hematological: Bruises/bleeds easily  Psychiatric/Behavioral: Negative for dysphoric mood  All other systems reviewed and are negative          Patient Active Problem List   Diagnosis    Moderate COPD (chronic obstructive pulmonary disease) (HCC)    [facility]oxysmal atrial fibrillation ([facility]    CAD in native artery    BPH (benign prostatic hyperplasia)    Hyperlipidemia    Hypertension    S/P TAVR (transcatheter aortic valve replacement)    Ambulatory dysfunction    Hearing difficulty of right ear    Chronic hypoxemic respiratory failure (HCC)    PATITO (obstructive sleep apnea)    Pleural effusion, right (chronic)    Multiple pulmonary nodules    Class 1 obesity in adult    Osteoarthritis of knee    Peripheral vascular disease ([facility] )    Diabetic polyneuropathy associated with type 2 diabetes mellitus ([facility] )    [facility]ipheral [facility]iosclerosis [facility]    Actinic keratosis    Allergic rhinitis    Centrilobular emphysema (HCC)    Cervical radiculopathy    Acute on chronic respiratory failure with hypoxia (HCC)    CKD (chronic kidney disease)    Chronic left shoulder pain    Chronic combined systolic and diastolic CHF, 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)    Hypothyroidism    GERD (gastroesophageal reflux disease)    Elevated d-dimer    MICKEY (iron deficiency anemia)    Corns    Pain in both feet    Onychomycosis    Pulmonary hypertension (HCC)    Chronic pain of both shoulders       Past Medical History:   Diagnosis Date    Allergic rhinitis 06/11/2010    Aortic stenosis, severe     Bacterial pneumonia 06/08/2007    Bladder neck obstruction 12/23/2010    BPH (benign prostatic hyperplasia)     Bronchitis, chronic obstructive, with exacerbation ([facility] ) 01/30/2012    CAD (coronary artery disease)     stent 2014    Carcinoma ([facility] )     resolved [id number]    Cardiomegaly 02/13/2007    Cataract of both eyes     CHF (congestive heart failure) (HCC)     Chronic allergic conjunctivitis     last assessed [id number]    Chronic kidney disease (CKD)     CKD (chronic kidney disease), stage III (HCC)     CKD (chronic kidney disease), stage III (HCC)     COPD (chronic obstructive pulmonary disease) ([facility] )     Decubitus ulcer     resolved [id number]    Dermatomycosis     last assessed [id number]    Diabetes mellitus type 2, noninsulin dependent ([facility] )     Dyspnea on exertion     last assessed [id number]    Eczema     last assessed [id number]    Edema     last assessed [id number]    Epistaxis 12/01/2004    Esophagitis, erosive     Exposure to scabies     resolved [id number]    Fracture of rib     last assessed [id number]    H/O aortic valve replacement     resolved [id number]    H/O blood clots     History of DVT (deep vein thrombosis)     left posterior tibial/peroneal veins    History of non-ST elevation myocardial infarction (NSTEMI)     History of pneumonia     Hyperlipidemia     Hypertension     Internal hemorrhoids 09/13/2006    LBBB (left bundle branch block)     MRSA (methicillin resistant staph aureus) culture positive     NSTEMI (non-ST elevated myocardial infarction) ([facility] )     resolved [id number]    Obstructive sleep apnea on CPAP     severe PATITO with AHI of 106 and uses CPAP at 10 cm H2O with 2 l/m oxygen    Paroxysmal atrial fibrillation ([facility] )     Phimosis 09/01/2010    severe pt had circumcision 9/2010    Pulmonary fibrosis ([facility] )     PVD (peripheral vascular disease) ([facility] )     Rotator cuff tendonitis     lsat assessed [id number]    Skin abscess 12/21/2004    Hip    Skin neoplasm     last assessed [id number]    Tachycardia 12/01/2004    Trigger finger     last assessed [id number]    Type 2 diabetes mellitus ([facility] ) 12/01/2004    Venous thrombosis 05/16/2007    Venous thrombosis 05/16/2007       Past Surgical History:   Procedure Laterality Date    CARDIAC CATHETERIZATION  03/10/2016    Showed 60-70% mid LAD lesion next to stent    CATARACT EXTRACTION      CIRCUMCISION, NON-      ESOPHAGOGASTRODUODENOSCOPY N/A 3/14/2016    Procedure: ESOPHAGOGASTRODUODENOSCOPY (EGD); Surgeon: Ngozi Reddy MD;  Location: [facility]; Service:     EYE SURGERY      FRACTURE SURGERY      JOINT REPLACEMENT      both hips replaced    OTHER SURGICAL HISTORY      H/O thoracentesis (diagnostic)    OR REPLACE AORTIC VALVE OPENFEMORAL ARTERY APPROACH N/A 2016    Procedure: TRANSFEMORAL TAVR WITH 26MM CALIX MAKAYLA S3 TISSUE VALVE; PINA ;  Surgeon: Homer Mcintyre DO;  Location:  MAIN OR;  Service: Cardiac Surgery    REPLACEMENT TOTAL KNEE Bilateral 1991 and     TISSUE AORTIC VALVE REPLACEMENT      transfemoral, transcatheter    TONSILLECTOMY      TONSILLECTOMY AND ADENOIDECTOMY      TOTAL HIP ARTHROPLASTY      Bilateral       Family History   Problem Relation Age of Onset    Coronary artery disease Mother     Arthritis Mother     Hypertension Mother     Rheum arthritis Father     Prostate cancer Father        Social History     Occupational History    Not on file       Social History Main Topics    Smoking status: Former Smoker     Packs/day: 1 00     Years: 35 00     Types: Cigarettes     Quit date: 1987    Smokeless tobacco: Never Used      Comment: quit 50 years ago, per [employer]    Alcohol use Yes      Comment: socially    Drug use: No    Sexual activity: No      Comment: , lives at home with wife, ambulates with cane at home         Current Outpatient Prescriptions:     albuterol (PROAIR HFA) 90 mcg/act inhaler, Inhale 2 puffs every 4 (four) hours as needed for wheezing or shortness of breath, Disp: 18 g, Rfl: 5    amiodarone 100 mg tablet, Take 1 tablet (100 mg total) by mouth daily, Disp: 90 tablet, Rfl: 3    amLODIPine (NORVASC) 10 mg tablet, Take 1 tablet (10 mg total) by mouth daily, Disp: 30 tablet, Rfl: 5    aspirin 81 mg chewable tablet, Chew daily, Disp: , Rfl:     atorvastatin (LIPITOR) 10 mg tablet, Take 2 tablets (20 mg total) by mouth daily, Disp: 90 tablet, Rfl: 3    B Complex Vitamins (VITAMIN B COMPLEX PO), Take by mouth, Disp: , Rfl:     BREO ELLIPTA, USE 1 INHALATION DAILY, Disp: 1 each, Rfl: 5    cyanocobalamin (VITAMIN B-12) 100 mcg tablet, Take by mouth, Disp: , Rfl:     docusate sodium (COLACE) 100 mg capsule, Take 100 mg by mouth daily at bedtime  , Disp: , Rfl:     DULoxetine (CYMBALTA) 30 mg delayed release capsule, Take 1 capsule (30 mg total) by mouth daily, Disp: 30 capsule, Rfl: 1    fenofibrate (TRICOR) 145 mg tablet, Take 145 mg by mouth daily  , Disp: , Rfl:     hydrALAZINE (APRESOLINE) 25 mg tablet, take 1 tablet by mouth every 8 hours, Disp: 90 tablet, Rfl: 0    IFEREX 150 150 MG capsule, take 1 capsule by mouth once daily, Disp: 30 capsule, Rfl: 2    isosorbide mononitrate (IMDUR) 30 mg 24 hr tablet, take 1 tablet by mouth once daily, Disp: 30 tablet, Rfl: 0    levothyroxine 50 mcg tablet, Take 1 tablet (50 mcg total) by mouth daily, Disp: 90 tablet, Rfl: 0    meloxicam (MOBIC) 15 mg tablet, Take 1 tablet (15 mg total) by mouth daily for 90 days As needed, Disp: 90 tablet, Rfl: 0    metoprolol tartrate (LOPRESSOR) 25 mg tablet, Take 1 tablet (25 mg total) by mouth every 12 (twelve) hours  , Disp: 60 tablet, Rfl: 2    pantoprazole (PROTONIX) 40 mg tablet, take 1 tablet by mouth once daily, Disp: 90 tablet, Rfl: 3    tolterodine (DETROL) 1 mg tablet, Take 1 mg by mouth daily, Disp: , Rfl:     torsemide (DEMADEX) 20 mg tablet, Take 1 tablet (20 mg total) by mouth 2 (two) times a day as needed (leg edema), Disp: 60 tablet, Rfl: 5    traMADol-acetaminophen (ULTRACET) 37 5-325 mg per tablet, Take 1 tablet by mouth 2 (two) times a day as needed for moderate pain, Disp: 30 tablet, Rfl: 0    diclofenac sodium (VOLTAREN) 1 %, Apply 4 g topically 4 (four) times a day Both shoulders and knees (Patient not taking: Reported on 10/1/2018 ), Disp: 5 Tube, Rfl: 5    No Known Allergies    Physical Exam:    /50 (BP Location: Right arm, Patient Position: Sitting, Cuff Size: Standard)   Pulse 67   Resp 12   Ht 5' 10" (1 778 m)   BMI 30 56 kg/m²      Constitutional: overweight  Eyes: anicteric  HEENT: grossly intact  Neck: supple, symmetric, trachea midline and no masses   Pulmonary:even and unlabored  Cardiovascular:No edema or pitting edema present  Skin:Normal without rashes or lesions and well hydrated  Psychiatric:Mood and affect appropriate  Neurologic:Cranial Nerves II-XII grossly intact  Musculoskeletal:in wheelchair, active range of motion modalities of the bilateral shoulders are limited to the 90 degree samina  The the there is pain on palpation of the posterior, superior, and anterior portion of the bilateral shoulder joints      Orders Placed This Encounter   Procedures    Ambulatory referral to Orthopedic Surgery no distress

## 2019-01-29 ENCOUNTER — OFFICE VISIT (OUTPATIENT)
Dept: PODIATRY | Facility: CLINIC | Age: 84
End: 2019-01-29
Payer: COMMERCIAL

## 2019-01-29 VITALS
RESPIRATION RATE: 16 BRPM | HEIGHT: 70 IN | SYSTOLIC BLOOD PRESSURE: 120 MMHG | BODY MASS INDEX: 29.35 KG/M2 | DIASTOLIC BLOOD PRESSURE: 72 MMHG | WEIGHT: 205 LBS | HEART RATE: 70 BPM

## 2019-01-29 DIAGNOSIS — I70.209 PERIPHERAL ARTERIOSCLEROSIS (HCC): Chronic | ICD-10-CM

## 2019-01-29 DIAGNOSIS — L84 CORNS: ICD-10-CM

## 2019-01-29 DIAGNOSIS — M79.671 PAIN IN BOTH FEET: Primary | ICD-10-CM

## 2019-01-29 DIAGNOSIS — M79.672 PAIN IN BOTH FEET: Primary | ICD-10-CM

## 2019-01-29 PROCEDURE — 11056 PARNG/CUTG B9 HYPRKR LES 2-4: CPT | Performed by: PODIATRIST

## 2019-01-30 NOTE — PROGRESS NOTES
Procedures   Foot Exam     Discussion/Summary  The patient was counseled regarding diagnostic results,-- instructions for management,-- prognosis,-- patient and family education,-- risks and benefits of treatment options,-- importance of compliance with treatment  Patient is able to Self-Care  Possible side effects of new medications were reviewed with the patient/guardian today  The treatment plan was reviewed with the patient/guardian  The patient/guardian understands and agrees with the treatment plan   Patient's shoes and socks removed  Right Foot/Ankle   Right Foot Inspection  Skin Exam: callus and callus               Toe Exam: swelling  Sensory   Vibration: diminished  Proprioception: diminished   Monofilament testing: diminished  Vascular  Capillary refills: < 3 seconds  The right DP pulse is 1+  The right PT pulse is 1+       Left Foot/Ankle  Left Foot Inspection  Skin Exam: callus              Toe Exam: swelling                   Sensory   Vibration: diminished  Proprioception: diminished  Monofilament: diminished  Vascular  Capillary refills: < 3 seconds  The left DP pulse is 1+  The left PT pulse is 1+  Assign Risk Category:  Deformity present; Loss of protective sensation; Weak pulses       Risk: 2     Chief Complaint  Routine nail care      History of Present Illness  HPI: Patient complains of pain in his feet with ambulation  Patient has no history of trauma  Patient has pain wearing shoes       Review of Systems   Constitutional: chills   Eyes: eyesight problems    ENT: hearing loss   Cardiovascular: No complaints of chest pain, no palpitations, no leg claudication or lower extremity edema   Respiratory: shortness of breath   Gastrointestinal: No complaints of abdominal pain, no constipation, no nausea or vomiting, no diarrhea or bloody stools   Genitourinary: No complaints of dysuria or incontinence, no hesitancy, no nocturia   Musculoskeletal: as noted in HPI   Integumentary: No complaints of skin rash or lesion, no itching or dry skin, no skin wounds   Neurological: No complaints of headache, no confusion, no numbness or tingling, no dizziness   Psychiatric: No suicidal thoughts, no anxiety, no depression   Endocrine: muscle weakness,-- frequent urination-- and-- excessive thirst       Active Problems  1  Abnormal gait (781 2) (R26 9)  2  Acquired ankle/foot deformity (736 70) (M21 969)  3  Actinic keratosis (702 0) (L57 0)  4  Allergic rhinitis (477 9) (J30 9)  5  Arteriosclerosis of arteries of extremities (440 20) (I70 209)  6  Atrial fibrillation (427 31) (I48 91)  7  Benign essential HTN (401 1) (I10)  8  BPH (benign prostatic hyperplasia) (600 00) (N40 0)  9  Bursitis, subacromial (726 19) (M75 50)  10  CAD in native artery (414 01) (I25 10)  11  Callus (700) (L84)  12  Centrilobular emphysema (492 8) (J43 2)  13  Cervical radiculopathy (723 4) (M54 12)  14  Denied: History of Chest tightness  15  Chronic hypoxemic respiratory failure (518 83,799 02) (J96 11)  16  Chronic kidney disease, stage 3 (585 3) (N18 3)  17  Chronic left shoulder pain (719 41,338 29) (M25 512,G89 29)  18  Chronic systolic congestive heart failure (428 22,428 0) (I50 22)  19  Congestive heart failure (428 0) (I50 9)  20  COPD (chronic obstructive pulmonary disease) (496) (J44 9)  21  Denied: History of Cough  22  Depression screening (V79 0) (Z13 89)  23  Difficulty walking (719 7) (R26 2)  24  Dizziness (780 4) (R42)  25  Esophagitis determined by endoscopy (530 10) (K20 9)  26  Foot pain, bilateral (729 5) (M79 671,M79 672)  27  Heart failure, left, 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)  28  High triglycerides (272 1) (E78 1)  29  History of aortic valve stenosis (V12 59) (Z86 79)  30  Hyperlipidemia LDL goal <100 (272 4) (E78 5)  31  Hyperthyroidism (242 90) (E05 90)  32  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)  33  Hyponatremia (276 1) (E87 1)  34  Hypothyroidism (244 9) (E03 9)  35  Immunization due (V05 9) (Z23)  36  Obesity with alveolar hypoventilation, unspecified obesity severity (278 03) (E66 2)  37  Obstructive sleep apnea (327 23) (G47 33)  38  Onychomycosis (110 1) (B35 1)  39  Osteoarthritis of left shoulder (715 91) (M19 012)  40  Osteoarthritis, knee/lower leg (715 96) (M17 9)  41  Peripheral vascular disease (443 9) (I73 9)  42  Pleural Effusion  43  Prediabetes (790 29) (R73 09)  44  Primary osteoarthritis of left shoulder (715 11) (M19 012)  45  Pulmonary fibrosis (515) (J84 10)  46  Pulmonary nodule, left (793 11) (R91 1)  47  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)  48  Screening for cardiovascular, respiratory, and genitourinary diseases  (V81 2,V81 4,V81 6) (Z13 6,Z13 83,Z13 89)  49  Screening for neurological condition (V80 09) (Z13 89)  50   Sleep related hypoventilation in conditions classified elsewhere (327 26) (G47 36)     Past Medical History   · Acute maxillary sinusitis (461 0) (J01 00)   · History of Acute maxillary sinusitis, recurrence not specified (461 0) (J01 00)   · Acute upper respiratory infection (465 9) (J06 9)   · History of Acute upper respiratory infection (465 9) (J06 9)   · History of Bladder neck obstruction (596 0) (N32 0)   · History of Bronchitis, chronic obstructive, with exacerbation (491 21) (J44 1)   · History of Cataract of both eyes (366 9) (H26 9)   · History of Cellulitis (682 9) (L03 90)   · History of Cellulitis (682 9) (L03 90)   · History of Cellulitis of foot (682 7) (L03 119)   · History of Cellulitis of neck (682 1) (K08 837)   · Denied: History of Chest tightness   · History of Chronic allergic conjunctivitis (372 14) (H10 45)   · Denied: History of Cough   · History of 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)   · History of Diabetes type 2, uncontrolled (250 02) (E11 65)   · History of Dyspnea on exertion (786 09) (R06 09)   · History of Exposure to scabies (V01 89) (Z20 89)   · History of Foreign Body In The Right Auditory Canal (931)   · H/O blood clots (V12 51) (Z86 718)   · History of acute bronchitis (V12 69) (Z87 09)   · History of acute bronchitis (V12 69) (Z87 09)   · History of acute bronchitis (V12 69) (Z87 09)   · History of allergic rhinitis (V12 69) (Z87 09)   · History of aortic valve replacement (V43 3) (Z95 2)   · History of backache (V13 59) (Z87 39)   · History of bacterial pneumonia (V12 61) (Z87 01)   · History of bronchitis (V12 69) (Z87 09)   · History of carcinoma (V10 90) (Z85 9)   · History of cardiomegaly (V12 59) (Z86 79)   · History of constipation (V12 79) (Z87 19)   · History of decubitus ulcer (V13 3) (Z87 2)   · History of dermatitis (V13 3) (Z87 2)   · History of dizziness (V13 89) (U82 446)   · History of eczema (V13 3) (Z87 2)   · History of edema (V13 89) (O62 395)   · History of epistaxis (V12 69) (M94 174)   · History of fever (V13 89) (F54 611)   · History of fracture of rib (V15 51) (Z87 81)   · History of influenza (V12 09) (Z87 09)   · History of phimosis (V13 89) (L96 481)   · History of shortness of breath (V13 89) (V58 859)   · History of tinea corporis (V12 09) (Z86 19)   · History of tinea corporis (V12 09) (Z86 19)   · History of Internal Hemorrhoids (455 0)   · History of Knee Sprain (844 9)   · Late Effects Of Sprain Or Strain (905 7)   · History of Noninfected skin tear of right lower extremity, initial encounter (891 0)(S81 811A)   · History of Non-ST elevation myocardial infarction (NSTEMI) of indeterminate age   · History of Normal routine physical examination (V70 0) (Z00 00)   · Otalgia, unspecified laterality (388 70) (H92 09)   · History of Otalgia, unspecified laterality (388 70) (H92 09)   · History of Pneumonia (V12 61)   · History of Pneumonia (486) (J18 9)   · History of Postop check (V67 00) (Z09)   · History of Rotator cuff tendinitis (726 10) (M75 80)   · Skin Abscess Of Hip (682 6)   · History of Skin neoplasm (239 2) (D49 2)   · History of Sprain and strain (848 9) (T14 8XXA)   · History of Tachycardia (785 0) (R00 0)   · History of Trigger finger (727 03) (M65 30)   · History of Type 2 diabetes mellitus (250 00) (E11 9)   · Venous thrombosis (453 9) (I82 90)     The active problems and past medical history were reviewed and updated today       Surgical History   · History of Aortic Valve Replacement Transcatheter   · History of Cataract Surgery   · History of Hip Replacement   · History of Knee Replacement   · History of Surgery Penis Circumcision Except [place]   · History of Thoracentesis (Diagnostic)   · History of Tonsillectomy     The surgical history was reviewed and updated today        Family History  Mother    · Family history of arthritis (V17 7) (Z82 61)   · Family history of coronary artery disease (V17 3) (Z82 49)   · Family history of hypertension (V17 49) (Z82 49)  Father    · Family history of arthritis (V17 7) (Z82 61)   · Family history of Prostate cancer  Family History    · Denied: Family history of Pulmonary Disease     The family history was reviewed and updated today        Social History   · Being A Social Drinker   · Denied: Drug use (305 90) (F19 90)   · Former smoker (V15 82) (Z87 891)   · Pt quit smoking 50 yrs ago    · History of Former smoker (V15 82) (Z87 891)   · History of Former smoker (V15 82) (Z87 891)   · QUIT SMOKING 25-35 YEARS AGO AND HE SMOKED 1 PPD FOR 45YEARS    · Denied: History of Pets / animals   · Denied: History of Recreational drug use   · Denied: History of Travel history  The social history was reviewed and updated today       Current Meds  1  Amiodarone HCl - 100 MG Oral Tablet; TAKE 1 TABLET DAILY (CONTACT CARDIOLOGIST FOR ADDITIONAL REFILLS); Therapy: 87EUW3540 to (Last Rx:2017)  Requested for: 09PTV1823 Ordered  2  AmLODIPine Besylate 10 MG Oral Tablet; TAKE 1 TABLET DAILY; Therapy: 00IWA9499 to (Last Rx:2017)  Requested for: 96LPK4895 Ordered  3  Aspirin 81 MG Oral Tablet Chewable; 2 daily; Therapy: (Felecia Dow) to Recorded  4  Breo Ellipta 100-25 MCG/INH Inhalation Aerosol Powder Breath Activated; INHALE 1 PUFFS Daily; Therapy: 69MJE2020 to (Trista Taylor) Recorded  5  Cialis 2 5 MG Oral Tablet; Take 1 tablet daily as directed; Therapy: 40HGN8062 to (Evaluate:22Nov2017)  Requested for: 53LBP1399; Last Rx:99Iax1257 Ordered  6  Colace 100 MG Oral Capsule; 1 Two Times A Day, As Needed; Therapy: 16NUP3347 to [facility] for: 31LUP6943 Recorded  7  Fenofibrate 145 MG Oral Tablet; TAKE 1 TABLET BY MOUTH EVERY OTHER DAY; Therapy: 44XJP9106 to (74 831 591)  Requested for: 46FGG7434; Last Rx:03Nov2017 Ordered  8  Ipratropium-Albuterol 0 5-2 5 (3) MG/3ML Inhalation Solution; Use 1 vial in nebulizer 4 times daily; Therapy: 27Cwq0790 to (Evaluate:45Fzs7349)  Requested for: 83NRX5669; Last Rx:09May2016 Ordered  9  Levothyroxine Sodium 50 MCG Oral Tablet; Take 1 tablet daily, JESSICA; Therapy: 48JKW9200 to (Last Rx:27Nov2017)  Requested for: 91FQA2143 Ordered  10  Metoprolol Tartrate 25 MG Oral Tablet; take 1 tablet by mouth twice a day; [place] Gu[place]: 27XYZ9471 to (Keyona Valentino)  Requested for: 63ZKQ4897; Last  Rx:03Nov2017 Ordered  11  Simvastatin 40 MG Oral Tablet; take 1 tablet by mouth once daily;  Therapy: (JJEGPJQF:89ALI3398) to Recorded  12  Tolterodine Tartrate ER 4 MG Oral Capsule Extended Release 24 Hour; TAKE 1  CAPSULE ONCE DAILY;  Therapy: 76VGD4059 to (YWXDSLWJ:04SKU7414)  Requested for: 30GQV3049; Last  Rx:39Kbs1784 Ordered  13  Torsemide 20 MG Oral Tablet; TAKE 1 TABLET DAILY ALTERNATING WITH 2 TABLETS  (40 MG) DAILY;  Therapy: 97LZD0377 to (Keyona Valentino)  Requested for: 36TTX1670; Last  Rx:03Nov2017 Ordered  14  Vitamin B Complex CAPS;  Therapy: (Diamante Laser) to Recorded  15  Vitamin B-12 TABS;  Therapy: (Diamante Laser) to Recorded  16  Vitamin C TABS;  Therapy: (Diamante Laser) to Recorded  17   Vitamin E 400 UNIT Oral Capsule;  Therapy: (Diamante Laser) to Recorded     The medication list was reviewed and updated today       Allergies  1  No Known Drug Allergies     Vitals          Height 5 ft 9 in 5 ft 9 in   Weight 218 lb  218 lb    BMI Calculated 32 19 32 19   BSA Calculated 2 14 2 14      Physical Exam  Left Foot: Appearance: Normal except as noted: excessive pronation-- and-- pes planus  Great toe deformities include a bunion  Tenderness: None except the distal first metatarsal-- and-- distal fifth metatarsal    Right Foot: Appearance: Normal except as noted: excessive pronation-- and-- pes planus  Great toe deformities include a bunion  Tenderness: None except the distal first metatarsal-- and-- distal fifth metatarsal    Left Ankle: ROM: limited ROM in all planes   Right Ankle: ROM: limited ROM in all planes   Neurological Exam: performed  Light touch was intact bilaterally  Vibratory sensation was intact bilaterally  Response to monofilament test was intact bilaterally  Deep tendon reflexes: patellar reflex present bilaterally-- and-- achilles reflex present bilaterally  Vascular Exam: performed Dorsalis pedis pulses were One/4 bilaterally  Posterior tibial pulses were One/4 bilaterally  Elevation Pallor: present bilaterally  Dependence rubor was present bilaterally  Capillary refill time was Q9 findings bilateral  Negative digital hair noted  Positive abnormal cooling bilateral, but-- between 1-3 seconds bilaterally  Toenails: All of the toenails were elongated,-- hypertrophied,-- discolored,-- tender-- and-- Mycotic  Hyperkeratosis: present on both first sub metatarsals-- and-- present on both fifth sub metatarsals  Shoe Gear Evaluation: performed ()  Recommendation(s): custom inlays       Procedure  Foot exam performed  All mycotic nails debrided  Plantar lesions debrided  Patient tolerated procedures well without pain or complication  Patient will moisturize after bathing  He will return for follow-up            There are no diagnoses linked to this encounter        Subjective:   Patient has pain in his feet with ambulation   No history of trauma      Patient ID: Genaro Murray is a 80 y  o  male         Objective:      Foot Exam     Right Foot/Ankle      Inspection and Palpation  Skin Exam: callus;      Neurovascular  Dorsalis pedis: 1+  Posterior tibial: 1+        Left Foot/Ankle       Inspection and Palpation  Skin Exam: callus;      Neurovascular  Dorsalis pedis: 1+  Posterior tibial: 1+        Physical Exam   Cardiovascular: Pulses are weak pulses  Pulses:       Dorsalis pedis pulses are 1+ on the right side, and 1+ on the left side         Posterior tibial pulses are 1+ on the right side, and 1+ on the left side  Feet:   Right Foot:   Skin Integrity: Positive for callus     Left Foot:   Skin Integrity: Positive for callus

## 2019-02-01 DIAGNOSIS — I48.0 PAF (PAROXYSMAL ATRIAL FIBRILLATION) (HCC): ICD-10-CM

## 2019-02-04 RX ORDER — AMIODARONE HYDROCHLORIDE 100 MG/1
TABLET ORAL
Qty: 90 TABLET | Refills: 1 | Status: SHIPPED | OUTPATIENT
Start: 2019-02-04

## 2019-02-11 ENCOUNTER — APPOINTMENT (OUTPATIENT)
Dept: LAB | Facility: HOSPITAL | Age: 84
End: 2019-02-11
Attending: INTERNAL MEDICINE
Payer: COMMERCIAL

## 2019-02-11 ENCOUNTER — TRANSCRIBE ORDERS (OUTPATIENT)
Dept: ADMINISTRATIVE | Facility: HOSPITAL | Age: 84
End: 2019-02-11

## 2019-02-11 DIAGNOSIS — I27.20 PULMONARY HYPERTENSION (HCC): ICD-10-CM

## 2019-02-11 DIAGNOSIS — N18.4 STAGE 4 CHRONIC KIDNEY DISEASE (HCC): ICD-10-CM

## 2019-02-11 LAB
ALBUMIN SERPL BCP-MCNC: 2.9 G/DL (ref 3.5–5)
ALP SERPL-CCNC: 50 U/L (ref 46–116)
ALT SERPL W P-5'-P-CCNC: 17 U/L (ref 12–78)
ANION GAP SERPL CALCULATED.3IONS-SCNC: 10 MMOL/L (ref 4–13)
AST SERPL W P-5'-P-CCNC: 45 U/L (ref 5–45)
BASOPHILS # BLD AUTO: 0.02 THOUSANDS/ΜL (ref 0–0.1)
BASOPHILS NFR BLD AUTO: 0 % (ref 0–1)
BILIRUB SERPL-MCNC: 0.6 MG/DL (ref 0.2–1)
BUN SERPL-MCNC: 49 MG/DL (ref 5–25)
CALCIUM SERPL-MCNC: 8.9 MG/DL (ref 8.3–10.1)
CHLORIDE SERPL-SCNC: 98 MMOL/L (ref 100–108)
CO2 SERPL-SCNC: 32 MMOL/L (ref 21–32)
CREAT SERPL-MCNC: 2.7 MG/DL (ref 0.6–1.3)
EOSINOPHIL # BLD AUTO: 0.18 THOUSAND/ΜL (ref 0–0.61)
EOSINOPHIL NFR BLD AUTO: 2 % (ref 0–6)
ERYTHROCYTE [DISTWIDTH] IN BLOOD BY AUTOMATED COUNT: 16.8 % (ref 11.6–15.1)
GFR SERPL CREATININE-BSD FRML MDRD: 20 ML/MIN/1.73SQ M
GLUCOSE P FAST SERPL-MCNC: 101 MG/DL (ref 65–99)
HCT VFR BLD AUTO: 39.8 % (ref 36.5–49.3)
HGB BLD-MCNC: 11.5 G/DL (ref 12–17)
IMM GRANULOCYTES # BLD AUTO: 0.06 THOUSAND/UL (ref 0–0.2)
IMM GRANULOCYTES NFR BLD AUTO: 1 % (ref 0–2)
LYMPHOCYTES # BLD AUTO: 1.05 THOUSANDS/ΜL (ref 0.6–4.47)
LYMPHOCYTES NFR BLD AUTO: 12 % (ref 14–44)
MCH RBC QN AUTO: 25.1 PG (ref 26.8–34.3)
MCHC RBC AUTO-ENTMCNC: 28.9 G/DL (ref 31.4–37.4)
MCV RBC AUTO: 87 FL (ref 82–98)
MONOCYTES # BLD AUTO: 0.72 THOUSAND/ΜL (ref 0.17–1.22)
MONOCYTES NFR BLD AUTO: 8 % (ref 4–12)
NEUTROPHILS # BLD AUTO: 7.06 THOUSANDS/ΜL (ref 1.85–7.62)
NEUTS SEG NFR BLD AUTO: 77 % (ref 43–75)
NRBC BLD AUTO-RTO: 0 /100 WBCS
PLATELET # BLD AUTO: 338 THOUSANDS/UL (ref 149–390)
PMV BLD AUTO: 10.9 FL (ref 8.9–12.7)
POTASSIUM SERPL-SCNC: 4 MMOL/L (ref 3.5–5.3)
PROT SERPL-MCNC: 7.8 G/DL (ref 6.4–8.2)
RBC # BLD AUTO: 4.58 MILLION/UL (ref 3.88–5.62)
SODIUM SERPL-SCNC: 140 MMOL/L (ref 136–145)
WBC # BLD AUTO: 9.09 THOUSAND/UL (ref 4.31–10.16)

## 2019-02-11 PROCEDURE — 80053 COMPREHEN METABOLIC PANEL: CPT

## 2019-02-11 PROCEDURE — 36415 COLL VENOUS BLD VENIPUNCTURE: CPT

## 2019-02-11 PROCEDURE — 85025 COMPLETE CBC W/AUTO DIFF WBC: CPT

## 2019-02-13 ENCOUNTER — TELEPHONE (OUTPATIENT)
Dept: FAMILY MEDICINE CLINIC | Facility: CLINIC | Age: 84
End: 2019-02-13

## 2019-02-13 DIAGNOSIS — J44.9 MODERATE COPD (CHRONIC OBSTRUCTIVE PULMONARY DISEASE) (HCC): ICD-10-CM

## 2019-02-13 DIAGNOSIS — J43.2 CENTRILOBULAR EMPHYSEMA (HCC): ICD-10-CM

## 2019-02-13 DIAGNOSIS — J96.11 CHRONIC HYPOXEMIC RESPIRATORY FAILURE (HCC): ICD-10-CM

## 2019-02-13 DIAGNOSIS — N18.4 STAGE 4 CHRONIC KIDNEY DISEASE (HCC): ICD-10-CM

## 2019-02-13 DIAGNOSIS — I25.10 CAD IN NATIVE ARTERY: Primary | Chronic | ICD-10-CM

## 2019-02-13 NOTE — TELEPHONE ENCOUNTER
Jessica Castanon is requesting In [facility] Dr Cara Ogden needs to approve a consult for hospice  if acceptable [facility] [phone]  In [address] Please advise   [facility]

## 2019-02-13 NOTE — TELEPHONE ENCOUNTER
I called [facility] but they are closed for the day   If someone can call them tomorrow to let them know that the order was done by dr Charna Riedel, MA

## 2019-02-13 NOTE — TELEPHONE ENCOUNTER
I created/printed the referral for hospice    Please contact [facility] regarding the referral

## 2019-02-14 ENCOUNTER — TELEPHONE (OUTPATIENT)
Dept: FAMILY MEDICINE CLINIC | Facility: CLINIC | Age: 84
End: 2019-02-14

## 2019-02-15 ENCOUNTER — PATIENT OUTREACH (OUTPATIENT)
Dept: FAMILY MEDICINE CLINIC | Facility: CLINIC | Age: 84
End: 2019-02-15

## 2019-02-15 NOTE — PROGRESS NOTES
Bryce Urbano has been referred to Hospice  I have left voice message for Jovan Reveles with offer of assistance and return contact number  I will remove my name from care team if I don not get a reply

## 2019-02-16 DIAGNOSIS — N32.81 OAB (OVERACTIVE BLADDER): Primary | ICD-10-CM

## 2019-02-17 RX ORDER — TOLTERODINE 4 MG/1
CAPSULE, EXTENDED RELEASE ORAL
Qty: 90 CAPSULE | Refills: 1 | Status: SHIPPED | OUTPATIENT
Start: 2019-02-17 | End: 2019-04-17

## 2019-02-18 ENCOUNTER — TELEPHONE (OUTPATIENT)
Dept: CARDIOLOGY CLINIC | Facility: CLINIC | Age: 84
End: 2019-02-18

## 2019-02-18 ENCOUNTER — TELEPHONE (OUTPATIENT)
Dept: FAMILY MEDICINE CLINIC | Facility: CLINIC | Age: 84
End: 2019-02-18

## 2019-02-18 NOTE — TELEPHONE ENCOUNTER
Daisy  calling to speak with Keven Mays  Spoke last week regarding patient      Erendira [phone]

## 2019-02-18 NOTE — TELEPHONE ENCOUNTER
Please call him back  If he is feeling great and he has no dizziness or lightheadedness we can continue same medications  If he continues to lose weight they should call us by Wednesday to tell me house he doing  Then we may need decrease his water pill

## 2019-02-18 NOTE — TELEPHONE ENCOUNTER
Spoke with Cresencio Han, she just wanted to make you aware that he is down three pounds  Can you please advise?

## 2019-02-19 ENCOUNTER — TELEPHONE (OUTPATIENT)
Dept: CARDIOLOGY CLINIC | Facility: CLINIC | Age: 84
End: 2019-02-19

## 2019-02-21 DIAGNOSIS — I27.20 PULMONARY HYPERTENSION (HCC): ICD-10-CM

## 2019-02-21 RX ORDER — POTASSIUM CHLORIDE 750 MG/1
TABLET, EXTENDED RELEASE ORAL
Qty: 8 TABLET | Refills: 1 | Status: SHIPPED | OUTPATIENT
Start: 2019-02-21

## 2019-02-21 NOTE — TELEPHONE ENCOUNTER
On receiving paperwork  PO Boxes are not acceptable  The PO Box was [place]  When Calling the pt actual address was [place] [address]  [facility] was then called back to reevaluate the pt  The agency will accept pt and insurance for Hospice in [place]  Face sheet Prescription which states Hospice evaluate and treat and a ICD 10 code of J44 9 COPD  And last Office note all faxed to [phone]    [facility]

## 2019-02-21 NOTE — TELEPHONE ENCOUNTER
[facility] declined the pt on 02/14/2019 [address] was [place]  This week on investigating with Sal Doran  Discovered pt lives in [place]     Now all information has been refaxed to [phone]  Phone [phone]  Task is complete for now  marina

## 2019-02-21 NOTE — TELEPHONE ENCOUNTER
Spoke with Daisy  who stated Hospice needs to be ordered  [address] [phone] was contacted Prescription for  Hospice Care  Evaluation and Treatment was ordered ICD10 used was J44 9  COPD  Attn  Hospice Intake Prescription signed   Demographics and last Office note were faxed as well to [phone]  Gary Barr is aware of the paperwork started again with a different company   Fax was received  Task Complete for now    [facility]

## 2019-02-23 DIAGNOSIS — I27.20 PULMONARY HYPERTENSION (HCC): ICD-10-CM

## 2019-02-23 RX ORDER — POTASSIUM CHLORIDE 750 MG/1
TABLET, EXTENDED RELEASE ORAL
Qty: 8 TABLET | Refills: 1 | Status: SHIPPED | OUTPATIENT
Start: 2019-02-23

## 2019-02-25 ENCOUNTER — TELEPHONE (OUTPATIENT)
Dept: FAMILY MEDICINE CLINIC | Facility: CLINIC | Age: 84
End: 2019-02-25

## 2019-02-25 NOTE — TELEPHONE ENCOUNTER
[facility]   [facility] in Dr Valery Casas folder for completion  [facility]

## 2019-04-08 ENCOUNTER — TELEPHONE (OUTPATIENT)
Dept: FAMILY MEDICINE CLINIC | Facility: CLINIC | Age: 84
End: 2019-04-08

## 2019-04-17 DIAGNOSIS — N32.81 OAB (OVERACTIVE BLADDER): ICD-10-CM

## 2019-04-17 RX ORDER — TOLTERODINE TARTRATE 2 MG/1
TABLET, EXTENDED RELEASE ORAL
Qty: 90 TABLET | Refills: 1 | Status: SHIPPED | OUTPATIENT
Start: 2019-04-17

## 2019-05-12 DIAGNOSIS — M25.512 CHRONIC PAIN OF BOTH SHOULDERS: ICD-10-CM

## 2019-05-12 DIAGNOSIS — G89.29 CHRONIC PAIN OF BOTH SHOULDERS: ICD-10-CM

## 2019-05-12 DIAGNOSIS — I10 ESSENTIAL HYPERTENSION: Chronic | ICD-10-CM

## 2019-05-12 DIAGNOSIS — M25.511 CHRONIC PAIN OF BOTH SHOULDERS: ICD-10-CM

## 2019-05-12 RX ORDER — DULOXETIN HYDROCHLORIDE 30 MG/1
CAPSULE, DELAYED RELEASE ORAL
Qty: 90 CAPSULE | Refills: 1 | OUTPATIENT
Start: 2019-05-12

## 2019-05-12 RX ORDER — AMLODIPINE BESYLATE 5 MG/1
TABLET ORAL
Qty: 90 TABLET | Refills: 1 | OUTPATIENT
Start: 2019-05-12

## 2021-10-27 NOTE — PLAN OF CARE

## 2024-01-11 NOTE — PROGRESS NOTES
Progress Note - Cardiology Office  Nidia Guillory 80 y o  male MRN: [id number]  : 10/24/1930  Encounter: [id number]      Assessment:     1  Paroxysmal atrial fibrillation (HCC)    2  CAD in native artery    3  Benign hypertension with CKD (chronic kidney disease) stage IV ([facility] )    4  Chronic hypoxemic respiratory failure (HCC)    5  Pulmonary hypertension ([facility] )    6  Centrilobular emphysema ([facility] )    7  Moderate COPD (chronic obstructive pulmonary disease) ([facility] )    8  PATITO (obstructive sleep apnea)    9  Stage 4 chronic kidney disease ([facility] )    10  S/P TAVR (transcatheter aortic valve replacement)    11  Ambulatory dysfunction        Discussion Summary and Plan:  1  Chronic systolic and diastolic heart failure [place] heart Association class 3  Patient was recently in the hospital   He is on Demadex 40 mg to alternate with 60 mg  His weight currently is around 205 lb  There were advised to check him every day and call me if there is a weight gain of 3 lb in a day and 5 lb in a week  They will take additional Demadex pill that day  Patient and caretaker Understood it very well  2   Known moderate COPD with history of chronic hypoxia on oxygen therapy at home  Not actively wheezing  He is on chronic oxygen therapy  3   History of severe aortic stenosis status post TAVR in 2017  Patient valve is functioning adequately  He does have ischemic cardiomyopathy EF around 35% and has underlying LBBB could benefit from biventricular pacemaker patient refused  He want only conservative Rx  4   CAD status post angioplasty of LAD in   Has residual 50-60% lad stenosis on medical Rx  No symptoms of angina    5  Dyslipidemia on statin  Tolerating well  Patient is tolerating Lipitor 10 milligram without any issues    6  Paroxysmal atrial fibrillation currently in sinus suppressed with low-dose amiodarone  Heart rate is now around 70 beats per minute  7   Hypothyroidism    On levothyroxine management as per PMD    8  Moderate obesity with recent loss of weight  9  Hypertension seemed to well controlled with current therapy  10  Chronic kidney disease is stage4  Creatinine is now elevated  Creatinine is now around 2 5-3  That may be new baseline  He slowly declining  I had discussion with patient and patient's son in the past along with significant others regarding slow decline in patient's overall condition  Counseling :  A description of the counseling  above reviewed in detail  Patient's ability to self care: Yes  Medication side effect reviewed with patient in detail and all their questions answered to their satisfaction  HPI :     Fausto Zaman is a 80y o  year old male who came for follow up  Patient has past medical history significant for moderate COPD, chronic combined systolic and diastolic heart failure, severe aortic stenosis status post TAVR in August of 2017, CAD status post angioplasty of LAD, chronic LBBB, CKD stage 3, type 2 diabetes mellitus, dyslipidemia, paroxysmal atrial fibrillation suppressed with amiodarone in sinus rhythm with no reoccurrence, CO2 dependent due to chronic hypoxia  with history of coronary artery disease status post angioplasty of LAD with a residual 60% mid stenosis, severe aortic stenosis status post TAVR in [facility], known moderate to severe COPD, paroxysmal atrial fibrillation currently suppressed with amiodarone, on home oxygen therapy, chronic LBBB, CRI, hypertension, history of diastolic heart failure who was recently admitted to [facility] with a COPD exacerbation and Astelin heart failure was discharged home but was not taking his Demadex as prescribed  For couple days he did not take it at all and now he has started back  He was feeling short of breath particularly when he exerts  Since he started back on it he has been feeling a lot better  He still get hypoxic when he walks short distances   He denies any chest pain, any fever, any wheezing, any chills, any nausea or any increased leg swelling or weight gain  04/30/2018  Patient was recently admitted to [facility] with acute tracheobronchitis and COPD along with some diastolic heart failure  He recovered in nursing home  He still dropped his oxygen when he walks but as per him he quickly goes back to 94-95  Denies any leg swelling  No chest pain  Has chronic shortness of breath  No fever no chills no nausea no vomiting  No cough no other significant complaint  09/12/2018  Above reviewed  Patient came for follow-up  His main complaint is about bilateral shoulder pain and muscle soreness which is getting worse as per him and because of that he cannot move  He denies any chest pain  Denies any shortness of breath  No fever no chills no nausea no vomiting no leg swelling no other significant complaint  He is trying to lose weight  He was admitted in July with chronic diastolic heart failure with exacerbation and now much improvent    12/17/2018    Above reviewed  Patient was recently discharged home  He was admitted with chronic systolic and diastolic heart failure  His weight now is around 205 lb  He had weight in the hospital was 203-204 lb  Weight has been stable  No significant leg edema  He is taking Demadex 40 mg to alternate with 60 mg  His breathing is better while resting but with exertion he gets symptomatic which is not changed  No nausea no vomiting no PND no orthopnea  He came with caretaker  Review of Systems   Constitutional: Positive for fatigue  Negative for activity change, chills, diaphoresis, fever and unexpected weight change  HENT: Negative for congestion  Eyes: Negative for discharge and redness  Respiratory: Positive for shortness of breath  Negative for cough, chest tightness and wheezing  Chronic and much better since his discharge   Cardiovascular: Negative    Negative for chest pain, palpitations and leg swelling  Gastrointestinal: Negative for abdominal pain, diarrhea and nausea  Endocrine: Negative  Genitourinary: Negative for decreased urine volume and urgency  Musculoskeletal: Positive for arthralgias and gait problem  Negative for back pain  Bilateral shoulder pain   Skin: Negative for rash and wound  Allergic/Immunologic: Negative  Neurological: Negative for dizziness, seizures, syncope, weakness, light-headedness and headaches  Hematological: Negative  Psychiatric/Behavioral: Positive for sleep disturbance  Negative for agitation and confusion  The patient is nervous/anxious  All other systems reviewed and are negative        Historical Information   Past Medical History:   Diagnosis Date    Allergic rhinitis 06/11/2010    Aortic stenosis, severe     Bacterial pneumonia 06/08/2007    Bladder neck obstruction 12/23/2010    BPH (benign prostatic hyperplasia)     Bronchitis, chronic obstructive, with exacerbation ([facility] ) 01/30/2012    CAD (coronary artery disease)     stent 2014    [facility]cinoma [facility]     resolved [id number]    Cardiomegaly 02/13/2007    Cataract of both eyes     CHF (congestive heart failure) (HCC)     Chronic allergic conjunctivitis     last assessed [id number]    Chronic kidney disease (CKD)     CKD (chronic kidney disease), stage III (HCC)     CKD (chronic kidney disease), stage III (HCC)     COPD (chronic obstructive pulmonary disease) ([facility] )     Decubitus ulcer     resolved [id number]    Dermatomycosis     last assessed [id number]    Diabetes mellitus type 2, noninsulin dependent ([facility] )     Dyspnea on exertion     last assessed [id number]    Eczema     last assessed [id number]    Edema     last assessed [id number]    Epistaxis 12/01/2004    Esophagitis, erosive     Exposure to scabies     resolved [id number]    Fracture of rib     last assessed [id number]    H/O aortic valve replacement     resolved [id number]    H/O blood clots     History of DVT (deep vein thrombosis)     left posterior tibial/peroneal veins    History of non-ST elevation myocardial infarction (NSTEMI)     History of pneumonia     Hyperlipidemia     Hypertension     Internal hemorrhoids 2006    LBBB (left bundle branch block)     MRSA (methicillin resistant staph aureus) culture positive     NSTEMI (non-ST elevated myocardial infarction) ([facility] )     resolved [id number]    Obstructive sleep apnea on CPAP     severe PATITO with AHI of 106 and uses CPAP at 10 cm H2O with 2 l/m oxygen    Paroxysmal atrial fibrillation ([facility] )     Phimosis 2010    severe pt had circumcision 2010    Pulmonary fibrosis ([facility] )     PVD (peripheral vascular disease) ([facility] )     Rotator cuff tendonitis     lsat assessed [id number]    Skin abscess 2004    Hip    Skin neoplasm     last assessed [id number]    Tachycardia 2004    Trigger finger     last assessed [id number]    Type 2 diabetes mellitus ([facility] ) 2004    Venous thrombosis 2007    Venous thrombosis 2007     Past Surgical History:   Procedure Laterality Date    CARDIAC CATHETERIZATION  03/10/2016    Showed 60-70% mid LAD lesion next to stent    CATARACT EXTRACTION      CIRCUMCISION, NON-      ESOPHAGOGASTRODUODENOSCOPY N/A 3/14/2016    Procedure: ESOPHAGOGASTRODUODENOSCOPY (EGD); Surgeon: Ngozi Reddy MD;  Location: [facility];   Service:     EYE SURGERY      FRACTURE SURGERY      JOINT REPLACEMENT      both hips replaced    OTHER SURGICAL HISTORY      H/O thoracentesis (diagnostic)    MO REPLACE AORTIC VALVE OPENFEMORAL ARTERY APPROACH N/A 2016    Procedure: TRANSFEMORAL TAVR WITH 26MM CALIX MAKAYLA S3 TISSUE VALVE; PINA ;  Surgeon: Homer Mcintyre DO;  Location:  MAIN OR;  Service: Cardiac Surgery    REPLACEMENT TOTAL KNEE Bilateral 1991 and     TISSUE AORTIC VALVE REPLACEMENT      transfemoral, transcatheter    TONSILLECTOMY      TONSILLECTOMY AND ADENOIDECTOMY      TOTAL HIP ARTHROPLASTY      Bilateral     History   Alcohol Use    Yes     Comment: socially     History   Drug Use No     History   Smoking Status    Former Smoker    Packs/day: 1 00    Years: 35 00    Types: Cigarettes    Quit date: 1/1/1987   Smokeless Tobacco    Never Used     Comment: quit 50 years ago, per [employer]     Family History:   Family History   Problem Relation Age of Onset    Coronary artery disease Mother     Arthritis Mother     Hypertension Mother     Rheum arthritis Father     Prostate cancer Father        Meds/Allergies     No Known Allergies    Current Outpatient Prescriptions:     albuterol (PROAIR HFA) 90 mcg/act inhaler, Inhale 2 puffs every 4 (four) hours as needed for wheezing or shortness of breath, Disp: 18 g, Rfl: 5    amiodarone 100 mg tablet, Take 1 tablet (100 mg total) by mouth daily, Disp: 90 tablet, Rfl: 3    amLODIPine (NORVASC) 5 mg tablet, Take 1 tablet (5 mg total) by mouth daily, Disp: 90 tablet, Rfl: 0    aspirin (ECOTRIN LOW STRENGTH) 81 mg EC tablet, Take 81 mg by mouth daily, Disp: , Rfl:     atorvastatin (LIPITOR) 10 mg tablet, TAKE 2 TABLETS BY MOUTH DAILY, Disp: 90 tablet, Rfl: 3    B Complex Vitamins (VITAMIN B COMPLEX PO), Take by mouth, Disp: , Rfl:     BREO ELLIPTA, USE 1 INHALATION DAILY, Disp: 1 each, Rfl: 5    cyanocobalamin (VITAMIN B-12) 100 mcg tablet, Take by mouth daily  , Disp: , Rfl:     diclofenac sodium (VOLTAREN) 1 %, Apply 4 g topically 2 (two) times a day as needed (pain) Both shoulders and knees, Disp: 5 Tube, Rfl: 0    docusate sodium (COLACE) 100 mg capsule, Take 100 mg by mouth daily at bedtime  , Disp: , Rfl:     fenofibrate (TRICOR) 145 mg tablet, Take 1 tablet (145 mg total) by mouth daily, Disp: 90 tablet, Rfl: 3    hydrALAZINE (APRESOLINE) 25 mg tablet, TAKE 1 TABLET BY MOUTH EVERY 8 HOURS, Disp: 90 tablet, Rfl: 0    IFEREX 150 150 MG capsule, take 1 capsule by mouth once daily, Disp: 30 capsule, Rfl: 2    ipratropium-albuterol (DUO-NEB) 0 5-2 5 mg/3 mL nebulizer solution, Take 1 vial (3 mL total) by nebulization 4 (four) times a day, Disp: , Rfl: 0    isosorbide mononitrate (IMDUR) 30 mg 24 hr tablet, take 1 tablet by mouth once daily, Disp: 30 tablet, Rfl: 0    levothyroxine 50 mcg tablet, take 1 tablet by mouth once daily, Disp: 30 tablet, Rfl: 5    metoprolol tartrate (LOPRESSOR) 25 mg tablet, Take 1 tablet (25 mg total) by mouth every 12 (twelve) hours, Disp: 180 tablet, Rfl: 3    pantoprazole (PROTONIX) 40 mg tablet, take 1 tablet by mouth once daily, Disp: 90 tablet, Rfl: 3    tolterodine (DETROL) 2 mg tablet, Take 1 tablet (2 mg total) by mouth daily, Disp: 90 tablet, Rfl: 1    torsemide (DEMADEX) 20 mg tablet, Take 40 mg alternatively with 60 mg every other day, Disp: 100 tablet, Rfl: 0    traMADol-acetaminophen (ULTRACET) 37 5-325 mg per tablet, Take 1 tablet by mouth 2 (two) times a day as needed for moderate pain (Patient not taking: Reported on 12/13/2018 ), Disp: 30 tablet, Rfl: 0    Vitals: Blood pressure 118/66, pulse 76, height 5' 10" (1 778 m), weight 93 kg (205 lb), SpO2 93 %  Body mass index is 29 41 kg/m²  Vitals:    12/17/18 1459   Weight: 93 kg (205 lb)     BP Readings from Last 3 Encounters:   12/17/18 118/66   12/14/18 132/68   12/09/18 145/65         Physical Exam   Constitutional: He is oriented to person, place, and time  He appears well-developed  No distress  HENT:   Head: Normocephalic and atraumatic  Eyes: Pupils are equal, round, and reactive to light  Neck: Normal range of motion  Neck supple  No JVD present  No thyromegaly present  Cardiovascular: Normal rate  Murmur heard  Pulmonary/Chest: Effort normal  No respiratory distress  He has wheezes  He has no rales  Bilateral air entry with bilateral chronic rhonchi and wheezing  Abdominal: Soft  Bowel sounds are normal  He exhibits no distension  There is no tenderness   There is no rebound  Musculoskeletal: Normal range of motion  He exhibits no edema or tenderness  No lower extremity edema   Neurological: He is alert and oriented to person, place, and time  Skin: Skin is warm and dry  He is not diaphoretic  Psychiatric: He has a normal mood and affect  His behavior is normal  Judgment normal          Diagnostic Studies Review Cardio:    Echocardiogram/PINA: Echo Doppler done of in September of 2017 shows EF 40%, paradoxical septal motion, mildly dilated LV, the lateral right atrium and left atrium, mild-to-moderate TR with PA pressure 65 mm of mercury, bioprosthetic valve at aortic position which is Villarreal Esdras TAVR valve with normal function and mean gradient of 9 mm of mercury with no paravalvular leak  Mild-to-moderate MR noted  As compared to old study patient's EF is now around 40% and aortic stenosis has improved  Catheterization: Cardiac catheterization done shows he has 50-60% LAD stenosis  There is a patent stent seen in the mid and distal LAD  Nonobstructive disease seen in the right coronary artery and circumflex  This was done in March 2016  Other: Chest x-ray done few weeks ago shows evidence of interstitial fibrosis  No pneumonia no CHF  ECG Report: 6/27/2017 tablet EKG shows sinus rhythm with first-degree AV block, IVCD or incomplete LBBB  Not changed from old EKG  Patient had history of rate-related LBBB     Twelve lead EKG 04/30/2018 shows normal sinus rhythm heart rate 65 beats per minute  IVCD  No change from old EKG    Twelve lead EKG done on 12/17/2018 shows sinus rhythm with first-degree AV block underlying LBBB not change from old EKG  Heart rate is 78 beats per minute      Cardiac testing:   Results for orders placed during the hospital encounter of 03/28/18   Echo complete with contrast if indicated    [address]  [address] [facility], [place]   [phone]    Transthoracic Echocardiogram  2D, M-mode, Doppler, and Color Doppler    Study date:  30-Mar-2018    Patient: Jeramie Murray  MR number: [id number]  Account number: [de-identified]  : 24-Oct-1930  Age: 80 years  Gender: Male  Status: Routine  Location: Bedside  Height: 70 in  Weight: 213 lb  BP: 146/ 65 mmHg    Indications: Coronary Artery Disease    Diagnoses: I50 9 - Heart failure, unspecified    Sonographer:  Annia Sharma, RCS, RVS  Primary Physician:  Mariela Peacock DO  Referring Physician:  Rosendo Hood MD  Group:  [id number]  Interpreting Physician:  Randal Tripathi MD    SUMMARY    LEFT VENTRICLE:  Size was at the upper limits of normal   Systolic function was moderately reduced  Ejection fraction was estimated in the range of 35 % to 40 % to be 35 %  There was moderate diffuse hypokinesis  Wall thickness was mildly increased  There was mild concentric hypertrophy  Doppler parameters were consistent with abnormal left ventricular relaxation (grade 1 diastolic dysfunction)  VENTRICULAR SEPTUM:  There was moderate paradoxical motion  These changes are consistent with LBBB  RIGHT VENTRICLE:  The size was at the upper limits of normal     LEFT ATRIUM:  The atrium was moderately dilated  RIGHT ATRIUM:  The atrium was mildly dilated  MITRAL VALVE:  There was mild to moderate annular calcification  There was mild regurgitation  AORTIC VALVE:  There was trace regurgitation  TRICUSPID VALVE:  There was moderate regurgitation  Estimated peak PA pressure was 70 mmHg  The findings suggest severe pulmonary hypertension  PULMONIC VALVE:  There was mild regurgitation  HISTORY: PRIOR HISTORY: HTN, PVD, Atrial Fibrillation, LBBB, CHF, CAD, COPD, TAVR    PROCEDURE: The procedure was performed at the bedside  This was a routine study  The transthoracic approach was used  The study included complete 2D imaging, M-mode, complete spectral Doppler, and color Doppler   The heart rate was 75 bpm,  at the start of the study  Images were obtained from the parasternal, apical, subcostal, and suprasternal notch acoustic windows  Echocardiographic views were limited due to poor acoustic window availability  This was a technically  difficult study  LEFT VENTRICLE: Size was at the upper limits of normal  Systolic function was moderately reduced  Ejection fraction was estimated in the range of 35 % to 40 % to be 35 %  There was moderate diffuse hypokinesis  Wall thickness was mildly  increased  There was mild concentric hypertrophy  DOPPLER: Doppler parameters were consistent with abnormal left ventricular relaxation (grade 1 diastolic dysfunction)  VENTRICULAR SEPTUM: Thickness was mildly increased  There was moderate paradoxical motion  These changes are consistent with LBBB  RIGHT VENTRICLE: The size was at the upper limits of normal  Systolic function was low normal     LEFT ATRIUM: The atrium was moderately dilated  RIGHT ATRIUM: The atrium was mildly dilated  MITRAL VALVE: There was mild to moderate annular calcification  There was lower normal leaflet separation  DOPPLER: There was no evidence for stenosis  There was mild regurgitation  AORTIC VALVE: TAVR had been performed previously  Peak velocity 2 6 to 2 7 m/sec  Mean gradient 16-17 mm and peak gradient of 28-30 mm of hg and FATMATA 1 30 to 1 35 cm2 DOPPLER: There was trace regurgitation  TRICUSPID VALVE: DOPPLER: There was moderate regurgitation  Estimated peak PA pressure was 70 mmHg  The findings suggest severe pulmonary hypertension  PULMONIC VALVE: DOPPLER: There was mild regurgitation  PERICARDIUM: There was no thickening or calcification  There was no pericardial effusion  AORTA: The root exhibited normal size  SYSTEMIC VEINS: IVC: The inferior vena cava was upper normal in size   Respirophasic changes were normal     SYSTEM MEASUREMENT TABLES    2D mode  AoR Diam 2D: 2 6 cm  LA Diam (2D): 5 3 cm  LA/Ao (2D): 2 04  FS (2D Teich): 6 82 %  IVSd (2D): 1 58 cm  LVDEV: 152 cm³  LVESV: 129 cm³  LVIDd(2D): 5 57 cm  LVISd (2D): 5 19 cm  LVOT Area 2D: 3 8 cm squared  LVPWd (2D): 1 39 cm  SV (Teich): 23 cm³    Apical four chamber  LVEF A4C: 25 %    Unspecified Scan Mode  FATMATA Cont Eq (Peak Pasquale): 1 56 cm squared  FATMATA Cont Eq (VTI): 1 61 cm squared  LVOT (VTI): 22 1 cm  LVOT Diam : 2 2 cm  LVOT Vmax: 1070 mm/s  LVOT Vmax; Mean: 1040 mm/s  Peak Grad ; Mean: 4 mm[Hg]  SV (LVOT): 82 cm³  VTI;Mean: 3 mm[Hg]  MV Peak A Pasquale: 1150 mm/s  MV Peak E Pasquale  Mean: 718 mm/s  MVA (PHT): 1 95 cm squared  PHT: 110 ms  Max P mm[Hg]  V Max: 3760 mm/s  Vmax: 3550 mm/s  RA Area: 22 5 cm squared  RA Volume: 66 8 cm³    [employer] Accredited Echocardiography Laboratory    Prepared and electronically signed by    Lexie Whitt MD  Signed 30-Mar-2018 16:35:19         Results for orders placed during the hospital encounter of 16   Cardiac catheterization    Narrative Cardiac Catheterization Operative Report    Primary care doctor: Dr Sarah Khan     Cardiologist is Dr Rogena Spatz    Date of procedure:  3/10/2016    Brief history: -26-year-old male with history of chronic renal   insufficiency, CAD, status post angioplasty of LAD, known severe COPD who   was admitted with hypoxic respiratory failure and has a troponin of 10  He   has a known history of severe aortic stenosis along with moderate   pulmonary hypertension  Hence he was scheduled for complete heart cath    Procedure Details  The risks, benefits, complications, including risk of stroke, heart   attack, bleeding and even death but not limited to at along with treatment   options, and expected outcomes were discussed with the patient  The   patient and/or family concurred with the proposed plan, giving informed   consent  Patient was brought to the cath lab after IV hydration was begun   and oral premedication was given  He was further sedated with midazolam   and fentanyl   He was prepped and draped in the usual manner  Using the   modified Seldinger access technique, a 6 [place] sheath was placed in the   right common femoral artery without any complications    A left heart   catheterization was done  Angiograms were also done  End of the procedure   patient was transferred to [facility] area without any complications  He   tolerated the procedure well  After the procedure was completed, sedation was stopped and the sheaths   and catheters were all removed  Hemostasis was achieved with fem stop  Access was obtained in the right femoral artery as well as right femoral   vein    Equipment used:   1  JR4 for right coronary angiography  2  JL 4 0 for left coronary angiography   3  LV gram with AL1    Findings:  1  Dominance: Right dominant coronary system    2  Left main:  Is a normal-size vessel it has mild calcification noted  It   bifurcates into large LAD and a circumflex system    3  LAD: LAD is a large-size vessel and it has proximal calcification  Just   after septal it has focal about 60-65% stenosis  This stent in the LAD is   patent after the stenosis  No other focal stenosis seen  4  Circumflex is a nondominant medium size vessel  It has proximally   35-40% stenosis seen  5  RCA: RCA is large vessel and has mild luminal irregularities causing   35% stenosis proximally and mid area  RPL branch has around 35% stenosis   in the mid area  5  LV gram: LV gram was not done as patient has renal insufficiency  However there was 25 mm gradient across aortic valve  Right heart catheter meters: Aortic pressure 128/74  LV pressure 152/10  Card to call output  About 5 L  PA pressure 55 mmHg  RV pressure 55/10  RA mean 8-10  Wedge pressure was not very accurate as patient has lots of respiratory   motions  Itis to be around 16 minutes of mercury  Estimated Blood Loss: Minimal  Complications:  None; patient tolerated the procedure well  Recommendation: Continue medical therapy   Continue risk factor   modification and patient will have a functional stress test as an   outpatient to see if that LAD needs to be done  It seemed to be stable   lesion and less likely to be the cause of patient's non-ST elevation MI  We'll continue other Rx before  Check BMP and CBC  See orders     Disposition: Hemodynamically stable and PACU - hemodynamically stable  Condition: stable       Imaging:  Chest X-Ray:   Xr Chest Pa & Lateral    Result Date: 10/16/2017  Impression Interstitial fibrosis and chronic pleural changes on the right  No acute infiltrate is seen Workstation performed: [id number]     Xr Chest Pa & Lateral    Result Date: 5/9/2017  Impression Loculated pleural effusion on the right appears smaller  Bibasilar atelectasis  Workstation performed: [id number]     CT-scan of the chest:     No CTA results available for this patient    Lab Review   Lab Results   Component Value Date    WBC 6 79 12/05/2018    HGB 9 1 (L) 12/05/2018    HCT 31 0 (L) 12/05/2018    MCV 88 12/05/2018    RDW 16 6 (H) 12/05/2018     12/09/2018     BMP:  Lab Results   Component Value Date     10/16/2017    K 4 1 12/09/2018     12/09/2018    CO2 35 (H) 12/09/2018    ANIONGAP 13 9 10/08/2015    BUN 71 (H) 12/09/2018    CREATININE 2 75 (H) 12/09/2018    GLUCOSE 109 (H) 10/16/2017    CALCIUM 9 2 12/09/2018    EGFR 20 12/09/2018    MG 2 2 12/08/2018     LFT:  Lab Results   Component Value Date    AST 47 (H) 11/29/2018    ALT 26 11/29/2018    ALKPHOS 55 11/29/2018    PROT 7 9 10/16/2017    BILITOT 0 4 10/16/2017       Lab Results   Component Value Date    HGBA1C 5 3 06/12/2018     Lipid Profile:   Lab Results   Component Value Date    CHOL 97 (L) 10/16/2017    HDL 51 10/26/2018    LDLCALC 45 10/16/2017    TRIG 88 10/26/2018     Lab Results   Component Value Date    CHOL 97 (L) 10/16/2017    CHOL 110 06/08/2017     Lab Results   Component Value Date    CKTOTAL 111 01/06/2017    TROPONINI 0 02 11/29/2018     Dr Sallie Carrera MD [facility] [place]      "This note has been constructed using a voice recognition system  Therefore there may be syntax, spelling, and/or grammatical errors   Please call if you have any questions  " You can access the FollowMyHealth Patient Portal offered by [facility] by registering at the following website: [email or web address]. By joining [email or web address]’s FollowMyHealth portal, you will also be able to view your health information using other applications (apps) compatible with our system. You can access the FollowMyHealth Patient Portal offered by [facility] by registering at the following website: [email or web address]. By joining [employer]’s FollowMyHealth portal, you will also be able to view your health information using other applications (apps) compatible with our system.

## 2025-04-23 NOTE — ASSESSMENT & PLAN NOTE
· Continue home dose amiodarone 100 mg p o   Daily  · Patient is currently in normal sinus rhythm  · Per Cardiology report patient refuses long-term anticoagulation Isacc